# Patient Record
Sex: FEMALE | Race: WHITE | Employment: OTHER | URBAN - METROPOLITAN AREA
[De-identification: names, ages, dates, MRNs, and addresses within clinical notes are randomized per-mention and may not be internally consistent; named-entity substitution may affect disease eponyms.]

---

## 2017-02-23 ENCOUNTER — OFFICE VISIT (OUTPATIENT)
Dept: FAMILY MEDICINE CLINIC | Age: 67
End: 2017-02-23

## 2017-02-23 VITALS
DIASTOLIC BLOOD PRESSURE: 90 MMHG | HEIGHT: 64 IN | WEIGHT: 140 LBS | HEART RATE: 57 BPM | SYSTOLIC BLOOD PRESSURE: 160 MMHG | OXYGEN SATURATION: 97 % | BODY MASS INDEX: 23.9 KG/M2

## 2017-02-23 DIAGNOSIS — I25.10 CORONARY ARTERY DISEASE INVOLVING NATIVE CORONARY ARTERY OF NATIVE HEART WITHOUT ANGINA PECTORIS: ICD-10-CM

## 2017-02-23 DIAGNOSIS — R35.0 URINARY FREQUENCY: ICD-10-CM

## 2017-02-23 DIAGNOSIS — J45.20 MILD INTERMITTENT ASTHMA WITHOUT COMPLICATION: ICD-10-CM

## 2017-02-23 DIAGNOSIS — F32.A DEPRESSION, UNSPECIFIED DEPRESSION TYPE: ICD-10-CM

## 2017-02-23 DIAGNOSIS — I10 ESSENTIAL HYPERTENSION: Primary | ICD-10-CM

## 2017-02-23 DIAGNOSIS — I24.9 ACS (ACUTE CORONARY SYNDROME) (HCC): ICD-10-CM

## 2017-02-23 DIAGNOSIS — F41.9 ANXIETY: ICD-10-CM

## 2017-02-23 DIAGNOSIS — K21.9 GASTROESOPHAGEAL REFLUX DISEASE WITHOUT ESOPHAGITIS: ICD-10-CM

## 2017-02-23 DIAGNOSIS — F41.0 PANIC DISORDER: ICD-10-CM

## 2017-02-23 DIAGNOSIS — K52.9 COLITIS: ICD-10-CM

## 2017-02-23 DIAGNOSIS — Z79.899 LONG TERM USE OF DRUG: ICD-10-CM

## 2017-02-23 LAB
AMPHETAMINE SCREEN, URINE: NORMAL
BARBITURATE SCREEN, URINE: NORMAL
BASOPHILS ABSOLUTE: 0.2 K/UL (ref 0–0.2)
BASOPHILS RELATIVE PERCENT: 2.4 %
BENZODIAZEPINE SCREEN, URINE: NORMAL
BILIRUBIN, POC: ABNORMAL
BLOOD URINE, POC: ABNORMAL
CLARITY, POC: ABNORMAL
COCAINE METABOLITE SCREEN URINE: NORMAL
COLOR, POC: ABNORMAL
EOSINOPHILS ABSOLUTE: 0.1 K/UL (ref 0–0.6)
EOSINOPHILS RELATIVE PERCENT: 1.6 %
GLUCOSE URINE, POC: ABNORMAL
HCT VFR BLD CALC: 42.6 % (ref 36–48)
HEMOGLOBIN: 13.4 G/DL (ref 12–16)
KETONES, POC: ABNORMAL
LEUKOCYTE EST, POC: ABNORMAL
LYMPHOCYTES ABSOLUTE: 2.1 K/UL (ref 1–5.1)
LYMPHOCYTES RELATIVE PERCENT: 29.6 %
MCH RBC QN AUTO: 27.8 PG (ref 26–34)
MCHC RBC AUTO-ENTMCNC: 31.5 G/DL (ref 31–36)
MCV RBC AUTO: 88.3 FL (ref 80–100)
MDMA URINE: NORMAL
METHADONE SCREEN, URINE: NORMAL
METHAMPHETAMINE, URINE: NORMAL
MONOCYTES ABSOLUTE: 0.6 K/UL (ref 0–1.3)
MONOCYTES RELATIVE PERCENT: 7.9 %
NEUTROPHILS ABSOLUTE: 4.2 K/UL (ref 1.7–7.7)
NEUTROPHILS RELATIVE PERCENT: 58.5 %
NITRITE, POC: ABNORMAL
OPIATE SCREEN URINE: NORMAL
OXYCODONE SCREEN URINE: NORMAL
PDW BLD-RTO: 16.2 % (ref 12.4–15.4)
PH, POC: 6
PHENCYCLIDINE SCREEN URINE: NORMAL
PLATELET # BLD: 395 K/UL (ref 135–450)
PMV BLD AUTO: 9.5 FL (ref 5–10.5)
PROPOXYPHENE SCREEN, URINE: NORMAL
PROTEIN, POC: ABNORMAL
RBC # BLD: 4.82 M/UL (ref 4–5.2)
SPECIFIC GRAVITY, POC: 1
THC: NORMAL
TRICYCLIC ANTIDEPRESSANTS, UR: NORMAL
UROBILINOGEN, POC: NORMAL
WBC # BLD: 7.2 K/UL (ref 4–11)

## 2017-02-23 PROCEDURE — 80305 DRUG TEST PRSMV DIR OPT OBS: CPT | Performed by: NURSE PRACTITIONER

## 2017-02-23 PROCEDURE — G8484 FLU IMMUNIZE NO ADMIN: HCPCS | Performed by: NURSE PRACTITIONER

## 2017-02-23 PROCEDURE — 36415 COLL VENOUS BLD VENIPUNCTURE: CPT | Performed by: NURSE PRACTITIONER

## 2017-02-23 PROCEDURE — 81002 URINALYSIS NONAUTO W/O SCOPE: CPT | Performed by: NURSE PRACTITIONER

## 2017-02-23 PROCEDURE — 3014F SCREEN MAMMO DOC REV: CPT | Performed by: NURSE PRACTITIONER

## 2017-02-23 PROCEDURE — 3017F COLORECTAL CA SCREEN DOC REV: CPT | Performed by: NURSE PRACTITIONER

## 2017-02-23 PROCEDURE — G8427 DOCREV CUR MEDS BY ELIG CLIN: HCPCS | Performed by: NURSE PRACTITIONER

## 2017-02-23 PROCEDURE — 99215 OFFICE O/P EST HI 40 MIN: CPT | Performed by: NURSE PRACTITIONER

## 2017-02-23 PROCEDURE — G8598 ASA/ANTIPLAT THER USED: HCPCS | Performed by: NURSE PRACTITIONER

## 2017-02-23 PROCEDURE — G8420 CALC BMI NORM PARAMETERS: HCPCS | Performed by: NURSE PRACTITIONER

## 2017-02-23 PROCEDURE — 1036F TOBACCO NON-USER: CPT | Performed by: NURSE PRACTITIONER

## 2017-02-23 RX ORDER — BUDESONIDE AND FORMOTEROL FUMARATE DIHYDRATE 80; 4.5 UG/1; UG/1
2 AEROSOL RESPIRATORY (INHALATION) 2 TIMES DAILY
Qty: 3 INHALER | Refills: 1 | Status: SHIPPED | OUTPATIENT
Start: 2017-02-23 | End: 2017-07-13 | Stop reason: SDUPTHER

## 2017-02-23 RX ORDER — SIMVASTATIN 10 MG
10 TABLET ORAL EVERY EVENING
Qty: 90 TABLET | Refills: 1 | Status: SHIPPED | OUTPATIENT
Start: 2017-02-23 | End: 2017-07-13 | Stop reason: SDUPTHER

## 2017-02-23 RX ORDER — CIPROFLOXACIN 500 MG/1
500 TABLET, FILM COATED ORAL 2 TIMES DAILY
Qty: 10 TABLET | Refills: 0 | Status: SHIPPED | OUTPATIENT
Start: 2017-02-23 | End: 2017-02-28

## 2017-02-23 RX ORDER — ATENOLOL 25 MG/1
TABLET ORAL
Qty: 90 TABLET | Refills: 1 | Status: SHIPPED | OUTPATIENT
Start: 2017-02-23 | End: 2017-07-13 | Stop reason: SDUPTHER

## 2017-02-23 RX ORDER — PANTOPRAZOLE SODIUM 40 MG/1
TABLET, DELAYED RELEASE ORAL
Qty: 90 TABLET | Refills: 1 | Status: SHIPPED | OUTPATIENT
Start: 2017-02-23 | End: 2017-07-13 | Stop reason: SDUPTHER

## 2017-02-23 RX ORDER — ALBUTEROL SULFATE 90 UG/1
2 AEROSOL, METERED RESPIRATORY (INHALATION) EVERY 6 HOURS PRN
Qty: 3 INHALER | Refills: 1 | Status: SHIPPED | OUTPATIENT
Start: 2017-02-23 | End: 2017-07-13 | Stop reason: SDUPTHER

## 2017-02-23 RX ORDER — CLOPIDOGREL BISULFATE 75 MG/1
75 TABLET ORAL DAILY
Qty: 90 TABLET | Refills: 1 | Status: SHIPPED | OUTPATIENT
Start: 2017-02-23 | End: 2017-07-13 | Stop reason: SDUPTHER

## 2017-02-23 RX ORDER — NITROGLYCERIN 0.4 MG/1
0.4 TABLET SUBLINGUAL EVERY 5 MIN PRN
Qty: 25 TABLET | Refills: 2 | Status: SHIPPED | OUTPATIENT
Start: 2017-02-23 | End: 2018-04-10 | Stop reason: SDUPTHER

## 2017-02-23 RX ORDER — ALPRAZOLAM 1 MG/1
1 TABLET ORAL 2 TIMES DAILY PRN
Qty: 180 TABLET | Refills: 0 | Status: SHIPPED | OUTPATIENT
Start: 2017-02-23 | End: 2017-07-13 | Stop reason: SDUPTHER

## 2017-02-23 RX ORDER — HYDROCHLOROTHIAZIDE 12.5 MG/1
12.5 CAPSULE, GELATIN COATED ORAL DAILY
Qty: 90 CAPSULE | Refills: 1 | Status: SHIPPED | OUTPATIENT
Start: 2017-02-23 | End: 2017-08-11 | Stop reason: SDUPTHER

## 2017-02-23 ASSESSMENT — ENCOUNTER SYMPTOMS
DIARRHEA: 0
VOMITING: 0
SHORTNESS OF BREATH: 0
CHEST TIGHTNESS: 0
CONSTIPATION: 0
STRIDOR: 0
ABDOMINAL DISTENTION: 0
NAUSEA: 0
WHEEZING: 0
CHOKING: 0
FACIAL SWELLING: 0
BACK PAIN: 0
COUGH: 0
APNEA: 0
PHOTOPHOBIA: 0

## 2017-02-24 ENCOUNTER — TELEPHONE (OUTPATIENT)
Dept: FAMILY MEDICINE CLINIC | Age: 67
End: 2017-02-24

## 2017-02-24 LAB
A/G RATIO: 1.3 (ref 1.1–2.2)
ALBUMIN SERPL-MCNC: 4.3 G/DL (ref 3.4–5)
ALP BLD-CCNC: 90 U/L (ref 40–129)
ALT SERPL-CCNC: 11 U/L (ref 10–40)
ANION GAP SERPL CALCULATED.3IONS-SCNC: 19 MMOL/L (ref 3–16)
AST SERPL-CCNC: 22 U/L (ref 15–37)
BILIRUB SERPL-MCNC: 0.3 MG/DL (ref 0–1)
BUN BLDV-MCNC: 28 MG/DL (ref 7–20)
CALCIUM SERPL-MCNC: 9.9 MG/DL (ref 8.3–10.6)
CHLORIDE BLD-SCNC: 100 MMOL/L (ref 99–110)
CHOLESTEROL, TOTAL: 179 MG/DL (ref 0–199)
CO2: 22 MMOL/L (ref 21–32)
CREAT SERPL-MCNC: 1.2 MG/DL (ref 0.6–1.2)
GFR AFRICAN AMERICAN: 55
GFR NON-AFRICAN AMERICAN: 45
GLOBULIN: 3.3 G/DL
GLUCOSE BLD-MCNC: 84 MG/DL (ref 70–99)
HDLC SERPL-MCNC: 78 MG/DL (ref 40–60)
LDL CHOLESTEROL CALCULATED: 85 MG/DL
POTASSIUM SERPL-SCNC: 4.7 MMOL/L (ref 3.5–5.1)
SODIUM BLD-SCNC: 141 MMOL/L (ref 136–145)
TOTAL PROTEIN: 7.6 G/DL (ref 6.4–8.2)
TRIGL SERPL-MCNC: 81 MG/DL (ref 0–150)
VLDLC SERPL CALC-MCNC: 16 MG/DL

## 2017-02-27 ENCOUNTER — TELEPHONE (OUTPATIENT)
Dept: FAMILY MEDICINE CLINIC | Age: 67
End: 2017-02-27

## 2017-07-06 ENCOUNTER — HOSPITAL ENCOUNTER (OUTPATIENT)
Dept: ENDOSCOPY | Age: 67
Discharge: HOME | End: 2017-07-06
Attending: INTERNAL MEDICINE | Admitting: INTERNAL MEDICINE

## 2017-07-06 VITALS
WEIGHT: 132.5 LBS | OXYGEN SATURATION: 97 % | DIASTOLIC BLOOD PRESSURE: 68 MMHG | HEART RATE: 59 BPM | TEMPERATURE: 97.1 F | HEIGHT: 65 IN | BODY MASS INDEX: 22.08 KG/M2 | SYSTOLIC BLOOD PRESSURE: 109 MMHG | RESPIRATION RATE: 18 BRPM

## 2017-07-06 RX ORDER — SODIUM CHLORIDE 9 MG/ML
INJECTION, SOLUTION INTRAVENOUS CONTINUOUS
Status: DISCONTINUED | OUTPATIENT
Start: 2017-07-06 | End: 2017-07-07 | Stop reason: HOSPADM

## 2017-07-06 RX ADMIN — SODIUM CHLORIDE: 9 INJECTION, SOLUTION INTRAVENOUS at 08:34

## 2017-07-06 ASSESSMENT — PAIN - FUNCTIONAL ASSESSMENT
PAIN_FUNCTIONAL_ASSESSMENT: 0-10
PAIN_FUNCTIONAL_ASSESSMENT: 0-10

## 2017-07-06 ASSESSMENT — PAIN SCALES - GENERAL
PAINLEVEL_OUTOF10: 0

## 2017-07-13 ENCOUNTER — OFFICE VISIT (OUTPATIENT)
Dept: FAMILY MEDICINE CLINIC | Age: 67
End: 2017-07-13

## 2017-07-13 VITALS
HEART RATE: 66 BPM | DIASTOLIC BLOOD PRESSURE: 80 MMHG | BODY MASS INDEX: 22.96 KG/M2 | WEIGHT: 138 LBS | SYSTOLIC BLOOD PRESSURE: 130 MMHG

## 2017-07-13 DIAGNOSIS — F32.A DEPRESSION, UNSPECIFIED DEPRESSION TYPE: ICD-10-CM

## 2017-07-13 DIAGNOSIS — I73.9 PERIPHERAL VASCULAR DISEASE, UNSPECIFIED (HCC): ICD-10-CM

## 2017-07-13 DIAGNOSIS — F41.9 ANXIETY: ICD-10-CM

## 2017-07-13 DIAGNOSIS — I24.9 ACS (ACUTE CORONARY SYNDROME) (HCC): ICD-10-CM

## 2017-07-13 DIAGNOSIS — I25.10 CORONARY ARTERY DISEASE INVOLVING NATIVE CORONARY ARTERY OF NATIVE HEART WITHOUT ANGINA PECTORIS: ICD-10-CM

## 2017-07-13 DIAGNOSIS — I10 ESSENTIAL HYPERTENSION: ICD-10-CM

## 2017-07-13 DIAGNOSIS — F41.0 PANIC DISORDER: ICD-10-CM

## 2017-07-13 DIAGNOSIS — M54.42 ACUTE LEFT-SIDED LOW BACK PAIN WITH LEFT-SIDED SCIATICA: Primary | ICD-10-CM

## 2017-07-13 DIAGNOSIS — J45.20 MILD INTERMITTENT ASTHMA WITHOUT COMPLICATION: ICD-10-CM

## 2017-07-13 PROCEDURE — G8598 ASA/ANTIPLAT THER USED: HCPCS | Performed by: FAMILY MEDICINE

## 2017-07-13 PROCEDURE — 3014F SCREEN MAMMO DOC REV: CPT | Performed by: FAMILY MEDICINE

## 2017-07-13 PROCEDURE — 1036F TOBACCO NON-USER: CPT | Performed by: FAMILY MEDICINE

## 2017-07-13 PROCEDURE — G8420 CALC BMI NORM PARAMETERS: HCPCS | Performed by: FAMILY MEDICINE

## 2017-07-13 PROCEDURE — 3017F COLORECTAL CA SCREEN DOC REV: CPT | Performed by: FAMILY MEDICINE

## 2017-07-13 PROCEDURE — 99214 OFFICE O/P EST MOD 30 MIN: CPT | Performed by: FAMILY MEDICINE

## 2017-07-13 PROCEDURE — G8428 CUR MEDS NOT DOCUMENT: HCPCS | Performed by: FAMILY MEDICINE

## 2017-07-13 RX ORDER — ALPRAZOLAM 1 MG/1
1 TABLET ORAL 2 TIMES DAILY PRN
Qty: 180 TABLET | Refills: 0 | Status: CANCELLED | OUTPATIENT
Start: 2017-07-13

## 2017-07-13 RX ORDER — ALBUTEROL SULFATE 90 UG/1
2 AEROSOL, METERED RESPIRATORY (INHALATION) EVERY 6 HOURS PRN
Qty: 1 INHALER | Refills: 2 | Status: SHIPPED | OUTPATIENT
Start: 2017-07-13 | End: 2018-05-23 | Stop reason: SDUPTHER

## 2017-07-13 RX ORDER — BUDESONIDE AND FORMOTEROL FUMARATE DIHYDRATE 80; 4.5 UG/1; UG/1
2 AEROSOL RESPIRATORY (INHALATION) 2 TIMES DAILY
Qty: 3 INHALER | Refills: 1 | Status: CANCELLED | OUTPATIENT
Start: 2017-07-13

## 2017-07-13 RX ORDER — ATENOLOL 25 MG/1
TABLET ORAL
Qty: 90 TABLET | Refills: 1 | Status: CANCELLED | OUTPATIENT
Start: 2017-07-13

## 2017-07-13 RX ORDER — ALPRAZOLAM 1 MG/1
1 TABLET ORAL 2 TIMES DAILY PRN
Qty: 180 TABLET | Refills: 0 | Status: SHIPPED | OUTPATIENT
Start: 2017-07-13 | End: 2017-11-17 | Stop reason: SDUPTHER

## 2017-07-13 RX ORDER — ATENOLOL 25 MG/1
TABLET ORAL
Qty: 90 TABLET | Refills: 1 | Status: SHIPPED | OUTPATIENT
Start: 2017-07-13 | End: 2018-04-10 | Stop reason: SDUPTHER

## 2017-07-13 RX ORDER — BUDESONIDE AND FORMOTEROL FUMARATE DIHYDRATE 80; 4.5 UG/1; UG/1
2 AEROSOL RESPIRATORY (INHALATION) 2 TIMES DAILY
Qty: 1 INHALER | Refills: 0 | Status: CANCELLED | OUTPATIENT
Start: 2017-07-13

## 2017-07-13 RX ORDER — BUDESONIDE AND FORMOTEROL FUMARATE DIHYDRATE 80; 4.5 UG/1; UG/1
2 AEROSOL RESPIRATORY (INHALATION) 2 TIMES DAILY
Qty: 1 INHALER | Refills: 1 | Status: SHIPPED | OUTPATIENT
Start: 2017-07-13 | End: 2017-11-17 | Stop reason: SDUPTHER

## 2017-07-13 RX ORDER — PANTOPRAZOLE SODIUM 40 MG/1
TABLET, DELAYED RELEASE ORAL
Qty: 90 TABLET | Refills: 1 | Status: CANCELLED | OUTPATIENT
Start: 2017-07-13

## 2017-07-13 RX ORDER — ALBUTEROL SULFATE 90 UG/1
2 AEROSOL, METERED RESPIRATORY (INHALATION) EVERY 6 HOURS PRN
Qty: 1 INHALER | Refills: 0 | Status: CANCELLED | OUTPATIENT
Start: 2017-07-13

## 2017-07-13 RX ORDER — CLOPIDOGREL BISULFATE 75 MG/1
75 TABLET ORAL DAILY
Qty: 90 TABLET | Refills: 1 | Status: SHIPPED | OUTPATIENT
Start: 2017-07-13 | End: 2017-12-29 | Stop reason: SDUPTHER

## 2017-07-13 RX ORDER — NITROGLYCERIN 0.4 MG/1
0.4 TABLET SUBLINGUAL EVERY 5 MIN PRN
Qty: 25 TABLET | Refills: 2 | Status: CANCELLED | OUTPATIENT
Start: 2017-07-13

## 2017-07-13 RX ORDER — HYDROCHLOROTHIAZIDE 12.5 MG/1
12.5 CAPSULE, GELATIN COATED ORAL DAILY
Qty: 90 CAPSULE | Refills: 1 | Status: CANCELLED | OUTPATIENT
Start: 2017-07-13

## 2017-07-13 RX ORDER — PANTOPRAZOLE SODIUM 40 MG/1
TABLET, DELAYED RELEASE ORAL
Qty: 90 TABLET | Refills: 1 | Status: SHIPPED | OUTPATIENT
Start: 2017-07-13 | End: 2018-04-10 | Stop reason: SDUPTHER

## 2017-07-13 RX ORDER — SIMVASTATIN 10 MG
10 TABLET ORAL EVERY EVENING
Qty: 90 TABLET | Refills: 1 | Status: SHIPPED | OUTPATIENT
Start: 2017-07-13 | End: 2018-04-10 | Stop reason: SDUPTHER

## 2017-07-13 RX ORDER — SIMVASTATIN 10 MG
10 TABLET ORAL EVERY EVENING
Qty: 90 TABLET | Refills: 1 | Status: CANCELLED | OUTPATIENT
Start: 2017-07-13

## 2017-07-13 RX ORDER — ALBUTEROL SULFATE 90 UG/1
2 AEROSOL, METERED RESPIRATORY (INHALATION) EVERY 6 HOURS PRN
Qty: 3 INHALER | Refills: 1 | Status: CANCELLED | OUTPATIENT
Start: 2017-07-13

## 2017-07-13 RX ORDER — CLOPIDOGREL BISULFATE 75 MG/1
75 TABLET ORAL DAILY
Qty: 90 TABLET | Refills: 1 | Status: CANCELLED | OUTPATIENT
Start: 2017-07-13

## 2017-07-13 ASSESSMENT — ENCOUNTER SYMPTOMS
CONSTIPATION: 0
BACK PAIN: 1
ABDOMINAL PAIN: 1
BLOOD IN STOOL: 0
DIARRHEA: 1

## 2017-07-14 ENCOUNTER — HOSPITAL ENCOUNTER (OUTPATIENT)
Dept: OTHER | Age: 67
Discharge: HOME | End: 2017-07-14
Attending: FAMILY MEDICINE | Admitting: FAMILY MEDICINE

## 2017-07-14 DIAGNOSIS — M54.42 ACUTE LEFT-SIDED LOW BACK PAIN WITH LEFT-SIDED SCIATICA: ICD-10-CM

## 2017-07-17 ENCOUNTER — TELEPHONE (OUTPATIENT)
Dept: FAMILY MEDICINE CLINIC | Age: 67
End: 2017-07-17

## 2017-07-17 RX ORDER — OXYCODONE AND ACETAMINOPHEN 7.5; 325 MG/1; MG/1
1 TABLET ORAL EVERY 8 HOURS PRN
Qty: 60 TABLET | Refills: 0 | Status: SHIPPED | OUTPATIENT
Start: 2017-07-17 | End: 2017-11-17 | Stop reason: SDUPTHER

## 2017-07-17 RX ORDER — DEXAMETHASONE 4 MG/1
4 TABLET ORAL
Qty: 7 TABLET | Refills: 0 | Status: SHIPPED | OUTPATIENT
Start: 2017-07-17 | End: 2017-07-17

## 2017-07-26 ENCOUNTER — TELEPHONE (OUTPATIENT)
Dept: FAMILY MEDICINE CLINIC | Age: 67
End: 2017-07-26

## 2017-08-03 ENCOUNTER — TELEPHONE (OUTPATIENT)
Dept: FAMILY MEDICINE CLINIC | Age: 67
End: 2017-08-03

## 2017-08-11 RX ORDER — HYDROCHLOROTHIAZIDE 12.5 MG/1
12.5 CAPSULE, GELATIN COATED ORAL DAILY
Qty: 90 CAPSULE | Refills: 1 | Status: SHIPPED | OUTPATIENT
Start: 2017-08-11 | End: 2017-11-17

## 2017-11-17 ENCOUNTER — OFFICE VISIT (OUTPATIENT)
Dept: FAMILY MEDICINE CLINIC | Age: 67
End: 2017-11-17

## 2017-11-17 VITALS
TEMPERATURE: 97.9 F | OXYGEN SATURATION: 96 % | SYSTOLIC BLOOD PRESSURE: 144 MMHG | BODY MASS INDEX: 24.41 KG/M2 | WEIGHT: 143 LBS | HEIGHT: 64 IN | DIASTOLIC BLOOD PRESSURE: 80 MMHG | HEART RATE: 60 BPM

## 2017-11-17 DIAGNOSIS — J45.20 MILD INTERMITTENT ASTHMA WITHOUT COMPLICATION: ICD-10-CM

## 2017-11-17 DIAGNOSIS — F32.A DEPRESSION, UNSPECIFIED DEPRESSION TYPE: ICD-10-CM

## 2017-11-17 DIAGNOSIS — M54.42 ACUTE LEFT-SIDED LOW BACK PAIN WITH LEFT-SIDED SCIATICA: Primary | ICD-10-CM

## 2017-11-17 DIAGNOSIS — F41.0 PANIC DISORDER: ICD-10-CM

## 2017-11-17 DIAGNOSIS — F41.9 ANXIETY: ICD-10-CM

## 2017-11-17 PROCEDURE — G8427 DOCREV CUR MEDS BY ELIG CLIN: HCPCS | Performed by: NURSE PRACTITIONER

## 2017-11-17 PROCEDURE — 1123F ACP DISCUSS/DSCN MKR DOCD: CPT | Performed by: NURSE PRACTITIONER

## 2017-11-17 PROCEDURE — 99214 OFFICE O/P EST MOD 30 MIN: CPT | Performed by: NURSE PRACTITIONER

## 2017-11-17 PROCEDURE — 3017F COLORECTAL CA SCREEN DOC REV: CPT | Performed by: NURSE PRACTITIONER

## 2017-11-17 PROCEDURE — 3014F SCREEN MAMMO DOC REV: CPT | Performed by: NURSE PRACTITIONER

## 2017-11-17 PROCEDURE — G8598 ASA/ANTIPLAT THER USED: HCPCS | Performed by: NURSE PRACTITIONER

## 2017-11-17 PROCEDURE — 4040F PNEUMOC VAC/ADMIN/RCVD: CPT | Performed by: NURSE PRACTITIONER

## 2017-11-17 PROCEDURE — 1036F TOBACCO NON-USER: CPT | Performed by: NURSE PRACTITIONER

## 2017-11-17 PROCEDURE — G8420 CALC BMI NORM PARAMETERS: HCPCS | Performed by: NURSE PRACTITIONER

## 2017-11-17 PROCEDURE — G8482 FLU IMMUNIZE ORDER/ADMIN: HCPCS | Performed by: NURSE PRACTITIONER

## 2017-11-17 PROCEDURE — G8399 PT W/DXA RESULTS DOCUMENT: HCPCS | Performed by: NURSE PRACTITIONER

## 2017-11-17 PROCEDURE — 1090F PRES/ABSN URINE INCON ASSESS: CPT | Performed by: NURSE PRACTITIONER

## 2017-11-17 RX ORDER — ALPRAZOLAM 1 MG/1
1 TABLET ORAL 2 TIMES DAILY PRN
Qty: 180 TABLET | Refills: 0 | Status: SHIPPED | OUTPATIENT
Start: 2017-11-17 | End: 2018-02-15

## 2017-11-17 RX ORDER — OXYCODONE AND ACETAMINOPHEN 7.5; 325 MG/1; MG/1
1 TABLET ORAL EVERY 12 HOURS PRN
Qty: 60 TABLET | Refills: 0 | Status: SHIPPED | OUTPATIENT
Start: 2017-11-17 | End: 2018-04-16 | Stop reason: ALTCHOICE

## 2017-11-17 RX ORDER — BUDESONIDE AND FORMOTEROL FUMARATE DIHYDRATE 80; 4.5 UG/1; UG/1
2 AEROSOL RESPIRATORY (INHALATION) 2 TIMES DAILY
Qty: 2 INHALER | Refills: 0 | COMMUNITY
Start: 2017-11-17 | End: 2018-10-03 | Stop reason: ALTCHOICE

## 2017-11-17 RX ORDER — GABAPENTIN 300 MG/1
300 CAPSULE ORAL 2 TIMES DAILY
Qty: 60 CAPSULE | Refills: 3 | Status: SHIPPED | OUTPATIENT
Start: 2017-11-17 | End: 2018-04-16 | Stop reason: ALTCHOICE

## 2017-11-17 NOTE — PROGRESS NOTES
Subjective:      Patient ID: Ricardo Kelsey is a 74 y.o. female. HPI  Patient with history of chronic left sided back pain with left sciatica, HTN, IBS/colitis, depression/anxiety, panic disorder, and CAD is here with complaint of left leg numbness and burning for about one month. While attempting to discuss whether she had tried neurontin, she began complaining about how the GI doctors that she has been referred to in the past have done nothing to help her colitis. She was given a steroid after her last colonoscopy, but admits that she discontinued this because she was also prescribed a steroid by Dr. Francisco Grace at the same time for her back. When asked if Dr. Francisco Grace was aware that she was taking a steroid from the GI doctor, she said that she could not imagine that she didn't. She also discontinued the steroid prescribed by Dr. Juan Corado they were both just \"tearing me up\". She continues to have abdominal cramping and intermittent diarrhea, stating \"I can't go anywhere\". Past Medical History:   Diagnosis Date    Anxiety     Asthma     CAD (coronary artery disease)     Depression 5/17/2013    Disorders of porphyrin metabolism     Hypertension 5/3/2016    IBS (irritable bowel syndrome)     Menopause     Other and unspecified noninfectious gastroenteritis and colitis(558.9)     Palpitations     Panic disorder         Review of Systems   Constitutional: Negative. Negative for appetite change, chills, fatigue, fever and unexpected weight change. HENT: Negative for congestion, ear pain and facial swelling. Eyes: Negative for photophobia and visual disturbance. Respiratory: Negative for apnea, cough, choking, chest tightness, shortness of breath, wheezing and stridor. Cardiovascular: Negative. Negative for chest pain, palpitations and leg swelling. HTN; HLD; CAD   Gastrointestinal: Positive for abdominal pain and diarrhea.  Negative for abdominal distention, constipation, nausea and vomiting. History of ischemic colitis (biopsy confirmed) Dr. Miguel Gillette   Genitourinary: Negative for decreased urine volume, difficulty urinating, dysuria, flank pain, frequency, hematuria, pelvic pain and urgency. Musculoskeletal: Positive for back pain. Negative for gait problem, joint swelling and neck pain. Skin: Negative for color change and rash. Neurological: Positive for numbness. Psychiatric/Behavioral: Negative for agitation, behavioral problems, confusion, self-injury, sleep disturbance and suicidal ideas. The patient is not nervous/anxious. Depression and anxiety       Objective:   Physical Exam   Constitutional: She is oriented to person, place, and time. She appears well-developed and well-nourished. No distress. HENT:   Head: Normocephalic and atraumatic. Eyes: Conjunctivae are normal. Pupils are equal, round, and reactive to light. Right eye exhibits no discharge. Left eye exhibits no discharge. No scleral icterus. Neck: Normal range of motion. No tracheal deviation present. Cardiovascular: Normal rate, regular rhythm and normal heart sounds. Pulmonary/Chest: Effort normal and breath sounds normal. No respiratory distress. She has no wheezes. She has no rales. Abdominal: Soft. Bowel sounds are normal. She exhibits no distension and no mass. There is tenderness (diffuse tenderness). There is no rebound and no guarding. Musculoskeletal: She exhibits tenderness (tenderness in the lumbosacral region, but no left leg tenderness) and deformity (spasm in lumbar region). She exhibits no edema. Lymphadenopathy:     She has no cervical adenopathy. Neurological: She is alert and oriented to person, place, and time. She has normal reflexes. She exhibits normal muscle tone. Coordination normal.   Sensation is intact to light touch. Strength is 4/5 in bilateral LE's   Skin: Skin is warm and dry. No rash noted. She is not diaphoretic. No erythema.    Psychiatric: She has a

## 2017-11-17 NOTE — PROGRESS NOTES
Subjective:      Patient ID: Cloyde Peabody is a 74 y.o. female.     HPI    Review of Systems    Objective:   Physical Exam    Assessment:            Plan:

## 2017-11-20 ASSESSMENT — ENCOUNTER SYMPTOMS
WHEEZING: 0
FACIAL SWELLING: 0
CONSTIPATION: 0
DIARRHEA: 1
BACK PAIN: 1
CHEST TIGHTNESS: 0
VOMITING: 0
PHOTOPHOBIA: 0
STRIDOR: 0
SHORTNESS OF BREATH: 0
NAUSEA: 0
ABDOMINAL PAIN: 1
COLOR CHANGE: 0
COUGH: 0
ABDOMINAL DISTENTION: 0
CHOKING: 0
APNEA: 0

## 2017-12-29 DIAGNOSIS — I25.10 CORONARY ARTERY DISEASE INVOLVING NATIVE CORONARY ARTERY OF NATIVE HEART WITHOUT ANGINA PECTORIS: ICD-10-CM

## 2017-12-29 RX ORDER — CLOPIDOGREL BISULFATE 75 MG/1
75 TABLET ORAL DAILY
Qty: 90 TABLET | Refills: 0 | Status: SHIPPED | OUTPATIENT
Start: 2017-12-29 | End: 2018-04-10 | Stop reason: SDUPTHER

## 2018-02-08 RX ORDER — HYDROCHLOROTHIAZIDE 12.5 MG/1
CAPSULE, GELATIN COATED ORAL
Qty: 90 CAPSULE | Refills: 0 | Status: SHIPPED | OUTPATIENT
Start: 2018-02-08 | End: 2018-04-16 | Stop reason: ALTCHOICE

## 2018-04-10 ENCOUNTER — TELEPHONE (OUTPATIENT)
Dept: PRIMARY CARE CLINIC | Age: 68
End: 2018-04-10

## 2018-04-10 ENCOUNTER — OFFICE VISIT (OUTPATIENT)
Dept: PRIMARY CARE CLINIC | Age: 68
End: 2018-04-10

## 2018-04-10 VITALS
WEIGHT: 146 LBS | SYSTOLIC BLOOD PRESSURE: 130 MMHG | BODY MASS INDEX: 24.92 KG/M2 | HEIGHT: 64 IN | TEMPERATURE: 98.1 F | DIASTOLIC BLOOD PRESSURE: 80 MMHG | OXYGEN SATURATION: 98 % | HEART RATE: 60 BPM

## 2018-04-10 DIAGNOSIS — F41.0 PANIC DISORDER: ICD-10-CM

## 2018-04-10 DIAGNOSIS — R51.9 NONINTRACTABLE HEADACHE, UNSPECIFIED CHRONICITY PATTERN, UNSPECIFIED HEADACHE TYPE: Primary | ICD-10-CM

## 2018-04-10 DIAGNOSIS — I10 ESSENTIAL HYPERTENSION: ICD-10-CM

## 2018-04-10 DIAGNOSIS — I25.10 CORONARY ARTERY DISEASE INVOLVING NATIVE CORONARY ARTERY OF NATIVE HEART WITHOUT ANGINA PECTORIS: ICD-10-CM

## 2018-04-10 DIAGNOSIS — I24.9 ACS (ACUTE CORONARY SYNDROME) (HCC): ICD-10-CM

## 2018-04-10 DIAGNOSIS — R04.0 FREQUENT NOSEBLEEDS: ICD-10-CM

## 2018-04-10 PROCEDURE — G8399 PT W/DXA RESULTS DOCUMENT: HCPCS | Performed by: NURSE PRACTITIONER

## 2018-04-10 PROCEDURE — 4040F PNEUMOC VAC/ADMIN/RCVD: CPT | Performed by: NURSE PRACTITIONER

## 2018-04-10 PROCEDURE — 1036F TOBACCO NON-USER: CPT | Performed by: NURSE PRACTITIONER

## 2018-04-10 PROCEDURE — 1090F PRES/ABSN URINE INCON ASSESS: CPT | Performed by: NURSE PRACTITIONER

## 2018-04-10 PROCEDURE — 1123F ACP DISCUSS/DSCN MKR DOCD: CPT | Performed by: NURSE PRACTITIONER

## 2018-04-10 PROCEDURE — 3017F COLORECTAL CA SCREEN DOC REV: CPT | Performed by: NURSE PRACTITIONER

## 2018-04-10 PROCEDURE — 3014F SCREEN MAMMO DOC REV: CPT | Performed by: NURSE PRACTITIONER

## 2018-04-10 PROCEDURE — G8427 DOCREV CUR MEDS BY ELIG CLIN: HCPCS | Performed by: NURSE PRACTITIONER

## 2018-04-10 PROCEDURE — G8598 ASA/ANTIPLAT THER USED: HCPCS | Performed by: NURSE PRACTITIONER

## 2018-04-10 PROCEDURE — G8419 CALC BMI OUT NRM PARAM NOF/U: HCPCS | Performed by: NURSE PRACTITIONER

## 2018-04-10 PROCEDURE — 99214 OFFICE O/P EST MOD 30 MIN: CPT | Performed by: NURSE PRACTITIONER

## 2018-04-10 RX ORDER — PANTOPRAZOLE SODIUM 40 MG/1
TABLET, DELAYED RELEASE ORAL
Qty: 90 TABLET | Refills: 1 | Status: SHIPPED | OUTPATIENT
Start: 2018-04-10 | End: 2018-10-03 | Stop reason: SDUPTHER

## 2018-04-10 RX ORDER — CLOPIDOGREL BISULFATE 75 MG/1
75 TABLET ORAL DAILY
Qty: 90 TABLET | Refills: 0 | Status: SHIPPED | OUTPATIENT
Start: 2018-04-10 | End: 2018-04-10 | Stop reason: SDUPTHER

## 2018-04-10 RX ORDER — SIMVASTATIN 10 MG
10 TABLET ORAL EVERY EVENING
Qty: 90 TABLET | Refills: 1 | Status: SHIPPED | OUTPATIENT
Start: 2018-04-10 | End: 2018-10-03 | Stop reason: SDUPTHER

## 2018-04-10 RX ORDER — ATENOLOL 25 MG/1
TABLET ORAL
Qty: 90 TABLET | Refills: 1 | Status: SHIPPED | OUTPATIENT
Start: 2018-04-10 | End: 2018-04-16 | Stop reason: SDUPTHER

## 2018-04-10 RX ORDER — ALPRAZOLAM 1 MG/1
1 TABLET ORAL NIGHTLY PRN
COMMUNITY
End: 2018-04-10 | Stop reason: SDUPTHER

## 2018-04-10 RX ORDER — PANTOPRAZOLE SODIUM 40 MG/1
TABLET, DELAYED RELEASE ORAL
Qty: 90 TABLET | Refills: 1 | Status: SHIPPED | OUTPATIENT
Start: 2018-04-10 | End: 2018-04-10 | Stop reason: SDUPTHER

## 2018-04-10 RX ORDER — NITROGLYCERIN 0.4 MG/1
0.4 TABLET SUBLINGUAL EVERY 5 MIN PRN
Qty: 25 TABLET | Refills: 2 | Status: SHIPPED | OUTPATIENT
Start: 2018-04-10 | End: 2019-01-11 | Stop reason: SDUPTHER

## 2018-04-10 RX ORDER — ALPRAZOLAM 1 MG/1
1 TABLET ORAL NIGHTLY PRN
Qty: 30 TABLET | Refills: 0 | Status: SHIPPED | OUTPATIENT
Start: 2018-04-10 | End: 2018-05-23 | Stop reason: SDUPTHER

## 2018-04-10 RX ORDER — SIMVASTATIN 10 MG
10 TABLET ORAL EVERY EVENING
Qty: 90 TABLET | Refills: 1 | Status: SHIPPED | OUTPATIENT
Start: 2018-04-10 | End: 2018-04-10 | Stop reason: SDUPTHER

## 2018-04-10 RX ORDER — ATENOLOL 25 MG/1
TABLET ORAL
Qty: 90 TABLET | Refills: 1 | Status: SHIPPED | OUTPATIENT
Start: 2018-04-10 | End: 2018-04-10 | Stop reason: SDUPTHER

## 2018-04-10 RX ORDER — CLOPIDOGREL BISULFATE 75 MG/1
75 TABLET ORAL DAILY
Qty: 90 TABLET | Refills: 0 | Status: SHIPPED | OUTPATIENT
Start: 2018-04-10 | End: 2018-05-23 | Stop reason: SDUPTHER

## 2018-04-10 ASSESSMENT — ENCOUNTER SYMPTOMS
SORE THROAT: 0
SCALP TENDERNESS: 0
EYE WATERING: 0
COUGH: 0
SINUS PRESSURE: 0
VOMITING: 0
BACK PAIN: 0
PHOTOPHOBIA: 0
BLURRED VISION: 0
RHINORRHEA: 0
SWOLLEN GLANDS: 0
FACIAL SWEATING: 0
ABDOMINAL PAIN: 0
VISUAL CHANGE: 0
SINUS PAIN: 0
NAUSEA: 0
EYE REDNESS: 0
EYE PAIN: 0

## 2018-04-16 ENCOUNTER — TELEPHONE (OUTPATIENT)
Dept: PRIMARY CARE CLINIC | Age: 68
End: 2018-04-16

## 2018-04-16 DIAGNOSIS — I10 ESSENTIAL HYPERTENSION: ICD-10-CM

## 2018-04-16 RX ORDER — ATENOLOL 25 MG/1
TABLET ORAL
Qty: 7 TABLET | Refills: 0 | Status: SHIPPED | OUTPATIENT
Start: 2018-04-16 | End: 2018-05-23 | Stop reason: SDUPTHER

## 2018-04-19 ENCOUNTER — HOSPITAL ENCOUNTER (OUTPATIENT)
Dept: OTHER | Age: 68
Discharge: HOME | End: 2018-04-19
Attending: NURSE PRACTITIONER | Admitting: NURSE PRACTITIONER

## 2018-04-19 DIAGNOSIS — R51.9 NONINTRACTABLE HEADACHE, UNSPECIFIED CHRONICITY PATTERN, UNSPECIFIED HEADACHE TYPE: ICD-10-CM

## 2018-05-23 ENCOUNTER — OFFICE VISIT (OUTPATIENT)
Dept: PRIMARY CARE CLINIC | Age: 68
End: 2018-05-23

## 2018-05-23 VITALS
HEIGHT: 65 IN | WEIGHT: 146 LBS | BODY MASS INDEX: 24.32 KG/M2 | TEMPERATURE: 97.7 F | HEART RATE: 60 BPM | DIASTOLIC BLOOD PRESSURE: 80 MMHG | OXYGEN SATURATION: 97 % | SYSTOLIC BLOOD PRESSURE: 130 MMHG

## 2018-05-23 DIAGNOSIS — M54.2 NECK PAIN: ICD-10-CM

## 2018-05-23 DIAGNOSIS — I10 ESSENTIAL HYPERTENSION: ICD-10-CM

## 2018-05-23 DIAGNOSIS — J45.20 MILD INTERMITTENT ASTHMA WITHOUT COMPLICATION: ICD-10-CM

## 2018-05-23 DIAGNOSIS — R35.0 URINARY FREQUENCY: ICD-10-CM

## 2018-05-23 DIAGNOSIS — F41.9 ANXIETY: Primary | ICD-10-CM

## 2018-05-23 DIAGNOSIS — I25.10 CORONARY ARTERY DISEASE INVOLVING NATIVE CORONARY ARTERY OF NATIVE HEART WITHOUT ANGINA PECTORIS: ICD-10-CM

## 2018-05-23 DIAGNOSIS — F34.1 DYSTHYMIA: ICD-10-CM

## 2018-05-23 DIAGNOSIS — Z79.899 LONG TERM USE OF DRUG: ICD-10-CM

## 2018-05-23 LAB
A/G RATIO: 1.4 (ref 1.1–2.2)
ALBUMIN SERPL-MCNC: 4.5 G/DL (ref 3.4–5)
ALP BLD-CCNC: 91 U/L (ref 40–129)
ALT SERPL-CCNC: 11 U/L (ref 10–40)
AMPHETAMINE SCREEN, URINE: NORMAL
ANION GAP SERPL CALCULATED.3IONS-SCNC: 14 MMOL/L (ref 3–16)
AST SERPL-CCNC: 22 U/L (ref 15–37)
BARBITURATE SCREEN, URINE: NORMAL
BASOPHILS ABSOLUTE: 0.1 K/UL (ref 0–0.2)
BASOPHILS RELATIVE PERCENT: 1 %
BENZODIAZEPINE SCREEN, URINE: NORMAL
BILIRUB SERPL-MCNC: 0.3 MG/DL (ref 0–1)
BILIRUBIN, POC: ABNORMAL
BLOOD URINE, POC: ABNORMAL
BUN BLDV-MCNC: 25 MG/DL (ref 7–20)
CALCIUM SERPL-MCNC: 10.2 MG/DL (ref 8.3–10.6)
CHLORIDE BLD-SCNC: 104 MMOL/L (ref 99–110)
CHOLESTEROL, TOTAL: 197 MG/DL (ref 0–199)
CLARITY, POC: ABNORMAL
CO2: 25 MMOL/L (ref 21–32)
COCAINE METABOLITE SCREEN URINE: NORMAL
COLOR, POC: ABNORMAL
CREAT SERPL-MCNC: 1.5 MG/DL (ref 0.6–1.2)
EOSINOPHILS ABSOLUTE: 0.2 K/UL (ref 0–0.6)
EOSINOPHILS RELATIVE PERCENT: 2 %
GFR AFRICAN AMERICAN: 42
GFR NON-AFRICAN AMERICAN: 35
GLOBULIN: 3.2 G/DL
GLUCOSE BLD-MCNC: 86 MG/DL (ref 70–99)
GLUCOSE URINE, POC: ABNORMAL
HCT VFR BLD CALC: 37.6 % (ref 36–48)
HDLC SERPL-MCNC: 73 MG/DL (ref 40–60)
HEMOGLOBIN: 12.5 G/DL (ref 12–16)
KETONES, POC: ABNORMAL
LDL CHOLESTEROL CALCULATED: 99 MG/DL
LEUKOCYTE EST, POC: ABNORMAL
LYMPHOCYTES ABSOLUTE: 2.9 K/UL (ref 1–5.1)
LYMPHOCYTES RELATIVE PERCENT: 35.6 %
MCH RBC QN AUTO: 27.1 PG (ref 26–34)
MCHC RBC AUTO-ENTMCNC: 33.3 G/DL (ref 31–36)
MCV RBC AUTO: 81.3 FL (ref 80–100)
MDMA URINE: NORMAL
METHADONE SCREEN, URINE: NORMAL
METHAMPHETAMINE, URINE: NORMAL
MONOCYTES ABSOLUTE: 0.7 K/UL (ref 0–1.3)
MONOCYTES RELATIVE PERCENT: 8.8 %
NEUTROPHILS ABSOLUTE: 4.3 K/UL (ref 1.7–7.7)
NEUTROPHILS RELATIVE PERCENT: 52.6 %
NITRITE, POC: ABNORMAL
OPIATE SCREEN URINE: NORMAL
OXYCODONE SCREEN URINE: NORMAL
PDW BLD-RTO: 18 % (ref 12.4–15.4)
PH, POC: 5.5
PHENCYCLIDINE SCREEN URINE: NORMAL
PLATELET # BLD: 445 K/UL (ref 135–450)
PMV BLD AUTO: 9.6 FL (ref 5–10.5)
POTASSIUM SERPL-SCNC: 5.9 MMOL/L (ref 3.5–5.1)
PROPOXYPHENE SCREEN, URINE: NORMAL
PROTEIN, POC: ABNORMAL
RBC # BLD: 4.63 M/UL (ref 4–5.2)
SODIUM BLD-SCNC: 143 MMOL/L (ref 136–145)
SPECIFIC GRAVITY, POC: 1
THC: NORMAL
TOTAL PROTEIN: 7.7 G/DL (ref 6.4–8.2)
TRICYCLIC ANTIDEPRESSANTS, UR: NORMAL
TRIGL SERPL-MCNC: 123 MG/DL (ref 0–150)
TSH REFLEX FT4: 0.89 UIU/ML (ref 0.27–4.2)
UROBILINOGEN, POC: 0.2
VLDLC SERPL CALC-MCNC: 25 MG/DL
WBC # BLD: 8.3 K/UL (ref 4–11)

## 2018-05-23 PROCEDURE — 1036F TOBACCO NON-USER: CPT | Performed by: NURSE PRACTITIONER

## 2018-05-23 PROCEDURE — 3017F COLORECTAL CA SCREEN DOC REV: CPT | Performed by: NURSE PRACTITIONER

## 2018-05-23 PROCEDURE — 99214 OFFICE O/P EST MOD 30 MIN: CPT | Performed by: NURSE PRACTITIONER

## 2018-05-23 PROCEDURE — 80305 DRUG TEST PRSMV DIR OPT OBS: CPT | Performed by: NURSE PRACTITIONER

## 2018-05-23 PROCEDURE — 81002 URINALYSIS NONAUTO W/O SCOPE: CPT | Performed by: NURSE PRACTITIONER

## 2018-05-23 PROCEDURE — G8427 DOCREV CUR MEDS BY ELIG CLIN: HCPCS | Performed by: NURSE PRACTITIONER

## 2018-05-23 PROCEDURE — 1090F PRES/ABSN URINE INCON ASSESS: CPT | Performed by: NURSE PRACTITIONER

## 2018-05-23 PROCEDURE — G8420 CALC BMI NORM PARAMETERS: HCPCS | Performed by: NURSE PRACTITIONER

## 2018-05-23 PROCEDURE — G8399 PT W/DXA RESULTS DOCUMENT: HCPCS | Performed by: NURSE PRACTITIONER

## 2018-05-23 PROCEDURE — 1123F ACP DISCUSS/DSCN MKR DOCD: CPT | Performed by: NURSE PRACTITIONER

## 2018-05-23 PROCEDURE — G8598 ASA/ANTIPLAT THER USED: HCPCS | Performed by: NURSE PRACTITIONER

## 2018-05-23 PROCEDURE — 4040F PNEUMOC VAC/ADMIN/RCVD: CPT | Performed by: NURSE PRACTITIONER

## 2018-05-23 PROCEDURE — 36415 COLL VENOUS BLD VENIPUNCTURE: CPT | Performed by: NURSE PRACTITIONER

## 2018-05-23 RX ORDER — ALPRAZOLAM 1 MG/1
1 TABLET ORAL NIGHTLY PRN
Qty: 30 TABLET | Refills: 2 | Status: SHIPPED | OUTPATIENT
Start: 2018-05-23 | End: 2018-10-03 | Stop reason: SDUPTHER

## 2018-05-23 RX ORDER — ATENOLOL 25 MG/1
TABLET ORAL
Qty: 90 TABLET | Refills: 0 | Status: SHIPPED | OUTPATIENT
Start: 2018-05-23 | End: 2018-10-03 | Stop reason: SDUPTHER

## 2018-05-23 RX ORDER — ALBUTEROL SULFATE 90 UG/1
2 AEROSOL, METERED RESPIRATORY (INHALATION) EVERY 6 HOURS PRN
Qty: 1 INHALER | Refills: 2 | Status: SHIPPED | OUTPATIENT
Start: 2018-05-23 | End: 2018-10-03 | Stop reason: SDUPTHER

## 2018-05-23 RX ORDER — CIPROFLOXACIN 500 MG/1
500 TABLET, FILM COATED ORAL 2 TIMES DAILY
Qty: 10 TABLET | Refills: 0 | Status: SHIPPED | OUTPATIENT
Start: 2018-05-23 | End: 2018-05-28

## 2018-05-23 RX ORDER — CLOPIDOGREL BISULFATE 75 MG/1
75 TABLET ORAL DAILY
Qty: 90 TABLET | Refills: 0 | Status: SHIPPED | OUTPATIENT
Start: 2018-05-23 | End: 2018-10-03 | Stop reason: SDUPTHER

## 2018-05-23 RX ORDER — CYCLOBENZAPRINE HCL 10 MG
10 TABLET ORAL 3 TIMES DAILY PRN
Qty: 90 TABLET | Refills: 1 | Status: SHIPPED | OUTPATIENT
Start: 2018-05-23 | End: 2018-10-03 | Stop reason: SDUPTHER

## 2018-05-23 ASSESSMENT — ENCOUNTER SYMPTOMS
ABDOMINAL DISTENTION: 0
COUGH: 0
PHOTOPHOBIA: 0
VOMITING: 0
CHEST TIGHTNESS: 0
COLOR CHANGE: 0
APNEA: 0
NAUSEA: 0
STRIDOR: 0
ABDOMINAL PAIN: 0
BLOOD IN STOOL: 0
TROUBLE SWALLOWING: 0
WHEEZING: 0
DIARRHEA: 0
VOICE CHANGE: 0
CHOKING: 0
SHORTNESS OF BREATH: 0
CONSTIPATION: 0
FACIAL SWELLING: 0
BACK PAIN: 0

## 2018-05-24 ENCOUNTER — TELEPHONE (OUTPATIENT)
Dept: PRIMARY CARE CLINIC | Age: 68
End: 2018-05-24

## 2018-05-24 DIAGNOSIS — N18.30 CKD (CHRONIC KIDNEY DISEASE) STAGE 3, GFR 30-59 ML/MIN (HCC): Primary | ICD-10-CM

## 2018-06-04 ASSESSMENT — PATIENT HEALTH QUESTIONNAIRE - PHQ9
SUM OF ALL RESPONSES TO PHQ QUESTIONS 1-9: 0
2. FEELING DOWN, DEPRESSED OR HOPELESS: 0
SUM OF ALL RESPONSES TO PHQ9 QUESTIONS 1 & 2: 0
1. LITTLE INTEREST OR PLEASURE IN DOING THINGS: 0

## 2018-06-11 ENCOUNTER — TELEPHONE (OUTPATIENT)
Dept: PRIMARY CARE CLINIC | Age: 68
End: 2018-06-11

## 2018-06-11 DIAGNOSIS — R51.9 NONINTRACTABLE EPISODIC HEADACHE, UNSPECIFIED HEADACHE TYPE: Primary | ICD-10-CM

## 2018-06-13 ENCOUNTER — HOSPITAL ENCOUNTER (OUTPATIENT)
Dept: CT IMAGING | Age: 68
Discharge: HOME | End: 2018-06-13
Attending: NURSE PRACTITIONER | Admitting: NURSE PRACTITIONER

## 2018-06-13 DIAGNOSIS — R51.9 NONINTRACTABLE EPISODIC HEADACHE, UNSPECIFIED HEADACHE TYPE: ICD-10-CM

## 2018-06-13 DIAGNOSIS — R51.9 HEADACHE: ICD-10-CM

## 2018-06-13 LAB — SEDIMENTATION RATE, ERYTHROCYTE: 38 MM/HR (ref 0–30)

## 2018-06-13 RX ORDER — PREDNISONE 20 MG/1
40 TABLET ORAL DAILY
Qty: 20 TABLET | Refills: 0 | Status: SHIPPED | OUTPATIENT
Start: 2018-06-13 | End: 2018-06-23

## 2018-06-19 ENCOUNTER — OFFICE VISIT (OUTPATIENT)
Dept: PRIMARY CARE CLINIC | Age: 68
End: 2018-06-19

## 2018-06-19 VITALS
HEART RATE: 71 BPM | OXYGEN SATURATION: 91 % | DIASTOLIC BLOOD PRESSURE: 80 MMHG | TEMPERATURE: 98.5 F | WEIGHT: 147.2 LBS | HEIGHT: 65 IN | SYSTOLIC BLOOD PRESSURE: 130 MMHG | BODY MASS INDEX: 24.53 KG/M2

## 2018-06-19 DIAGNOSIS — Z23 NEED FOR ZOSTER VACCINATION: ICD-10-CM

## 2018-06-19 DIAGNOSIS — G44.209 TENSION-TYPE HEADACHE, NOT INTRACTABLE, UNSPECIFIED CHRONICITY PATTERN: Primary | ICD-10-CM

## 2018-06-19 DIAGNOSIS — Z12.39 BREAST CANCER SCREENING: ICD-10-CM

## 2018-06-19 DIAGNOSIS — Z23 NEED FOR VACCINATION FOR STREP PNEUMONIAE: ICD-10-CM

## 2018-06-19 DIAGNOSIS — M54.2 NECK PAIN: ICD-10-CM

## 2018-06-19 PROCEDURE — G0009 ADMIN PNEUMOCOCCAL VACCINE: HCPCS | Performed by: NURSE PRACTITIONER

## 2018-06-19 PROCEDURE — G8427 DOCREV CUR MEDS BY ELIG CLIN: HCPCS | Performed by: NURSE PRACTITIONER

## 2018-06-19 PROCEDURE — G8399 PT W/DXA RESULTS DOCUMENT: HCPCS | Performed by: NURSE PRACTITIONER

## 2018-06-19 PROCEDURE — G8420 CALC BMI NORM PARAMETERS: HCPCS | Performed by: NURSE PRACTITIONER

## 2018-06-19 PROCEDURE — G8598 ASA/ANTIPLAT THER USED: HCPCS | Performed by: NURSE PRACTITIONER

## 2018-06-19 PROCEDURE — 3017F COLORECTAL CA SCREEN DOC REV: CPT | Performed by: NURSE PRACTITIONER

## 2018-06-19 PROCEDURE — 1123F ACP DISCUSS/DSCN MKR DOCD: CPT | Performed by: NURSE PRACTITIONER

## 2018-06-19 PROCEDURE — 4040F PNEUMOC VAC/ADMIN/RCVD: CPT | Performed by: NURSE PRACTITIONER

## 2018-06-19 PROCEDURE — 90670 PCV13 VACCINE IM: CPT | Performed by: NURSE PRACTITIONER

## 2018-06-19 PROCEDURE — 99213 OFFICE O/P EST LOW 20 MIN: CPT | Performed by: NURSE PRACTITIONER

## 2018-06-19 PROCEDURE — 1090F PRES/ABSN URINE INCON ASSESS: CPT | Performed by: NURSE PRACTITIONER

## 2018-06-19 PROCEDURE — 4004F PT TOBACCO SCREEN RCVD TLK: CPT | Performed by: NURSE PRACTITIONER

## 2018-06-19 ASSESSMENT — ENCOUNTER SYMPTOMS
NAUSEA: 0
EYE PAIN: 0
BLOOD IN STOOL: 0
VOMITING: 0
EYE DISCHARGE: 0
TROUBLE SWALLOWING: 0
VOICE CHANGE: 0
PHOTOPHOBIA: 0
CHOKING: 0
SHORTNESS OF BREATH: 0
CHEST TIGHTNESS: 0
SINUS PAIN: 0
APNEA: 0
DIARRHEA: 0
CONSTIPATION: 0
SINUS PRESSURE: 0
WHEEZING: 0
ABDOMINAL PAIN: 0
STRIDOR: 0
EYE ITCHING: 0
COUGH: 0
ABDOMINAL DISTENTION: 0
BACK PAIN: 0
COLOR CHANGE: 0
EYE REDNESS: 0

## 2018-07-06 ENCOUNTER — HOSPITAL ENCOUNTER (OUTPATIENT)
Dept: ULTRASOUND IMAGING | Age: 68
Discharge: HOME | End: 2018-07-06
Attending: INTERNAL MEDICINE | Admitting: INTERNAL MEDICINE

## 2018-07-06 DIAGNOSIS — N18.30 CHRONIC KIDNEY DISEASE, STAGE III (MODERATE) (HCC): ICD-10-CM

## 2018-08-03 ENCOUNTER — HOSPITAL ENCOUNTER (OUTPATIENT)
Dept: INFUSION THERAPY | Age: 68
Discharge: HOME | End: 2018-08-31
Admitting: INTERNAL MEDICINE

## 2018-08-03 VITALS
OXYGEN SATURATION: 100 % | HEART RATE: 72 BPM | DIASTOLIC BLOOD PRESSURE: 80 MMHG | SYSTOLIC BLOOD PRESSURE: 140 MMHG | TEMPERATURE: 98.1 F | RESPIRATION RATE: 16 BRPM

## 2018-08-03 RX ORDER — SODIUM CHLORIDE 9 MG/ML
INJECTION, SOLUTION INTRAVENOUS CONTINUOUS
Status: DISPENSED | OUTPATIENT
Start: 2018-08-03 | End: 2018-08-03

## 2018-08-03 RX ADMIN — SODIUM CHLORIDE: 9 INJECTION, SOLUTION INTRAVENOUS at 14:20

## 2018-08-03 RX ADMIN — SODIUM CHLORIDE: 9 INJECTION, SOLUTION INTRAVENOUS at 12:17

## 2018-08-04 ENCOUNTER — HOSPITAL ENCOUNTER (OUTPATIENT)
Dept: OTHER | Age: 68
Discharge: HOME | End: 2018-08-04
Attending: INTERNAL MEDICINE | Admitting: INTERNAL MEDICINE

## 2018-08-04 LAB
ALBUMIN SERPL-MCNC: 3.6 G/DL (ref 3.4–5)
ANION GAP SERPL CALCULATED.3IONS-SCNC: 12 MMOL/L (ref 3–16)
BUN BLDV-MCNC: 23 MG/DL (ref 7–20)
CALCIUM SERPL-MCNC: 8.8 MG/DL (ref 8.3–10.6)
CHLORIDE BLD-SCNC: 107 MMOL/L (ref 99–110)
CO2: 21 MMOL/L (ref 21–32)
CREAT SERPL-MCNC: 1.6 MG/DL (ref 0.6–1.2)
CREATININE URINE: 131.3 MG/DL (ref 28–259)
GFR AFRICAN AMERICAN: 39
GFR NON-AFRICAN AMERICAN: 32
GLUCOSE BLD-MCNC: 92 MG/DL (ref 70–99)
HCT VFR BLD CALC: 37.3 % (ref 36–48)
HEMOGLOBIN: 12 G/DL (ref 12–16)
MCH RBC QN AUTO: 26.5 PG (ref 26–34)
MCHC RBC AUTO-ENTMCNC: 32.3 G/DL (ref 31–36)
MCV RBC AUTO: 82.2 FL (ref 80–100)
MICROALBUMIN UR-MCNC: 10.2 MG/DL
MICROALBUMIN/CREAT UR-RTO: 77.7 MG/G (ref 0–30)
PARATHYROID HORMONE INTACT: 69.6 PG/ML (ref 14–72)
PDW BLD-RTO: 18.7 % (ref 12.4–15.4)
PHOSPHORUS: 3.1 MG/DL (ref 2.5–4.9)
PLATELET # BLD: 424 K/UL (ref 135–450)
PMV BLD AUTO: 8.3 FL (ref 5–10.5)
POTASSIUM SERPL-SCNC: 3.6 MMOL/L (ref 3.5–5.1)
PROTEIN PROTEIN: 37 MG/DL
RBC # BLD: 4.53 M/UL (ref 4–5.2)
SODIUM BLD-SCNC: 140 MMOL/L (ref 136–145)
VITAMIN D 25-HYDROXY: 31.1 NG/ML
WBC # BLD: 9.9 K/UL (ref 4–11)

## 2018-09-01 ENCOUNTER — HOSPITAL ENCOUNTER (OUTPATIENT)
Dept: INFUSION THERAPY | Age: 68
Discharge: HOME | End: 2018-09-01
Attending: INTERNAL MEDICINE | Admitting: INTERNAL MEDICINE

## 2018-09-23 DIAGNOSIS — I25.10 CORONARY ARTERY DISEASE INVOLVING NATIVE CORONARY ARTERY OF NATIVE HEART WITHOUT ANGINA PECTORIS: ICD-10-CM

## 2018-09-23 DIAGNOSIS — I10 ESSENTIAL HYPERTENSION: ICD-10-CM

## 2018-09-24 DIAGNOSIS — I25.10 CORONARY ARTERY DISEASE INVOLVING NATIVE CORONARY ARTERY OF NATIVE HEART WITHOUT ANGINA PECTORIS: ICD-10-CM

## 2018-09-25 RX ORDER — CLOPIDOGREL BISULFATE 75 MG/1
75 TABLET ORAL DAILY
Qty: 90 TABLET | Refills: 0 | OUTPATIENT
Start: 2018-09-25

## 2018-09-25 RX ORDER — SIMVASTATIN 10 MG
TABLET ORAL
Qty: 90 TABLET | Refills: 1 | OUTPATIENT
Start: 2018-09-25

## 2018-09-25 RX ORDER — ATENOLOL 25 MG/1
TABLET ORAL
Qty: 90 TABLET | Refills: 1 | OUTPATIENT
Start: 2018-09-25

## 2018-10-01 ENCOUNTER — TELEPHONE (OUTPATIENT)
Dept: PRIMARY CARE CLINIC | Age: 68
End: 2018-10-01

## 2018-10-02 DIAGNOSIS — I25.10 CORONARY ARTERY DISEASE INVOLVING NATIVE CORONARY ARTERY OF NATIVE HEART WITHOUT ANGINA PECTORIS: ICD-10-CM

## 2018-10-03 ENCOUNTER — OFFICE VISIT (OUTPATIENT)
Dept: PRIMARY CARE CLINIC | Age: 68
End: 2018-10-03
Payer: MEDICARE

## 2018-10-03 VITALS
BODY MASS INDEX: 25.19 KG/M2 | SYSTOLIC BLOOD PRESSURE: 138 MMHG | TEMPERATURE: 98 F | WEIGHT: 151.2 LBS | DIASTOLIC BLOOD PRESSURE: 84 MMHG | HEIGHT: 65 IN | HEART RATE: 72 BPM

## 2018-10-03 DIAGNOSIS — F41.0 PANIC DISORDER: ICD-10-CM

## 2018-10-03 DIAGNOSIS — F34.1 DYSTHYMIA: ICD-10-CM

## 2018-10-03 DIAGNOSIS — J45.20 MILD INTERMITTENT ASTHMA WITHOUT COMPLICATION: ICD-10-CM

## 2018-10-03 DIAGNOSIS — I25.10 CORONARY ARTERY DISEASE INVOLVING NATIVE CORONARY ARTERY OF NATIVE HEART WITHOUT ANGINA PECTORIS: ICD-10-CM

## 2018-10-03 DIAGNOSIS — F41.9 ANXIETY: ICD-10-CM

## 2018-10-03 DIAGNOSIS — M54.2 NECK PAIN: ICD-10-CM

## 2018-10-03 DIAGNOSIS — I10 ESSENTIAL HYPERTENSION: Primary | ICD-10-CM

## 2018-10-03 DIAGNOSIS — M50.30 DDD (DEGENERATIVE DISC DISEASE), CERVICAL: ICD-10-CM

## 2018-10-03 DIAGNOSIS — Z12.39 SCREENING FOR BREAST CANCER: ICD-10-CM

## 2018-10-03 PROBLEM — N28.9 KIDNEY DISORDER: Status: ACTIVE | Noted: 2018-10-03

## 2018-10-03 PROBLEM — K21.9 GASTROESOPHAGEAL REFLUX DISEASE: Status: ACTIVE | Noted: 2018-10-03

## 2018-10-03 PROBLEM — M19.90 ARTHRITIS: Status: ACTIVE | Noted: 2018-10-03

## 2018-10-03 PROBLEM — R51.9 HEADACHE: Status: ACTIVE | Noted: 2018-10-03

## 2018-10-03 PROBLEM — R32 URINARY INCONTINENCE: Status: ACTIVE | Noted: 2018-10-03

## 2018-10-03 PROBLEM — J45.909 ASTHMA: Status: ACTIVE | Noted: 2018-10-03

## 2018-10-03 PROBLEM — E80.21 ACUTE INTERMITTENT PORPHYRIA (HCC): Status: ACTIVE | Noted: 2018-10-03

## 2018-10-03 PROBLEM — J30.2 SEASONAL ALLERGIC RHINITIS: Status: ACTIVE | Noted: 2018-10-03

## 2018-10-03 PROBLEM — I48.91 ATRIAL FIBRILLATION (HCC): Status: ACTIVE | Noted: 2018-10-03

## 2018-10-03 PROCEDURE — 99214 OFFICE O/P EST MOD 30 MIN: CPT | Performed by: NURSE PRACTITIONER

## 2018-10-03 RX ORDER — CLOPIDOGREL BISULFATE 75 MG/1
75 TABLET ORAL DAILY
Qty: 90 TABLET | Refills: 1 | Status: SHIPPED | OUTPATIENT
Start: 2018-10-03 | End: 2019-04-12 | Stop reason: SDUPTHER

## 2018-10-03 RX ORDER — PANTOPRAZOLE SODIUM 40 MG/1
TABLET, DELAYED RELEASE ORAL
Qty: 90 TABLET | Refills: 1 | Status: SHIPPED | OUTPATIENT
Start: 2018-10-03 | End: 2019-04-01 | Stop reason: SDUPTHER

## 2018-10-03 RX ORDER — BUPROPION HYDROCHLORIDE 150 MG/1
150 TABLET ORAL EVERY MORNING
Qty: 30 TABLET | Refills: 3 | Status: SHIPPED | OUTPATIENT
Start: 2018-10-03 | End: 2018-11-14 | Stop reason: SDUPTHER

## 2018-10-03 RX ORDER — ATENOLOL 25 MG/1
TABLET ORAL
Qty: 90 TABLET | Refills: 1 | Status: SHIPPED | OUTPATIENT
Start: 2018-10-03 | End: 2018-12-24 | Stop reason: SDUPTHER

## 2018-10-03 RX ORDER — CYCLOBENZAPRINE HCL 10 MG
10 TABLET ORAL 3 TIMES DAILY PRN
Qty: 90 TABLET | Refills: 1 | Status: SHIPPED | OUTPATIENT
Start: 2018-10-03 | End: 2019-06-25 | Stop reason: SDUPTHER

## 2018-10-03 RX ORDER — CLOPIDOGREL BISULFATE 75 MG/1
75 TABLET ORAL DAILY
Qty: 90 TABLET | Refills: 0 | OUTPATIENT
Start: 2018-10-03

## 2018-10-03 RX ORDER — ALPRAZOLAM 1 MG/1
1 TABLET ORAL NIGHTLY PRN
Qty: 30 TABLET | Refills: 2 | Status: SHIPPED | OUTPATIENT
Start: 2018-10-03 | End: 2019-01-11 | Stop reason: SDUPTHER

## 2018-10-03 RX ORDER — ALBUTEROL SULFATE 90 UG/1
2 AEROSOL, METERED RESPIRATORY (INHALATION) EVERY 6 HOURS PRN
Qty: 1 INHALER | Refills: 2 | Status: SHIPPED | OUTPATIENT
Start: 2018-10-03 | End: 2019-04-12 | Stop reason: SDUPTHER

## 2018-10-03 RX ORDER — SIMVASTATIN 10 MG
10 TABLET ORAL EVERY EVENING
Qty: 90 TABLET | Refills: 1 | Status: SHIPPED | OUTPATIENT
Start: 2018-10-03 | End: 2019-04-12 | Stop reason: SDUPTHER

## 2018-10-03 ASSESSMENT — ENCOUNTER SYMPTOMS
NAUSEA: 0
FACIAL SWELLING: 0
ABDOMINAL PAIN: 0
BACK PAIN: 0
ABDOMINAL DISTENTION: 0
CHEST TIGHTNESS: 0
STRIDOR: 0
DIARRHEA: 0
COLOR CHANGE: 0
CONSTIPATION: 0
VOMITING: 0
PHOTOPHOBIA: 0
TROUBLE SWALLOWING: 0
WHEEZING: 0
BLOOD IN STOOL: 0
VOICE CHANGE: 0
SHORTNESS OF BREATH: 0
APNEA: 0
COUGH: 0
CHOKING: 0

## 2018-10-03 NOTE — PROGRESS NOTES
Subjective:      Patient ID: Marlon Yancey is a 74 y.o. female. HPI  Patient is here for follow up of HLD, CAD, HTN, GERD, depression with anxiety, and headaches. She was recently diagnosed with acute intermittent porphyria and is being followed by Dr. Dori Moya for this. She is requesting to try something other than zoloft for her depression. She reports that this medication caused severe dry mouth. she is complaining about not having as much xanax as she has had in the past.  WE have discussed that I am not willing to increase her dose. She declines a referral to psychiatry. She is having headaches which originate in the posterior neck. These are chronic. She does have a history of DDD both lumbar and cervical and has been seen at [address] [place] [address] in the remote past.  We have discussed this and she is wanting to see Dr. Mendy Diaz. Past Medical History:   Diagnosis Date    Allergic rhinitis     Anxiety     Asthma     Atrial fibrillation (HCC)     CAD (coronary artery disease)     Chronic kidney disease     Depression 5/17/2013    Disorders of porphyrin metabolism     GERD (gastroesophageal reflux disease)     Headache     Hypertension 5/3/2016    IBS (irritable bowel syndrome)     Menopause     Other and unspecified noninfectious gastroenteritis and colitis(558.9)     Palpitations     Panic disorder     Urinary incontinence        Review of Systems   Constitutional: Negative for activity change, appetite change, chills, diaphoresis, fatigue, fever and unexpected weight change. HENT: Negative for congestion, ear pain, facial swelling, trouble swallowing and voice change. Eyes: Negative for photophobia and visual disturbance. Respiratory: Negative for apnea, cough, choking, chest tightness, shortness of breath, wheezing and stridor. Cardiovascular: Negative for chest pain, palpitations and leg swelling.         HTN; HLD; CAD   Gastrointestinal: Negative for abdominal distention, abdominal pain, blood in stool, constipation, diarrhea, nausea and vomiting. History of ischemic colitis (biopsy confirmed) followed by Dr. Fani Bright   Genitourinary: Negative for decreased urine volume, difficulty urinating, dysuria, flank pain, frequency, hematuria and urgency. Musculoskeletal: Negative for back pain, gait problem, joint swelling and neck pain. Skin: Negative for color change, rash and wound. Neurological: Positive for headaches (chronic posterior headaches). Negative for dizziness, syncope, speech difficulty, weakness and light-headedness. Hematological:        History of acute intermittent porphyria? ? Psychiatric/Behavioral: Negative for agitation, behavioral problems, confusion, decreased concentration, self-injury, sleep disturbance and suicidal ideas. The patient is nervous/anxious. Objective:   Physical Exam   Constitutional: She is oriented to person, place, and time. She appears well-developed and well-nourished. No distress. HENT:   Head: Normocephalic and atraumatic. No mastoid or tragus tenderness   Eyes: Pupils are equal, round, and reactive to light. Conjunctivae are normal. Right eye exhibits no discharge. Left eye exhibits no discharge. No scleral icterus. Neck: Normal range of motion. Neck supple. No tracheal deviation present. No thyromegaly present. No bruits   Cardiovascular: Normal rate, regular rhythm and normal heart sounds. Exam reveals no gallop and no friction rub. No murmur heard. Pulmonary/Chest: Effort normal and breath sounds normal. No respiratory distress. She has no wheezes. She has no rales. Abdominal: Soft. She exhibits no distension and no mass. There is no tenderness. There is no rebound. Neurological: She is alert and oriented to person, place, and time. No cranial nerve deficit. Skin: Skin is warm and dry. No rash noted. She is not diaphoretic. No erythema. Psychiatric: She has a normal mood and affect.  Her

## 2018-10-08 ENCOUNTER — TELEPHONE (OUTPATIENT)
Dept: PRIMARY CARE CLINIC | Age: 68
End: 2018-10-08

## 2018-10-08 NOTE — TELEPHONE ENCOUNTER
I don't see a referral in pts chart for [facility]. Will verify with Genaro Hawkins what the referral is for and what records need sent.

## 2018-11-14 ENCOUNTER — OFFICE VISIT (OUTPATIENT)
Dept: PRIMARY CARE CLINIC | Age: 68
End: 2018-11-14
Payer: MEDICARE

## 2018-11-14 VITALS
DIASTOLIC BLOOD PRESSURE: 78 MMHG | SYSTOLIC BLOOD PRESSURE: 138 MMHG | OXYGEN SATURATION: 97 % | WEIGHT: 160 LBS | HEIGHT: 64 IN | HEART RATE: 50 BPM | BODY MASS INDEX: 27.31 KG/M2 | TEMPERATURE: 98.3 F

## 2018-11-14 DIAGNOSIS — M04.8 OTHER AUTOINFLAMMATORY SYNDROMES (HCC): ICD-10-CM

## 2018-11-14 DIAGNOSIS — R43.2 ALTERED TASTE: ICD-10-CM

## 2018-11-14 DIAGNOSIS — R43.2 DYSGEUSIA: Primary | ICD-10-CM

## 2018-11-14 DIAGNOSIS — F41.8 DEPRESSION WITH ANXIETY: ICD-10-CM

## 2018-11-14 LAB
FOLATE: 7.85 NG/ML (ref 4.78–24.2)
TSH REFLEX: 1.49 UIU/ML (ref 0.27–4.2)
VITAMIN B-12: 465 PG/ML (ref 211–911)

## 2018-11-14 PROCEDURE — 1090F PRES/ABSN URINE INCON ASSESS: CPT | Performed by: NURSE PRACTITIONER

## 2018-11-14 PROCEDURE — G8598 ASA/ANTIPLAT THER USED: HCPCS | Performed by: NURSE PRACTITIONER

## 2018-11-14 PROCEDURE — G8419 CALC BMI OUT NRM PARAM NOF/U: HCPCS | Performed by: NURSE PRACTITIONER

## 2018-11-14 PROCEDURE — 36415 COLL VENOUS BLD VENIPUNCTURE: CPT | Performed by: NURSE PRACTITIONER

## 2018-11-14 PROCEDURE — 1123F ACP DISCUSS/DSCN MKR DOCD: CPT | Performed by: NURSE PRACTITIONER

## 2018-11-14 PROCEDURE — 4004F PT TOBACCO SCREEN RCVD TLK: CPT | Performed by: NURSE PRACTITIONER

## 2018-11-14 PROCEDURE — 99213 OFFICE O/P EST LOW 20 MIN: CPT | Performed by: NURSE PRACTITIONER

## 2018-11-14 PROCEDURE — 4040F PNEUMOC VAC/ADMIN/RCVD: CPT | Performed by: NURSE PRACTITIONER

## 2018-11-14 PROCEDURE — 1101F PT FALLS ASSESS-DOCD LE1/YR: CPT | Performed by: NURSE PRACTITIONER

## 2018-11-14 PROCEDURE — 3017F COLORECTAL CA SCREEN DOC REV: CPT | Performed by: NURSE PRACTITIONER

## 2018-11-14 PROCEDURE — G8399 PT W/DXA RESULTS DOCUMENT: HCPCS | Performed by: NURSE PRACTITIONER

## 2018-11-14 PROCEDURE — G8427 DOCREV CUR MEDS BY ELIG CLIN: HCPCS | Performed by: NURSE PRACTITIONER

## 2018-11-14 PROCEDURE — G8484 FLU IMMUNIZE NO ADMIN: HCPCS | Performed by: NURSE PRACTITIONER

## 2018-11-14 RX ORDER — BUPROPION HYDROCHLORIDE 300 MG/1
300 TABLET ORAL EVERY MORNING
Qty: 30 TABLET | Refills: 2 | Status: SHIPPED | OUTPATIENT
Start: 2018-11-14 | End: 2018-12-18 | Stop reason: SINTOL

## 2018-11-14 RX ORDER — PREDNISONE 1 MG/1
TABLET ORAL
Qty: 36 TABLET | Refills: 0 | Status: SHIPPED | OUTPATIENT
Start: 2018-11-14 | End: 2018-12-18 | Stop reason: ALTCHOICE

## 2018-11-14 ASSESSMENT — ENCOUNTER SYMPTOMS
ABDOMINAL DISTENTION: 0
CHOKING: 0
VOMITING: 0
ABDOMINAL PAIN: 0
BACK PAIN: 0
TROUBLE SWALLOWING: 0
PHOTOPHOBIA: 0
APNEA: 0
STRIDOR: 0
BLOOD IN STOOL: 0
SINUS PRESSURE: 0
FACIAL SWELLING: 0
WHEEZING: 0
SORE THROAT: 0
CHEST TIGHTNESS: 0
VOICE CHANGE: 0
DIARRHEA: 0
NAUSEA: 0
CONSTIPATION: 0
COUGH: 0
COLOR CHANGE: 0
SHORTNESS OF BREATH: 0
SINUS PAIN: 0

## 2018-11-21 ENCOUNTER — NURSE ONLY (OUTPATIENT)
Dept: PRIMARY CARE CLINIC | Age: 68
End: 2018-11-21
Payer: MEDICARE

## 2018-11-21 DIAGNOSIS — Z23 NEED FOR INFLUENZA VACCINATION: Primary | ICD-10-CM

## 2018-11-21 DIAGNOSIS — E53.8 VITAMIN B 12 DEFICIENCY: ICD-10-CM

## 2018-11-21 PROCEDURE — 96372 THER/PROPH/DIAG INJ SC/IM: CPT | Performed by: NURSE PRACTITIONER

## 2018-11-21 PROCEDURE — G0008 ADMIN INFLUENZA VIRUS VAC: HCPCS | Performed by: NURSE PRACTITIONER

## 2018-11-21 PROCEDURE — 90662 IIV NO PRSV INCREASED AG IM: CPT | Performed by: NURSE PRACTITIONER

## 2018-11-21 RX ORDER — CYANOCOBALAMIN 1000 UG/ML
1000 INJECTION INTRAMUSCULAR; SUBCUTANEOUS ONCE
Status: COMPLETED | OUTPATIENT
Start: 2018-11-21 | End: 2018-11-21

## 2018-11-21 RX ADMIN — CYANOCOBALAMIN 1000 MCG: 1000 INJECTION INTRAMUSCULAR; SUBCUTANEOUS at 11:30

## 2018-12-18 ENCOUNTER — OFFICE VISIT (OUTPATIENT)
Dept: PRIMARY CARE CLINIC | Age: 68
End: 2018-12-18
Payer: MEDICARE

## 2018-12-18 VITALS
HEART RATE: 73 BPM | WEIGHT: 159 LBS | HEIGHT: 64 IN | OXYGEN SATURATION: 95 % | BODY MASS INDEX: 27.14 KG/M2 | DIASTOLIC BLOOD PRESSURE: 80 MMHG | SYSTOLIC BLOOD PRESSURE: 138 MMHG | TEMPERATURE: 97.8 F

## 2018-12-18 DIAGNOSIS — F41.9 ANXIETY: Primary | ICD-10-CM

## 2018-12-18 DIAGNOSIS — N39.0 ACUTE UTI: ICD-10-CM

## 2018-12-18 LAB
BILIRUBIN, POC: ABNORMAL
BLOOD URINE, POC: ABNORMAL
CLARITY, POC: ABNORMAL
COLOR, POC: ABNORMAL
GLUCOSE URINE, POC: ABNORMAL
KETONES, POC: ABNORMAL
LEUKOCYTE EST, POC: ABNORMAL
NITRITE, POC: ABNORMAL
PH, POC: 6
PROTEIN, POC: ABNORMAL
SPECIFIC GRAVITY, POC: <1.005
UROBILINOGEN, POC: 0.2

## 2018-12-18 PROCEDURE — 81002 URINALYSIS NONAUTO W/O SCOPE: CPT | Performed by: NURSE PRACTITIONER

## 2018-12-18 PROCEDURE — G8419 CALC BMI OUT NRM PARAM NOF/U: HCPCS | Performed by: NURSE PRACTITIONER

## 2018-12-18 PROCEDURE — 1101F PT FALLS ASSESS-DOCD LE1/YR: CPT | Performed by: NURSE PRACTITIONER

## 2018-12-18 PROCEDURE — 4004F PT TOBACCO SCREEN RCVD TLK: CPT | Performed by: NURSE PRACTITIONER

## 2018-12-18 PROCEDURE — G8399 PT W/DXA RESULTS DOCUMENT: HCPCS | Performed by: NURSE PRACTITIONER

## 2018-12-18 PROCEDURE — 1090F PRES/ABSN URINE INCON ASSESS: CPT | Performed by: NURSE PRACTITIONER

## 2018-12-18 PROCEDURE — 4040F PNEUMOC VAC/ADMIN/RCVD: CPT | Performed by: NURSE PRACTITIONER

## 2018-12-18 PROCEDURE — G8427 DOCREV CUR MEDS BY ELIG CLIN: HCPCS | Performed by: NURSE PRACTITIONER

## 2018-12-18 PROCEDURE — 3017F COLORECTAL CA SCREEN DOC REV: CPT | Performed by: NURSE PRACTITIONER

## 2018-12-18 PROCEDURE — 99214 OFFICE O/P EST MOD 30 MIN: CPT | Performed by: NURSE PRACTITIONER

## 2018-12-18 PROCEDURE — G8598 ASA/ANTIPLAT THER USED: HCPCS | Performed by: NURSE PRACTITIONER

## 2018-12-18 PROCEDURE — 1123F ACP DISCUSS/DSCN MKR DOCD: CPT | Performed by: NURSE PRACTITIONER

## 2018-12-18 PROCEDURE — G8482 FLU IMMUNIZE ORDER/ADMIN: HCPCS | Performed by: NURSE PRACTITIONER

## 2018-12-18 RX ORDER — NITROFURANTOIN 25; 75 MG/1; MG/1
100 CAPSULE ORAL 2 TIMES DAILY
Qty: 20 CAPSULE | Refills: 0 | Status: SHIPPED | OUTPATIENT
Start: 2018-12-18 | End: 2018-12-28

## 2018-12-18 RX ORDER — BUPROPION HYDROCHLORIDE 150 MG/1
150 TABLET ORAL EVERY MORNING
Qty: 30 TABLET | Refills: 3 | Status: SHIPPED | OUTPATIENT
Start: 2018-12-18 | End: 2019-04-12 | Stop reason: SDUPTHER

## 2018-12-18 ASSESSMENT — ENCOUNTER SYMPTOMS
BACK PAIN: 0
CHEST TIGHTNESS: 0
EYE REDNESS: 0
SHORTNESS OF BREATH: 0
COUGH: 0
ABDOMINAL PAIN: 0
ABDOMINAL DISTENTION: 0

## 2018-12-18 NOTE — PATIENT INSTRUCTIONS
Patient Education        Anxiety Disorder: Care Instructions  Your Care Instructions    Anxiety is a normal reaction to stress. Difficult situations can cause you to have symptoms such as sweaty palms and a nervous feeling. In an anxiety disorder, the symptoms are far more severe. Constant worry, muscle tension, trouble sleeping, nausea and diarrhea, and other symptoms can make normal daily activities difficult or impossible. These symptoms may occur for no reason, and they can affect your work, school, or social life. Medicines, counseling, and self-care can all help. Follow-up care is a key part of your treatment and safety. Be sure to make and go to all appointments, and call your doctor if you are having problems. It's also a good idea to know your test results and keep a list of the medicines you take. How can you care for yourself at home? · Take medicines exactly as directed. Call your doctor if you think you are having a problem with your medicine. · Go to your counseling sessions and follow-up appointments. · Recognize and accept your anxiety. Then, when you are in a situation that makes you anxious, say to yourself, \"This is not an emergency. I feel uncomfortable, but I am not in danger. I can keep going even if I feel anxious. \"  · Be kind to your body:  ? Relieve tension with exercise or a massage. ? Get enough rest.  ? Avoid alcohol, caffeine, nicotine, and illegal drugs. They can increase your anxiety level and cause sleep problems. ? Learn and do relaxation techniques. See below for more about these techniques. · Engage your mind. Get out and do something you enjoy. Go to a funny movie, or take a walk or hike. Plan your day. Having too much or too little to do can make you anxious. · Keep a record of your symptoms. Discuss your fears with a good friend or family member, or join a support group for people with similar problems. Talking to others sometimes relieves stress.   · Get involved in

## 2018-12-18 NOTE — PROGRESS NOTES
[facility] Note    Date: 12/18/2018                                               Subjective/Objective:     Chief Complaint   Patient presents with    Check-Up     discuss dry mouth -started whe put on zoloft, med was stopped and changed. still has dry mouth after 1 month. also poss UTI       HPI   80-year-old female patient comes in office today for an acute visit regarding side effects from her medications. Depression- was on Zoloft but developed severe dry mouth was changed to Wellbutrin 150 then increased to 300mg. Now having increase RLS, agitation fells like she wants to come out of her skin, cant focus and dizziness  uti-1 week Denies any nausea or vomiting.      Objective   Patient Active Problem List    Diagnosis Date Noted    Acute intermittent porphyria ([address] 10/03/2018    Arthritis 10/03/2018    Asthma 10/03/2018    Atrial fibrillation ([facility] [address] 10/03/2018    Gastroesophageal reflux disease 10/03/2018    Headache 10/03/2018    Kidney disorder 10/03/2018    Seasonal allergic rhinitis 10/03/2018    Urinary incontinence 10/03/2018    Sepsis ([address] 05/03/2016    GI bleed 05/03/2016    Colitis 05/03/2016    Peripheral arterial disease ([address] 05/03/2016    CAD (coronary artery disease) 05/03/2016    Hypertension 05/03/2016    Acute coronary syndrome ([address] 10/21/2013    Depression 05/17/2013    Anxiety     Panic disorder     Palpitations     IBS (irritable bowel syndrome)        Past Medical History:   Diagnosis Date    Allergic rhinitis     Anxiety     Asthma     Atrial fibrillation (HCC)     CAD (coronary artery disease)     Chronic kidney disease     Depression 5/17/2013    Disorders of porphyrin metabolism     GERD (gastroesophageal reflux disease)     Headache     Hx of blood clots     Hypertension 5/3/2016    IBS (irritable bowel syndrome)     Menopause     Other and unspecified noninfectious gastroenteritis and colitis(558.9)     Palpitations     Panic

## 2018-12-19 LAB — URINE CULTURE, ROUTINE: NORMAL

## 2018-12-24 DIAGNOSIS — I10 ESSENTIAL HYPERTENSION: ICD-10-CM

## 2018-12-24 RX ORDER — ATENOLOL 25 MG/1
TABLET ORAL
Qty: 90 TABLET | Refills: 0 | Status: SHIPPED | OUTPATIENT
Start: 2018-12-24 | End: 2019-04-12 | Stop reason: SDUPTHER

## 2019-01-11 ENCOUNTER — OFFICE VISIT (OUTPATIENT)
Dept: PRIMARY CARE CLINIC | Age: 69
End: 2019-01-11
Payer: MEDICARE

## 2019-01-11 VITALS
SYSTOLIC BLOOD PRESSURE: 128 MMHG | HEIGHT: 64 IN | WEIGHT: 160 LBS | BODY MASS INDEX: 27.31 KG/M2 | OXYGEN SATURATION: 97 % | TEMPERATURE: 98 F | DIASTOLIC BLOOD PRESSURE: 76 MMHG | HEART RATE: 69 BPM

## 2019-01-11 DIAGNOSIS — J01.00 ACUTE NON-RECURRENT MAXILLARY SINUSITIS: ICD-10-CM

## 2019-01-11 DIAGNOSIS — I24.9 ACS (ACUTE CORONARY SYNDROME) (HCC): ICD-10-CM

## 2019-01-11 DIAGNOSIS — F41.9 ANXIETY: Primary | ICD-10-CM

## 2019-01-11 DIAGNOSIS — R31.9 URINARY TRACT INFECTION WITH HEMATURIA, SITE UNSPECIFIED: ICD-10-CM

## 2019-01-11 DIAGNOSIS — N39.0 URINARY TRACT INFECTION WITH HEMATURIA, SITE UNSPECIFIED: ICD-10-CM

## 2019-01-11 DIAGNOSIS — Z79.899 LONG TERM USE OF DRUG: ICD-10-CM

## 2019-01-11 LAB
AMPHETAMINE SCREEN, URINE: NORMAL
BARBITURATE SCREEN, URINE: NORMAL
BENZODIAZEPINE SCREEN, URINE: NORMAL
BILIRUBIN, POC: ABNORMAL
BLOOD URINE, POC: ABNORMAL
BUPRENORPHINE URINE: NORMAL
CLARITY, POC: ABNORMAL
COCAINE METABOLITE SCREEN URINE: NORMAL
COLOR, POC: ABNORMAL
GABAPENTIN SCREEN, URINE: NORMAL
GLUCOSE URINE, POC: ABNORMAL
KETONES, POC: ABNORMAL
LEUKOCYTE EST, POC: ABNORMAL
MDMA URINE: NORMAL
METHADONE SCREEN, URINE: NORMAL
METHAMPHETAMINE, URINE: NORMAL
NITRITE, POC: ABNORMAL
OPIATE SCREEN URINE: NORMAL
OXYCODONE SCREEN URINE: NORMAL
PH, POC: 5.5
PHENCYCLIDINE SCREEN URINE: NORMAL
PROPOXYPHENE SCREEN, URINE: NORMAL
PROTEIN, POC: 100
SPECIFIC GRAVITY, POC: 1.02
THC SCREEN, URINE: NORMAL
TRICYCLIC ANTIDEPRESSANTS, UR: NORMAL
UROBILINOGEN, POC: 0.2

## 2019-01-11 PROCEDURE — G8419 CALC BMI OUT NRM PARAM NOF/U: HCPCS | Performed by: NURSE PRACTITIONER

## 2019-01-11 PROCEDURE — G8399 PT W/DXA RESULTS DOCUMENT: HCPCS | Performed by: NURSE PRACTITIONER

## 2019-01-11 PROCEDURE — 4004F PT TOBACCO SCREEN RCVD TLK: CPT | Performed by: NURSE PRACTITIONER

## 2019-01-11 PROCEDURE — G8482 FLU IMMUNIZE ORDER/ADMIN: HCPCS | Performed by: NURSE PRACTITIONER

## 2019-01-11 PROCEDURE — 80305 DRUG TEST PRSMV DIR OPT OBS: CPT | Performed by: NURSE PRACTITIONER

## 2019-01-11 PROCEDURE — 81002 URINALYSIS NONAUTO W/O SCOPE: CPT | Performed by: NURSE PRACTITIONER

## 2019-01-11 PROCEDURE — G8598 ASA/ANTIPLAT THER USED: HCPCS | Performed by: NURSE PRACTITIONER

## 2019-01-11 PROCEDURE — 4040F PNEUMOC VAC/ADMIN/RCVD: CPT | Performed by: NURSE PRACTITIONER

## 2019-01-11 PROCEDURE — 99214 OFFICE O/P EST MOD 30 MIN: CPT | Performed by: NURSE PRACTITIONER

## 2019-01-11 PROCEDURE — 3017F COLORECTAL CA SCREEN DOC REV: CPT | Performed by: NURSE PRACTITIONER

## 2019-01-11 PROCEDURE — 1123F ACP DISCUSS/DSCN MKR DOCD: CPT | Performed by: NURSE PRACTITIONER

## 2019-01-11 PROCEDURE — 1101F PT FALLS ASSESS-DOCD LE1/YR: CPT | Performed by: NURSE PRACTITIONER

## 2019-01-11 PROCEDURE — G8427 DOCREV CUR MEDS BY ELIG CLIN: HCPCS | Performed by: NURSE PRACTITIONER

## 2019-01-11 PROCEDURE — 1090F PRES/ABSN URINE INCON ASSESS: CPT | Performed by: NURSE PRACTITIONER

## 2019-01-11 RX ORDER — ALPRAZOLAM 1 MG/1
1 TABLET ORAL NIGHTLY PRN
COMMUNITY
End: 2019-04-12 | Stop reason: SDUPTHER

## 2019-01-11 RX ORDER — ALPRAZOLAM 1 MG/1
1 TABLET ORAL NIGHTLY PRN
Qty: 90 TABLET | Refills: 0 | Status: SHIPPED | OUTPATIENT
Start: 2019-01-12 | End: 2019-04-12 | Stop reason: SDUPTHER

## 2019-01-11 RX ORDER — AMOXICILLIN AND CLAVULANATE POTASSIUM 875; 125 MG/1; MG/1
1 TABLET, FILM COATED ORAL 2 TIMES DAILY
Qty: 20 TABLET | Refills: 0 | Status: SHIPPED | OUTPATIENT
Start: 2019-01-11 | End: 2019-01-21

## 2019-01-11 RX ORDER — NITROGLYCERIN 0.4 MG/1
0.4 TABLET SUBLINGUAL EVERY 5 MIN PRN
Qty: 25 TABLET | Refills: 2 | Status: SHIPPED | OUTPATIENT
Start: 2019-01-11 | End: 2019-08-02 | Stop reason: SDUPTHER

## 2019-01-11 ASSESSMENT — ENCOUNTER SYMPTOMS
COLOR CHANGE: 0
SINUS PAIN: 0
SHORTNESS OF BREATH: 0
CHEST TIGHTNESS: 0
ABDOMINAL DISTENTION: 0
BACK PAIN: 0
CHOKING: 0
SINUS PRESSURE: 0
ABDOMINAL PAIN: 0
CONSTIPATION: 0
BLOOD IN STOOL: 0
STRIDOR: 0
PHOTOPHOBIA: 0
VOICE CHANGE: 0
APNEA: 0
SORE THROAT: 0
COUGH: 0
NAUSEA: 0
FACIAL SWELLING: 0
VOMITING: 0
DIARRHEA: 0
WHEEZING: 0
TROUBLE SWALLOWING: 0

## 2019-04-01 DIAGNOSIS — F41.0 PANIC DISORDER: ICD-10-CM

## 2019-04-01 RX ORDER — PANTOPRAZOLE SODIUM 40 MG/1
TABLET, DELAYED RELEASE ORAL
Qty: 90 TABLET | Refills: 1 | Status: SHIPPED | OUTPATIENT
Start: 2019-04-01 | End: 2019-08-02 | Stop reason: SDUPTHER

## 2019-04-12 ENCOUNTER — OFFICE VISIT (OUTPATIENT)
Dept: PRIMARY CARE CLINIC | Age: 69
End: 2019-04-12
Payer: MEDICARE

## 2019-04-12 VITALS
TEMPERATURE: 98.5 F | WEIGHT: 158 LBS | SYSTOLIC BLOOD PRESSURE: 138 MMHG | BODY MASS INDEX: 26.98 KG/M2 | DIASTOLIC BLOOD PRESSURE: 84 MMHG | HEIGHT: 64 IN | OXYGEN SATURATION: 98 % | HEART RATE: 68 BPM

## 2019-04-12 DIAGNOSIS — F41.9 ANXIETY: ICD-10-CM

## 2019-04-12 DIAGNOSIS — I25.10 CORONARY ARTERY DISEASE INVOLVING NATIVE CORONARY ARTERY OF NATIVE HEART WITHOUT ANGINA PECTORIS: Primary | ICD-10-CM

## 2019-04-12 DIAGNOSIS — I10 ESSENTIAL HYPERTENSION: ICD-10-CM

## 2019-04-12 DIAGNOSIS — Z79.899 LONG TERM USE OF DRUG: ICD-10-CM

## 2019-04-12 DIAGNOSIS — J45.20 MILD INTERMITTENT ASTHMA WITHOUT COMPLICATION: ICD-10-CM

## 2019-04-12 PROCEDURE — G8598 ASA/ANTIPLAT THER USED: HCPCS | Performed by: NURSE PRACTITIONER

## 2019-04-12 PROCEDURE — G8427 DOCREV CUR MEDS BY ELIG CLIN: HCPCS | Performed by: NURSE PRACTITIONER

## 2019-04-12 PROCEDURE — G8419 CALC BMI OUT NRM PARAM NOF/U: HCPCS | Performed by: NURSE PRACTITIONER

## 2019-04-12 PROCEDURE — 3017F COLORECTAL CA SCREEN DOC REV: CPT | Performed by: NURSE PRACTITIONER

## 2019-04-12 PROCEDURE — 4040F PNEUMOC VAC/ADMIN/RCVD: CPT | Performed by: NURSE PRACTITIONER

## 2019-04-12 PROCEDURE — G8399 PT W/DXA RESULTS DOCUMENT: HCPCS | Performed by: NURSE PRACTITIONER

## 2019-04-12 PROCEDURE — 1090F PRES/ABSN URINE INCON ASSESS: CPT | Performed by: NURSE PRACTITIONER

## 2019-04-12 PROCEDURE — 1123F ACP DISCUSS/DSCN MKR DOCD: CPT | Performed by: NURSE PRACTITIONER

## 2019-04-12 PROCEDURE — 99214 OFFICE O/P EST MOD 30 MIN: CPT | Performed by: NURSE PRACTITIONER

## 2019-04-12 PROCEDURE — 4004F PT TOBACCO SCREEN RCVD TLK: CPT | Performed by: NURSE PRACTITIONER

## 2019-04-12 RX ORDER — CLOPIDOGREL BISULFATE 75 MG/1
75 TABLET ORAL DAILY
Qty: 90 TABLET | Refills: 1 | Status: SHIPPED | OUTPATIENT
Start: 2019-04-12 | End: 2019-08-02 | Stop reason: SDUPTHER

## 2019-04-12 RX ORDER — BUPROPION HYDROCHLORIDE 150 MG/1
150 TABLET ORAL EVERY MORNING
Qty: 90 TABLET | Refills: 1 | Status: SHIPPED | OUTPATIENT
Start: 2019-04-12 | End: 2019-08-02 | Stop reason: SDUPTHER

## 2019-04-12 RX ORDER — SIMVASTATIN 10 MG
10 TABLET ORAL EVERY EVENING
Qty: 90 TABLET | Refills: 1 | Status: SHIPPED | OUTPATIENT
Start: 2019-04-12 | End: 2019-08-02 | Stop reason: SDUPTHER

## 2019-04-12 RX ORDER — ALPRAZOLAM 1 MG/1
1 TABLET ORAL NIGHTLY PRN
Qty: 90 TABLET | Refills: 0 | Status: SHIPPED | OUTPATIENT
Start: 2019-04-12 | End: 2019-07-09 | Stop reason: SDUPTHER

## 2019-04-12 RX ORDER — ALBUTEROL SULFATE 90 UG/1
2 AEROSOL, METERED RESPIRATORY (INHALATION) EVERY 6 HOURS PRN
Qty: 1 INHALER | Refills: 2 | Status: SHIPPED | OUTPATIENT
Start: 2019-04-12 | End: 2019-08-02 | Stop reason: SDUPTHER

## 2019-04-12 RX ORDER — ATENOLOL 25 MG/1
TABLET ORAL
Qty: 90 TABLET | Refills: 1 | Status: SHIPPED | OUTPATIENT
Start: 2019-04-12 | End: 2019-08-02 | Stop reason: SDUPTHER

## 2019-04-12 ASSESSMENT — ENCOUNTER SYMPTOMS
VOMITING: 0
WHEEZING: 0
SHORTNESS OF BREATH: 0
CHEST TIGHTNESS: 0
BACK PAIN: 0
VOICE CHANGE: 0
COUGH: 0
CONSTIPATION: 0
BLOOD IN STOOL: 0
TROUBLE SWALLOWING: 0
STRIDOR: 0
NAUSEA: 0
APNEA: 0
DIARRHEA: 0
COLOR CHANGE: 0
ABDOMINAL DISTENTION: 0
PHOTOPHOBIA: 0
ABDOMINAL PAIN: 0
CHOKING: 0

## 2019-04-12 NOTE — PROGRESS NOTES
Subjective:      Patient ID: Jeremy Morrison is a 79 y.o. female. HPI   Patient is here for follow up of HLD, CAD, HTN, GERD, depression with anxiety, and headaches.  She was recently diagnosed with acute intermittent porphyria and is being followed by Dr. Jennifer Cardenas for this. Derik Echols was taking zoloft but reports that this medication caused severe dry mouth. She was then given wellbutrin, but the 300 mg dose caused her to feel \"funny headed\". She is now taking [id number] mg and it is working well.  Sense of taste is still decreased. Oral hydration has not helped. Headaches and neck pain have improved significantly. She is followed by Dr. Lakshmi Matos for her stage 4 CKD.     Past Medical History:   Diagnosis Date    Allergic rhinitis     Anxiety     Asthma     Atrial fibrillation ([address]     CAD (coronary artery disease)     Chronic kidney disease     Depression 5/17/2013    Disorders of porphyrin metabolism     GERD (gastroesophageal reflux disease)     Headache     Hx of blood clots     Hypertension 5/3/2016    IBS (irritable bowel syndrome)     Menopause     Other and unspecified noninfectious gastroenteritis and colitis(558.9)     Palpitations     Panic disorder     Urinary incontinence        Review of Systems   Constitutional: Negative for activity change, appetite change, chills, diaphoresis, fatigue, fever and unexpected weight change. HENT: Negative for congestion, dental problem, trouble swallowing and voice change. Dry mouth   Eyes: Negative for photophobia and visual disturbance. Respiratory: Negative for apnea, cough, choking, chest tightness, shortness of breath, wheezing and stridor. Cardiovascular: Negative for chest pain, palpitations and leg swelling. HTN; HLD; CAD   Gastrointestinal: Negative for abdominal distention, abdominal pain, blood in stool, constipation, diarrhea, nausea and vomiting.         History of ischemic colitis (biopsy confirmed) followed by  Ayou[facility]   Endocrine: Negative for cold intolerance and heat intolerance. Genitourinary: Negative for decreased urine volume, difficulty urinating, dysuria, flank pain, frequency, hematuria and urgency. Musculoskeletal: Negative for back pain, gait problem, joint swelling and neck pain. Increased restless legs   Skin: Negative for color change, rash and wound. Allergic/Immunologic: Negative for food allergies. Neurological: Negative for dizziness, syncope, speech difficulty, weakness, light-headedness and headaches. Hematological: Negative for adenopathy. History of acute intermittent porphyria   Psychiatric/Behavioral: Negative for agitation, behavioral problems, confusion, decreased concentration, self-injury, sleep disturbance and suicidal ideas. The patient is nervous/anxious. Objective:   Physical Exam   Constitutional: She is oriented to person, place, and time. She appears well-developed and well-nourished. No distress. HENT:   Head: Normocephalic and atraumatic. Eyes: Pupils are equal, round, and reactive to light. Conjunctivae are normal. Right eye exhibits no discharge. Left eye exhibits no discharge. No scleral icterus. Neck: Normal range of motion. Neck supple. No tracheal deviation present. No thyromegaly present. No bruits   Cardiovascular: Normal rate, regular rhythm and normal heart sounds. Exam reveals no gallop and no friction rub. No murmur heard. Pulmonary/Chest: Effort normal and breath sounds normal. No respiratory distress. She has no wheezes. She has no rales. Abdominal: Soft. She exhibits no distension and no mass. There is no tenderness. There is no rebound. Neurological: She is alert and oriented to person, place, and time. She exhibits normal muscle tone. Coordination normal.   Skin: Skin is warm and dry. No rash noted. She is not diaphoretic. No erythema. Psychiatric: She has a normal mood and affect.  Her behavior is normal. Judgment and thought content normal.   Nursing note and vitals reviewed. Assessment:       Diagnosis Orders   1. Coronary artery disease involving native coronary artery of native heart without angina pectoris  clopidogrel (PLAVIX) 75 MG tablet    simvastatin (ZOCOR) 10 MG tablet   2. Anxiety  ALPRAZolam (XANAX) 1 MG tablet    buPROPion (WELLBUTRIN XL) 150 MG extended release tablet   3. Essential hypertension  atenolol (TENORMIN) 25 MG tablet   4. Mild intermittent asthma without complication  albuterol sulfate  (90 Base) MCG/ACT inhaler   5. Long term use of drug  Drug Panel-PM-HI Res-UR Interp-A     CAD stable. No angina. Anxiety>>stable. HTN>>stable. Asthma>>stable. Dry mouth>>she is offered a referral to rheumatology, but declines. She is advised of need for regular dental checks due to dry mouth. Plan:          Discussed use, benefit, and side effects of prescribed medications. Barriers to medication compliance addressed. All patient questions answered. Pt voiced understanding.            [facility], APRN - CNP

## 2019-04-18 LAB
6-ACETYLMORPHINE: NOT DETECTED
7-AMINOCLONAZEPAM: NOT DETECTED
ALPHA-OH-ALPRAZOLAM: PRESENT
ALPRAZOLAM: PRESENT
AMPHETAMINE: NOT DETECTED
BARBITURATES: NOT DETECTED
BENZOYLECGONINE: NOT DETECTED
BUPRENORPHINE: NOT DETECTED
CARISOPRODOL: NOT DETECTED
CLONAZEPAM: NOT DETECTED
CODEINE: NOT DETECTED
CREATININE URINE: 43.6 MG/DL (ref 20–400)
DIAZEPAM: NOT DETECTED
DRUGS EXPECTED: NORMAL
EER PAIN MGT DRUG PANEL, HIGH RES/EMIT U: NORMAL
ETHYL GLUCURONIDE: NOT DETECTED
FENTANYL: NOT DETECTED
HYDROCODONE: NOT DETECTED
HYDROMORPHONE: NOT DETECTED
LORAZEPAM: NOT DETECTED
MARIJUANA METABOLITE: NOT DETECTED
MDA: NOT DETECTED
MDEA: NOT DETECTED
MDMA URINE: NOT DETECTED
MEPERIDINE: NOT DETECTED
METHADONE: NOT DETECTED
METHAMPHETAMINE: NOT DETECTED
METHYLPHENIDATE: NOT DETECTED
MIDAZOLAM: NOT DETECTED
MORPHINE: NOT DETECTED
NORBUPRENORPHINE, FREE: NOT DETECTED
NORDIAZEPAM: NOT DETECTED
NORFENTANYL: NOT DETECTED
NORHYDROCODONE, URINE: NOT DETECTED
NOROXYCODONE: NOT DETECTED
NOROXYMORPHONE, URINE: NOT DETECTED
OXAZEPAM: NOT DETECTED
OXYCODONE: NOT DETECTED
OXYMORPHONE: NOT DETECTED
PAIN MANAGEMENT DRUG PANEL: NORMAL
PAIN MANAGEMENT DRUG PANEL: NORMAL
PCP: NOT DETECTED
PHENTERMINE: NOT DETECTED
PROPOXYPHENE: NOT DETECTED
TAPENTADOL, URINE: NOT DETECTED
TAPENTADOL-O-SULFATE, URINE: NOT DETECTED
TEMAZEPAM: NOT DETECTED
TRAMADOL: NOT DETECTED
ZOLPIDEM: NOT DETECTED

## 2019-06-25 ENCOUNTER — OFFICE VISIT (OUTPATIENT)
Dept: PRIMARY CARE CLINIC | Age: 69
End: 2019-06-25
Payer: MEDICARE

## 2019-06-25 VITALS
OXYGEN SATURATION: 95 % | HEART RATE: 72 BPM | DIASTOLIC BLOOD PRESSURE: 74 MMHG | SYSTOLIC BLOOD PRESSURE: 120 MMHG | HEIGHT: 65 IN | WEIGHT: 155 LBS | BODY MASS INDEX: 25.83 KG/M2 | TEMPERATURE: 98.4 F

## 2019-06-25 DIAGNOSIS — N18.4 STAGE 4 CHRONIC KIDNEY DISEASE (HCC): ICD-10-CM

## 2019-06-25 DIAGNOSIS — F41.9 ANXIETY: Primary | ICD-10-CM

## 2019-06-25 DIAGNOSIS — F34.1 DYSTHYMIA: ICD-10-CM

## 2019-06-25 DIAGNOSIS — E78.2 MIXED HYPERLIPIDEMIA: ICD-10-CM

## 2019-06-25 DIAGNOSIS — M54.2 NECK PAIN: ICD-10-CM

## 2019-06-25 DIAGNOSIS — R63.0 DECREASED APPETITE: ICD-10-CM

## 2019-06-25 LAB
BASOPHILS ABSOLUTE: 0.1 K/UL (ref 0–0.2)
BASOPHILS RELATIVE PERCENT: 0.6 %
EOSINOPHILS ABSOLUTE: 0.3 K/UL (ref 0–0.6)
EOSINOPHILS RELATIVE PERCENT: 2.6 %
HCT VFR BLD CALC: 37.5 % (ref 36–48)
HEMOGLOBIN: 11.8 G/DL (ref 12–16)
LYMPHOCYTES ABSOLUTE: 3.3 K/UL (ref 1–5.1)
LYMPHOCYTES RELATIVE PERCENT: 30.2 %
MCH RBC QN AUTO: 26.2 PG (ref 26–34)
MCHC RBC AUTO-ENTMCNC: 31.4 G/DL (ref 31–36)
MCV RBC AUTO: 83.5 FL (ref 80–100)
MONOCYTES ABSOLUTE: 0.8 K/UL (ref 0–1.3)
MONOCYTES RELATIVE PERCENT: 7.2 %
NEUTROPHILS ABSOLUTE: 6.5 K/UL (ref 1.7–7.7)
NEUTROPHILS RELATIVE PERCENT: 59.4 %
PDW BLD-RTO: 18 % (ref 12.4–15.4)
PLATELET # BLD: 492 K/UL (ref 135–450)
PMV BLD AUTO: 9.6 FL (ref 5–10.5)
RBC # BLD: 4.5 M/UL (ref 4–5.2)
WBC # BLD: 10.9 K/UL (ref 4–11)

## 2019-06-25 PROCEDURE — 4004F PT TOBACCO SCREEN RCVD TLK: CPT | Performed by: NURSE PRACTITIONER

## 2019-06-25 PROCEDURE — 1090F PRES/ABSN URINE INCON ASSESS: CPT | Performed by: NURSE PRACTITIONER

## 2019-06-25 PROCEDURE — G8419 CALC BMI OUT NRM PARAM NOF/U: HCPCS | Performed by: NURSE PRACTITIONER

## 2019-06-25 PROCEDURE — 90732 PPSV23 VACC 2 YRS+ SUBQ/IM: CPT | Performed by: NURSE PRACTITIONER

## 2019-06-25 PROCEDURE — G0009 ADMIN PNEUMOCOCCAL VACCINE: HCPCS | Performed by: NURSE PRACTITIONER

## 2019-06-25 PROCEDURE — 3017F COLORECTAL CA SCREEN DOC REV: CPT | Performed by: NURSE PRACTITIONER

## 2019-06-25 PROCEDURE — G8598 ASA/ANTIPLAT THER USED: HCPCS | Performed by: NURSE PRACTITIONER

## 2019-06-25 PROCEDURE — G8399 PT W/DXA RESULTS DOCUMENT: HCPCS | Performed by: NURSE PRACTITIONER

## 2019-06-25 PROCEDURE — G8427 DOCREV CUR MEDS BY ELIG CLIN: HCPCS | Performed by: NURSE PRACTITIONER

## 2019-06-25 PROCEDURE — 99214 OFFICE O/P EST MOD 30 MIN: CPT | Performed by: NURSE PRACTITIONER

## 2019-06-25 PROCEDURE — 36415 COLL VENOUS BLD VENIPUNCTURE: CPT | Performed by: NURSE PRACTITIONER

## 2019-06-25 PROCEDURE — 1123F ACP DISCUSS/DSCN MKR DOCD: CPT | Performed by: NURSE PRACTITIONER

## 2019-06-25 PROCEDURE — 4040F PNEUMOC VAC/ADMIN/RCVD: CPT | Performed by: NURSE PRACTITIONER

## 2019-06-25 RX ORDER — BUSPIRONE HYDROCHLORIDE 15 MG/1
15 TABLET ORAL 3 TIMES DAILY
Qty: 90 TABLET | Refills: 0 | Status: SHIPPED | OUTPATIENT
Start: 2019-06-25 | End: 2019-08-02 | Stop reason: SDUPTHER

## 2019-06-25 RX ORDER — CYCLOBENZAPRINE HCL 10 MG
10 TABLET ORAL 3 TIMES DAILY PRN
Qty: 90 TABLET | Refills: 1 | Status: SHIPPED | OUTPATIENT
Start: 2019-06-25 | End: 2019-07-25

## 2019-06-25 ASSESSMENT — ENCOUNTER SYMPTOMS
TROUBLE SWALLOWING: 0
VOICE CHANGE: 0
CONSTIPATION: 0
ABDOMINAL PAIN: 0
CHEST TIGHTNESS: 0
STRIDOR: 0
BACK PAIN: 0
BLOOD IN STOOL: 0
COLOR CHANGE: 0
SHORTNESS OF BREATH: 0
APNEA: 0
CHOKING: 0
ABDOMINAL DISTENTION: 0
WHEEZING: 0
PHOTOPHOBIA: 0
NAUSEA: 0
VOMITING: 0
DIARRHEA: 0
COUGH: 0

## 2019-06-25 NOTE — PROGRESS NOTES
Subjective:      Patient ID: Jeremy Morrison is a 79 y.o. female. HPI  Patient is here for follow up of HLD, CAD, HTN, GERD, depression with anxiety, and headaches. She was recently diagnosed with acute intermittent porphyria and is being followed by Dr. Jennifer Cardenas for this. Derik Echols is now taking wellbutrin 150 mg for her depression and it was working well., but she is now complaining of depression and anxiety. She reports that her anxiety is increased due to her son living with her. We have discussed counseling. She has had problems with SSRI's in the past causing dry mouth. Neck pain have improved significantly, but she is having headaches again. She has been seen by a specialist in the past who offered injections and she declined. She is followed by Dr. Lakshmi Matos for her stage 4 CKD. She is not pleased with the care she is receiving. She would like to see a different kidney doctor. She complains of restless legs and states that the flexeril helped and she would like to use it again. Past Medical History:   Diagnosis Date    Allergic rhinitis     Anxiety     Asthma     Atrial fibrillation (HCC)     CAD (coronary artery disease)     Chronic kidney disease     Depression 5/17/2013    Disorders of porphyrin metabolism     GERD (gastroesophageal reflux disease)     Headache     Hx of blood clots     Hypertension 5/3/2016    IBS (irritable bowel syndrome)     Menopause     Other and unspecified noninfectious gastroenteritis and colitis(558.9)     Palpitations     Panic disorder     Urinary incontinence          Review of Systems   Constitutional: Negative for activity change, appetite change, chills, diaphoresis, fatigue, fever and unexpected weight change. HENT: Negative for congestion, dental problem, trouble swallowing and voice change. Dry mouth   Eyes: Negative for photophobia and visual disturbance.    Respiratory: Negative for apnea, cough, choking, chest tightness, shortness of breath, wheezing and stridor. Cardiovascular: Negative for chest pain, palpitations and leg swelling. HTN; HLD; CAD   Gastrointestinal: Negative for abdominal distention, abdominal pain, blood in stool, constipation, diarrhea, nausea and vomiting. History of ischemic colitis (biopsy confirmed) followed by Dr. Dodge Shelter   Endocrine: Negative for cold intolerance and heat intolerance. Genitourinary: Negative for decreased urine volume, difficulty urinating, dysuria, flank pain, frequency, hematuria and urgency. Musculoskeletal: Negative for back pain, gait problem, joint swelling and neck pain. Increased restless legs   Skin: Negative for color change, rash and wound. Allergic/Immunologic: Negative for food allergies. Neurological: Negative for dizziness, syncope, speech difficulty, weakness, light-headedness and headaches. Hematological: Negative for adenopathy. History of acute intermittent porphyria   Psychiatric/Behavioral: Negative for agitation, behavioral problems, confusion, decreased concentration, self-injury, sleep disturbance and suicidal ideas. The patient is nervous/anxious. Objective:   Physical Exam   Constitutional: She is oriented to person, place, and time. She appears well-developed and well-nourished. No distress. HENT:   Head: Normocephalic and atraumatic. Eyes: Pupils are equal, round, and reactive to light. Conjunctivae are normal. Right eye exhibits no discharge. Left eye exhibits no discharge. No scleral icterus. Neck: Normal range of motion. Neck supple. No tracheal deviation present. No thyromegaly present. No bruits   Cardiovascular: Normal rate, regular rhythm and normal heart sounds. Exam reveals no gallop and no friction rub. No murmur heard. Pulmonary/Chest: Effort normal and breath sounds normal. No respiratory distress. She has no wheezes. She has no rales. Abdominal: Soft. She exhibits no distension and no mass.  There is no tenderness. There is no rebound and no guarding. Neurological: She is alert and oriented to person, place, and time. She exhibits normal muscle tone. Coordination normal.   Skin: Skin is warm and dry. No rash noted. She is not diaphoretic. No erythema. Psychiatric: She has a normal mood and affect. Her behavior is normal. Judgment and thought content normal.   Nursing note and vitals reviewed. Assessment:      Diagnosis Orders   1. Anxiety  TSH with Reflex    CBC Auto Differential   2. Dysthymia     3. Decreased appetite  Comprehensive Metabolic Panel    TSH with Reflex    CBC Auto Differential   4. Stage 4 chronic kidney disease (HCC)  Reyna Pelaez MD, Nephrology, [facility]    Comprehensive Metabolic Panel    Lipid Panel    TSH with Reflex    CBC Auto Differential   5. Neck pain  cyclobenzaprine (FLEXERIL) 10 MG tablet   6. Mixed hyperlipidemia  Comprehensive Metabolic Panel    Lipid Panel    TSH with Reflex    CBC Auto Differential     Anxiety>>continue with wellbutrin. I am adding buspar to use prn. She will seek counseling.,    Depression>>continue with wellbutrin. See in one month. Decreased appetite>>wieght is down 3 lbs. Will follow. CKD>>will give her referral to Dr. Mackenzie Shetty for second opinion. Neck pain>>restart flexeril and use prn. HLD>>will check fasting lab today. Plan:        Discussed use, benefit, and side effects of prescribed medications. Barriers to medication compliance addressed. All patient questions answered. Pt voiced understanding.            Young Luu, APRN - CNP

## 2019-06-26 LAB
A/G RATIO: 1.4 (ref 1.1–2.2)
ALBUMIN SERPL-MCNC: 4.6 G/DL (ref 3.4–5)
ALP BLD-CCNC: 95 U/L (ref 40–129)
ALT SERPL-CCNC: 13 U/L (ref 10–40)
ANION GAP SERPL CALCULATED.3IONS-SCNC: 19 MMOL/L (ref 3–16)
AST SERPL-CCNC: 23 U/L (ref 15–37)
BILIRUB SERPL-MCNC: <0.2 MG/DL (ref 0–1)
BUN BLDV-MCNC: 39 MG/DL (ref 7–20)
CALCIUM SERPL-MCNC: 10.6 MG/DL (ref 8.3–10.6)
CHLORIDE BLD-SCNC: 102 MMOL/L (ref 99–110)
CHOLESTEROL, TOTAL: 180 MG/DL (ref 0–199)
CO2: 21 MMOL/L (ref 21–32)
CREAT SERPL-MCNC: 1.8 MG/DL (ref 0.6–1.2)
GFR AFRICAN AMERICAN: 34
GFR NON-AFRICAN AMERICAN: 28
GLOBULIN: 3.4 G/DL
GLUCOSE BLD-MCNC: 89 MG/DL (ref 70–99)
HDLC SERPL-MCNC: 65 MG/DL (ref 40–60)
LDL CHOLESTEROL CALCULATED: 84 MG/DL
POTASSIUM SERPL-SCNC: 5.3 MMOL/L (ref 3.5–5.1)
SODIUM BLD-SCNC: 142 MMOL/L (ref 136–145)
TOTAL PROTEIN: 8 G/DL (ref 6.4–8.2)
TRIGL SERPL-MCNC: 156 MG/DL (ref 0–150)
TSH REFLEX: 1.26 UIU/ML (ref 0.27–4.2)
VLDLC SERPL CALC-MCNC: 31 MG/DL

## 2019-07-08 DIAGNOSIS — F41.9 ANXIETY: ICD-10-CM

## 2019-07-08 RX ORDER — ALPRAZOLAM 1 MG/1
1 TABLET ORAL NIGHTLY PRN
Qty: 90 TABLET | Refills: 0 | Status: CANCELLED | OUTPATIENT
Start: 2019-07-08 | End: 2019-10-06

## 2019-07-09 DIAGNOSIS — F41.9 ANXIETY: ICD-10-CM

## 2019-07-09 RX ORDER — ALPRAZOLAM 1 MG/1
1 TABLET ORAL NIGHTLY PRN
Qty: 30 TABLET | Refills: 0 | Status: SHIPPED | OUTPATIENT
Start: 2019-07-09 | End: 2019-08-02 | Stop reason: SDUPTHER

## 2019-07-09 NOTE — TELEPHONE ENCOUNTER
Pt stated that she will be out of town and will like for it to fill it early. Is this possible?  Please advise

## 2019-08-02 ENCOUNTER — OFFICE VISIT (OUTPATIENT)
Dept: PRIMARY CARE CLINIC | Age: 69
End: 2019-08-02
Payer: MEDICARE

## 2019-08-02 VITALS
HEART RATE: 58 BPM | BODY MASS INDEX: 25.66 KG/M2 | DIASTOLIC BLOOD PRESSURE: 80 MMHG | HEIGHT: 65 IN | OXYGEN SATURATION: 98 % | SYSTOLIC BLOOD PRESSURE: 118 MMHG | WEIGHT: 154 LBS | TEMPERATURE: 98.2 F

## 2019-08-02 DIAGNOSIS — Z90.710 HISTORY OF HYSTERECTOMY: ICD-10-CM

## 2019-08-02 DIAGNOSIS — N18.4 CKD (CHRONIC KIDNEY DISEASE) STAGE 4, GFR 15-29 ML/MIN (HCC): ICD-10-CM

## 2019-08-02 DIAGNOSIS — F41.0 PANIC DISORDER: ICD-10-CM

## 2019-08-02 DIAGNOSIS — I10 ESSENTIAL HYPERTENSION: Primary | ICD-10-CM

## 2019-08-02 DIAGNOSIS — I24.9 ACS (ACUTE CORONARY SYNDROME) (HCC): ICD-10-CM

## 2019-08-02 DIAGNOSIS — J45.20 MILD INTERMITTENT ASTHMA WITHOUT COMPLICATION: ICD-10-CM

## 2019-08-02 DIAGNOSIS — F41.9 ANXIETY: ICD-10-CM

## 2019-08-02 DIAGNOSIS — I25.10 CORONARY ARTERY DISEASE INVOLVING NATIVE CORONARY ARTERY OF NATIVE HEART WITHOUT ANGINA PECTORIS: ICD-10-CM

## 2019-08-02 DIAGNOSIS — Z79.899 LONG TERM USE OF DRUG: ICD-10-CM

## 2019-08-02 PROCEDURE — 99214 OFFICE O/P EST MOD 30 MIN: CPT | Performed by: NURSE PRACTITIONER

## 2019-08-02 PROCEDURE — G8427 DOCREV CUR MEDS BY ELIG CLIN: HCPCS | Performed by: NURSE PRACTITIONER

## 2019-08-02 PROCEDURE — 1123F ACP DISCUSS/DSCN MKR DOCD: CPT | Performed by: NURSE PRACTITIONER

## 2019-08-02 PROCEDURE — G8598 ASA/ANTIPLAT THER USED: HCPCS | Performed by: NURSE PRACTITIONER

## 2019-08-02 PROCEDURE — G8399 PT W/DXA RESULTS DOCUMENT: HCPCS | Performed by: NURSE PRACTITIONER

## 2019-08-02 PROCEDURE — 3017F COLORECTAL CA SCREEN DOC REV: CPT | Performed by: NURSE PRACTITIONER

## 2019-08-02 PROCEDURE — 1090F PRES/ABSN URINE INCON ASSESS: CPT | Performed by: NURSE PRACTITIONER

## 2019-08-02 PROCEDURE — G8419 CALC BMI OUT NRM PARAM NOF/U: HCPCS | Performed by: NURSE PRACTITIONER

## 2019-08-02 PROCEDURE — 4004F PT TOBACCO SCREEN RCVD TLK: CPT | Performed by: NURSE PRACTITIONER

## 2019-08-02 PROCEDURE — 4040F PNEUMOC VAC/ADMIN/RCVD: CPT | Performed by: NURSE PRACTITIONER

## 2019-08-02 RX ORDER — SIMVASTATIN 10 MG
10 TABLET ORAL EVERY EVENING
Qty: 90 TABLET | Refills: 1 | Status: SHIPPED | OUTPATIENT
Start: 2019-08-02 | End: 2019-11-05 | Stop reason: SDUPTHER

## 2019-08-02 RX ORDER — ALPRAZOLAM 1 MG/1
1 TABLET ORAL NIGHTLY PRN
Qty: 30 TABLET | Refills: 2 | Status: SHIPPED | OUTPATIENT
Start: 2019-08-09 | End: 2019-11-05 | Stop reason: SDUPTHER

## 2019-08-02 RX ORDER — BUPROPION HYDROCHLORIDE 150 MG/1
150 TABLET ORAL EVERY MORNING
Qty: 90 TABLET | Refills: 1 | Status: SHIPPED | OUTPATIENT
Start: 2019-08-02 | End: 2019-11-05 | Stop reason: SDUPTHER

## 2019-08-02 RX ORDER — NITROGLYCERIN 0.4 MG/1
0.4 TABLET SUBLINGUAL EVERY 5 MIN PRN
Qty: 25 TABLET | Refills: 5 | Status: SHIPPED | OUTPATIENT
Start: 2019-08-02 | End: 2020-01-06 | Stop reason: ALTCHOICE

## 2019-08-02 RX ORDER — ATENOLOL 25 MG/1
TABLET ORAL
Qty: 90 TABLET | Refills: 1 | Status: SHIPPED | OUTPATIENT
Start: 2019-08-02 | End: 2019-10-11 | Stop reason: SDUPTHER

## 2019-08-02 RX ORDER — CLOPIDOGREL BISULFATE 75 MG/1
75 TABLET ORAL DAILY
Qty: 90 TABLET | Refills: 1 | Status: SHIPPED | OUTPATIENT
Start: 2019-08-02 | End: 2019-11-05 | Stop reason: SDUPTHER

## 2019-08-02 RX ORDER — BUSPIRONE HYDROCHLORIDE 15 MG/1
15 TABLET ORAL 3 TIMES DAILY
Qty: 270 TABLET | Refills: 1 | Status: SHIPPED | OUTPATIENT
Start: 2019-08-02 | End: 2019-11-05 | Stop reason: SDUPTHER

## 2019-08-02 RX ORDER — ALBUTEROL SULFATE 90 UG/1
2 AEROSOL, METERED RESPIRATORY (INHALATION) EVERY 6 HOURS PRN
Qty: 1 INHALER | Refills: 5 | Status: SHIPPED | OUTPATIENT
Start: 2019-08-02 | End: 2019-11-05 | Stop reason: SDUPTHER

## 2019-08-02 RX ORDER — PANTOPRAZOLE SODIUM 40 MG/1
TABLET, DELAYED RELEASE ORAL
Qty: 90 TABLET | Refills: 1 | Status: SHIPPED | OUTPATIENT
Start: 2019-08-02 | End: 2019-11-05 | Stop reason: SDUPTHER

## 2019-08-02 ASSESSMENT — ENCOUNTER SYMPTOMS
TROUBLE SWALLOWING: 0
PHOTOPHOBIA: 0
APNEA: 0
BLOOD IN STOOL: 0
ABDOMINAL PAIN: 0
NAUSEA: 0
FACIAL SWELLING: 0
COLOR CHANGE: 0
VOMITING: 0
SORE THROAT: 0
SINUS PAIN: 0
WHEEZING: 0
COUGH: 0
SINUS PRESSURE: 0
DIARRHEA: 0
CHEST TIGHTNESS: 0
STRIDOR: 0
ABDOMINAL DISTENTION: 0
CHOKING: 0
CONSTIPATION: 0
BACK PAIN: 0
VOICE CHANGE: 0
SHORTNESS OF BREATH: 0

## 2019-08-02 NOTE — PROGRESS NOTES
Subjective:      Patient ID: Breana Goins is a 79 y.o. female. HPI  Patient is here for follow up of HLD, CAD, HTN, GERD, depression with anxiety, and headaches. She was recently diagnosed with acute intermittent porphyria and is being followed by Dr. Ordonez for this. Betty Evans is now taking wellbutrin 150 mg for her depression and it was working well. She reports that her anxiety is increased due to her son living with her, but the buspar is helping a lot. We have discussed counseling. Neck pain has improved significantly. She is followed by Dr. Urban Rahman for her stage 4 CKD. She is not pleased with the care she is receiving, but is planning to [place] him another chance\". She complains of restless legs and flexeril helped. Past Medical History:   Diagnosis Date    Allergic rhinitis     Anxiety     Asthma     Atrial fibrillation (HCC)     CAD (coronary artery disease)     Chronic kidney disease     Depression 5/17/2013    Disorders of porphyrin metabolism     GERD (gastroesophageal reflux disease)     Headache     Hx of blood clots     Hypertension 5/3/2016    IBS (irritable bowel syndrome)     Menopause     Other and unspecified noninfectious gastroenteritis and colitis(558.9)     Palpitations     Panic disorder     Urinary incontinence        Review of Systems   Constitutional: Negative for activity change, appetite change, chills, diaphoresis, fatigue, fever and unexpected weight change. HENT: Negative for congestion, dental problem, ear pain, facial swelling, postnasal drip, sinus pressure, sinus pain, sore throat, tinnitus, trouble swallowing and voice change. Decrease in sense of taste and sense of smell per HPI   Eyes: Negative for photophobia and visual disturbance. Respiratory: Negative for apnea, cough, choking, chest tightness, shortness of breath, wheezing and stridor. Cardiovascular: Negative for chest pain, palpitations and leg swelling.         HTN; HLD; CAD

## 2019-08-06 LAB
6-ACETYLMORPHINE: NOT DETECTED
7-AMINOCLONAZEPAM: NOT DETECTED
ALPHA-OH-ALPRAZOLAM: PRESENT
ALPRAZOLAM: PRESENT
AMPHETAMINE: NOT DETECTED
BARBITURATES: NOT DETECTED
BENZOYLECGONINE: NOT DETECTED
BUPRENORPHINE: NOT DETECTED
CARISOPRODOL: NOT DETECTED
CLONAZEPAM: NOT DETECTED
CODEINE: NOT DETECTED
CREATININE URINE: 96.5 MG/DL (ref 20–400)
DIAZEPAM: NOT DETECTED
DRUGS EXPECTED: NORMAL
EER PAIN MGT DRUG PANEL, HIGH RES/EMIT U: NORMAL
ETHYL GLUCURONIDE: NOT DETECTED
FENTANYL: NOT DETECTED
HYDROCODONE: NOT DETECTED
HYDROMORPHONE: NOT DETECTED
LORAZEPAM: NOT DETECTED
MARIJUANA METABOLITE: NOT DETECTED
MDA: NOT DETECTED
MDEA: NOT DETECTED
MDMA URINE: NOT DETECTED
MEPERIDINE: NOT DETECTED
METHADONE: NOT DETECTED
METHAMPHETAMINE: NOT DETECTED
METHYLPHENIDATE: NOT DETECTED
MIDAZOLAM: NOT DETECTED
MORPHINE: NOT DETECTED
NORBUPRENORPHINE, FREE: NOT DETECTED
NORDIAZEPAM: NOT DETECTED
NORFENTANYL: NOT DETECTED
NORHYDROCODONE, URINE: NOT DETECTED
NOROXYCODONE: NOT DETECTED
NOROXYMORPHONE, URINE: NOT DETECTED
OXAZEPAM: NOT DETECTED
OXYCODONE: NOT DETECTED
OXYMORPHONE: NOT DETECTED
PAIN MANAGEMENT DRUG PANEL: NORMAL
PAIN MANAGEMENT DRUG PANEL: NORMAL
PCP: NOT DETECTED
PHENTERMINE: NOT DETECTED
PROPOXYPHENE: NOT DETECTED
TAPENTADOL, URINE: NOT DETECTED
TAPENTADOL-O-SULFATE, URINE: NOT DETECTED
TEMAZEPAM: NOT DETECTED
TRAMADOL: NOT DETECTED
ZOLPIDEM: NOT DETECTED

## 2019-09-20 ENCOUNTER — TELEPHONE (OUTPATIENT)
Dept: PRIMARY CARE CLINIC | Age: 69
End: 2019-09-20

## 2019-09-27 ENCOUNTER — TELEPHONE (OUTPATIENT)
Dept: PRIMARY CARE CLINIC | Age: 69
End: 2019-09-27

## 2019-10-02 RX ORDER — ROPINIROLE 0.25 MG/1
0.25 TABLET, FILM COATED ORAL 3 TIMES DAILY
Qty: 90 TABLET | Refills: 2 | Status: SHIPPED | OUTPATIENT
Start: 2019-10-02 | End: 2019-11-05

## 2019-10-11 ENCOUNTER — TELEPHONE (OUTPATIENT)
Dept: PRIMARY CARE CLINIC | Age: 69
End: 2019-10-11

## 2019-10-11 DIAGNOSIS — I10 ESSENTIAL HYPERTENSION: ICD-10-CM

## 2019-10-11 RX ORDER — ATENOLOL 25 MG/1
TABLET ORAL
Qty: 90 TABLET | Refills: 1 | Status: SHIPPED | OUTPATIENT
Start: 2019-10-11 | End: 2020-02-11 | Stop reason: SDUPTHER

## 2019-11-05 ENCOUNTER — OFFICE VISIT (OUTPATIENT)
Dept: PRIMARY CARE CLINIC | Age: 69
End: 2019-11-05
Payer: MEDICARE

## 2019-11-05 VITALS
TEMPERATURE: 98.8 F | HEART RATE: 55 BPM | BODY MASS INDEX: 26.39 KG/M2 | HEIGHT: 65 IN | DIASTOLIC BLOOD PRESSURE: 76 MMHG | SYSTOLIC BLOOD PRESSURE: 118 MMHG | WEIGHT: 158.4 LBS | OXYGEN SATURATION: 95 %

## 2019-11-05 DIAGNOSIS — I10 ESSENTIAL HYPERTENSION: ICD-10-CM

## 2019-11-05 DIAGNOSIS — I25.10 CORONARY ARTERY DISEASE INVOLVING NATIVE CORONARY ARTERY OF NATIVE HEART WITHOUT ANGINA PECTORIS: ICD-10-CM

## 2019-11-05 DIAGNOSIS — Z12.39 BREAST CANCER SCREENING: ICD-10-CM

## 2019-11-05 DIAGNOSIS — F41.0 PANIC DISORDER: ICD-10-CM

## 2019-11-05 DIAGNOSIS — Z79.899 LONG TERM USE OF DRUG: ICD-10-CM

## 2019-11-05 DIAGNOSIS — Z12.31 ENCOUNTER FOR SCREENING MAMMOGRAM FOR MALIGNANT NEOPLASM OF BREAST: ICD-10-CM

## 2019-11-05 DIAGNOSIS — R73.09 ELEVATED GLUCOSE: ICD-10-CM

## 2019-11-05 DIAGNOSIS — J45.20 MILD INTERMITTENT ASTHMA WITHOUT COMPLICATION: ICD-10-CM

## 2019-11-05 DIAGNOSIS — F41.9 ANXIETY: ICD-10-CM

## 2019-11-05 DIAGNOSIS — M79.10 GENERALIZED MUSCLE ACHE: ICD-10-CM

## 2019-11-05 DIAGNOSIS — F41.8 DEPRESSION WITH ANXIETY: Primary | ICD-10-CM

## 2019-11-05 LAB
ALBUMIN SERPL-MCNC: 4.4 G/DL (ref 3.4–5)
ALP BLD-CCNC: 96 U/L (ref 40–129)
ALT SERPL-CCNC: 14 U/L (ref 10–40)
AST SERPL-CCNC: 28 U/L (ref 15–37)
BASOPHILS ABSOLUTE: 0.1 K/UL (ref 0–0.2)
BASOPHILS RELATIVE PERCENT: 0.8 %
BILIRUB SERPL-MCNC: 0.4 MG/DL (ref 0–1)
BILIRUBIN DIRECT: <0.2 MG/DL (ref 0–0.3)
BILIRUBIN, INDIRECT: NORMAL MG/DL (ref 0–1)
EOSINOPHILS ABSOLUTE: 0.2 K/UL (ref 0–0.6)
EOSINOPHILS RELATIVE PERCENT: 2.5 %
HBA1C MFR BLD: 5.7 %
HCT VFR BLD CALC: 35.8 % (ref 36–48)
HEMOGLOBIN: 11.4 G/DL (ref 12–16)
LYMPHOCYTES ABSOLUTE: 2.9 K/UL (ref 1–5.1)
LYMPHOCYTES RELATIVE PERCENT: 32.3 %
MCH RBC QN AUTO: 25.7 PG (ref 26–34)
MCHC RBC AUTO-ENTMCNC: 31.9 G/DL (ref 31–36)
MCV RBC AUTO: 80.6 FL (ref 80–100)
MONOCYTES ABSOLUTE: 0.8 K/UL (ref 0–1.3)
MONOCYTES RELATIVE PERCENT: 8.9 %
NEUTROPHILS ABSOLUTE: 4.9 K/UL (ref 1.7–7.7)
NEUTROPHILS RELATIVE PERCENT: 55.5 %
PDW BLD-RTO: 18.4 % (ref 12.4–15.4)
PLATELET # BLD: 509 K/UL (ref 135–450)
PMV BLD AUTO: 9.5 FL (ref 5–10.5)
RBC # BLD: 4.44 M/UL (ref 4–5.2)
TOTAL CK: 80 U/L (ref 26–192)
TOTAL PROTEIN: 7.7 G/DL (ref 6.4–8.2)
WBC # BLD: 8.8 K/UL (ref 4–11)

## 2019-11-05 PROCEDURE — 99214 OFFICE O/P EST MOD 30 MIN: CPT | Performed by: NURSE PRACTITIONER

## 2019-11-05 PROCEDURE — G8419 CALC BMI OUT NRM PARAM NOF/U: HCPCS | Performed by: NURSE PRACTITIONER

## 2019-11-05 PROCEDURE — 4040F PNEUMOC VAC/ADMIN/RCVD: CPT | Performed by: NURSE PRACTITIONER

## 2019-11-05 PROCEDURE — G8482 FLU IMMUNIZE ORDER/ADMIN: HCPCS | Performed by: NURSE PRACTITIONER

## 2019-11-05 PROCEDURE — 1090F PRES/ABSN URINE INCON ASSESS: CPT | Performed by: NURSE PRACTITIONER

## 2019-11-05 PROCEDURE — 4004F PT TOBACCO SCREEN RCVD TLK: CPT | Performed by: NURSE PRACTITIONER

## 2019-11-05 PROCEDURE — G8598 ASA/ANTIPLAT THER USED: HCPCS | Performed by: NURSE PRACTITIONER

## 2019-11-05 PROCEDURE — 83036 HEMOGLOBIN GLYCOSYLATED A1C: CPT | Performed by: NURSE PRACTITIONER

## 2019-11-05 PROCEDURE — G8399 PT W/DXA RESULTS DOCUMENT: HCPCS | Performed by: NURSE PRACTITIONER

## 2019-11-05 PROCEDURE — 1123F ACP DISCUSS/DSCN MKR DOCD: CPT | Performed by: NURSE PRACTITIONER

## 2019-11-05 PROCEDURE — 36415 COLL VENOUS BLD VENIPUNCTURE: CPT | Performed by: NURSE PRACTITIONER

## 2019-11-05 PROCEDURE — 3017F COLORECTAL CA SCREEN DOC REV: CPT | Performed by: NURSE PRACTITIONER

## 2019-11-05 PROCEDURE — G8427 DOCREV CUR MEDS BY ELIG CLIN: HCPCS | Performed by: NURSE PRACTITIONER

## 2019-11-05 RX ORDER — ROPINIROLE 1 MG/1
1 TABLET, FILM COATED ORAL NIGHTLY
Qty: 90 TABLET | Refills: 1 | Status: SHIPPED | OUTPATIENT
Start: 2019-11-05 | End: 2020-02-11 | Stop reason: SDUPTHER

## 2019-11-05 RX ORDER — CLOPIDOGREL BISULFATE 75 MG/1
75 TABLET ORAL DAILY
Qty: 90 TABLET | Refills: 1 | Status: SHIPPED | OUTPATIENT
Start: 2019-11-05 | End: 2020-02-11 | Stop reason: SDUPTHER

## 2019-11-05 RX ORDER — ALBUTEROL SULFATE 90 UG/1
2 AEROSOL, METERED RESPIRATORY (INHALATION) EVERY 6 HOURS PRN
Qty: 1 INHALER | Refills: 5 | Status: SHIPPED | OUTPATIENT
Start: 2019-11-05 | End: 2021-06-28 | Stop reason: SDUPTHER

## 2019-11-05 RX ORDER — SIMVASTATIN 10 MG
10 TABLET ORAL EVERY EVENING
Qty: 90 TABLET | Refills: 1 | Status: SHIPPED | OUTPATIENT
Start: 2019-11-05 | End: 2020-02-11 | Stop reason: SDUPTHER

## 2019-11-05 RX ORDER — PANTOPRAZOLE SODIUM 40 MG/1
TABLET, DELAYED RELEASE ORAL
Qty: 90 TABLET | Refills: 1 | Status: SHIPPED | OUTPATIENT
Start: 2019-11-05 | End: 2020-02-11 | Stop reason: SDUPTHER

## 2019-11-05 RX ORDER — BUPROPION HYDROCHLORIDE 150 MG/1
150 TABLET ORAL EVERY MORNING
Qty: 90 TABLET | Refills: 1 | Status: SHIPPED | OUTPATIENT
Start: 2019-11-05 | End: 2020-02-11 | Stop reason: SDUPTHER

## 2019-11-05 RX ORDER — ALPRAZOLAM 1 MG/1
1 TABLET ORAL NIGHTLY PRN
Qty: 30 TABLET | Refills: 2 | Status: SHIPPED | OUTPATIENT
Start: 2019-11-11 | End: 2019-12-11

## 2019-11-05 RX ORDER — BUSPIRONE HYDROCHLORIDE 15 MG/1
15 TABLET ORAL DAILY
Qty: 90 TABLET | Refills: 1 | Status: SHIPPED | OUTPATIENT
Start: 2019-11-05 | End: 2020-02-11 | Stop reason: SDUPTHER

## 2019-11-05 ASSESSMENT — ENCOUNTER SYMPTOMS
ABDOMINAL DISTENTION: 0
TROUBLE SWALLOWING: 0
DIARRHEA: 0
SINUS PRESSURE: 0
CHEST TIGHTNESS: 0
SHORTNESS OF BREATH: 0
APNEA: 0
CHOKING: 0
NAUSEA: 0
PHOTOPHOBIA: 0
BACK PAIN: 0
SORE THROAT: 0
CONSTIPATION: 0
SINUS PAIN: 0
VOICE CHANGE: 0
FACIAL SWELLING: 0
BLOOD IN STOOL: 0
COUGH: 0
COLOR CHANGE: 0
STRIDOR: 0
WHEEZING: 0
ABDOMINAL PAIN: 0
VOMITING: 0

## 2019-11-06 LAB
SEDIMENTATION RATE, ERYTHROCYTE: 32 MM/HR (ref 0–30)
TSH REFLEX: 1.38 UIU/ML (ref 0.27–4.2)

## 2019-11-09 LAB
6-ACETYLMORPHINE: NOT DETECTED
7-AMINOCLONAZEPAM: NOT DETECTED
ALPHA-OH-ALPRAZOLAM: PRESENT
ALPRAZOLAM: PRESENT
AMPHETAMINE: NOT DETECTED
BARBITURATES: NOT DETECTED
BENZOYLECGONINE: NOT DETECTED
BUPRENORPHINE: NOT DETECTED
CARISOPRODOL: NOT DETECTED
CLONAZEPAM: NOT DETECTED
CODEINE: NOT DETECTED
CREATININE URINE: 121.9 MG/DL (ref 20–400)
DIAZEPAM: NOT DETECTED
DRUGS EXPECTED: NORMAL
EER PAIN MGT DRUG PANEL, HIGH RES/EMIT U: NORMAL
ETHYL GLUCURONIDE: NOT DETECTED
FENTANYL: NOT DETECTED
HYDROCODONE: NOT DETECTED
HYDROMORPHONE: NOT DETECTED
LORAZEPAM: NOT DETECTED
MARIJUANA METABOLITE: NOT DETECTED
MDA: NOT DETECTED
MDEA: NOT DETECTED
MDMA URINE: NOT DETECTED
MEPERIDINE: NOT DETECTED
METHADONE: NOT DETECTED
METHAMPHETAMINE: NOT DETECTED
METHYLPHENIDATE: NOT DETECTED
MIDAZOLAM: NOT DETECTED
MORPHINE: NOT DETECTED
NORBUPRENORPHINE, FREE: NOT DETECTED
NORDIAZEPAM: NOT DETECTED
NORFENTANYL: NOT DETECTED
NORHYDROCODONE, URINE: NOT DETECTED
NOROXYCODONE: NOT DETECTED
NOROXYMORPHONE, URINE: NOT DETECTED
OXAZEPAM: NOT DETECTED
OXYCODONE: NOT DETECTED
OXYMORPHONE: NOT DETECTED
PAIN MANAGEMENT DRUG PANEL: NORMAL
PAIN MANAGEMENT DRUG PANEL: NORMAL
PCP: NOT DETECTED
PHENTERMINE: NOT DETECTED
PROPOXYPHENE: NOT DETECTED
TAPENTADOL, URINE: NOT DETECTED
TAPENTADOL-O-SULFATE, URINE: NOT DETECTED
TEMAZEPAM: NOT DETECTED
TRAMADOL: NOT DETECTED
ZOLPIDEM: NOT DETECTED

## 2020-01-06 ENCOUNTER — HOSPITAL ENCOUNTER (EMERGENCY)
Age: 70
Discharge: HOME | End: 2020-01-06
Attending: EMERGENCY MEDICINE
Payer: MEDICARE

## 2020-01-06 ENCOUNTER — APPOINTMENT (OUTPATIENT)
Dept: CT IMAGING | Age: 70
End: 2020-01-06
Payer: MEDICARE

## 2020-01-06 LAB
A/G RATIO: 1 (ref 1.1–2.2)
ALBUMIN SERPL-MCNC: 3.8 G/DL (ref 3.4–5)
ALP BLD-CCNC: 89 U/L (ref 40–129)
ALT SERPL-CCNC: 16 U/L (ref 10–40)
ANION GAP SERPL CALCULATED.3IONS-SCNC: 12 MMOL/L (ref 3–16)
AST SERPL-CCNC: 29 U/L (ref 15–37)
BASOPHILS ABSOLUTE: 0.1 K/UL (ref 0–0.2)
BASOPHILS RELATIVE PERCENT: 0.8 %
BILIRUB SERPL-MCNC: <0.2 MG/DL (ref 0–1)
BILIRUBIN URINE: NEGATIVE
BLOOD, URINE: ABNORMAL
BUN BLDV-MCNC: 34 MG/DL (ref 7–20)
CALCIUM SERPL-MCNC: 9.7 MG/DL (ref 8.3–10.6)
CHLORIDE BLD-SCNC: 103 MMOL/L (ref 99–110)
CLARITY: CLEAR
CO2: 26 MMOL/L (ref 21–32)
COLOR: ABNORMAL
CREAT SERPL-MCNC: 1.9 MG/DL (ref 0.6–1.2)
EOSINOPHILS ABSOLUTE: 0.2 K/UL (ref 0–0.6)
EOSINOPHILS RELATIVE PERCENT: 2.2 %
EPITHELIAL CELLS, UA: ABNORMAL /HPF
GFR AFRICAN AMERICAN: 32
GFR NON-AFRICAN AMERICAN: 26
GLOBULIN: 3.7 G/DL
GLUCOSE BLD-MCNC: 114 MG/DL (ref 70–99)
GLUCOSE URINE: NEGATIVE MG/DL
HCT VFR BLD CALC: 32.3 % (ref 36–48)
HEMOGLOBIN: 10.2 G/DL (ref 12–16)
KETONES, URINE: NEGATIVE MG/DL
LACTIC ACID: 1.2 MMOL/L (ref 0.4–2)
LEUKOCYTE ESTERASE, URINE: ABNORMAL
LIPASE: 56 U/L (ref 13–60)
LYMPHOCYTES ABSOLUTE: 1.4 K/UL (ref 1–5.1)
LYMPHOCYTES RELATIVE PERCENT: 15.1 %
MCH RBC QN AUTO: 25.1 PG (ref 26–34)
MCHC RBC AUTO-ENTMCNC: 31.6 G/DL (ref 31–36)
MCV RBC AUTO: 79.6 FL (ref 80–100)
MICROSCOPIC EXAMINATION: YES
MONOCYTES ABSOLUTE: 0.8 K/UL (ref 0–1.3)
MONOCYTES RELATIVE PERCENT: 8.3 %
NEUTROPHILS ABSOLUTE: 6.9 K/UL (ref 1.7–7.7)
NEUTROPHILS RELATIVE PERCENT: 73.6 %
NITRITE, URINE: NEGATIVE
PDW BLD-RTO: 17.4 % (ref 12.4–15.4)
PH UA: 6.5 (ref 5–8)
PLATELET # BLD: 432 K/UL (ref 135–450)
PMV BLD AUTO: 8 FL (ref 5–10.5)
POTASSIUM REFLEX MAGNESIUM: 4.7 MMOL/L (ref 3.5–5.1)
PROTEIN UA: ABNORMAL MG/DL
RBC # BLD: 4.06 M/UL (ref 4–5.2)
RBC UA: ABNORMAL /HPF (ref 0–2)
SODIUM BLD-SCNC: 141 MMOL/L (ref 136–145)
SPECIFIC GRAVITY UA: 1.01 (ref 1–1.03)
TOTAL PROTEIN: 7.5 G/DL (ref 6.4–8.2)
URINE REFLEX TO CULTURE: YES
URINE TYPE: ABNORMAL
UROBILINOGEN, URINE: 0.2 E.U./DL
WBC # BLD: 9.3 K/UL (ref 4–11)
WBC UA: ABNORMAL /HPF (ref 0–5)

## 2020-01-06 PROCEDURE — 36415 COLL VENOUS BLD VENIPUNCTURE: CPT

## 2020-01-06 PROCEDURE — 96365 THER/PROPH/DIAG IV INF INIT: CPT

## 2020-01-06 PROCEDURE — 96375 TX/PRO/DX INJ NEW DRUG ADDON: CPT

## 2020-01-06 PROCEDURE — 81001 URINALYSIS AUTO W/SCOPE: CPT

## 2020-01-06 PROCEDURE — 80053 COMPREHEN METABOLIC PANEL: CPT

## 2020-01-06 PROCEDURE — 83605 ASSAY OF LACTIC ACID: CPT

## 2020-01-06 PROCEDURE — 99284 EMERGENCY DEPT VISIT MOD MDM: CPT

## 2020-01-06 PROCEDURE — 2580000003 HC RX 258: Performed by: EMERGENCY MEDICINE

## 2020-01-06 PROCEDURE — 83690 ASSAY OF LIPASE: CPT

## 2020-01-06 PROCEDURE — 74176 CT ABD & PELVIS W/O CONTRAST: CPT

## 2020-01-06 PROCEDURE — 87086 URINE CULTURE/COLONY COUNT: CPT

## 2020-01-06 PROCEDURE — 85025 COMPLETE CBC W/AUTO DIFF WBC: CPT

## 2020-01-06 PROCEDURE — 96366 THER/PROPH/DIAG IV INF ADDON: CPT

## 2020-01-06 PROCEDURE — 6360000002 HC RX W HCPCS: Performed by: EMERGENCY MEDICINE

## 2020-01-06 RX ORDER — PROMETHAZINE HYDROCHLORIDE 25 MG/1
25 TABLET ORAL 4 TIMES DAILY PRN
Qty: 20 TABLET | Refills: 0 | Status: SHIPPED | OUTPATIENT
Start: 2020-01-06 | End: 2020-01-13

## 2020-01-06 RX ORDER — HYDROCODONE BITARTRATE AND ACETAMINOPHEN 5; 325 MG/1; MG/1
1 TABLET ORAL EVERY 6 HOURS PRN
Qty: 8 TABLET | Refills: 0 | Status: SHIPPED | OUTPATIENT
Start: 2020-01-06 | End: 2020-01-09

## 2020-01-06 RX ORDER — ALPRAZOLAM 1 MG/1
1 TABLET ORAL NIGHTLY
COMMUNITY
End: 2020-02-11 | Stop reason: SDUPTHER

## 2020-01-06 RX ORDER — DEXTROSE AND SODIUM CHLORIDE 5; .9 G/100ML; G/100ML
INJECTION, SOLUTION INTRAVENOUS CONTINUOUS
Status: DISCONTINUED | OUTPATIENT
Start: 2020-01-06 | End: 2020-01-06 | Stop reason: HOSPADM

## 2020-01-06 RX ORDER — SEVELAMER HYDROCHLORIDE 800 MG/1
800 TABLET, FILM COATED ORAL DAILY
COMMUNITY
End: 2021-08-24 | Stop reason: SDUPTHER

## 2020-01-06 RX ADMIN — PROMETHAZINE HYDROCHLORIDE: 25 INJECTION INTRAMUSCULAR; INTRAVENOUS at 15:47

## 2020-01-06 RX ADMIN — DEXTROSE AND SODIUM CHLORIDE: 5; 900 INJECTION, SOLUTION INTRAVENOUS at 14:29

## 2020-01-06 RX ADMIN — DEXTROSE AND SODIUM CHLORIDE: 5; 900 INJECTION, SOLUTION INTRAVENOUS at 14:32

## 2020-01-06 ASSESSMENT — PAIN DESCRIPTION - FREQUENCY: FREQUENCY: CONTINUOUS

## 2020-01-06 ASSESSMENT — PAIN DESCRIPTION - PAIN TYPE: TYPE: ACUTE PAIN

## 2020-01-06 ASSESSMENT — PAIN DESCRIPTION - DESCRIPTORS: DESCRIPTORS: SHARP

## 2020-01-06 ASSESSMENT — PAIN SCALES - GENERAL: PAINLEVEL_OUTOF10: 10

## 2020-01-06 ASSESSMENT — PAIN DESCRIPTION - LOCATION: LOCATION: ABDOMEN

## 2020-01-06 NOTE — ED PROVIDER NOTES
Father     Other Father         MI    Cancer Brother         testicular    No Known Problems Maternal Grandmother     No Known Problems Maternal Grandfather     No Known Problems Paternal Grandmother     No Known Problems Paternal Grandfather        SOCIAL HISTORY   reports that she has been smoking cigarettes. She started smoking about 52 years ago. She has a 4.50 pack-year smoking history. She has never used smokeless tobacco. She reports that she does not drink alcohol or use drugs. SURGICAL HISTORY  Past Surgical History:   Procedure Laterality Date    CHOLECYSTECTOMY      COLONOSCOPY  07/06/2017    DR. CARMONA    CORONARY ANGIOPLASTY WITH STENT PLACEMENT  10/2013 [place]    HYSTERECTOMY         CURRENT MEDICATIONS  Current Outpatient Rx   Medication Sig Dispense Refill    sevelamer hcl (RENAGEL) 800 MG tablet Take 800 mg by mouth daily      ALPRAZolam (XANAX) 1 MG tablet Take 1 mg by mouth nightly.  promethazine (PHENERGAN) 25 MG tablet Take 1 tablet by mouth 4 times daily as needed for Nausea 20 tablet 0    HYDROcodone-acetaminophen (NORCO) 5-325 MG per tablet Take 1 tablet by mouth every 6 hours as needed for Pain for up to 3 days. Intended supply: 3 days.  Take lowest dose possible to manage pain 8 tablet 0    busPIRone (BUSPAR) 15 MG tablet Take 15 mg by mouth daily 90 tablet 1    buPROPion (WELLBUTRIN XL) 150 MG extended release tablet Take 1 tablet by mouth every morning 90 tablet 1    clopidogrel (PLAVIX) 75 MG tablet Take 1 tablet by mouth daily 90 tablet 1    simvastatin (ZOCOR) 10 MG tablet Take 1 tablet by mouth every evening 90 tablet 1    albuterol sulfate  (90 Base) MCG/ACT inhaler Inhale 2 puffs into the lungs every 6 hours as needed for Wheezing 1 Inhaler 5    pantoprazole (PROTONIX) 40 MG tablet Take 1 tablet by mouth  daily 90 tablet 1    rOPINIRole (REQUIP) 1 MG tablet Take 1 tablet by mouth nightly (Patient taking differently: Take 1 mg by mouth 3 times daily Indications: pt only taking once a day ) 90 tablet 1    atenolol (TENORMIN) 25 MG tablet Take one tab daily (Patient taking differently: Indications: does not know her dose Take one tab daily) 90 tablet 1    aspirin 81 MG tablet Take 1 tablet by mouth daily 30 tablet 0       ALLERGIES  Allergies   Allergen Reactions    Adhesive Tape Rash    Iodine Itching and Nausea And Vomiting    Sertraline Other (See Comments)     Severe dry mouth    Sulfa Antibiotics Itching and Nausea Only         PHYSICAL EXAM  VITAL SIGNS: BP (!) 159/80   Pulse 69   Temp 97.6 °F (36.4 °C) (Oral)   Resp 20   Ht 5' 5\" (1.651 m)   Wt 162 lb 14.7 oz (73.9 kg)   SpO2 98%   BMI 27.11 kg/m²    Constitutional: Well-developed, well-nourished, appears mildly ill but otherwise normal, nontoxic, activity: Lying on the cart, emesis bag in front of her, TV is off  HEENT: Normocephalic, Atraumatic, Bilateral ears are normal, Oropharynx moist, No oral exudates, Nose normal.  Eyes: PERRLA, EOMI, Conjunctiva normal, No discharge. No scleral icterus. Neck: Normal range of motion, No tenderness, Supple,  Lymphatic: No lymphadenopathy noted. Cardiovascular: Normal heart rate, Normal rhythm, no murmurs, no gallops, no rubs. Thorax & Lungs: Normal breath sounds, No respiratory distress, No wheezing,  Abdomen: Soft, moderate periumbilical tender with mild guarding, no rebound, no rigidity; no distension, no masses, no pulsatile masses, no hepatosplenomegaly, normal bowel sounds. Skin: Warm, Dry, No erythema, No rash. Back: No tenderness, Full range of motion, No scoliosis. Extremities: No edema, No tenderness, No cyanosis, No clubbing. No amputations, capillary refill less than 2 seconds. Musculoskeletal: Good range of motion in all major joints, No tenderness to palpation or major deformities noted.   Neurologic: Alert & oriented x 3  Psychiatric: Affect normal, Judgment normal, Mood normal.    LABORATORY  Labs Reviewed   CBC WITH AUTO DIFFERENTIAL - Abnormal; Notable for the following components:       Result Value    Hemoglobin 10.2 (*)     Hematocrit 32.3 (*)     MCV 79.6 (*)     MCH 25.1 (*)     RDW 17.4 (*)     All other components within normal limits    Narrative:     Performed at:  [facility]  [address] [place], [place]   Phone [phone]   COMPREHENSIVE METABOLIC PANEL W/ REFLEX TO MG FOR LOW K - Abnormal; Notable for the following components:    Glucose 114 (*)     BUN 34 (*)     CREATININE 1.9 (*)     GFR Non- 26 (*)     GFR African American 32 (*)     Albumin/Globulin Ratio 1.0 (*)     All other components within normal limits    Narrative:     Performed at:  [facility]  [address] [place], [place]   Phone [phone]   URINE RT REFLEX TO CULTURE - Abnormal; Notable for the following components:    Blood, Urine MODERATE (*)     Protein, UA TRACE (*)     Leukocyte Esterase, Urine TRACE (*)     All other components within normal limits    Narrative:     Performed at:  [facility]  [address] [place], [place]   Phone [phone]   MICROSCOPIC URINALYSIS - Abnormal; Notable for the following components:    RBC, UA 3-5 (*)     All other components within normal limits    Narrative:     Performed at:  [facility]  [address] [place], [place]   Phone [phone]   URINE CULTURE   LIPASE    Narrative:     Performed at:  [address] [facility] [address] [facility]  [address] [place], [place]   Phone [phone]   LACTIC ACID, PLASMA    Narrative:     Performed at:  [address] [facility] [facility]  [address] [place], [place]   Phone [phone]       RADIOLOGY/PROCEDURES  I personally reviewed the images for this case.   CT changed. The patient was instructed to follow up closely with their personal physician to have their blood pressure rechecked. The patient was instructed to take a list of recent blood pressure readings to their next visit with their personal physician. See discharge instructions for specific medications, discharge information, and treatments. They were verbally instructed to return to emergency if any problems. (This chart has been completed using [address]. Although attempts have been made to ensure accuracy, words and/or phrases may not be transcribed as intended.)    Patient refused pain medicines at the time of his exam.    IMPRESSION(S):  1. Periumbilical abdominal pain    2. Chronic renal impairment, unspecified CKD stage    3. Nausea and vomiting, intractability of vomiting not specified, unspecified vomiting type        ?   Recheck Times: 1500, [place], [place]  01/06/20 4632

## 2020-01-06 NOTE — ED NOTES
epigastric pain at [address] [facility], [address] [place] [address]  01/06/20 5250

## 2020-01-07 VITALS
SYSTOLIC BLOOD PRESSURE: 163 MMHG | WEIGHT: 162.92 LBS | OXYGEN SATURATION: 96 % | DIASTOLIC BLOOD PRESSURE: 88 MMHG | BODY MASS INDEX: 27.14 KG/M2 | HEART RATE: 72 BPM | TEMPERATURE: 97.6 F | HEIGHT: 65 IN | RESPIRATION RATE: 17 BRPM

## 2020-01-07 PROBLEM — N28.9 KIDNEY DISORDER: Status: RESOLVED | Noted: 2018-10-03 | Resolved: 2020-01-07

## 2020-01-07 PROBLEM — R51.9 HEADACHE: Status: RESOLVED | Noted: 2018-10-03 | Resolved: 2020-01-07

## 2020-01-07 PROBLEM — N18.4 STAGE 4 CHRONIC KIDNEY DISEASE (HCC): Status: ACTIVE | Noted: 2020-01-07

## 2020-01-07 LAB — URINE CULTURE, ROUTINE: NORMAL

## 2020-01-27 NOTE — ED NOTES
Epigastric pain back to [address],  Rebeca Diaz, ASHLYN  01/27/20 6440

## 2020-01-27 NOTE — ED NOTES
Nauseated but not vomiting. Up to bathroom voids qs.   Explaining new orders  Epigastric pain at [address] [facility], [address] [place] [address]  01/27/20 7659

## 2020-01-29 ENCOUNTER — HOSPITAL ENCOUNTER (OUTPATIENT)
Dept: ULTRASOUND IMAGING | Age: 70
Discharge: HOME | End: 2020-01-29
Payer: MEDICARE

## 2020-01-29 ENCOUNTER — HOSPITAL ENCOUNTER (OUTPATIENT)
Dept: CT IMAGING | Age: 70
Discharge: HOME | End: 2020-01-29
Payer: MEDICARE

## 2020-01-29 PROBLEM — E04.1 LEFT THYROID NODULE: Status: ACTIVE | Noted: 2020-01-29

## 2020-01-29 PROCEDURE — 76536 US EXAM OF HEAD AND NECK: CPT

## 2020-01-29 PROCEDURE — 70450 CT HEAD/BRAIN W/O DYE: CPT

## 2020-06-16 ENCOUNTER — HOSPITAL ENCOUNTER (OUTPATIENT)
Age: 70
Discharge: HOME | End: 2020-06-16
Payer: MEDICARE

## 2020-06-16 ENCOUNTER — HOSPITAL ENCOUNTER (OUTPATIENT)
Dept: GENERAL RADIOLOGY | Age: 70
Discharge: HOME | End: 2020-06-16
Payer: MEDICARE

## 2020-06-16 PROCEDURE — 71046 X-RAY EXAM CHEST 2 VIEWS: CPT

## 2020-06-24 ENCOUNTER — OFFICE VISIT (OUTPATIENT)
Dept: CARDIOLOGY CLINIC | Age: 70
End: 2020-06-24
Payer: MEDICARE

## 2020-06-24 VITALS
HEART RATE: 76 BPM | DIASTOLIC BLOOD PRESSURE: 83 MMHG | HEIGHT: 64 IN | SYSTOLIC BLOOD PRESSURE: 160 MMHG | BODY MASS INDEX: 27.52 KG/M2 | OXYGEN SATURATION: 99 % | WEIGHT: 161.2 LBS | TEMPERATURE: 98.4 F

## 2020-06-24 PROCEDURE — 99214 OFFICE O/P EST MOD 30 MIN: CPT | Performed by: INTERNAL MEDICINE

## 2020-06-24 PROCEDURE — 93000 ELECTROCARDIOGRAM COMPLETE: CPT | Performed by: INTERNAL MEDICINE

## 2020-06-24 NOTE — PROGRESS NOTES
disease)     Headache     Heart attack ([facility] [address]     Hyperlipidemia     Hypertension 5/3/2016    IBS (irritable bowel syndrome)     Left thyroid nodule     Menopause     Other and unspecified noninfectious gastroenteritis and colitis(558.9)     Palpitations     Panic disorder     Restless leg syndrome     Urinary incontinence      Past Surgical History:   Procedure Laterality Date    CHOLECYSTECTOMY      COLONOSCOPY  07/06/2017    DR. CARMONA    [place]ONARY AN[place]OPLASTY [place] ST[place] PLACEMENT  10[place]2013 [place]    HYSTERECTOMY       Family History   Problem Relation Age of Onset    Hypertension Father     Other Father         MI    Cancer Brother         testicular    No Known Problems Maternal Grandmother     No Known Problems Maternal Grandfather     No Known Problems Paternal Grandmother     No Known Problems Paternal Grandfather      Social History     Tobacco Use    Smoking status: Current Some Day Smoker     Packs/day: 0.25     Years: 18.00     Pack years: 4.50     Types: Cigarettes     Start date: 1/1/1968    Smokeless tobacco: Never Used    Tobacco comment: 2 cigarettes daily-using patches when she does not smoke   Substance Use Topics    Alcohol use: No     Alcohol/week: 0.0 standard drinks    Drug use: No       Allergies   Allergen Reactions    Wellbutrin [Bupropion]      Dry mouth    Adhesive Tape Rash    Iodine Itching and Nausea And Vomiting    Sertraline Other (See Comments)     Severe dry mouth    Sulfa Antibiotics Itching and Nausea Only     Current Outpatient Medications   Medication Sig Dispense Refill    umeclidinium-vilanterol (ANORO ELLIPTA) 62.5-25 MCG/INH AEPB inhaler Inhale 1 puff into the lungs daily Lot# [id number] 25 puff 0    ALPRAZolam (XANAX) 1 MG tablet Take 1 tablet by mouth nightly as needed for Sleep for up to 90 days.  90 tablet 0    atenolol (TENORMIN) 25 MG tablet Take one tab daily (Patient taking differently: 12.5 mg Take half tablet daily) 90 tablet 1    normal. No respiratory distress. She has no wheezes, rhonchi or rales. Abdominal: Soft, non-tender. Bowel sounds and aorta are normal. She exhibits no organomegaly, mass or bruit. Extremities: No edema, cyanosis, or clubbing. Pulses are 2+ radial/carotid/dorsalis pedis and posterior tibial bilaterally. Neurological: She is alert and oriented to person, place, and time. She has normal reflexes. No cranial nerve deficit. Coordination normal.   Skin: Skin is warm and dry. There is no rash or diaphoresis. Psychiatric: She has a normal mood and affect. Her speech is normal and behavior is normal.       Procedures:     [facility] 10/21/13  1. Right dominant coronary arterial system with a 99% mid RCA lesion  successfully stented with a 3.5 mm bare metal Integrity stent. There is a  60% distal RCA lesion that did not meet significance by FFR with a ratio of  0.81.    2.  In the left system, there is no left main stenosis. There is a long 40%  mid LAD stenosis that is smooth. The diagonal branches are medium size with  no significant disease. The circumflex is a medium size vessel with no  significant disease either. 3.  Normal left ventricular end diastolic pressure. 4.  Normal left ventricular systolic function with an LV ejection fraction of  70%. 5.  No gradient across the aortic valve on pullback to suggest aortic  stenosis. Peripheral angiogram 5/30/14  Patent bilateral Renal arteries with no significant stenoses  Abdominal aorta with no sig atherosclerosis  Patent bilateral common, internal and external iliac arteries. Patent bilateral common femoral, femoris profunda and SFA arteries. There is 50% bilateral SFA stenoses in the mid portion. Two vessel runoff on the left as the posterior tibial artery is atretic. Three vessel runoff on the left to the foot.       Imaging:     Echo 10/21/13  Ejection fraction is visually estimated to be 60 %. trace mitral regurgitation is present.   Normal right

## 2020-06-25 ENCOUNTER — TELEPHONE (OUTPATIENT)
Dept: CARDIOLOGY CLINIC | Age: 70
End: 2020-06-25

## 2020-06-25 RX ORDER — CARVEDILOL 6.25 MG/1
6.25 TABLET ORAL DAILY
Qty: 30 TABLET | Refills: 5 | Status: SHIPPED | OUTPATIENT
Start: 2020-06-25 | End: 2020-08-05 | Stop reason: SDUPTHER

## 2020-06-30 ENCOUNTER — HOSPITAL ENCOUNTER (OUTPATIENT)
Dept: VASCULAR LAB | Age: 70
Discharge: HOME | End: 2020-06-30
Payer: MEDICARE

## 2020-06-30 ENCOUNTER — HOSPITAL ENCOUNTER (OUTPATIENT)
Dept: NON INVASIVE DIAGNOSTICS | Age: 70
Discharge: HOME | End: 2020-06-30
Payer: MEDICARE

## 2020-06-30 LAB
LV EF: 88 %
LVEF MODALITY: NORMAL

## 2020-06-30 PROCEDURE — 78452 HT MUSCLE IMAGE SPECT MULT: CPT

## 2020-06-30 PROCEDURE — 93017 CV STRESS TEST TRACING ONLY: CPT

## 2020-06-30 PROCEDURE — A9502 TC99M TETROFOSMIN: HCPCS | Performed by: INTERNAL MEDICINE

## 2020-06-30 PROCEDURE — 93923 UPR/LXTR ART STDY 3+ LVLS: CPT

## 2020-06-30 PROCEDURE — 3430000000 HC RX DIAGNOSTIC RADIOPHARMACEUTICAL: Performed by: INTERNAL MEDICINE

## 2020-06-30 RX ADMIN — TETROFOSMIN 10 MILLICURIE: 1.38 INJECTION, POWDER, LYOPHILIZED, FOR SOLUTION INTRAVENOUS at 08:27

## 2020-06-30 RX ADMIN — TETROFOSMIN 30 MILLICURIE: 1.38 INJECTION, POWDER, LYOPHILIZED, FOR SOLUTION INTRAVENOUS at 10:11

## 2020-08-05 ENCOUNTER — OFFICE VISIT (OUTPATIENT)
Dept: CARDIOLOGY CLINIC | Age: 70
End: 2020-08-05
Payer: MEDICARE

## 2020-08-05 VITALS
DIASTOLIC BLOOD PRESSURE: 98 MMHG | BODY MASS INDEX: 27.52 KG/M2 | HEART RATE: 78 BPM | SYSTOLIC BLOOD PRESSURE: 181 MMHG | OXYGEN SATURATION: 98 % | HEIGHT: 64 IN | TEMPERATURE: 97.9 F | WEIGHT: 161.2 LBS

## 2020-08-05 PROCEDURE — 93000 ELECTROCARDIOGRAM COMPLETE: CPT | Performed by: INTERNAL MEDICINE

## 2020-08-05 PROCEDURE — 99214 OFFICE O/P EST MOD 30 MIN: CPT | Performed by: INTERNAL MEDICINE

## 2020-08-05 RX ORDER — CARVEDILOL 6.25 MG/1
6.25 TABLET ORAL 2 TIMES DAILY
Qty: 180 TABLET | Refills: 3 | Status: SHIPPED | OUTPATIENT
Start: 2020-08-05 | End: 2020-11-20 | Stop reason: SDUPTHER

## 2020-08-05 NOTE — PROGRESS NOTES
Procedure Laterality Date    CHOLECYSTECTOMY      COLONOSCOPY  07/06/2017    DR. CARMONA    CORONARY ANGIOPLASTY WITH STENT PLACEMENT  10/2013 [place]    HYSTERECTOMY       Family History   Problem Relation Age of Onset    Hypertension Father     Other Father         MI    Cancer Brother         testicular    No Known Problems Maternal Grandmother     No Known Problems Maternal Grandfather     No Known Problems Paternal Grandmother     No Known Problems Paternal Grandfather      Social History     Tobacco Use    Smoking status: Current Some Day Smoker     Packs/day: 0.25     Years: 18.00     Pack years: 4.50     Types: Cigarettes     Start date: 1/1/1968    Smokeless tobacco: Never Used    Tobacco comment: 2 cigarettes daily-using patches when she does not smoke   Substance Use Topics    Alcohol use: No     Alcohol/week: 0.0 standard drinks    Drug use: No       Allergies   Allergen Reactions    Wellbutrin [Bupropion]      Dry mouth    Adhesive Tape Rash    Iodine Itching and Nausea And Vomiting    Sertraline Other (See Comments)     Severe dry mouth    Sulfa Antibiotics Itching and Nausea Only     Current Outpatient Medications   Medication Sig Dispense Refill    simvastatin (ZOCOR) 20 MG tablet Take 1 tablet by mouth every evening 30 tablet 3    umeclidinium-vilanterol (ANORO ELLIPTA) 62.5-25 MCG/INH AEPB inhaler Inhale 1 puff into the lungs daily Lot# [id number] 25 puff 0    carvedilol (COREG) 6.25 MG tablet Take 1 tablet by mouth daily 30 tablet 5    ALPRAZolam (XANAX) 1 MG tablet Take 1 tablet by mouth nightly as needed for Sleep for up to 90 days.  90 tablet 0    busPIRone (BUSPAR) 15 MG tablet Take 15 mg by mouth daily 90 tablet 1    pantoprazole (PROTONIX) 40 MG tablet Take 1 tablet by mouth  daily 90 tablet 1    rOPINIRole (REQUIP) 1 MG tablet Take 1 tablet by mouth nightly 90 tablet 1    amitriptyline (ELAVIL) 50 MG tablet Take 1 tablet by mouth nightly 90 tablet 1    clopidogrel 28 06/16/2020     Lab Results   Component Value Date     06/16/2020    K 5.5 06/16/2020    K 4.7 01/06/2020    BUN 28 06/16/2020    CREATININE 1.8 06/16/2020     Assessment:  1. CAD of native coronary arteries   2. Hyperlipidemia with LDL goal <70 mg/dL   3. PAD   4. Shortness of breath with exertion   5. Essential Hypertension    Plan:  Iram Baird has had fluctuating blood pressures lately. Currently, she is hypertensive. She is only taking her carvedilol once daily so we will Increase it to 6.25 mg BID. She ia going to see Dr. Jewels Osborn in 4 weeks and I asked for Iram Baird to have him assess this as well. Her carvedilol can be increased further as well. I will see her in the office for follow up in 6 months. This note was scribed in the presence of Cy Mireles MD by [employer], RN. Physician Attestation:  The scribes documentation has been prepared under my direction and personally reviewed by me in its entirety. I, Dr. Reinier Moreira personally performed the services described in this documentation as scribed by my RN,  [employer] in my presence, and I confirm that the note above accurately reflects all work, treatment, procedures, and medical decision making performed by me.

## 2020-08-05 NOTE — PATIENT INSTRUCTIONS

## 2020-08-11 ENCOUNTER — HOSPITAL ENCOUNTER (OUTPATIENT)
Dept: CT IMAGING | Age: 70
Discharge: HOME | End: 2020-08-11
Payer: MEDICARE

## 2020-08-11 PROCEDURE — G0297 LDCT FOR LUNG CA SCREEN: HCPCS

## 2020-08-14 ENCOUNTER — HOSPITAL ENCOUNTER (OUTPATIENT)
Dept: WOMENS IMAGING | Age: 70
Discharge: HOME | End: 2020-08-14
Payer: MEDICARE

## 2020-08-14 PROCEDURE — 77067 SCR MAMMO BI INCL CAD: CPT

## 2020-08-14 PROCEDURE — 77080 DXA BONE DENSITY AXIAL: CPT

## 2020-11-24 ENCOUNTER — HOSPITAL ENCOUNTER (EMERGENCY)
Age: 70
Discharge: HOME | End: 2020-11-25
Attending: EMERGENCY MEDICINE
Payer: MEDICARE

## 2020-11-24 ENCOUNTER — APPOINTMENT (OUTPATIENT)
Dept: CT IMAGING | Age: 70
End: 2020-11-24
Payer: MEDICARE

## 2020-11-24 ENCOUNTER — APPOINTMENT (OUTPATIENT)
Dept: GENERAL RADIOLOGY | Age: 70
End: 2020-11-24
Payer: MEDICARE

## 2020-11-24 LAB
ANION GAP SERPL CALCULATED.3IONS-SCNC: 11 MMOL/L (ref 3–16)
BASOPHILS ABSOLUTE: 0.1 K/UL (ref 0–0.2)
BASOPHILS RELATIVE PERCENT: 0.7 %
BUN BLDV-MCNC: 28 MG/DL (ref 7–20)
CALCIUM SERPL-MCNC: 9.5 MG/DL (ref 8.3–10.6)
CHLORIDE BLD-SCNC: 105 MMOL/L (ref 99–110)
CO2: 24 MMOL/L (ref 21–32)
CREAT SERPL-MCNC: 1.7 MG/DL (ref 0.6–1.2)
EOSINOPHILS ABSOLUTE: 0.3 K/UL (ref 0–0.6)
EOSINOPHILS RELATIVE PERCENT: 2.5 %
GFR AFRICAN AMERICAN: 36
GFR NON-AFRICAN AMERICAN: 30
GLUCOSE BLD-MCNC: 99 MG/DL (ref 70–99)
HCT VFR BLD CALC: 43 % (ref 36–48)
HEMOGLOBIN: 14.3 G/DL (ref 12–16)
LYMPHOCYTES ABSOLUTE: 3.1 K/UL (ref 1–5.1)
LYMPHOCYTES RELATIVE PERCENT: 30.3 %
MCH RBC QN AUTO: 29.6 PG (ref 26–34)
MCHC RBC AUTO-ENTMCNC: 33.3 G/DL (ref 31–36)
MCV RBC AUTO: 88.7 FL (ref 80–100)
MONOCYTES ABSOLUTE: 0.8 K/UL (ref 0–1.3)
MONOCYTES RELATIVE PERCENT: 7.5 %
NEUTROPHILS ABSOLUTE: 6 K/UL (ref 1.7–7.7)
NEUTROPHILS RELATIVE PERCENT: 59 %
PDW BLD-RTO: 14.3 % (ref 12.4–15.4)
PLATELET # BLD: 286 K/UL (ref 135–450)
PMV BLD AUTO: 8.2 FL (ref 5–10.5)
POTASSIUM REFLEX MAGNESIUM: 4.7 MMOL/L (ref 3.5–5.1)
PRO-BNP: 267 PG/ML (ref 0–124)
RBC # BLD: 4.84 M/UL (ref 4–5.2)
REASON FOR REJECTION: NORMAL
REJECTED TEST: NORMAL
SODIUM BLD-SCNC: 140 MMOL/L (ref 136–145)
TROPONIN: <0.01 NG/ML
WBC # BLD: 10.1 K/UL (ref 4–11)

## 2020-11-24 PROCEDURE — 93005 ELECTROCARDIOGRAM TRACING: CPT | Performed by: EMERGENCY MEDICINE

## 2020-11-24 PROCEDURE — 80048 BASIC METABOLIC PNL TOTAL CA: CPT

## 2020-11-24 PROCEDURE — 36415 COLL VENOUS BLD VENIPUNCTURE: CPT

## 2020-11-24 PROCEDURE — 70450 CT HEAD/BRAIN W/O DYE: CPT

## 2020-11-24 PROCEDURE — 85025 COMPLETE CBC W/AUTO DIFF WBC: CPT

## 2020-11-24 PROCEDURE — 71045 X-RAY EXAM CHEST 1 VIEW: CPT

## 2020-11-24 PROCEDURE — 99283 EMERGENCY DEPT VISIT LOW MDM: CPT

## 2020-11-24 PROCEDURE — 84484 ASSAY OF TROPONIN QUANT: CPT

## 2020-11-24 PROCEDURE — 72125 CT NECK SPINE W/O DYE: CPT

## 2020-11-24 PROCEDURE — 85610 PROTHROMBIN TIME: CPT

## 2020-11-24 PROCEDURE — 83880 ASSAY OF NATRIURETIC PEPTIDE: CPT

## 2020-11-25 VITALS
HEART RATE: 70 BPM | OXYGEN SATURATION: 93 % | WEIGHT: 156.53 LBS | HEIGHT: 65 IN | SYSTOLIC BLOOD PRESSURE: 101 MMHG | RESPIRATION RATE: 19 BRPM | TEMPERATURE: 97.4 F | DIASTOLIC BLOOD PRESSURE: 62 MMHG | BODY MASS INDEX: 26.08 KG/M2

## 2020-11-25 LAB
EKG ATRIAL RATE: 62 BPM
EKG DIAGNOSIS: NORMAL
EKG P AXIS: 62 DEGREES
EKG P-R INTERVAL: 136 MS
EKG Q-T INTERVAL: 456 MS
EKG QRS DURATION: 76 MS
EKG QTC CALCULATION (BAZETT): 462 MS
EKG R AXIS: 54 DEGREES
EKG T AXIS: 74 DEGREES
EKG VENTRICULAR RATE: 62 BPM
INR BLD: 1.11 (ref 0.86–1.14)
PROTHROMBIN TIME: 12.9 SEC (ref 10–13.2)

## 2020-11-25 PROCEDURE — 93010 ELECTROCARDIOGRAM REPORT: CPT | Performed by: INTERNAL MEDICINE

## 2020-11-25 ASSESSMENT — ENCOUNTER SYMPTOMS
EYE ITCHING: 0
VOMITING: 0
CONSTIPATION: 0
EYE DISCHARGE: 0
SHORTNESS OF BREATH: 0
COLOR CHANGE: 0
ABDOMINAL PAIN: 0
COUGH: 0

## 2020-11-25 NOTE — ED PROVIDER NOTES
[address] [facility]      Pt Name: Kumar Art  MRN: [id number]  [place] 1950  Date of evaluation: 11/24/2020  Provider: Ameena Rogers MD    CHIEF COMPLAINT       Chief Complaint   Patient presents with    Loss of Consciousness     pt was making a sandwich, got dizzy, passed out and fell backwards. hematoma to back of head. Pt on blood thinners. HISTORY OF PRESENT ILLNESS    Kumar Art is a 78 y.o. female who presents to the emergency department with LOC. Patient states she went to make a sandwhich and got up quickly and became dizzy and fell backwards and hit head. No LOC. On ASA and Plavix. Endorses mild headache other wise no other associated symptoms. States this has happened before when she gets up to quickly at times. Nursing Notes were reviewed. Including nursing noted for FM, Surgical History, Past Medical History, Social History, vitals, and allergies; agree with all. REVIEW OF SYSTEMS       Review of Systems   Constitutional: Negative for diaphoresis and unexpected weight change. HENT: Negative for congestion and dental problem. Eyes: Negative for discharge and itching. Respiratory: Negative for cough and shortness of breath. Cardiovascular: Negative for chest pain and leg swelling. Gastrointestinal: Negative for abdominal pain, constipation and vomiting. Endocrine: Negative for cold intolerance and heat intolerance. Genitourinary: Negative for vaginal bleeding, vaginal discharge and vaginal pain. Musculoskeletal: Negative for neck pain and neck stiffness. Skin: Negative for color change and pallor. Neurological: Positive for dizziness and headaches. Negative for tremors and weakness. Psychiatric/Behavioral: Negative for agitation and behavioral problems. Except as noted above the remainder of the review of systems was reviewed and negative.      PAST MEDICAL HISTORY     Past Medical History:   Diagnosis Date    Allergic rhinitis     Anxiety     Asthma     CAD (coronary artery disease)     Chronic kidney disease     Depression 5/17/2013    Disorders of porphyrin metabolism     GERD (gastroesophageal reflux disease)     Heart [facility] ([facility] [address]     Hyperlipidemia     Hypertension 5/3/2016    IBS (irritable bowel syndrome)     Left thyroid nodule     Menopause     Migraines     Other and unspecified noninfectious gastroenteritis and colitis(558.9)     Panic disorder     Prediabetes     Restless leg syndrome     Urinary incontinence        SURGICAL HISTORY       Past Surgical History:   Procedure Laterality Date    CHOLECYSTECTOMY      COLONOSCOPY  07/06/2017    DR. CARMONA    CORONARY ANGIOPLASTY WITH STENT PLACEMENT  10/2013 [place]    HYSTERECTOMY         CURRENT MEDICATIONS       Discharge Medication List as of 11/25/2020  1:02 AM      CONTINUE these medications which have NOT CHANGED    Details   simvastatin (ZOCOR) 20 MG tablet Take 1 tablet by mouth every evening, Disp-90 tablet,R-1Normal      carvedilol (COREG) 6.25 MG tablet Take 1 tablet by mouth 2 times daily, Disp-180 tablet,R-3Normal      amitriptyline (ELAVIL) 50 MG tablet Take 1 tablet by mouth nightly, Disp-90 tablet,R-1Normal      busPIRone (BUSPAR) 15 MG tablet Take 15 mg by mouth daily, Disp-90 tablet,R-1Normal      pantoprazole (PROTONIX) 40 MG tablet Take 1 tablet by mouth  daily, Disp-90 tablet,R-1Normal      clopidogrel (PLAVIX) 75 MG tablet Take 1 tablet by mouth daily, Disp-90 tablet,R-1Normal      methocarbamol (ROBAXIN) 500 MG tablet Take 1 tablet by mouth 4 times daily for 10 days, Disp-40 tablet,R-0Normal      rOPINIRole (REQUIP) 1 MG tablet Take 1 tablet by mouth nightly, Disp-90 tablet,R-1This replaces the rx for 0.25 mg tablets. Normal      ALPRAZolam (XANAX) 1 MG tablet Take 1 tablet by mouth nightly as needed for Sleep for up to 90 days. , Disp-90 tablet,R-0Normal      umeclidinium-vilanterol [facility] ELLIPTA) 62.5-25 MCG/INH AEPB inhaler Inhale 1 puff into the lungs daily Lot# [id number], Disp-25 puff,R-0Normal      sevelamer hcl (RENAGEL) 800 MG tablet Take 800 mg by mouth dailyHistorical Med      albuterol sulfate  (90 Base) MCG/ACT inhaler Inhale 2 puffs into the lungs every 6 hours as needed for Wheezing, Disp-1 Inhaler, R-5Normal      aspirin 81 MG tablet Take 1 tablet by mouth daily, Disp-30 tablet, R-0Normal             ALLERGIES     Wellbutrin [bupropion]; Adhesive tape; Iodine; Sertraline; and Sulfa antibiotics    FAMILY HISTORY        Family History   Problem Relation Age of Onset    Hypertension Father     Other Father         MI    Cancer Brother         testicular    No Known Problems Maternal Grandmother     No Known Problems Maternal Grandfather     No Known Problems Paternal Grandmother     No Known Problems Paternal Grandfather        SOCIAL HISTORY       Social History     Socioeconomic History    Marital status:       Spouse name: None    Number of children: None    Years of education: None    Highest education level: None   Occupational History    None   Social Needs    Financial resource strain: None    Food insecurity     Worry: None     Inability: None    Transportation needs     Medical: None     Non-medical: None   Tobacco Use    Smoking status: Current Some Day Smoker     Packs/day: 0.25     Years: 18.00     Pack years: 4.50     Types: Cigarettes     Start date: 1/1/1968    Smokeless tobacco: Never Used    Tobacco comment: 2 cigarettes daily-using patches when she does not smoke   Substance and Sexual Activity    Alcohol use: No     Alcohol/week: 0.0 standard drinks    Drug use: No    Sexual activity: Not Currently   Lifestyle    Physical activity     Days per week: None     Minutes per session: None    Stress: None   Relationships    Social connections     Talks on phone: None     Gets together: None     Attends Faith service: None     Active member of club or organization: None     Attends meetings of clubs or organizations: None     Relationship status: None    Intimate partner violence     Fear of current or ex partner: None     Emotionally abused: None     Physically abused: None     Forced sexual activity: None   Other Topics Concern    None   Social History Narrative    None       PHYSICAL EXAM       ED Triage Vitals   BP Temp Temp Source Pulse Resp SpO2 Height Weight   11/24/20 2210 11/24/20 2204 11/24/20 2204 11/24/20 2204 11/24/20 2204 11/24/20 2204 11/24/20 2204 11/24/20 2204   130/73 97.4 °F (36.3 °C) Oral 71 16 99 % 5' 5\" (1.651 m) 156 lb 8.4 oz (71 kg)       Physical Exam  Vitals signs and nursing note reviewed. Constitutional:       General: She is not in acute distress. Appearance: She is well-developed. She is not ill-appearing, toxic-appearing or diaphoretic. HENT:      Head: Normocephalic. Comments: Small hematoma on back of head     Right Ear: External ear normal.      Left Ear: External ear normal.   Eyes:      General:         Right eye: No discharge. Left eye: No discharge. Conjunctiva/sclera: Conjunctivae normal.      Pupils: Pupils are equal, round, and reactive to light. Neck:      Musculoskeletal: Normal range of motion and neck supple. Cardiovascular:      Rate and Rhythm: Normal rate and regular rhythm. Heart sounds: No murmur. Pulmonary:      Effort: Pulmonary effort is normal. No respiratory distress. Breath sounds: Normal breath sounds. No wheezing or rales. Abdominal:      General: Bowel sounds are normal. There is no distension. Palpations: Abdomen is soft. There is no mass. Tenderness: There is no abdominal tenderness. There is no guarding or rebound. Genitourinary:     Comments: Deferred  Musculoskeletal: Normal range of motion. General: No deformity. Skin:     General: Skin is warm. Findings: No erythema or rash.    Neurological:      Mental Status: She is alert and oriented to person, place, and time. She is not disoriented. Cranial Nerves: No cranial nerve deficit. Motor: No atrophy or abnormal muscle tone. Coordination: Coordination normal.   Psychiatric:         Behavior: Behavior normal.         Thought Content:  Thought content normal.         DIAGNOSTIC RESULTS     EKG: All EKG's are interpreted by the Emergency Department Physician who either signs or Co-signs this chart in the absence of acardiologist.    EKG NSR nonspecific ekg changes no ectopy     RADIOLOGY:   Non-plain film images such as CT, Ultrasoundand MRI are read by the radiologist. Plain radiographic images are visualized and preliminarily interpreted by the emergency physician with the below findings:    Reassuring imaging     ED BEDSIDE ULTRASOUND:   Performed by ED Physician - none    LABS:  Labs Reviewed   BASIC METABOLIC PANEL W/ REFLEX TO MG FOR LOW K - Abnormal; Notable for the following components:       Result Value    BUN 28 (*)     CREATININE 1.7 (*)     GFR Non- 30 (*)     GFR  36 (*)     All other components within normal limits    Narrative:     CALL  [facility] [phone],  Rejected Test Name/Called to: catherine can rn, 11/24/2020 23:15, by [facility]  Performed at:  [facility]  [address] [place], [place] [address]   Phone [phone]   BRAIN NATRIURETIC PEPTIDE - Abnormal; Notable for the following components:    Pro- (*)     All other components within normal limits    Narrative:     [place] tel. [phone],  Rejected Test Name/Called to: catherine can rn, 11/24/2020 23:15, by [facility]  Performed at:  [facility]  [address] [place], [place] [address]   Phone [phone]   CBC WITH AUTO DIFFERENTIAL    Narrative:     Performed at:  [address] [place] [address]  [address] [place], [place] [address]   Phone [phone]   TROPONIN    Narrative:     CALL  Mac Singh [phone],  Rejected Test Name/Called to: Yoseph Camp rn, 11/24/2020 23:15, by [facility]  Performed at:  [facility]  [address] [place] [address]   Phone [phone]   SPECIMEN REJECTION    Narrative:     Skylar Mclaughlin tel. [phone],  Rejected Test Name/Called to: Yoseph Camp rn, 11/24/2020 23:15, by [facility]  Performed at:  [facility]  [address], [facility] [place] [address]   Phone [phone]   PROTIME-INR    Narrative:     Performed at:  [address] [place] [address] Laboratory  [address] [place] [address] [facility] [place] [address]   Phone [phone]       All other labs were withinnormal range or not returned as of this dictation. EMERGENCY DEPARTMENT COURSE and DIFFERENTIAL DIAGNOSIS/MDM:     PMH, Surgical Hx, FH, Social Hx reviewed by myself (ETOH usage, Tobacco usage, Drug usage reviewed by myself, no pertinent Hx)- No Pertinent Hx     Old records were reviewed by me    Creatinine at baseline    Appears to be vasovagal in nature    Tolerating po. Feels better    I estimate there is LOW risk for Sepsis, MI, Stroke, Tamponade, PTX, Toxicity or other life threatening etiology thus I consider the discharge disposition reasonable. The patient is at low risk for mortality based on demographic, history and clinical factors. Given the best available information and clinical assessment, I estimate the risk of hospitalization to be greater than risk of treatment at home. I have explained to the patient that the risk could rapidly change, given precautions for return and instructions. Explained to patient that the risk for mortality is low based on demographic, history and clinical factors. I discussed with patient the results of evaluation in the ED, diagnosis, care, and prognosis. The plan is to discharge to home.   Patient is in agreement with plan and questions have been answered. I also discussed with patient the reasons which may require a return visit and the importance of follow-up care. The patient is well-appearing, nontoxic, and improved at the time of discharge. Patient agrees to call to arrange follow-up care as directed. Patient understands to return immediately for worsening/change in symptoms. CRITICAL CARE TIME   Total Critical Caretime was 21 minutes, excluding separately reportable procedures. There was a high probability of clinically significant/life threatening deterioration in the patient's condition which required my urgent intervention. PROCEDURES:  Unlessotherwise noted below, none    FINAL IMPRESSION      1.  Syncope and collapse          DISPOSITION/PLAN   DISPOSITION Decision To Discharge 11/25/2020 12:58:07 AM    PATIENT REFERRED TO:  Sade Bejarano, [address]  [phone]    Call today        DISCHARGE MEDICATIONS:  Discharge Medication List as of 11/25/2020  1:02 AM             (Please note that portions ofthis note were completed with a voice recognition program.  Efforts were made to edit the dictations but occasionally words are mis-transcribed.)    Judith Garcia MD(electronically signed)  Attending Emergency Physician        Judith Garcia MD  11/25/20 [id number]

## 2020-11-25 NOTE — ED NOTES
Spoke to patient's son Ingris Woods with her permission to give him an update on tests and poc. Pt resting comfortably in bed. No needs at this time. Will continue to monitor.      True Law RN  11/25/20 1773

## 2020-11-25 NOTE — ED NOTES
D/C: Order noted for d/c. Pt confirmed d/c paperwork does have correct name. Discharge and education instructions reviewed with patient. Teach-back successful. Pt verbalized understanding and signed d/c papers. Pt denied questions at this time. No acute distress noted. Patient instructed to follow-up as noted - return to emergency department if symptoms worsen. Patient verbalized understanding. Discharged per EDMD with discharge instructions. Pt discharged to private vehicle with son. Patient stable upon departure. Thanked patient for choosing [facility] for care.       Pranav Almodovar RN  11/25/20 2082

## 2021-02-22 ENCOUNTER — HOSPITAL ENCOUNTER (OUTPATIENT)
Dept: CT IMAGING | Age: 71
Discharge: HOME | End: 2021-02-22
Payer: MEDICARE

## 2021-02-22 DIAGNOSIS — R91.1 PULMONARY NODULE: ICD-10-CM

## 2021-02-22 PROCEDURE — 71250 CT THORAX DX C-: CPT

## 2021-02-25 ENCOUNTER — IMMUNIZATION (OUTPATIENT)
Dept: PRIMARY CARE CLINIC | Age: 71
End: 2021-02-25
Payer: MEDICARE

## 2021-02-25 PROCEDURE — 91300 COVID-19, PFIZER VACCINE 30MCG/0.3ML DOSE: CPT | Performed by: FAMILY MEDICINE

## 2021-02-25 PROCEDURE — 0001A COVID-19, PFIZER VACCINE 30MCG/0.3ML DOSE: CPT | Performed by: FAMILY MEDICINE

## 2021-03-09 NOTE — PROGRESS NOTES
[address]  1950    March 10, 2021      CC: \"I fell a few months ago\"     HPI:  The patient is 78 y.o. female with a past medical history significant for CAD, hyperlipidemia and hypertension. She underwent LHC on 10/21/13 resulting in PCI with BMS to mid RCA lesion. A peripheral angiogram was completed on 5/30/14 revealing 50% bilateral SFA stenoses. She presents today for follow up. Today, she reports feeling \"okay\". She reports an episode in November where she stood up and then passed out. She was seen in the ED and was found to be hypotensive. Her dose of carvedilol was decreased to 3.125 mg BID. She has experienced two consecutive evenings of chest pain that occurred with rest. This was improved with nitro. She was been walking 3 times daily and denies any exertional symptoms including chest pain or shortness of breath. She continues to follow with Dr. Hardik Novak for nephrology. Review of Systems:  Constitutional: No fatigue, weakness, night sweats or fever. HEENT: No new vision difficulties or ringing in the ears. Respiratory: No new SOB, PND, orthopnea or cough. Cardiovascular: See HPI   GI: No n/v, diarrhea, constipation, abdominal pain or changes in bowel habits. No melena, no hematochezia  : No urinary frequency, urgency, incontinence, hematuria or dysuria. Skin: No cyanosis or skin lesions. Musculoskeletal: No new muscle or joint pain. Neurological: No syncope or TIA-like symptoms.   Psychiatric: No anxiety, insomnia or depression     Past Medical History:   Diagnosis Date    Allergic rhinitis     Anxiety     Asthma     CAD (coronary artery disease)     Chronic kidney disease     Depression 5/17/2013    Disorders of porphyrin metabolism     GERD (gastroesophageal reflux disease)     [facility] attack ([facility] [address]     Hyperlipidemia     Hypertension 5/3/2016    IBS (irritable bowel syndrome)     Left thyroid nodule     Menopause     Migraines     Other and unspecified noninfectious gastroenteritis and colitis(558.9)     Panic disorder     Prediabetes     Restless leg syndrome     Urinary incontinence      Past Surgical History:   Procedure Laterality Date    CHOLECYSTECTOMY      COLONOSCOPY  07/06/2017    DR. CARMONA    CORONARY ANGIOPLASTY WITH STENT PLACEMENT  10/2013 [place]    HYSTERECTOMY       Family History   Problem Relation Age of Onset    Hypertension Father     Other Father         MI    Cancer Brother         testicular    No Known Problems Maternal Grandmother     No Known Problems Maternal Grandfather     No Known Problems Paternal Grandmother     No Known Problems Paternal Grandfather      Social History     Tobacco Use    Smoking status: Current Some Day Smoker     Packs/day: 0.50     Years: 18.00     Pack years: 9.00     Types: Cigarettes     Start date: 1/1/1968    Smokeless tobacco: Never Used    Tobacco comment: 2 cigarettes daily-using patches when she does not smoke   Substance Use Topics    Alcohol use: No     Alcohol/week: 0.0 standard drinks    Drug use: No       Allergies   Allergen Reactions    Wellbutrin [Bupropion]      Dry mouth    Adhesive Tape Rash    Iodine Itching and Nausea And Vomiting    Sertraline Other (See Comments)     Severe dry mouth    Sulfa Antibiotics Itching and Nausea Only     Current Outpatient Medications   Medication Sig Dispense Refill    carvedilol (COREG) 3.125 MG tablet Take 3.125 mg by mouth 2 times daily (with meals)      amitriptyline (ELAVIL) 50 MG tablet Take 1 tablet by mouth nightly 90 tablet 1    ALPRAZolam (XANAX) 1 MG tablet Take 1 tablet by mouth nightly as needed for Sleep or Anxiety for up to 90 days.  90 tablet 0    pantoprazole (PROTONIX) 40 MG tablet Take 1 tablet by mouth 2 times daily Take 1 tablet by mouth  daily 180 tablet 1    rOPINIRole (REQUIP) 1 MG tablet Take 1 tablet by mouth nightly 90 tablet 1    methocarbamol (ROBAXIN) 500 MG tablet TAKE ONE TABLET BY MOUTH FOUR TIMES A DAY FOR 10 DAYS 40 tablet 5    simvastatin (ZOCOR) 20 MG tablet Take 1 tablet by mouth every evening 90 tablet 1    busPIRone (BUSPAR) 15 MG tablet Take 15 mg by mouth daily 90 tablet 1    clopidogrel (PLAVIX) 75 MG tablet Take 1 tablet by mouth daily 90 tablet 1    umeclidinium-vilanterol (ANORO ELLIPTA) 62.5-25 MCG/INH AEPB inhaler Inhale 1 puff into the lungs daily Lot# [id number] 25 puff 0    sevelamer hcl (RENAGEL) 800 MG tablet Take 800 mg by mouth daily      albuterol sulfate  (90 Base) MCG/ACT inhaler Inhale 2 puffs into the lungs every 6 hours as needed for Wheezing 1 Inhaler 5    aspirin 81 MG tablet Take 1 tablet by mouth daily 30 tablet 0    nicotine (NICODERM CQ) 21 MG/24HR Place 1 patch onto the skin daily (Patient not taking: Reported on 3/10/2021) 42 patch 0     No current facility-administered medications for this visit. Physical Exam:   BP (!) 150/90   Pulse 77   Temp 96.4 °F (35.8 °C)   Ht 5' 5\" (1.651 m)   Wt 154 lb (69.9 kg)   SpO2 99%   BMI 25.63 kg/m²   No intake or output data in the 24 hours ending 03/10/21 1412  Wt Readings from Last 2 Encounters:   03/10/21 154 lb (69.9 kg)   02/23/21 155 lb (70.3 kg)     Constitutional: She is oriented to person, place, and time. She appears well-developed and well-nourished. In no acute distress. Head: Normocephalic and atraumatic. Neck: Neck supple. No JVD present. Carotid bruit is not present. No mass and no thyromegaly present. No lymphadenopathy present. Cardiovascular: Normal rate, regular rhythm, normal heart sounds and intact distal pulses. Exam reveals no gallop and no friction rub. No murmur heard. Pulmonary/Chest: Effort normal and breath sounds normal. No respiratory distress. She has no wheezes, rhonchi or rales. Abdominal: Soft, non-tender. Bowel sounds and aorta are normal. She exhibits no organomegaly, mass or bruit. Extremities: No edema, cyanosis, or clubbing. Pulses are 2+ radial/carotid/dorsalis pedis and posterior tibial bilaterally. Neurological: She is alert and oriented to person, place, and time. She has normal reflexes. No cranial nerve deficit. Coordination normal.   Skin: Skin is warm and dry. There is no rash or diaphoresis. Psychiatric: She has a normal mood and affect. Her speech is normal and behavior is normal.       Procedures:     [facility] 10/21/13  1. Right dominant coronary arterial system with a 99% mid RCA lesion  successfully stented with a 3.5 mm bare metal Integrity stent. There is a  60% distal RCA lesion that did not meet significance by FFR with a ratio of  0.81.    2.  In the left system, there is no left main stenosis. There is a long 40%  mid LAD stenosis that is smooth. The diagonal branches are medium size with  no significant disease. The circumflex is a medium size vessel with no  significant disease either. 3.  Normal left ventricular end diastolic pressure. 4.  Normal left ventricular systolic function with an LV ejection fraction of  70%. 5.  No gradient across the aortic valve on pullback to suggest aortic  stenosis. Peripheral angiogram 5/30/14  Patent bilateral Renal arteries with no significant stenoses  Abdominal aorta with no sig atherosclerosis  Patent bilateral common, internal and external iliac arteries. Patent bilateral common femoral, femoris profunda and SFA arteries. There is 50% bilateral SFA stenoses in the mid portion. Two vessel runoff on the left as the posterior tibial artery is atretic. Three vessel runoff on the left to the foot.       Imaging:     Echo 10/21/13  Ejection fraction is visually estimated to be 60 %. trace mitral regurgitation is present. Normal right ventricular size and function. There is trivial tricuspid regurgitation with RVSP estimated at 39 mmHg. Stress perfusion 6/30/20  Normal myocardial perfusion study.     Normal LV size and systolic function.      ECG portion of the stress test is normal.     Overall findings represent a low risk study. Lower extremity arterial study 6/30/20   No significant evidence of large vessel arterial occlusive disease of the     lower extremities.     Normal doppler waveforms, plethysmography, and segmental pressures     Normal SOULEYMANE and Toe/Brachial Index bilaterally           Lab Review:   Lab Results   Component Value Date    TRIG 141 06/16/2020    HDL 61 06/16/2020    LDLCALC 92 06/16/2020    LABVLDL 28 06/16/2020     Lab Results   Component Value Date     02/22/2021    K 5.4 02/22/2021    K 4.7 11/24/2020    BUN 22 02/22/2021    CREATININE 1.7 02/22/2021     Assessment:  1. CAD of native coronary arteries without angina   2. Hyperlipidemia with LDL goal <70 mg/dL   3. PAD   4. Essential Hypertension    Plan:  I think that Ms. Contreras  is entirely stable from a cardiovascular standpoint. I see no need to make any changes currently in her medical regimen or pursue further testing. The chest pain she describes is atypical and I have encouraged her to call with exertional chest pain or worsening episodes. Her blood pressure is elevated today in the office but I will defer further medication changes to Dr. Rox Miner.     I will see her in the office for follow up in 6 months. This note was scribed in the presence of Brooklyn Rae MD by [employer], RN. Sophie Bronson Physician Attestation:  The scribes documentation has been prepared under my direction and personally reviewed by me in its entirety. I, Dr. Greg Mendez personally performed the services described in this documentation as scribed by my RN in my presence, and I confirm that the note above accurately reflects all work, treatment, procedures, and medical decision making performed by me.

## 2021-03-10 ENCOUNTER — OFFICE VISIT (OUTPATIENT)
Dept: CARDIOLOGY CLINIC | Age: 71
End: 2021-03-10
Payer: MEDICARE

## 2021-03-10 VITALS
WEIGHT: 154 LBS | TEMPERATURE: 96.4 F | OXYGEN SATURATION: 99 % | HEART RATE: 77 BPM | SYSTOLIC BLOOD PRESSURE: 150 MMHG | BODY MASS INDEX: 25.66 KG/M2 | DIASTOLIC BLOOD PRESSURE: 90 MMHG | HEIGHT: 65 IN

## 2021-03-10 DIAGNOSIS — E78.5 HYPERLIPIDEMIA LDL GOAL <70: ICD-10-CM

## 2021-03-10 DIAGNOSIS — I10 ESSENTIAL HYPERTENSION: ICD-10-CM

## 2021-03-10 DIAGNOSIS — I25.10 CORONARY ARTERY DISEASE INVOLVING NATIVE CORONARY ARTERY OF NATIVE HEART WITHOUT ANGINA PECTORIS: Primary | ICD-10-CM

## 2021-03-10 DIAGNOSIS — I73.9 PAD (PERIPHERAL ARTERY DISEASE) (HCC): ICD-10-CM

## 2021-03-10 PROCEDURE — 99213 OFFICE O/P EST LOW 20 MIN: CPT | Performed by: INTERNAL MEDICINE

## 2021-03-10 RX ORDER — CARVEDILOL 3.12 MG/1
3.12 TABLET ORAL 2 TIMES DAILY WITH MEALS
COMMUNITY
End: 2021-08-24 | Stop reason: SDUPTHER

## 2021-03-18 ENCOUNTER — IMMUNIZATION (OUTPATIENT)
Dept: PRIMARY CARE CLINIC | Age: 71
End: 2021-03-18
Payer: MEDICARE

## 2021-03-18 PROCEDURE — 91300 COVID-19, PFIZER VACCINE 30MCG/0.3ML DOSE: CPT | Performed by: NURSE PRACTITIONER

## 2021-03-18 PROCEDURE — 0002A COVID-19, PFIZER VACCINE 30MCG/0.3ML DOSE: CPT | Performed by: NURSE PRACTITIONER

## 2021-05-19 ENCOUNTER — TELEPHONE (OUTPATIENT)
Dept: PHARMACY | Age: 71
End: 2021-05-19

## 2021-05-19 NOTE — TELEPHONE ENCOUNTER
Received referral from Dr. Thad Vinson for smoking cessation (advice and opinion only). Attempted to call patient to schedule initial appointment. LVM with patient asking her to call back to schedule.      Mathew Bernstein, PharmD, [facility]  [facility]  [place]: [phone]  [facility]: [phone]  5/19/2021 3:25 PM

## 2021-06-09 NOTE — TELEPHONE ENCOUNTER
Pt refused to schedule appt due to clinic location. Provided pt with phone number to clinic to call if she changes her mind.  Will close referral.      Santos Khan, PharmD, [facility]  [facility]   [facility] Ph: [phone]  Tamiko Combs [facility] Ph: [phone]  6/9/2021 6:05 PM

## 2021-06-21 DIAGNOSIS — E78.5 HYPERLIPIDEMIA, UNSPECIFIED HYPERLIPIDEMIA TYPE: ICD-10-CM

## 2021-06-21 LAB
CHOLESTEROL, TOTAL: 181 MG/DL (ref 0–199)
HDLC SERPL-MCNC: 58 MG/DL (ref 40–60)
LDL CHOLESTEROL CALCULATED: 85 MG/DL
TRIGL SERPL-MCNC: 192 MG/DL (ref 0–150)
VLDLC SERPL CALC-MCNC: 38 MG/DL

## 2021-08-24 PROBLEM — F33.9 EPISODE OF RECURRENT MAJOR DEPRESSIVE DISORDER, UNSPECIFIED DEPRESSION EPISODE SEVERITY (HCC): Status: ACTIVE | Noted: 2021-08-24

## 2021-08-24 PROBLEM — F13.20 SEDATIVE, HYPNOTIC OR ANXIOLYTIC DEPENDENCE, UNCOMPLICATED (HCC): Status: ACTIVE | Noted: 2021-08-24

## 2021-08-24 PROBLEM — F13.99 SEDATIVE, HYPNOTIC OR ANXIOLYTIC USE, UNSPECIFIED WITH UNSPECIFIED SEDATIVE, HYPNOTIC OR ANXIOLYTIC-INDUCED DISORDER (HCC): Status: ACTIVE | Noted: 2021-08-24

## 2021-08-24 PROBLEM — F13.29 SEDATIVE, HYPNOTIC OR ANXIOLYTIC DEPENDENCE WITH UNSPECIFIED SEDATIVE, HYPNOTIC OR ANXIOLYTIC-INDUCED DISORDER (HCC): Status: ACTIVE | Noted: 2021-08-24

## 2021-08-27 ENCOUNTER — HOSPITAL ENCOUNTER (OUTPATIENT)
Dept: WOMENS IMAGING | Age: 71
Discharge: HOME | End: 2021-08-27
Payer: MEDICARE

## 2021-08-27 DIAGNOSIS — Z12.31 BREAST CANCER SCREENING BY MAMMOGRAM: ICD-10-CM

## 2021-08-27 PROCEDURE — 77063 BREAST TOMOSYNTHESIS BI: CPT

## 2021-09-15 ENCOUNTER — HOSPITAL ENCOUNTER (OUTPATIENT)
Age: 71
Discharge: HOME | End: 2021-09-15
Payer: MEDICARE

## 2021-09-15 PROCEDURE — 97802 MEDICAL NUTRITION INDIV IN: CPT

## 2021-09-15 NOTE — PROGRESS NOTES
NUTRITION THERAPY ASSESSMENT     Referring Physician: Abeba Kuhn  PCP: Oleg Francois  Referring diagnosis: Colitis and renal disease     Katja Silver is a 78 y.o. female with a hx of intermittent acute porphyria, abdominal pain, GERD, IBS, colitis, CKD 4, and prediabetes. RD went over nutrition for CKD 4 including low sodium and low potassium foods. K+ 5.6. Phos WNL. Pt does take a phosphorus binder. Pt states eating potatoes sometimes but doesn't eat many bananas. RD also went over avoiding high fiber foods when experiencing periods of colitis flare-up. RD recommended more small frequent meals as pt states eating only one meal a day with decreased appetite. RD also recommended adding an Ensure or other high protein drink during the day. For porphyria, pt should consume regular amounts of carbs, just avoid a low carb diet such as keto / [employer]. Maintain a regular weight. OBJECTIVE DATA:  Past Medical History:   Diagnosis Date    Allergic rhinitis     Anxiety     Asthma     CAD (coronary artery disease)     Chronic kidney disease     Depression 5/17/2013    Disorders of porphyrin metabolism     GERD (gastroesophageal reflux disease)     Heart attack ([address]     Hyperlipidemia     Hypertension 5/3/2016    IBS (irritable bowel syndrome)     Left thyroid nodule     Menopause     Migraines     Other and unspecified noninfectious gastroenteritis and colitis(558.9)     Panic disorder     Prediabetes     Restless leg syndrome     Urinary incontinence        Labs:  No results for input(s): NA, K, CL, CO2, BUN, CREATININE, GLUCOSE in the last 72 hours.     Invalid input(s): CA  Lab Results   Component Value Date    PHOS 3.4 06/21/2021       Lab Results   Component Value Date    LABALBU 4.4 06/21/2021      Lab Results   Component Value Date    TRIG 192 06/21/2021    HDL 58 06/21/2021    LDLCALC 85 06/21/2021    LABVLDL 38 06/21/2021     Lab Results   Component Value Date    LABA1C 6.3 11/20/2020     No results for input(s): AST, ALT, ALB, BILIDIR, BILITOT, ALKPHOS in the last 72 hours.     Wt Readings from Last 50 Encounters:   08/24/21 148 lb 6.4 oz (67.3 kg)   05/18/21 151 lb (68.5 kg)   03/10/21 154 lb (69.9 kg)   02/23/21 155 lb (70.3 kg)   11/24/20 156 lb 8.4 oz (71 kg)   11/20/20 157 lb (71.2 kg)   08/05/20 161 lb 3.2 oz (73.1 kg)   07/20/20 160 lb (72.6 kg)   06/24/20 161 lb 3.2 oz (73.1 kg)   06/16/20 158 lb (71.7 kg)   02/11/20 163 lb (73.9 kg)   01/24/20 161 lb (73 kg)   01/07/20 163 lb (73.9 kg)   01/06/20 162 lb 14.7 oz (73.9 kg)   11/05/19 158 lb 6.4 oz (71.8 kg)   08/02/19 154 lb (69.9 kg)   06/25/19 155 lb (70.3 kg)   04/12/19 158 lb (71.7 kg)   01/11/19 160 lb (72.6 kg)   12/18/18 159 lb (72.1 kg)   11/14/18 160 lb (72.6 kg)   10/03/18 151 lb 3.2 oz (68.6 kg)   06/19/18 147 lb 3.2 oz (66.8 kg)   05/23/18 146 lb (66.2 kg)   04/10/18 146 lb (66.2 kg)   11/17/17 143 lb (64.9 kg)   07/13/17 138 lb (62.6 kg)   07/06/17 132 lb 8 oz (60.1 kg)   06/29/17 130 lb (59 kg)   02/23/17 140 lb (63.5 kg)   10/03/16 141 lb (64 kg)   05/27/16 138 lb (62.6 kg)   05/18/16 136 lb 2.2 oz (61.8 kg)   05/05/16 135 lb 12.9 oz (61.6 kg)   04/18/16 140 lb (63.5 kg)   11/24/15 131 lb (59.4 kg)   07/30/15 119 lb (54 kg)   05/27/15 120 lb 3.2 oz (54.5 kg)   04/21/15 125 lb (56.7 kg)   03/25/15 124 lb (56.2 kg)   02/25/15 124 lb (56.2 kg)   11/07/14 120 lb (54.4 kg)   07/24/14 123 lb 8 oz (56 kg)   05/05/14 117 lb 12.8 oz (53.4 kg)   03/31/14 116 lb (52.6 kg)   12/31/13 112 lb (50.8 kg)   12/14/13 113 lb (51.3 kg)   11/01/13 110 lb (49.9 kg)   09/23/13 111 lb (50.3 kg)   07/23/13 109 lb (49.4 kg)       Comparative Standards (not pertinent at this time)    24 Hour Diet Recall  One meal at 5pm: Hamburger osbaldo (no bun), green onion, pasta salad      NUTRITION DIAGNOSIS and GOAL  P: Inadequate oral intake  E related to Decreased appetite, Early satiety  S: as evidenced by GI issues    Nutrition Intervention:  - Nutrition Education: Colitis / low fiber edu, low sodium edu, low potassium edu  - Nutrition Counseling: verbal and written handouts    Individualized Treament Goals:  1. Eat multiple small meals daily  2. Avoid fruits with skins and seeds, raw vegetables, whole grains, and beans  3. Avoid high K+ foods  4. Hydrate    Patient encouraged to call RD with any questions. RD available for follow up as needed.      Ray Castle RD, LD RD, LD  Contact Number: [phone]

## 2022-02-07 ENCOUNTER — HOSPITAL ENCOUNTER (OUTPATIENT)
Dept: CT IMAGING | Age: 72
Discharge: HOME | End: 2022-02-07
Payer: MEDICARE

## 2022-02-07 DIAGNOSIS — R91.1 PULMONARY NODULE: ICD-10-CM

## 2022-02-07 PROCEDURE — 71250 CT THORAX DX C-: CPT

## 2022-03-10 NOTE — PROGRESS NOTES
[address] 86  1950      CC: \"my blood pressure is still high\"     HPI:  The patient is 73 y.o. female with a past medical history significant for CAD, hyperlipidemia and hypertension. She underwent LHC on 10/21/13 resulting in PCI with BMS to mid RCA lesion. A peripheral angiogram was completed on 14 revealing 50% bilateral SFA stenoses. She presents today for follow up. She has noticed her blood pressure being elevated on several occasions. She denies chest pain with rest or exertion. She does report shortness of breath. She continues to be a daily smoker and feels she is unable to quit currently. She reports medication compliance and is tolerating. Review of Systems:  Constitutional: No fatigue, weakness, night sweats or fever. HEENT: No new vision difficulties or ringing in the ears. Respiratory: No new SOB, PND, orthopnea or cough. Cardiovascular: See HPI   GI: No n/v, diarrhea, constipation, abdominal pain or changes in bowel habits. No melena, no hematochezia  : No urinary frequency, urgency, incontinence, hematuria or dysuria. Skin: No cyanosis or skin lesions. Musculoskeletal: No new muscle or joint pain. Neurological: No syncope or TIA-like symptoms.   Psychiatric: No anxiety, insomnia or depression     Past Medical History:   Diagnosis Date    acute intermittent porphyria     sees Abbe Stewart, mom had it too    agoraphobic     Allergic rhinitis     Anxiety     Asthma     CAD (coronary artery disease)     ckd 4     from HTN,  sees [facility]    collagenous colitis     [employer]    Depression 2013    eversince      GERD (gastroesophageal reflux disease)     Heart attack ([address]     Santhosh Tinajero    Hyperlipidemia     Hypertension 2016    Left thyroid nodule     Migraines     gets 4x per month    post menopausal     Prediabetes     Restless leg syndrome     Urinary incontinence      Past Surgical History:   Procedure Laterality Date    CHOLECYSTECTOMY      COLONOSCOPY  07/06/2017    DR. CARMONA    CORONARY ANGIOPLASTY WITH STENT PLACEMENT  10/2013 [place]    HYSTERECTOMY      JAMEL only for ? age 36     Family History   Problem Relation Age of Onset    Hypertension Father     Other Father         MI    Cancer Brother         testicular    No Known Problems Maternal Grandmother     No Known Problems Maternal Grandfather     No Known Problems Paternal Grandmother     No Known Problems Paternal Grandfather      Social History     Tobacco Use    Smoking status: Current Some Day Smoker     Packs/day: 1.00     Years: 50.00     Pack years: 50.00     Types: Cigarettes     Start date: 1/1/1968    Smokeless tobacco: Never Used    Tobacco comment: much of time smoked 1ppd   Vaping Use    Vaping Use: Never used   Substance Use Topics    Alcohol use: No     Alcohol/week: 0.0 standard drinks    Drug use: No       Allergies   Allergen Reactions    Wellbutrin [Bupropion]      Dry mouth    Adhesive Tape Rash    Iodine Itching and Nausea And Vomiting    Sertraline Other (See Comments)     Severe dry mouth    Sulfa Antibiotics Itching and Nausea Only     Current Outpatient Medications   Medication Sig Dispense Refill    patiromer sorbitex calcium (VELTASSA) 8.4 g PACK packet Take 8.4 g by mouth daily      pantoprazole (PROTONIX) 40 MG tablet Take 1 tablet by mouth 2 times daily Take 1 tablet by mouth  daily 180 tablet 1    clindamycin-tretinoin (ZIANA) 1.2-0.025 % gel Apply topically nightly Apply topically nightly.       amitriptyline (ELAVIL) 75 MG tablet Take 1 tablet by mouth nightly 90 tablet 0    diphenoxylate-atropine (DIPHENATOL) 2.5-0.025 MG per tablet Two pills qid prn diarrhea 240 tablet 2    colestipol (COLESTID) 1 g tablet One po tid for diarrhea 90 tablet 3    rosuvastatin (CRESTOR) 10 MG tablet Take 1 tablet by mouth nightly 30 tablet 3    carvedilol (COREG) 3.125 MG tablet Take 1 tablet by mouth 2 times daily (with meals) 60 tablet 1    albuterol sulfate  (90 Base) MCG/ACT inhaler Inhale 2 puffs into the lungs every 6 hours as needed for Wheezing 1 each 0    nitroGLYCERIN (NITROSTAT) 0.4 MG SL tablet Place 1 tablet under the tongue every 5 minutes as needed for Chest pain up to max of 3 total doses. If no relief after 1 dose, call 911. 25 tablet 3    nitroglycerin (NITRO-BID) 2 % ointment Apply to top of toes twice daily as needed 1 each 3    rOPINIRole (REQUIP) 1 MG tablet Take 1 tablet by mouth nightly 90 tablet 1    clopidogrel (PLAVIX) 75 MG tablet Take 1 tablet by mouth daily 90 tablet 1    busPIRone (BUSPAR) 15 MG tablet Take 15 mg by mouth daily 90 tablet 1    umeclidinium-vilanterol (ANORO ELLIPTA) 62.5-25 MCG/INH AEPB inhaler Inhale 1 puff into the lungs daily Lot# [id number] 25 puff 0    aspirin 81 MG tablet Take 1 tablet by mouth daily 30 tablet 0    simvastatin (ZOCOR) 20 MG tablet Take 1 tablet by mouth every evening (Patient not taking: Reported on 3/11/2022) 90 tablet 1     No current facility-administered medications for this visit. Physical Exam:   BP (!) 194/96   Pulse 74   Ht 5' 5\" (1.651 m)   Wt 151 lb 9.6 oz (68.8 kg)   SpO2 97%   BMI 25.23 kg/m²   No intake or output data in the 24 hours ending 03/11/22 1030  Wt Readings from Last 2 Encounters:   03/11/22 151 lb 9.6 oz (68.8 kg)   03/02/22 151 lb 6.4 oz (68.7 kg)     Constitutional: She is oriented to person, place, and time. She appears well-developed and well-nourished. In no acute distress. Head: Normocephalic and atraumatic. Neck: Neck supple. No JVD present. Carotid bruit is not present. No mass and no thyromegaly present. No lymphadenopathy present. Cardiovascular: Normal rate, regular rhythm, normal heart sounds and intact distal pulses. Exam reveals no gallop and no friction rub. No murmur heard.   Pulmonary/Chest: Effort normal and breath sounds normal. No respiratory distress. She has no wheezes, rhonchi or rales. Abdominal: Soft, non-tender. Bowel sounds and aorta are normal. She exhibits no organomegaly, mass or bruit. Extremities: No edema, cyanosis, or clubbing. Pulses are 2+ radial/carotid/dorsalis pedis and posterior tibial bilaterally. Neurological: She is alert and oriented to person, place, and time. She has normal reflexes. No cranial nerve deficit. Coordination normal.   Skin: Skin is warm and dry. There is no rash or diaphoresis. Psychiatric: She has a normal mood and affect. Her speech is normal and behavior is normal.       Procedures:     [facility] 10/21/13  1. Right dominant coronary arterial system with a 99% mid RCA lesion  successfully stented with a 3.5 mm bare metal Integrity stent. There is a  60% distal RCA lesion that did not meet significance by FFR with a ratio of  0.81.    2.  In the left system, there is no left main stenosis. There is a long 40%  mid LAD stenosis that is smooth. The diagonal branches are medium size with  no significant disease. The circumflex is a medium size vessel with no  significant disease either. 3.  Normal left ventricular end diastolic pressure. 4.  Normal left ventricular systolic function with an LV ejection fraction of  70%. 5.  No gradient across the aortic valve on pullback to suggest aortic  stenosis. Peripheral angiogram 5/30/14  Patent bilateral Renal arteries with no significant stenoses  Abdominal aorta with no sig atherosclerosis  Patent bilateral common, internal and external iliac arteries. Patent bilateral common femoral, femoris profunda and SFA arteries. There is 50% bilateral SFA stenoses in the mid portion. Two vessel runoff on the left as the posterior tibial artery is atretic. Three vessel runoff on the left to the foot.       Imaging:     Echo 10/21/13  Ejection fraction is visually estimated to be 60 %. trace mitral regurgitation is present.   Normal right ventricular size and function. There is trivial tricuspid regurgitation with RVSP estimated at 39 mmHg. Stress perfusion 6/30/20  Normal myocardial perfusion study.     Normal LV size and systolic function.  ECG portion of the stress test is normal.     Overall findings represent a low risk study. Lower extremity arterial study 6/30/20   No significant evidence of large vessel arterial occlusive disease of the     lower extremities.     Normal doppler waveforms, plethysmography, and segmental pressures     Normal SOULEYMANE and Toe/Brachial Index bilaterally           Lab Review:   Lab Results   Component Value Date    TRIG 130 03/02/2022    HDL 68 03/02/2022    LDLCALC 97 03/02/2022    LABVLDL 26 03/02/2022     Lab Results   Component Value Date     02/07/2022    K 5.2 02/07/2022    K 4.7 11/24/2020    BUN 37 02/07/2022    CREATININE 1.8 02/07/2022     Assessment:  1. CAD of native coronary arteries without angina   2. Hyperlipidemia with LDL goal <70 mg/dL   3. PAD   4. Essential Hypertension    Plan:  She is not endorsing any symptoms representing angina. Her blood pressure is uncontrolled today in the office. I will have her increase her dose of carvedilol to 12.5 mg BID and begin taking HCTZ 12. 5 mg daily. She will complete a BMP in one week to assess her kidney function. She will be seen in the office for an RN visit to assess her blood pressure in 2 weeks. Should this continue to be elevated her carvedilol or HCTZ dose could be increased. She will also complete a renal artery duplex to assess for further cause of her uncontrolled hypertension. I have personally reviewed all previous testing for this visit today including imaging, lab results and EKG as detailed above. I will see her in the office for follow up in 3 months. This note was scribed in the presence of Nat Marcelino MD by [employer], RN.   Physician Attestation:  The scribes documentation has been prepared under my direction and personally reviewed by me in its entirety. I, Dr. Hector Muse personally performed the services described in this documentation as scribed by my RN in my presence, and I confirm that the note above accurately reflects all work, treatment, procedures, and medical decision making performed by me.

## 2022-03-11 ENCOUNTER — OFFICE VISIT (OUTPATIENT)
Dept: CARDIOLOGY CLINIC | Age: 72
End: 2022-03-11
Payer: MEDICARE

## 2022-03-11 VITALS
HEART RATE: 74 BPM | SYSTOLIC BLOOD PRESSURE: 188 MMHG | BODY MASS INDEX: 25.26 KG/M2 | DIASTOLIC BLOOD PRESSURE: 108 MMHG | HEIGHT: 65 IN | OXYGEN SATURATION: 97 % | WEIGHT: 151.6 LBS

## 2022-03-11 DIAGNOSIS — E78.5 HYPERLIPIDEMIA LDL GOAL <70: ICD-10-CM

## 2022-03-11 DIAGNOSIS — I10 ESSENTIAL HYPERTENSION: ICD-10-CM

## 2022-03-11 DIAGNOSIS — I73.9 PAD (PERIPHERAL ARTERY DISEASE) (HCC): ICD-10-CM

## 2022-03-11 DIAGNOSIS — I25.10 CORONARY ARTERY DISEASE INVOLVING NATIVE CORONARY ARTERY OF NATIVE HEART WITHOUT ANGINA PECTORIS: Primary | ICD-10-CM

## 2022-03-11 PROCEDURE — 99214 OFFICE O/P EST MOD 30 MIN: CPT | Performed by: INTERNAL MEDICINE

## 2022-03-11 PROCEDURE — 93000 ELECTROCARDIOGRAM COMPLETE: CPT | Performed by: INTERNAL MEDICINE

## 2022-03-11 RX ORDER — PATIROMER 8.4 G/1
8.4 POWDER, FOR SUSPENSION ORAL DAILY
COMMUNITY
Start: 2021-11-29 | End: 2022-08-15 | Stop reason: CLARIF

## 2022-03-11 RX ORDER — HYDROCHLOROTHIAZIDE 25 MG/1
12.5 TABLET ORAL EVERY MORNING
Qty: 45 TABLET | Refills: 3 | Status: SHIPPED | OUTPATIENT
Start: 2022-03-11 | End: 2022-04-12

## 2022-03-11 RX ORDER — CARVEDILOL 12.5 MG/1
12.5 TABLET ORAL 2 TIMES DAILY
Qty: 60 TABLET | Refills: 3 | Status: SHIPPED | OUTPATIENT
Start: 2022-03-11 | End: 2022-04-12

## 2022-03-11 RX ORDER — ROSUVASTATIN CALCIUM 20 MG/1
20 TABLET, COATED ORAL NIGHTLY
Qty: 90 TABLET | Refills: 3 | Status: SHIPPED | OUTPATIENT
Start: 2022-03-11

## 2022-03-17 ENCOUNTER — HOSPITAL ENCOUNTER (OUTPATIENT)
Dept: VASCULAR LAB | Age: 72
Discharge: HOME | End: 2022-03-17
Payer: MEDICARE

## 2022-03-17 DIAGNOSIS — I10 ESSENTIAL HYPERTENSION: ICD-10-CM

## 2022-03-17 PROCEDURE — 93975 VASCULAR STUDY: CPT

## 2022-03-25 ENCOUNTER — NURSE ONLY (OUTPATIENT)
Dept: CARDIOLOGY CLINIC | Age: 72
End: 2022-03-25

## 2022-03-25 VITALS — SYSTOLIC BLOOD PRESSURE: 128 MMHG | DIASTOLIC BLOOD PRESSURE: 82 MMHG

## 2022-03-25 DIAGNOSIS — I10 ESSENTIAL HYPERTENSION: Primary | ICD-10-CM

## 2022-03-25 DIAGNOSIS — I10 ESSENTIAL HYPERTENSION: ICD-10-CM

## 2022-03-25 DIAGNOSIS — E78.00 HIGH CHOLESTEROL: ICD-10-CM

## 2022-03-25 LAB
ALBUMIN SERPL-MCNC: 4.1 G/DL (ref 3.4–5)
ALP BLD-CCNC: 97 U/L (ref 40–129)
ALT SERPL-CCNC: 10 U/L (ref 10–40)
ANION GAP SERPL CALCULATED.3IONS-SCNC: 16 MMOL/L (ref 3–16)
AST SERPL-CCNC: 19 U/L (ref 15–37)
BILIRUB SERPL-MCNC: <0.2 MG/DL (ref 0–1)
BILIRUBIN DIRECT: <0.2 MG/DL (ref 0–0.3)
BILIRUBIN, INDIRECT: NORMAL MG/DL (ref 0–1)
BUN BLDV-MCNC: 43 MG/DL (ref 7–20)
CALCIUM SERPL-MCNC: 9.4 MG/DL (ref 8.3–10.6)
CHLORIDE BLD-SCNC: 104 MMOL/L (ref 99–110)
CHOLESTEROL, TOTAL: 163 MG/DL (ref 0–199)
CO2: 21 MMOL/L (ref 21–32)
CREAT SERPL-MCNC: 2.3 MG/DL (ref 0.6–1.2)
GFR AFRICAN AMERICAN: 25
GFR NON-AFRICAN AMERICAN: 21
GLUCOSE BLD-MCNC: 95 MG/DL (ref 70–99)
HDLC SERPL-MCNC: 59 MG/DL (ref 40–60)
LDL CHOLESTEROL CALCULATED: 77 MG/DL
POTASSIUM SERPL-SCNC: 5.2 MMOL/L (ref 3.5–5.1)
SODIUM BLD-SCNC: 141 MMOL/L (ref 136–145)
TOTAL PROTEIN: 7.2 G/DL (ref 6.4–8.2)
TRIGL SERPL-MCNC: 135 MG/DL (ref 0–150)
VLDLC SERPL CALC-MCNC: 27 MG/DL

## 2022-04-04 DIAGNOSIS — I10 PRIMARY HYPERTENSION: Primary | ICD-10-CM

## 2022-04-04 RX ORDER — CARVEDILOL 25 MG/1
25 TABLET ORAL 2 TIMES DAILY
Qty: 60 TABLET | Refills: 1 | Status: SHIPPED | OUTPATIENT
Start: 2022-04-04

## 2022-04-19 DIAGNOSIS — E87.5 HYPERKALEMIA: Primary | ICD-10-CM

## 2022-06-16 NOTE — PROGRESS NOTES
[address] 86  1950      CC: \"I have felt horrible\"     HPI:  The patient is 73 y.o. female with a past medical history significant for CAD, hyperlipidemia and hypertension. She underwent LHC on 10/21/13 resulting in PCI with BMS to mid RCA lesion. A peripheral angiogram was completed on 14 revealing 50% bilateral SFA stenoses. She presents today for follow up. She experienced symptoms that resembled her prior heart attack for a couple days. These presented with rest. She reports occasional hypotension and will feel poorly at those times. She also reports occasional shortness of breath. She has a history of colitis and experienced recent symptoms. She has lost approximately 12 pounds since she was last seen. Her dose of carvedilol was recently reduced by Dr. Carol Cormier. She reports medication compliance and is tolerating. Review of Systems:  Constitutional: No fatigue, weakness, night sweats or fever. HEENT: No new vision difficulties or ringing in the ears. Respiratory: No new SOB, PND, orthopnea or cough. Cardiovascular: See HPI   GI: No n/v, diarrhea, constipation, abdominal pain or changes in bowel habits. No melena, no hematochezia  : No urinary frequency, urgency, incontinence, hematuria or dysuria. Skin: No cyanosis or skin lesions. Musculoskeletal: No new muscle or joint pain. Neurological: No syncope or TIA-like symptoms.   Psychiatric: No anxiety, insomnia or depression     Past Medical History:   Diagnosis Date    acute intermittent porphyria     sees Maddox Juliana, mom had it too    agoraphobic     Allergic rhinitis     Anxiety     Asthma     CAD (coronary artery disease)     ckd 4     from HTN,  sees [place]    collagenous colitis     Ayoub    Depression 2013    eversince      GERD (gastroesophageal reflux disease)     Heart attack ([facility] [address]    [address]    Hyperlipidemia     Hypertension 05/03/2016    Left thyroid nodule     Migraines     gets 4x per month    post menopausal     Prediabetes     Pulmonary nodules     yearly ct lung followed by Dr Juanito Barreto Restless leg syndrome     Urinary incontinence      Past Surgical History:   Procedure Laterality Date    CHOLECYSTECTOMY      COLONOSCOPY  07/06/2017    DR. CARMONA    CORONARY ANGIOPLASTY WITH STENT PLACEMENT  10/2013 [place]    HYSTERECTOMY (CERVIX STATUS UNKNOWN)      JAMEL only for ? age 36     Family History   Problem Relation Age of Onset    Hypertension Father     Other Father         MI    Cancer Brother         testicular    No Known Problems Maternal Grandmother     No Known Problems Maternal Grandfather     No Known Problems Paternal Grandmother     No Known Problems Paternal Grandfather      Social History     Tobacco Use    Smoking status: Current Some Day Smoker     Packs/day: 1.00     Years: 50.00     Pack years: 50.00     Types: Cigarettes     Start date: 1/1/1968    Smokeless tobacco: Never Used    Tobacco comment: much of time smoked 1ppd   Vaping Use    Vaping Use: Never used   Substance Use Topics    Alcohol use: No     Alcohol/week: 0.0 standard drinks    Drug use: No       Allergies   Allergen Reactions    Wellbutrin [Bupropion]      Dry mouth    Adhesive Tape Rash    Iodine Itching and Nausea And Vomiting    Sertraline Other (See Comments)     Severe dry mouth    Sulfa Antibiotics Itching and Nausea Only     Current Outpatient Medications   Medication Sig Dispense Refill    ALPRAZolam (XANAX) 1 MG tablet Take 1 tablet by mouth nightly as needed for Sleep or Anxiety for up to 90 days.  90 tablet 0    chlorthalidone (HYGROTON) 25 MG tablet Take 12.5 mg by mouth daily      rOPINIRole (REQUIP) 1 MG tablet Take 1 tablet by mouth nightly For RLS 90 tablet 1    albuterol sulfate  (90 Base) MCG/ACT inhaler Inhale 2 puffs into the lungs every 6 hours as needed for Wheezing 1 each 1  busPIRone (BUSPAR) 15 MG tablet Take 15 mg by mouth daily 90 tablet 1    carvedilol (COREG) 25 MG tablet Take 1 tablet by mouth 2 times daily 60 tablet 1    patiromer sorbitex calcium (VELTASSA) 8.4 g PACK packet Take 8.4 g by mouth daily      rosuvastatin (CRESTOR) 20 MG tablet Take 1 tablet by mouth nightly 90 tablet 3    pantoprazole (PROTONIX) 40 MG tablet Take 1 tablet by mouth 2 times daily Take 1 tablet by mouth  daily (Patient taking differently: Take 40 mg by mouth 2 times daily Take one tablet bid) 180 tablet 1    clindamycin-tretinoin (ZIANA) 1.2-0.025 % gel Apply topically nightly Apply topically nightly.  nitroGLYCERIN (NITROSTAT) 0.4 MG SL tablet Place 1 tablet under the tongue every 5 minutes as needed for Chest pain up to max of 3 total doses. If no relief after 1 dose, call 911. 25 tablet 3    nitroglycerin (NITRO-BID) 2 % ointment Apply to top of toes twice daily as needed 1 each 3    clopidogrel (PLAVIX) 75 MG tablet Take 1 tablet by mouth daily 90 tablet 1    umeclidinium-vilanterol (ANORO ELLIPTA) 62.5-25 MCG/INH AEPB inhaler Inhale 1 puff into the lungs daily Lot# [id number] 25 puff 0    aspirin 81 MG tablet Take 1 tablet by mouth daily 30 tablet 0    sucralfate (CARAFATE) 1 GM tablet One po qhs before bed for gerd (Patient not taking: Reported on 6/17/2022) 30 tablet 3     No current facility-administered medications for this visit. Physical Exam:   /78   Pulse 68   Ht 5' 4\" (1.626 m)   Wt 143 lb 12.8 oz (65.2 kg)   SpO2 95%   BMI 24.68 kg/m²   No intake or output data in the 24 hours ending 06/17/22 1101  Wt Readings from Last 2 Encounters:   06/17/22 143 lb 12.8 oz (65.2 kg)   05/11/22 152 lb 9.6 oz (69.2 kg)     Constitutional: She is oriented to person, place, and time. She appears well-developed and well-nourished. In no acute distress. Head: Normocephalic and atraumatic. Neck: Neck supple. No JVD present. Carotid bruit is not present.  No mass and no thyromegaly present. No lymphadenopathy present. Cardiovascular: Normal rate, regular rhythm, normal heart sounds and intact distal pulses. Exam reveals no gallop and no friction rub. No murmur heard. Pulmonary/Chest: Effort normal and breath sounds normal. No respiratory distress. She has no wheezes, rhonchi or rales. Abdominal: Soft, non-tender. Bowel sounds and aorta are normal. She exhibits no organomegaly, mass or bruit. Extremities: No edema, cyanosis, or clubbing. Pulses are 2+ radial/carotid/dorsalis pedis and posterior tibial bilaterally. Neurological: She is alert and oriented to person, place, and time. She has normal reflexes. No cranial nerve deficit. Coordination normal.   Skin: Skin is warm and dry. There is no rash or diaphoresis. Psychiatric: She has a normal mood and affect. Her speech is normal and behavior is normal.       Procedures:     [facility] 10/21/13  1. Right dominant coronary arterial system with a 99% mid RCA lesion  successfully stented with a 3.5 mm bare metal Integrity stent. There is a  60% distal RCA lesion that did not meet significance by FFR with a ratio of  0.81.    2.  In the left system, there is no left main stenosis. There is a long 40%  mid LAD stenosis that is smooth. The diagonal branches are medium size with  no significant disease. The circumflex is a medium size vessel with no  significant disease either. 3.  Normal left ventricular end diastolic pressure. 4.  Normal left ventricular systolic function with an LV ejection fraction of  70%. 5.  No gradient across the aortic valve on pullback to suggest aortic  stenosis. Peripheral angiogram 5/30/14  Patent bilateral Renal arteries with no significant stenoses  Abdominal aorta with no sig atherosclerosis  Patent bilateral common, internal and external iliac arteries. Patent bilateral common femoral, femoris profunda and SFA arteries. There is 50% bilateral SFA stenoses in the mid portion.   Two vessel runoff on the left as the posterior tibial artery is atretic. Three vessel runoff on the left to the foot.       Imaging:     Echo 10/21/13  Ejection fraction is visually estimated to be 60 %. trace mitral regurgitation is present. Normal right ventricular size and function. There is trivial tricuspid regurgitation with RVSP estimated at 39 mmHg. Stress perfusion 6/30/20  Normal myocardial perfusion study.     Normal LV size and systolic function.  ECG portion of the stress test is normal.     Overall findings represent a low risk study. Lower extremity arterial study 6/30/20   No significant evidence of large vessel arterial occlusive disease of the     lower extremities.     Normal doppler waveforms, plethysmography, and segmental pressures     Normal SOULEYMANE and Toe/Brachial Index bilaterally      Renal arterial duplex 3/17/22  Right Impression   There is no evidence to suggest renal artery stenosis. The right renal vein is patent. The parenchymal resistive index is within normal limits. Left Impression   There is no evidence to suggest renal artery stenosis. The left renal vein is patent. The parenchymal resistive index is within normal limits. Unilateral ImpressionNo evidence of abdominal aortic aneurysmal dilation. Mild   to moderate atherosclerotic plaque throughout. Lab Review:   Lab Results   Component Value Date    TRIG 135 03/25/2022    HDL 59 03/25/2022    LDLCALC 77 03/25/2022    LABVLDL 27 03/25/2022     Lab Results   Component Value Date     04/12/2022    K 5.5 04/12/2022    K 4.7 11/24/2020    BUN 26 04/12/2022    CREATININE 2.0 04/12/2022     Assessment:  1. CAD of native coronary arteries without angina   2. Hyperlipidemia with LDL goal <70 mg/dL   3. PAD   4. Essential Hypertension  5. Shortness of breath    Plan:  Given her episodes of fatigue and shortness of breath, I will have her complete a stress perfusion.  Her blood pressure is well controlled today with her current medical therapy. I have encouraged her to follow up with Dr. Cornel Garza regarding her symptoms of porphyria. Her lipid profile was favorable with her current statin therapy. With normal stress test results, I would encourage her to increase her aerobic activity and adhere to a heart healthy diet. I have personally reviewed all previous testing for this visit today including imaging, lab results and EKG as detailed above. I will see her in the office for follow up in 6 months or sooner pending test results. This note was scribed in the presence of Danny Hobbs MD by [employer], RN. Physician Attestation:  The scribes documentation has been prepared under my direction and personally reviewed by me in its entirety. I, Dr. Guillaume Pollock personally performed the services described in this documentation as scribed by my RN in my presence, and I confirm that the note above accurately reflects all work, treatment, procedures, and medical decision making performed by me.

## 2022-06-17 ENCOUNTER — OFFICE VISIT (OUTPATIENT)
Dept: CARDIOLOGY CLINIC | Age: 72
End: 2022-06-17
Payer: MEDICARE

## 2022-06-17 VITALS
HEIGHT: 64 IN | OXYGEN SATURATION: 95 % | BODY MASS INDEX: 24.55 KG/M2 | WEIGHT: 143.8 LBS | HEART RATE: 68 BPM | DIASTOLIC BLOOD PRESSURE: 78 MMHG | SYSTOLIC BLOOD PRESSURE: 122 MMHG

## 2022-06-17 DIAGNOSIS — I10 ESSENTIAL HYPERTENSION: ICD-10-CM

## 2022-06-17 DIAGNOSIS — R06.02 SOB (SHORTNESS OF BREATH): ICD-10-CM

## 2022-06-17 DIAGNOSIS — I25.10 CORONARY ARTERY DISEASE INVOLVING NATIVE CORONARY ARTERY OF NATIVE HEART WITHOUT ANGINA PECTORIS: Primary | ICD-10-CM

## 2022-06-17 DIAGNOSIS — I73.9 PAD (PERIPHERAL ARTERY DISEASE) (HCC): ICD-10-CM

## 2022-06-17 DIAGNOSIS — E78.5 HYPERLIPIDEMIA LDL GOAL <70: ICD-10-CM

## 2022-06-17 PROCEDURE — 1123F ACP DISCUSS/DSCN MKR DOCD: CPT | Performed by: INTERNAL MEDICINE

## 2022-06-17 PROCEDURE — 93000 ELECTROCARDIOGRAM COMPLETE: CPT | Performed by: INTERNAL MEDICINE

## 2022-06-17 PROCEDURE — 99214 OFFICE O/P EST MOD 30 MIN: CPT | Performed by: INTERNAL MEDICINE

## 2022-08-12 ENCOUNTER — HOSPITAL ENCOUNTER (EMERGENCY)
Age: 72
Discharge: HOME | End: 2022-08-12
Attending: EMERGENCY MEDICINE
Payer: MEDICARE

## 2022-08-12 ENCOUNTER — APPOINTMENT (OUTPATIENT)
Dept: CT IMAGING | Age: 72
End: 2022-08-12
Payer: MEDICARE

## 2022-08-12 VITALS
HEART RATE: 66 BPM | WEIGHT: 137.13 LBS | DIASTOLIC BLOOD PRESSURE: 97 MMHG | RESPIRATION RATE: 18 BRPM | OXYGEN SATURATION: 100 % | BODY MASS INDEX: 23.54 KG/M2 | TEMPERATURE: 97 F | SYSTOLIC BLOOD PRESSURE: 174 MMHG

## 2022-08-12 DIAGNOSIS — S39.012A STRAIN OF LUMBAR REGION, INITIAL ENCOUNTER: Primary | ICD-10-CM

## 2022-08-12 LAB
A/G RATIO: 1.2 (ref 1.1–2.2)
ALBUMIN SERPL-MCNC: 4 G/DL (ref 3.4–5)
ALP BLD-CCNC: 70 U/L (ref 40–129)
ALT SERPL-CCNC: 8 U/L (ref 10–40)
ANION GAP SERPL CALCULATED.3IONS-SCNC: 14 MMOL/L (ref 3–16)
AST SERPL-CCNC: 18 U/L (ref 15–37)
BACTERIA: ABNORMAL /HPF
BASOPHILS ABSOLUTE: 0 K/UL (ref 0–0.2)
BASOPHILS RELATIVE PERCENT: 0.5 %
BILIRUB SERPL-MCNC: 0.3 MG/DL (ref 0–1)
BILIRUBIN URINE: NEGATIVE
BLOOD, URINE: ABNORMAL
BUN BLDV-MCNC: 43 MG/DL (ref 7–20)
CALCIUM SERPL-MCNC: 10 MG/DL (ref 8.3–10.6)
CHLORIDE BLD-SCNC: 101 MMOL/L (ref 99–110)
CLARITY: CLEAR
CO2: 21 MMOL/L (ref 21–32)
COLOR: YELLOW
COMMENT UA: ABNORMAL
CREAT SERPL-MCNC: 2.4 MG/DL (ref 0.6–1.2)
EOSINOPHILS ABSOLUTE: 0.3 K/UL (ref 0–0.6)
EOSINOPHILS RELATIVE PERCENT: 3 %
EPITHELIAL CELLS, UA: 1 /HPF (ref 0–5)
GFR AFRICAN AMERICAN: 24
GFR NON-AFRICAN AMERICAN: 20
GLUCOSE BLD-MCNC: 111 MG/DL (ref 70–99)
GLUCOSE URINE: NEGATIVE MG/DL
HCT VFR BLD CALC: 39.9 % (ref 36–48)
HEMOGLOBIN: 13.2 G/DL (ref 12–16)
HYALINE CASTS: 2 /LPF (ref 0–8)
KETONES, URINE: NEGATIVE MG/DL
LEUKOCYTE ESTERASE, URINE: ABNORMAL
LIPASE: 76 U/L (ref 13–60)
LYMPHOCYTES ABSOLUTE: 1.2 K/UL (ref 1–5.1)
LYMPHOCYTES RELATIVE PERCENT: 12.4 %
MCH RBC QN AUTO: 29.3 PG (ref 26–34)
MCHC RBC AUTO-ENTMCNC: 33 G/DL (ref 31–36)
MCV RBC AUTO: 88.7 FL (ref 80–100)
MICROSCOPIC EXAMINATION: YES
MONOCYTES ABSOLUTE: 0.7 K/UL (ref 0–1.3)
MONOCYTES RELATIVE PERCENT: 6.8 %
MUCUS: ABNORMAL /LPF
NEUTROPHILS ABSOLUTE: 7.5 K/UL (ref 1.7–7.7)
NEUTROPHILS RELATIVE PERCENT: 77.3 %
NITRITE, URINE: NEGATIVE
PDW BLD-RTO: 15.4 % (ref 12.4–15.4)
PH UA: 5 (ref 5–8)
PLATELET # BLD: 307 K/UL (ref 135–450)
PMV BLD AUTO: 8.5 FL (ref 5–10.5)
POTASSIUM SERPL-SCNC: 4.2 MMOL/L (ref 3.5–5.1)
PROTEIN UA: ABNORMAL MG/DL
RBC # BLD: 4.49 M/UL (ref 4–5.2)
RBC UA: ABNORMAL /HPF (ref 0–4)
SODIUM BLD-SCNC: 136 MMOL/L (ref 136–145)
SPECIFIC GRAVITY UA: 1.01 (ref 1–1.03)
TOTAL PROTEIN: 7.4 G/DL (ref 6.4–8.2)
URINE REFLEX TO CULTURE: ABNORMAL
URINE TYPE: ABNORMAL
UROBILINOGEN, URINE: 0.2 E.U./DL
WBC # BLD: 9.7 K/UL (ref 4–11)
WBC UA: 6 /HPF (ref 0–5)

## 2022-08-12 PROCEDURE — 85025 COMPLETE CBC W/AUTO DIFF WBC: CPT

## 2022-08-12 PROCEDURE — 2580000003 HC RX 258: Performed by: NURSE PRACTITIONER

## 2022-08-12 PROCEDURE — 80053 COMPREHEN METABOLIC PANEL: CPT

## 2022-08-12 PROCEDURE — 83690 ASSAY OF LIPASE: CPT

## 2022-08-12 PROCEDURE — 99284 EMERGENCY DEPT VISIT MOD MDM: CPT

## 2022-08-12 PROCEDURE — 74176 CT ABD & PELVIS W/O CONTRAST: CPT

## 2022-08-12 PROCEDURE — 72131 CT LUMBAR SPINE W/O DYE: CPT

## 2022-08-12 PROCEDURE — 36415 COLL VENOUS BLD VENIPUNCTURE: CPT

## 2022-08-12 PROCEDURE — 81001 URINALYSIS AUTO W/SCOPE: CPT

## 2022-08-12 PROCEDURE — 6370000000 HC RX 637 (ALT 250 FOR IP): Performed by: NURSE PRACTITIONER

## 2022-08-12 RX ORDER — LIDOCAINE 50 MG/G
1 PATCH TOPICAL DAILY
Qty: 10 PATCH | Refills: 0 | Status: SHIPPED | OUTPATIENT
Start: 2022-08-12 | End: 2022-08-22

## 2022-08-12 RX ORDER — CEFUROXIME AXETIL 250 MG/1
250 TABLET ORAL ONCE
Status: COMPLETED | OUTPATIENT
Start: 2022-08-12 | End: 2022-08-12

## 2022-08-12 RX ORDER — METHOCARBAMOL 500 MG/1
750 TABLET, FILM COATED ORAL ONCE
Status: COMPLETED | OUTPATIENT
Start: 2022-08-12 | End: 2022-08-12

## 2022-08-12 RX ORDER — TRAMADOL HYDROCHLORIDE 50 MG/1
50 TABLET ORAL EVERY 6 HOURS PRN
Qty: 12 TABLET | Refills: 0 | Status: SHIPPED | OUTPATIENT
Start: 2022-08-12 | End: 2022-08-12

## 2022-08-12 RX ORDER — 0.9 % SODIUM CHLORIDE 0.9 %
500 INTRAVENOUS SOLUTION INTRAVENOUS ONCE
Status: COMPLETED | OUTPATIENT
Start: 2022-08-12 | End: 2022-08-12

## 2022-08-12 RX ORDER — LIDOCAINE 4 G/G
1 PATCH TOPICAL ONCE
Status: DISCONTINUED | OUTPATIENT
Start: 2022-08-12 | End: 2022-08-12 | Stop reason: HOSPADM

## 2022-08-12 RX ORDER — METHOCARBAMOL 500 MG/1
500 TABLET, FILM COATED ORAL 4 TIMES DAILY
Qty: 40 TABLET | Refills: 0 | Status: SHIPPED | OUTPATIENT
Start: 2022-08-12 | End: 2022-08-16

## 2022-08-12 RX ADMIN — CEFUROXIME AXETIL 250 MG: 250 TABLET ORAL at 11:12

## 2022-08-12 RX ADMIN — METHOCARBAMOL 750 MG: 500 TABLET ORAL at 09:34

## 2022-08-12 RX ADMIN — SODIUM CHLORIDE 500 ML: 9 INJECTION, SOLUTION INTRAVENOUS at 11:20

## 2022-08-12 ASSESSMENT — PAIN DESCRIPTION - ONSET: ONSET: SUDDEN

## 2022-08-12 ASSESSMENT — PAIN DESCRIPTION - PAIN TYPE: TYPE: ACUTE PAIN

## 2022-08-12 ASSESSMENT — PAIN - FUNCTIONAL ASSESSMENT: PAIN_FUNCTIONAL_ASSESSMENT: 0-10

## 2022-08-12 ASSESSMENT — PAIN DESCRIPTION - LOCATION: LOCATION: BACK

## 2022-08-12 ASSESSMENT — PAIN SCALES - GENERAL: PAINLEVEL_OUTOF10: 10

## 2022-08-12 ASSESSMENT — PAIN DESCRIPTION - FREQUENCY: FREQUENCY: CONTINUOUS

## 2022-08-12 ASSESSMENT — PAIN DESCRIPTION - ORIENTATION: ORIENTATION: LEFT;MID;LOWER

## 2022-08-12 ASSESSMENT — PAIN DESCRIPTION - DESCRIPTORS: DESCRIPTORS: ACHING;STABBING

## 2022-08-12 NOTE — ED PROVIDER NOTES
ED Attending Attestation Note    This patient was seen by the advanced practice provider. I personally saw the patient and performed a substantive portion of the visit including all aspects of the medical decision making. Briefly, 73 y.o. female presents with pain at the left buttocks. Denies fever. Denies dysuria. Denies focal weakness or numbness. Focused exam:   Gen: 73 y.o. female, NAD  HEENT: NCAT. PERRL. EOMI.   MSK: No C/T/L spine TTP, step off, or gross deformity. Palpable muscle spasm L gluteus muscle. MDM:   Patient presents with pain at the left buttocks. Has a palpable muscle spasm. There is no tenderness to palpation in the axial skeleton. Presentation not consistent with discitis, osteomyelitis, epidural abscess, or cauda equina syndrome. Patient has CKD. Will recommend acetaminophen and muscle relaxer. Usual strict return precautions for new or worsening symptoms communicated. For further details of the patient's emergency department visit, please see the advanced practice provider's documentation. David Donaldson MD     This report has been produced using speech recognition software and may contain errors related to that system including errors in grammar, punctuation, and spelling, as well as words and phrases that may be inappropriate. If there are any questions or concerns please feel free to contact the dictating provider for clarification.        David Donaldson MD  08/12/22 0295

## 2022-08-12 NOTE — ED PROVIDER NOTES
[facility] EMERGENCY DEPARTMENT  [address] [address]  Dept: [phone]  Loc: [facility] Lisa Mejia Agness COMPLAINT    Chief Complaint   Patient presents with    Back Pain     Patient states pain onset yesterday 08/11/2022; around 2100 went to lay down and had increasing pain. States it hurts to put pressure on the leg. States pain starts on the back and wraps into the groin. States only fall was x2 weeks ago when she was knocked down by a dog        HPI    Trae Kessler is a 73 y.o. female who presents emergency department with complaints of left lower back pain that started yesterday. She states the pain is radiating around down into her pelvis and left lower quadrant. She cannot tell the pain is starting in the back going to the abdomen or vice versa. She reports that she fell 2 weeks ago but was not evaluated. She did not notice any problems than yesterday when she was on a ladder she was reaching up for some time working when she developed discomfort. She is in stage IV CKD. She denies body aches, fever, chills, saddle anesthesia, urinary retention, loss of bowel or bladder function, extremity numbness, weakness or paresthesias. REVIEW OF SYSTEMS    General: No fevers, chills or night sweats, No weight loss  GI: No abdominal pain or vomiting  : No dysuria or hematuria  Musculoskeletal: see HPI, No unrelenting pain or night pain  Neurologic: No bowel or bladder incontinence, No saddle anesthesia, No leg weakness  All other systems reviewed and are negative.     PAST MEDICAL & SURGICAL HISTORY    Past Medical History:   Diagnosis Date    acute intermittent porphyria 1989    sees Servando Zurita, mom had it too    agoraphobic     Allergic rhinitis     Anxiety     Asthma     CAD (coronary artery disease)     ckd 4     from HTN,  sees Trini Ironsidejordyn    collagenous colitis     Ayoub    Depression 05/17/2013 [place]      GERD (gastroesophageal reflux disease)     Heart attack ([facility] [address]     Valjean Linker    Hyperlipidemia     Hypertension 2016    Left thyroid nodule     Migraines     gets 4x per month    post menopausal     Prediabetes     Pulmonary nodules     yearly ct lung followed by Dr Jenae Cohen    Restless leg syndrome     Urinary incontinence      Past Surgical History:   Procedure Laterality Date    CHOLECYSTECTOMY      COLONOSCOPY  2017    DR. CARMONA    CORONARY ANGIOPLASTY WITH STENT PLACEMENT  10/2013 [place]    HYSTERECTOMY (CERVIX STATUS UNKNOWN)      JAMEL only for ? age 36       CURRENT MEDICATIONS  (may include discharge medications prescribed in the ED)  Current Outpatient Rx   Medication Sig Dispense Refill    methocarbamol (ROBAXIN) 500 MG tablet Take 1 tablet by mouth in the morning and 1 tablet at noon and 1 tablet in the evening and 1 tablet before bedtime. Do all this for 10 days. 40 tablet 0    lidocaine (LIDODERM) 5 % Place 1 patch onto the skin in the morning for 10 days. 12 hours on, 12 hours off. . 10 patch 0    clopidogrel (PLAVIX) 75 MG tablet Take 1 tablet by mouth in the morning. 90 tablet 1    ALPRAZolam (XANAX) 1 MG tablet Take 1 tablet by mouth nightly as needed for Sleep or Anxiety for up to 90 days.  90 tablet 0    chlorthalidone (HYGROTON) 25 MG tablet Take 12.5 mg by mouth daily      rOPINIRole (REQUIP) 1 MG tablet Take 1 tablet by mouth nightly For RLS 90 tablet 1    albuterol sulfate  (90 Base) MCG/ACT inhaler Inhale 2 puffs into the lungs every 6 hours as needed for Wheezing 1 each 1    sucralfate (CARAFATE) 1 GM tablet One po qhs before bed for gerd (Patient not taking: Reported on 2022) 30 tablet 3    busPIRone (BUSPAR) 15 MG tablet Take 15 mg by mouth daily 90 tablet 1    carvedilol (COREG) 25 MG tablet Take 1 tablet by mouth 2 times daily 60 tablet 1    patiromer sorbitex calcium (VELTASSA) 8.4 g PACK packet Take 8.4 g by mouth daily rosuvastatin (CRESTOR) 20 MG tablet Take 1 tablet by mouth nightly 90 tablet 3    pantoprazole (PROTONIX) 40 MG tablet Take 1 tablet by mouth 2 times daily Take 1 tablet by mouth  daily (Patient taking differently: Take 40 mg by mouth 2 times daily Take one tablet bid) 180 tablet 1    clindamycin-tretinoin (ZIANA) 1.2-0.025 % gel Apply topically nightly Apply topically nightly. nitroGLYCERIN (NITROSTAT) 0.4 MG SL tablet Place 1 tablet under the tongue every 5 minutes as needed for Chest pain up to max of 3 total doses. If no relief after 1 dose, call 911. 25 tablet 3    nitroglycerin (NITRO-BID) 2 % ointment Apply to top of toes twice daily as needed 1 each 3    umeclidinium-vilanterol (ANORO ELLIPTA) 62.5-25 MCG/INH AEPB inhaler Inhale 1 puff into the lungs daily Lot# [id number] 25 puff 0    aspirin 81 MG tablet Take 1 tablet by mouth daily 30 tablet 0       ALLERGIES    Allergies   Allergen Reactions    Wellbutrin [Bupropion]      Dry mouth    Adhesive Tape Rash    Iodine Itching and Nausea And Vomiting    Sertraline Other (See Comments)     Severe dry mouth    Sulfa Antibiotics Itching and Nausea Only       SOCIAL HISTORY    Social History     Socioeconomic History    Marital status:       Spouse name: None    Number of children: None    Years of education: None    Highest education level: None   Tobacco Use    Smoking status: Some Days     Packs/day: 1.00     Years: 50.00     Pack years: 50.00     Types: Cigarettes     Start date: 1/1/1968    Smokeless tobacco: Never    Tobacco comments:     much of time smoked 1ppd   Vaping Use    Vaping Use: Never used   Substance and Sexual Activity    Alcohol use: No     Alcohol/week: 0.0 standard drinks    Drug use: No    Sexual activity: Not Currently     Social Determinants of Health     Financial Resource Strain: Low Risk     Difficulty of Paying Living Expenses: Not hard at all   Food Insecurity: No Food Insecurity    Worried About [employer] [employer] in the Last Year: Never true    Ran Out of Food in the Last Year: Never true       PHYSICAL EXAM    VITAL SIGNS: BP (!) 174/97   Pulse 66   Temp 97 °F (36.1 °C)   Resp 18   Wt 137 lb 2 oz (62.2 kg)   SpO2 100%   BMI 23.54 kg/m²    Constitutional:  Well developed, well nourished, no acute distress, non-toxic appearing  HENT:  Atraumatic, moist mucus membranes  Neck: No JVD, supple   Respiratory:  No respiratory distress, normal breath sounds  Cardiovascular: Regular rhythm and rate, S1-S2. No murmur   GI:  Soft, nondistended abdomen. She has tenderness with palpation to the left lower quadrant. No rebound or guarding. No pain with palpation to the pelvis or hip/thigh. Musculoskeletal:  No edema, no acute deformities no obvious deformities. 5/5 bilateral upper and bilateral lower extremity strength. She is ambulating without difficulty but she has her hand placed to her back like she is splinting the right lower side. She has an obvious palpable spasm to the left lumbar paraspinal  there is no midline C-spine, T-spine or L-spine pain with palpation. No bony abnormalities step-offs or crepitance. Negative straight leg raise. Integument:  Well hydrated, no rash  Vascular: Radial, pedal posttibial pulses 2+ equal bilaterally. Brisk capillary refill to fingers and toes. Extremities are warm natural color.   Neurologic:  Motor testing reveals: intact thigh adduction, knee flexion and extension, ankle dorsiflexion, toe plantarflexion, and great toe dorsiflexion bilaterally, patellar reflexes are 2+ and equal bilaterally, sensation to light touch is intact in the groin and lower extremities bilaterally    RADIOLOGY  CT ABDOMEN PELVIS WO CONTRAST   Final Result   Abdomen and pelvis:      No acute intra-abdominal abnormality      Lumbar spine:      No acute findings noted      Degenerative change, greatest at L4-L5         CT LUMBAR SPINE WO CONTRAST   Final Result   Abdomen and pelvis:      No acute intra-abdominal abnormality      Lumbar spine:      No acute findings noted      Degenerative change, greatest at L4-L5           Labs Reviewed   COMPREHENSIVE METABOLIC PANEL - Abnormal; Notable for the following components:       Result Value    Glucose 111 (*)     BUN 43 (*)     Creatinine 2.4 (*)     GFR Non- 20 (*)     GFR  24 (*)     ALT 8 (*)     All other components within normal limits   LIPASE - Abnormal; Notable for the following components:    Lipase 76.0 (*)     All other components within normal limits   URINALYSIS WITH REFLEX TO CULTURE - Abnormal; Notable for the following components:    Blood, Urine MODERATE (*)     Protein, UA TRACE (*)     Leukocyte Esterase, Urine SMALL (*)     All other components within normal limits   MICROSCOPIC URINALYSIS - Abnormal; Notable for the following components:    Mucus, UA Rare (*)     WBC, UA 6 (*)     All other components within normal limits   CBC WITH AUTO DIFFERENTIAL       ED COURSE & MEDICAL DECISION MAKING    Please see EMR for medications administered in the ED. Medications   methocarbamol (ROBAXIN) tablet 750 mg (750 mg Oral Given 8/12/22 0934)   0.9 % sodium chloride bolus (0 mLs IntraVENous Stopped 8/12/22 1249)   cefUROXime (CEFTIN) tablet 250 mg (250 mg Oral Given 8/12/22 1112)     This patient was seen evaluated by myself my attending physician Dr. Elver Moran    Differential Diagnosis: Spinal Epidural Abscess, Vertebral Osteomyelitis, Cauda Equina Syndrome, Spinal Cord Compression, Conus Medullaris Syndrome, ruptured/dissecting Abdominal Aortic Aneurysm, Metastases to the back, Herpes Zoster, Kidney Stone, Pyelonephritis, Fracture or dislocation, other    Patient is afebrile and nontoxic in appearance.  No evidence of neurological deficit on exam.    CBC is unremarkable  CMP with BUN of 43 and a creatinine of 2.4 she has known stage IV kidney disease    Urinalysis is unremarkable for infection    CT of the abdomen pelvis and lumbar showed  Impression   Abdomen and pelvis:       No acute intra-abdominal abnormality       Lumbar spine:       No acute findings noted       Degenerative change, greatest at L4-L5     She is ambulating without difficulty. She will be discharged with Robaxin and lidocaine. She can follow-up with her PCP. She was instructed on red flag symptoms when to return to the emergency department. Patient verbalized understanding of the discharge instructions. FINAL IMPRESSION    1.  Strain of lumbar region, initial encounter        PLAN  Discharge with outpatient follow-up (see EMR)      (Please note that this note was completed with a voice recognition program.  Every attempt was made to edit the dictations, but inevitably there remain words that are mis-transcribed.)            BEKAH Dale - DOREEN  08/14/22 0211

## 2022-08-12 NOTE — ED TRIAGE NOTES
Patient is A&O x4; vitals are stable. Patient pain Is in the low back off to the left side and goes into the groin.

## 2022-08-12 NOTE — ED NOTES
Attempted to call pt from [address] [place] [address] no answer     Rita Hughes RN  08/12/22 [id number]

## 2022-08-15 ENCOUNTER — CARE COORDINATION (OUTPATIENT)
Dept: CARE COORDINATION | Age: 72
End: 2022-08-15

## 2022-08-15 NOTE — CARE COORDINATION
ACM contacted Rachel Katz to follow up on her [facility] ED visit from 8/12/2022 with back pain     Prescribed :  lidocaine (LIDODERM)  methocarbamol (Robaxin)  Follow up   Schedule an appointment with Brenton Herrera MD (Internal Medicine) in 2 days (8/14/2022)  Go to [facility] Emergency Department (Emergency Medicine); If symptoms worsen    Ambulatory Care Coordination  ED Follow up Call    Reason for ED visit:  lumbar strain   Status:     not changed    Did you call your PCP prior to going to the ED? No      Did you receive a discharge instructions from the Emergency Room? No: she has not started the prescribed meds. Review of Instructions:     Understands what to report/when to return?:  Yes   Understands discharge instructions?:  No, did not start prescribed meds but ha scheduled follow up visit    Following discharge instructions? :No If not why? She does not believe the prescribed meds will help and she will need stronger med    Are there any new complaints of pain? No  New Pain Meds? No    Constipation prophylaxis needed? No    If you have a wound is the dressing clean, dry, and intact? N/A  Understands wound care regimen? N/A    Are there any other complaints/concerns that you wish to tell your provider? ACM spoke with Rachel Katz today. She reports that her back pain has not improved. She has not started Lidoderm or Robaxin because she does not believe meds will provided relief. She was not happy with her care in the ED. Provided grievance number to contact to report. She has scheduled an ED follow up visit for today. FU appts/Provider:    Future Appointments   Date Time Provider Michael Beltran   8/15/2022 11:20 AM Mecca Dale MD [facility]   11/9/2022 10:20 AM Lio Woods MD [facility]   12/9/2022 10:15 AM Etienne James MD [facility]           New Medications?:   Yes      Medication Reconciliation by phone - No  Understands Medications?   Did not access  Taking Medications? Not taking prescribed meds from ED visit   Can you swallow your pills? Yes    Any further needs in the home i.e. Equipment?   No    Link to services in community?:  No   Which services:  N/A

## 2022-08-31 ENCOUNTER — HOSPITAL ENCOUNTER (OUTPATIENT)
Dept: PHYSICAL THERAPY | Age: 72
Setting detail: THERAPIES SERIES
Discharge: HOME | End: 2022-08-31

## 2022-08-31 NOTE — FLOWSHEET NOTE
[facility]       Physical Therapy   Phone: [phone]   Fax: [phone]      Physical Therapy  Cancellation/No-show Note  Patient Name:  Kathy Singh  :  1950   Date:  2022  Cancelled visits to date: 1  No-shows to date: 0    For today's appointment patient:  [x]  Cancelled  []  Rescheduled appointment  []  No-show     Reason given by patient:  [x]  Patient ill  []  Conflicting appointment  []  No transportation    []  Conflict with work  []  No reason given  []  Other:     Comments:  sick    Electronically signed by:  Tatyana Bernard PT, DPT

## 2022-11-09 PROBLEM — F17.200 SMOKER: Status: ACTIVE | Noted: 2022-11-09

## 2022-11-09 NOTE — PROGRESS NOTES
[facility] time______0915______        Surgery time_____1045_______    Take the following medications with a sip of water: Follow your MD/Surgeons pre-procedure instructions regarding your medications     Do not eat or drink anything after 12:00 midnight except for your prep prior to your surgery. This includes water chewing gum, mints and ice chips. You may brush your teeth and gargle the morning of your surgery, but do not swallow the water     Please see your family doctor/pediatrician for a history and physical and/or concerning medications. Bring any test results/reports from your physicians office. If you are under the care of a heart doctor or specialist doctor, please be aware that you may be asked to them for clearance    You may be asked to stop blood thinners such as Coumadin, Plavix, Fragmin, Lovenox, etc., or any anti-inflammatories such as:  Aspirin, Ibuprofen, Advil, Naproxen prior to your surgery. We also ask that you stop any OTC medications such as fish oil, vitamin E, glucosamine, garlic, Multivitamins, COQ 10, etc.    We ask that you do not smoke 24 hours prior to surgery  We ask that you do not  drink any alcoholic beverages 24 hours prior to surgery     You must make arrangements for a responsible adult to take you home after your surgery. For your safety you will not be allowed to leave alone or drive yourself home. Your surgery will be cancelled if you do not have a ride home. Also for your safety, it is strongly suggested that someone stay with you the first 24 hours after your surgery. A parent or legal guardian must accompany a child scheduled for surgery and plan to stay at the hospital until the child is discharged. Please do not bring other children with you. For your comfort, please wear simple loose fitting clothing to the hospital.  Please do not bring valuables.     Do not wear any make-up or nail polish on your fingers or toes      For your safety, please do not wear any jewelry or body piercing's on the day of surgery. All jewelry must be removed. If you have dentures, they will be removed before going to operating room. For your convenience, we will provide you with a container. If you wear contact lenses or glasses, they will be removed, please bring a case for them. If you have a living will and a durable power of  for healthcare, please bring in a copy. As part of our patient safety program to minimize surgical site infections, we ask you to do the following:    Please notify your surgeon if you develop any illness between         now and the  day of your surgery. This includes a cough, cold, fever, sore throat, nausea,         or vomiting, and diarrhea, etc.   Please notify your surgeon if you experience dizziness, shortness         of breath or blurred vision between now and the time of your surgery. Do not shave your operative site 96 hours prior to surgery. For face and neck surgery, men may use an electric razor 48 hours   prior to surgery. You may shower the night before surgery or the morning of   your surgery with an antibacterial soap. You will need to bring a photo ID and insurance card    [facility] has an onsite pharmacy, would you like to utilize our pharmacy     If you will be staying overnight and use a C-pap machine, please bring   your C-pap to hospital     Our goal is to provide you with excellent care, therefore, visitors will be limited to two(2) in the room at a time so that we may focus on providing this care for you. Please contact pre-admission testing if you have any further questions.                  [facility] phone number:  [phone] PAT fax number:  [phone]  Please note these are generalized instructions for all surgical cases, you may be provided with more specific instructions according to your surgery. C-Difficile admission screening and protocol:       * Admitted with diarrhea? [] YES    [x]  NO     *Prior history of C-Diff. In last 3 months? [] YES    [x]  NO     *Antibiotic use in the past 6-8 weeks? [x]  NO    []  YES                 If yes, which ANTIBIOTIC AND REASON______     *Prior hospitalization or nursing home in the last month? []  YES    [x]  NO        SAFETY FIRST. .call before you fall

## 2022-11-14 ENCOUNTER — HOSPITAL ENCOUNTER (OUTPATIENT)
Dept: WOMENS IMAGING | Age: 72
Discharge: HOME | End: 2022-11-14
Payer: MEDICARE

## 2022-11-14 DIAGNOSIS — Z12.31 VISIT FOR SCREENING MAMMOGRAM: ICD-10-CM

## 2022-11-14 PROCEDURE — 77067 SCR MAMMO BI INCL CAD: CPT

## 2022-11-15 ENCOUNTER — ANESTHESIA EVENT (OUTPATIENT)
Dept: ENDOSCOPY | Age: 72
End: 2022-11-15
Payer: MEDICARE

## 2022-11-16 ENCOUNTER — HOSPITAL ENCOUNTER (OUTPATIENT)
Age: 72
Setting detail: OUTPATIENT SURGERY
Discharge: HOME | End: 2022-11-16
Attending: INTERNAL MEDICINE | Admitting: INTERNAL MEDICINE
Payer: MEDICARE

## 2022-11-16 ENCOUNTER — ANESTHESIA (OUTPATIENT)
Dept: ENDOSCOPY | Age: 72
End: 2022-11-16
Payer: MEDICARE

## 2022-11-16 VITALS
WEIGHT: 131 LBS | DIASTOLIC BLOOD PRESSURE: 71 MMHG | HEIGHT: 64 IN | HEART RATE: 68 BPM | OXYGEN SATURATION: 98 % | SYSTOLIC BLOOD PRESSURE: 131 MMHG | RESPIRATION RATE: 18 BRPM | BODY MASS INDEX: 22.36 KG/M2 | TEMPERATURE: 97 F

## 2022-11-16 DIAGNOSIS — K52.9 COLITIS: ICD-10-CM

## 2022-11-16 DIAGNOSIS — K21.9 GASTROESOPHAGEAL REFLUX DISEASE, UNSPECIFIED WHETHER ESOPHAGITIS PRESENT: ICD-10-CM

## 2022-11-16 PROCEDURE — 3700000001 HC ADD 15 MINUTES (ANESTHESIA): Performed by: INTERNAL MEDICINE

## 2022-11-16 PROCEDURE — 6360000002 HC RX W HCPCS: Performed by: NURSE ANESTHETIST, CERTIFIED REGISTERED

## 2022-11-16 PROCEDURE — 3700000000 HC ANESTHESIA ATTENDED CARE: Performed by: INTERNAL MEDICINE

## 2022-11-16 PROCEDURE — 7100000010 HC PHASE II RECOVERY - FIRST 15 MIN: Performed by: INTERNAL MEDICINE

## 2022-11-16 PROCEDURE — 2580000003 HC RX 258: Performed by: ANESTHESIOLOGY

## 2022-11-16 PROCEDURE — 2500000003 HC RX 250 WO HCPCS: Performed by: NURSE ANESTHETIST, CERTIFIED REGISTERED

## 2022-11-16 PROCEDURE — 7100000000 HC PACU RECOVERY - FIRST 15 MIN: Performed by: INTERNAL MEDICINE

## 2022-11-16 PROCEDURE — 7100000001 HC PACU RECOVERY - ADDTL 15 MIN: Performed by: INTERNAL MEDICINE

## 2022-11-16 PROCEDURE — 3609012400 HC EGD TRANSORAL BIOPSY SINGLE/MULTIPLE: Performed by: INTERNAL MEDICINE

## 2022-11-16 PROCEDURE — 7100000011 HC PHASE II RECOVERY - ADDTL 15 MIN: Performed by: INTERNAL MEDICINE

## 2022-11-16 PROCEDURE — 2709999900 HC NON-CHARGEABLE SUPPLY: Performed by: INTERNAL MEDICINE

## 2022-11-16 PROCEDURE — 3609010300 HC COLONOSCOPY W/BIOPSY SINGLE/MULTIPLE: Performed by: INTERNAL MEDICINE

## 2022-11-16 PROCEDURE — 88305 TISSUE EXAM BY PATHOLOGIST: CPT

## 2022-11-16 RX ORDER — SODIUM CHLORIDE 0.9 % (FLUSH) 0.9 %
5-40 SYRINGE (ML) INJECTION PRN
Status: CANCELLED | OUTPATIENT
Start: 2022-11-16

## 2022-11-16 RX ORDER — SODIUM CHLORIDE 0.9 % (FLUSH) 0.9 %
5-40 SYRINGE (ML) INJECTION EVERY 12 HOURS SCHEDULED
Status: DISCONTINUED | OUTPATIENT
Start: 2022-11-16 | End: 2022-11-16 | Stop reason: HOSPADM

## 2022-11-16 RX ORDER — SODIUM CHLORIDE 9 MG/ML
INJECTION, SOLUTION INTRAVENOUS CONTINUOUS
Status: DISCONTINUED | OUTPATIENT
Start: 2022-11-16 | End: 2022-11-16 | Stop reason: HOSPADM

## 2022-11-16 RX ORDER — SODIUM CHLORIDE 0.9 % (FLUSH) 0.9 %
5-40 SYRINGE (ML) INJECTION PRN
Status: DISCONTINUED | OUTPATIENT
Start: 2022-11-16 | End: 2022-11-16 | Stop reason: HOSPADM

## 2022-11-16 RX ORDER — SODIUM CHLORIDE 9 MG/ML
INJECTION, SOLUTION INTRAVENOUS PRN
Status: DISCONTINUED | OUTPATIENT
Start: 2022-11-16 | End: 2022-11-16 | Stop reason: HOSPADM

## 2022-11-16 RX ORDER — SODIUM CHLORIDE 0.9 % (FLUSH) 0.9 %
5-40 SYRINGE (ML) INJECTION EVERY 12 HOURS SCHEDULED
Status: CANCELLED | OUTPATIENT
Start: 2022-11-16

## 2022-11-16 RX ORDER — PROPOFOL 10 MG/ML
INJECTION, EMULSION INTRAVENOUS PRN
Status: DISCONTINUED | OUTPATIENT
Start: 2022-11-16 | End: 2022-11-16 | Stop reason: SDUPTHER

## 2022-11-16 RX ORDER — LIDOCAINE HYDROCHLORIDE 20 MG/ML
INJECTION, SOLUTION EPIDURAL; INFILTRATION; INTRACAUDAL; PERINEURAL PRN
Status: DISCONTINUED | OUTPATIENT
Start: 2022-11-16 | End: 2022-11-16 | Stop reason: SDUPTHER

## 2022-11-16 RX ORDER — PHENYLEPHRINE HCL IN 0.9% NACL 1 MG/10 ML
SYRINGE (ML) INTRAVENOUS PRN
Status: DISCONTINUED | OUTPATIENT
Start: 2022-11-16 | End: 2022-11-16 | Stop reason: SDUPTHER

## 2022-11-16 RX ORDER — SODIUM CHLORIDE 9 MG/ML
INJECTION, SOLUTION INTRAVENOUS PRN
Status: CANCELLED | OUTPATIENT
Start: 2022-11-16

## 2022-11-16 RX ORDER — GLYCOPYRROLATE 0.2 MG/ML
INJECTION INTRAMUSCULAR; INTRAVENOUS PRN
Status: DISCONTINUED | OUTPATIENT
Start: 2022-11-16 | End: 2022-11-16 | Stop reason: SDUPTHER

## 2022-11-16 RX ADMIN — Medication 100 MCG: at 10:28

## 2022-11-16 RX ADMIN — Medication 100 MCG: at 10:16

## 2022-11-16 RX ADMIN — PROPOFOL 160 MCG/KG/MIN: 10 INJECTION, EMULSION INTRAVENOUS at 10:10

## 2022-11-16 RX ADMIN — LIDOCAINE HYDROCHLORIDE 50 MG: 20 INJECTION, SOLUTION EPIDURAL; INFILTRATION; INTRACAUDAL; PERINEURAL at 10:09

## 2022-11-16 RX ADMIN — SODIUM CHLORIDE: 9 INJECTION, SOLUTION INTRAVENOUS at 09:42

## 2022-11-16 RX ADMIN — PROPOFOL 70 MG: 10 INJECTION, EMULSION INTRAVENOUS at 10:09

## 2022-11-16 RX ADMIN — GLYCOPYRROLATE 0.2 MG: 0.2 INJECTION, SOLUTION INTRAMUSCULAR; INTRAVENOUS at 10:13

## 2022-11-16 ASSESSMENT — PAIN SCALES - GENERAL
PAINLEVEL_OUTOF10: 0
PAINLEVEL_OUTOF10: 0

## 2022-11-16 NOTE — ANESTHESIA PRE PROCEDURE
[facility] Department of Anesthesiology  Pre-Anesthesia Evaluation/Consultation       Name:  Alycia Magallanes  : 1950  Age:  81 y.o.                                            MRN:  [id number]  Date: 2022           Surgeon: Surgeon(s):  Lelo Guillen MD    Procedure: Procedure(s):  COLONOSCOPY  ESOPHAGOGASTRODUODENOSCOPY     Allergies   Allergen Reactions    Wellbutrin [Bupropion]      Dry mouth    Adhesive Tape Rash    Iodine Itching and Nausea And Vomiting    Sertraline Other (See Comments)     Severe dry mouth    Sulfa Antibiotics Itching and Nausea Only     Patient Active Problem List   Diagnosis    Anxiety    Panic disorder    Palpitations    IBS (irritable bowel syndrome)    Depression    Colitis    Peripheral arterial disease ([facility] [address]    CAD (coronary artery disease)    Hypertension    Acute intermittent porphyria ([address]    Arthritis    Asthma    Atrial fibrillation (HCC)    Gastroesophageal reflux disease    Seasonal allergic rhinitis    Urinary incontinence    Stage 4 chronic kidney disease ([address]    Left thyroid nodule    Sedative, hypnotic or anxiolytic use, unspecified with unspecified sedative, hypnotic or anxiolytic-induced disorder    Sedative, hypnotic or anxiolytic dependence with unspecified sedative, hypnotic or anxiolytic-induced disorder    Sedative, hypnotic or anxiolytic dependence, uncomplicated    Episode of recurrent major depressive disorder, unspecified depression episode severity ([address]    Smoker     Past Medical History:   Diagnosis Date    acute intermittent porphyria     sees [place], mom had it too    agoraphobic     ongoing, prefers to be at home    Allergic rhinitis     Anxiety     Asthma     CAD (coronary artery disease)     historyMI     ckd 4     from HTN,  sees [place]    collagenous colitis     [facility]    Depression 2013    eversince      GERD (gastroesophageal reflux disease)     Hyperlipidemia     Hypertension 05/03/2016    Left thyroid nodule 2020    9mm, gets rechecked yearly on ct lung    Migraines     gets 4x per month    post menopausal     Prediabetes     Pulmonary nodules     yearly ct lung followed by Dr Clint Ledesma Restless leg syndrome     Urinary incontinence      Past Surgical History:   Procedure Laterality Date    CHOLECYSTECTOMY      COLONOSCOPY  07/06/2017    DR. CARMONA    CORONARY ANGIOPLASTY WITH STENT PLACEMENT  10/2013 [facility]    HYSTERECTOMY (CERVIX STATUS UNKNOWN)      JAMEL only for ? age 36     Social History     Tobacco Use    Smoking status: Some Days     Packs/day: 1.00     Years: 50.00     Pack years: 50.00     Types: Cigarettes     Start date: 1/1/1968    Smokeless tobacco: Never    Tobacco comments:     Now smokes half ppd   Vaping Use    Vaping Use: Never used   Substance Use Topics    Alcohol use: No     Alcohol/week: 0.0 standard drinks    Drug use: No     Medications  No current facility-administered medications on file prior to encounter. Current Outpatient Medications on File Prior to Encounter   Medication Sig Dispense Refill    ALPRAZolam (XANAX) 1 MG tablet Take 1 tablet by mouth nightly as needed for Sleep or Anxiety for up to 90 days. 90 tablet 0    pantoprazole (PROTONIX) 40 MG tablet TAKE ONE TABLET BY MOUTH TWICE A  tablet 1    clopidogrel (PLAVIX) 75 MG tablet Take 1 tablet by mouth in the morning. 90 tablet 1    chlorthalidone (HYGROTON) 25 MG tablet Take 12.5 mg by mouth as needed swelling      sucralfate (CARAFATE) 1 GM tablet One po qhs before bed for gerd (Patient taking differently: as needed One po qhs before bed for gerd) 30 tablet 3    nitroGLYCERIN (NITROSTAT) 0.4 MG SL tablet Place 1 tablet under the tongue every 5 minutes as needed for Chest pain up to max of 3 total doses. If no relief after 1 dose, call 911. 25 tablet 3     No current facility-administered medications for this encounter.      Vital Signs (Current) Vitals:    11/09/22 1533   Weight: 131 lb (59.4 kg)   Height: 5' 4\" (1.626 m)                                            Vital Signs Statistics (for past 48 hrs)     No data recorded  BP Readings from Last 3 Encounters:   11/09/22 (!) 144/82   08/15/22 138/76   08/12/22 (!) 174/97       BMI  Body mass index is 22.49 kg/m². Estimated body mass index is 22.49 kg/m² as calculated from the following:    Height as of this encounter: 5' 4\" (1.626 m). Weight as of this encounter: 131 lb (59.4 kg). CBC   Lab Results   Component Value Date/Time    WBC 9.7 08/12/2022 09:16 AM    RBC 4.49 08/12/2022 09:16 AM    HGB 13.2 08/12/2022 09:16 AM    HCT 39.9 08/12/2022 09:16 AM    MCV 88.7 08/12/2022 09:16 AM    RDW 15.4 08/12/2022 09:16 AM     08/12/2022 09:16 AM     CMP    Lab Results   Component Value Date/Time     08/12/2022 09:16 AM    K 4.2 08/12/2022 09:16 AM    K 4.7 11/24/2020 10:24 PM     08/12/2022 09:16 AM    CO2 21 08/12/2022 09:16 AM    BUN 43 08/12/2022 09:16 AM    CREATININE 2.4 08/12/2022 09:16 AM    GFRAA 24 08/12/2022 09:16 AM    GFRAA 54 01/16/2013 02:40 PM    AGRATIO 1.2 08/12/2022 09:16 AM    LABGLOM 20 08/12/2022 09:16 AM    GLUCOSE 111 08/12/2022 09:16 AM    PROT 7.4 08/12/2022 09:16 AM    PROT 7.9 06/24/2012 05:30 PM    CALCIUM 10.0 08/12/2022 09:16 AM    BILITOT 0.3 08/12/2022 09:16 AM    ALKPHOS 70 08/12/2022 09:16 AM    AST 18 08/12/2022 09:16 AM    ALT 8 08/12/2022 09:16 AM     BMP    Lab Results   Component Value Date/Time     08/12/2022 09:16 AM    K 4.2 08/12/2022 09:16 AM    K 4.7 11/24/2020 10:24 PM     08/12/2022 09:16 AM    CO2 21 08/12/2022 09:16 AM    BUN 43 08/12/2022 09:16 AM    CREATININE 2.4 08/12/2022 09:16 AM    CALCIUM 10.0 08/12/2022 09:16 AM    GFRAA 24 08/12/2022 09:16 AM    GFRAA 54 01/16/2013 02:40 PM    LABGLOM 20 08/12/2022 09:16 AM    GLUCOSE 111 08/12/2022 09:16 AM     POCGlucose  No results for input(s): GLUCOSE in the last 72 hours. [facility]    Lab Results   Component Value Date/Time    PROTIME 12.9 11/24/2020 11:37 PM    INR 1.11 11/24/2020 11:37 PM    APTT 31.5 02/09/2015 10:48 AM     HCG (If Applicable) No results found for: PREGTESTUR, PREGSERUM, HCG, HCGQUANT   ABGs No results found for: PHART, PO2ART, WJP6XGR, BIB0UPT, BEART, V2MEQQNR   Type & Screen (If Applicable)  No results found for: LABABO, LABRH                         BMI: Wt Readings from Last 3 Encounters:       NPO Status:  >8h                          Anesthesia Evaluation  Patient summary reviewed no history of anesthetic complications:   Airway: Mallampati: II  TM distance: >3 FB   Neck ROM: full  Mouth opening: > = 3 FB   Dental: normal exam         Pulmonary: breath sounds clear to auscultation  (+) asthma:                            Cardiovascular:    (+) hypertension:, CAD:, dysrhythmias: atrial fibrillation,           Rate: normal                    Neuro/Psych:   (+) headaches: migraine headaches, psychiatric history:depression/anxiety             GI/Hepatic/Renal:   (+) GERD:, bowel prep,           Endo/Other:    (+) blood dyscrasia (AIP): arthritis:., .                 Abdominal:             Vascular:   + PVD, aortic or cerebral, . Other Findings:           Anesthesia Plan      MAC     ASA 3       Induction: intravenous. Anesthetic plan and risks discussed with patient. Plan discussed with CRNA. This pre-anesthesia assessment may be used as a history and physical.    DOS STAFF ADDENDUM:    Pt seen and examined, chart reviewed (including anesthesia, drug and allergy history). No interval changes to history and physical examination. Anesthetic plan, risks, benefits, alternatives, and personnel involved discussed with patient. Patient verbalized an understanding and agrees to proceed.       Richard Chase MD  November 16, 2022  9:19 AM

## 2022-11-16 NOTE — ANESTHESIA POSTPROCEDURE EVALUATION
Department of Anesthesiology  Postprocedure Note    Patient: Stevenson Caicedo  MRN: [id number]  YOB: 1950  Date of evaluation: 11/16/2022      Procedure Summary     Date: 11/16/22 Room / Location: [address] [place] [address]    Anesthesia Start: 1004 Anesthesia Stop: 2304    Procedures:       COLONOSCOPY WITH BIOPSY      EGD BIOPSY Diagnosis:       Colitis      Gastroesophageal reflux disease, unspecified whether esophagitis present      (COLITIS, REFLUX)    Surgeons: Sherley Thompson MD Responsible Provider: Ever Doherty MD    Anesthesia Type: MAC ASA Status: 3          Anesthesia Type: No value filed.     Missy Phase I: Missy Score: 9    Missy Phase II:        Anesthesia Post Evaluation    Patient location during evaluation: PACU  Patient participation: complete - patient participated  Level of consciousness: awake and alert  Pain score: 0  Airway patency: patent  Nausea & Vomiting: no nausea and no vomiting  Complications: no  Cardiovascular status: blood pressure returned to baseline  Respiratory status: acceptable  Hydration status: euvolemic

## 2022-11-16 NOTE — PROGRESS NOTES
Tolerating oral intake and sitting up on side of bed. Discharge instructions given to patient and friend. Verbalize understanding. No complaints.

## 2022-11-16 NOTE — DISCHARGE INSTRUCTIONS
[facility] Discharge Instructions  Colonoscopy    NAME:  Allie Salcedo  YOB: 1950  MEDICAL RECORD NUMBER:  [id number]  DATE:  11/16/2022      After receiving Propofol (Diprivan) for Moderate Sedation:    Do not drive or operate any machinery until tomorrow  Do not sign any legal documents or make any critical decisions  Do not drink alcoholic beverages for 24 hours  Plan to spend a few hours resting before resuming your normal routine  Possible side effects are light headedness and sedation    You may resume your usual diet at home    Resume all your daily medications    Call your physician if any of the following occur:    Severe abdominal distention and/or pain. (Mild distention or cramping is normal after this procedure; this should improve within an hour or two with passage of air)  Fever, chills, nausea or vomiting  You may notice a small amount of blood in your next few bowel movements    If excessive bleeding occurs:  Call your physician immediately or proceed to the nearest Emergency Room    Biopsy Obtained: YES. If you have not heard from your physician in one week please call your physician's office for biopsy results, [phone]. Recommendations: pending pathology results         For questions or concerns please contact your GI physician's 24 hour call center at [phone].

## 2022-11-16 NOTE — PROCEDURES
[facility]  Endoscopy Note    Patient: Winsome Son  : 1950  Acct#: [de-identified]    Procedure: Colonoscopy with biopsy    Date:  2022    Surgeon:  Kim Winter MD, MD    Referring Physician:  Niko Martins    Previous Colonoscopy: Yes  Date:  17  Greater than 3 years? Yes    Preoperative Diagnosis:  colitis    Postoperative Diagnosis:  Normal colon biopsied for microscopic colitis. Anesthesia:  See anesthesia note    Indications: This is a 81y.o. year old female who presents today with colitis. Procedure: An informed consent was obtained from the patient after explanation of indications, benefits, possible risks and complications of the procedure. The patient was then taken to the endoscopy suite, placed in the left lateral decubitus position, and the above IV anesthesia was administered. A digital rectal examination was performed and revealed negative without mass, lesions or tenderness. The Olympus CFQ-180-AL video colonoscope was placed in the patient's rectum under digital direction and advanced to the cecum. The cecum was identified by characteristic anatomy and ballottment. The ileocecal valve was identified. The preparation was good. The scope was then withdrawn back through the cecum, ascending, transverse, descending and sigmoid colons. Carefull circumferential examination of the mucosa in these areas demonstrated normal colonic mucosa throughout. Biopsies were taken of the colon to assess for microscopic colitis. The scope was then withdrawn into the rectum and retroflexed. The retroflexed view of the anal verge and rectum demonstrates no abnormalities. The scope was straightened, the colon was decompressed and the scope was withdrawn from the patient. The patient tolerated the procedure well and was taken to the PACU in good condition. Estimated Blood Loss:  none    Impression:  Normal colon biopsied for microscopic colitis.     Recommendations:  Await pathology.     Ryann Be MD, MD   [facility]  11/16/2022

## 2022-11-16 NOTE — PROCEDURES
[facility]  Endoscopy Note    Patient: Kortney Buenrostro  : 1950  Acct#: [de-identified]    Procedure: Esophagogastroduodenoscopy with biopsy    Date:  2022     Surgeon:  Judi De La Torre MD, MD    Referring Physician:  Levi Nava    Preoperative Diagnosis:  GERD    Postoperative Diagnosis:  Possible eosinophilic esophagitis and gastritis. Anesthesia: see anesthesia note. Indications: This is a 81y.o. year old female who presents today with GERD. Description of Procedure:  Informed consent was obtained from the patient after explanation of indications, benefits and possible risks and complications of the procedure. The patient was then taken to the endoscopy suite, placed in the left lateral decubitus position and the above IV sedation was administrered. The Olympus videoendoscope was placed in the patient's mouth and under direct visualization passed into the esophagus. Visualization of the esophagus demonstrated concentric rings in the middle of the esophagus that was biopsied. It is suspicious for eosinophilic esophagitis. The disral esophagus was biopsied as well. .     The scope was then advanced into the stomach. Visualization of the gastric body and antrum demonstrated gastritis. The antrum was biopsied. .  A retroflexed exam of the gastric cardia and fundus demonstrated normal..  The pylorus was patent and the scope was advanced into the duodenum. Visualization of the duodenal bulb demonstrated normal..  The second portion of the duodenum demonstrated normal..    The scope was then withdrawn back into the stomach, it was decompressed, and the scope was completely withdrawn. The patient tolerated the procedure well and was taken to the post anesthesia care unit in good condition. Estimated Blood loss:  minimal.    Impression: Possible eosinophilic esophagitis and gastritis. Recommendations:Await pathology. Consider fluticasone or budesonide.     Judi De La Torre MD, MD [facility]

## 2022-11-16 NOTE — H&P
[facility]   Pre-operative History and Physical    Patient: Bea Krueger  : 1950  Acct#: [de-identified]    History Obtained From: electronic medical record    HISTORY OF PRESENT ILLNESS  Procedure:EGD and colonoscopy  Indications:GERD and colitis  Past Medical History:        Diagnosis Date    acute intermittent porphyria     sees Vern Mcdaniels, mom had it too    agoraphobic     ongoing, prefers to be at home    Allergic rhinitis     Anxiety     Asthma     CAD (coronary artery disease)     historyMI     ckd 4     from HTN,  sees Trini Pagan    collagenous colitis     Mega    Depression 2013    eversince      GERD (gastroesophageal reflux disease)     Hyperlipidemia     Hypertension 2016    Left thyroid nodule 2020    9mm, gets rechecked yearly on ct lung    Migraines     gets 4x per month    post menopausal     Prediabetes     Pulmonary nodules     yearly ct lung followed by Dr Vern Mcdaniels    Restless leg syndrome     Urinary incontinence      Past Surgical History:        Procedure Laterality Date    CHOLECYSTECTOMY      COLONOSCOPY  2017    DR. CARMONA    CORONARY ANGIOPLASTY WITH STENT PLACEMENT  10/2013 [place]    HYSTERECTOMY (CERVIX STATUS UNKNOWN)      JAMEL only for ? age 36     Medications prior to admission:   Prior to Admission medications    Medication Sig Start Date End Date Taking? Authorizing Provider   rOPINIRole (REQUIP) 1 MG tablet Take 1 tablet by mouth nightly For RLS 22   Soo Chiang MD   carvedilol (COREG) 25 MG tablet Take 1 tablet by mouth 2 times daily 22   Soo Chiang MD   rosuvastatin (CRESTOR) 20 MG tablet Take 1 tablet by mouth nightly 22   Soo Chiang MD   umeclidinium-vilanterol [facility] ELLIPTA) 62.5-25 MCG/INH AEPB inhaler Inhale 1 puff into the lungs daily Lot# [id number] 22   Soo Chiang MD   fluocinolone (DERMA-SMOOTHE) 0.01 % external oil Apply topically. daily to scalp  Patient not taking: Reported on 2022 Lashawn Tenorio MD   busPIRone (BUSPAR) 15 MG tablet Take 15 mg by mouth daily 11/4/22   Lashawn Tenorio MD   albuterol sulfate HFA (PROVENTIL;VENTOLIN;PROAIR) 108 (90 Base) MCG/ACT inhaler Inhale 2 puffs into the lungs every 6 hours as needed for Wheezing 10/14/22   Syed Wong MD   ALPRAZolam [place]) 1 MG tablet Take 1 tablet by mouth nightly as needed for Sleep or Anxiety for up to 90 days. 9/12/22 12/11/22  Lashawn Tenorio MD   pantoprazole (PROTONIX) 40 MG tablet TAKE ONE TABLET BY MOUTH TWICE A DAY 9/12/22   Lashawn Tenorio MD   clopidogrel (PLAVIX) 75 MG tablet Take 1 tablet by mouth in the morning. 7/29/22   Lashawn Tenorio MD   chlorthalidone (HYGROTON) 25 MG tablet Take 12.5 mg by mouth as needed swelling  Patient not taking: Reported on 11/16/2022    Historical Provider, MD   sucralfate (CARAFATE) 1 GM tablet One po qhs before bed for gerd  Patient taking differently: as needed One po qhs before bed for gerd 5/11/22   Lashawn Tenorio MD   nitroGLYCERIN (NITROSTAT) 0.4 MG SL tablet Place 1 tablet under the tongue every 5 minutes as needed for Chest pain up to max of 3 total doses. If no relief after 1 dose, call 911. 12/5/21 8/15/22  Rhona King,      Allergies: Wellbutrin [bupropion], Adhesive tape, Iodine, Sertraline, and Sulfa antibiotics    Social History     Socioeconomic History    Marital status:       Spouse name: Not on file    Number of children: Not on file    Years of education: Not on file    Highest education level: Not on file   Occupational History    Not on file   Tobacco Use    Smoking status: Some Days     Packs/day: 1.00     Years: 50.00     Pack years: 50.00     Types: Cigarettes     Start date: 1/1/1968    Smokeless tobacco: Never    Tobacco comments:     Now smokes half ppd   Vaping Use    Vaping Use: Never used   Substance and Sexual Activity    Alcohol use: No     Alcohol/week: 0.0 standard drinks    Drug use: No    Sexual activity: Not Currently   Other Topics Concern    Not on file Social History Narrative    Not on file     Social Determinants of Health     Financial Resource Strain: Low Risk     Difficulty of Paying Living Expenses: Not hard at all   Food Insecurity: No Food Insecurity    Worried About Running Out of Food in the Last Year: Never true    Ran Out of Food in the Last Year: Never true   Transportation Needs: Not on file   Physical Activity: Not on file   Stress: Not on file   Social Connections: Not on file   Intimate Partner Violence: Not on file   Housing Stability: Not on file     Family History   Problem Relation Age of Onset    Hypertension Father     Other Father         MI    Cancer Brother         testicular    No Known Problems Maternal Grandmother     No Known Problems Maternal Grandfather     No Known Problems Paternal Grandmother     No Known Problems Paternal Grandfather     Cancer Other          PHYSICAL EXAM:      /75   Pulse 72   Temp 97.8 °F (36.6 °C) (Temporal)   Resp 18   Ht 5' 4\" (1.626 m)   Wt 131 lb (59.4 kg)   SpO2 99%   BMI 22.49 kg/m²  I        Heart:normal    Lungs: normal    Abdomen: normal      ASA Grade:  See anesthesia note      ASSESSMENT AND PLAN:    1. Procedure options, risks and benefits reviewed with patient and expresses understanding.

## 2022-11-22 ENCOUNTER — HOSPITAL ENCOUNTER (OUTPATIENT)
Dept: ULTRASOUND IMAGING | Age: 72
Discharge: HOME | End: 2022-11-22
Payer: MEDICARE

## 2022-11-22 ENCOUNTER — HOSPITAL ENCOUNTER (OUTPATIENT)
Dept: WOMENS IMAGING | Age: 72
Discharge: HOME | End: 2022-11-22
Payer: MEDICARE

## 2022-11-22 DIAGNOSIS — R92.8 ABNORMAL MAMMOGRAM: ICD-10-CM

## 2022-11-22 PROCEDURE — 77065 DX MAMMO INCL CAD UNI: CPT

## 2022-11-22 PROCEDURE — 76642 ULTRASOUND BREAST LIMITED: CPT

## 2022-11-22 PROCEDURE — G0279 TOMOSYNTHESIS, MAMMO: HCPCS

## 2022-12-04 ENCOUNTER — APPOINTMENT (OUTPATIENT)
Dept: GENERAL RADIOLOGY | Age: 72
DRG: 249 | End: 2022-12-04
Payer: MEDICARE

## 2022-12-04 ENCOUNTER — HOSPITAL ENCOUNTER (INPATIENT)
Age: 72
LOS: 2 days | Discharge: HOME | DRG: 249 | End: 2022-12-06
Attending: EMERGENCY MEDICINE | Admitting: INTERNAL MEDICINE
Payer: MEDICARE

## 2022-12-04 DIAGNOSIS — I21.19 INFERIOR MI (HCC): Primary | ICD-10-CM

## 2022-12-04 PROBLEM — I21.11 ST ELEVATION MYOCARDIAL INFARCTION INVOLVING RIGHT CORONARY ARTERY (HCC): Status: ACTIVE | Noted: 2022-12-04

## 2022-12-04 LAB
ANION GAP SERPL CALCULATED.3IONS-SCNC: 14 MMOL/L (ref 3–16)
APTT: 27.2 SEC (ref 23–34.3)
BASE EXCESS VENOUS: -5 (ref -3–3)
BASOPHILS ABSOLUTE: 0.1 K/UL (ref 0–0.2)
BASOPHILS RELATIVE PERCENT: 1.4 %
BUN BLDV-MCNC: 39 MG/DL (ref 7–20)
CALCIUM SERPL-MCNC: 9.2 MG/DL (ref 8.3–10.6)
CHLORIDE BLD-SCNC: 105 MMOL/L (ref 99–110)
CO2: 20 MMOL/L (ref 21–32)
CREAT SERPL-MCNC: 1.6 MG/DL (ref 0.6–1.2)
EOSINOPHILS ABSOLUTE: 0.2 K/UL (ref 0–0.6)
EOSINOPHILS RELATIVE PERCENT: 2.7 %
GFR SERPL CREATININE-BSD FRML MDRD: 34 ML/MIN/{1.73_M2}
GLUCOSE BLD-MCNC: 135 MG/DL (ref 70–99)
HCO3 VENOUS: 20.6 MMOL/L (ref 23–29)
HCT VFR BLD CALC: 38.8 % (ref 36–48)
HEMOGLOBIN: 12.5 G/DL (ref 12–16)
LYMPHOCYTES ABSOLUTE: 3.4 K/UL (ref 1–5.1)
LYMPHOCYTES RELATIVE PERCENT: 37.1 %
MCH RBC QN AUTO: 29.2 PG (ref 26–34)
MCHC RBC AUTO-ENTMCNC: 32.3 G/DL (ref 31–36)
MCV RBC AUTO: 90.7 FL (ref 80–100)
MONOCYTES ABSOLUTE: 0.7 K/UL (ref 0–1.3)
MONOCYTES RELATIVE PERCENT: 7.8 %
NEUTROPHILS ABSOLUTE: 4.7 K/UL (ref 1.7–7.7)
NEUTROPHILS RELATIVE PERCENT: 51 %
O2 SAT, VEN: 90 %
PCO2, VEN: 38.7 MM HG (ref 40–50)
PDW BLD-RTO: 13.6 % (ref 12.4–15.4)
PERFORMED ON: ABNORMAL
PH VENOUS: 7.33 (ref 7.35–7.45)
PLATELET # BLD: 309 K/UL (ref 135–450)
PMV BLD AUTO: 8.3 FL (ref 5–10.5)
PO2, VEN: 62 MM HG
POC ACT LR: 176 SEC
POC SAMPLE TYPE: ABNORMAL
POTASSIUM REFLEX MAGNESIUM: 4.5 MMOL/L (ref 3.5–5.1)
PRO-BNP: 634 PG/ML (ref 0–124)
RBC # BLD: 4.28 M/UL (ref 4–5.2)
SODIUM BLD-SCNC: 139 MMOL/L (ref 136–145)
TCO2 CALC VENOUS: 22 MMOL/L
TROPONIN: <0.01 NG/ML
WBC # BLD: 9.2 K/UL (ref 4–11)

## 2022-12-04 PROCEDURE — 93005 ELECTROCARDIOGRAM TRACING: CPT | Performed by: EMERGENCY MEDICINE

## 2022-12-04 PROCEDURE — 93460 R&L HRT ART/VENTRICLE ANGIO: CPT | Performed by: INTERNAL MEDICINE

## 2022-12-04 PROCEDURE — 85730 THROMBOPLASTIN TIME PARTIAL: CPT

## 2022-12-04 PROCEDURE — 02703DZ DILATION OF CORONARY ARTERY, ONE ARTERY WITH INTRALUMINAL DEVICE, PERCUTANEOUS APPROACH: ICD-10-PCS | Performed by: INTERNAL MEDICINE

## 2022-12-04 PROCEDURE — 2100000000 HC CCU R&B

## 2022-12-04 PROCEDURE — 6360000004 HC RX CONTRAST MEDICATION: Performed by: INTERNAL MEDICINE

## 2022-12-04 PROCEDURE — 96374 THER/PROPH/DIAG INJ IV PUSH: CPT

## 2022-12-04 PROCEDURE — 92928 PRQ TCAT PLMT NTRAC ST 1 LES: CPT | Performed by: INTERNAL MEDICINE

## 2022-12-04 PROCEDURE — 71045 X-RAY EXAM CHEST 1 VIEW: CPT

## 2022-12-04 PROCEDURE — C1874 STENT, COATED/COV W/DEL SYS: HCPCS

## 2022-12-04 PROCEDURE — C1894 INTRO/SHEATH, NON-LASER: HCPCS

## 2022-12-04 PROCEDURE — 80048 BASIC METABOLIC PNL TOTAL CA: CPT

## 2022-12-04 PROCEDURE — 99223 1ST HOSP IP/OBS HIGH 75: CPT | Performed by: INTERNAL MEDICINE

## 2022-12-04 PROCEDURE — 2580000003 HC RX 258: Performed by: EMERGENCY MEDICINE

## 2022-12-04 PROCEDURE — 85347 COAGULATION TIME ACTIVATED: CPT

## 2022-12-04 PROCEDURE — 6370000000 HC RX 637 (ALT 250 FOR IP): Performed by: INTERNAL MEDICINE

## 2022-12-04 PROCEDURE — C1887 CATHETER, GUIDING: HCPCS

## 2022-12-04 PROCEDURE — 6370000000 HC RX 637 (ALT 250 FOR IP)

## 2022-12-04 PROCEDURE — 84484 ASSAY OF TROPONIN QUANT: CPT

## 2022-12-04 PROCEDURE — C1725 CATH, TRANSLUMIN NON-LASER: HCPCS

## 2022-12-04 PROCEDURE — 96361 HYDRATE IV INFUSION ADD-ON: CPT

## 2022-12-04 PROCEDURE — 93460 R&L HRT ART/VENTRICLE ANGIO: CPT

## 2022-12-04 PROCEDURE — 85025 COMPLETE CBC W/AUTO DIFF WBC: CPT

## 2022-12-04 PROCEDURE — 2709999900 HC NON-CHARGEABLE SUPPLY

## 2022-12-04 PROCEDURE — 02HQ32Z INSERTION OF MONITORING DEVICE INTO RIGHT PULMONARY ARTERY, PERCUTANEOUS APPROACH: ICD-10-PCS | Performed by: INTERNAL MEDICINE

## 2022-12-04 PROCEDURE — 83880 ASSAY OF NATRIURETIC PEPTIDE: CPT

## 2022-12-04 PROCEDURE — 6360000002 HC RX W HCPCS

## 2022-12-04 PROCEDURE — 82803 BLOOD GASES ANY COMBINATION: CPT

## 2022-12-04 PROCEDURE — C9606 PERC D-E COR REVASC W AMI S: HCPCS

## 2022-12-04 PROCEDURE — 4A023N8 MEASUREMENT OF CARDIAC SAMPLING AND PRESSURE, BILATERAL, PERCUTANEOUS APPROACH: ICD-10-PCS | Performed by: INTERNAL MEDICINE

## 2022-12-04 PROCEDURE — 36415 COLL VENOUS BLD VENIPUNCTURE: CPT

## 2022-12-04 PROCEDURE — C1751 CATH, INF, PER/CENT/MIDLINE: HCPCS

## 2022-12-04 PROCEDURE — 027034Z DILATION OF CORONARY ARTERY, ONE ARTERY WITH DRUG-ELUTING INTRALUMINAL DEVICE, PERCUTANEOUS APPROACH: ICD-10-PCS | Performed by: INTERNAL MEDICINE

## 2022-12-04 PROCEDURE — 2580000003 HC RX 258: Performed by: INTERNAL MEDICINE

## 2022-12-04 PROCEDURE — B2111ZZ FLUOROSCOPY OF MULTIPLE CORONARY ARTERIES USING LOW OSMOLAR CONTRAST: ICD-10-PCS | Performed by: INTERNAL MEDICINE

## 2022-12-04 PROCEDURE — 6360000002 HC RX W HCPCS: Performed by: EMERGENCY MEDICINE

## 2022-12-04 PROCEDURE — 4A023N7 MEASUREMENT OF CARDIAC SAMPLING AND PRESSURE, LEFT HEART, PERCUTANEOUS APPROACH: ICD-10-PCS | Performed by: INTERNAL MEDICINE

## 2022-12-04 PROCEDURE — C1769 GUIDE WIRE: HCPCS

## 2022-12-04 PROCEDURE — 99285 EMERGENCY DEPT VISIT HI MDM: CPT

## 2022-12-04 PROCEDURE — 93005 ELECTROCARDIOGRAM TRACING: CPT | Performed by: INTERNAL MEDICINE

## 2022-12-04 PROCEDURE — 2500000003 HC RX 250 WO HCPCS

## 2022-12-04 RX ORDER — HEPARIN SODIUM 1000 [USP'U]/ML
3700 INJECTION, SOLUTION INTRAVENOUS; SUBCUTANEOUS ONCE
Status: COMPLETED | OUTPATIENT
Start: 2022-12-04 | End: 2022-12-04

## 2022-12-04 RX ORDER — ONDANSETRON 2 MG/ML
4 INJECTION INTRAMUSCULAR; INTRAVENOUS EVERY 6 HOURS PRN
Status: DISCONTINUED | OUTPATIENT
Start: 2022-12-04 | End: 2022-12-06 | Stop reason: HOSPADM

## 2022-12-04 RX ORDER — 0.9 % SODIUM CHLORIDE 0.9 %
1000 INTRAVENOUS SOLUTION INTRAVENOUS ONCE
Status: COMPLETED | OUTPATIENT
Start: 2022-12-04 | End: 2022-12-04

## 2022-12-04 RX ORDER — HEPARIN SODIUM 1000 [USP'U]/ML
60 INJECTION, SOLUTION INTRAVENOUS; SUBCUTANEOUS PRN
Status: DISCONTINUED | OUTPATIENT
Start: 2022-12-04 | End: 2022-12-04

## 2022-12-04 RX ORDER — EPTIFIBATIDE 0.75 MG/ML
1 INJECTION, SOLUTION INTRAVENOUS CONTINUOUS
Status: ACTIVE | OUTPATIENT
Start: 2022-12-04 | End: 2022-12-04

## 2022-12-04 RX ORDER — ASPIRIN 81 MG/1
81 TABLET, CHEWABLE ORAL DAILY
Status: DISCONTINUED | OUTPATIENT
Start: 2022-12-05 | End: 2022-12-06 | Stop reason: HOSPADM

## 2022-12-04 RX ORDER — ALBUTEROL SULFATE 90 UG/1
2 AEROSOL, METERED RESPIRATORY (INHALATION) EVERY 6 HOURS PRN
Status: DISCONTINUED | OUTPATIENT
Start: 2022-12-04 | End: 2022-12-06 | Stop reason: HOSPADM

## 2022-12-04 RX ORDER — HEPARIN SODIUM 1000 [USP'U]/ML
30 INJECTION, SOLUTION INTRAVENOUS; SUBCUTANEOUS PRN
Status: DISCONTINUED | OUTPATIENT
Start: 2022-12-04 | End: 2022-12-04

## 2022-12-04 RX ORDER — SODIUM CHLORIDE 0.9 % (FLUSH) 0.9 %
5-40 SYRINGE (ML) INJECTION EVERY 12 HOURS SCHEDULED
Status: DISCONTINUED | OUTPATIENT
Start: 2022-12-04 | End: 2022-12-06 | Stop reason: HOSPADM

## 2022-12-04 RX ORDER — HEPARIN SODIUM 10000 [USP'U]/100ML
0-4000 INJECTION, SOLUTION INTRAVENOUS CONTINUOUS
Status: DISCONTINUED | OUTPATIENT
Start: 2022-12-04 | End: 2022-12-05

## 2022-12-04 RX ORDER — SODIUM CHLORIDE 9 MG/ML
INJECTION, SOLUTION INTRAVENOUS PRN
Status: DISCONTINUED | OUTPATIENT
Start: 2022-12-04 | End: 2022-12-06 | Stop reason: HOSPADM

## 2022-12-04 RX ORDER — SODIUM CHLORIDE 0.9 % (FLUSH) 0.9 %
5-40 SYRINGE (ML) INJECTION PRN
Status: DISCONTINUED | OUTPATIENT
Start: 2022-12-04 | End: 2022-12-06 | Stop reason: HOSPADM

## 2022-12-04 RX ORDER — ATROPINE SULFATE 0.1 MG/ML
INJECTION INTRAVENOUS
Status: DISCONTINUED
Start: 2022-12-04 | End: 2022-12-05 | Stop reason: WASHOUT

## 2022-12-04 RX ORDER — PANTOPRAZOLE SODIUM 40 MG/1
40 TABLET, DELAYED RELEASE ORAL
Status: DISCONTINUED | OUTPATIENT
Start: 2022-12-05 | End: 2022-12-06 | Stop reason: HOSPADM

## 2022-12-04 RX ORDER — ALPRAZOLAM 0.5 MG/1
1 TABLET ORAL NIGHTLY PRN
Status: DISCONTINUED | OUTPATIENT
Start: 2022-12-04 | End: 2022-12-06 | Stop reason: HOSPADM

## 2022-12-04 RX ORDER — ACETAMINOPHEN 325 MG/1
650 TABLET ORAL EVERY 4 HOURS PRN
Status: DISCONTINUED | OUTPATIENT
Start: 2022-12-04 | End: 2022-12-06 | Stop reason: HOSPADM

## 2022-12-04 RX ORDER — ROSUVASTATIN CALCIUM 40 MG/1
40 TABLET, COATED ORAL NIGHTLY
Status: DISCONTINUED | OUTPATIENT
Start: 2022-12-04 | End: 2022-12-06 | Stop reason: HOSPADM

## 2022-12-04 RX ORDER — EPTIFIBATIDE 0.75 MG/ML
2 INJECTION, SOLUTION INTRAVENOUS CONTINUOUS
Status: DISCONTINUED | OUTPATIENT
Start: 2022-12-04 | End: 2022-12-04

## 2022-12-04 RX ORDER — CLOPIDOGREL BISULFATE 75 MG/1
75 TABLET ORAL DAILY
Status: DISCONTINUED | OUTPATIENT
Start: 2022-12-05 | End: 2022-12-06 | Stop reason: HOSPADM

## 2022-12-04 RX ORDER — ATORVASTATIN CALCIUM 40 MG/1
80 TABLET, FILM COATED ORAL NIGHTLY
Status: DISCONTINUED | OUTPATIENT
Start: 2022-12-04 | End: 2022-12-04 | Stop reason: ALTCHOICE

## 2022-12-04 RX ORDER — ROPINIROLE 1 MG/1
1 TABLET, FILM COATED ORAL NIGHTLY
Status: DISCONTINUED | OUTPATIENT
Start: 2022-12-04 | End: 2022-12-06 | Stop reason: HOSPADM

## 2022-12-04 RX ADMIN — Medication 10 ML: at 22:22

## 2022-12-04 RX ADMIN — IOPAMIDOL 65 ML: 755 INJECTION, SOLUTION INTRAVENOUS at 21:38

## 2022-12-04 RX ADMIN — ROSUVASTATIN CALCIUM 40 MG: 40 TABLET, FILM COATED ORAL at 22:22

## 2022-12-04 RX ADMIN — ROPINIROLE HYDROCHLORIDE 1 MG: 1 TABLET, FILM COATED ORAL at 22:22

## 2022-12-04 RX ADMIN — HEPARIN SODIUM 3700 UNITS: 1000 INJECTION INTRAVENOUS; SUBCUTANEOUS at 20:50

## 2022-12-04 RX ADMIN — SODIUM CHLORIDE 1000 ML: 9 INJECTION, SOLUTION INTRAVENOUS at 20:29

## 2022-12-04 ASSESSMENT — ENCOUNTER SYMPTOMS
ABDOMINAL PAIN: 0
SHORTNESS OF BREATH: 1

## 2022-12-04 ASSESSMENT — LIFESTYLE VARIABLES: HOW OFTEN DO YOU HAVE A DRINK CONTAINING ALCOHOL: NEVER

## 2022-12-05 LAB
ANION GAP SERPL CALCULATED.3IONS-SCNC: 14 MMOL/L (ref 3–16)
APTT: 28.6 SEC (ref 23–34.3)
BUN BLDV-MCNC: 30 MG/DL (ref 7–20)
CALCIUM SERPL-MCNC: 9.2 MG/DL (ref 8.3–10.6)
CHLORIDE BLD-SCNC: 105 MMOL/L (ref 99–110)
CO2: 21 MMOL/L (ref 21–32)
CREAT SERPL-MCNC: 1.6 MG/DL (ref 0.6–1.2)
EKG ATRIAL RATE: 52 BPM
EKG ATRIAL RATE: 81 BPM
EKG DIAGNOSIS: NORMAL
EKG DIAGNOSIS: NORMAL
EKG P AXIS: 34 DEGREES
EKG P AXIS: 55 DEGREES
EKG P-R INTERVAL: 132 MS
EKG P-R INTERVAL: 144 MS
EKG Q-T INTERVAL: 402 MS
EKG Q-T INTERVAL: 482 MS
EKG QRS DURATION: 72 MS
EKG QRS DURATION: 82 MS
EKG QTC CALCULATION (BAZETT): 448 MS
EKG QTC CALCULATION (BAZETT): 466 MS
EKG R AXIS: 24 DEGREES
EKG R AXIS: 52 DEGREES
EKG T AXIS: 85 DEGREES
EKG T AXIS: 91 DEGREES
EKG VENTRICULAR RATE: 52 BPM
EKG VENTRICULAR RATE: 81 BPM
GFR SERPL CREATININE-BSD FRML MDRD: 34 ML/MIN/{1.73_M2}
GLUCOSE BLD-MCNC: 136 MG/DL (ref 70–99)
HCT VFR BLD CALC: 39.8 % (ref 36–48)
HEMOGLOBIN: 12.6 G/DL (ref 12–16)
MCH RBC QN AUTO: 28.8 PG (ref 26–34)
MCHC RBC AUTO-ENTMCNC: 31.7 G/DL (ref 31–36)
MCV RBC AUTO: 90.7 FL (ref 80–100)
PDW BLD-RTO: 14 % (ref 12.4–15.4)
PLATELET # BLD: 302 K/UL (ref 135–450)
PMV BLD AUTO: 8.4 FL (ref 5–10.5)
POC ACT LR: 155 SEC
POC ACT LR: 173 SEC
POC ACT LR: 177 SEC
POC ACT LR: 207 SEC
POC ACT LR: 240 SEC
POC ACT LR: 284 SEC
POTASSIUM SERPL-SCNC: 4.4 MMOL/L (ref 3.5–5.1)
RBC # BLD: 4.39 M/UL (ref 4–5.2)
SODIUM BLD-SCNC: 140 MMOL/L (ref 136–145)
TROPONIN: 6.54 NG/ML
WBC # BLD: 7.5 K/UL (ref 4–11)

## 2022-12-05 PROCEDURE — 6370000000 HC RX 637 (ALT 250 FOR IP): Performed by: INTERNAL MEDICINE

## 2022-12-05 PROCEDURE — 80048 BASIC METABOLIC PNL TOTAL CA: CPT

## 2022-12-05 PROCEDURE — 2100000000 HC CCU R&B

## 2022-12-05 PROCEDURE — 94640 AIRWAY INHALATION TREATMENT: CPT

## 2022-12-05 PROCEDURE — 6360000002 HC RX W HCPCS

## 2022-12-05 PROCEDURE — 36415 COLL VENOUS BLD VENIPUNCTURE: CPT

## 2022-12-05 PROCEDURE — 6360000002 HC RX W HCPCS: Performed by: INTERNAL MEDICINE

## 2022-12-05 PROCEDURE — 84484 ASSAY OF TROPONIN QUANT: CPT

## 2022-12-05 PROCEDURE — 85027 COMPLETE CBC AUTOMATED: CPT

## 2022-12-05 PROCEDURE — 2580000003 HC RX 258: Performed by: INTERNAL MEDICINE

## 2022-12-05 PROCEDURE — 93010 ELECTROCARDIOGRAM REPORT: CPT | Performed by: INTERNAL MEDICINE

## 2022-12-05 PROCEDURE — 99233 SBSQ HOSP IP/OBS HIGH 50: CPT | Performed by: INTERNAL MEDICINE

## 2022-12-05 PROCEDURE — C8929 TTE W OR WO FOL WCON,DOPPLER: HCPCS

## 2022-12-05 PROCEDURE — 93005 ELECTROCARDIOGRAM TRACING: CPT | Performed by: INTERNAL MEDICINE

## 2022-12-05 PROCEDURE — 94760 N-INVAS EAR/PLS OXIMETRY 1: CPT

## 2022-12-05 PROCEDURE — 6360000004 HC RX CONTRAST MEDICATION

## 2022-12-05 PROCEDURE — 85730 THROMBOPLASTIN TIME PARTIAL: CPT

## 2022-12-05 PROCEDURE — 85347 COAGULATION TIME ACTIVATED: CPT

## 2022-12-05 RX ORDER — CARVEDILOL 6.25 MG/1
6.25 TABLET ORAL 2 TIMES DAILY WITH MEALS
Status: DISCONTINUED | OUTPATIENT
Start: 2022-12-05 | End: 2022-12-06 | Stop reason: HOSPADM

## 2022-12-05 RX ORDER — MORPHINE SULFATE 2 MG/ML
INJECTION, SOLUTION INTRAMUSCULAR; INTRAVENOUS
Status: COMPLETED
Start: 2022-12-05 | End: 2022-12-05

## 2022-12-05 RX ORDER — MORPHINE SULFATE 2 MG/ML
2 INJECTION, SOLUTION INTRAMUSCULAR; INTRAVENOUS EVERY 4 HOURS PRN
Status: DISCONTINUED | OUTPATIENT
Start: 2022-12-05 | End: 2022-12-06 | Stop reason: HOSPADM

## 2022-12-05 RX ORDER — NICOTINE 21 MG/24HR
1 PATCH, TRANSDERMAL 24 HOURS TRANSDERMAL DAILY
Status: DISCONTINUED | OUTPATIENT
Start: 2022-12-05 | End: 2022-12-06 | Stop reason: HOSPADM

## 2022-12-05 RX ORDER — NITROGLYCERIN 0.4 MG/1
0.4 TABLET SUBLINGUAL EVERY 5 MIN PRN
Status: DISCONTINUED | OUTPATIENT
Start: 2022-12-05 | End: 2022-12-06 | Stop reason: HOSPADM

## 2022-12-05 RX ORDER — ISOSORBIDE MONONITRATE 30 MG/1
30 TABLET, EXTENDED RELEASE ORAL DAILY
Status: DISCONTINUED | OUTPATIENT
Start: 2022-12-05 | End: 2022-12-06 | Stop reason: HOSPADM

## 2022-12-05 RX ADMIN — ROPINIROLE HYDROCHLORIDE 1 MG: 1 TABLET, FILM COATED ORAL at 20:12

## 2022-12-05 RX ADMIN — PERFLUTREN: 6.52 INJECTION, SUSPENSION INTRAVENOUS at 12:15

## 2022-12-05 RX ADMIN — ACETAMINOPHEN 650 MG: 325 TABLET ORAL at 21:07

## 2022-12-05 RX ADMIN — PANTOPRAZOLE SODIUM 40 MG: 40 TABLET, DELAYED RELEASE ORAL at 09:29

## 2022-12-05 RX ADMIN — CLOPIDOGREL BISULFATE 75 MG: 75 TABLET ORAL at 09:30

## 2022-12-05 RX ADMIN — ONDANSETRON 4 MG: 2 INJECTION INTRAMUSCULAR; INTRAVENOUS at 21:14

## 2022-12-05 RX ADMIN — BUSPIRONE HYDROCHLORIDE 15 MG: 5 TABLET ORAL at 09:30

## 2022-12-05 RX ADMIN — ACETAMINOPHEN 650 MG: 325 TABLET ORAL at 04:45

## 2022-12-05 RX ADMIN — NITROGLYCERIN 0.4 MG: 0.4 TABLET, ORALLY DISINTEGRATING SUBLINGUAL at 10:02

## 2022-12-05 RX ADMIN — Medication 10 ML: at 09:32

## 2022-12-05 RX ADMIN — Medication 10 ML: at 20:12

## 2022-12-05 RX ADMIN — CARVEDILOL 6.25 MG: 6.25 TABLET, FILM COATED ORAL at 12:13

## 2022-12-05 RX ADMIN — MORPHINE SULFATE 2 MG: 2 INJECTION, SOLUTION INTRAMUSCULAR; INTRAVENOUS at 15:10

## 2022-12-05 RX ADMIN — LIDOCAINE HYDROCHLORIDE: 20 SOLUTION ORAL; TOPICAL at 09:31

## 2022-12-05 RX ADMIN — ALPRAZOLAM 1 MG: 0.5 TABLET ORAL at 23:28

## 2022-12-05 RX ADMIN — ASPIRIN 81 MG 81 MG: 81 TABLET ORAL at 09:29

## 2022-12-05 RX ADMIN — TIOTROPIUM BROMIDE AND OLODATEROL 2 PUFF: 3.124; 2.736 SPRAY, METERED RESPIRATORY (INHALATION) at 07:45

## 2022-12-05 RX ADMIN — MORPHINE SULFATE 2 MG: 2 INJECTION, SOLUTION INTRAMUSCULAR; INTRAVENOUS at 10:40

## 2022-12-05 RX ADMIN — ROSUVASTATIN CALCIUM 40 MG: 40 TABLET, FILM COATED ORAL at 20:12

## 2022-12-05 RX ADMIN — CARVEDILOL 6.25 MG: 6.25 TABLET, FILM COATED ORAL at 18:50

## 2022-12-05 RX ADMIN — ALPRAZOLAM 1 MG: 0.5 TABLET ORAL at 00:37

## 2022-12-05 RX ADMIN — PANTOPRAZOLE SODIUM 40 MG: 40 TABLET, DELAYED RELEASE ORAL at 18:50

## 2022-12-05 RX ADMIN — ISOSORBIDE MONONITRATE 30 MG: 30 TABLET, EXTENDED RELEASE ORAL at 12:13

## 2022-12-05 RX ADMIN — NITROGLYCERIN 0.4 MG: 0.4 TABLET, ORALLY DISINTEGRATING SUBLINGUAL at 09:55

## 2022-12-05 ASSESSMENT — PAIN SCALES - GENERAL
PAINLEVEL_OUTOF10: 8
PAINLEVEL_OUTOF10: 8
PAINLEVEL_OUTOF10: 2
PAINLEVEL_OUTOF10: 8
PAINLEVEL_OUTOF10: 5
PAINLEVEL_OUTOF10: 8
PAINLEVEL_OUTOF10: 2
PAINLEVEL_OUTOF10: 3

## 2022-12-05 ASSESSMENT — PAIN DESCRIPTION - ORIENTATION
ORIENTATION: MID
ORIENTATION: MID
ORIENTATION: OTHER (COMMENT)

## 2022-12-05 ASSESSMENT — PAIN DESCRIPTION - DESCRIPTORS
DESCRIPTORS: ACHING;SORE
DESCRIPTORS: ACHING;SORE
DESCRIPTORS: SHARP;ACHING
DESCRIPTORS: ACHING
DESCRIPTORS: ACHING;SHARP

## 2022-12-05 ASSESSMENT — PAIN - FUNCTIONAL ASSESSMENT
PAIN_FUNCTIONAL_ASSESSMENT: ACTIVITIES ARE NOT PREVENTED
PAIN_FUNCTIONAL_ASSESSMENT: PREVENTS OR INTERFERES SOME ACTIVE ACTIVITIES AND ADLS
PAIN_FUNCTIONAL_ASSESSMENT: ACTIVITIES ARE NOT PREVENTED
PAIN_FUNCTIONAL_ASSESSMENT: PREVENTS OR INTERFERES SOME ACTIVE ACTIVITIES AND ADLS

## 2022-12-05 ASSESSMENT — PAIN DESCRIPTION - LOCATION
LOCATION: CHEST
LOCATION: CHEST
LOCATION: BACK
LOCATION: CHEST
LOCATION: HEAD
LOCATION: CHEST

## 2022-12-05 NOTE — PROGRESS NOTES
Late entry R/t patient care    Upon morning assessment Pt was continuing to have some CP that would come and go. She seems to have had this during the night as well but did not express this to the night RN. She does have a hx of reflux and did need to lay flat r/t swan in groin. Discussed with Yobany Brownlee. GI cocktail, the nitro SL if pain remains. GI Cocktail given without relieft,  Nitro x 3 given with no improvement. ECG obtained and reviewed by Dr Yobany Brownlee. He felt the ECG was greatly improved.   Coreg and Imdur started as well as morphine PRN for pain

## 2022-12-05 NOTE — PROGRESS NOTES
Pt admitted to  from cath lab. Alert and oriented. VSS as charted. Pt with 1 JAYME to RCA. Venous and arterial sheaths left in place, swan floated through venous sheath. Order per Dr. Helene Dale to remove arterial sheath when ACT<170. Site CD&I. Slight hardness around site which has been present in cath lab per cath lab RN report. Palpable pulses noted in all extremities. Son Jillian Borden brought to bedside. All questions answered.

## 2022-12-05 NOTE — PROCEDURES
[address] [place] [address]                            CARDIAC CATHETERIZATION    PATIENT NAME: Doris Hernandez                     :        1950  MED REC NO:   [id number]                          ROOM:       3417  ACCOUNT NO:   [de-identified]                           ADMIT DATE: 2022  PROVIDER:     Thu Fernandez MD    DATE OF PROCEDURE:  2022    PROCEDURES PERFORMED:  1. Left heart catheterization. 2.  Percutaneous coronary intervention of right coronary with insertion  of one stent 3.0 x 26 mm drug-coated in the distal RCA, getting a  diameter of 3.3 mm.  3.  Insertion of balloon-tipped Purdum-Matilda catheter in the pulmonary  artery. INDICATIONS:  Acute inferior wall ST-elevation myocardial infarction  involving the right coronary artery. Informed consent obtained. ASA is III. Mallampati score is II. No complications. Estimated blood loss less than 30 mL. PROCEDURE IN DETAIL:  The right groin was prepped and draped in sterile  fashion. We accessed right common femoral artery and common femoral  vein. Inserted 6 and 7-Amharic sheaths respectively. JR4 guide catheter  engaged in the right coronary artery. We performed angioplasty. We  crossed the lesion with a guidewire. Balloon angioplasty performed with  2.5-mm balloon catheter. Stent placed 3.0 x 26 mm post-dilating up to  3.3 mm. JL4 engaged in left main. We performed angiography of the left system. End-diastolic pressure recorded after crossing the aortic valve with a  JR4 catheter. Balloon-tipped Purdum-Matilda catheter placed in the right pulmonary artery  from the right common femoral vein access. ANGIOGRAPHY FINDINGS:  1. Right coronary had distal 99.99% stenosis with GARO 1 flow. 2.  Left main normal.  3.  Left anterior descending artery is patent. no significant stenosis  present.   4.  Left circumflex is patent without significant stenosis of the _____  dominant system. 5.  LVEDP is 21%, LV pressure is 110/70. 6.  Pulmonary capillary wedge pressure 21 mmHg. 7.  Pulmonary artery pressure 42/19. SUMMARY:  Successful revascularization of the distal right coronary  artery with placement of one drug-coated stent 3.0 x 26 mm post-dilating  up to 3.3 mm. RECOMMENDATIONS:  1. Continue postop post cath care. 2.  Site care. 3.  Risk factor modification. 4.  Dual antiplatelet therapy. 5.  Echocardiogram tomorrow. 6.  The patient will follow up with me in office.         Jazmine Barahona MD    D: 12/04/2022 21:39:48       T: 12/05/2022 0:04:14     [id number]  Job#: [id number]     Doc#: [id number]    CC:

## 2022-12-05 NOTE — PLAN OF CARE
Problem: Safety - Adult  Goal: Free from fall injury  12/5/2022 1131 by Julianne Blanchard RN  Outcome: Progressing  12/5/2022 0503 by Esther Ravi RN  Outcome: Progressing  Flowsheets (Taken 12/5/2022 0503)  Free From Fall Injury: Instruct family/caregiver on patient safety     Problem: ABCDS Injury Assessment  Goal: Absence of physical injury  12/5/2022 1131 by Julianne Blanchard RN  Outcome: Progressing  12/5/2022 0503 by Esther Ravi RN  Outcome: Progressing  Flowsheets (Taken 12/5/2022 0503)  Absence of Physical Injury: Implement safety measures based on patient assessment     Problem: Skin/Tissue Integrity  Goal: Absence of new skin breakdown  Description: 1. Monitor for areas of redness and/or skin breakdown  2. Assess vascular access sites hourly  3. Every 4-6 hours minimum:  Change oxygen saturation probe site  4. Every 4-6 hours:  If on nasal continuous positive airway pressure, respiratory therapy assess nares and determine need for appliance change or resting period.   12/5/2022 1131 by Julianne Blanchard RN  Outcome: Progressing  12/5/2022 0503 by Esther Ravi RN  Outcome: Progressing

## 2022-12-05 NOTE — ED TRIAGE NOTES
Patient presents to ED via [facility] for c/o chest pain that started approximately 30 minutes PTA. Patient took 2 nitro at home prior to EMS arrival and was given 324 ASA per EMS. . Patient noted with cardiac hx. Patient states 10/10 chest pain. A&O upon arrival. Code STEMI activated.

## 2022-12-05 NOTE — PROGRESS NOTES
4 Eyes Skin Assessment     NAME:  Karen Jenkins  YOB: 1950  MEDICAL RECORD NUMBER:  [id number]    The patient is being assessed for  Shift Handoff    I agree that One RN have performed a thorough Head to Toe Skin Assessment on the patient. ALL assessment sites listed below have been assessed. Areas assessed by both nurses:    Head, Face, Ears, Shoulders, Back, Chest, Arms, Elbows, Hands, Sacrum. Buttock, Coccyx, Ischium, and Legs. Feet and Heels        Does the Patient have a Wound?  No noted wound(s)       Hakeem Prevention initiated by RN: Yes   Wound Care Orders initiated by RN: NA    Pressure Injury (Stage 3,4, Unstageable, DTI, NWPT, and Complex wounds) if present place referral order by RN under : NA    New and Established Ostomies, if present place, referral order under : NA      Nurse 1 eSignature: Electronically signed by Mara Amaya RN on 12/5/22 at 5:17 AM EST    **SHARE this note so that the co-signing nurse is able to place an eSignature**    Nurse 2 eSignature: Electronically signed by Diego Preciado RN on 12/5/22 at 7:30 A M EST

## 2022-12-05 NOTE — PROGRESS NOTES
4 Eyes Skin Assessment     NAME:  Rachel Slade  YOB: 1950  MEDICAL RECORD NUMBER:  [id number]    The patient is being assessed for  Admission    I agree that One RN have performed a thorough Head to Toe Skin Assessment on the patient. ALL assessment sites listed below have been assessed. Areas assessed by both nurses:    Head, Face, Ears, Shoulders, Back, Chest, Arms, Elbows, Hands, Sacrum. Buttock, Coccyx, Ischium, and Legs. Feet and Heels        Does the Patient have a Wound?  No noted wound(s)       Hakeem Prevention initiated by RN: NA   Wound Care Orders initiated by RN: NA    Pressure Injury (Stage 3,4, Unstageable, DTI, NWPT, and Complex wounds) if present place referral order by RN under : NA    New and Established Ostomies, if present place, referral order under : NA      Nurse 1 eSignature: Electronically signed by Maria Esther Culver RN on 12/4/22 at 10:40 PM EST    **SHARE this note so that the co-signing nurse is able to place an eSignature**    Nurse 2 eSignature: Electronically signed by Danni Bustamante RN on 12/4/22 at 10:44 PM EST

## 2022-12-05 NOTE — CONSULTS
[address]  CARDIOPULMONARY PHASE I CONSULT        NAME:  Rosina [facility] RECORD NUMBER:  [id number]  AGE: 81 y.o.    GENDER: female  : 1950  TODAY'S DATE:  2022    Subjective:     VISIT TYPE: evaluation     ADMITTING PHYSICIAN:  Ilana Daniel MD     PAST MEDICAL HISTORY        Diagnosis Date    acute intermittent porphyria     sees Vinnie Bauer, mom had it too    agoraphobic     ongoing, prefers to be at home    Allergic rhinitis     Anxiety     Asthma     CAD (coronary artery disease)     historyMI     ckd 4     from HTN,  sees Trini Pagan    collagenous colitis     Ayoub    Depression 2013    eversince      GERD (gastroesophageal reflux disease)     Hyperlipidemia     Hypertension 2016    Left thyroid nodule     9mm, gets rechecked yearly on ct lung    Migraines     gets 4x per month    post menopausal     Prediabetes     Pulmonary nodules     yearly ct lung followed by Dr Vinnie Bauer    Restless leg syndrome     Urinary incontinence        SOCIAL HISTORY    Social History     Tobacco Use    Smoking status: Some Days     Packs/day: 1.00     Years: 50.00     Pack years: 50.00     Types: Cigarettes     Start date: 1968    Smokeless tobacco: Never    Tobacco comments:     Now smokes half ppd   Vaping Use    Vaping Use: Never used   Substance Use Topics    Alcohol use: No     Alcohol/week: 0.0 standard drinks    Drug use: No       ALLERGIES    Allergies   Allergen Reactions    Wellbutrin [Bupropion]      Dry mouth    Adhesive Tape Rash    Iodine Itching and Nausea And Vomiting    Sertraline Other (See Comments)     Severe dry mouth    Sulfa Antibiotics Itching and Nausea Only       MEDICATIONS  Scheduled Meds:   nicotine  1 patch TransDERmal Daily    clopidogrel  75 mg Oral Daily    pantoprazole  40 mg Oral BID AC    busPIRone  15 mg Oral Daily    rOPINIRole  1 mg Oral Nightly    rosuvastatin  40 mg Oral Nightly    sodium chloride flush  5-40 mL IntraVENous 2 times per day    aspirin  81 mg Oral Daily    tiotropium-olodaterol  2 puff Inhalation Daily       ADMIT DATE: 12/4/2022      Objective:     ADMISSION DIAGNOSIS:   Inferior MI ([facility] [address] [I21.19]  STEMI involving right coronary artery (HCC) [I21.11]     BP (!) 146/89   Pulse 71   Temp 97.4 °F (36.3 °C) (Oral)   Resp 15   Ht 5' 4\" (1.626 m)   Wt 129 lb 3 oz (58.6 kg)   SpO2 99%   BMI 22.18 kg/m²     ADMIT:  Weight: 135 lb 5.8 oz (61.4 kg)    TODAY: Weight: 129 lb 3 oz (58.6 kg)    Wt Readings from Last 3 Encounters:   12/05/22 129 lb 3 oz (58.6 kg)   11/09/22 131 lb (59.4 kg)   11/09/22 131 lb 3.2 oz (59.5 kg)        ECHOCARDIOGRAM: Pending     HgBA1c:  No components found for: HGBA1C  LIPID PANEL:    Lab Results   Component Value Date/Time    CHOL 163 03/25/2022 09:33 AM    HDL 59 03/25/2022 09:33 AM    TRIG 135 03/25/2022 09:33 AM        Assessment:     CONSULTS:   IP CONSULT TO CARDIAC REHAB  IP CONSULT TO CARDIAC REHAB    Patient has a CARDIOLOGY CONSULT: Yes        EDUCATION STATUS: Patient   [x]  Provided both written and verbal education on Cardiopulmonary Rehabilitation. [x]  Provided instructions for smoking cessation programs. [x]  Provided education of CAD risk factors. [x]  Provided recommendations on activity and exercise. []  Provided education on medications. []  Provided education on coronary anatomy and coronary interventions. [x]  Other:Patient continues to smoke \"off and on\" but is interested in smoking cessation. Brief counseling provided and informed of counseling options within the rehab program     CURRENT DIET: ADULT DIET; Regular; Low Fat/Low Chol/High Fiber/2 gm Na    EDUCATIONAL PACKETS PROVIDED- PRINTED FROM [employer].     Titles and material given:  Yes   [x]  Managing Heart Disease and Preventing Stroke  []  Heart Owner's Manual  []  Living Well with Heart Failure  []  Other:     PATIENT/CAREGIVER TEACHING: patient    Level of patient/caregiver understanding able to: [x] Verbalize understanding   [] Demonstrate understanding       [] Teach back        [] Needs reinforcement     []  Other:       TEACHING TIME:  10 minutes       Plan:       DISCHARGE PLAN:  Placement for patient upon discharge: home with support   Hospice Care:  no  Code Status: Full Code  Discharge appointment scheduled: Yes     RECOMMENDATIONS:   [x]  Patient/Family instructed to call Cardiopulmonary Rehab ([phone]) upon discharge schedule the initial evaluation  [x]  Encourage to call Cardiopulmonary Rehabilitation with any questions. []  Referral to alternate Cardiopulmonary Rehabilitation facility.    [x]  Other:  Patient expressed interest in the program.   [] Appointment scheduled for   [x] Chooses to not schedule at this time          Electronically signed by Salvador Del Cid RN,MS on 12/5/2022 at 10:29 AM

## 2022-12-05 NOTE — PROGRESS NOTES
[address]   Daily Progress Note      Admit Date:  12/4/2022      Subjective:   Ms. Fortino Berry is a 71yo female with PAD and CAD who presented to [facility] with chest pain and an acute inferior STEMI. She underwent PCI by Dr. Justin Laurent to a 99% distal RCA lesion restoring GARO 3 flow. Her prior LHC on 10/21/13 resulted in PCI with BMS to mid RCA lesion. A peripheral angiogram was completed on 5/30/14 revealing 50% bilateral SFA stenoses. I saw her in the office on 06/17/22. She had anginal symptoms then and I ordered a stress perfusion study which she never completed. Interval History:  Daya Lockhart is doing okay but does have some persistent left chest pain. She has tenderness to palpation there as well. Sinus rhythm on telemetry.          Objective:     BP (!) 146/89   Pulse 71   Temp 97.4 °F (36.3 °C) (Oral)   Resp 15   Ht 5' 4\" (1.626 m)   Wt 129 lb 3 oz (58.6 kg)   SpO2 99%   BMI 22.18 kg/m²      Intake/Output Summary (Last 24 hours) at 12/5/2022 0849  Last data filed at 12/5/2022 1501  Gross per 24 hour   Intake 240 ml   Output 475 ml   Net -235 ml       Physical Exam:  General:  Awake, alert, NAD  Skin:  Warm and dry  Neck:  JVD<8, no carotid bruits  Chest:  Clear to auscultation, no wheezes/rhonchi/rales  Cardiovascular:  RRR, normal S1/S2, no M/R/G  Abdomen:  Soft, nontender, +bowel sounds  Extremities:  No edema  Pulses: 2+ bilat carotid    2+ bilat radial    2+ bilat femoral        Medications:    clopidogrel  75 mg Oral Daily    pantoprazole  40 mg Oral BID AC    busPIRone  15 mg Oral Daily    rOPINIRole  1 mg Oral Nightly    rosuvastatin  40 mg Oral Nightly    sodium chloride flush  5-40 mL IntraVENous 2 times per day    aspirin  81 mg Oral Daily    tiotropium-olodaterol  2 puff Inhalation Daily      heparin (PORCINE) Infusion      sodium chloride         Lab Data:  CBC:   Recent Labs     12/04/22 2027 12/05/22  0356   WBC 9.2 7.5   HGB 12.5 12.6    302     BMP:    Recent Labs 12/04/22 2027 12/05/22  0355    140   K 4.5 4.4   CO2 20* 21   BUN 39* 30*   CREATININE 1.6* 1.6*     LIVR: No results for input(s): AST, ALT in the last 72 hours. PT/INR: No results for input(s): PROT, INR in the last 72 hours. APTT:   Recent Labs     12/04/22 2045 12/05/22  0356   APTT 27.2 28.6     BNP:  No results for input(s): BNP in the last 72 hours. CARDIAC ENZYMES:  Recent Labs     12/04/22 2027   TROPONINI <0.01     FASTING LIPID PANEL:  Lab Results   Component Value Date/Time    CHOL 163 03/25/2022 09:33 AM    HDL 59 03/25/2022 09:33 AM    TRIG 135 03/25/2022 09:33 AM           Left and Right Heart Cath PCI 12/4/22:  1. Right coronary had distal 99.99% stenosis with GARO 1 flow. 2.  Left main normal.  3.  Left anterior descending artery is patent. no significant stenosis  present. 4.  Left circumflex is patent without significant stenosis of the  dominant system. 5.  LVEDP is 21%, LV pressure is 110/70. 6.  Pulmonary capillary wedge pressure 21 mmHg. 7.  Pulmonary artery pressure 42/19. SUMMARY:  Successful revascularization of the distal right coronary  artery with placement of one drug-coated stent 3.0 x 26 mm post-dilating  up to 3.3 mm. Echo 10/21/13  Ejection fraction is visually estimated to be 60 %. trace mitral regurgitation is present. Normal right ventricular size and function. There is trivial tricuspid regurgitation with RVSP estimated at 39 mmHg. Stress perfusion 6/30/20  Normal myocardial perfusion study. Normal LV size and systolic function. ECG portion of the stress test is normal.     Overall findings represent a low risk study. Lower extremity arterial study 6/30/20   No significant evidence of large vessel arterial occlusive disease of the     lower extremities.      Normal doppler waveforms, plethysmography, and segmental pressures     Normal SOULEYMANE and Toe/Brachial Index bilaterally       Renal arterial duplex 3/17/22  Right Impression   There is no evidence to suggest renal artery stenosis. The right renal vein is patent. The parenchymal resistive index is within normal limits. Left Impression   There is no evidence to suggest renal artery stenosis. The left renal vein is patent. The parenchymal resistive index is within normal limits. Unilateral ImpressionNo evidence of abdominal aortic aneurysmal dilation. Mild   to moderate atherosclerotic plaque throughout. Echo 12/5/22:  Normal left ventricular size, wall thickness and systolic function. Ejection fraction is visually estimated to be 60-65 %. Indeterminate diastolic function. The right ventricle is normal in size and function. The left atrium is mildly dilated. There are no significant valvular abnormalities appreciated in this study. Assessment / Plan: 1. Acute Inferior myocardial Infarction  Reina Boone is doing better but still has persistent chest pain. I think this is musculoskeletal in nature but we will monitor it. Her ECG actually look s much better and Dr. Mita Gallegos achieved a good results with his PCI. Restart beta blocker therapy with carvedilol. Continue high dose statin therapy. 2.PAD  Reina Boone has some significant right common femoral stenosis. We will obtain a Duplex U/S of the right leg to assess this. 3.Chest Pain, atypical  This seems musculoskeletal in nature. She has reproducible chest wall pain to palpation.   Her ECG is improved and her LV function and wall motion on the echocardiogram are normal.          Signed:  Alyssa Iyer MD

## 2022-12-05 NOTE — PLAN OF CARE
Problem: Safety - Adult  Goal: Free from fall injury  Outcome: Progressing  Flowsheets (Taken 12/5/2022 0503)  Free From Fall Injury: Instruct family/caregiver on patient safety     Problem: ABCDS Injury Assessment  Goal: Absence of physical injury  Outcome: Progressing  Flowsheets (Taken 12/5/2022 0503)  Absence of Physical Injury: Implement safety measures based on patient assessment     Problem: Skin/Tissue Integrity  Goal: Absence of new skin breakdown  Description: 1. Monitor for areas of redness and/or skin breakdown  2. Assess vascular access sites hourly  3. Every 4-6 hours minimum:  Change oxygen saturation probe site  4. Every 4-6 hours:  If on nasal continuous positive airway pressure, respiratory therapy assess nares and determine need for appliance change or resting period.   Outcome: Progressing

## 2022-12-05 NOTE — PROGRESS NOTES
ACT drawn and resulted 155. Patient instructed on removal procedure. Arterial sheath site without hematoma or oozing. Arterial sheath removed per policy without difficulty. Integrity of sheath inspected upon removal and no abnormalities noted. Manual pressure held X 30 minutes. Dry, sterile tegaderm applied. Pressure dressing applied. Patient tolerated well. Vital signs, groin checks, and pedal pulses will be completed per protocol (every 15 minutes X 4, every 30 minutes X 2, and every hour X 2 per protocol).

## 2022-12-05 NOTE — ED PROVIDER NOTES
[address] [facility]      Pt Name: Radha Walker  MRN: [id number]  [place] 1950  Date of evaluation: 12/4/2022  Provider: Nehemias Olmedo MD    CHIEF COMPLAINT       Chief Complaint   Patient presents with    Chest Pain     STEMI         HISTORY OF PRESENT ILLNESS   (Location/Symptom, Timing/Onset, Context/Setting, Quality, Duration, Modifying Factors, Severity)  Note limiting factors. Radha Walker is a 81 y.o. female who presents to the emergency department chest pain and EKG concerning for inferior MI    HPI    This is a 53-year-old  female with history of hypertension smoking and possibly high cholesterol who developed an episode of chest pain about 30 minutes prior to arrival.  I was notified by this via EMS and they faxed an EKG which was highly concerning for inferior STEMI. The Cath Lab was paged prior to the patient's arrival.  On arrival here she complains of chest pain rates the pain a 6 or 7 out of 10 makes her somewhat short of breath some nausea but no diaphoresis. She is awake but mildly lethargic and looks ill she is also mildly hypotensive. Nursing Notes were reviewed. REVIEW OF SYSTEMS    (2-9 systems for level 4, 10 or more for level 5)     Review of Systems   Constitutional:  Negative for chills and fever. Respiratory:  Positive for shortness of breath. Cardiovascular:  Positive for chest pain. Negative for palpitations. Gastrointestinal:  Negative for abdominal pain. All other systems reviewed and are negative. Except as noted above the remainder of the review of systems was reviewed and negative.        PAST MEDICAL HISTORY     Past Medical History:   Diagnosis Date    acute intermittent porphyria 1989    sees Micah Apodaca, mom had it too    agoraphobic     ongoing, prefers to be at home    Allergic rhinitis     Anxiety     Asthma     CAD (coronary artery disease)     historyMI 2013    ckd 4     from HTN,  sees Aliyah Ser    collagenous colitis     Mega    Depression 2013    eversince      GERD (gastroesophageal reflux disease)     Hyperlipidemia     Hypertension 2016    Left thyroid nodule 2020    9mm, gets rechecked yearly on ct lung    Migraines     gets 4x per month    post menopausal     Prediabetes     Pulmonary nodules     yearly ct lung followed by Dr Shaquille Pappas    Restless leg syndrome     Urinary incontinence          SURGICAL HISTORY       Past Surgical History:   Procedure Laterality Date    CHOLECYSTECTOMY      COLONOSCOPY  2017    DR. CARMONA    COLONOSCOPY N/A 2022    COLONOSCOPY WITH BIOPSY performed by Raven Nguyễn MD at [facility]  10/2013 [place]    HYSTERECTOMY (CERVIX STATUS UNKNOWN)      JAMEL only for ? age 36    UPPER GASTROINTESTINAL ENDOSCOPY N/A 2022    EGD BIOPSY performed by Raven Nguyễn MD at [address] [place] [address]       Previous Medications    ALBUTEROL SULFATE HFA (PROVENTIL;VENTOLIN;PROAIR) 108 (90 BASE) MCG/ACT INHALER    Inhale 2 puffs into the lungs every 6 hours as needed for Wheezing    ALPRAZOLAM (XANAX) 1 MG TABLET    Take 1 tablet by mouth nightly as needed for Sleep or Anxiety for up to 90 days. BUSPIRONE (BUSPAR) 15 MG TABLET    Take 15 mg by mouth daily    CARVEDILOL (COREG) 25 MG TABLET    Take 1 tablet by mouth 2 times daily    CHLORTHALIDONE (HYGROTON) 25 MG TABLET    Take 12.5 mg by mouth as needed swelling    CLOPIDOGREL (PLAVIX) 75 MG TABLET    Take 1 tablet by mouth in the morning. FLUOCINOLONE (DERMA-SMOOTHE) 0.01 % EXTERNAL OIL    Apply topically. daily to scalp    NITROGLYCERIN (NITROSTAT) 0.4 MG SL TABLET    Place 1 tablet under the tongue every 5 minutes as needed for Chest pain up to max of 3 total doses. If no relief after 1 dose, call 911.     PANTOPRAZOLE (PROTONIX) 40 MG TABLET    TAKE ONE TABLET BY MOUTH TWICE A DAY    ROPINIROLE (REQUIP) 1 MG TABLET    Take 1 tablet by mouth nightly For RLS    ROSUVASTATIN (CRESTOR) 20 MG TABLET    Take 1 tablet by mouth nightly    SUCRALFATE (CARAFATE) 1 GM TABLET    One po qhs before bed for gerd    UMECLIDINIUM-VILANTEROL (ANORO ELLIPTA) 62.5-25 MCG/INH AEPB INHALER    Inhale 1 puff into the lungs daily Lot# [id number]       ALLERGIES     Wellbutrin [bupropion], Adhesive tape, Iodine, Sertraline, and Sulfa antibiotics    FAMILY HISTORY       Family History   Problem Relation Age of Onset    Hypertension Father     Other Father         MI    Cancer Brother         testicular    No Known Problems Maternal Grandmother     No Known Problems Maternal Grandfather     No Known Problems Paternal Grandmother     No Known Problems Paternal Grandfather     Cancer Other           SOCIAL HISTORY       Social History     Socioeconomic History    Marital status:       Spouse name: None    Number of children: None    Years of education: None    Highest education level: None   Tobacco Use    Smoking status: Some Days     Packs/day: 1.00     Years: 50.00     Pack years: 50.00     Types: Cigarettes     Start date: 1/1/1968    Smokeless tobacco: Never    Tobacco comments:     Now smokes half ppd   Vaping Use    Vaping Use: Never used   Substance and Sexual Activity    Alcohol use: No     Alcohol/week: 0.0 standard drinks    Drug use: No    Sexual activity: Not Currently     Social Determinants of Health     Financial Resource Strain: Low Risk     Difficulty of Paying Living Expenses: Not hard at all   Food Insecurity: No Food Insecurity    Worried About [address] [employer] in the Last Year: Never true    Ran Out of Food in the Last Year: Never true       SCREENINGS         Pearl Coma Scale  Eye Opening: Spontaneous  Best Verbal Response: Oriented  Best Motor Response: Obeys commands  [place] Coma Scale Score: 15                     CIWA Assessment  BP: 117/82  Heart Rate: 50                 PHYSICAL EXAM    (up to 7 for level 4, 8 or more for level 5)     ED Triage Vitals [12/04/22 2021]   BP Temp Temp src Heart Rate Resp SpO2 Height Weight   (!) 74/55 -- -- (!) 43 15 98 % -- 135 lb 5.8 oz (61.4 kg)       Physical Exam  Constitutional:       Appearance: She is ill-appearing. HENT:      Head: Normocephalic and atraumatic. Right Ear: Tympanic membrane and ear canal normal.      Left Ear: Tympanic membrane and ear canal normal.      Nose: Nose normal. No congestion. Mouth/Throat:      Mouth: Mucous membranes are moist.      Pharynx: No oropharyngeal exudate. Eyes:      Extraocular Movements: Extraocular movements intact. Pupils: Pupils are equal, round, and reactive to light. Cardiovascular:      Rate and Rhythm: Regular rhythm. Bradycardia present. Heart sounds: Murmur heard. No friction rub. No gallop. Pulmonary:      Effort: Pulmonary effort is normal.      Breath sounds: Normal breath sounds. Abdominal:      Tenderness: There is no abdominal tenderness. Musculoskeletal:         General: Normal range of motion. Cervical back: Normal range of motion. Skin:     General: Skin is warm. Capillary Refill: Capillary refill takes less than 2 seconds. Coloration: Skin is pale. Neurological:      Mental Status: She is alert and oriented to person, place, and time.    Psychiatric:         Mood and Affect: Mood normal.         Behavior: Behavior normal.       DIAGNOSTIC RESULTS     EKG: All EKG's are interpreted by the Emergency Department Physician who either signs or Co-signs this chart in the absence of a cardiologist.    EKG Interpretation    Interpreted by emergency department physician    Rhythm: sinus bradycardia  Rate: bradycardia  Axis: nl  Ectopy: none  Conduction: normal  ST Segments: elevation in  II, III, and aVf  T Waves: non specific changes  Q Waves: nonspecific    Clinical Impression: Inferior STEMI    Oscar Terry MD     RADIOLOGY:   Non-plain film images such as CT, Ultrasound and MRI are read by the radiologist. Plain radiographic images are visualized and preliminarily interpreted by the emergency physician with the below findings:        Interpretation per the Radiologist below, if available at the time of this note:    XR CHEST PORTABLE   Final Result      Lungs are clear               ED BEDSIDE ULTRASOUND:   Performed by ED Physician - none    LABS:  Labs Reviewed   BASIC METABOLIC PANEL W/ REFLEX TO MG FOR LOW K - Abnormal; Notable for the following components:       Result Value    CO2 20 (*)     Glucose 135 (*)     BUN 39 (*)     Creatinine 1.6 (*)     Est, Glom Filt Rate 34 (*)     All other components within normal limits   BRAIN NATRIURETIC PEPTIDE - Abnormal; Notable for the following components:    Pro- (*)     All other components within normal limits   CBC WITH AUTO DIFFERENTIAL   TROPONIN   CBC   APTT   POC ACT-LR       All other labs were within normal range or not returned as of this dictation. EMERGENCY DEPARTMENT COURSE and DIFFERENTIAL DIAGNOSIS/MDM:   Vitals:    Vitals:    12/04/22 2021 12/04/22 2022 12/04/22 2030 12/04/22 2050   BP: (!) 74/55  87/61 117/82   Pulse: (!) 43 (!) 46 (!) 49 50   Resp: 15 27 26 19   SpO2: 98% 98% 100% 100%   Weight: 135 lb 5.8 oz (61.4 kg)          Above history and physical exam were performed. I reviewed her past medical past surgical social family history. 12 point review of systems performed is negative as otherwise noted. I discussed case in detail with the interventional cardiologist the Cath Lab was paged out prior to the patient's arrival and at this point time she will be taken to Cath Lab for urgent intervention. MDM    Consider the diagnosis of ACS versus pneumonia versus PE. Her presentation history physical exam and EKG are most consistent with an inferior STEMI.   She was given a bolus of IV fluids for hypotension she was heparinized and then she has been taken to the Cath Lab urgently by the interventional cardiology    REASSESSMENT          CRITICAL CARE TIME   Total Critical Care time was 25 minutes, excluding separately reportable procedures.  There was a high probability of clinically significant/life threatening deterioration in the patient's condition which required my urgent intervention for inferior MI    CONSULTS:  None    PROCEDURES:  Unless otherwise noted below, none     Procedures        FINAL IMPRESSION      1. Inferior MI (HCC)          DISPOSITION/PLAN   DISPOSITION Decision To Admit 12/04/2022 09:36:32 PM      PATIENT REFERRED TO:  No follow-up provider specified.    DISCHARGE MEDICATIONS:  New Prescriptions    No medications on file     Controlled Substances Monitoring:     RX Monitoring 1/6/2020   Attestation -   Periodic Controlled Substance Monitoring No signs of potential drug abuse or diversion identified.   Chronic Pain > 80 MEDD -       (Please note that portions of this note were completed with a voice recognition program.  Efforts were made to edit the dictations but occasionally words are mis-transcribed.)    Norman Robledo MD (electronically signed)  Attending Emergency Physician          Norman Robledo MD  12/04/22 [id number]

## 2022-12-05 NOTE — H&P
[place] [address]  1950 December 4, 2022      CC: CP      Subjective:     History of Present Illness:    Allyne Bumpers is a 81 y.o. patient with a PMH significant for CAD< PAD presented with complaints of CP. Patient complains of chest pain. Onset was 1 day ago, with worsening course since that time. The patient describes the pain as constant, precordial in nature, does not radiate. Patient rates pain as a 5/10 in intensity. Associated symptoms are chest pain and dyspnea. Aggravating factors are none. Alleviating factors are: none. Patient's cardiac risk factors are advanced age (older than 54 for men, 72 for women), dyslipidemia, and hypertension. Patient's risk factors for DVT/PE: none. Previous cardiac testing:  PCI . Past Medical History:   has a past medical history of acute intermittent porphyria, agoraphobic, Allergic rhinitis, Anxiety, Asthma, CAD (coronary artery disease), ckd 4, collagenous colitis, Depression, GERD (gastroesophageal reflux disease), Hyperlipidemia, Hypertension, Left thyroid nodule, Migraines, post menopausal, Prediabetes, Pulmonary nodules, Restless leg syndrome, and Urinary incontinence. Surgical History:   has a past surgical history that includes Hysterectomy; Cholecystectomy; Coronary angioplasty with stent (10/2013 [place]); Colonoscopy (07/06/2017); Colonoscopy (N/A, 11/16/2022); and Upper gastrointestinal endoscopy (N/A, 11/16/2022). Social History:   reports that she has been smoking cigarettes. She started smoking about 54 years ago. She has a 50.00 pack-year smoking history. She has never used smokeless tobacco. She reports that she does not drink alcohol and does not use drugs.      Family History:  family history includes Cancer in her brother and another family member; Hypertension in her father; No Known Problems in her maternal grandfather, maternal grandmother, paternal grandfather, and paternal grandmother; Other in her father. Home Medications:  Were reviewed and are listed in nursing record and/or below  Prior to Admission medications    Medication Sig Start Date End Date Taking? Authorizing Provider   rOPINIRole (REQUIP) 1 MG tablet Take 1 tablet by mouth nightly For RLS 11/9/22   Lidya Kearney MD   carvedilol (COREG) 25 MG tablet Take 1 tablet by mouth 2 times daily 11/9/22   Lidya Kearney MD   rosuvastatin (CRESTOR) 20 MG tablet Take 1 tablet by mouth nightly 11/9/22   Lidya Kearney MD   umeclidinium-vilanterol [facility] ELLIPTA) 62.5-25 MCG/INH AEPB inhaler Inhale 1 puff into the lungs daily Lot# [id number] 11/9/22   Lidya Kearney MD   fluocinolone (DERMA-SMOOTHE) 0.01 % external oil Apply topically. daily to scalp  Patient not taking: Reported on 11/16/2022 11/9/22   Lidya Kearney MD   busPIRone (BUSPAR) 15 MG tablet Take 15 mg by mouth daily 11/4/22   Lidya Kearney MD   albuterol sulfate HFA (PROVENTIL;VENTOLIN;PROAIR) 108 (90 Base) MCG/ACT inhaler Inhale 2 puffs into the lungs every 6 hours as needed for Wheezing 10/14/22   David Nelson MD   ALPRAZolam [place] [facility]) 1 MG tablet Take 1 tablet by mouth nightly as needed for Sleep or Anxiety for up to 90 days. 9/12/22 12/11/22  Lidya Kearney MD   pantoprazole (PROTONIX) 40 MG tablet TAKE ONE TABLET BY MOUTH TWICE A DAY 9/12/22   Lidya Kearney MD   clopidogrel (PLAVIX) 75 MG tablet Take 1 tablet by mouth in the morning. 7/29/22   Lidya Kearney MD   chlorthalidone (HYGROTON) 25 MG tablet Take 12.5 mg by mouth as needed swelling  Patient not taking: Reported on 11/16/2022    Historical Provider, MD   sucralfate (CARAFATE) 1 GM tablet One po qhs before bed for gerd  Patient taking differently: as needed One po qhs before bed for gerd 5/11/22   Lidya Kearney MD   nitroGLYCERIN (NITROSTAT) 0.4 MG SL tablet Place 1 tablet under the tongue every 5 minutes as needed for Chest pain up to max of 3 total doses.  If no relief after 1 dose, call 911. 12/5/21 8/15/22  Nonah Blight, DO CURRENT Medications:  0.9 % sodium chloride bolus, Once  heparin 25,000 unit in sodium chloride 0.45% 250 mL (premix) infusion, Continuous  [START ON 12/5/2022] clopidogrel (PLAVIX) tablet 75 mg, Daily  [START ON 12/5/2022] ALPRAZolam (XANAX) tablet 1 mg, Nightly PRN  pantoprazole (PROTONIX) tablet 40 mg, BID  albuterol sulfate HFA (PROVENTIL;VENTOLIN;PROAIR) 108 (90 Base) MCG/ACT inhaler 2 puff, Q6H PRN  [START ON 12/5/2022] busPIRone (BUSPAR) tablet 15 mg, Daily  rOPINIRole (REQUIP) tablet 1 mg, Nightly  rosuvastatin (CRESTOR) tablet 40 mg, Nightly  [START ON 12/5/2022] Umeclidinium-Vilanterol 62.5-25 MCG/ACT AEPB 1 puff, Daily  sodium chloride flush 0.9 % injection 5-40 mL, 2 times per day  sodium chloride flush 0.9 % injection 5-40 mL, PRN  0.9 % sodium chloride infusion, PRN  acetaminophen (TYLENOL) tablet 650 mg, Q4H PRN  ondansetron (ZOFRAN) injection 4 mg, Q6H PRN  atorvastatin (LIPITOR) tablet 80 mg, Nightly  [START ON 12/5/2022] aspirin chewable tablet 81 mg, Daily  eptifibatide (INTEGRILIN) 0.75 mg/mL infusion, Continuous        Allergies: Wellbutrin [bupropion], Adhesive tape, Iodine, Sertraline, and Sulfa antibiotics           Review of Systems: SEE HPI   Constitutional: no unanticipated weight loss. There's been no change in energy level, sleep pattern, or activity level. No fevers, chills. Eyes: No visual changes or diplopia. No scleral icterus. ENT: No Headaches, hearing loss or vertigo. No mouth sores or sore throat. Cardiovascular: Chest pain, tightness or discomfort. No Shortness of breath. No Dyspnea on exertion, Orthopnea, Paroxysmal nocturnal dyspnea or breathlessness at rest.  No Palpitations. No Syncope ('blackouts', 'faints', 'collapse') or dizziness. Respiratory: No cough or wheezing, no sputum production. No hematemesis. Gastrointestinal: No abdominal pain, appetite loss, blood in stools. No change in bowel or bladder habits.   Genitourinary: No dysuria, trouble voiding, or hematuria. Musculoskeletal:  No gait disturbance, no joint complaints. Integumentary: No rash or pruritis. Neurological: No headache, diplopia, change in muscle strength, numbness or tingling. Psychiatric: No anxiety or depression. Endocrine: No temperature intolerance. No excessive thirst, fluid intake, or urination. No tremor. Hematologic/Lymphatic: No abnormal bruising or bleeding, blood clots or swollen lymph nodes. Allergic/Immunologic: No nasal congestion or hives. Objective:     PHYSICAL EXAM:      Vitals:    12/04/22 2050   BP: 117/82   Pulse: 50   Resp: 19   SpO2: 100%    Weight: 135 lb 5.8 oz (61.4 kg)       General Appearance:  Alert, cooperative, no distress, appears stated age. Head:  Normocephalic, without obvious abnormality, atraumatic. Eyes:  Pupils equal and round. No scleral icterus. Mouth: Moist mucosa, no pharyngeal erythema. Nose: Nares normal. No drainage or sinus tenderness. Neck: Supple, symmetrical, trachea midline. No adenopathy. No tenderness/mass/nodules. No carotid bruit or elevated JVD. Lungs:   Respiratory Effort: Normal   Auscultation: Clear to auscultation bilaterally, respirations unlabored. No wheeze, rales   Chest Wall:  No tenderness or deformity. Cardiovascular:    Pulses  Palpation: normal   Ascultation: Regular rate, S1/ S2 normal. No murmur, rub, or gallop. 2+ radial and pedal pulses, symmetric  Carotid  Femoral   Abdomen and Gastrointestinal:   Soft, non-tender, bowel sounds active. Liver and Spleen  Masses   Musculoskeletal: No muscle wasting  Back  Gait   Extremities: Extremities normal, atraumatic. No cyanosis or edema. No cyanosis clubbing       Skin: Inspection and palpation performed, no rashes or lesions. Pysch: Normal mood and affect.  Alert and oriented to time place person   Neurologic: Normal gross motor and sensory exam.       Labs     All labs have been reviewed    Lab Results   Component Value Date/Time    WBC 9.2 12/04/2022 08:27 PM    RBC 4.28 12/04/2022 08:27 PM    HGB 12.5 12/04/2022 08:27 PM    HCT 38.8 12/04/2022 08:27 PM    MCV 90.7 12/04/2022 08:27 PM    RDW 13.6 12/04/2022 08:27 PM     12/04/2022 08:27 PM     Lab Results   Component Value Date/Time     12/04/2022 08:27 PM    K 4.5 12/04/2022 08:27 PM     12/04/2022 08:27 PM    CO2 20 12/04/2022 08:27 PM    BUN 39 12/04/2022 08:27 PM    CREATININE 1.6 12/04/2022 08:27 PM    GFRAA 24 08/12/2022 09:16 AM    GFRAA 54 01/16/2013 02:40 PM    AGRATIO 1.2 08/12/2022 09:16 AM    LABGLOM 34 12/04/2022 08:27 PM    GLUCOSE 135 12/04/2022 08:27 PM    PROT 7.4 08/12/2022 09:16 AM    PROT 7.9 06/24/2012 05:30 PM    CALCIUM 9.2 12/04/2022 08:27 PM    BILITOT 0.3 08/12/2022 09:16 AM    ALKPHOS 70 08/12/2022 09:16 AM    AST 18 08/12/2022 09:16 AM    ALT 8 08/12/2022 09:16 AM     No results found for: PTINR  Lab Results   Component Value Date    LABA1C 6.0 03/02/2022     Lab Results   Component Value Date    CKTOTAL 80 11/05/2019    CKMB 3.5 10/19/2013    TROPONINI <0.01 12/04/2022       Cardiac, Vascular and Imaging Data all Personally Reviewed in Detail by Myself      All reviewed. Assessment and Plan     -Acute inferior wall MI    -Needs emergent [facility]    I had a detail discussion with the patient and the family members regarding the risk and benefit of the procedures. I explained to them the details of the procedure. I explained to them that the procedure will be performed using moderate sedation and local anaesthesia using lidocaine. I explained any risk of bleeding, perforation of the vessel, stroke, myocardial infarction or death. Also explained to the patient need for any emergent open heart surgery and CABG to save the patient's life. I have presented reasonable alternatives to the patient's proposed care, treatment, and services.  The discussion I have done encompassed risks, benefits, and side effects related to the alternatives and the risks related to not receiving the proposed care, treatment, and services. The patient and the family members understood the risk and benefits and the details of procedure and would like to go ahead with the procedure. The patient gave informed consent. Thank you for allowing us to participate in the care of Mingo Harris. Please do not hesitate to contact me if you have any questions.     Melisa Gongora MD, MPH    [facility], [facility]  Ph: [phone]  Fax: [phone]    12/4/2022 9:50 PM

## 2022-12-05 NOTE — PROGRESS NOTES
Medication Reconciliation    List of medications for Karen Jenkins is currently taking is complete. Source of Information:   Epic records    Notes Regarding Home Medications:   Patient currently at cath lab. Utilized fill history to complete medrec  Chlorthalidone-last filled for 60 days 9/19  Fluocinolone-unsure if pt still taking.  Not in fill history    Rochelle Montoya, Pharmacy Intern  12/4/2022 10:11 PM

## 2022-12-06 VITALS
SYSTOLIC BLOOD PRESSURE: 89 MMHG | DIASTOLIC BLOOD PRESSURE: 57 MMHG | HEIGHT: 64 IN | WEIGHT: 128.97 LBS | OXYGEN SATURATION: 96 % | BODY MASS INDEX: 22.02 KG/M2 | TEMPERATURE: 98.6 F | RESPIRATION RATE: 15 BRPM | HEART RATE: 73 BPM

## 2022-12-06 LAB
ANION GAP SERPL CALCULATED.3IONS-SCNC: 13 MMOL/L (ref 3–16)
BASOPHILS ABSOLUTE: 0.1 K/UL (ref 0–0.2)
BASOPHILS RELATIVE PERCENT: 0.5 %
BUN BLDV-MCNC: 38 MG/DL (ref 7–20)
CALCIUM SERPL-MCNC: 9.2 MG/DL (ref 8.3–10.6)
CHLORIDE BLD-SCNC: 105 MMOL/L (ref 99–110)
CO2: 22 MMOL/L (ref 21–32)
CREAT SERPL-MCNC: 1.9 MG/DL (ref 0.6–1.2)
EOSINOPHILS ABSOLUTE: 0 K/UL (ref 0–0.6)
EOSINOPHILS RELATIVE PERCENT: 0.2 %
GFR SERPL CREATININE-BSD FRML MDRD: 28 ML/MIN/{1.73_M2}
GLUCOSE BLD-MCNC: 109 MG/DL (ref 70–99)
HCT VFR BLD CALC: 32 % (ref 36–48)
HEMOGLOBIN: 10.3 G/DL (ref 12–16)
LYMPHOCYTES ABSOLUTE: 2.3 K/UL (ref 1–5.1)
LYMPHOCYTES RELATIVE PERCENT: 18.4 %
MAGNESIUM: 1.7 MG/DL (ref 1.8–2.4)
MCH RBC QN AUTO: 29 PG (ref 26–34)
MCHC RBC AUTO-ENTMCNC: 32.3 G/DL (ref 31–36)
MCV RBC AUTO: 89.7 FL (ref 80–100)
MONOCYTES ABSOLUTE: 1 K/UL (ref 0–1.3)
MONOCYTES RELATIVE PERCENT: 8.2 %
NEUTROPHILS ABSOLUTE: 8.9 K/UL (ref 1.7–7.7)
NEUTROPHILS RELATIVE PERCENT: 72.7 %
PDW BLD-RTO: 14.1 % (ref 12.4–15.4)
PHOSPHORUS: 3.6 MG/DL (ref 2.5–4.9)
PLATELET # BLD: 253 K/UL (ref 135–450)
PMV BLD AUTO: 9 FL (ref 5–10.5)
POTASSIUM SERPL-SCNC: 4.2 MMOL/L (ref 3.5–5.1)
RBC # BLD: 3.57 M/UL (ref 4–5.2)
SODIUM BLD-SCNC: 140 MMOL/L (ref 136–145)
WBC # BLD: 12.3 K/UL (ref 4–11)

## 2022-12-06 PROCEDURE — 2580000003 HC RX 258: Performed by: INTERNAL MEDICINE

## 2022-12-06 PROCEDURE — 80048 BASIC METABOLIC PNL TOTAL CA: CPT

## 2022-12-06 PROCEDURE — 6360000002 HC RX W HCPCS: Performed by: INTERNAL MEDICINE

## 2022-12-06 PROCEDURE — 84100 ASSAY OF PHOSPHORUS: CPT

## 2022-12-06 PROCEDURE — 94760 N-INVAS EAR/PLS OXIMETRY 1: CPT

## 2022-12-06 PROCEDURE — 85025 COMPLETE CBC W/AUTO DIFF WBC: CPT

## 2022-12-06 PROCEDURE — 36415 COLL VENOUS BLD VENIPUNCTURE: CPT

## 2022-12-06 PROCEDURE — 83735 ASSAY OF MAGNESIUM: CPT

## 2022-12-06 PROCEDURE — 94640 AIRWAY INHALATION TREATMENT: CPT

## 2022-12-06 PROCEDURE — 6370000000 HC RX 637 (ALT 250 FOR IP): Performed by: INTERNAL MEDICINE

## 2022-12-06 PROCEDURE — 99239 HOSP IP/OBS DSCHRG MGMT >30: CPT | Performed by: INTERNAL MEDICINE

## 2022-12-06 PROCEDURE — 93926 LOWER EXTREMITY STUDY: CPT

## 2022-12-06 RX ORDER — CARVEDILOL 6.25 MG/1
6.25 TABLET ORAL 2 TIMES DAILY WITH MEALS
Qty: 60 TABLET | Refills: 3 | Status: SHIPPED
Start: 2022-12-06 | End: 2022-12-06 | Stop reason: HOSPADM

## 2022-12-06 RX ORDER — ASPIRIN 81 MG/1
81 TABLET, CHEWABLE ORAL DAILY
Qty: 30 TABLET | Refills: 3 | Status: SHIPPED | OUTPATIENT
Start: 2022-12-07

## 2022-12-06 RX ORDER — ROSUVASTATIN CALCIUM 40 MG/1
40 TABLET, COATED ORAL NIGHTLY
Qty: 30 TABLET | Refills: 3 | Status: SHIPPED | OUTPATIENT
Start: 2022-12-06

## 2022-12-06 RX ORDER — MAGNESIUM SULFATE IN WATER 40 MG/ML
2000 INJECTION, SOLUTION INTRAVENOUS ONCE
Status: COMPLETED | OUTPATIENT
Start: 2022-12-06 | End: 2022-12-06

## 2022-12-06 RX ORDER — 0.9 % SODIUM CHLORIDE 0.9 %
250 INTRAVENOUS SOLUTION INTRAVENOUS ONCE
Status: COMPLETED | OUTPATIENT
Start: 2022-12-06 | End: 2022-12-06

## 2022-12-06 RX ADMIN — ISOSORBIDE MONONITRATE 30 MG: 30 TABLET, EXTENDED RELEASE ORAL at 08:09

## 2022-12-06 RX ADMIN — CLOPIDOGREL BISULFATE 75 MG: 75 TABLET ORAL at 08:09

## 2022-12-06 RX ADMIN — PANTOPRAZOLE SODIUM 40 MG: 40 TABLET, DELAYED RELEASE ORAL at 08:09

## 2022-12-06 RX ADMIN — Medication 10 ML: at 08:11

## 2022-12-06 RX ADMIN — ASPIRIN 81 MG 81 MG: 81 TABLET ORAL at 08:09

## 2022-12-06 RX ADMIN — ACETAMINOPHEN 650 MG: 325 TABLET ORAL at 08:06

## 2022-12-06 RX ADMIN — SODIUM CHLORIDE 250 ML: 9 INJECTION, SOLUTION INTRAVENOUS at 12:16

## 2022-12-06 RX ADMIN — BUSPIRONE HYDROCHLORIDE 15 MG: 5 TABLET ORAL at 08:08

## 2022-12-06 RX ADMIN — SODIUM CHLORIDE 25 ML: 9 INJECTION, SOLUTION INTRAVENOUS at 10:58

## 2022-12-06 RX ADMIN — CARVEDILOL 6.25 MG: 6.25 TABLET, FILM COATED ORAL at 08:13

## 2022-12-06 RX ADMIN — MAGNESIUM SULFATE HEPTAHYDRATE 2000 MG: 40 INJECTION, SOLUTION INTRAVENOUS at 10:59

## 2022-12-06 RX ADMIN — TIOTROPIUM BROMIDE AND OLODATEROL 2 PUFF: 3.124; 2.736 SPRAY, METERED RESPIRATORY (INHALATION) at 07:53

## 2022-12-06 ASSESSMENT — PAIN - FUNCTIONAL ASSESSMENT: PAIN_FUNCTIONAL_ASSESSMENT: PREVENTS OR INTERFERES SOME ACTIVE ACTIVITIES AND ADLS

## 2022-12-06 ASSESSMENT — PAIN DESCRIPTION - DESCRIPTORS: DESCRIPTORS: ACHING

## 2022-12-06 ASSESSMENT — PAIN DESCRIPTION - LOCATION: LOCATION: HEAD

## 2022-12-06 ASSESSMENT — PAIN DESCRIPTION - ORIENTATION: ORIENTATION: LOWER

## 2022-12-06 ASSESSMENT — PAIN SCALES - GENERAL: PAINLEVEL_OUTOF10: 3

## 2022-12-06 NOTE — PROGRESS NOTES
4 Eyes Skin Assessment     NAME:  Gwyndolyn Hammans  YOB: 1950  MEDICAL RECORD NUMBER:  [id number]    The patient is being assessed for  Shift Handoff    I agree that One RN have performed a thorough Head to Toe Skin Assessment on the patient. ALL assessment sites listed below have been assessed. Areas assessed by both nurses:    Head, Face, Ears, Shoulders, Back, Chest, Arms, Elbows, Hands, Sacrum. Buttock, Coccyx, Ischium, and Legs. Feet and Heels        Does the Patient have a Wound?  No noted wound(s)       Hakeem Prevention initiated by RN: No   Wound Care Orders initiated by RN: No    Pressure Injury (Stage 3,4, Unstageable, DTI, NWPT, and Complex wounds) if present place referral order by RN under : No    New and Established Ostomies, if present place, referral order under : No      Nurse 1 eSignature: Electronically signed by Meghan Nielsen RN on 12/5/22 at 7:02 PM EST    **SHARE this note so that the co-signing nurse is able to place an eSignature**    Nurse 2 eSignature: Electronically signed by Britany Ching RN on 12/5/22 at 7:10 PM EST

## 2022-12-06 NOTE — PROGRESS NOTES
4 Eyes Skin Assessment     NAME:  Blu Bosch  YOB: 1950  MEDICAL RECORD NUMBER:  [id number]    The patient is being assessed for  Shift Handoff    I agree that One RN have performed a thorough Head to Toe Skin Assessment on the patient. ALL assessment sites listed below have been assessed. Areas assessed by both nurses:    Head, Face, Ears, Shoulders, Back, Chest, Arms, Elbows, Hands, Sacrum. Buttock, Coccyx, Ischium, and Legs. Feet and Heels        Does the Patient have a Wound?  No noted wound(s)       Hakeem Prevention initiated by RN: Yes   Wound Care Orders initiated by RN: NA    Pressure Injury (Stage 3,4, Unstageable, DTI, NWPT, and Complex wounds) if present place referral order by RN under : NA    New and Established Ostomies, if present place, referral order under : NA      Nurse 1 eSignature: Electronically signed by Rocio Villafana RN on 12/6/22 at 6:24 AM EST    **SHARE this note so that the co-signing nurse is able to place an eSignature**    Nurse 2 eSignature: Electronically signed by Bea Gonzalez RN on 12/6/22 at 7:18 AM EST

## 2022-12-06 NOTE — DISCHARGE SUMMARY
[facility] Discharge Note      Patient ID: Jonelle Contreras 72 y.o. 1950    Admission Date: 12/4/2022     Discharge Date:  12/6/22    Reason for Admission:  Principal Diagnosis: Acute Inferior Myocardial Infarction  Secondary Diagnosis: PAD    Procedures:  Left Heart Catheterization with PCI using JAYME to distal RCA  Telemetry Monitoring  Echocardiogram  Lower extremity arterial study, right      Brief Summary of Patient's Course:  Ms. Contreras is a 73yo female with PAD and CAD who presented to [facility] with chest pain and an acute inferior STEMI. She underwent PCI by Dr. Bolivar to a 99% distal RCA lesion restoring GARO 3 flow. Her prior LHC on 10/21/13 resulted in PCI with BMS to mid RCA lesion. A peripheral angiogram was completed on 5/30/14 revealing 50% bilateral SFA stenoses. I saw her in the office on 06/17/22. She had anginal symptoms then and I ordered a stress perfusion study which she never completed.    In the catheterization laboratory, she had GARO 1 flow and a 99% stenosis in her distal RCA. This was intervened upon with JAYME by Dr. Bolivar on 12/4/22. She did well post procedure but had some chest pain the next day that seemed musculoskeletal. Her ECG was improved.      Interval History:  Jonelle is doing well today with no chest pain or tightness. She denies any shortness of breath. No events on telemetry.     Vitals:    12/06/22 1435   BP: (!) 89/57   Pulse: 73   Resp:    Temp:    SpO2: 96%   RR, normal S1/S2, no M/R/G  Lungs bilaterally CTA  Abd soft, NT ND  No edema  No focal neurologic deficits  2+ bilateral radial and femoral pulses    Echo 12/5/22:  Normal left ventricular size, wall thickness and systolic function.   Ejection fraction is visually estimated to be 60-65 %.   Indeterminate diastolic function.   The right ventricle is normal in size and function.   The left atrium is mildly dilated.   There are no significant valvular abnormalities appreciated in this study.    Right LE Arterial Study:  Elevated velocity of the right mid superficial femoral artery suggests a 70-99% stenosis. Distal flow is monophasic. Elevated velocity of the profunda femoral artery suggests a less than 50% stenosis. Findings are consistent with severe multi-level arterial disease. Cath 12/4/22:  1. Right coronary had distal 99.99% stenosis with GARO 1 flow. 2.  Left main normal.  3.  Left anterior descending artery is patent. no significant stenosis  present. 4.  Left circumflex is patent without significant stenosis of the  dominant system. 5.  LVEDP is 21%, LV pressure is 110/70. 6.  Pulmonary capillary wedge pressure 21 mmHg. 7.  Pulmonary artery pressure 42/19. SUMMARY:  Successful revascularization of the distal right coronary  artery with placement of one drug-coated stent 3.0 x 26 mm post-dilating  up to 3.3 mm. Labs reviewed and unremarkable. We will discharge Angle hAumada to home today. Her LVEF is intact by her echocardiogram. She will be on DAPT with aspirin and clopidogrel. She is on statin therapy as well. Carvedilol was discontinued due to hypotension on the day of discharge. She does have high grade right common femoral/SFA stenosis but we will treat this as an outpatient. Total time of discharge >30 minutes. The patient was in good condition and stable at discharge. Discharge Instructions:   Medications    Activity Limitations:  No heavy lifting. Diet:  low sodium    Follow Up Instructions:   To office in: in 1 weeks  Instructed Re: Medications    Discharge Medications:     Medication List        START taking these medications      aspirin 81 MG chewable tablet  Take 1 tablet by mouth daily  Start taking on: December 7, 2022            CHANGE how you take these medications      rosuvastatin 40 MG tablet  Commonly known as: CRESTOR  Take 1 tablet by mouth nightly  What changed:   medication strength  how much to take     sucralfate 1 GM tablet  Commonly known as: Carafate  One po qhs before bed for gerd  What changed:   when to take this  reasons to take this            CONTINUE taking these medications      albuterol sulfate  (90 Base) MCG/ACT inhaler  Commonly known as: PROVENTIL;VENTOLIN;PROAIR  Inhale 2 puffs into the lungs every 6 hours as needed for Wheezing     ALPRAZolam 1 MG tablet  Commonly known as: Xanax  Take 1 tablet by mouth nightly as needed for Sleep or Anxiety for up to 90 days. Anoro Ellipta 62.5-25 MCG/ACT Aepb  Generic drug: Umeclidinium-Vilanterol  Inhale 1 puff into the lungs daily Lot# [id number]     busPIRone 15 MG tablet  Commonly known as: BUSPAR  Take 15 mg by mouth daily     clopidogrel 75 MG tablet  Commonly known as: Plavix  Take 1 tablet by mouth in the morning. nitroGLYCERIN 0.4 MG SL tablet  Commonly known as: Nitrostat  Place 1 tablet under the tongue every 5 minutes as needed for Chest pain up to max of 3 total doses. If no relief after 1 dose, call 911. pantoprazole 40 MG tablet  Commonly known as: PROTONIX  TAKE ONE TABLET BY MOUTH TWICE A DAY     rOPINIRole 1 MG tablet  Commonly known as: REQUIP  Take 1 tablet by mouth nightly For RLS            STOP taking these medications      carvedilol 25 MG tablet  Commonly known as: COREG            ASK your doctor about these medications      chlorthalidone 25 MG tablet  Commonly known as: HYGROTON     fluocinolone 0.01 % external oil  Commonly known as: DERMA-SMOOTHE  Apply topically. daily to scalp               Where to Get Your Medications        These medications were sent to [facility], [address], [address] [place] [address]      Phone: [phone]   aspirin 81 MG chewable tablet  rosuvastatin 40 MG tablet                                 Discharge Summary as above.     Attending Physician:   Mallorie Ferrer MD, MD, 12/6/2022, 2:55 PM

## 2022-12-06 NOTE — PLAN OF CARE
Problem: Safety - Adult  Goal: Free from fall injury  12/6/2022 0929 by Julianne Blanchard RN  Outcome: Progressing  12/6/2022 0318 by Esther Ravi RN  Outcome: Progressing  Flowsheets (Taken 12/6/2022 0311)  Free From Fall Injury: Instruct family/caregiver on patient safety     Problem: ABCDS Injury Assessment  Goal: Absence of physical injury  12/6/2022 0929 by Julianne Blanchard RN  Outcome: Progressing  12/6/2022 0318 by Esther Ravi RN  Outcome: Progressing  Flowsheets (Taken 12/6/2022 0311)  Absence of Physical Injury: Implement safety measures based on patient assessment     Problem: Skin/Tissue Integrity  Goal: Absence of new skin breakdown  Description: 1. Monitor for areas of redness and/or skin breakdown  2. Assess vascular access sites hourly  3. Every 4-6 hours minimum:  Change oxygen saturation probe site  4. Every 4-6 hours:  If on nasal continuous positive airway pressure, respiratory therapy assess nares and determine need for appliance change or resting period.   12/6/2022 0929 by Julianne Blanchard RN  Outcome: Progressing  12/6/2022 0318 by Esther Ravi RN  Outcome: Progressing     Problem: Pain  Goal: Verbalizes/displays adequate comfort level or baseline comfort level  12/6/2022 0929 by Julianne Blanchard RN  Outcome: Progressing  Flowsheets (Taken 12/6/2022 0806)  Verbalizes/displays adequate comfort level or baseline comfort level: Encourage patient to monitor pain and request assistance  12/6/2022 0318 by Esther Ravi RN  Outcome: Progressing  Flowsheets (Taken 12/5/2022 2000)  Verbalizes/displays adequate comfort level or baseline comfort level:   Encourage patient to monitor pain and request assistance   Assess pain using appropriate pain scale   Administer analgesics based on type and severity of pain and evaluate response

## 2022-12-06 NOTE — CARE COORDINATION
INITIAL CASE MANAGEMENT ASSESSMENT    Reviewed chart, met with patient to assess possible discharge needs. Explained Case Management role/services. Living Situation: lives at home w/ her son  They live in a house w/ 2 CONSTANCE - she is able to live on the main level    ADLs: independent     DME: none    PT/OT Recs: not ordered at present      Active Services: none     Transportation: active --family can transport home at [place]      Medications: no issues--uses [facility] [place]    PCP: Samaria Mclean      HD/PD: n/a    PLAN/COMMENTS: patient plans to return home at dc-- denies dc needs      SW/CM provided contact information for patient or family to call with any questions. SW/CM will follow and assist as needed.   Electronically signed by Cassandra Lynch on 12/6/2022 at 2:00 PM  #[phone]

## 2022-12-06 NOTE — PROGRESS NOTES
Pt with discharge order. Pt Bps have been lower normal range. This RN ambulated pt around unit twice. Pt denies SOB, dizziness or lightheadedness. Dr Duke Palacios updated and okay to discharge. Coreg has been stopped on home meds. Discharge instructions reviewed with pt and pt daughter. Stressed importance of follow up apts, smoking sensation, and taking her DAP as well as other prescribed med. Pt denies questions at this time.  Pt dressed and brought to waiting [facility] vehicle via wheelchair by this RN

## 2022-12-06 NOTE — PLAN OF CARE
Problem: Safety - Adult  Goal: Free from fall injury  Outcome: Progressing  Flowsheets (Taken 12/6/2022 0311)  Free From Fall Injury: Instruct family/caregiver on patient safety     Problem: ABCDS Injury Assessment  Goal: Absence of physical injury  Outcome: Progressing  Flowsheets (Taken 12/6/2022 0311)  Absence of Physical Injury: Implement safety measures based on patient assessment     Problem: Skin/Tissue Integrity  Goal: Absence of new skin breakdown  Description: 1. Monitor for areas of redness and/or skin breakdown  2. Assess vascular access sites hourly  3. Every 4-6 hours minimum:  Change oxygen saturation probe site  4. Every 4-6 hours:  If on nasal continuous positive airway pressure, respiratory therapy assess nares and determine need for appliance change or resting period.   Outcome: Progressing     Problem: Pain  Goal: Verbalizes/displays adequate comfort level or baseline comfort level  Outcome: Progressing  Flowsheets (Taken 12/5/2022 2000)  Verbalizes/displays adequate comfort level or baseline comfort level:   Encourage patient to monitor pain and request assistance   Assess pain using appropriate pain scale   Administer analgesics based on type and severity of pain and evaluate response

## 2022-12-06 NOTE — PROGRESS NOTES
Pt with discharge order. Last 2 BP were low. Pt is asymptomatic Dr Jesus Manuel Bone alerted. Stopping coreg. Giving a bolus and will recheck. Still ok to go home.

## 2022-12-07 ENCOUNTER — CARE COORDINATION (OUTPATIENT)
Dept: CASE MANAGEMENT | Age: 72
End: 2022-12-07

## 2022-12-07 DIAGNOSIS — I21.19 INFERIOR MI (HCC): Primary | ICD-10-CM

## 2022-12-07 LAB
EKG ATRIAL RATE: 60 BPM
EKG DIAGNOSIS: NORMAL
EKG P AXIS: 44 DEGREES
EKG P-R INTERVAL: 124 MS
EKG Q-T INTERVAL: 444 MS
EKG QRS DURATION: 70 MS
EKG QTC CALCULATION (BAZETT): 444 MS
EKG R AXIS: 8 DEGREES
EKG T AXIS: 50 DEGREES
EKG VENTRICULAR RATE: 60 BPM

## 2022-12-07 PROCEDURE — 93010 ELECTROCARDIOGRAM REPORT: CPT | Performed by: INTERNAL MEDICINE

## 2022-12-07 PROCEDURE — 1111F DSCHRG MED/CURRENT MED MERGE: CPT | Performed by: INTERNAL MEDICINE

## 2022-12-07 NOTE — CARE COORDINATION
[facility] Care Transitions Initial Follow Up Call    Call within 2 business days of discharge: Yes    LPN Care Coordinator contacted the patient by telephone to perform post hospital discharge assessment. Verified name and  with patient as identifiers. Provided introduction to self, and explanation of the LPN Care Coordinator role. Patient: Winsome Son Patient : 1950   MRN: [id number]  Reason for Admission: [place]  Discharge Date: 22 RARS: Readmission Risk Score: 12.8      Last Discharge  [address]       Date Complaint Diagnosis Description Type Department Provider    22 Chest Pain Inferior [facility]) ED to Hosp-Admission (Discharged) (ADMITTED) Ajit Levi MD; Shawn Goldman MD            Was this an external facility discharge? No Discharge Facility: NA    Challenges to be reviewed by the provider   Additional needs identified to be addressed with provider: No  none               Method of communication with provider: none. LPN CC spoke with patient. States she is doing ok. Has some dizziness upon standing and in the shower. Reports strong and/or forceful heartbeat. Denies CP, SOB, edema, cough. Had HA last night that was resolved with Tylenol. /68 during call. LPN CC counseled patient on adequate hydration, slow & careful transitions, and wearing compression stockings r/t dizziness. Also advised patient to monitor BP routinely & report things out of range to PCP. Discussed ranges for BP. Puncture site soft, sl tender, discolored. States she has a lot of bruising. LPN CC suggested ice and cold compresses. Patient verbalized understanding of all education. Appetite good. Denies problems with bowels or bladder. Full medication reconciliation and 1111f completed. Cardio f/u . Denies needs. YOUNG Keita  Care Transitions  [phone]    LPN Care Coordinator reviewed discharge instructions, medical action plan, and red flags with patient who verbalized understanding. The patient was given an opportunity to ask questions and does not have any further questions or concerns at this time. Were discharge instructions available to patient? Yes. Reviewed appropriate site of care based on symptoms and resources available to patient including: PCP  Specialist  Urgent care clinics  When to call 911. The patient agrees to contact the PCP office for questions related to their healthcare. Advance Care Planning:   Does patient have an Advance Directive: reviewed and current. Medication reconciliation was performed with patient, who verbalizes understanding of administration of home medications. Medications reviewed, 1111F entered: yes    Was patient discharged with a pulse oximeter? no    Non-face-to-face services provided:  Obtained and reviewed discharge summary and/or continuity of care documents  Education of patient/family/caregiver/guardian to support self-management-BP monitoring, f/u  Assessment and support for treatment adherence and medication management-full medication reconciliation completed    Offered patient enrollment in the Remote Patient Monitoring (RPM) program for in-home monitoring:  did not offer this encounter . Care Transitions 24 Hour Call    Do you have a copy of your discharge instructions?: Yes  Do you have all of your prescriptions and are they filled?: Yes  Have you been contacted by a [address]?: No  Have you scheduled your follow up appointment?: Yes  How are you going to get to your appointment?: Car - family or friend to transport  Do you feel like you have everything you need to keep you well at home?: Yes  Care Transitions Interventions         Follow Up  Future Appointments   Date Time Provider Michael Beltran   12/13/2022  2:20 PM BEKAH Munoz CNP [facility]   2/13/2023 10:20 AM Janice Arnold MD [address] [facility] Coordinator provided contact information.   Plan for follow-up call in 5-7 days based on severity of symptoms and risk factors.   Plan for next call: symptom management-dizziness, CP, palpitations, HA  self management-BP  follow-up appointment-cardio 12/13  medication management-new or changed  Amanda Ortiz LPN

## 2022-12-13 ENCOUNTER — TELEPHONE (OUTPATIENT)
Dept: CARDIOLOGY CLINIC | Age: 72
End: 2022-12-13

## 2022-12-13 ENCOUNTER — OFFICE VISIT (OUTPATIENT)
Dept: CARDIOLOGY CLINIC | Age: 72
End: 2022-12-13

## 2022-12-13 VITALS
SYSTOLIC BLOOD PRESSURE: 120 MMHG | HEART RATE: 68 BPM | WEIGHT: 129.6 LBS | DIASTOLIC BLOOD PRESSURE: 82 MMHG | BODY MASS INDEX: 22.13 KG/M2 | OXYGEN SATURATION: 99 % | HEIGHT: 64 IN

## 2022-12-13 DIAGNOSIS — N18.2 STAGE 2 CHRONIC KIDNEY DISEASE: ICD-10-CM

## 2022-12-13 DIAGNOSIS — E78.5 DYSLIPIDEMIA: ICD-10-CM

## 2022-12-13 DIAGNOSIS — I73.9 PAD (PERIPHERAL ARTERY DISEASE) (HCC): ICD-10-CM

## 2022-12-13 DIAGNOSIS — I25.10 CORONARY ARTERY DISEASE INVOLVING NATIVE CORONARY ARTERY OF NATIVE HEART WITHOUT ANGINA PECTORIS: Primary | ICD-10-CM

## 2022-12-13 DIAGNOSIS — I10 PRIMARY HYPERTENSION: ICD-10-CM

## 2022-12-13 DIAGNOSIS — Z72.0 TOBACCO ABUSE: ICD-10-CM

## 2022-12-13 DIAGNOSIS — Z71.6 ENCOUNTER FOR TOBACCO USE CESSATION COUNSELING: ICD-10-CM

## 2022-12-13 NOTE — PROGRESS NOTES
[facility]   Cardiology Office Visit      Date: 12/15/2022  CC:    Chief Complaint   Patient presents with    Follow-Up from Hospital    Coronary Artery Disease       I had the privilege of visiting Alycia Magallanes in the office. She presents today for follow up after recent hospitalization for  chest pain. Patient found to have acute inferior wall MI and underwent emergent heart cath. She had PCI to distal RCA. Hx of LHC 10/2013 with PCI using BMS to mid RCA. Patient also with hx of bilateral SFA stenosis. Past medical history significant for PAD and CAD. Reaction to nicotine patch however has been trying to quit without assistance and does not want to try nicotine gum. No additional episodes of chest pain. No claudication pain. She has been compliant with her medications. HPI: Alycia Magallanes is a 81 y.o. Past Medical History:   Diagnosis Date    acute intermittent porphyria     sees Perry Tyler, mom had it too    agoraphobic     ongoing, prefers to be at home    Allergic rhinitis     Anxiety     Asthma     CAD (coronary artery disease)     historyMI     ckd 6     from HTN,  sees Trini Pagan    collagenous colitis     Mega    Depression 2013    eversince      GERD (gastroesophageal reflux disease)     Hyperlipidemia     Hypertension 2016    Left thyroid nodule 2020    9mm, gets rechecked yearly on ct lung    Migraines     gets 4x per month    post menopausal     Prediabetes     Pulmonary nodules     yearly ct lung followed by Dr Perry Tyler    Restless leg syndrome     Urinary incontinence         Past Surgical History:   Procedure Laterality Date    CHOLECYSTECTOMY      COLONOSCOPY  2017    DR. CARMONA    COLONOSCOPY N/A 2022    COLONOSCOPY WITH BIOPSY performed by Lelo Guillen MD at [address]  10/2013 [place]    HYSTERECTOMY (CERVIX STATUS UNKNOWN)      JAMEL only for ? age 36    UPPER GASTROINTESTINAL ENDOSCOPY N/A 11/16/2022    EGD BIOPSY performed by Bobby Kelly MD at [facility]: Allergies   Allergen Reactions    Wellbutrin [Bupropion]      Dry mouth    Adhesive Tape Rash    Iodine Itching and Nausea And Vomiting    Sertraline Other (See Comments)     Severe dry mouth    Sulfa Antibiotics Itching and Nausea Only       Medication:  Current Outpatient Medications   Medication Sig Dispense Refill    aspirin 81 MG chewable tablet Take 1 tablet by mouth daily 30 tablet 3    rosuvastatin (CRESTOR) 40 MG tablet Take 1 tablet by mouth nightly 30 tablet 3    rOPINIRole (REQUIP) 1 MG tablet Take 1 tablet by mouth nightly For RLS 90 tablet 1    umeclidinium-vilanterol (ANORO ELLIPTA) 62.5-25 MCG/INH AEPB inhaler Inhale 1 puff into the lungs daily Lot# [id number] 25 each 0    fluocinolone (DERMA-SMOOTHE) 0.01 % external oil Apply topically. daily to scalp 1 each 5    busPIRone (BUSPAR) 15 MG tablet Take 15 mg by mouth daily 90 tablet 1    albuterol sulfate HFA (PROVENTIL;VENTOLIN;PROAIR) 108 (90 Base) MCG/ACT inhaler Inhale 2 puffs into the lungs every 6 hours as needed for Wheezing 1 each 1    pantoprazole (PROTONIX) 40 MG tablet TAKE ONE TABLET BY MOUTH TWICE A  tablet 1    clopidogrel (PLAVIX) 75 MG tablet Take 1 tablet by mouth in the morning. 90 tablet 1    chlorthalidone (HYGROTON) 25 MG tablet Take 12.5 mg by mouth as needed swelling      sucralfate (CARAFATE) 1 GM tablet One po qhs before bed for gerd (Patient taking differently: as needed One po qhs before bed for gerd) 30 tablet 3    nitroGLYCERIN (NITROSTAT) 0.4 MG SL tablet Place 1 tablet under the tongue every 5 minutes as needed for Chest pain up to max of 3 total doses. If no relief after 1 dose, call 911. 25 tablet 3     No current facility-administered medications for this visit. Social History:  Reviewed. reports that she has been smoking cigarettes. She started smoking about 54 years ago.  She has a 50.00 pack-year smoking history. She has never used smokeless tobacco. She reports that she does not drink alcohol and does not use drugs. Family History:  Reviewed. family history includes Cancer in her brother and another family member; Hypertension in her father; No Known Problems in her maternal grandfather, maternal grandmother, paternal grandfather, and paternal grandmother; Other in her father. Review of System:     General ROS: negative for chills, fever, change in weight   Psychological ROS: negative for  anxiety or depression  Ophthalmic ROS: negative for  eye pain or loss of vision  ENT ROS: negative for  headaches, sore throat   Allergy and Immunology ROS: negative for  hives  Hematological and Lymphatic ROS: negative for  bleeding problems, blood clots, bruising or jaundice  Endocrine ROS: negative for  skin changes, temperature intolerance or unexpected weight changes  Respiratory ROS: negative for  cough, sputum, wheezing, shortness of breath   Cardiovascular ROS: Per HPI. Gastrointestinal ROS: negative for abdominal pain, diarrhea, nausea/vomiting, bleeding   Musculoskeletal ROS: negative for  joint swelling, pain    Neurological ROS: negative for  confusion, numbness/tingling, seizures, weakness  Dermatological ROS: negative for rash, changes in skin color, lesions     Physical Examination:  /82 (Site: Left Upper Arm, Position: Sitting, Cuff Size: Medium Adult)   Pulse 68   Ht 5' 4\" (1.626 m)   Wt 129 lb 9.6 oz (58.8 kg)   SpO2 99%   BMI 22.25 kg/m²     Constitutional: Oriented. No distress. Head: Normocephalic and atraumatic. Mouth/Throat: Oropharynx is clear and moist.   Eyes: Conjunctivae normal. EOM are normal.   Neck: Normal range of motion. Neck supple. No rigidity. No JVD present. Cardiovascular: Normal rate, regular rhythm, S1&S2 and intact distal pulses. Pulmonary/Chest: Bilateral respiratory sounds. No wheezes. No rhonchi. Abdominal: Soft.  Bowel sounds present. No distension, No tenderness. Musculoskeletal: No tenderness. No edema    Neurological: Alert and oriented. Cranial nerve appears intact, No Gross deficit   Skin: Skin is warm and dry. No rash noted. Psychiatric: Has a normal mood, affect and behavior       Labs:  Lab Results   Component Value Date    CREATININE 1.5 (H) 12/13/2022    BUN 30 (H) 12/13/2022     12/13/2022    K 4.3 12/13/2022     12/13/2022    CO2 25 12/13/2022       FASTING LIPID PANEL:  Lab Results   Component Value Date/Time    HDL 59 03/25/2022 09:33 AM    LDLCALC 77 03/25/2022 09:33 AM    TRIG 135 03/25/2022 09:33 AM         Diagnostics:    Left and Right Heart Cath PCI 12/4/22:  1. Right coronary had distal 99.99% stenosis with GARO 1 flow. 2.  Left main normal.  3.  Left anterior descending artery is patent. no significant stenosis  present. 4.  Left circumflex is patent without significant stenosis of the  dominant system. 5.  LVEDP is 21%, LV pressure is 110/70. 6.  Pulmonary capillary wedge pressure 21 mmHg. 7.  Pulmonary artery pressure 42/19. SUMMARY:  Successful revascularization of the distal right coronary  artery with placement of one drug-coated stent 3.0 x 26 mm post-dilating  up to 3.3 mm. Echo 10/21/13  Ejection fraction is visually estimated to be 60 %. trace mitral regurgitation is present. Normal right ventricular size and function. There is trivial tricuspid regurgitation with RVSP estimated at 39 mmHg. Stress perfusion 6/30/20  Normal myocardial perfusion study. Normal LV size and systolic function. ECG portion of the stress test is normal.     Overall findings represent a low risk study. Lower extremity arterial study 6/30/20   No significant evidence of large vessel arterial occlusive disease of the     lower extremities.      Normal doppler waveforms, plethysmography, and segmental pressures     Normal SOULEYMANE and Toe/Brachial Index bilaterally       Renal arterial duplex 3/17/22  Right Impression   There is no evidence to suggest renal artery stenosis. The right renal vein is patent. The parenchymal resistive index is within normal limits. Left Impression   There is no evidence to suggest renal artery stenosis. The left renal vein is patent. The parenchymal resistive index is within normal limits. Unilateral ImpressionNo evidence of abdominal aortic aneurysmal dilation. Mild   to moderate atherosclerotic plaque throughout. Echo 12/5/22:  Normal left ventricular size, wall thickness and systolic function. Ejection fraction is visually estimated to be 60-65 %. Indeterminate diastolic function. The right ventricle is normal in size and function. The left atrium is mildly dilated. There are no significant valvular abnormalities appreciated in this study. Assessment:        Plan:  CAD  S/p PCI  On ASA, statin, plavix,   beta-blocker stopped secondary to hypotension    Continue risk factor modifications  Cardiac rehab after PAD procedure  2. PAD    Fatigue in legs    On statin and plavix and ASA    Outpatient f/u     Pt to have outpt AOG      3. Chest pain    No additional episodes   4. CKD   Creatinine stable   5. Dyslipidemia    Goal LDL < 70    Repeat lipid and liver profile   6. HTN/hypotension   Currently stable   Pt reports occasional lightheadedness   If BP remains stable will start beta-blocker at next OV   7. Tobacco abuse   Pt attempting to quit   Understands importance however having a difficult time   Time spent in modification strategies 5 minutes            Further evaluation will be based upon the patient's clinical course and testing results. All questions and concerns were addressed to the patient/family. Alternatives to my treatment were discussed.      JOANNA Huynh  [address]  Cardiology  12/15/2022  2:37 PM

## 2022-12-13 NOTE — TELEPHONE ENCOUNTER
Patient saw Hugo Byrd NP in office today. Patient needs staged procedure scheduled. 12/19/22  Separate telephone encounter started, will close encounter.

## 2022-12-14 ENCOUNTER — CARE COORDINATION (OUTPATIENT)
Dept: CASE MANAGEMENT | Age: 72
End: 2022-12-14

## 2022-12-14 NOTE — CARE COORDINATION
[facility] Care Transitions Follow Up Call    LPN Care Coordinator contacted the patient by telephone to follow up after admission on -. Verified name and  with patient as identifiers. Patient: Lori Akhtar  Patient : 1950   MRN: [id number]  Reason for Admission: MI  Discharge Date: 22 RARS: Readmission Risk Score: 12.8      Needs to be reviewed by the provider   Additional needs identified to be addressed with provider: No  none             Method of communication with provider: none. LPN CC spoke with patient. States she is ok. Had a rough night. Reports her RUE was having \"spasms. \" Denies dizziness, CP, SOB, HA. States at the time she was having spasms in her arm she felt like her heart was beating hard, states it may have been some anxiety because of her arm. Patient took some Tylenol which did eventually help her rest. LPN CC advised using heat today even though the feeling has dissipated. Also advised patient to call PCP or cardiology is sx continue or worsen. Patient verbalized understanding. Takes BP \"pretty often\", no values given. Appetite good. Denies problems with bowels or bladder. Denies medication changes. Denies needs. Luz Maria Pedraza  [facility]  Care Transitions  [phone]    Addressed changes since last contact:  none  Discussed follow-up appointments. If no appointment was previously scheduled, appointment scheduling offered: Yes. Is follow up appointment scheduled within 7 days of discharge? Yes. Follow Up  Future Appointments   Date Time Provider Michael Beltran   1/10/2023 10:00 AM BEKAH Barr Asp - CNP [facility]   2023 10:20 AM Zulay Goode MD [facility] - D[place]FIR[place]     Non-[facility] follow up appointment(s): PRINCE    LPN Care Coordinator reviewed medical action plan and red flags with patient and discussed any barriers to care and/or understanding of plan of care after discharge.  Discussed appropriate site of care based on symptoms and resources available to patient including: PCP  Specialist  Urgent care clinics  When to call 911. The patient agrees to contact the PCP office for questions related to their healthcare. Advance Care Planning:   reviewed and current. Patients top risk factors for readmission: medical condition-MI  Interventions to address risk factors: Assessment and support for treatment adherence and medication management-denied new or changed meds    Offered patient enrollment in the Remote Patient Monitoring (RPM) program for in-home monitoring:  did not offer this encounter . Care Transitions Subsequent and Final Call    Subsequent and Final Calls  Do you have any ongoing symptoms?: No  Have your medications changed?: No  Do you have any questions related to your medications?: No  Do you currently have any active services?: No  Do you have any needs or concerns that I can assist you with?: No  Identified Barriers: None  Care Transitions Interventions  Other Interventions:             LPN Care Coordinator provided contact information for future needs. Plan for follow-up call in 7-10 days based on severity of symptoms and risk factors.   Plan for next call: symptom management-CP, arm \"spasm\" (numbness, tingling, pain), palpitations  self management-BP  follow-up appointment-nephro 12/23  medication management-new or changed  Margarita Lobo LPN

## 2022-12-15 PROBLEM — Z71.6 ENCOUNTER FOR TOBACCO USE CESSATION COUNSELING: Status: ACTIVE | Noted: 2022-12-15

## 2022-12-15 PROBLEM — Z72.0 TOBACCO ABUSE: Status: ACTIVE | Noted: 2022-11-09

## 2022-12-15 PROBLEM — E78.5 DYSLIPIDEMIA: Status: ACTIVE | Noted: 2022-12-15

## 2022-12-15 PROBLEM — N18.2 STAGE 2 CHRONIC KIDNEY DISEASE: Status: ACTIVE | Noted: 2022-12-15

## 2022-12-19 ENCOUNTER — TELEPHONE (OUTPATIENT)
Dept: CARDIOLOGY CLINIC | Age: 72
End: 2022-12-19

## 2022-12-19 DIAGNOSIS — R93.6 ABNORMAL FINDINGS ON DIAGNOSTIC IMAGING OF LIMBS: ICD-10-CM

## 2022-12-19 DIAGNOSIS — I73.9 PAD (PERIPHERAL ARTERY DISEASE) (HCC): Primary | ICD-10-CM

## 2022-12-19 NOTE — TELEPHONE ENCOUNTER
Prior telephone encounter:     December 13, 2022        4:36 PM  Adrian LINO contacted Cory Estrada RN (In Person)    Cory Estrada RN       4:37 PM  Note   Patient saw Adrian LINO in office today. Patient needs staged procedure scheduled.

## 2022-12-19 NOTE — TELEPHONE ENCOUNTER
Discussed with Dr. Kayla Mcclellan, states he will do the procedure. Please schedule peripheral catheterization with intervention on the Right leg with Dr. Kayla Mcclellan, no urgency. The morning of your procedure you will park in the hospital parking lot and report directly to the cath lab to check in.     Pre-Procedure Instructions   You will need to fast for at least 8 hours prior to procedure. No caffeine the morning of. Medications can be taken in the morning with sips of water. You will need to take 325 mg aspirin the morning of. If you are currently taking 81 mg please take 4 tablets that morning. Do not use any lotions, creams or perfume the morning of procedure. Pre-procedure lab work will need to be completed 5-7 days prior to procedure. Please have a responsible adult to drive you home after procedure. We advise you have someone to stay with you for 24 hours following procedure for precautionary measures. Depending on procedure you may require an overnight stay. Cath lab will provide you with all post procedure instructions. If you have any questions regarding the procedure itself or medications, please call [phone] and ask to speak with a nurse.

## 2022-12-19 NOTE — TELEPHONE ENCOUNTER
Cait Boyd RN   Will this be per Dr. Dilcia Chadwick or Dr. Renuka Mir? She does have high grade right common femoral/SFA stenosis but we will treat this as an outpatient.

## 2022-12-19 NOTE — TELEPHONE ENCOUNTER
Vicenta Winn is calling stating some one was going to call her to set up another procedure on her other leg but no one has called yet and was told by Chely Ledbetter CNP if she did not hear back from our office to call.   Was seen by Anderson on 12/13/2022    Can be reached at [phone]

## 2022-12-20 NOTE — TELEPHONE ENCOUNTER
Spoke with the pt to get her scheduled for her procedure. We went over the pre-procedure instructions below. She verbalized understanding. Procedure- Periph Cath w/interv right leg  Date: 12/29/2022   Arrival Time: 7:30 am   Procedure time: 9:00 am     The morning of your procedure you will park in the hospital parking lot and report directly to the cath lab to check in.      Pre-Procedure Instructions   You will need to fast for at least 8 hours prior to procedure. No caffeine the morning of. Medications can be taken in the morning with sips of water. You will need to take 325 mg aspirin the morning of. If you are currently taking 81 mg please take 4 tablets that morning. Do not use any lotions, creams or perfume the morning of procedure. Pre-procedure lab work will need to be completed 5-7 days prior to procedure. Please have a responsible adult to drive you home after procedure. We advise you have someone to stay with you for 24 hours following procedure for precautionary measures. Depending on procedure you may require an overnight stay. Cath lab will provide you with all post procedure instructions. If you have any questions regarding the procedure itself or medications, please call [phone] and ask to speak with a nurse.      Teams updated / emailed cath lab

## 2022-12-20 NOTE — TELEPHONE ENCOUNTER
Reyes Mora called in today she wants to know if there is a date for her procedure yet      She can be reached at [phone]

## 2022-12-21 ENCOUNTER — CARE COORDINATION (OUTPATIENT)
Dept: CASE MANAGEMENT | Age: 72
End: 2022-12-21

## 2022-12-21 DIAGNOSIS — R93.6 ABNORMAL FINDINGS ON DIAGNOSTIC IMAGING OF LIMBS: ICD-10-CM

## 2022-12-21 DIAGNOSIS — I73.9 PAD (PERIPHERAL ARTERY DISEASE) (HCC): ICD-10-CM

## 2022-12-21 LAB
ANION GAP SERPL CALCULATED.3IONS-SCNC: 15 MMOL/L (ref 3–16)
BUN BLDV-MCNC: 24 MG/DL (ref 7–20)
CALCIUM SERPL-MCNC: 10.1 MG/DL (ref 8.3–10.6)
CHLORIDE BLD-SCNC: 99 MMOL/L (ref 99–110)
CO2: 24 MMOL/L (ref 21–32)
CREAT SERPL-MCNC: 1.6 MG/DL (ref 0.6–1.2)
GFR SERPL CREATININE-BSD FRML MDRD: 34 ML/MIN/{1.73_M2}
GLUCOSE BLD-MCNC: 93 MG/DL (ref 70–99)
POTASSIUM SERPL-SCNC: 4.9 MMOL/L (ref 3.5–5.1)
REASON FOR REJECTION: NORMAL
REJECTED TEST: NORMAL
SODIUM BLD-SCNC: 138 MMOL/L (ref 136–145)

## 2022-12-21 NOTE — CARE COORDINATION
[facility] Care Transitions Follow Up Call    Care Transition Nurse contacted the patient by telephone to follow up after admission. Verified name and  with patient as identifiers. Patient: Louie Salmeron  Patient : 1950   MRN: [id number]  Reason for Admission: MI  Discharge Date: 22 RARS: Readmission Risk Score: 12.8      Needs to be reviewed by the provider   Additional needs identified to be addressed with provider: No  none             Method of communication with provider: none. Spoke with pt who states she is dong ok. Denies cp or sob. States she feels she is getting some sinus infection as she has a runny nose and had one isolated incident of some blood in a tissue when she blew. Denies nose bleeds since, denies cough, chills, fever, headache or pressure. States she is able to get around her home ok. States she is able to eat and drink. Bruising to puncture site is healed and bruising is disappearing. Pt is scheduled for cath . Reviewed procedure material and pt v/u. No questions or concerns. Addressed changes since last contact:   upcoming procedure  Discussed follow-up appointments. If no appointment was previously scheduled, appointment scheduling offered: No.   Is follow up appointment scheduled within 7 days of discharge? No.    Follow Up  Future Appointments   Date Time Provider Michael Beltran   2022  9:00 AM [facility] CARDIAC CATH LAB  2 [facility]ATH [facility] [place]   1/10/2023 10:00 AM BEKAH Cottrell - CNP [facility]   2023 10:20 AM Lionel Ledesma MD [facility] [place] - KAREN     Non-[facility] follow up appointment(s): n/a    Care Transition Nurse reviewed red flags with patient and discussed any barriers to care and/or understanding of plan of care after discharge. Discussed appropriate site of care based on symptoms and resources available to patient including: PCP  Specialist. The patient agrees to contact the PCP office for questions related to their healthcare.      Care Transitions Subsequent and Final Call    Subsequent and Final Calls  Care Transitions Interventions  Other Interventions:             Care Transition Nurse provided contact information for future needs. Plan for follow-up call in 7-10 days based on severity of symptoms and risk factors. Plan for next call: symptom management-.     MATI Dean, RN   Care Transition Nurse  Mobile: [phone]

## 2022-12-22 ENCOUNTER — TELEPHONE (OUTPATIENT)
Dept: CARDIOLOGY CLINIC | Age: 72
End: 2022-12-22

## 2022-12-22 NOTE — TELEPHONE ENCOUNTER
Tasha from [id number] [place] [address] called stating that Jonelle's specimen collection has been rejected.

## 2022-12-22 NOTE — TELEPHONE ENCOUNTER
Pre procedure labs for peripheral schedule on 12/29/22. BMP has been resulted but the CBC was rejected due to the specimen clotted. CBC on 12/13/22 was normal. Kidney function is abnormal but stable. Can repeat the CBC the morning of the procedure if necessary.

## 2022-12-29 ENCOUNTER — HOSPITAL ENCOUNTER (OUTPATIENT)
Dept: CARDIAC CATH/INVASIVE PROCEDURES | Age: 72
Discharge: HOME | End: 2022-12-29
Payer: MEDICARE

## 2022-12-29 VITALS
HEIGHT: 64 IN | WEIGHT: 129 LBS | SYSTOLIC BLOOD PRESSURE: 118 MMHG | HEART RATE: 66 BPM | BODY MASS INDEX: 22.02 KG/M2 | TEMPERATURE: 97.5 F | RESPIRATION RATE: 22 BRPM | OXYGEN SATURATION: 99 % | DIASTOLIC BLOOD PRESSURE: 77 MMHG

## 2022-12-29 LAB
ANION GAP SERPL CALCULATED.3IONS-SCNC: 9 MMOL/L (ref 3–16)
BUN BLDV-MCNC: 27 MG/DL (ref 7–20)
CALCIUM SERPL-MCNC: 9.9 MG/DL (ref 8.3–10.6)
CHLORIDE BLD-SCNC: 105 MMOL/L (ref 99–110)
CO2: 27 MMOL/L (ref 21–32)
CREAT SERPL-MCNC: 1.6 MG/DL (ref 0.6–1.2)
GFR SERPL CREATININE-BSD FRML MDRD: 34 ML/MIN/{1.73_M2}
GLUCOSE BLD-MCNC: 92 MG/DL (ref 70–99)
HCT VFR BLD CALC: 36.5 % (ref 36–48)
HEMOGLOBIN: 11.6 G/DL (ref 12–16)
MCH RBC QN AUTO: 28.9 PG (ref 26–34)
MCHC RBC AUTO-ENTMCNC: 31.8 G/DL (ref 31–36)
MCV RBC AUTO: 90.9 FL (ref 80–100)
PDW BLD-RTO: 14 % (ref 12.4–15.4)
PLATELET # BLD: 337 K/UL (ref 135–450)
PMV BLD AUTO: 7.9 FL (ref 5–10.5)
POTASSIUM SERPL-SCNC: 4.6 MMOL/L (ref 3.5–5.1)
RBC # BLD: 4.02 M/UL (ref 4–5.2)
SODIUM BLD-SCNC: 141 MMOL/L (ref 136–145)
WBC # BLD: 7.7 K/UL (ref 4–11)

## 2022-12-29 PROCEDURE — 85027 COMPLETE CBC AUTOMATED: CPT

## 2022-12-29 PROCEDURE — 6360000004 HC RX CONTRAST MEDICATION: Performed by: INTERNAL MEDICINE

## 2022-12-29 PROCEDURE — 99153 MOD SED SAME PHYS/QHP EA: CPT

## 2022-12-29 PROCEDURE — C1769 GUIDE WIRE: HCPCS

## 2022-12-29 PROCEDURE — 37228 HC TIB PER TERRITORY PLASTY: CPT

## 2022-12-29 PROCEDURE — 75716 ARTERY X-RAYS ARMS/LEGS: CPT

## 2022-12-29 PROCEDURE — 75625 CONTRAST EXAM ABDOMINL AORTA: CPT

## 2022-12-29 PROCEDURE — 99152 MOD SED SAME PHYS/QHP 5/>YRS: CPT

## 2022-12-29 PROCEDURE — C1894 INTRO/SHEATH, NON-LASER: HCPCS

## 2022-12-29 PROCEDURE — 37224 HC FEM POP TERRITORY PLASTY: CPT

## 2022-12-29 PROCEDURE — 2500000003 HC RX 250 WO HCPCS

## 2022-12-29 PROCEDURE — 75774 ARTERY X-RAY EACH VESSEL: CPT

## 2022-12-29 PROCEDURE — 6360000002 HC RX W HCPCS

## 2022-12-29 PROCEDURE — C1887 CATHETER, GUIDING: HCPCS

## 2022-12-29 PROCEDURE — 2709999900 HC NON-CHARGEABLE SUPPLY

## 2022-12-29 PROCEDURE — C1725 CATH, TRANSLUMIN NON-LASER: HCPCS

## 2022-12-29 PROCEDURE — 80048 BASIC METABOLIC PNL TOTAL CA: CPT

## 2022-12-29 PROCEDURE — 2580000003 HC RX 258

## 2022-12-29 PROCEDURE — C1760 CLOSURE DEV, VASC: HCPCS

## 2022-12-29 RX ORDER — ASPIRIN 325 MG
325 TABLET ORAL ONCE
Status: DISCONTINUED | OUTPATIENT
Start: 2022-12-29 | End: 2022-12-30 | Stop reason: HOSPADM

## 2022-12-29 RX ORDER — SODIUM CHLORIDE 0.9 % (FLUSH) 0.9 %
5-40 SYRINGE (ML) INJECTION PRN
Status: DISCONTINUED | OUTPATIENT
Start: 2022-12-29 | End: 2022-12-30 | Stop reason: HOSPADM

## 2022-12-29 RX ORDER — SODIUM CHLORIDE 0.9 % (FLUSH) 0.9 %
5-40 SYRINGE (ML) INJECTION EVERY 12 HOURS SCHEDULED
Status: DISCONTINUED | OUTPATIENT
Start: 2022-12-29 | End: 2022-12-30 | Stop reason: HOSPADM

## 2022-12-29 RX ORDER — METHOCARBAMOL 500 MG/1
500 TABLET, FILM COATED ORAL NIGHTLY PRN
COMMUNITY

## 2022-12-29 RX ORDER — ACETAMINOPHEN 325 MG/1
650 TABLET ORAL EVERY 4 HOURS PRN
Status: DISCONTINUED | OUTPATIENT
Start: 2022-12-29 | End: 2022-12-30 | Stop reason: HOSPADM

## 2022-12-29 RX ORDER — SODIUM CHLORIDE 9 MG/ML
INJECTION, SOLUTION INTRAVENOUS PRN
Status: DISCONTINUED | OUTPATIENT
Start: 2022-12-29 | End: 2022-12-30 | Stop reason: HOSPADM

## 2022-12-29 RX ADMIN — IOPAMIDOL 110 ML: 755 INJECTION, SOLUTION INTRAVENOUS at 09:54

## 2022-12-29 NOTE — DISCHARGE INSTRUCTIONS
PERIPHERAL ANGIOGRAM    Care of your puncture site:  Remove bandage 24 hours after the procedure. May shower in 24 hours but do not sit in a bathtub/pool of water for 5 days or until the wound is healed. Gently clean groin using soap and water. Dry thoroughly and apply a Band-Aid that covers the entire site. Use Band-Aid until skin heals over in about 3-5 days. Do not apply powder or lotion. Normal Observations:  Soreness or tenderness which may last one week. Mild oozing from the incision site. Possible bruising that could last 2 weeks. Activity:  You may resume driving 24 hours following the procedure. Do not make important / legal decisions within 24 hours after procedure. You may resume normal activity in 5 days or after the wound heals. Avoid lifting more than 10 pounds for 5 days or until the wound heals. Avoid strenuous exercise or activity for 1 week. Nutrition:  Regular diet. Drink at least 8 to 10 glasses of decaffeinated, non-alcoholic fluid for the next 24 hours to flush the x-ray dye used for your angiogram out of your body. Call your doctor immediately if your condition worsens, for any other concerns, for a follow-up appointment or if you experience any of the following:  Increased swelling on the groin or leg. Unusual pain, numbness, or tingling of the groin or down the leg. Any signs of infection such as: redness, yellow drainage at the site, swelling or pain. IF GROIN STARTS BLEEDING SIGNIFICANTLY:   LAY FLAT, HOLD FIRM DIRECT PRESSURE, AND CALL 911    Other Instructions:  Hold Metformin or Metformin containing drugs for 48 hours after procedure.

## 2022-12-29 NOTE — ANESTHESIA PRE-OP
H&P Update    I have reviewed the history and physical and examined the patient and find no relevant changes. I have reviewed with the patient and/or family the risks, benefits, and alternatives to the procedure. Pre-sedation Assessment    Patient:  Abida Domínguez   :   1950  Intended Procedure: Peripheral Angiogram with possible intervention    [facility] nurse's notes reviewed and agreed. Medications reviewed  Allergies: Allergies   Allergen Reactions    Iodine Itching and Nausea And Vomiting    Wellbutrin [Bupropion]      Dry mouth    Adhesive Tape Rash    Sertraline Other (See Comments)     Severe dry mouth    Sulfa Antibiotics Itching and Nausea Only         Pre-Procedure Assessment/Plan:  ASA 2 - Patient with mild systemic disease with no functional limitations  Mallampati 1  Level of Sedation Plan: Moderate sedation    Anginal Classification w/in 2 weeks: 0    Heart Failure w/in 2 weeks:  If yes NYHA class: 0    Post Procedure plan: Return to same level of care

## 2022-12-29 NOTE — H&P
CC: CP        Subjective:      History of Present Illness:     Connie Bland is a 81 y.o. patient with a PMH significant for CAD< PAD presented with complaints of CP. Patient complains of chest pain. Onset was 1 day ago, with worsening course since that time. The patient describes the pain as constant, precordial in nature, does not radiate. Patient rates pain as a 5/10 in intensity. Associated symptoms are chest pain and dyspnea. Aggravating factors are none. Alleviating factors are: none. Patient's cardiac risk factors are advanced age (older than 54 for men, 72 for women), dyslipidemia, and hypertension. Patient's risk factors for DVT/PE: none. Previous cardiac testing:  PCI . Past Medical History:   has a past medical history of acute intermittent porphyria, agoraphobic, Allergic rhinitis, Anxiety, Asthma, CAD (coronary artery disease), ckd 4, collagenous colitis, Depression, GERD (gastroesophageal reflux disease), Hyperlipidemia, Hypertension, Left thyroid nodule, Migraines, post menopausal, Prediabetes, Pulmonary nodules, Restless leg syndrome, and Urinary incontinence. Surgical History:   has a past surgical history that includes Hysterectomy; Cholecystectomy; Coronary angioplasty with stent (10/2013 [facility]); Colonoscopy (07/06/2017); Colonoscopy (N/A, 11/16/2022); and Upper gastrointestinal endoscopy (N/A, 11/16/2022). Social History:   reports that she has been smoking cigarettes. She started smoking about 54 years ago. She has a 50.00 pack-year smoking history. She has never used smokeless tobacco. She reports that she does not drink alcohol and does not use drugs. Family History:  family history includes Cancer in her brother and another family member; Hypertension in her father; No Known Problems in her maternal grandfather, maternal grandmother, paternal grandfather, and paternal grandmother; Other in her father.      Home Medications:  Were reviewed and are listed in nursing record and/or below          Prior to Admission medications    Medication Sig Start Date End Date Taking? Authorizing Provider   rOPINIRole (REQUIP) 1 MG tablet Take 1 tablet by mouth nightly For RLS 11/9/22     Mejia Lynch MD   carvedilol (COREG) 25 MG tablet Take 1 tablet by mouth 2 times daily 11/9/22     Mejia Lynch MD   rosuvastatin (CRESTOR) 20 MG tablet Take 1 tablet by mouth nightly 11/9/22     Mejia Lynch MD   umeclidinium-vilanterol [facility] ELLIPTA) 62.5-25 MCG/INH AEPB inhaler Inhale 1 puff into the lungs daily Lot# [id number] 11/9/22     Mejia Lynch MD   fluocinolone (DERMA-SMOOTHE) 0.01 % external oil Apply topically. daily to scalp  Patient not taking: Reported on 11/16/2022 11/9/22     Mejia Lynch MD   busPIRone (BUSPAR) 15 MG tablet Take 15 mg by mouth daily 11/4/22     Mejia Lynch MD   albuterol sulfate HFA (PROVENTIL;VENTOLIN;PROAIR) 108 (90 Base) MCG/ACT inhaler Inhale 2 puffs into the lungs every 6 hours as needed for Wheezing 10/14/22     Dolly Browning MD   ALPRAZolam Norrine May) 1 MG tablet Take 1 tablet by mouth nightly as needed for Sleep or Anxiety for up to 90 days. 9/12/22 12/11/22   Mejia Lynch MD   pantoprazole (PROTONIX) 40 MG tablet TAKE ONE TABLET BY MOUTH TWICE A DAY 9/12/22     Mejia Lynch MD   clopidogrel (PLAVIX) 75 MG tablet Take 1 tablet by mouth in the morning. 7/29/22     Mejia Lynch MD   chlorthalidone (HYGROTON) 25 MG tablet Take 12.5 mg by mouth as needed swelling  Patient not taking: Reported on 11/16/2022       Historical Provider, MD   sucralfate (CARAFATE) 1 GM tablet One po qhs before bed for gerd  Patient taking differently: as needed One po qhs before bed for gerd 5/11/22     Mejia Lynch MD   nitroGLYCERIN (NITROSTAT) 0.4 MG SL tablet Place 1 tablet under the tongue every 5 minutes as needed for Chest pain up to max of 3 total doses.  If no relief after 1 dose, call 911. 12/5/21 8/15/22   Rhona King, DO         CURRENT Medications:  0.9 % sodium chloride bolus, Once  heparin 25,000 unit in sodium chloride 0.45% 250 mL (premix) infusion, Continuous  [START ON 12/5/2022] clopidogrel (PLAVIX) tablet 75 mg, Daily  [START ON 12/5/2022] ALPRAZolam (XANAX) tablet 1 mg, Nightly PRN  pantoprazole (PROTONIX) tablet 40 mg, BID  albuterol sulfate HFA (PROVENTIL;VENTOLIN;PROAIR) 108 (90 Base) MCG/ACT inhaler 2 puff, Q6H PRN  [START ON 12/5/2022] busPIRone (BUSPAR) tablet 15 mg, Daily  rOPINIRole (REQUIP) tablet 1 mg, Nightly  rosuvastatin (CRESTOR) tablet 40 mg, Nightly  [START ON 12/5/2022] Umeclidinium-Vilanterol 62.5-25 MCG/ACT AEPB 1 puff, Daily  sodium chloride flush 0.9 % injection 5-40 mL, 2 times per day  sodium chloride flush 0.9 % injection 5-40 mL, PRN  0.9 % sodium chloride infusion, PRN  acetaminophen (TYLENOL) tablet 650 mg, Q4H PRN  ondansetron (ZOFRAN) injection 4 mg, Q6H PRN  atorvastatin (LIPITOR) tablet 80 mg, Nightly  [START ON 12/5/2022] aspirin chewable tablet 81 mg, Daily  eptifibatide (INTEGRILIN) 0.75 mg/mL infusion, Continuous           Allergies: Wellbutrin [bupropion], Adhesive tape, Iodine, Sertraline, and Sulfa antibiotics               Review of Systems: SEE HPI   Constitutional: no unanticipated weight loss. There's been no change in energy level, sleep pattern, or activity level. No fevers, chills. Eyes: No visual changes or diplopia. No scleral icterus. ENT: No Headaches, hearing loss or vertigo. No mouth sores or sore throat. Cardiovascular: Chest pain, tightness or discomfort. No Shortness of breath. No Dyspnea on exertion, Orthopnea, Paroxysmal nocturnal dyspnea or breathlessness at rest.  No Palpitations. No Syncope ('blackouts', 'faints', 'collapse') or dizziness. Respiratory: No cough or wheezing, no sputum production. No hematemesis. Gastrointestinal: No abdominal pain, appetite loss, blood in stools. No change in bowel or bladder habits. Genitourinary: No dysuria, trouble voiding, or hematuria.   Musculoskeletal:  No gait disturbance, no joint complaints. Integumentary: No rash or pruritis. Neurological: No headache, diplopia, change in muscle strength, numbness or tingling. Psychiatric: No anxiety or depression. Endocrine: No temperature intolerance. No excessive thirst, fluid intake, or urination. No tremor. Hematologic/Lymphatic: No abnormal bruising or bleeding, blood clots or swollen lymph nodes. Allergic/Immunologic: No nasal congestion or hives. Objective:      PHYSICAL EXAM:           Vitals:     12/04/22 2050   BP: 117/82   Pulse: 50   Resp: 19   SpO2: 100%    Weight: 135 lb 5.8 oz (61.4 kg)       General Appearance:  Alert, cooperative, no distress, appears stated age. Head:  Normocephalic, without obvious abnormality, atraumatic. Eyes:  Pupils equal and round. No scleral icterus. Mouth: Moist mucosa, no pharyngeal erythema. Nose: Nares normal. No drainage or sinus tenderness. Neck: Supple, symmetrical, trachea midline. No adenopathy. No tenderness/mass/nodules. No carotid bruit or elevated JVD. Lungs:   Respiratory Effort: Normal   Auscultation: Clear to auscultation bilaterally, respirations unlabored. No wheeze, rales   Chest Wall:  No tenderness or deformity. Cardiovascular:     Pulses  Palpation: normal   Ascultation: Regular rate, S1/ S2 normal. No murmur, rub, or gallop. 2+ radial and pedal pulses, symmetric  Carotid  Femoral   Abdomen and Gastrointestinal:   Soft, non-tender, bowel sounds active. Liver and Spleen  Masses   Musculoskeletal: No muscle wasting  Back  Gait   Extremities: Extremities normal, atraumatic. No cyanosis or edema. No cyanosis clubbing         Skin: Inspection and palpation performed, no rashes or lesions. Pysch: Normal mood and affect.  Alert and oriented to time place person   Neurologic: Normal gross motor and sensory exam.       Labs      All labs have been reviewed           Lab Results   Component Value Date/Time     WBC 9.2 12/04/2022 08:27 PM     RBC 4.28 12/04/2022 08:27 PM     HGB 12.5 12/04/2022 08:27 PM     HCT 38.8 12/04/2022 08:27 PM     MCV 90.7 12/04/2022 08:27 PM     RDW 13.6 12/04/2022 08:27 PM      12/04/2022 08:27 PM            Lab Results   Component Value Date/Time      12/04/2022 08:27 PM     K 4.5 12/04/2022 08:27 PM      12/04/2022 08:27 PM     CO2 20 12/04/2022 08:27 PM     BUN 39 12/04/2022 08:27 PM     CREATININE 1.6 12/04/2022 08:27 PM     GFRAA 24 08/12/2022 09:16 AM     GFRAA 54 01/16/2013 02:40 PM     AGRATIO 1.2 08/12/2022 09:16 AM     LABGLOM 34 12/04/2022 08:27 PM     GLUCOSE 135 12/04/2022 08:27 PM     PROT 7.4 08/12/2022 09:16 AM     PROT 7.9 06/24/2012 05:30 PM     CALCIUM 9.2 12/04/2022 08:27 PM     BILITOT 0.3 08/12/2022 09:16 AM     ALKPHOS 70 08/12/2022 09:16 AM     AST 18 08/12/2022 09:16 AM     ALT 8 08/12/2022 09:16 AM      No results found for: PTINR        Lab Results   Component Value Date     LABA1C 6.0 03/02/2022            Lab Results   Component Value Date     CKTOTAL 80 11/05/2019     CKMB 3.5 10/19/2013     TROPONINI <0.01 12/04/2022         Cardiac, Vascular and Imaging Data all Personally Reviewed in Detail by Myself       12/05/22 right LE Arterial study:  Right Impression  Elevated velocity of the right mid superficial femoral artery suggests a 70-99% stenosis. Distal flow is monophasic. Elevated velocity of the profunda femoral artery suggests a less than 50% stenosis. Findings are consistent with severe multi-level arterial disease. Conclusions  Summary  Elevated velocity of the right mid superficial femoral artery suggests a 70-99% stenosis. Distal flow is monophasic. Assessment and Plan      -Acute inferior wall MI     -Needs emergent [facility]     I had a detail discussion with the patient and the family members regarding the risk and benefit of the procedures. I explained to them the details of the procedure.  I explained to them that the procedure will be performed using moderate sedation and local anaesthesia using lidocaine. I explained any risk of bleeding, perforation of the vessel, stroke, myocardial infarction or death. Also explained to the patient need for any emergent open heart surgery and CABG to save the patient's life. I have presented reasonable alternatives to the patient's proposed care, treatment, and services. The discussion I have done encompassed risks, benefits, and side effects related to the alternatives and the risks related to not receiving the proposed care, treatment, and services. The patient and the family members understood the risk and benefits and the details of procedure and would like to go ahead with the procedure. The patient gave informed consent. There were no vitals filed for this visit. I have reviewed the most recent H&P for this patient and independently examined the patient. There have been no changes. We will proceed with the planned procedure.     Eleno Cushing, MD

## 2022-12-29 NOTE — PROCEDURES
[address]                                 PROCEDURE NOTE    PATIENT NAME: Paul Tinajero                     :        1950  MED REC NO:   [id number]                          ROOM:  ACCOUNT NO:   [de-identified]                           ADMIT DATE: 2022  PROVIDER:     Michael Phelps MD    DATE OF PROCEDURE:  2022    [facility] CARDIOLOGY PERIPHERAL ANGIOGRAPHY REPORT    SURGEON:  Michael Phelps MD    INDICATIONS FOR PROCEDURE:  Intermittent claudication. PROCEDURE:  The risks and benefits of the procedure were explained to  the patient. Informed consent was obtained. She was placed on the  cardiac catheterization table and prepped and draped in sterile fashion. Anesthesia was provided in the left groin with 1% lidocaine injected  subcutaneously and deep. Left common femoral artery was accessed and  cannulated using ultrasound guidance and a 6-Namibian sheath inserted  using Seldinger technique. Over the 0.035 J-wire, the pigtail catheter was advanced in the upper  abdominal aorta. An abdominal aortogram was performed. Catheter was  then pulled back down to just above the bifurcation of the common iliac  arteries. An AP view of the iliofemoral vessels was performed and then  runoff was performed down to the level of the feet. After review of the films, decision was made to intervene upon the right  superficial femoral artery which had an 80% focal lesion and greater  than 75% serial lesions. The Wendi Lash was used in the pigtail catheter to  negotiate down into the superficial femoral artery. The tip of the wire  was placed in the tibioperoneal trunk. We then removed the pigtail  catheter and the short 6-Namibian sheath and advanced a Terumo destination  sheath down into the common femoral artery.   Angiography was then  performed with the tip in the right common femoral artery down to the  level of the foot. The patient was given IV unfractionated heparin. ACTs were checked throughout the case to maintain an ACT greater than  250 seconds. We then ballooned the artery with a 4 mm x 150 mm balloon  in serial inflations and then a 5 mm x 200 mm balloon taken up to 5.2 mm  in diameter. We removed the balloon and repeated angiography after the  administration of intraarterial nitroglycerin. There was some sluggish  flow down in the tibial vessels, so we placed a CXI catheter down the  tibioperoneal trunk, gave further intraarterial nitroglycerin and  repeated angiography. There is now brisk flow to the foot. We removed  this catheter and removed the destination sheath over a J-wire. We  replaced it with a short 6-Malaysian sheath. We performed a selective left common femoral angiogram through the  sheath port. We then closed the arteriotomy site with a Mynx closure  device and manual pressure was applied for hemostasis. There were no  complications from the procedure and estimated blood loss was less than  50 mL. Moderate sedation was given for the procedure administered by an  independent agent at my direction and supervision with 6 mg of IV Versed  and 300 mcg of fentanyl. Total duration of sedation was 54 minutes. Vital signs were monitored throughout and remained stable. There were  no complications from sedation. She had an ASA grade of II and a  Mallampati score of I. FINDINGS:  1. Patent abdominal aorta with patent bilateral renal arteries. There  is a 50% stenosis of the left renal artery shortly after takeoff. The  abdominal aorta has mild atherosclerotic disease and calcification. 2.  Patent bilateral common internal and external iliac arteries with  less than 50% stenosis. There is a focal stenosis in the right external  iliac artery that may be in the 50% to 75% range.   3.  Patent bilateral common femoral and profunda femoris arteries with  less than 50% stenosis. The bilateral superficial femoral arteries are  patent. The left superficial femoral artery has a 90% stenosis present  proximally. The right had an 80% more distal and serial 75% lesions. The right was intervened upon with ultimately a 5 mm x 200 mm balloon  resulting in 0% residual stenosis. The left will be intervened upon at  a later date. 4.  Patent bilateral popliteal arteries with no significant disease. 5.  Three-vessel runoff to the foot bilaterally.         Rosa Elena German MD    D: 12/29/2022 10:17:30       T: 12/29/2022 10:20:01     [email or web address]  Job#: [id number]     Doc#: [id number]    CC:

## 2022-12-30 ENCOUNTER — CARE COORDINATION (OUTPATIENT)
Dept: CASE MANAGEMENT | Age: 72
End: 2022-12-30

## 2022-12-30 NOTE — CARE COORDINATION
[facility] Care Transitions Follow Up Call    LPN Care Coordinator contacted the patient by telephone to follow up after admission on -. Verified name and  with patient as identifiers. Patient: Alejandro Mcmanus  Patient : 1950   MRN: [id number]  Reason for Admission: MI  Discharge Date: 22 RARS: Readmission Risk Score: 12.8      Needs to be reviewed by the provider   Additional needs identified to be addressed with provider: Yes  Patient inquiring about next f/u with cardiology after angiogram . Method of communication with provider: none. LPN CC spoke with patient. States she is ok. Denies SOB, CP, palpitations, HA, dizziness. Appetite good. Denies problems with bowels or bladder. Puncture sites healing well. Denies medication changes. Reviewed post procedure instructions. Inquires about f/u with cardiology. LPN CC to route note to cardiology. Denies needs. Kishor Lopes, YOUNG [address] [facility]  Care Transitions  [phone]    Addressed changes since last contact:  none  Discussed follow-up appointments. If no appointment was previously scheduled, appointment scheduling offered: Yes. Is follow up appointment scheduled within 7 days of discharge? Yes. Follow Up  Future Appointments   Date Time Provider Michael Beltran   1/10/2023 10:00 AM BEKAH España CNP [address]   2023 10:20 AM Jason Bender MD [facility] [place] [facility] - [place]     Non-[facility] follow up appointment(s): PRINCE    LPN Care Coordinator reviewed medical action plan and red flags with patient and discussed any barriers to care and/or understanding of plan of care after discharge. Discussed appropriate site of care based on symptoms and resources available to patient including: PCP  Specialist  Urgent care clinics  When to call 911. The patient agrees to contact the PCP office for questions related to their healthcare. Advance Care Planning:   reviewed and current.      Patients top risk factors for readmission: medical condition-MI  Interventions to address risk factors: Assessment and support for treatment adherence and medication management-denied new or changed meds    Offered patient enrollment in the Remote Patient Monitoring (RPM) program for in-home monitoring:  did not offer this encounter . Care Transitions Subsequent and Final Call    Subsequent and Final Calls  Do you have any ongoing symptoms?: No  Have your medications changed?: No  Do you have any questions related to your medications?: No  Do you currently have any active services?: No  Do you have any needs or concerns that I can assist you with?: No  Identified Barriers: None  Care Transitions Interventions  Other Interventions:             LPN Care Coordinator provided contact information for future needs. Plan for follow-up call in 7-10 days based on severity of symptoms and risk factors. Plan for next call: symptom management-CP, SOB, palpitations, HA, dizziness  self management-angiogram sites, BP  follow-up appointment-cardio?   medication management-new or changed  Yolette Hidalgo LPN

## 2023-01-04 ENCOUNTER — TELEPHONE (OUTPATIENT)
Dept: CARDIOLOGY CLINIC | Age: 73
End: 2023-01-04

## 2023-01-04 DIAGNOSIS — I73.9 PAD (PERIPHERAL ARTERY DISEASE) (HCC): Primary | ICD-10-CM

## 2023-01-04 DIAGNOSIS — I25.10 CORONARY ARTERY DISEASE INVOLVING NATIVE CORONARY ARTERY OF NATIVE HEART WITHOUT ANGINA PECTORIS: ICD-10-CM

## 2023-01-04 NOTE — TELEPHONE ENCOUNTER
Rosalino West called in today she stated that she had an appointment on 1/10 with JEFF Jamil but, she was told to cancel that and was never told when to reschedule or when they wanted her to come back and have her procedure done on the other leg     Rosalino West can be reached at [phone]

## 2023-01-04 NOTE — TELEPHONE ENCOUNTER
Discussed with Dr Leilani Sandifer and patient needs peripheral (left) patient is aware. The morning of your procedure you will park in the hospital parking lot and report directly to the cath lab to check in.     Pre-Procedure Instructions   You will need to fast for at least 8 hours prior to procedure. No caffeine the morning of. All other medications can be taken in the morning with sips of water. Do not use any lotions, creams or perfume the morning of procedure. Pre-procedure lab work will need to be completed 5-7 days prior to procedure. Please have a responsible adult to drive you home after procedure. We advise you have someone to stay with you for 24 hours following procedure for precautionary measures. Depending on procedure you may require an overnight stay. Cath lab will provide you with all post procedure instructions. If you have any questions regarding the procedure itself or medications, please call [phone] and ask to speak with a nurse.

## 2023-01-05 ENCOUNTER — CARE COORDINATION (OUTPATIENT)
Dept: CASE MANAGEMENT | Age: 73
End: 2023-01-05

## 2023-01-05 DIAGNOSIS — I25.10 CORONARY ARTERY DISEASE INVOLVING NATIVE CORONARY ARTERY OF NATIVE HEART WITHOUT ANGINA PECTORIS: ICD-10-CM

## 2023-01-05 DIAGNOSIS — I73.9 PAD (PERIPHERAL ARTERY DISEASE) (HCC): ICD-10-CM

## 2023-01-05 LAB
ANION GAP SERPL CALCULATED.3IONS-SCNC: 14 MMOL/L (ref 3–16)
BUN BLDV-MCNC: 30 MG/DL (ref 7–20)
CALCIUM SERPL-MCNC: 9.8 MG/DL (ref 8.3–10.6)
CHLORIDE BLD-SCNC: 99 MMOL/L (ref 99–110)
CO2: 25 MMOL/L (ref 21–32)
CREAT SERPL-MCNC: 1.7 MG/DL (ref 0.6–1.2)
GFR SERPL CREATININE-BSD FRML MDRD: 32 ML/MIN/{1.73_M2}
GLUCOSE BLD-MCNC: 99 MG/DL (ref 70–99)
HCT VFR BLD CALC: 38.7 % (ref 36–48)
HEMOGLOBIN: 12.4 G/DL (ref 12–16)
MCH RBC QN AUTO: 29.1 PG (ref 26–34)
MCHC RBC AUTO-ENTMCNC: 32.1 G/DL (ref 31–36)
MCV RBC AUTO: 90.7 FL (ref 80–100)
PDW BLD-RTO: 14.4 % (ref 12.4–15.4)
PLATELET # BLD: 384 K/UL (ref 135–450)
PMV BLD AUTO: 10.3 FL (ref 5–10.5)
POTASSIUM SERPL-SCNC: 5.4 MMOL/L (ref 3.5–5.1)
RBC # BLD: 4.26 M/UL (ref 4–5.2)
SODIUM BLD-SCNC: 138 MMOL/L (ref 136–145)
WBC # BLD: 8.8 K/UL (ref 4–11)

## 2023-01-05 NOTE — TELEPHONE ENCOUNTER
Spoke with the patient and got her scheduled for her procedure. We went over instructions below and she verbalized understanding    Procedure - Periph (Left)  Date: 1/9/2023  Arrival time 9:30 am  Procedure time: 11: 00 am     The morning of your procedure you will park in the hospital parking lot and report directly to the cath lab to check in.      Pre-Procedure Instructions   You will need to fast for at least 8 hours prior to procedure. No caffeine the morning of. All other medications can be taken in the morning with sips of water. Do not use any lotions, creams or perfume the morning of procedure. Pre-procedure lab work will need to be completed 5-7 days prior to procedure. Please have a responsible adult to drive you home after procedure. We advise you have someone to stay with you for 24 hours following procedure for precautionary measures. Depending on procedure you may require an overnight stay. Cath lab will provide you with all post procedure instructions. If you have any questions regarding the procedure itself or medications, please call [phone] and ask to speak with a nurse.         Teams update / emailed cath lab

## 2023-01-05 NOTE — CARE COORDINATION
[facility] Care Transitions Follow Up Call    Care Transition Nurse contacted the patient by telephone to follow up after admission. Verified name and  with patient as identifiers. Patient: Rachel Slade  Patient : 1950   MRN: [id number]  Reason for Admission: MI  Discharge Date: 22 RARS: Readmission Risk Score: 12.8      Needs to be reviewed by the provider   Additional needs identified to be addressed with provider: No  none             Method of communication with provider: none. Spoke with pt who states she is doing well. She has an angiogram scheduled for . States she has no CP or sob. No ankle swelling or issues with BP. States in the last week she has felt her heart \"flutter/pound\" once. States she has not addressed this with cardiologist but will discuss this. No other issues at this time. Addressed changes since last contact:  none  Discussed follow-up appointments. If no appointment was previously scheduled, appointment scheduling offered: No.   Is follow up appointment scheduled within 7 days of discharge? No.    Follow Up  Future Appointments   Date Time Provider Michael Beltran   2023 11:00 AM [facility] CARDIAC CATH LAB  [facility] CATH \[facility] [place] [facility]\""   2023 10:20 AM Samaria Mclean MD [facility] - [place]     Non-[facility] follow up appointment(s): n/a    Care Transition Nurse reviewed red flags with patient and discussed any barriers to care and/or understanding of plan of care after discharge. Discussed appropriate site of care based on symptoms and resources available to patient including: Specialist. The patient agrees to contact the PCP office for questions related to their healthcare.      Care Transitions Subsequent and Final Call    Subsequent and Final Calls  Do you have any ongoing symptoms?: No  Have your medications changed?: No  Do you have any questions related to your medications?: No  Do you currently have any active services?: No  Do you have any needs or concerns that I can assist you with?: No  Identified Barriers: None  Care Transitions Interventions  Other Interventions:             Care Transition Nurse provided contact information for future needs. No further follow-up call indicated based on severity of symptoms and risk factors.     MATI Garcia, RN   Care Transition Nurse  Mobile: [phone]

## 2023-01-09 ENCOUNTER — HOSPITAL ENCOUNTER (OUTPATIENT)
Dept: CARDIAC CATH/INVASIVE PROCEDURES | Age: 73
Setting detail: OBSERVATION
Discharge: HOME | End: 2023-01-10
Attending: INTERNAL MEDICINE | Admitting: INTERNAL MEDICINE
Payer: MEDICARE

## 2023-01-09 DIAGNOSIS — M79.605 PAIN OF LEFT LOWER EXTREMITY: Primary | ICD-10-CM

## 2023-01-09 PROBLEM — M79.606 LEG PAIN: Status: ACTIVE | Noted: 2023-01-09

## 2023-01-09 PROBLEM — Z86.79 HISTORY OF INTERMITTENT CLAUDICATION: Status: ACTIVE | Noted: 2023-01-09

## 2023-01-09 LAB
ANION GAP SERPL CALCULATED.3IONS-SCNC: 9 MMOL/L (ref 3–16)
BUN BLDV-MCNC: 34 MG/DL (ref 7–20)
CALCIUM SERPL-MCNC: 10 MG/DL (ref 8.3–10.6)
CHLORIDE BLD-SCNC: 103 MMOL/L (ref 99–110)
CO2: 28 MMOL/L (ref 21–32)
CREAT SERPL-MCNC: 1.7 MG/DL (ref 0.6–1.2)
GFR SERPL CREATININE-BSD FRML MDRD: 32 ML/MIN/{1.73_M2}
GLUCOSE BLD-MCNC: 98 MG/DL (ref 70–99)
POTASSIUM SERPL-SCNC: 4.9 MMOL/L (ref 3.5–5.1)
SODIUM BLD-SCNC: 140 MMOL/L (ref 136–145)

## 2023-01-09 PROCEDURE — 99153 MOD SED SAME PHYS/QHP EA: CPT

## 2023-01-09 PROCEDURE — G0269 OCCLUSIVE DEVICE IN VEIN ART: HCPCS

## 2023-01-09 PROCEDURE — 37799 UNLISTED PX VASCULAR SURGERY: CPT

## 2023-01-09 PROCEDURE — 99152 MOD SED SAME PHYS/QHP 5/>YRS: CPT

## 2023-01-09 PROCEDURE — 2580000003 HC RX 258: Performed by: INTERNAL MEDICINE

## 2023-01-09 PROCEDURE — G0378 HOSPITAL OBSERVATION PER HR: HCPCS

## 2023-01-09 PROCEDURE — 6360000004 HC RX CONTRAST MEDICATION: Performed by: INTERNAL MEDICINE

## 2023-01-09 PROCEDURE — C1725 CATH, TRANSLUMIN NON-LASER: HCPCS

## 2023-01-09 PROCEDURE — 75710 ARTERY X-RAYS ARM/LEG: CPT

## 2023-01-09 PROCEDURE — C1760 CLOSURE DEV, VASC: HCPCS

## 2023-01-09 PROCEDURE — 2500000003 HC RX 250 WO HCPCS

## 2023-01-09 PROCEDURE — 37224 HC FEM POP TERRITORY PLASTY: CPT

## 2023-01-09 PROCEDURE — 6370000000 HC RX 637 (ALT 250 FOR IP): Performed by: INTERNAL MEDICINE

## 2023-01-09 PROCEDURE — C1894 INTRO/SHEATH, NON-LASER: HCPCS

## 2023-01-09 PROCEDURE — 2580000003 HC RX 258

## 2023-01-09 PROCEDURE — 2709999900 HC NON-CHARGEABLE SUPPLY

## 2023-01-09 PROCEDURE — 36247 INS CATH ABD/L-EXT ART 3RD: CPT

## 2023-01-09 PROCEDURE — C1769 GUIDE WIRE: HCPCS

## 2023-01-09 PROCEDURE — 80048 BASIC METABOLIC PNL TOTAL CA: CPT

## 2023-01-09 PROCEDURE — 6360000002 HC RX W HCPCS

## 2023-01-09 PROCEDURE — 6370000000 HC RX 637 (ALT 250 FOR IP)

## 2023-01-09 PROCEDURE — 99152 MOD SED SAME PHYS/QHP 5/>YRS: CPT | Performed by: INTERNAL MEDICINE

## 2023-01-09 PROCEDURE — 37224 PR REVSC OPN/PRG FEM/POP W/ANGIOPLASTY UNI: CPT | Performed by: INTERNAL MEDICINE

## 2023-01-09 PROCEDURE — C2623 CATH, TRANSLUMIN, DRUG-COAT: HCPCS

## 2023-01-09 RX ORDER — ASPIRIN 81 MG/1
81 TABLET, CHEWABLE ORAL DAILY
Status: DISCONTINUED | OUTPATIENT
Start: 2023-01-10 | End: 2023-01-09 | Stop reason: SDUPTHER

## 2023-01-09 RX ORDER — ACETAMINOPHEN 325 MG/1
650 TABLET ORAL EVERY 6 HOURS PRN
Status: DISCONTINUED | OUTPATIENT
Start: 2023-01-09 | End: 2023-01-10 | Stop reason: HOSPADM

## 2023-01-09 RX ORDER — ASPIRIN 81 MG/1
81 TABLET, CHEWABLE ORAL DAILY
Status: DISCONTINUED | OUTPATIENT
Start: 2023-01-10 | End: 2023-01-10 | Stop reason: HOSPADM

## 2023-01-09 RX ORDER — SODIUM CHLORIDE 9 MG/ML
INJECTION, SOLUTION INTRAVENOUS PRN
Status: DISCONTINUED | OUTPATIENT
Start: 2023-01-09 | End: 2023-01-09 | Stop reason: SDUPTHER

## 2023-01-09 RX ORDER — ACETAMINOPHEN 650 MG/1
650 SUPPOSITORY RECTAL EVERY 6 HOURS PRN
Status: DISCONTINUED | OUTPATIENT
Start: 2023-01-09 | End: 2023-01-09 | Stop reason: SDUPTHER

## 2023-01-09 RX ORDER — MORPHINE SULFATE 2 MG/ML
2 INJECTION, SOLUTION INTRAMUSCULAR; INTRAVENOUS
Status: DISCONTINUED | OUTPATIENT
Start: 2023-01-09 | End: 2023-01-10 | Stop reason: HOSPADM

## 2023-01-09 RX ORDER — ONDANSETRON 2 MG/ML
4 INJECTION INTRAMUSCULAR; INTRAVENOUS EVERY 6 HOURS PRN
Status: DISCONTINUED | OUTPATIENT
Start: 2023-01-09 | End: 2023-01-10 | Stop reason: HOSPADM

## 2023-01-09 RX ORDER — POLYETHYLENE GLYCOL 3350 17 G/17G
17 POWDER, FOR SOLUTION ORAL DAILY PRN
Status: DISCONTINUED | OUTPATIENT
Start: 2023-01-09 | End: 2023-01-10 | Stop reason: HOSPADM

## 2023-01-09 RX ORDER — ALPRAZOLAM 0.5 MG/1
1 TABLET ORAL NIGHTLY PRN
Status: DISCONTINUED | OUTPATIENT
Start: 2023-01-09 | End: 2023-01-10 | Stop reason: HOSPADM

## 2023-01-09 RX ORDER — CLOPIDOGREL BISULFATE 75 MG/1
75 TABLET ORAL DAILY
Status: DISCONTINUED | OUTPATIENT
Start: 2023-01-10 | End: 2023-01-10 | Stop reason: HOSPADM

## 2023-01-09 RX ORDER — SODIUM CHLORIDE 0.9 % (FLUSH) 0.9 %
5-40 SYRINGE (ML) INJECTION EVERY 12 HOURS SCHEDULED
Status: DISCONTINUED | OUTPATIENT
Start: 2023-01-09 | End: 2023-01-09 | Stop reason: SDUPTHER

## 2023-01-09 RX ORDER — SODIUM CHLORIDE 0.9 % (FLUSH) 0.9 %
5-40 SYRINGE (ML) INJECTION EVERY 12 HOURS SCHEDULED
Status: DISCONTINUED | OUTPATIENT
Start: 2023-01-09 | End: 2023-01-10 | Stop reason: HOSPADM

## 2023-01-09 RX ORDER — ENOXAPARIN SODIUM 100 MG/ML
30 INJECTION SUBCUTANEOUS DAILY
Status: DISCONTINUED | OUTPATIENT
Start: 2023-01-10 | End: 2023-01-10 | Stop reason: HOSPADM

## 2023-01-09 RX ORDER — ONDANSETRON 4 MG/1
4 TABLET, ORALLY DISINTEGRATING ORAL EVERY 8 HOURS PRN
Status: DISCONTINUED | OUTPATIENT
Start: 2023-01-09 | End: 2023-01-10 | Stop reason: HOSPADM

## 2023-01-09 RX ORDER — SODIUM CHLORIDE 9 MG/ML
INJECTION, SOLUTION INTRAVENOUS PRN
Status: DISCONTINUED | OUTPATIENT
Start: 2023-01-09 | End: 2023-01-10 | Stop reason: HOSPADM

## 2023-01-09 RX ORDER — ROSUVASTATIN CALCIUM 40 MG/1
40 TABLET, COATED ORAL NIGHTLY
Status: DISCONTINUED | OUTPATIENT
Start: 2023-01-09 | End: 2023-01-10 | Stop reason: HOSPADM

## 2023-01-09 RX ORDER — ROPINIROLE 1 MG/1
1 TABLET, FILM COATED ORAL NIGHTLY
Status: DISCONTINUED | OUTPATIENT
Start: 2023-01-09 | End: 2023-01-10 | Stop reason: HOSPADM

## 2023-01-09 RX ORDER — ASPIRIN 325 MG
325 TABLET ORAL ONCE
Status: DISCONTINUED | OUTPATIENT
Start: 2023-01-09 | End: 2023-01-10 | Stop reason: HOSPADM

## 2023-01-09 RX ORDER — BUSPIRONE HYDROCHLORIDE 15 MG/1
15 TABLET ORAL DAILY
Status: DISCONTINUED | OUTPATIENT
Start: 2023-01-10 | End: 2023-01-10 | Stop reason: HOSPADM

## 2023-01-09 RX ORDER — SODIUM CHLORIDE 0.9 % (FLUSH) 0.9 %
5-40 SYRINGE (ML) INJECTION PRN
Status: DISCONTINUED | OUTPATIENT
Start: 2023-01-09 | End: 2023-01-09 | Stop reason: SDUPTHER

## 2023-01-09 RX ORDER — LORAZEPAM 2 MG/ML
0.5 INJECTION INTRAMUSCULAR ONCE
Status: DISCONTINUED | OUTPATIENT
Start: 2023-01-09 | End: 2023-01-10 | Stop reason: HOSPADM

## 2023-01-09 RX ORDER — ACETAMINOPHEN 325 MG/1
650 TABLET ORAL EVERY 4 HOURS PRN
Status: DISCONTINUED | OUTPATIENT
Start: 2023-01-09 | End: 2023-01-10 | Stop reason: HOSPADM

## 2023-01-09 RX ORDER — ACETAMINOPHEN 325 MG/1
650 TABLET ORAL EVERY 6 HOURS PRN
Status: DISCONTINUED | OUTPATIENT
Start: 2023-01-09 | End: 2023-01-09 | Stop reason: SDUPTHER

## 2023-01-09 RX ORDER — OXYCODONE HYDROCHLORIDE AND ACETAMINOPHEN 5; 325 MG/1; MG/1
1 TABLET ORAL EVERY 4 HOURS PRN
Status: DISCONTINUED | OUTPATIENT
Start: 2023-01-09 | End: 2023-01-10 | Stop reason: HOSPADM

## 2023-01-09 RX ORDER — POLYETHYLENE GLYCOL 3350 17 G/17G
17 POWDER, FOR SOLUTION ORAL DAILY PRN
Status: DISCONTINUED | OUTPATIENT
Start: 2023-01-09 | End: 2023-01-09 | Stop reason: SDUPTHER

## 2023-01-09 RX ORDER — ALBUTEROL SULFATE 90 UG/1
2 AEROSOL, METERED RESPIRATORY (INHALATION) EVERY 6 HOURS PRN
Status: DISCONTINUED | OUTPATIENT
Start: 2023-01-09 | End: 2023-01-10 | Stop reason: HOSPADM

## 2023-01-09 RX ORDER — METHOCARBAMOL 500 MG/1
500 TABLET, FILM COATED ORAL NIGHTLY PRN
Status: DISCONTINUED | OUTPATIENT
Start: 2023-01-09 | End: 2023-01-10 | Stop reason: HOSPADM

## 2023-01-09 RX ORDER — SODIUM CHLORIDE 0.9 % (FLUSH) 0.9 %
5-40 SYRINGE (ML) INJECTION PRN
Status: DISCONTINUED | OUTPATIENT
Start: 2023-01-09 | End: 2023-01-10 | Stop reason: HOSPADM

## 2023-01-09 RX ORDER — MORPHINE SULFATE 4 MG/ML
4 INJECTION, SOLUTION INTRAMUSCULAR; INTRAVENOUS
Status: DISCONTINUED | OUTPATIENT
Start: 2023-01-09 | End: 2023-01-10 | Stop reason: HOSPADM

## 2023-01-09 RX ORDER — VITAMIN B COMPLEX
2000 TABLET ORAL DAILY
Status: DISCONTINUED | OUTPATIENT
Start: 2023-01-10 | End: 2023-01-10 | Stop reason: HOSPADM

## 2023-01-09 RX ORDER — PANTOPRAZOLE SODIUM 40 MG/1
40 TABLET, DELAYED RELEASE ORAL 2 TIMES DAILY
Status: DISCONTINUED | OUTPATIENT
Start: 2023-01-09 | End: 2023-01-10 | Stop reason: HOSPADM

## 2023-01-09 RX ORDER — ONDANSETRON 4 MG/1
4 TABLET, ORALLY DISINTEGRATING ORAL EVERY 8 HOURS PRN
Status: DISCONTINUED | OUTPATIENT
Start: 2023-01-09 | End: 2023-01-09 | Stop reason: SDUPTHER

## 2023-01-09 RX ORDER — ONDANSETRON 2 MG/ML
4 INJECTION INTRAMUSCULAR; INTRAVENOUS EVERY 6 HOURS PRN
Status: DISCONTINUED | OUTPATIENT
Start: 2023-01-09 | End: 2023-01-09 | Stop reason: SDUPTHER

## 2023-01-09 RX ADMIN — PANTOPRAZOLE SODIUM 40 MG: 40 TABLET, DELAYED RELEASE ORAL at 20:58

## 2023-01-09 RX ADMIN — IOPAMIDOL 80 ML: 755 INJECTION, SOLUTION INTRAVENOUS at 12:55

## 2023-01-09 RX ADMIN — ROSUVASTATIN CALCIUM 40 MG: 40 TABLET, FILM COATED ORAL at 20:58

## 2023-01-09 RX ADMIN — OXYCODONE AND ACETAMINOPHEN 1 TABLET: 5; 325 TABLET ORAL at 20:58

## 2023-01-09 RX ADMIN — Medication 10 ML: at 20:59

## 2023-01-09 RX ADMIN — ROPINIROLE HYDROCHLORIDE 1 MG: 1 TABLET, FILM COATED ORAL at 20:58

## 2023-01-09 RX ADMIN — ALPRAZOLAM 1 MG: 0.5 TABLET ORAL at 21:02

## 2023-01-09 ASSESSMENT — PAIN - FUNCTIONAL ASSESSMENT: PAIN_FUNCTIONAL_ASSESSMENT: PREVENTS OR INTERFERES SOME ACTIVE ACTIVITIES AND ADLS

## 2023-01-09 ASSESSMENT — PAIN DESCRIPTION - DESCRIPTORS: DESCRIPTORS: ACHING;DISCOMFORT

## 2023-01-09 ASSESSMENT — PAIN SCALES - GENERAL: PAINLEVEL_OUTOF10: 4

## 2023-01-09 ASSESSMENT — PAIN DESCRIPTION - ORIENTATION: ORIENTATION: LEFT

## 2023-01-09 ASSESSMENT — PAIN DESCRIPTION - LOCATION: LOCATION: LEG

## 2023-01-09 NOTE — ANESTHESIA PRE-OP
H&P Update    I have reviewed the history and physical and examined the patient and find no relevant changes. I have reviewed with the patient and/or family the risks, benefits, and alternatives to the procedure. Pre-sedation Assessment    Patient:  Alycia Magallanes   :   1950  Intended Procedure: Peripheral Angiogram with intervention    [facility] nurse's notes reviewed and agreed. Medications reviewed  Allergies: Allergies   Allergen Reactions    Iodine Itching and Nausea And Vomiting    Wellbutrin [Bupropion]      Dry mouth    Adhesive Tape Rash    Sertraline Other (See Comments)     Severe dry mouth    Sulfa Antibiotics Itching and Nausea Only         Pre-Procedure Assessment/Plan:  ASA 3 - Patient with moderate systemic disease with functional limitations  Mallampati 2    Level of Sedation Plan: Moderate sedation    Anginal Classification w/in 2 weeks: 0    Heart Failure w/in 2 weeks:  If yes NYHA class: 0    Post Procedure plan: Return to same level of care

## 2023-01-09 NOTE — ANESTHESIA PRE-OP
H&P Update    I have reviewed the history and physical and examined the patient and find no relevant changes. I have reviewed with the patient and/or family the risks, benefits, and alternatives to the procedure. Pre-sedation Assessment    Patient:  Bea Krueger   :   1950  Intended Procedure: Peripheral Angiogram    [facility] nurse's notes reviewed and agreed. Medications reviewed  Allergies: Allergies   Allergen Reactions    Iodine Itching and Nausea And Vomiting    Wellbutrin [Bupropion]      Dry mouth    Adhesive Tape Rash    Sertraline Other (See Comments)     Severe dry mouth    Sulfa Antibiotics Itching and Nausea Only         Pre-Procedure Assessment/Plan:  ASA 2 - Patient with mild systemic disease with no functional limitations  Mallampati 2  Level of Sedation Plan: Moderate sedation    Anginal Classification w/in 2 weeks: 0    Heart Failure w/in 2 weeks:  If yes NYHA class: 0    Post Procedure plan: Return to same level of care

## 2023-01-09 NOTE — H&P
CC: CP        Subjective:      History of Present Illness:     Radha Craft is a 81 y.o. patient with a PMH significant for CAD< PAD presented with complaints of CP. Patient complains of chest pain. Onset was 1 day ago, with worsening course since that time. The patient describes the pain as constant, precordial in nature, does not radiate. Patient rates pain as a 5/10 in intensity. Associated symptoms are chest pain and dyspnea. Aggravating factors are none. Alleviating factors are: none. Patient's cardiac risk factors are advanced age (older than 54 for men, 72 for women), dyslipidemia, and hypertension. Patient's risk factors for DVT/PE: none. Previous cardiac testing:  PCI . Past Medical History:   has a past medical history of acute intermittent porphyria, agoraphobic, Allergic rhinitis, Anxiety, Asthma, CAD (coronary artery disease), ckd 4, collagenous colitis, Depression, GERD (gastroesophageal reflux disease), Hyperlipidemia, Hypertension, Left thyroid nodule, Migraines, post menopausal, Prediabetes, Pulmonary nodules, Restless leg syndrome, and Urinary incontinence. Surgical History:   has a past surgical history that includes Hysterectomy; Cholecystectomy; Coronary angioplasty with stent (10/2013 [facility]); Colonoscopy (07/06/2017); Colonoscopy (N/A, 11/16/2022); and Upper gastrointestinal endoscopy (N/A, 11/16/2022). Social History:   reports that she has been smoking cigarettes. She started smoking about 54 years ago. She has a 50.00 pack-year smoking history. She has never used smokeless tobacco. She reports that she does not drink alcohol and does not use drugs. Family History:  family history includes Cancer in her brother and another family member; Hypertension in her father; No Known Problems in her maternal grandfather, maternal grandmother, paternal grandfather, and paternal grandmother; Other in her father.      Home Medications:  Were reviewed and are listed in nursing record and/or below                Prior to Admission medications    Medication Sig Start Date End Date Taking? Authorizing Provider   rOPINIRole (REQUIP) 1 MG tablet Take 1 tablet by mouth nightly For RLS 11/9/22     Sarah Johnson MD   carvedilol (COREG) 25 MG tablet Take 1 tablet by mouth 2 times daily 11/9/22     Sarah Johnson MD   rosuvastatin (CRESTOR) 20 MG tablet Take 1 tablet by mouth nightly 11/9/22     Sarah Johnson MD   umeclidinium-vilanterol (ANORO ELLIPTA) 62.5-25 MCG/INH AEPB inhaler Inhale 1 puff into the lungs daily Lot# [id number] 11/9/22     Sarah Johnson MD   fluocinolone (DERMA-SMOOTHE) 0.01 % external oil Apply topically.daily to scalp  Patient not taking: Reported on 11/16/2022 11/9/22     Sarah Johnson MD   busPIRone (BUSPAR) 15 MG tablet Take 15 mg by mouth daily 11/4/22     Sarah Johnson MD   albuterol sulfate HFA (PROVENTIL;VENTOLIN;PROAIR) 108 (90 Base) MCG/ACT inhaler Inhale 2 puffs into the lungs every 6 hours as needed for Wheezing 10/14/22     Russell Sr MD   ALPRAZolam (XANAX) 1 MG tablet Take 1 tablet by mouth nightly as needed for Sleep or Anxiety for up to 90 days. 9/12/22 12/11/22   Sarah Johnson MD   pantoprazole (PROTONIX) 40 MG tablet TAKE ONE TABLET BY MOUTH TWICE A DAY 9/12/22     Sarah Johnson MD   clopidogrel (PLAVIX) 75 MG tablet Take 1 tablet by mouth in the morning. 7/29/22     Sarah Johnson MD   chlorthalidone (HYGROTON) 25 MG tablet Take 12.5 mg by mouth as needed swelling  Patient not taking: Reported on 11/16/2022       Historical Provider, MD   sucralfate (CARAFATE) 1 GM tablet One po qhs before bed for gerd  Patient taking differently: as needed One po qhs before bed for gerd 5/11/22     Sarah Johnson MD   nitroGLYCERIN (NITROSTAT) 0.4 MG SL tablet Place 1 tablet under the tongue every 5 minutes as needed for Chest pain up to max of 3 total doses. If no relief after 1 dose, call 911. 12/5/21 8/15/22   Rhona King, DO         CURRENT Medications:  0.9 % sodium chloride  bolus, Once  heparin 25,000 unit in sodium chloride 0.45% 250 mL (premix) infusion, Continuous  [START ON 12/5/2022] clopidogrel (PLAVIX) tablet 75 mg, Daily  [START ON 12/5/2022] ALPRAZolam (XANAX) tablet 1 mg, Nightly PRN  pantoprazole (PROTONIX) tablet 40 mg, BID  albuterol sulfate HFA (PROVENTIL;VENTOLIN;PROAIR) 108 (90 Base) MCG/ACT inhaler 2 puff, Q6H PRN  [START ON 12/5/2022] busPIRone (BUSPAR) tablet 15 mg, Daily  rOPINIRole (REQUIP) tablet 1 mg, Nightly  rosuvastatin (CRESTOR) tablet 40 mg, Nightly  [START ON 12/5/2022] Umeclidinium-Vilanterol 62.5-25 MCG/ACT AEPB 1 puff, Daily  sodium chloride flush 0.9 % injection 5-40 mL, 2 times per day  sodium chloride flush 0.9 % injection 5-40 mL, PRN  0.9 % sodium chloride infusion, PRN  acetaminophen (TYLENOL) tablet 650 mg, Q4H PRN  ondansetron (ZOFRAN) injection 4 mg, Q6H PRN  atorvastatin (LIPITOR) tablet 80 mg, Nightly  [START ON 12/5/2022] aspirin chewable tablet 81 mg, Daily  eptifibatide (INTEGRILIN) 0.75 mg/mL infusion, Continuous           Allergies: Wellbutrin [bupropion], Adhesive tape, Iodine, Sertraline, and Sulfa antibiotics               Review of Systems: SEE HPI   Constitutional: no unanticipated weight loss. There's been no change in energy level, sleep pattern, or activity level. No fevers, chills. Eyes: No visual changes or diplopia. No scleral icterus. ENT: No Headaches, hearing loss or vertigo. No mouth sores or sore throat. Cardiovascular: Chest pain, tightness or discomfort. No Shortness of breath. No Dyspnea on exertion, Orthopnea, Paroxysmal nocturnal dyspnea or breathlessness at rest.  No Palpitations. No Syncope ('blackouts', 'faints', 'collapse') or dizziness. Respiratory: No cough or wheezing, no sputum production. No hematemesis. Gastrointestinal: No abdominal pain, appetite loss, blood in stools. No change in bowel or bladder habits. Genitourinary: No dysuria, trouble voiding, or hematuria.   Musculoskeletal:  No gait disturbance, no joint complaints. Integumentary: No rash or pruritis. Neurological: No headache, diplopia, change in muscle strength, numbness or tingling. Psychiatric: No anxiety or depression. Endocrine: No temperature intolerance. No excessive thirst, fluid intake, or urination. No tremor. Hematologic/Lymphatic: No abnormal bruising or bleeding, blood clots or swollen lymph nodes. Allergic/Immunologic: No nasal congestion or hives. Objective:      PHYSICAL EXAM:             Vitals:     12/04/22 2050   BP: 117/82   Pulse: 50   Resp: 19   SpO2: 100%    Weight: 135 lb 5.8 oz (61.4 kg)       General Appearance:  Alert, cooperative, no distress, appears stated age. Head:  Normocephalic, without obvious abnormality, atraumatic. Eyes:  Pupils equal and round. No scleral icterus. Mouth: Moist mucosa, no pharyngeal erythema. Nose: Nares normal. No drainage or sinus tenderness. Neck: Supple, symmetrical, trachea midline. No adenopathy. No tenderness/mass/nodules. No carotid bruit or elevated JVD. Lungs:   Respiratory Effort: Normal   Auscultation: Clear to auscultation bilaterally, respirations unlabored. No wheeze, rales   Chest Wall:  No tenderness or deformity. Cardiovascular:     Pulses  Palpation: normal   Ascultation: Regular rate, S1/ S2 normal. No murmur, rub, or gallop. 2+ radial and pedal pulses, symmetric  Carotid  Femoral   Abdomen and Gastrointestinal:   Soft, non-tender, bowel sounds active. Liver and Spleen  Masses   Musculoskeletal: No muscle wasting  Back  Gait   Extremities: Extremities normal, atraumatic. No cyanosis or edema. No cyanosis clubbing         Skin: Inspection and palpation performed, no rashes or lesions. Pysch: Normal mood and affect.  Alert and oriented to time place person   Neurologic: Normal gross motor and sensory exam.       Labs      All labs have been reviewed               Lab Results   Component Value Date/Time     WBC 9.2 12/04/2022 08:27 PM     RBC 4.28 12/04/2022 08:27 PM     HGB 12.5 12/04/2022 08:27 PM     HCT 38.8 12/04/2022 08:27 PM     MCV 90.7 12/04/2022 08:27 PM     RDW 13.6 12/04/2022 08:27 PM      12/04/2022 08:27 PM                Lab Results   Component Value Date/Time      12/04/2022 08:27 PM     K 4.5 12/04/2022 08:27 PM      12/04/2022 08:27 PM     CO2 20 12/04/2022 08:27 PM     BUN 39 12/04/2022 08:27 PM     CREATININE 1.6 12/04/2022 08:27 PM     GFRAA 24 08/12/2022 09:16 AM     GFRAA 54 01/16/2013 02:40 PM     AGRATIO 1.2 08/12/2022 09:16 AM     LABGLOM 34 12/04/2022 08:27 PM     GLUCOSE 135 12/04/2022 08:27 PM     PROT 7.4 08/12/2022 09:16 AM     PROT 7.9 06/24/2012 05:30 PM     CALCIUM 9.2 12/04/2022 08:27 PM     BILITOT 0.3 08/12/2022 09:16 AM     ALKPHOS 70 08/12/2022 09:16 AM     AST 18 08/12/2022 09:16 AM     ALT 8 08/12/2022 09:16 AM      No results found for: PTINR            Lab Results   Component Value Date     LABA1C 6.0 03/02/2022                Lab Results   Component Value Date     CKTOTAL 80 11/05/2019     CKMB 3.5 10/19/2013     TROPONINI <0.01 12/04/2022         Peripheral Angiogram 12/29/22:  FINDINGS:  1. Patent abdominal aorta with patent bilateral renal arteries. There  is a 50% stenosis of the left renal artery shortly after takeoff. The  abdominal aorta has mild atherosclerotic disease and calcification. 2.  Patent bilateral common internal and external iliac arteries with  less than 50% stenosis. There is a focal stenosis in the right external  iliac artery that may be in the 50% to 75% range. 3.  Patent bilateral common femoral and profunda femoris arteries with  less than 50% stenosis. The bilateral superficial femoral arteries are  patent. The left superficial femoral artery has a 90% stenosis present  proximally. The right had an 80% more distal and serial 75% lesions. The right was intervened upon with ultimately a 5 mm x 200 mm balloon  resulting in 0% residual stenosis.   The left will be intervened upon at  a later date. 4.  Patent bilateral popliteal arteries with no significant disease. 5.  Three-vessel runoff to the foot bilaterally. Cardiac, Vascular and Imaging Data all Personally Reviewed in Detail by Myself       12/05/22 right LE Arterial study:  Right Impression  Elevated velocity of the right mid superficial femoral artery suggests a 70-99% stenosis. Distal flow is monophasic. Elevated velocity of the profunda femoral artery suggests a less than 50% stenosis. Findings are consistent with severe multi-level arterial disease. Conclusions  Summary  Elevated velocity of the right mid superficial femoral artery suggests a 70-99% stenosis. Distal flow is monophasic. Assessment and Plan      -Acute inferior wall MI     -Needs emergent C     I had a detail discussion with the patient and the family members regarding the risk and benefit of the procedures. I explained to them the details of the procedure. I explained to them that the procedure will be performed using moderate sedation and local anaesthesia using lidocaine. I explained any risk of bleeding, perforation of the vessel, stroke, myocardial infarction or death. Also explained to the patient need for any emergent open heart surgery and CABG to save the patient's life. I have presented reasonable alternatives to the patient's proposed care, treatment, and services. The discussion I have done encompassed risks, benefits, and side effects related to the alternatives and the risks related to not receiving the proposed care, treatment, and services. The patient and the family members understood the risk and benefits and the details of procedure and would like to go ahead with the procedure. The patient gave informed consent. There were no vitals filed for this visit. I have reviewed the most recent H&P for this patient and independently examined the patient. There have been no changes.  We will proceed with the planned procedure. Daniel Gilbert MD  Vitals:    01/09/23 1009   BP: 127/80   Pulse: 65   Resp: 22   Temp: 97.9 °F (36.6 °C)   SpO2: 99%     I have reviewed the most recent H&P for this patient and independently examined the patient. There have been no changes. We will proceed with the planned procedure.     Daniel Gilbert MD

## 2023-01-09 NOTE — PROCEDURES
[address]                                 PROCEDURE NOTE    PATIENT NAME: Paul Tinajero                     :        1950  MED REC NO:   [id number]                          ROOM:  ACCOUNT NO:   [de-identified]                           ADMIT DATE: 2023  PROVIDER:     Michael Phelps MD    DATE OF PROCEDURE:  2023    [facility] CARDIOLOGY CATHETERIZATION REPORT:    SURGEON:  Michael Phelps MD    INDICATION FOR PROCEDURE:  Intermittent claudication. PROCEDURE:  The risks and benefits of the procedure were explained to  the patient. Informed consent was obtained. She was placed on the  cardiac catheterization table and prepped and draped in a sterile  fashion. Anesthesia was provided in the right groin with 1% lidocaine  injected subcutaneously and deep. Right common femoral artery was  accessed and cannulated and a 6-Cameroonian sheath inserted using Seldinger  technique. Over the 0.035 Terumo angled Glidewire, the IRIS catheter was advanced to  the ostium of the left common iliac artery. Angiography was performed  to this vessel down into the superficial femoral artery. We then wired  the Terumo Advantage wire down into the superficial femoral artery into  the SFA on the left and removed the IRIS catheter and the short 6-Cameroonian  sheath. We advanced a Terumo Destination sheath down into the common  femoral artery. Angiography was performed down to the level of the  foot. There was a focal 90% proximal superficial femoral artery lesion. Gave the patient IV unfractionated heparin. ACTs were checked  throughout the case to maintain an ACT greater than 250 seconds. We  ballooned this initially with a 5 mm x 60 mm balloon and then a 5 mm x  100 mm Lutonix drug-coated balloon. There was 0% residual stenosis and  excellent flow in the vessel at the conclusion of the procedure. Removed the Terumo Destination sheath over the wire and advanced a short  6-Danish sheath. We then closed the arteriotomy site with a Mynx  closure device and manual pressure was applied for hemostasis. There  were no complications from the procedure. Estimated blood loss was less  than 20 mL. Moderate sedation was administered for the procedure by an independent  agent at  direction and supervision with 4 mg of IV Versed and 200 mcg  of fentanyl. Total duration of sedation was 27 minutes. Vital signs  were monitored throughout and remained stable. The patient had an ASA  grade of III and a Mallampati score of I. There were no complications  from sedation. FINDINGS:  1. Patent left common, external and internal iliac arteries. 2.  Patent common femoral and profunda femoris with less than 50%  plaque. 3.  Patent left superficial femoral artery with a 90% focal proximal  stenosis that underwent balloon angioplasty ultimately with a 5 mm x 100  mm Lutonix drug-coated balloon resulting in 0% residual stenosis and  excellent flow. 4.  Patent popliteal artery with no significant stenosis. 5.  Patent tibioperoneal trunk with three-vessel runoff to the foot.         Rosa Elena German MD    D: 01/09/2023 13:12:58       T: 01/09/2023 13:15:44     [id number]  Job#: [id number]     Doc#: [id number]    CC:  Valerie Martinez MD

## 2023-01-10 VITALS
SYSTOLIC BLOOD PRESSURE: 115 MMHG | OXYGEN SATURATION: 96 % | BODY MASS INDEX: 21.34 KG/M2 | WEIGHT: 125 LBS | HEART RATE: 79 BPM | RESPIRATION RATE: 21 BRPM | HEIGHT: 64 IN | TEMPERATURE: 98.1 F | DIASTOLIC BLOOD PRESSURE: 65 MMHG

## 2023-01-10 LAB
ANION GAP SERPL CALCULATED.3IONS-SCNC: 12 MMOL/L (ref 3–16)
BUN BLDV-MCNC: 38 MG/DL (ref 7–20)
CALCIUM SERPL-MCNC: 9.2 MG/DL (ref 8.3–10.6)
CHLORIDE BLD-SCNC: 102 MMOL/L (ref 99–110)
CO2: 23 MMOL/L (ref 21–32)
CREAT SERPL-MCNC: 1.8 MG/DL (ref 0.6–1.2)
GFR SERPL CREATININE-BSD FRML MDRD: 30 ML/MIN/{1.73_M2}
GLUCOSE BLD-MCNC: 195 MG/DL (ref 70–99)
HCT VFR BLD CALC: 34 % (ref 36–48)
HEMOGLOBIN: 11 G/DL (ref 12–16)
MCH RBC QN AUTO: 29.2 PG (ref 26–34)
MCHC RBC AUTO-ENTMCNC: 32.2 G/DL (ref 31–36)
MCV RBC AUTO: 90.7 FL (ref 80–100)
PDW BLD-RTO: 14 % (ref 12.4–15.4)
PLATELET # BLD: 365 K/UL (ref 135–450)
PMV BLD AUTO: 7.7 FL (ref 5–10.5)
POTASSIUM REFLEX MAGNESIUM: 4.8 MMOL/L (ref 3.5–5.1)
RBC # BLD: 3.75 M/UL (ref 4–5.2)
SODIUM BLD-SCNC: 137 MMOL/L (ref 136–145)
WBC # BLD: 16.7 K/UL (ref 4–11)

## 2023-01-10 PROCEDURE — G0378 HOSPITAL OBSERVATION PER HR: HCPCS

## 2023-01-10 PROCEDURE — 94640 AIRWAY INHALATION TREATMENT: CPT

## 2023-01-10 PROCEDURE — 85027 COMPLETE CBC AUTOMATED: CPT

## 2023-01-10 PROCEDURE — 96372 THER/PROPH/DIAG INJ SC/IM: CPT

## 2023-01-10 PROCEDURE — 80048 BASIC METABOLIC PNL TOTAL CA: CPT

## 2023-01-10 PROCEDURE — 36415 COLL VENOUS BLD VENIPUNCTURE: CPT

## 2023-01-10 PROCEDURE — 99239 HOSP IP/OBS DSCHRG MGMT >30: CPT | Performed by: NURSE PRACTITIONER

## 2023-01-10 PROCEDURE — 94760 N-INVAS EAR/PLS OXIMETRY 1: CPT

## 2023-01-10 PROCEDURE — 6370000000 HC RX 637 (ALT 250 FOR IP): Performed by: INTERNAL MEDICINE

## 2023-01-10 PROCEDURE — 6360000002 HC RX W HCPCS: Performed by: INTERNAL MEDICINE

## 2023-01-10 PROCEDURE — 2580000003 HC RX 258: Performed by: INTERNAL MEDICINE

## 2023-01-10 RX ORDER — OXYCODONE HYDROCHLORIDE AND ACETAMINOPHEN 5; 325 MG/1; MG/1
1 TABLET ORAL EVERY 6 HOURS PRN
Qty: 8 TABLET | Refills: 0 | Status: SHIPPED | OUTPATIENT
Start: 2023-01-10 | End: 2023-01-12

## 2023-01-10 RX ADMIN — BUSPIRONE HYDROCHLORIDE 15 MG: 15 TABLET ORAL at 08:12

## 2023-01-10 RX ADMIN — Medication 2000 UNITS: at 08:12

## 2023-01-10 RX ADMIN — ENOXAPARIN SODIUM 30 MG: 100 INJECTION SUBCUTANEOUS at 08:12

## 2023-01-10 RX ADMIN — TIOTROPIUM BROMIDE AND OLODATEROL 1 PUFF: 3.124; 2.736 SPRAY, METERED RESPIRATORY (INHALATION) at 08:03

## 2023-01-10 RX ADMIN — CLOPIDOGREL BISULFATE 75 MG: 75 TABLET ORAL at 08:12

## 2023-01-10 RX ADMIN — OXYCODONE AND ACETAMINOPHEN 1 TABLET: 5; 325 TABLET ORAL at 12:31

## 2023-01-10 RX ADMIN — ASPIRIN 81 MG CHEWABLE TABLET 81 MG: 81 TABLET CHEWABLE at 08:12

## 2023-01-10 RX ADMIN — OXYCODONE AND ACETAMINOPHEN 1 TABLET: 5; 325 TABLET ORAL at 05:04

## 2023-01-10 RX ADMIN — PANTOPRAZOLE SODIUM 40 MG: 40 TABLET, DELAYED RELEASE ORAL at 08:12

## 2023-01-10 RX ADMIN — Medication 10 ML: at 08:12

## 2023-01-10 ASSESSMENT — PAIN DESCRIPTION - ORIENTATION
ORIENTATION: LEFT
ORIENTATION: LEFT

## 2023-01-10 ASSESSMENT — PAIN DESCRIPTION - LOCATION
LOCATION: LEG
LOCATION: LEG

## 2023-01-10 ASSESSMENT — PAIN DESCRIPTION - DESCRIPTORS
DESCRIPTORS: ACHING;SPASM;SQUEEZING
DESCRIPTORS: ACHING;DISCOMFORT

## 2023-01-10 ASSESSMENT — PAIN - FUNCTIONAL ASSESSMENT
PAIN_FUNCTIONAL_ASSESSMENT: PREVENTS OR INTERFERES SOME ACTIVE ACTIVITIES AND ADLS
PAIN_FUNCTIONAL_ASSESSMENT: PREVENTS OR INTERFERES SOME ACTIVE ACTIVITIES AND ADLS

## 2023-01-10 ASSESSMENT — PAIN SCALES - GENERAL
PAINLEVEL_OUTOF10: 5
PAINLEVEL_OUTOF10: 6
PAINLEVEL_OUTOF10: 0

## 2023-01-10 ASSESSMENT — PAIN DESCRIPTION - FREQUENCY: FREQUENCY: INTERMITTENT

## 2023-01-10 ASSESSMENT — PAIN DESCRIPTION - PAIN TYPE: TYPE: ACUTE PAIN

## 2023-01-10 ASSESSMENT — PAIN DESCRIPTION - ONSET: ONSET: ON-GOING

## 2023-01-10 NOTE — PROGRESS NOTES
[address]   Daily Progress Note      Admit Date:  1/9/2023    CC: claudication    HPI:   Ilya Spann is a 81 y.o. female with PMH CAD s/p PCI/BMS to mid RCA 2013, PAD/ bilateral SFA stenosis, CKD4, HLP, HTN.  + smoker. 12/4 adm to [facility] w acute inferior wall MI>emergent [facility] w PCI to [facility] to distal RCA    12/29 peripheral cath for claudication w/ successful angioplasty balloon to R SFA. Adm 1/9 for staged peripheral cath with successful balloon angioplasty to L SFA. Complicated by LLE calf swelling post procedure. C/O Left calf pain. Associated with swelling. Improves with percocet. Movement in Left leg has improved. She is able to ambulate in [place]. Denies CP, SOB, LH, syncope, palpitations. Review of Systems:   General: Denies fever, chills  Skin: Denies skin changes, rash, itching, lesions.   HEENT: Denies headache, dizziness, vision changes, nosebleeds, sore throat, nasal drainage  RESP: Denies cough, sputum, dyspnea, wheeze, snoring  CARD: Denies palpitations,  murmur  GI:Denies nausea, vomiting, heartburn, loss of appetite, change in bowels  : Denies frequency, pain, incontinence, polyuria  VASC: Denies claudication, leg cramps, clots  MUSC/SKEL: Denies pain, stiffness, arthritis  PSYCH: Denies anxiety, depression, stress  NEURO: Denies numbness, tingling, weakness,change in mood or memory  HEME: Denies abn bruising, bleeding, anemia  ENDO: Denies intolerance to heat, cold, excessive thirst or hunger, hx thyroid disease    Objective:   /77   Pulse 89   Temp 98.5 °F (36.9 °C) (Oral)   Resp 17   Ht 5' 4\" (1.626 m)   Wt 125 lb (56.7 kg)   SpO2 94%   BMI 21.46 kg/m²         Intake/Output Summary (Last 24 hours) at 1/10/2023 1020  Last data filed at 1/10/2023 0845  Gross per 24 hour   Intake 360 ml   Output --   Net 360 ml       WEIGHT:Admit Weight: 127 lb (57.6 kg)         Today  Weight: 125 lb (56.7 kg)   DRY WEIGHT:  Wt Readings from Last 3 Encounters: 01/10/23 125 lb (56.7 kg)   12/29/22 129 lb (58.5 kg)   12/13/22 129 lb 9.6 oz (58.8 kg)       Physical Exam:  GEN: Appears frail,  no acute distress  SKIN: Pink, warm, dry. HEENT: PERRLA. No adenopathy. LUNG: AP diameter normal. Diminished BS bilateral bases. No wheeze, rales, or ronchi. Respiratory effort normal.  HEART: S1S2 A/R. No JVD. No carotid bruit. No murmur, rub or gallop. ABD: Soft, nontender. +BS X 4 quads. No hepatomegaly. EXT: Radial and pedal pulses 2+ and symmetric. Without varicosities. LLE calf swelling. R groin site CDI. MUSCSKEL: Good ROM X4 extremities. No deformity. NEURO: A/O X3. Calm and cooperative. Telemetry: NSR    Medications:    aspirin  325 mg Oral Once    sodium chloride flush  5-40 mL IntraVENous 2 times per day    LORazepam  0.5 mg IntraVENous Once    enoxaparin  30 mg SubCUTAneous Daily    aspirin  81 mg Oral Daily    clopidogrel  75 mg Oral Daily    pantoprazole  40 mg Oral BID    busPIRone  15 mg Oral Daily    rOPINIRole  1 mg Oral Nightly    tiotropium-olodaterol  1 puff Inhalation Daily    rosuvastatin  40 mg Oral Nightly    Vitamin D  2,000 Units Oral Daily      sodium chloride      sodium chloride       sodium chloride flush, sodium chloride, acetaminophen, [DISCONTINUED] ondansetron **OR** ondansetron, albuterol sulfate HFA, ALPRAZolam, methocarbamol, sodium chloride, ondansetron **OR** [DISCONTINUED] ondansetron, acetaminophen **OR** [DISCONTINUED] acetaminophen, polyethylene glycol, oxyCODONE-acetaminophen, morphine **OR** morphine    Lab Data: I have reviewed all labs below today.    CBC:   Recent Labs     01/10/23  0456   WBC 16.7*   HGB 11.0*   HCT 34.0*   MCV 90.7        BMP:   Recent Labs     01/09/23  0932 01/10/23  0456    137   K 4.9 4.8    102   CO2 28 23   BUN 34* 38*   CREATININE 1.7* 1.8*     GLUCOSE:   Recent Labs     01/09/23 0932 01/10/23  0456   GLUCOSE 98 195*     LIVER PROFILE:   Lab Results   Component Value Date/Time AST 18 08/12/2022 09:16 AM    ALT 8 08/12/2022 09:16 AM    LIPASE 76.0 08/12/2022 09:16 AM    LABALBU 3.8 12/13/2022 02:10 PM    BILIDIR <0.2 03/25/2022 09:33 AM    BILITOT 0.3 08/12/2022 09:16 AM    ALKPHOS 70 08/12/2022 09:16 AM     PT/INR:   Lab Results   Component Value Date/Time    PROTIME 12.9 11/24/2020 11:37 PM    INR 1.11 11/24/2020 11:37 PM    INR 0.98 02/09/2015 10:48 AM    INR 0.97 10/19/2013 11:40 AM     APTT:   Lab Results   Component Value Date/Time    APTT 28.6 12/05/2022 03:56 AM     Pro-BNP:    Lab Results   Component Value Date/Time    PROBNP 634 12/04/2022 08:27 PM    PROBNP 267 11/24/2020 10:24 PM    PROBNP 226 02/09/2015 10:48 AM     Parameters:   > 450 pg/mL under age 48  > 900 pg/mL ages 54-65  > 1800 pg/mL over age 76    ENZYMES:  Lab Results   Component Value Date/Time    CKTOTAL 80 11/05/2019 03:33 PM    CKTOTAL 88 10/19/2013 11:40 AM    CKTOTAL 87 10/18/2013 02:54 PM    CKMB 3.5 10/19/2013 11:40 AM    CKMB 2.4 10/18/2013 02:54 PM    TROPONINI 6.54 12/05/2022 02:31 PM    TROPONINI <0.01 12/04/2022 08:27 PM    TROPONINI <0.01 11/24/2020 10:24 PM     FASTING LIPID PANEL:  Lab Results   Component Value Date/Time    CHOL 163 03/25/2022 09:33 AM    HDL 59 03/25/2022 09:33 AM    LDLCALC 77 03/25/2022 09:33 AM    TRIG 135 03/25/2022 09:33 AM       Diagnostics:    EKG:     Echo 10/21/13  Ejection fraction is visually estimated to be 60 %. trace mitral regurgitation is present. Normal right ventricular size and function. There is trivial tricuspid regurgitation with RVSP estimated at 39 mmHg. Stress perfusion 6/30/20  Normal myocardial perfusion study. Normal LV size and systolic function. ECG portion of the stress test is normal.     Overall findings represent a low risk study. Lower extremity arterial study 6/30/20   No significant evidence of large vessel arterial occlusive disease of the     lower extremities.      Normal doppler waveforms, plethysmography, and segmental pressures     Normal SOULEYMANE and Toe/Brachial Index bilaterally      Renal arterial duplex 3/17/22  Right Impression   There is no evidence to suggest renal artery stenosis.   The right renal vein is patent.   The parenchymal resistive index is within normal limits.   Left Impression   There is no evidence to suggest renal artery stenosis.   The left renal vein is patent.   The parenchymal resistive index is within normal limits.   Unilateral ImpressionNo evidence of abdominal aortic aneurysmal dilation. Mild   to moderate atherosclerotic plaque throughout.     Left and Right Heart Cath PCI 12/4/22:  1.  Right coronary had distal 99.99% stenosis with GARO 1 flow.  2.  Left main normal.  3.  Left anterior descending artery is patent. no significant stenosis  present.  4.  Left circumflex is patent without significant stenosis of the  dominant system.  5.  LVEDP is 21%, LV pressure is 110/70.  6.  Pulmonary capillary wedge pressure 21 mmHg.  7.  Pulmonary artery pressure 42/19.     SUMMARY:  Successful revascularization of the distal right coronary  artery with placement of one drug-coated stent 3.0 x 26 mm post-dilating  up to 3.3 mm.     Echo 12/5/22:  Normal left ventricular size, wall thickness and systolic function.   Ejection fraction is visually estimated to be 60-65 %.   Indeterminate diastolic function.   The right ventricle is normal in size and function.   The left atrium is mildly dilated.   There are no significant valvular abnormalities appreciated in this study.    12/05/22 right LE Arterial study:  Right Impression  Elevated velocity of the right mid superficial femoral artery suggests a 70-99% stenosis. Distal flow is monophasic.  Elevated velocity of the profunda femoral artery suggests a less than 50% stenosis.  Findings are consistent with severe multi-level arterial disease.  Conclusions  Summary  Elevated velocity of the right mid superficial femoral artery suggests a 70-99% stenosis. Distal flow is  monophasic. Peripheral Cath 12/29/2023:   FINDINGS:  1. Patent abdominal aorta with patent bilateral renal arteries. There  is a 50% stenosis of the left renal artery shortly after takeoff. The  abdominal aorta has mild atherosclerotic disease and calcification. 2.  Patent bilateral common internal and external iliac arteries with  less than 50% stenosis. There is a focal stenosis in the right external  iliac artery that may be in the 50% to 75% range. 3.  Patent bilateral common femoral and profunda femoris arteries with  less than 50% stenosis. The bilateral superficial femoral arteries are  patent. The left superficial femoral artery has a 90% stenosis present  proximally. The right had an 80% more distal and serial 75% lesions. The right was intervened upon with ultimately a 5 mm x 200 mm balloon  resulting in 0% residual stenosis. The left will be intervened upon at  a later date. 4.  Patent bilateral popliteal arteries with no significant disease. 5.  Three-vessel runoff to the foot bilaterally. Peripheral Cath: 1/9/2023:   FINDINGS:  1. Patent left common, external and internal iliac arteries. 2.  Patent common femoral and profunda femoris with less than 50%  plaque. 3.  Patent left superficial femoral artery with a 90% focal proximal  stenosis that underwent balloon angioplasty ultimately with a 5 mm x 100  mm Lutonix drug-coated balloon resulting in 0% residual stenosis and  excellent flow. 4.  Patent popliteal artery with no significant stenosis. 5.  Patent tibioperoneal trunk with three-vessel runoff to the foot. Assessment/Plan:    1.) PAD: s/p balloon angioplasty to RLE SFA 12/19/22, s/p balloon angioplasty LLE SFA 1/9/22.   Supervised Exercise Therapy - 3x/week for 12 weeks  Cont asa/plavix/statin  Risk factor management: Smoking cessation, cardiac/low cholesterol diet, BS control (consider SGLT2i), weight reduction  Elevate LLE, percocet for pain, continue to ambulate as tolerated    2.) CAD: BMS to RCA 2013, s/p JAYME to RCA 12/4/22. Stable without angina, cont GDMT  DAPT: asa,plavix  Lipid management/high intensity statin: crestor  Risk factor management: high blood pressure, high cholesterol, Diabetes, smoking, obesity, family hx  Lifestyle modification: Heart healthy diet, regular exercise, weight loss, smoking cessation, stress reduction  Cardiac Rehab: Phase 2    3.) Hypertension:   Goal BP <130/80.  Met  Non pharmacologic interventions include:   -weight loss  -heart healthy low sodium and low fat diet that consist of mostly fruits, vegetables and grains (Dash diet)  -limited amount of alcohol (no more than 1 drink/day for women, 2 drinks/day for men)  -regular physical activity  -no smoking  -stress reduction    Electronically signed by BEKAH Morales CNP on 1/10/2023 at 10:20 AM

## 2023-01-10 NOTE — PLAN OF CARE
Problem: Discharge Planning  Goal: Discharge to home or other facility with appropriate resources  Outcome: Progressing  Flowsheets (Taken 1/9/2023 2244)  Discharge to home or other facility with appropriate resources:   Identify barriers to discharge with patient and caregiver   Arrange for needed discharge resources and transportation as appropriate     Problem: Pain  Goal: Verbalizes/displays adequate comfort level or baseline comfort level  Outcome: Progressing  Flowsheets (Taken 1/10/2023 0422)  Verbalizes/displays adequate comfort level or baseline comfort level:   Encourage patient to monitor pain and request assistance   Assess pain using appropriate pain scale   Administer analgesics based on type and severity of pain and evaluate response     Problem: ABCDS Injury Assessment  Goal: Absence of physical injury  Outcome: Progressing  Flowsheets (Taken 1/10/2023 0422)  Absence of Physical Injury: Implement safety measures based on patient assessment     Problem: Safety - Adult  Goal: Free from fall injury  Outcome: Progressing  Flowsheets (Taken 1/10/2023 0422)  Free From Fall Injury: Instruct family/caregiver on patient safety

## 2023-01-10 NOTE — PROGRESS NOTES
Pt able to ambulate in [place]. Continues to have pain but gait definitely more normal than this morning, able to apply more pressure to left foot than earlier. Pt is requesting pain medication since was walking. Pt is up to chair with call light within reach.

## 2023-01-10 NOTE — PROGRESS NOTES
4 Eyes Skin Assessment     NAME:  Abida Domínguez  YOB: 1950  MEDICAL RECORD NUMBER:  [id number]    The patient is being assessed for  Admission    I agree that One RN have performed a thorough Head to Toe Skin Assessment on the patient. ALL assessment sites listed below have been assessed. Areas assessed by both nurses:    Head, Face, Ears, Shoulders, Back, Chest, Arms, Elbows, Hands, Sacrum. Buttock, Coccyx, Ischium, and Legs. Feet and Heels        Does the Patient have a Wound?  No noted wound(s)       Hakeem Prevention initiated by RN: NA   Wound Care Orders initiated by RN: NA    Pressure Injury (Stage 3,4, Unstageable, DTI, NWPT, and Complex wounds) if present place referral order by RN under : NA    New and Established Ostomies, if present place, referral order under : NA      Nurse 1 eSignature: Electronically signed by Janes Sagastume RN on 1/10/23 at 4:22 AM EST    **SHARE this note so that the co-signing nurse is able to place an eSignature**    Nurse 2 eSignature: Electronically signed by Lori Herrera RN on 1/10/23 at 5:57 AM EST

## 2023-01-10 NOTE — DISCHARGE SUMMARY
[address]    DISCHARGE SUMMARY      Patient ID:  Teofilo Iqbal  [id number] 81 y.o. 1950    Admit date: 1/9/2023    Discharge date:  1/10/2023    Admitting Physician: Kurt Arnett MD     Discharge Physician: ELISE Galloway, [facility] - CNP     Admission Diagnoses: History of intermittent claudication [Z86.79]  Leg pain [M79.606]    Discharge Diagnoses:   Patient Active Problem List   Diagnosis    Anxiety    Panic disorder    Palpitations    IBS (irritable bowel syndrome)    Depression    Colitis    PAD (peripheral artery disease) (HCC)    CAD (coronary artery disease)    Primary hypertension    Acute intermittent porphyria (HCC)    Arthritis    Asthma    Atrial fibrillation ([facility]    Gastroesophageal reflux disease    Seasonal allergic rhinitis    Urinary incontinence    Stage 4 chronic kidney disease ([facility])    Left thyroid nodule    Sedative, hypnotic or anxiolytic use, unspecified with unspecified sedative, hypnotic or anxiolytic-induced disorder    Sedative, hypnotic or anxiolytic dependence with unspecified sedative, hypnotic or anxiolytic-induced disorder    Sedative, hypnotic or anxiolytic dependence, uncomplicated    Episode of recurrent major depressive disorder, unspecified depression episode severity ([address]    Tobacco abuse    ST elevation myocardial infarction involving right coronary artery ([address]    Inferior MI ([facility]    Stage 2 chronic kidney disease    Dyslipidemia    Encounter for tobacco use cessation counseling    History of intermittent claudication    Leg pain        Discharged Condition: good    Hospital Course: Teofilo Iqbal was admitted with PMH CAD s/p PCI/BMS to mid RCA 2013, PAD/ bilateral SFA stenosis, CKD4, HLP, HTN.  + smoker. [facility]/4 [facility] w acute inferior wall MI>emergent [facility] w PCI to [facility] to distal RCA     12/29 peripheral cath for claudication w/ successful angioplasty balloon to R SFA.       Adm 1/9 for staged peripheral cath with successful balloon angioplasty to L SFA. Complicated by LLE calf swelling post procedure. C/O Left calf pain. Associated with swelling. Improves with percocet. Movement in Left leg has improved. She is able to ambulate in [place]. Denies CP, SOB, LH, syncope, palpitations. 1.) PAD: s/p balloon angioplasty to RLE SFA 12/19/22, s/p balloon angioplasty LLE SFA 1/9/22. Supervised Exercise Therapy - 3x/week for 12 weeks  Cont asa/plavix/statin  Risk factor management: Smoking cessation, cardiac/low cholesterol diet, BS control (consider SGLT2i), weight reduction  Elevate LLE, percocet for pain, continue to ambulate as tolerated     2.) CAD: BMS to RCA 2013, s/p JAYME to RCA 12/4/22. Stable without angina, cont GDMT  DAPT: asa,plavix  Lipid management/high intensity statin: crestor  Risk factor management: high blood pressure, high cholesterol, Diabetes, smoking, obesity, family hx  Lifestyle modification: Heart healthy diet, regular exercise, weight loss, smoking cessation, stress reduction  Cardiac Rehab: Phase 2     3.) Hypertension:   Goal BP <130/80. Met  Non pharmacologic interventions include:   -weight loss  -heart healthy low sodium and low fat diet that consist of mostly fruits, vegetables and grains (Dash diet)  -limited amount of alcohol (no more than 1 drink/day for women, 2 drinks/day for men)  -regular physical activity  -no smoking  -stress reduction    Consults:  None    Significant Diagnostic Studies:   Peripheral Cath: 1/9/2023:   FINDINGS:  1. Patent left common, external and internal iliac arteries. 2.  Patent common femoral and profunda femoris with less than 50%  plaque. 3.  Patent left superficial femoral artery with a 90% focal proximal  stenosis that underwent balloon angioplasty ultimately with a 5 mm x 100  mm Lutonix drug-coated balloon resulting in 0% residual stenosis and  excellent flow. 4.  Patent popliteal artery with no significant stenosis.   5.  Patent tibioperoneal trunk with three-vessel runoff to the foot    Labs:   Lab Results   Component Value Date    CREATININE 1.8 (H) 01/10/2023    BUN 38 (H) 01/10/2023     01/10/2023    K 4.8 01/10/2023     01/10/2023    CO2 23 01/10/2023      Lab Results   Component Value Date    WBC 16.7 (H) 01/10/2023    HGB 11.0 (L) 01/10/2023    HCT 34.0 (L) 01/10/2023    MCV 90.7 01/10/2023     01/10/2023      Lab Results   Component Value Date    INR 1.11 11/24/2020    PROTIME 12.9 11/24/2020      Lab Results   Component Value Date    BNP 26 10/18/2013       Disposition: home    Patient Instructions:      Medication List        START taking these medications      oxyCODONE-acetaminophen 5-325 MG per tablet  Commonly known as: Percocet  Take 1 tablet by mouth every 6 hours as needed for Pain for up to 2 days. Intended supply: 3 days. Take lowest dose possible to manage pain Max Daily Amount: 4 tablets            CONTINUE taking these medications      albuterol sulfate  (90 Base) MCG/ACT inhaler  Commonly known as: PROVENTIL;VENTOLIN;PROAIR  Inhale 2 puffs into the lungs every 6 hours as needed for Wheezing     ALPRAZolam 1 MG tablet  Commonly known as: Xanax  Take 1 tablet by mouth nightly as needed for Sleep or Anxiety for up to 30 days. Anoro Ellipta 62.5-25 MCG/ACT Aepb  Generic drug: Umeclidinium-Vilanterol  Inhale 1 puff into the lungs daily Lot# [id number]     aspirin 81 MG chewable tablet  Take 1 tablet by mouth daily     busPIRone 15 MG tablet  Commonly known as: BUSPAR  Take 15 mg by mouth daily     Cholecalciferol 50 MCG (2000 UT) Caps     clopidogrel 75 MG tablet  Commonly known as: Plavix  Take 1 tablet by mouth in the morning. fluocinolone 0.01 % external oil  Commonly known as: DERMA-SMOOTHE  Apply topically. daily to scalp     methocarbamol 500 MG tablet  Commonly known as: ROBAXIN     nitroGLYCERIN 0.4 MG SL tablet  Commonly known as: Nitrostat  Place 1 tablet under the tongue every 5 minutes as needed for Chest pain up to max of 3 total doses. If no relief after 1 dose, call 911.      pantoprazole 40 MG tablet  Commonly known as: PROTONIX  TAKE ONE TABLET BY MOUTH TWICE A DAY     rOPINIRole 1 MG tablet  Commonly known as: REQUIP  Take 1 tablet by mouth nightly For RLS     rosuvastatin 40 MG tablet  Commonly known as: CRESTOR  Take 1 tablet by mouth nightly               Where to Get Your Medications        You can get these medications from any pharmacy    Bring a paper prescription for each of these medications  oxyCODONE-acetaminophen 5-325 MG per tablet           Signed:  BEKAH Pringle CNP on 1/10/2023 at 12:43 PM    [facility] [address], [id number] [place] [address]  Phone: [phone]  Fax: [phone]

## 2023-01-10 NOTE — PROGRESS NOTES
The [address] [place] [address], [address] [place] [address] [address] [place] [address]  [phone]    PrimaryCare Doctor:  Ruby Pettit MD  Primary Cardiologist: Dr. Radha Rajput    Chief Complaint   Patient presents with    Follow-Up from Hospital     Follow up from procedure on left leg with Radha Rajput on 1/9/23. Left calf pain and tingling in left foot. Right leg and foot are fine. History of Present Illness:  Jennifer Calixto is a 81 y.o. female with PMH CAD s/p PCI/BMS to mid RCA 2013, PAD/ bilateral SFA stenosis, CKD4, HLP, HTN.  + smoker. 12/4 adm to [facility] w acute inferior wall MI>emergent [facility] w PCI to [facility] to distal RCA     12/29 peripheral cath for claudication w/ successful angioplasty balloon to R SFA. Adm 1/9 for staged peripheral cath with successful balloon angioplasty to L SFA. Complicated by LLE calf swelling post procedure. Presents to [facility] today for PAD follow  up. LLE swelling and pain improving. Light purple bruise. Good mobility. Taking tylenol for pain. Able to ambulate. She has not started cardiac rehab following PCI, she was waiting to complete peripheral procedures. C/O Left calf pain. Associated with swelling. Improves with percocet. + palpable and doppler pulse L DP    Denies CP, SOB, LH, syncope, palpitations. Review of Systems:   General: Denies fever, chills, fatigue, weakness  Skin: Denies skin changes, rash, itching, lesions.   HEENT: Denies headache, dizziness, vision changes, nosebleeds, sore throat, nasal drainage  RESP: Denies cough, sputum, dyspnea, wheeze, snoring  CARD: Denies palpitations,  murmur  GI:Denies nausea, vomiting, heartburn, loss of appetite, change in bowels  : Denies frequency, pain, incontinence, polyuria  VASC: Denies claudication, leg cramps, clots  MUSC/SKEL: Denies pain, stiffness, arthritis  PSYCH: Denies anxiety, depression, stress  NEURO: Denies numbness, tingling, weakness,change in mood or memory  HEME: Denies abn bruising, bleeding, anemia  ENDO: Denies intolerance to heat, cold, excessive thirst or hunger, hx thyroid disease    /66   Pulse 76   Ht 5' 4\" (1.626 m)   Wt 127 lb (57.6 kg)   SpO2 98%   BMI 21.80 kg/m²   Wt Readings from Last 3 Encounters:   01/16/23 127 lb (57.6 kg)   01/10/23 125 lb (56.7 kg)   12/29/22 129 lb (58.5 kg)       Physical Exam:  GEN: Appears well, no acute distress  SKIN: Pink, warm, dry. Nails without clubbing. HEENT: PERRLA. Normocephalic, atraumatic. Neck supple. No adenopathy. LUNG: AP diameter normal. Clear bilateral. No wheeze, rales, or ronchi. Respiratory effort normal.  HEART: S1S2 A/R. No JVD. No carotid bruit. No murmur, rub or gallop. ABD: Soft, nontender. +BS X 4 quads. No hepatomegaly. EXT: Doppler and palpable pulse LLE DP No edema. Light purple bruise L calf/shin. MUSCSKEL: Good ROM X4 extremities. No deformity. NEURO: A/O X3. Calm and cooperative. Past Medical History:   has a past medical history of acute intermittent porphyria, agoraphobic, Allergic rhinitis, Anxiety, Asthma, CAD (coronary artery disease), ckd 4, collagenous colitis, Depression, GERD (gastroesophageal reflux disease), Hyperlipidemia, Hypertension, Left thyroid nodule, Migraines, post menopausal, Prediabetes, Pulmonary nodules, Restless leg syndrome, STEMI involving oth coronary artery of inferior wall ([facility] [address] and Urinary incontinence. Surgical History:   has a past surgical history that includes Hysterectomy; Cholecystectomy; Coronary angioplasty with stent (10/2013 [place]); Colonoscopy (07/06/2017); Colonoscopy (N/A, 11/16/2022); and Upper gastrointestinal endoscopy (N/A, 11/16/2022). Social History:   reports that she has been smoking cigarettes. She started smoking about 55 years ago. She has a 50.00 pack-year smoking history. She has never used smokeless tobacco. She reports that she does not drink alcohol and does not use drugs.    Family History:   Family History   Problem Relation Age of Onset    Hypertension Father     Other Father         MI    Cancer Brother         testicular    No Known Problems Maternal Grandmother     No Known Problems Maternal Grandfather     No Known Problems Paternal Grandmother     No Known Problems Paternal Grandfather     Cancer Other        HomeMedications:  Prior to Admission medications    Medication Sig Start Date End Date Taking? Authorizing Provider   methocarbamol (ROBAXIN) 500 MG tablet Take 500 mg by mouth nightly as needed   Yes Historical Provider, MD   Cholecalciferol 50 MCG (2000 UT) CAPS Take 1 capsule by mouth daily   Yes Historical Provider, MD   ALPRAZolam Danney Niels) 1 MG tablet Take 1 tablet by mouth nightly as needed for Sleep or Anxiety for up to 30 days. 12/19/22 1/18/23 Yes Nohemi Hunt MD   aspirin 81 MG chewable tablet Take 1 tablet by mouth daily 12/7/22  Yes Jada Rosas MD   rosuvastatin (CRESTOR) 40 MG tablet Take 1 tablet by mouth nightly 12/6/22  Yes Jada Rosas MD   rOPINIRole (REQUIP) 1 MG tablet Take 1 tablet by mouth nightly For RLS 11/9/22  Yes Andi Manjarrez MD   umeclidinium-vilanterol [facility] ELLIPTA) 62.5-25 MCG/INH AEPB inhaler Inhale 1 puff into the lungs daily Lot# [id number] 11/9/22  Yes Andi Manjrarez MD   busPIRone (BUSPAR) 15 MG tablet Take 15 mg by mouth daily 11/4/22  Yes Andi Manjarrez MD   albuterol sulfate HFA (PROVENTIL;VENTOLIN;PROAIR) 108 (90 Base) MCG/ACT inhaler Inhale 2 puffs into the lungs every 6 hours as needed for Wheezing 10/14/22  Yes Arnoldo Michael MD   pantoprazole (PROTONIX) 40 MG tablet TAKE ONE TABLET BY MOUTH TWICE A DAY 9/12/22  Yes Andi Manjarrez MD   clopidogrel (PLAVIX) 75 MG tablet Take 1 tablet by mouth in the morning. 7/29/22  Yes Andi Manjarrez MD   nitroGLYCERIN (NITROSTAT) 0.4 MG SL tablet Place 1 tablet under the tongue every 5 minutes as needed for Chest pain up to max of 3 total doses.  If no relief after 1 dose, call 911. 12/5/21 1/16/23 Yes Rhona King,         Allergies:  Iodine, Wellbutrin [bupropion], Adhesive tape, Sertraline, and Sulfa antibiotics       LABS: Results reviewed with patient today.     CBC:   Lab Results   Component Value Date/Time    WBC 16.7 01/10/2023 04:56 AM    WBC 8.8 01/05/2023 10:41 AM    WBC 7.7 12/29/2022 07:55 AM    RBC 3.75 01/10/2023 04:56 AM    RBC 4.26 01/05/2023 10:41 AM    RBC 4.02 12/29/2022 07:55 AM    HGB 11.0 01/10/2023 04:56 AM    HGB 12.4 01/05/2023 10:41 AM    HGB 11.6 12/29/2022 07:55 AM    HCT 34.0 01/10/2023 04:56 AM    HCT 38.7 01/05/2023 10:41 AM    HCT 36.5 12/29/2022 07:55 AM    MCV 90.7 01/10/2023 04:56 AM    MCV 90.7 01/05/2023 10:41 AM    MCV 90.9 12/29/2022 07:55 AM    RDW 14.0 01/10/2023 04:56 AM    RDW 14.4 01/05/2023 10:41 AM    RDW 14.0 12/29/2022 07:55 AM     01/10/2023 04:56 AM     01/05/2023 10:41 AM     12/29/2022 07:55 AM     BMP:  Lab Results   Component Value Date/Time     01/10/2023 04:56 AM     01/09/2023 09:32 AM     01/05/2023 10:41 AM    K 4.8 01/10/2023 04:56 AM    K 4.9 01/09/2023 09:32 AM    K 5.4 01/05/2023 10:41 AM    K 4.6 12/29/2022 07:55 AM    K 4.5 12/04/2022 08:27 PM    K 4.7 11/24/2020 10:24 PM     01/10/2023 04:56 AM     01/09/2023 09:32 AM    CL 99 01/05/2023 10:41 AM    CO2 23 01/10/2023 04:56 AM    CO2 28 01/09/2023 09:32 AM    CO2 25 01/05/2023 10:41 AM    PHOS 4.1 12/13/2022 02:10 PM    PHOS 3.6 12/06/2022 03:32 AM    PHOS 4.1 02/07/2022 10:18 AM    BUN 38 01/10/2023 04:56 AM    BUN 34 01/09/2023 09:32 AM    BUN 30 01/05/2023 10:41 AM    CREATININE 1.8 01/10/2023 04:56 AM    CREATININE 1.7 01/09/2023 09:32 AM    CREATININE 1.7 01/05/2023 10:41 AM     BNP:   Lab Results   Component Value Date/Time    PROBNP 634 12/04/2022 08:27 PM    PROBNP 267 11/24/2020 10:24 PM    PROBNP 226 02/09/2015 10:48 AM       Parameters:   > 450 pg/mL under age 48  > 900 pg/mL ages 54-65  > 1800 pg/mL over age 76    Iron Studies:    Lab Results   Component Value Date/Time    TIBC 436 06/16/2020 03:45 PM    TIBC 313 01/16/2013 02:40 PM     GLUCOSE:   No results for input(s): GLUCOSE in the last 72 hours. LIVER PROFILE:   Lab Results   Component Value Date/Time    AST 18 08/12/2022 09:16 AM    ALT 8 08/12/2022 09:16 AM    LIPASE 76.0 08/12/2022 09:16 AM    LABALBU 3.8 12/13/2022 02:10 PM    BILIDIR <0.2 03/25/2022 09:33 AM    BILITOT 0.3 08/12/2022 09:16 AM    ALKPHOS 70 08/12/2022 09:16 AM     PT/INR:   Lab Results   Component Value Date/Time    PROTIME 12.9 11/24/2020 11:37 PM    INR 1.11 11/24/2020 11:37 PM     Cardiac Enzymes:  Lab Results   Component Value Date/Time    CKTOTAL 80 11/05/2019 03:33 PM    CKMB 3.5 10/19/2013 11:40 AM    TROPONINI 6.54 12/05/2022 02:31 PM     FASTING LIPID PANEL:  Lab Results   Component Value Date/Time    CHOL 163 03/25/2022 09:33 AM    HDL 59 03/25/2022 09:33 AM    LDLCALC 77 03/25/2022 09:33 AM    TRIG 135 03/25/2022 09:33 AM     TSH:   Lab Results   Component Value Date/Time    TSH 0.77 07/30/2015 01:15 PM       Cardiac Imaging: Reports interpreted by me and reviewed with patient today. EKG:      Echo 10/21/13  Ejection fraction is visually estimated to be 60 %. trace mitral regurgitation is present. Normal right ventricular size and function. There is trivial tricuspid regurgitation with RVSP estimated at 39 mmHg. Stress perfusion 6/30/20  Normal myocardial perfusion study. Normal LV size and systolic function. ECG portion of the stress test is normal.     Overall findings represent a low risk study. Lower extremity arterial study 6/30/20   No significant evidence of large vessel arterial occlusive disease of the     lower extremities. Normal doppler waveforms, plethysmography, and segmental pressures     Normal SOULEYMANE and Toe/Brachial Index bilaterally       Renal arterial duplex 3/17/22  Right Impression   There is no evidence to suggest renal artery stenosis. The right renal vein is patent.    The parenchymal resistive index is within normal limits. Left Impression   There is no evidence to suggest renal artery stenosis. The left renal vein is patent. The parenchymal resistive index is within normal limits. Unilateral ImpressionNo evidence of abdominal aortic aneurysmal dilation. Mild   to moderate atherosclerotic plaque throughout. Left and Right Heart Cath PCI 12/4/22:  1. Right coronary had distal 99.99% stenosis with GARO 1 flow. 2.  Left main normal.  3.  Left anterior descending artery is patent. no significant stenosis  present. 4.  Left circumflex is patent without significant stenosis of the  dominant system. 5.  LVEDP is 21%, LV pressure is 110/70. 6.  Pulmonary capillary wedge pressure 21 mmHg. 7.  Pulmonary artery pressure 42/19. SUMMARY:  Successful revascularization of the distal right coronary  artery with placement of one drug-coated stent 3.0 x 26 mm post-dilating  up to 3.3 mm. Echo 12/5/22:  Normal left ventricular size, wall thickness and systolic function. Ejection fraction is visually estimated to be 60-65 %. Indeterminate diastolic function. The right ventricle is normal in size and function. The left atrium is mildly dilated. There are no significant valvular abnormalities appreciated in this study. 12/05/22 right LE Arterial study:  Right Impression  Elevated velocity of the right mid superficial femoral artery suggests a 70-99% stenosis. Distal flow is monophasic. Elevated velocity of the profunda femoral artery suggests a less than 50% stenosis. Findings are consistent with severe multi-level arterial disease. Conclusions  Summary  Elevated velocity of the right mid superficial femoral artery suggests a 70-99% stenosis. Distal flow is monophasic. Peripheral Cath 12/29/2023:   FINDINGS:  1. Patent abdominal aorta with patent bilateral renal arteries. There  is a 50% stenosis of the left renal artery shortly after takeoff.   The  abdominal aorta has mild atherosclerotic disease and calcification. 2.  Patent bilateral common internal and external iliac arteries with  less than 50% stenosis. There is a focal stenosis in the right external  iliac artery that may be in the 50% to 75% range. 3.  Patent bilateral common femoral and profunda femoris arteries with  less than 50% stenosis. The bilateral superficial femoral arteries are  patent. The left superficial femoral artery has a 90% stenosis present  proximally. The right had an 80% more distal and serial 75% lesions. The right was intervened upon with ultimately a 5 mm x 200 mm balloon  resulting in 0% residual stenosis. The left will be intervened upon at  a later date. 4.  Patent bilateral popliteal arteries with no significant disease. 5.  Three-vessel runoff to the foot bilaterally. Peripheral Cath: 1/9/2023:   FINDINGS:  1. Patent left common, external and internal iliac arteries. 2.  Patent common femoral and profunda femoris with less than 50%  plaque. 3.  Patent left superficial femoral artery with a 90% focal proximal  stenosis that underwent balloon angioplasty ultimately with a 5 mm x 100  mm Lutonix drug-coated balloon resulting in 0% residual stenosis and  excellent flow. 4.  Patent popliteal artery with no significant stenosis. 5.  Patent tibioperoneal trunk with three-vessel runoff to the foot. Assessment/Plan:     1.) PAD: s/p balloon angioplasty to RLE SFA 12/19/22, s/p balloon angioplasty LLE SFA 1/9/22. + BLE pedal pulses. LLE pain/swelling improving - able to increase activity. Supervised Exercise Therapy - 3x/week for 12 weeks- faxed order  Cont asa/plavix/statin  Risk factor management: Smoking cessation, cardiac/low cholesterol diet, BS control (consider SGLT2i), weight reduction       2.) CAD: BMS to RCA 2013, s/p JAYME to RCA 12/4/22.    Stable without angina, cont GDMT  DAPT: asa,plavix  Lipid management/high intensity statin: crestor  Risk factor management: high blood pressure, high cholesterol, Diabetes, smoking, obesity, family hx  Lifestyle modification: Heart healthy diet, regular exercise, weight loss, smoking cessation, stress reduction  Cardiac Rehab: Phase 2- needs to schedule, waited until after peripheral cath completed     3.) Hypertension:   Goal BP <130/80. Met  Non pharmacologic interventions include:   -weight loss  -heart healthy low sodium and low fat diet that consist of mostly fruits, vegetables and grains (Dash diet)  -limited amount of alcohol (no more than 1 drink/day for women, 2 drinks/day for men)  -regular physical activity  -no smoking  -stress reduction    4. Smoking addiction: cont to smoke, using nicotine patches  Patient continues to smoke cigarettes. Smoking cessation advised. Smoking cessation counseling provided > 3 min. Benefits of smoking cessation include decrease risk for lung disease, heart disease, stroke, and peripheral vascular disease. Recommendations include medications such as Chantix, Zyban or nicotine replacement products, counseling and resources such as 1800-QUIT-NOW. Will continue to monitor progress and follow up next office visit. Post-Discharge Transitional Care Follow Up      [address] [place] [address]   YOB: 1950    Date of Office Visit:  1/16/2023  Date of Hospital Admission: 1/9/23  Date of Hospital Discharge: 1/10/23  Readmission Risk Score (high >=14%.  Medium >=10%):Readmission Risk Score: 12.8      Care management risk score Rising risk (score 2-5) and Complex Care (Scores >=6): No Risk Score On File     Non face to face  following discharge, date last encounter closed (first attempt may have been earlier): 01/11/2023     Call initiated 2 business days of discharge: Yes     Tobacco abuse  Encounter for tobacco use cessation counseling  Hospital discharge follow-up  -     ME DISCHARGE MEDS RECONCILED W/ CURRENT OUTPATIENT MED LIST      Medical Decision Making: moderate complexity    Return if symptoms worsen or fail to improve. Subjective:     See above for HPI, inpatient course and interval history. Discharge summary reviewed - see chart. Patient Active Problem List   Diagnosis    Anxiety    Panic disorder    Palpitations    IBS (irritable bowel syndrome)    Depression    Colitis    PAD (peripheral artery disease) (HCC)    CAD (coronary artery disease)    Primary hypertension    Acute intermittent porphyria (HCC)    Arthritis    Asthma    Atrial fibrillation (HCC)    Gastroesophageal reflux disease    Seasonal allergic rhinitis    Urinary incontinence    Stage 4 chronic kidney disease (HCC)    Left thyroid nodule    Sedative, hypnotic or anxiolytic use, unspecified with unspecified sedative, hypnotic or anxiolytic-induced disorder    Sedative, hypnotic or anxiolytic dependence with unspecified sedative, hypnotic or anxiolytic-induced disorder    Sedative, hypnotic or anxiolytic dependence, uncomplicated    Episode of recurrent major depressive disorder, unspecified depression episode severity ([facility] [address]    Tobacco abuse    ST elevation myocardial infarction involving right coronary artery ([facility] [address]    Inferior MI ([facility] [address]    Stage 2 chronic kidney disease    Dyslipidemia    Encounter for tobacco use cessation counseling    History of intermittent claudication    Leg pain       Medications listed as ordered at the time of discharge from hospital     Medication List            Accurate as of January 16, 2023 11:38 AM. If you have any questions, ask your nurse or doctor. CONTINUE taking these medications      albuterol sulfate  (90 Base) MCG/ACT inhaler  Commonly known as: PROVENTIL;VENTOLIN;PROAIR  Inhale 2 puffs into the lungs every 6 hours as needed for Wheezing     ALPRAZolam 1 MG tablet  Commonly known as: Xanax  Take 1 tablet by mouth nightly as needed for Sleep or Anxiety for up to 30 days.      Anoro Ellipta 62.5-25 MCG/ACT Aepb  Generic drug: Umeclidinium-Vilanterol  Inhale 1 puff into the lungs daily Lot# [id number]     aspirin 81 MG chewable tablet  Take 1 tablet by mouth daily     busPIRone 15 MG tablet  Commonly known as: BUSPAR  Take 15 mg by mouth daily     Cholecalciferol 50 MCG (2000 UT) Caps     clopidogrel 75 MG tablet  Commonly known as: Plavix  Take 1 tablet by mouth in the morning. methocarbamol 500 MG tablet  Commonly known as: ROBAXIN     nitroGLYCERIN 0.4 MG SL tablet  Commonly known as: Nitrostat  Place 1 tablet under the tongue every 5 minutes as needed for Chest pain up to max of 3 total doses. If no relief after 1 dose, call 911. pantoprazole 40 MG tablet  Commonly known as: PROTONIX  TAKE ONE TABLET BY MOUTH TWICE A DAY     rOPINIRole 1 MG tablet  Commonly known as: REQUIP  Take 1 tablet by mouth nightly For RLS     rosuvastatin 40 MG tablet  Commonly known as: CRESTOR  Take 1 tablet by mouth nightly               Medications marked \"taking\" at this time  Outpatient Medications Marked as Taking for the 1/16/23 encounter (Office Visit) with BEKAH Ewing CNP   Medication Sig Dispense Refill    methocarbamol (ROBAXIN) 500 MG tablet Take 500 mg by mouth nightly as needed      Cholecalciferol 50 MCG (2000 UT) CAPS Take 1 capsule by mouth daily      ALPRAZolam (XANAX) 1 MG tablet Take 1 tablet by mouth nightly as needed for Sleep or Anxiety for up to 30 days.  30 tablet 0    aspirin 81 MG chewable tablet Take 1 tablet by mouth daily 30 tablet 3    rosuvastatin (CRESTOR) 40 MG tablet Take 1 tablet by mouth nightly 30 tablet 3    rOPINIRole (REQUIP) 1 MG tablet Take 1 tablet by mouth nightly For RLS 90 tablet 1    umeclidinium-vilanterol (ANORO ELLIPTA) 62.5-25 MCG/INH AEPB inhaler Inhale 1 puff into the lungs daily Lot# [id number] 25 each 0    busPIRone (BUSPAR) 15 MG tablet Take 15 mg by mouth daily 90 tablet 1    albuterol sulfate HFA (PROVENTIL;VENTOLIN;PROAIR) 108 (90 Base) MCG/ACT inhaler Inhale 2 puffs into the lungs every 6 hours as needed for Wheezing 1 each 1 pantoprazole (PROTONIX) 40 MG tablet TAKE ONE TABLET BY MOUTH TWICE A  tablet 1    clopidogrel (PLAVIX) 75 MG tablet Take 1 tablet by mouth in the morning. 90 tablet 1    nitroGLYCERIN (NITROSTAT) 0.4 MG SL tablet Place 1 tablet under the tongue every 5 minutes as needed for Chest pain up to max of 3 total doses. If no relief after 1 dose, call 911. 25 tablet 3        Medications patient taking as of now reconciled against medications ordered at time of hospital discharge: Yes    See above for review of systems. Objective:      See above for vitals and physical exam.    Instructions:     Heart Healthy Diet  Blood pressure control if  you have high blood pressure  Diabetic control if you have diabetes  Smoking cessation if you smoke  Weight loss if BMI >30  Regular exercise when OK per cardiac rehab  Stress Reduction    Call your Doctor if you have:   Increased shortness of breath, weakness, or increased fatigue  A fast or a slow heartbeat  Problems or questions with your medications  Feeling lightheaded or dizzy  Unusual swelling of lower legs/feet, chest discomfort, unusual weakness or fatigue    I appreciate the opportunity of cooperating in the care of this individual.    ELISE Ornelas, BEKAH - CNP, CNP, 1/16/2023,11:08 AM

## 2023-01-10 NOTE — PLAN OF CARE
Pt had an outpatient procedure and ended up being admitted due to increase in pain and in ability to ambulate. Pt continues to have swelling and pain to left leg. Pt able to ambulate in [place] and as she ambulated her gait became more normalized and more steady. Safety maintained. Patient has received pain medication as ordered prn.

## 2023-01-10 NOTE — PROGRESS NOTES
Patient continues to have pain and swelling to left lower extremity. She said she is able to flex her foot more than she could this morning but states it still hurts. Pt describes the pain that she has a pulsating and grabbing type pain. Pt denies the need for pain medication. Pt assisted to chair. Call light within reach.

## 2023-01-10 NOTE — PROGRESS NOTES
Discharge instructions reviewed with patient. Reviewed follow up appointments and the importance of keeping appointments. Reviewed medications including purpose and time. Pt verbalized understanding.

## 2023-01-11 ENCOUNTER — CARE COORDINATION (OUTPATIENT)
Dept: CASE MANAGEMENT | Age: 73
End: 2023-01-11

## 2023-01-11 DIAGNOSIS — M79.605 PAIN OF LEFT LOWER EXTREMITY: Primary | ICD-10-CM

## 2023-01-11 PROCEDURE — 1111F DSCHRG MED/CURRENT MED MERGE: CPT

## 2023-01-11 NOTE — CARE COORDINATION
[facility] Care Transitions Initial Follow Up Call    Call within 2 business days of discharge: Yes    Care Transition Nurse contacted the patient by telephone to perform post hospital discharge assessment. Verified name and  with patient as identifiers. Provided introduction to self, and explanation of the Care Transition Nurse role. Patient: Lori Akhtar Patient : 1950   MRN: [id number]  Reason for Admission: Pain of left lower extremity  Discharge Date: 1/10/23 RARS: Readmission Risk Score: 12.8      Last Discharge [address]       Date Complaint Diagnosis Description Type Department Provider    23  Pain of left lower extremity Admission (Discharged) from [address] [place] [address] Bambi Altman MD            Was this an external facility discharge? No Discharge Facility: [facility] to be reviewed by the provider   Additional needs identified to be addressed with provider: No                 Method of communication with provider: none. Max Bland states \"I have been better\". She states she is still in pain - about the same as when she left the hospital. She rates the pain in her left leg at 3/10 if she is sitting and 9/10 if she is up walking. Max Bland states she is unable to bear her full weight on her foot & leg. She states her left leg remains edematous. She states she is taking extra strength tylenol only at this time because she does not feel well enough to go  her prescription. Max Bland confirms her appointment with Claudia GODFREY on 2023. She states she will provide her own transportation. Max Bland states the puncture site is \"fine\". She states she is up and about at home on her own. Max Bland states she is tolerating fluids but has a poor appetite. She denies any fever at this time. Max Bland states her bowels are moving and she is urinating without difficulty.  She states she monitors her B/P daily for her kidney physician but has not done so yet today.She denies any needs at this time. Care Transition Nurse reviewed discharge instructions with patient who verbalized understanding. The patient was given an opportunity to ask questions and does not have any further questions or concerns at this time. Were discharge instructions available to patient? Yes. Reviewed appropriate site of care based on symptoms and resources available to patient including: PCP  Specialist  When to call 911. The patient agrees to contact the PCP office for questions related to their healthcare. Medication reconciliation was performed with patient, who verbalizes understanding of administration of home medications. Medications reviewed, 1111F entered: yes    Was patient discharged with a pulse oximeter? no    Non-face-to-face services provided:  Obtained and reviewed discharge summary and/or continuity of care documents  Assessment and support for treatment adherence and medication management-medication list reviewed. Offered patient enrollment in the Remote Patient Monitoring (RPM) program for in-home monitoring: Patient declined. Care Transitions 24 Hour Call    Do you have a copy of your discharge instructions?: Yes  Do you have all of your prescriptions and are they filled?: No  Have you been contacted by a [address]?: No  Have you scheduled your follow up appointment?: Yes  How are you going to get to your appointment?: Car - drive self  Do you have support at home?: Child  Do you feel like you have everything you need to keep you well at home?: Yes  Care Transitions Interventions         Follow Up  Future Appointments   Date Time Provider Michael Beltran   1/16/2023 11:00 AM BEKAH Moore - CNP [facility]   2/13/2023 10:20 AM Ruby García MD [facility] [place] [facility]   2/14/2023  1:45 PM Hudson Petersen MD [facility]       Care Transition Nurse provided contact information.     Plan for next call:  pain - B/P    [facility] Transition Nurse  [phone]

## 2023-01-16 ENCOUNTER — TELEPHONE (OUTPATIENT)
Dept: CARDIOLOGY CLINIC | Age: 73
End: 2023-01-16

## 2023-01-16 ENCOUNTER — OFFICE VISIT (OUTPATIENT)
Dept: CARDIOLOGY CLINIC | Age: 73
End: 2023-01-16

## 2023-01-16 VITALS
HEART RATE: 76 BPM | HEIGHT: 64 IN | SYSTOLIC BLOOD PRESSURE: 118 MMHG | BODY MASS INDEX: 21.68 KG/M2 | WEIGHT: 127 LBS | OXYGEN SATURATION: 98 % | DIASTOLIC BLOOD PRESSURE: 66 MMHG

## 2023-01-16 DIAGNOSIS — Z09 HOSPITAL DISCHARGE FOLLOW-UP: ICD-10-CM

## 2023-01-16 DIAGNOSIS — Z71.6 ENCOUNTER FOR TOBACCO USE CESSATION COUNSELING: ICD-10-CM

## 2023-01-16 DIAGNOSIS — Z72.0 TOBACCO ABUSE: Primary | ICD-10-CM

## 2023-01-17 ENCOUNTER — CARE COORDINATION (OUTPATIENT)
Dept: CASE MANAGEMENT | Age: 73
End: 2023-01-17

## 2023-01-17 NOTE — CARE COORDINATION
[facility] Care Transitions Follow Up Call         Patient: Iam Holden  Patient : 1950   MRN: [id number]  Reason for Admission: Pain of Left LE  Discharge Date: 1/10/23 RARS: Readmission Risk Score: 12.8      Needs to be reviewed by the provider   Additional needs identified to be addressed with provider: No               Method of communication with provider: none. Unable to reach patient. Left message. Contact info provided. Requested return call to CTN.       Follow Up  Future Appointments   Date Time Provider Michael Beltran   2023 10:20 AM José Rivas MD [facility] [place] [facility]   2023  1:45 PM Pema Reid MD [facility]        Care Transitions Subsequent and Final Call    Subsequent and Final Calls  Care Transitions Interventions  Other Interventions:              Gee Spears RN BSN  Care Transition Nurse  [phone]

## 2023-01-19 ENCOUNTER — CARE COORDINATION (OUTPATIENT)
Dept: CASE MANAGEMENT | Age: 73
End: 2023-01-19

## 2023-01-19 NOTE — CARE COORDINATION
[address] Care Transitions Follow Up Call        Patient: Andreia Lake  Patient : 1950   MRN: [id number]  Reason for Admission: Pain of Left LE  Discharge Date: 1/10/23 RARS: Readmission Risk Score: 12.8      Needs to be reviewed by the provider   Additional needs identified to be addressed with provider: No               Method of communication with provider: none. 2nd and final attempt at CT follow up phone call. Unable to reach patient left message. Informed Justin Apodaca this would be the final CTN outreach.       Follow Up  Future Appointments   Date Time Provider Michael Beltran   2023 10:20 AM Ronit Barber MD [facility] [place] [facility]   2023  1:45 PM Bambi Camarena MD [facility]        Care Transitions Subsequent and Final Call    Subsequent and Final Calls  Care Transitions Interventions  Other Interventions:              Cristhian Jerez RN BSN  Care Transition Nurse  [phone]

## 2023-02-14 ENCOUNTER — TELEPHONE (OUTPATIENT)
Dept: CARDIOLOGY CLINIC | Age: 73
End: 2023-02-14

## 2023-02-14 ENCOUNTER — OFFICE VISIT (OUTPATIENT)
Dept: CARDIOLOGY CLINIC | Age: 73
End: 2023-02-14
Payer: MEDICARE

## 2023-02-14 VITALS
HEIGHT: 64 IN | BODY MASS INDEX: 21.51 KG/M2 | SYSTOLIC BLOOD PRESSURE: 152 MMHG | OXYGEN SATURATION: 98 % | WEIGHT: 126 LBS | DIASTOLIC BLOOD PRESSURE: 72 MMHG | HEART RATE: 101 BPM

## 2023-02-14 DIAGNOSIS — E78.5 HYPERLIPIDEMIA LDL GOAL <70: ICD-10-CM

## 2023-02-14 DIAGNOSIS — N18.4 CKD (CHRONIC KIDNEY DISEASE), STAGE IV (HCC): ICD-10-CM

## 2023-02-14 DIAGNOSIS — I10 PRIMARY HYPERTENSION: ICD-10-CM

## 2023-02-14 DIAGNOSIS — I73.9 PAD (PERIPHERAL ARTERY DISEASE) (HCC): ICD-10-CM

## 2023-02-14 DIAGNOSIS — I25.10 CORONARY ARTERY DISEASE INVOLVING NATIVE CORONARY ARTERY OF NATIVE HEART WITHOUT ANGINA PECTORIS: Primary | ICD-10-CM

## 2023-02-14 DIAGNOSIS — R60.0 EDEMA OF LEFT LOWER EXTREMITY: ICD-10-CM

## 2023-02-14 PROCEDURE — 99214 OFFICE O/P EST MOD 30 MIN: CPT | Performed by: INTERNAL MEDICINE

## 2023-02-14 PROCEDURE — 1123F ACP DISCUSS/DSCN MKR DOCD: CPT | Performed by: INTERNAL MEDICINE

## 2023-02-14 PROCEDURE — 3074F SYST BP LT 130 MM HG: CPT | Performed by: INTERNAL MEDICINE

## 2023-02-14 PROCEDURE — 93000 ELECTROCARDIOGRAM COMPLETE: CPT | Performed by: INTERNAL MEDICINE

## 2023-02-14 PROCEDURE — 3078F DIAST BP <80 MM HG: CPT | Performed by: INTERNAL MEDICINE

## 2023-02-14 RX ORDER — TORSEMIDE 10 MG/1
10 TABLET ORAL DAILY
Qty: 10 TABLET | Refills: 0 | Status: SHIPPED | OUTPATIENT
Start: 2023-02-14

## 2023-02-14 NOTE — PROGRESS NOTES
[address] 86  1950 February 14, 2023      CC: \"My leg is still bothering me\"     HPI:  The patient is 81 y.o. female with a past medical history significant for CAD, PAD, hyperlipidemia and hypertension. She underwent LHC on 10/21/13 resulting in PCI with BMS to mid RCA lesion. A peripheral angiogram was completed on 5/30/14 revealing 50% bilateral SFA stenoses. She underwent a peripheral 12/19/22 that resulted in a balloon angioplasty to the right SFA. Her dose of carvedilol was previously reduced by Dr. Thomasine Nyhan. She completed a normal echocardiogram 12/5/22. She underwent a peripheral 1/10/22 that resulted in a balloon angioplasty to the left SFA. Today she presents for follow up and states that overall she is feeling okay. She states that her left leg is still bothering her and reports some pain at the surface. She reports continued left lower extremity edema. She denies any new sounding cardiac complaints. She denies any chest pains or worsening shortness of breath. She states that her shortness of breath has resolved and she did not complete the stress test ordered last office visit. She states she continues to smoke but she is waiting on the approval for Chantix. She continues to follow with Dr. Thomasine Nyhan for her kidney disease. She reports medication compliance and is tolerating. She denies any abnormal bleeding or bruising. She denies exertional chest pain/pressure, dyspnea at rest, worsening KOVACS, PND, orthopnea, palpitations, lightheadedness, weight changes, changes in LE edema, and syncope. Review of Systems:  Constitutional: No fatigue, weakness, night sweats or fever. HEENT: No new vision difficulties or ringing in the ears. Respiratory: No new SOB, PND, orthopnea or cough. Cardiovascular: See HPI   GI: No n/v, diarrhea, constipation, abdominal pain or changes in bowel habits.   No melena, no hematochezia  : No urinary frequency, urgency, incontinence, hematuria or dysuria. Skin: No cyanosis or skin lesions. Musculoskeletal: No new muscle or joint pain. Neurological: No syncope or TIA-like symptoms. Psychiatric: No anxiety, insomnia or depression     Past Medical History:   Diagnosis Date    acute intermittent porphyria     sees Brandon Lozano, mom had it too    agoraphobic     ongoing, prefers to be at home    Allergic rhinitis     Anxiety     Asthma     CAD (coronary artery disease)     historyMI     ckd 6     from HTN,  sees Trini Caroway    collagenous colitis     Mega    Depression 2013    eversince      GERD (gastroesophageal reflux disease)     Hyperlipidemia     Hypertension 2016    Left thyroid nodule     9mm, gets rechecked yearly on ct lung    Migraines     gets 4x per month    PAD (peripheral artery disease) ([address] 2022    bilat ptca fem art    post menopausal     Prediabetes     Pulmonary nodules     yearly ct lung followed by Dr Brandon Lozano    Restless leg syndrome     STEMI involving oth coronary artery of inferior wall ([address] 2022    JAYME RCA    Urinary incontinence      Past Surgical History:   Procedure Laterality Date    CHOLECYSTECTOMY      COLONOSCOPY  2017    DR. CARMONA    COLONOSCOPY N/A 2022    COLONOSCOPY WITH BIOPSY performed by Purvi Johnson MD at [address]  10/2013 [place]    HYSTERECTOMY (CERVIX STATUS UNKNOWN)      JAMEL only for ? age 36    UPPER GASTROINTESTINAL ENDOSCOPY N/A 2022    EGD BIOPSY performed by Purvi Johnson MD at [address] History   Problem Relation Age of Onset    Hypertension Father     Other Father         MI    Cancer Brother         testicular    No Known Problems Maternal Grandmother     No Known Problems Maternal Grandfather     No Known Problems Paternal Grandmother     No Known Problems Paternal Grandfather     Cancer Other      Social History     Tobacco Use    Smoking status: Some Days     Packs/day: 1.00     Years: 50.00     Pack years: 50.00     Types: Cigarettes     Start date: 1/1/1968    Smokeless tobacco: Never   Vaping Use    Vaping Use: Never used   Substance Use Topics    Alcohol use: No     Alcohol/week: 0.0 standard drinks    Drug use: No       Allergies   Allergen Reactions    Iodine Itching and Nausea And Vomiting    Wellbutrin [Bupropion]      Dry mouth    Adhesive Tape Rash    Sertraline Other (See Comments)     Severe dry mouth    Sulfa Antibiotics Itching and Nausea Only     Current Outpatient Medications   Medication Sig Dispense Refill    rOPINIRole (REQUIP) 1 MG tablet Take 1 tablet by mouth nightly For RLS 90 tablet 1    rosuvastatin (CRESTOR) 40 MG tablet Take 1 tablet by mouth nightly 30 tablet 3    pantoprazole (PROTONIX) 40 MG tablet TAKE ONE TABLET BY MOUTH TWICE A  tablet 1    clopidogrel (PLAVIX) 75 MG tablet Take 1 tablet by mouth daily 90 tablet 1    Cholecalciferol 50 MCG (2000 UT) CAPS Take 1 capsule by mouth daily      aspirin 81 MG chewable tablet Take 1 tablet by mouth daily 30 tablet 3    umeclidinium-vilanterol (ANORO ELLIPTA) 62.5-25 MCG/INH AEPB inhaler Inhale 1 puff into the lungs daily Lot# [id number] 25 each 0    busPIRone (BUSPAR) 15 MG tablet Take 15 mg by mouth daily 90 tablet 1    albuterol sulfate HFA (PROVENTIL;VENTOLIN;PROAIR) 108 (90 Base) MCG/ACT inhaler Inhale 2 puffs into the lungs every 6 hours as needed for Wheezing 1 each 1    nitroGLYCERIN (NITROSTAT) 0.4 MG SL tablet Place 1 tablet under the tongue every 5 minutes as needed for Chest pain up to max of 3 total doses.  If no relief after 1 dose, call 911. 25 tablet 3    methocarbamol (ROBAXIN) 500 MG tablet Take 1 tablet by mouth nightly as needed (muscles spasm) (Patient not taking: Reported on 2/14/2023) 90 tablet 2    varenicline (CHANTIX) 0.5 MG tablet Take 1-2 tablets by mouth See Admin Instructions 0.5mg DAILY for 3 days followed by 0.5mg TWICE DAILY for 4 days followed by 1mg TWICE DAILY (Patient not taking: Reported on 2/14/2023) 57 tablet 0    varenicline (CHANTIX CONTINUING MONTH PAK) 1 MG tablet Take 1 tablet by mouth 2 times daily (Patient not taking: Reported on 2/14/2023) 60 tablet 11     No current facility-administered medications for this visit. Physical Exam:   BP (!) 152/72   Pulse (!) 101   Ht 5' 4\" (1.626 m)   Wt 126 lb (57.2 kg)   SpO2 98%   BMI 21.63 kg/m²   No intake or output data in the 24 hours ending 02/14/23 1407  Wt Readings from Last 2 Encounters:   02/14/23 126 lb (57.2 kg)   02/13/23 126 lb 9.6 oz (57.4 kg)     Constitutional: She is oriented to person, place, and time. She appears well-developed and well-nourished. In no acute distress. Head: Normocephalic and atraumatic. Neck: Neck supple. No JVD present. Carotid bruit is not present. No mass and no thyromegaly present. No lymphadenopathy present. Cardiovascular: Normal rate, regular rhythm, normal heart sounds and intact distal pulses. Exam reveals no gallop and no friction rub. No murmur heard. Pulmonary/Chest: Effort normal and breath sounds normal. No respiratory distress. She has no wheezes, rhonchi or rales. Abdominal: Soft, non-tender. Bowel sounds and aorta are normal. She exhibits no organomegaly, mass or bruit. Extremities: No edema, cyanosis, or clubbing. Pulses are 2+ radial/carotid/dorsalis pedis and posterior tibial bilaterally. Neurological: She is alert and oriented to person, place, and time. She has normal reflexes. No cranial nerve deficit. Coordination normal.   Skin: Skin is warm and dry. There is no rash or diaphoresis. Psychiatric: She has a normal mood and affect. Her speech is normal and behavior is normal.     EKG Interpretation 2/14/23: Sinus rhythm     Procedures:     [facility] 10/21[facility]  1. Right dominant coronary arterial system with a 99% mid RCA lesion  successfully stented with a 3.5 mm bare metal Integrity stent.   There is a  60% distal RCA lesion that did not meet significance by FFR with a ratio of  0.81.    2.  In the left system, there is no left main stenosis. There is a long 40%  mid LAD stenosis that is smooth. The diagonal branches are medium size with  no significant disease. The circumflex is a medium size vessel with no  significant disease either. 3.  Normal left ventricular end diastolic pressure. 4.  Normal left ventricular systolic function with an LV ejection fraction of  70%. 5.  No gradient across the aortic valve on pullback to suggest aortic  stenosis. Peripheral angiogram 5/30/14  Patent bilateral Renal arteries with no significant stenoses  Abdominal aorta with no sig atherosclerosis  Patent bilateral common, internal and external iliac arteries. Patent bilateral common femoral, femoris profunda and SFA arteries. There is 50% bilateral SFA stenoses in the mid portion. Two vessel runoff on the left as the posterior tibial artery is atretic. Three vessel runoff on the left to the foot. Imaging:     Echo 10/21/13  Ejection fraction is visually estimated to be 60 %. trace mitral regurgitation is present. Normal right ventricular size and function. There is trivial tricuspid regurgitation with RVSP estimated at 39 mmHg. Stress perfusion 6/30/20  Normal myocardial perfusion study. Normal LV size and systolic function. ECG portion of the stress test is normal.     Overall findings represent a low risk study. Lower extremity arterial study 6/30/20   No significant evidence of large vessel arterial occlusive disease of the     lower extremities. Normal doppler waveforms, plethysmography, and segmental pressures     Normal SOULEYMANE and Toe/Brachial Index bilaterally      Renal arterial duplex 3/17/22  Right Impression   There is no evidence to suggest renal artery stenosis. The right renal vein is patent. The parenchymal resistive index is within normal limits.    Left Impression   There is no evidence to suggest renal artery stenosis. The left renal vein is patent. The parenchymal resistive index is within normal limits. Unilateral ImpressionNo evidence of abdominal aortic aneurysmal dilation. Mild   to moderate atherosclerotic plaque throughout. Echo 12/5/22  Normal left ventricular size, wall thickness and systolic function. Ejection fraction is visually estimated to be 60-65 %. Indeterminate diastolic function. The right ventricle is normal in size and function. The left atrium is mildly dilated. There are no significant valvular abnormalities appreciated in this study. Peripheral 1/10/23  FINDINGS:  1. Patent left common, external and internal iliac arteries. 2.  Patent common femoral and profunda femoris with less than 50%  plaque. 3.  Patent left superficial femoral artery with a 90% focal proximal  stenosis that underwent balloon angioplasty ultimately with a 5 mm x 100  mm Lutonix drug-coated balloon resulting in 0% residual stenosis and  excellent flow. 4.  Patent popliteal artery with no significant stenosis. 5.  Patent tibioperoneal trunk with three-vessel runoff to the foot. Lab Review:   Lab Results   Component Value Date/Time    TRIG 135 03/25/2022 09:33 AM    HDL 58 02/13/2023 11:21 AM    LDLCALC 58 02/13/2023 11:21 AM    LABVLDL 22 02/13/2023 11:21 AM     Lab Results   Component Value Date/Time     02/13/2023 11:21 AM    K 5.7 02/13/2023 11:21 AM    K 4.8 01/10/2023 04:56 AM    BUN 28 02/13/2023 11:21 AM    CREATININE 1.6 02/13/2023 11:21 AM     Assessment:  1. CAD of native coronary arteries without angina   2. Hyperlipidemia with LDL goal <70 mg/dL   3. PAD   4. Essential Hypertension  5. CKD stage IV  6. Left lower extremity edema     Plan:  I believe her left lower extremity pain may be orthopedic related more than vascular. She has noted strong pulses with the doppler on exam today.  Given her left lower extremity edema, I will have her take Torsemide 10 mg daily for 3 days and repeat BMP in one week. We discussed placing a referral to an orthopedic physician and she may call the office if she prefers. Her blood pressure is elevated in office today. I have encouraged her to monitor her blood pressure at home and call the office with continued elevated symptoms. Her most recent lipid profile was favorable on his current statin therapy. I have encouraged her to increase her aerobic activity as tolerated and adhere to a heart healthy diet. I have personally reviewed all previous testing for this visit today including imaging, lab results and EKG as detailed above. I will see her in the office for follow up in 3 months. This note was scribed in the presence of Dr Tae Hamilton MD by Rox Gonzalez RN. Physician Attestation:  The scribes documentation has been prepared under my direction and personally reviewed by me in its entirety. I, Dr. Tae Hamilton personally performed the services described in this documentation as scribed by my RN in my presence, and I confirm that the note above accurately reflects all work, treatment, procedures, and medical decision making performed by me.

## 2023-02-14 NOTE — TELEPHONE ENCOUNTER
[address] Stuart called to verify the script for TORSEMIDE. Needs clarification, says take 1 tablet a day  but only for 3 days however the dispense cmount says 10.       Haris Ledesma # [phone]

## 2023-02-21 DIAGNOSIS — R60.0 EDEMA OF LEFT LOWER EXTREMITY: ICD-10-CM

## 2023-02-21 LAB
ANION GAP SERPL CALCULATED.3IONS-SCNC: 13 MMOL/L (ref 3–16)
BUN BLDV-MCNC: 34 MG/DL (ref 7–20)
CALCIUM SERPL-MCNC: 10.1 MG/DL (ref 8.3–10.6)
CHLORIDE BLD-SCNC: 106 MMOL/L (ref 99–110)
CO2: 22 MMOL/L (ref 21–32)
CREAT SERPL-MCNC: 1.7 MG/DL (ref 0.6–1.2)
GFR SERPL CREATININE-BSD FRML MDRD: 32 ML/MIN/{1.73_M2}
GLUCOSE BLD-MCNC: 106 MG/DL (ref 70–99)
POTASSIUM SERPL-SCNC: 5.1 MMOL/L (ref 3.5–5.1)
SODIUM BLD-SCNC: 141 MMOL/L (ref 136–145)

## 2023-03-24 ENCOUNTER — HOSPITAL ENCOUNTER (OUTPATIENT)
Dept: CT IMAGING | Age: 73
Discharge: HOME | End: 2023-03-24
Payer: MEDICARE

## 2023-03-24 DIAGNOSIS — R91.1 PULMONARY NODULE: ICD-10-CM

## 2023-03-24 PROCEDURE — 71250 CT THORAX DX C-: CPT

## 2023-05-16 ENCOUNTER — OFFICE VISIT (OUTPATIENT)
Dept: CARDIOLOGY CLINIC | Age: 73
End: 2023-05-16
Payer: MEDICARE

## 2023-05-16 VITALS
SYSTOLIC BLOOD PRESSURE: 134 MMHG | DIASTOLIC BLOOD PRESSURE: 82 MMHG | HEIGHT: 64 IN | OXYGEN SATURATION: 100 % | HEART RATE: 62 BPM | WEIGHT: 122 LBS | BODY MASS INDEX: 20.83 KG/M2

## 2023-05-16 DIAGNOSIS — N18.4 CKD (CHRONIC KIDNEY DISEASE), STAGE IV (HCC): ICD-10-CM

## 2023-05-16 DIAGNOSIS — R60.0 EDEMA OF LEFT LOWER EXTREMITY: ICD-10-CM

## 2023-05-16 DIAGNOSIS — I25.10 CORONARY ARTERY DISEASE INVOLVING NATIVE CORONARY ARTERY OF NATIVE HEART WITHOUT ANGINA PECTORIS: Primary | ICD-10-CM

## 2023-05-16 DIAGNOSIS — I10 ESSENTIAL HYPERTENSION: ICD-10-CM

## 2023-05-16 DIAGNOSIS — I73.9 PAD (PERIPHERAL ARTERY DISEASE) (HCC): ICD-10-CM

## 2023-05-16 DIAGNOSIS — E78.5 HYPERLIPIDEMIA LDL GOAL <70: ICD-10-CM

## 2023-05-16 PROCEDURE — 3079F DIAST BP 80-89 MM HG: CPT | Performed by: INTERNAL MEDICINE

## 2023-05-16 PROCEDURE — 99214 OFFICE O/P EST MOD 30 MIN: CPT | Performed by: INTERNAL MEDICINE

## 2023-05-16 PROCEDURE — 3075F SYST BP GE 130 - 139MM HG: CPT | Performed by: INTERNAL MEDICINE

## 2023-05-16 PROCEDURE — 1123F ACP DISCUSS/DSCN MKR DOCD: CPT | Performed by: INTERNAL MEDICINE

## 2023-05-16 RX ORDER — CLOPIDOGREL BISULFATE 75 MG/1
75 TABLET ORAL DAILY
Qty: 90 TABLET | Refills: 3 | Status: SHIPPED | OUTPATIENT
Start: 2023-05-16

## 2023-05-16 RX ORDER — ROSUVASTATIN CALCIUM 40 MG/1
40 TABLET, COATED ORAL NIGHTLY
Qty: 90 TABLET | Refills: 3 | Status: SHIPPED | OUTPATIENT
Start: 2023-05-16

## 2023-05-16 NOTE — PROGRESS NOTES
extremity edema and pain have both improved. We discussed again placing a referral to an orthopedic physician and she may call the office if she prefers. She will remain on DAPT therapy with Aspirin and Plavix for at least one year. She denies any symptoms representing angina and her blood pressure is well controlled. Her most recent lipid profile was favorable on her current statin therapy. I have encouraged her to increase her aerobic activity as tolerated and adhere to a heart healthy diet. I have personally reviewed all previous testing for this visit today including imaging, lab results and EKG as detailed above    I will see her in the office for follow up in 7 months. This note was scribed in the presence of Dr Naif Conteh MD by Alexey Martines RN. Physician Attestation:  The scribes documentation has been prepared under my direction and personally reviewed by me in its entirety. I, Dr. Naif Conteh personally performed the services described in this documentation as scribed by my RN in my presence, and I confirm that the note above accurately reflects all work, treatment, procedures, and medical decision making performed by me.

## 2023-05-17 PROBLEM — G25.81 RESTLESS LEG SYNDROME: Status: ACTIVE | Noted: 2023-05-17

## 2023-05-17 PROBLEM — Z78.0 MENOPAUSE: Status: ACTIVE | Noted: 2020-08-01

## 2023-05-17 PROBLEM — E78.5 HYPERLIPIDEMIA: Status: ACTIVE | Noted: 2023-05-17

## 2023-06-05 ENCOUNTER — HOSPITAL ENCOUNTER (OUTPATIENT)
Dept: PHYSICAL THERAPY | Age: 73
Setting detail: THERAPIES SERIES
Discharge: HOME | End: 2023-06-05
Payer: MEDICARE

## 2023-06-08 ENCOUNTER — HOSPITAL ENCOUNTER (OUTPATIENT)
Dept: PHYSICAL THERAPY | Age: 73
Setting detail: THERAPIES SERIES
Discharge: HOME | End: 2023-06-08
Payer: MEDICARE

## 2023-06-08 PROCEDURE — 97110 THERAPEUTIC EXERCISES: CPT

## 2023-06-08 PROCEDURE — 97530 THERAPEUTIC ACTIVITIES: CPT

## 2023-06-08 PROCEDURE — 97161 PT EVAL LOW COMPLEX 20 MIN: CPT

## 2023-06-08 NOTE — FLOWSHEET NOTE
hours without increased symptoms or restriction. [] Progressing: [] Met: [] Not Met: [] Adjusted  5. Pt will be able to lay horizontal for 30 min without pillow propping and pain limitations  [] Progressing: [] Met: [] Not Met: [] Adjusted     ASSESSMENT:  See eval    Treatment/Activity Tolerance:  [] Patient tolerated treatment well [] Patient limited by fatique  [x] Patient limited by pain  [] Patient limited by other medical complications  [] Other:     Overall Progression Towards Functional goals/ Treatment Progress Update:  [] Patient is progressing as expected towards functional goals listed. [] Progression is slowed due to complexities/Impairments listed. [] Progression has been slowed due to co-morbidities. [x] Plan just implemented, too soon to assess goals progression <30days   [] Goals require adjustment due to lack of progress  [] Patient is not progressing as expected and requires additional follow up with physician  [] Other    Prognosis for POC: [x] Good [] Fair  [] Poor    Patient requires continued skilled intervention: [x] Yes  [] No        PLAN: See eval  [] Continue per plan of care [] Alter current plan (see comments)  [x] Plan of care initiated [] Hold pending MD visit [] Discharge    Electronically signed by: Mirela Harmon, PT, DPT    Note: If patient does not return for scheduled/recommended follow up visits, this note will serve as a discharge from care along with the most recent update on progress.

## 2023-06-08 NOTE — PLAN OF CARE
stabilizers to allow for proper functional mobility as indicated by patients Functional Deficits. [] Progressing: [] Met: [] Not Met: [] Adjusted  4. Patient will return to sleeping for 6+ hours without increased symptoms or restriction. [] Progressing: [] Met: [] Not Met: [] Adjusted  5. Pt will be able to lay horizontal for 30 min without pillow propping and pain limitations  [] Progressing: [] Met: [] Not Met: [] Adjusted       Electronically signed by:  Dawna Quintana PT      Note: If patient does not return for scheduled/recommended follow up visits, this note will serve as a discharge from care along with the most recent update on progress.

## 2023-06-21 ENCOUNTER — HOSPITAL ENCOUNTER (OUTPATIENT)
Dept: PHYSICAL THERAPY | Age: 73
Setting detail: THERAPIES SERIES
Discharge: HOME | End: 2023-06-21
Payer: MEDICARE

## 2023-06-21 PROCEDURE — 97112 NEUROMUSCULAR REEDUCATION: CPT

## 2023-06-21 PROCEDURE — 97140 MANUAL THERAPY 1/> REGIONS: CPT

## 2023-06-21 NOTE — FLOWSHEET NOTE
driving/computer work  [] (33328) Reviewed/Progressed HEP activities related to improving balance, coordination, kinesthetic sense, posture, motor skill, proprioception of cervical, scapular, scapulothoracic and UE control with self care, reaching, carrying, lifting, house/yardwork, driving/computer work      Manual Treatments:  PROM / STM / Oscillations-Mobs:  G-I, II, III, IV (PA's, Inf., Post.)  [] ([id number]) Provided manual therapy to mobilize soft tissue/joints of cervical/CT, scapular GHJ and UE for the purpose of decreasing headache, modulating pain, promoting relaxation,  increasing ROM, reducing/eliminating soft tissue swelling/inflammation/restriction, improving soft tissue extensibility and allowing for proper ROM for normal function with self care, reaching, carrying, lifting, house/yardwork, driving/computer work      Approval Dates:  CPT Code Units Approved Units Used  Date Updated:                     Charges:  Timed Code Treatment Minutes: 41   Total Treatment Minutes: 41     [] EVAL (LOW) 48993 (typically 20 minutes face-to-face)  [] EVAL (MOD) 72959 (typically 30 minutes face-to-face)  [] EVAL (HIGH) 99441 (typically 45 minutes face-to-face)  [] RE-EVAL     [] XB(88524) x   1  [] Dry needle 1 or 2 Muscles (48240)  [x] NMR (76722) x 1    [] Dry needle 3+ Muscles (62793)  [x] Manual (30931) x  2   [] Ultrasound (53245) x  [] TA (41353) x  1   [] Mech Traction (45653)  [] ES(attended) (63723)     [] ES (un) (08322):   [] Vasopump (27646) [] Ionto (10760)   [] Other:    GOALS:  Patient stated goal: Be able to lay down/sleep  [] Progressing: [] Met: [] Not Met: [] Adjusted    Therapist goals for Patient:   Short Term Goals: To be achieved in: 2 weeks  1. Independent in HEP and progression per patient tolerance, in order to prevent re-injury. [] Progressing: [] Met: [] Not Met: [] Adjusted  2.  Patient will have a decrease in pain 3/10 to to facilitate improvement in movement, function, and ADLs as

## 2023-06-23 ENCOUNTER — APPOINTMENT (OUTPATIENT)
Dept: PHYSICAL THERAPY | Age: 73
End: 2023-06-23
Payer: MEDICARE

## 2023-06-23 ENCOUNTER — HOSPITAL ENCOUNTER (OUTPATIENT)
Dept: WOMENS IMAGING | Age: 73
Discharge: HOME | End: 2023-06-23
Attending: INTERNAL MEDICINE
Payer: MEDICARE

## 2023-06-23 DIAGNOSIS — Z13.820 ENCOUNTER FOR OSTEOPOROSIS SCREENING IN ASYMPTOMATIC POSTMENOPAUSAL PATIENT: ICD-10-CM

## 2023-06-23 DIAGNOSIS — Z78.0 ENCOUNTER FOR OSTEOPOROSIS SCREENING IN ASYMPTOMATIC POSTMENOPAUSAL PATIENT: ICD-10-CM

## 2023-06-23 PROCEDURE — 77080 DXA BONE DENSITY AXIAL: CPT

## 2023-06-26 ENCOUNTER — HOSPITAL ENCOUNTER (OUTPATIENT)
Dept: PHYSICAL THERAPY | Age: 73
Setting detail: THERAPIES SERIES
Discharge: HOME | End: 2023-06-26
Payer: MEDICARE

## 2023-06-26 PROCEDURE — 97140 MANUAL THERAPY 1/> REGIONS: CPT

## 2023-06-26 PROCEDURE — 97112 NEUROMUSCULAR REEDUCATION: CPT

## 2023-06-26 PROCEDURE — 97110 THERAPEUTIC EXERCISES: CPT

## 2023-06-29 ENCOUNTER — HOSPITAL ENCOUNTER (OUTPATIENT)
Dept: PHYSICAL THERAPY | Age: 73
Setting detail: THERAPIES SERIES
Discharge: HOME | End: 2023-06-29
Payer: MEDICARE

## 2023-06-29 PROCEDURE — 97140 MANUAL THERAPY 1/> REGIONS: CPT

## 2023-06-29 PROCEDURE — 97110 THERAPEUTIC EXERCISES: CPT

## 2023-07-17 ENCOUNTER — HOSPITAL ENCOUNTER (OUTPATIENT)
Dept: PHYSICAL THERAPY | Age: 73
Setting detail: THERAPIES SERIES
Discharge: HOME | End: 2023-07-17
Payer: MEDICARE

## 2023-07-17 PROCEDURE — 97140 MANUAL THERAPY 1/> REGIONS: CPT

## 2023-07-17 PROCEDURE — 97110 THERAPEUTIC EXERCISES: CPT

## 2023-07-17 PROCEDURE — 97530 THERAPEUTIC ACTIVITIES: CPT

## 2023-07-17 NOTE — PROGRESS NOTES
[address] [address]  Phone: [phone]   Fax:     [phone]    Physical Therapy Treatment Note/ Progress Report:     Date:  2023    Patient Name:  Dameon Dao    :  1950  MRN: [id number]    Pertinent Medical History:   Asthma, CAD, Hyperlipidemia, Hypertension, Anxiety,ckd 4  from HTN, Depression, Migraines, PAD, Restless leg syndrome    Medical/Treatment Diagnosis Information:  Medical Diagnosis: Cervicalgia [M54.2]  Pain in right hip [M25.551]  Pain in left hip [M25.552]       Insurance/Certification information:   [place] MEDICARE, no cpt exclusions, no co-pay, no auth required, [place]  Physician Information:  Krystal Lo MD  Plan of care signed (Y/N): Y    Date of Patient follow up with Physician: 23     Progress Report: [x]  Yes  []  No     Date Range for reporting period:  Beginnin2023  PN: 23  Endin/12/23 SUICIDE SCREEN- NEGATIVE- No need for behavioral health referral    Progress report due (10 Rx/or 30 days whichever is less):      Recertification due (POC duration/ or 90 days whichever is less): 8/3/23     Visit # Insurance/POC Allowable Auth Needed   7  Auth not needed [x]Yes    []No     Functional Outcomes Measure:      Test: NDI  Date Assessed Score   23 10 (20% disability)   23  13 (26% disability) following set back with house work       Pain level:  310     HISTORY OF INJURY:Pt notes her head was itiching which started 1 month ago. She started using medical shampoo to calm the itching. She has itching at the base of her head and behind her ears mostly. Now she can't lay on the L side of her head when sleeping at night. When she lays on her R side, she has shooting pain to the L side of her head. She tried wearing a neck brace at night and felt relief.  She takes 2 extra strength tylenol every night so she could

## 2023-07-24 ENCOUNTER — HOSPITAL ENCOUNTER (OUTPATIENT)
Dept: PHYSICAL THERAPY | Age: 73
Setting detail: THERAPIES SERIES
Discharge: HOME | End: 2023-07-24
Payer: MEDICARE

## 2023-07-24 PROCEDURE — 97140 MANUAL THERAPY 1/> REGIONS: CPT

## 2023-07-24 PROCEDURE — 97110 THERAPEUTIC EXERCISES: CPT

## 2023-07-24 NOTE — FLOWSHEET NOTE
[address] [address]  Phone: [phone]   Fax:     [phone]    Physical Therapy Treatment Note/ Progress Report:     Date:  2023    Patient Name:  August Munguia    :  1950  MRN: [id number]    Pertinent Medical History:   Asthma, CAD, Hyperlipidemia, Hypertension, Anxiety,ckd 4  from HTN, Depression, Migraines, PAD, Restless leg syndrome    Medical/Treatment Diagnosis Information:  Medical Diagnosis: Cervicalgia [M54.2]  Pain in right hip [M25.551]  Pain in left hip [M25.552]       Insurance/Certification information:   [place] MEDICARE, no cpt exclusions, no co-pay, no auth required, [place]  Physician Information:  Marti Martinez MD  Plan of care signed (Y/N): Y    Date of Patient follow up with Physician: 23     Progress Report: []  Yes  [x]  No     Date Range for reporting period:  Beginnin2023  PN: 23  Endin/12/23 SUICIDE SCREEN- NEGATIVE- No need for behavioral health referral    Progress report due (10 Rx/or 30 days whichever is less): 3/84/06     Recertification due (POC duration/ or 90 days whichever is less): 8/3/23     Visit # Insurance/POC Allowable Auth Needed   8 Auth not needed [x]Yes    []No     Functional Outcomes Measure:      Test: NDI  Date Assessed Score   23 10 (20% disability)   23  13 (26% disability) following set back with house work       Pain level:  3/10     HISTORY OF INJURY:Pt notes her head was itiching which started 1 month ago. She started using medical shampoo to calm the itching. She has itching at the base of her head and behind her ears mostly. Now she can't lay on the L side of her head when sleeping at night. When she lays on her R side, she has shooting pain to the L side of her head. She tried wearing a neck brace at night and felt relief.  She takes 2 extra strength tylenol every night so she could

## 2023-07-31 ENCOUNTER — HOSPITAL ENCOUNTER (OUTPATIENT)
Dept: PHYSICAL THERAPY | Age: 73
Setting detail: THERAPIES SERIES
Discharge: HOME | End: 2023-07-31
Payer: MEDICARE

## 2023-07-31 NOTE — FLOWSHEET NOTE
[address] [address]  Phone: [phone]   Fax:     [phone]    Physical Therapy  Cancellation/No-show Note  Patient Name:  Sergio Truong  :  1950   Date:  2023  Cancelled visits to date: 1  No-shows to date: 0    Patient status for today's appointment patient:  [x]  Cancelled  []  Rescheduled appointment  []  No-show     Reason given by patient:  []  Patient ill  []  Conflicting appointment  []  No transportation    []  Conflict with work  [x]  No reason given  []  Other:     Comments:      Phone call information:   []  Phone call made today to patient at _ time at number provided:      []  Patient answered, conversation as follows:    []  Patient did not answer, message left as follows:  [x]  Phone call not made today- patient called to cancel    Electronically signed by:  Dom Petty, PT, DPT

## 2023-08-04 ENCOUNTER — HOSPITAL ENCOUNTER (OUTPATIENT)
Dept: PHYSICAL THERAPY | Age: 73
Setting detail: THERAPIES SERIES
Discharge: HOME | End: 2023-08-04
Payer: MEDICARE

## 2023-08-04 PROCEDURE — 97140 MANUAL THERAPY 1/> REGIONS: CPT

## 2023-08-04 PROCEDURE — 97530 THERAPEUTIC ACTIVITIES: CPT

## 2023-08-04 PROCEDURE — 97110 THERAPEUTIC EXERCISES: CPT

## 2023-08-04 NOTE — FLOWSHEET NOTE
[address] [address]  Phone: [phone]   Fax:     [phone]    Physical Therapy Treatment Note/ Progress Report:     Date:  2023    Patient Name:  Aldo Altamirano    :  1950  MRN: [id number]    Pertinent Medical History:   Asthma, CAD, Hyperlipidemia, Hypertension, Anxiety,ckd 4  from HTN, Depression, Migraines, PAD, Restless leg syndrome    Medical/Treatment Diagnosis Information:  Medical Diagnosis: Cervicalgia [M54.2]  Pain in right hip [M25.551]  Pain in left hip [M25.552]       Insurance/Certification information:   [place] MEDICARE, no cpt exclusions, no co-pay, [place] auth required[place]  Physician Information:  Arnulfo Pereira MD  Plan of care signed (Y/N): Y    Date of Patient follow up with Physician: 23     Progress Report: []  Yes  [x]  No     Date Range for reporting period:  Beginnin2023  PN: 23  Endin/12/23 SUICIDE SCREEN- NEGATIVE- No need for behavioral health referral    Progress report due (10 Rx/or 30 days whichever is less):      Recertification due (POC duration/ or 90 days whichever is less): 8/3/23     Visit # Insurance/POC Allowable Auth Needed   9 Auth not needed [x]Yes    []No     Functional Outcomes Measure:      Test: NDI  Date Assessed Score   23 10 (20% disability)   23  13 (26% disability) following set back with house work       Pain level:  5/10     HISTORY OF INJURY:Pt notes her head was itiching which started 1 month ago. She started using medical shampoo to calm the itching. She has itching at the base of her head and behind her ears mostly. Now she can't lay on the L side of her head when sleeping at night. When she lays on her R side, she has shooting pain to the L side of her head. She tried wearing a neck brace at night and felt relief.  She takes 2 extra strength tylenol every night so she could

## 2023-08-14 ENCOUNTER — HOSPITAL ENCOUNTER (OUTPATIENT)
Dept: PHYSICAL THERAPY | Age: 73
Setting detail: THERAPIES SERIES
Discharge: HOME | End: 2023-08-14
Payer: MEDICARE

## 2023-08-14 NOTE — FLOWSHEET NOTE
[address] [address]  Phone: [phone]   Fax:     [phone]    Physical Therapy  Cancellation/No-show Note  Patient Name:  Luis Miguel Tejada  :  1950   Date:  2023  Cancelled visits to date: 2  No-shows to date: 0    Patient status for today's appointment patient:  [x]  Cancelled  []  Rescheduled appointment  []  No-show     Reason given by patient:  [x]  Patient ill  []  Conflicting appointment  []  No transportation    []  Conflict with work  []  No reason given  []  Other:     Comments:      Phone call information:   []  Phone call made today to patient at _ time at number provided:      []  Patient answered, conversation as follows:    []  Patient did not answer, message left as follows:  [x]  Phone call not made today- patient called to cancel    Electronically signed by:  Neftali Pierre, PT, DPT

## 2023-08-18 ENCOUNTER — HOSPITAL ENCOUNTER (OUTPATIENT)
Dept: PHYSICAL THERAPY | Age: 73
Setting detail: THERAPIES SERIES
Discharge: HOME | End: 2023-08-18
Payer: MEDICARE

## 2023-08-18 PROCEDURE — 97112 NEUROMUSCULAR REEDUCATION: CPT

## 2023-08-18 PROCEDURE — 97140 MANUAL THERAPY 1/> REGIONS: CPT

## 2023-08-18 PROCEDURE — 97530 THERAPEUTIC ACTIVITIES: CPT

## 2023-08-18 NOTE — FLOWSHEET NOTE
[address] [address]  Phone: [phone]   Fax:     [phone]    Physical Therapy Treatment Note/ Progress Report:     Date:  2023    Patient Name:  Elizabeth Marie    :  1950  MRN: [id number]    Pertinent Medical History:   Asthma, CAD, Hyperlipidemia, Hypertension, Anxiety,ckd 4  from HTN, Depression, Migraines, PAD, Restless leg syndrome    Medical/Treatment Diagnosis Information:  Medical Diagnosis: Cervicalgia [M54.2]  Pain in right hip [M25.551]  Pain in left hip [M25.552]       Insurance/Certification information:   [place] MEDICARE, no cpt exclusions, no co-pay, [place] auth [place], [place]  Physician Information:  Yobani Hargrove MD  Plan of care signed (Y/N): Y    Date of Patient follow up with Physician: 23     Progress Report: []  Yes  [x]  No     Date Range for reporting period:  Beginnin2023  PN: 23  Endin/12/23 SUICIDE SCREEN- NEGATIVE- No need for behavioral health referral    Progress report due (10 Rx/or 30 days whichever is less):  PN NPV    Recertification due (POC duration/ or 90 days whichever is less): 8/3/23     Visit # Insurance/POC Allowable Auth Needed   10 [place]  [x]Yes    []No     Functional Outcomes Measure:      Test: NDI  Date Assessed Score   23 10 (20% disability)   23  13 (26% disability) following set back with house work       Pain level:  5/10     HISTORY OF INJURY:Pt notes her head was itiching which started 1 month ago. She started using medical shampoo to calm the itching. She has itching at the base of her head and behind her ears mostly. Now she can't lay on the L side of her head when sleeping at night. When she lays on her R side, she has shooting pain to the L side of her head. She tried wearing a neck brace at night and felt relief. She takes 2 extra strength tylenol every night so she could sleep.  No

## 2023-08-21 ENCOUNTER — APPOINTMENT (OUTPATIENT)
Dept: PHYSICAL THERAPY | Age: 73
End: 2023-08-21
Payer: MEDICARE

## 2023-08-24 ENCOUNTER — APPOINTMENT (OUTPATIENT)
Dept: PHYSICAL THERAPY | Age: 73
End: 2023-08-24
Payer: MEDICARE

## 2023-09-13 ENCOUNTER — HOSPITAL ENCOUNTER (OUTPATIENT)
Dept: MRI IMAGING | Age: 73
Discharge: HOME | End: 2023-09-13
Attending: INTERNAL MEDICINE
Payer: MEDICARE

## 2023-09-13 DIAGNOSIS — M47.22 OSTEOARTHRITIS OF SPINE WITH RADICULOPATHY, CERVICAL REGION: ICD-10-CM

## 2023-09-13 PROCEDURE — 72141 MRI NECK SPINE W/O DYE: CPT

## 2023-10-23 SDOH — HEALTH STABILITY: PHYSICAL HEALTH: ON AVERAGE, HOW MANY DAYS PER WEEK DO YOU ENGAGE IN MODERATE TO STRENUOUS EXERCISE (LIKE A BRISK WALK)?: 7 DAYS

## 2023-10-23 SDOH — HEALTH STABILITY: PHYSICAL HEALTH: ON AVERAGE, HOW MANY MINUTES DO YOU ENGAGE IN EXERCISE AT THIS LEVEL?: 10 MIN

## 2023-10-23 ASSESSMENT — SOCIAL DETERMINANTS OF HEALTH (SDOH)
WITHIN THE LAST YEAR, HAVE YOU BEEN HUMILIATED OR EMOTIONALLY ABUSED IN OTHER WAYS BY YOUR PARTNER OR EX-PARTNER?: PATIENT DECLINED
WITHIN THE LAST YEAR, HAVE YOU BEEN AFRAID OF YOUR PARTNER OR EX-PARTNER?: PATIENT DECLINED
WITHIN THE LAST YEAR, HAVE YOU BEEN KICKED, HIT, SLAPPED, OR OTHERWISE PHYSICALLY HURT BY YOUR PARTNER OR EX-PARTNER?: NO
WITHIN THE LAST YEAR, HAVE TO BEEN RAPED OR FORCED TO HAVE ANY KIND OF SEXUAL ACTIVITY BY YOUR PARTNER OR EX-PARTNER?: NO

## 2023-10-24 ENCOUNTER — TRANSCRIBE ORDERS (OUTPATIENT)
Dept: GENERAL RADIOLOGY | Age: 73
End: 2023-10-24

## 2023-10-24 ENCOUNTER — HOSPITAL ENCOUNTER (OUTPATIENT)
Age: 73
Discharge: HOME | End: 2023-10-24
Payer: MEDICARE

## 2023-10-24 ENCOUNTER — HOSPITAL ENCOUNTER (OUTPATIENT)
Dept: GENERAL RADIOLOGY | Age: 73
Discharge: HOME | End: 2023-10-24
Attending: NEUROLOGICAL SURGERY
Payer: MEDICARE

## 2023-10-24 PROCEDURE — 72110 X-RAY EXAM L-2 SPINE 4/>VWS: CPT

## 2023-10-26 ENCOUNTER — TELEPHONE (OUTPATIENT)
Dept: ADMINISTRATIVE | Age: 73
End: 2023-10-26

## 2023-10-26 ENCOUNTER — OFFICE VISIT (OUTPATIENT)
Dept: INTERNAL MEDICINE CLINIC | Age: 73
End: 2023-10-26
Payer: MEDICARE

## 2023-10-26 VITALS
BODY MASS INDEX: 21.17 KG/M2 | DIASTOLIC BLOOD PRESSURE: 95 MMHG | HEIGHT: 64 IN | WEIGHT: 124 LBS | SYSTOLIC BLOOD PRESSURE: 134 MMHG

## 2023-10-26 DIAGNOSIS — N18.4 STAGE 4 CHRONIC KIDNEY DISEASE (HCC): ICD-10-CM

## 2023-10-26 DIAGNOSIS — Z72.0 TOBACCO ABUSE: ICD-10-CM

## 2023-10-26 DIAGNOSIS — I25.10 CORONARY ARTERY DISEASE INVOLVING NATIVE CORONARY ARTERY OF NATIVE HEART WITHOUT ANGINA PECTORIS: Primary | ICD-10-CM

## 2023-10-26 DIAGNOSIS — I10 PRIMARY HYPERTENSION: ICD-10-CM

## 2023-10-26 DIAGNOSIS — F41.9 ANXIETY: ICD-10-CM

## 2023-10-26 PROCEDURE — 3080F DIAST BP >= 90 MM HG: CPT | Performed by: STUDENT IN AN ORGANIZED HEALTH CARE EDUCATION/TRAINING PROGRAM

## 2023-10-26 PROCEDURE — 99214 OFFICE O/P EST MOD 30 MIN: CPT | Performed by: STUDENT IN AN ORGANIZED HEALTH CARE EDUCATION/TRAINING PROGRAM

## 2023-10-26 PROCEDURE — 1123F ACP DISCUSS/DSCN MKR DOCD: CPT | Performed by: STUDENT IN AN ORGANIZED HEALTH CARE EDUCATION/TRAINING PROGRAM

## 2023-10-26 PROCEDURE — 3075F SYST BP GE 130 - 139MM HG: CPT | Performed by: STUDENT IN AN ORGANIZED HEALTH CARE EDUCATION/TRAINING PROGRAM

## 2023-10-26 RX ORDER — ALPRAZOLAM 1 MG/1
1 TABLET ORAL NIGHTLY PRN
Qty: 90 TABLET | Refills: 0 | Status: SHIPPED | OUTPATIENT
Start: 2023-11-20 | End: 2024-02-18

## 2023-10-26 RX ORDER — VARENICLINE TARTRATE 0.5 MG/1
.5-1 TABLET, FILM COATED ORAL SEE ADMIN INSTRUCTIONS
Qty: 57 TABLET | Refills: 0 | Status: SHIPPED | OUTPATIENT
Start: 2023-10-26

## 2023-10-26 RX ORDER — RIMEGEPANT SULFATE 75 MG/75MG
75 TABLET, ORALLY DISINTEGRATING ORAL DAILY PRN
Qty: 6 TABLET | Refills: 0 | COMMUNITY
Start: 2023-10-26

## 2023-10-26 RX ORDER — CHLORTHALIDONE 25 MG/1
25 TABLET ORAL EVERY OTHER DAY
COMMUNITY

## 2023-10-26 RX ORDER — ROSUVASTATIN CALCIUM 40 MG/1
40 TABLET, COATED ORAL NIGHTLY
Qty: 90 TABLET | Refills: 3 | Status: SHIPPED | OUTPATIENT
Start: 2023-10-26

## 2023-10-26 ASSESSMENT — ENCOUNTER SYMPTOMS
CONSTIPATION: 0
SORE THROAT: 0
NAUSEA: 0
BACK PAIN: 0
ABDOMINAL PAIN: 0
VOMITING: 0
COUGH: 0
DIARRHEA: 0
CHEST TIGHTNESS: 0

## 2023-10-26 NOTE — TELEPHONE ENCOUNTER
Submitted PA for VARENICLINE [facility]  (Key: [id number]) STATUS: PENDING. Follow up done daily; if no response in three days we will refax for status check. If another three days goes by with no response we will call the insurance for status.

## 2023-10-26 NOTE — PROGRESS NOTES
[facility] ([facility]) Internal Medicine    Kriss Berman is a 81 y.o. female with the past medical history as listed below presenting to the clinic for follow up on chronic condition and discussion of preventative health care. Generalized anxiety disorder and insomnia: Patient reports trouble sleeping and anxiety after her   12 years ago. She has been on alprazolam nightly that has helped. Coronary artery disease: Patient denies chest pain today. She is compliant with clopidogrel. CKD: Patient follows with nephrology for CKD and hypertension. Review of Systems   Constitutional:  Negative for chills, fatigue and fever. HENT:  Negative for congestion, ear pain and sore throat. Respiratory:  Negative for cough and chest tightness. Cardiovascular:  Negative for chest pain, palpitations and leg swelling. Gastrointestinal:  Negative for abdominal pain, constipation, diarrhea, nausea and vomiting. Genitourinary:  Negative for dysuria, flank pain and urgency. Musculoskeletal:  Negative for arthralgias, back pain and joint swelling. Skin:  Negative for rash. Neurological:  Negative for dizziness, weakness and numbness. Psychiatric/Behavioral:  Negative for sleep disturbance. The patient is not nervous/anxious.         Past Medical History:   Diagnosis Date    acute intermittent porphyria     sees aTty Franklin, mom had it too    agoraphobic     ongoing, prefers to be at home    Allergic rhinitis     Anxiety     Asthma     CAD (coronary artery disease) 2022    acute inf [place] RCA stent successfully placed Maddison Mcdonough other arteries clear    ckd 4     from HTN,  sees Trini Caroway    collagenous colitis     Ayoub    Depression 2013    eversince      GERD (gastroesophageal reflux disease)     Hyperlipidemia     Hypertension 2016    Left thyroid nodule 2020    9mm, gets rechecked yearly on ct lung    Migraines     gets 4x per month    PAD (peripheral artery disease) ([address]) 2022

## 2023-10-26 NOTE — ASSESSMENT & PLAN NOTE
Blood pressure is still slightly elevated. - Continue current regimen for now  - Follow with Dr. Maggie Higgins in nephrology.

## 2023-10-27 NOTE — TELEPHONE ENCOUNTER
The medication is APPROVED FROM 01/10/23 THRU 04/23/24    If this requires a response please respond to the pool ( [id number] [phone]). Thank you please advise patient.

## 2023-11-09 ENCOUNTER — APPOINTMENT (OUTPATIENT)
Dept: CT IMAGING | Age: 73
DRG: 305 | End: 2023-11-09
Payer: MEDICARE

## 2023-11-09 ENCOUNTER — APPOINTMENT (OUTPATIENT)
Dept: GENERAL RADIOLOGY | Age: 73
DRG: 305 | End: 2023-11-09
Payer: MEDICARE

## 2023-11-09 ENCOUNTER — HOSPITAL ENCOUNTER (INPATIENT)
Age: 73
LOS: 4 days | Discharge: HOME | DRG: 305 | End: 2023-11-13
Attending: STUDENT IN AN ORGANIZED HEALTH CARE EDUCATION/TRAINING PROGRAM | Admitting: INTERNAL MEDICINE
Payer: MEDICARE

## 2023-11-09 DIAGNOSIS — I16.1 HYPERTENSIVE EMERGENCY: Primary | ICD-10-CM

## 2023-11-09 DIAGNOSIS — R51.9 ACUTE INTRACTABLE HEADACHE, UNSPECIFIED HEADACHE TYPE: ICD-10-CM

## 2023-11-09 DIAGNOSIS — N17.9 ACUTE KIDNEY INJURY SUPERIMPOSED ON CKD (HCC): ICD-10-CM

## 2023-11-09 DIAGNOSIS — N18.9 ACUTE KIDNEY INJURY SUPERIMPOSED ON CKD (HCC): ICD-10-CM

## 2023-11-09 PROBLEM — H53.8 BLURRED VISION, BILATERAL: Status: ACTIVE | Noted: 2023-11-09

## 2023-11-09 PROBLEM — I16.0 HYPERTENSIVE URGENCY: Status: ACTIVE | Noted: 2023-11-09

## 2023-11-09 PROBLEM — R07.9 CHEST PAIN: Status: ACTIVE | Noted: 2023-11-09

## 2023-11-09 LAB
ALBUMIN SERPL-MCNC: 4.1 G/DL (ref 3.4–5)
ALBUMIN/GLOB SERPL: 1.1 {RATIO} (ref 1.1–2.2)
ALP SERPL-CCNC: 74 U/L (ref 40–129)
ALT SERPL-CCNC: 8 U/L (ref 10–40)
ANION GAP SERPL CALCULATED.3IONS-SCNC: 11 MMOL/L (ref 3–16)
AST SERPL-CCNC: 18 U/L (ref 15–37)
BASOPHILS # BLD: 0.1 K/UL (ref 0–0.2)
BASOPHILS NFR BLD: 1.1 %
BILIRUB SERPL-MCNC: 0.3 MG/DL (ref 0–1)
BUN SERPL-MCNC: 60 MG/DL (ref 7–20)
CALCIUM SERPL-MCNC: 9.9 MG/DL (ref 8.3–10.6)
CHLORIDE SERPL-SCNC: 103 MMOL/L (ref 99–110)
CO2 SERPL-SCNC: 26 MMOL/L (ref 21–32)
CREAT SERPL-MCNC: 2.5 MG/DL (ref 0.6–1.2)
CRP SERPL-MCNC: <3 MG/L (ref 0–5.1)
DEPRECATED RDW RBC AUTO: 15.5 % (ref 12.4–15.4)
EOSINOPHIL # BLD: 0.3 K/UL (ref 0–0.6)
EOSINOPHIL NFR BLD: 3.1 %
GFR SERPLBLD CREATININE-BSD FMLA CKD-EPI: 20 ML/MIN/{1.73_M2}
GLUCOSE SERPL-MCNC: 84 MG/DL (ref 70–99)
HCT VFR BLD AUTO: 39.1 % (ref 36–48)
HGB BLD-MCNC: 12.9 G/DL (ref 12–16)
LYMPHOCYTES # BLD: 2.4 K/UL (ref 1–5.1)
LYMPHOCYTES NFR BLD: 27.9 %
MCH RBC QN AUTO: 29.1 PG (ref 26–34)
MCHC RBC AUTO-ENTMCNC: 33.1 G/DL (ref 31–36)
MCV RBC AUTO: 87.8 FL (ref 80–100)
MONOCYTES # BLD: 0.6 K/UL (ref 0–1.3)
MONOCYTES NFR BLD: 7.6 %
NEUTROPHILS # BLD: 5.1 K/UL (ref 1.7–7.7)
NEUTROPHILS NFR BLD: 60.3 %
PLATELET # BLD AUTO: 395 K/UL (ref 135–450)
PMV BLD AUTO: 7.6 FL (ref 5–10.5)
POTASSIUM SERPL-SCNC: 4.5 MMOL/L (ref 3.5–5.1)
PROT SERPL-MCNC: 7.8 G/DL (ref 6.4–8.2)
RBC # BLD AUTO: 4.45 M/UL (ref 4–5.2)
SODIUM SERPL-SCNC: 140 MMOL/L (ref 136–145)
TROPONIN, HIGH SENSITIVITY: 28 NG/L (ref 0–14)
TROPONIN, HIGH SENSITIVITY: 28 NG/L (ref 0–14)
TROPONIN, HIGH SENSITIVITY: 33 NG/L (ref 0–14)
WBC # BLD AUTO: 8.4 K/UL (ref 4–11)

## 2023-11-09 PROCEDURE — 85652 RBC SED RATE AUTOMATED: CPT

## 2023-11-09 PROCEDURE — 96372 THER/PROPH/DIAG INJ SC/IM: CPT

## 2023-11-09 PROCEDURE — 96376 TX/PRO/DX INJ SAME DRUG ADON: CPT

## 2023-11-09 PROCEDURE — 80053 COMPREHEN METABOLIC PANEL: CPT

## 2023-11-09 PROCEDURE — 36415 COLL VENOUS BLD VENIPUNCTURE: CPT

## 2023-11-09 PROCEDURE — 6360000002 HC RX W HCPCS: Performed by: STUDENT IN AN ORGANIZED HEALTH CARE EDUCATION/TRAINING PROGRAM

## 2023-11-09 PROCEDURE — 96375 TX/PRO/DX INJ NEW DRUG ADDON: CPT

## 2023-11-09 PROCEDURE — 2000000000 HC ICU R&B

## 2023-11-09 PROCEDURE — 85025 COMPLETE CBC W/AUTO DIFF WBC: CPT

## 2023-11-09 PROCEDURE — 99285 EMERGENCY DEPT VISIT HI MDM: CPT

## 2023-11-09 PROCEDURE — 2580000003 HC RX 258: Performed by: STUDENT IN AN ORGANIZED HEALTH CARE EDUCATION/TRAINING PROGRAM

## 2023-11-09 PROCEDURE — 2500000003 HC RX 250 WO HCPCS: Performed by: STUDENT IN AN ORGANIZED HEALTH CARE EDUCATION/TRAINING PROGRAM

## 2023-11-09 PROCEDURE — 93005 ELECTROCARDIOGRAM TRACING: CPT | Performed by: STUDENT IN AN ORGANIZED HEALTH CARE EDUCATION/TRAINING PROGRAM

## 2023-11-09 PROCEDURE — 96367 TX/PROPH/DG ADDL SEQ IV INF: CPT

## 2023-11-09 PROCEDURE — 86140 C-REACTIVE PROTEIN: CPT

## 2023-11-09 PROCEDURE — 6370000000 HC RX 637 (ALT 250 FOR IP): Performed by: STUDENT IN AN ORGANIZED HEALTH CARE EDUCATION/TRAINING PROGRAM

## 2023-11-09 PROCEDURE — 71046 X-RAY EXAM CHEST 2 VIEWS: CPT

## 2023-11-09 PROCEDURE — 84484 ASSAY OF TROPONIN QUANT: CPT

## 2023-11-09 PROCEDURE — 70450 CT HEAD/BRAIN W/O DYE: CPT

## 2023-11-09 PROCEDURE — 6370000000 HC RX 637 (ALT 250 FOR IP): Performed by: INTERNAL MEDICINE

## 2023-11-09 PROCEDURE — 96365 THER/PROPH/DIAG IV INF INIT: CPT

## 2023-11-09 RX ORDER — ENOXAPARIN SODIUM 100 MG/ML
30 INJECTION SUBCUTANEOUS DAILY
Status: DISCONTINUED | OUTPATIENT
Start: 2023-11-10 | End: 2023-11-13

## 2023-11-09 RX ORDER — ACETAMINOPHEN 325 MG/1
650 TABLET ORAL EVERY 6 HOURS PRN
Status: DISCONTINUED | OUTPATIENT
Start: 2023-11-09 | End: 2023-11-13 | Stop reason: HOSPADM

## 2023-11-09 RX ORDER — POLYETHYLENE GLYCOL 3350 17 G/17G
17 POWDER, FOR SOLUTION ORAL DAILY PRN
Status: DISCONTINUED | OUTPATIENT
Start: 2023-11-09 | End: 2023-11-13 | Stop reason: HOSPADM

## 2023-11-09 RX ORDER — CARVEDILOL 3.12 MG/1
3.12 TABLET ORAL 2 TIMES DAILY WITH MEALS
Status: DISCONTINUED | OUTPATIENT
Start: 2023-11-10 | End: 2023-11-10

## 2023-11-09 RX ORDER — ACETAMINOPHEN 325 MG/1
650 TABLET ORAL ONCE
Status: COMPLETED | OUTPATIENT
Start: 2023-11-09 | End: 2023-11-09

## 2023-11-09 RX ORDER — MAGNESIUM SULFATE IN WATER 40 MG/ML
2000 INJECTION, SOLUTION INTRAVENOUS ONCE
Status: COMPLETED | OUTPATIENT
Start: 2023-11-09 | End: 2023-11-09

## 2023-11-09 RX ORDER — SODIUM CHLORIDE 0.9 % (FLUSH) 0.9 %
5-40 SYRINGE (ML) INJECTION EVERY 12 HOURS SCHEDULED
Status: DISCONTINUED | OUTPATIENT
Start: 2023-11-10 | End: 2023-11-13 | Stop reason: HOSPADM

## 2023-11-09 RX ORDER — 0.9 % SODIUM CHLORIDE 0.9 %
1000 INTRAVENOUS SOLUTION INTRAVENOUS ONCE
Status: COMPLETED | OUTPATIENT
Start: 2023-11-09 | End: 2023-11-09

## 2023-11-09 RX ORDER — NITROGLYCERIN 0.4 MG/1
0.4 TABLET SUBLINGUAL EVERY 5 MIN PRN
Status: DISCONTINUED | OUTPATIENT
Start: 2023-11-09 | End: 2023-11-10 | Stop reason: SDUPTHER

## 2023-11-09 RX ORDER — CHLORTHALIDONE 25 MG/1
25 TABLET ORAL EVERY OTHER DAY
Status: DISCONTINUED | OUTPATIENT
Start: 2023-11-10 | End: 2023-11-10

## 2023-11-09 RX ORDER — DEXAMETHASONE SODIUM PHOSPHATE 4 MG/ML
10 INJECTION, SOLUTION INTRA-ARTICULAR; INTRALESIONAL; INTRAMUSCULAR; INTRAVENOUS; SOFT TISSUE ONCE
Status: COMPLETED | OUTPATIENT
Start: 2023-11-09 | End: 2023-11-09

## 2023-11-09 RX ORDER — CLOPIDOGREL BISULFATE 75 MG/1
75 TABLET ORAL DAILY
Status: DISCONTINUED | OUTPATIENT
Start: 2023-11-10 | End: 2023-11-13 | Stop reason: HOSPADM

## 2023-11-09 RX ORDER — PANTOPRAZOLE SODIUM 40 MG/1
40 TABLET, DELAYED RELEASE ORAL
Status: DISCONTINUED | OUTPATIENT
Start: 2023-11-10 | End: 2023-11-10

## 2023-11-09 RX ORDER — PROCHLORPERAZINE EDISYLATE 5 MG/ML
10 INJECTION INTRAMUSCULAR; INTRAVENOUS EVERY 6 HOURS PRN
Status: DISCONTINUED | OUTPATIENT
Start: 2023-11-09 | End: 2023-11-10 | Stop reason: SDUPTHER

## 2023-11-09 RX ORDER — SODIUM CHLORIDE 0.9 % (FLUSH) 0.9 %
5-40 SYRINGE (ML) INJECTION PRN
Status: DISCONTINUED | OUTPATIENT
Start: 2023-11-09 | End: 2023-11-13 | Stop reason: HOSPADM

## 2023-11-09 RX ORDER — DIPHENHYDRAMINE HYDROCHLORIDE 50 MG/ML
50 INJECTION INTRAMUSCULAR; INTRAVENOUS ONCE
Status: COMPLETED | OUTPATIENT
Start: 2023-11-09 | End: 2023-11-09

## 2023-11-09 RX ORDER — ROPINIROLE 1 MG/1
1 TABLET, FILM COATED ORAL NIGHTLY
Status: DISCONTINUED | OUTPATIENT
Start: 2023-11-09 | End: 2023-11-13 | Stop reason: HOSPADM

## 2023-11-09 RX ORDER — DROPERIDOL 2.5 MG/ML
1.25 INJECTION, SOLUTION INTRAMUSCULAR; INTRAVENOUS ONCE
Status: COMPLETED | OUTPATIENT
Start: 2023-11-09 | End: 2023-11-09

## 2023-11-09 RX ORDER — ALPRAZOLAM 0.5 MG/1
1 TABLET ORAL NIGHTLY PRN
Status: DISCONTINUED | OUTPATIENT
Start: 2023-11-09 | End: 2023-11-13 | Stop reason: HOSPADM

## 2023-11-09 RX ORDER — ACETAMINOPHEN 650 MG/1
650 SUPPOSITORY RECTAL EVERY 6 HOURS PRN
Status: DISCONTINUED | OUTPATIENT
Start: 2023-11-09 | End: 2023-11-13 | Stop reason: HOSPADM

## 2023-11-09 RX ORDER — ROSUVASTATIN CALCIUM 40 MG/1
40 TABLET, COATED ORAL NIGHTLY
Status: DISCONTINUED | OUTPATIENT
Start: 2023-11-10 | End: 2023-11-10 | Stop reason: DRUGHIGH

## 2023-11-09 RX ORDER — LABETALOL HYDROCHLORIDE 5 MG/ML
10 INJECTION, SOLUTION INTRAVENOUS ONCE
Status: COMPLETED | OUTPATIENT
Start: 2023-11-09 | End: 2023-11-09

## 2023-11-09 RX ORDER — NITROGLYCERIN 0.4 MG/1
0.4 TABLET SUBLINGUAL EVERY 5 MIN PRN
Status: DISCONTINUED | OUTPATIENT
Start: 2023-11-09 | End: 2023-11-13 | Stop reason: HOSPADM

## 2023-11-09 RX ORDER — ASPIRIN 81 MG/1
81 TABLET, CHEWABLE ORAL DAILY
Status: DISCONTINUED | OUTPATIENT
Start: 2023-11-10 | End: 2023-11-13 | Stop reason: HOSPADM

## 2023-11-09 RX ORDER — ONDANSETRON 4 MG/1
4 TABLET, ORALLY DISINTEGRATING ORAL EVERY 8 HOURS PRN
Status: DISCONTINUED | OUTPATIENT
Start: 2023-11-09 | End: 2023-11-13 | Stop reason: HOSPADM

## 2023-11-09 RX ORDER — HYDRALAZINE HYDROCHLORIDE 20 MG/ML
10 INJECTION INTRAMUSCULAR; INTRAVENOUS
Status: COMPLETED | OUTPATIENT
Start: 2023-11-09 | End: 2023-11-09

## 2023-11-09 RX ORDER — ONDANSETRON 2 MG/ML
4 INJECTION INTRAMUSCULAR; INTRAVENOUS EVERY 6 HOURS PRN
Status: DISCONTINUED | OUTPATIENT
Start: 2023-11-09 | End: 2023-11-13 | Stop reason: HOSPADM

## 2023-11-09 RX ORDER — NITROGLYCERIN 20 MG/100ML
5-200 INJECTION INTRAVENOUS CONTINUOUS
Status: DISCONTINUED | OUTPATIENT
Start: 2023-11-09 | End: 2023-11-10

## 2023-11-09 RX ORDER — SODIUM CHLORIDE 9 MG/ML
INJECTION, SOLUTION INTRAVENOUS PRN
Status: DISCONTINUED | OUTPATIENT
Start: 2023-11-09 | End: 2023-11-13 | Stop reason: HOSPADM

## 2023-11-09 RX ORDER — HYDRALAZINE HYDROCHLORIDE 20 MG/ML
10 INJECTION INTRAMUSCULAR; INTRAVENOUS ONCE
Status: COMPLETED | OUTPATIENT
Start: 2023-11-09 | End: 2023-11-09

## 2023-11-09 RX ADMIN — DEXAMETHASONE SODIUM PHOSPHATE 10 MG: 4 INJECTION, SOLUTION INTRAMUSCULAR; INTRAVENOUS at 19:53

## 2023-11-09 RX ADMIN — ACETAMINOPHEN 650 MG: 325 TABLET ORAL at 17:08

## 2023-11-09 RX ADMIN — DIPHENHYDRAMINE HYDROCHLORIDE 50 MG: 50 INJECTION INTRAMUSCULAR; INTRAVENOUS at 17:07

## 2023-11-09 RX ADMIN — HYDRALAZINE HYDROCHLORIDE 10 MG: 20 INJECTION INTRAMUSCULAR; INTRAVENOUS at 20:50

## 2023-11-09 RX ADMIN — ROPINIROLE HYDROCHLORIDE 1 MG: 1 TABLET, FILM COATED ORAL at 22:38

## 2023-11-09 RX ADMIN — SODIUM CHLORIDE 1000 ML: 9 INJECTION, SOLUTION INTRAVENOUS at 17:06

## 2023-11-09 RX ADMIN — ALPRAZOLAM 1 MG: 0.5 TABLET ORAL at 22:30

## 2023-11-09 RX ADMIN — LABETALOL HYDROCHLORIDE 10 MG: 5 INJECTION, SOLUTION INTRAVENOUS at 17:46

## 2023-11-09 RX ADMIN — MAGNESIUM SULFATE HEPTAHYDRATE 2000 MG: 40 INJECTION, SOLUTION INTRAVENOUS at 17:12

## 2023-11-09 RX ADMIN — NICARDIPINE HYDROCHLORIDE 5 MG/HR: 0.1 INJECTION, SOLUTION INTRAVENTRICULAR at 20:59

## 2023-11-09 RX ADMIN — HYDRALAZINE HYDROCHLORIDE 10 MG: 20 INJECTION INTRAMUSCULAR; INTRAVENOUS at 18:22

## 2023-11-09 RX ADMIN — DROPERIDOL 1.25 MG: 2.5 INJECTION, SOLUTION INTRAMUSCULAR; INTRAVENOUS at 19:51

## 2023-11-09 RX ADMIN — NITROGLYCERIN 5 MCG/MIN: 20 INJECTION INTRAVENOUS at 19:58

## 2023-11-09 ASSESSMENT — PAIN DESCRIPTION - DESCRIPTORS
DESCRIPTORS: ACHING
DESCRIPTORS: POUNDING;STABBING

## 2023-11-09 ASSESSMENT — PAIN SCALES - GENERAL
PAINLEVEL_OUTOF10: 9
PAINLEVEL_OUTOF10: 9
PAINLEVEL_OUTOF10: 8

## 2023-11-09 ASSESSMENT — PAIN - FUNCTIONAL ASSESSMENT: PAIN_FUNCTIONAL_ASSESSMENT: 0-10

## 2023-11-09 ASSESSMENT — PAIN DESCRIPTION - LOCATION
LOCATION: HEAD
LOCATION: HEAD

## 2023-11-09 ASSESSMENT — PAIN DESCRIPTION - ORIENTATION: ORIENTATION: RIGHT;LEFT

## 2023-11-10 ENCOUNTER — TELEPHONE (OUTPATIENT)
Dept: INTERNAL MEDICINE CLINIC | Age: 73
End: 2023-11-10

## 2023-11-10 PROBLEM — I16.1 HYPERTENSIVE EMERGENCY: Status: ACTIVE | Noted: 2023-11-10

## 2023-11-10 LAB
ANION GAP SERPL CALCULATED.3IONS-SCNC: 11 MMOL/L (ref 3–16)
BASOPHILS # BLD: 0 K/UL (ref 0–0.2)
BASOPHILS NFR BLD: 0.3 %
BUN SERPL-MCNC: 58 MG/DL (ref 7–20)
CALCIUM SERPL-MCNC: 9.7 MG/DL (ref 8.3–10.6)
CHLORIDE SERPL-SCNC: 105 MMOL/L (ref 99–110)
CO2 SERPL-SCNC: 22 MMOL/L (ref 21–32)
CREAT SERPL-MCNC: 2.3 MG/DL (ref 0.6–1.2)
DEPRECATED RDW RBC AUTO: 15.1 % (ref 12.4–15.4)
EKG ATRIAL RATE: 67 BPM
EKG DIAGNOSIS: NORMAL
EKG P AXIS: 50 DEGREES
EKG P-R INTERVAL: 128 MS
EKG Q-T INTERVAL: 416 MS
EKG QRS DURATION: 74 MS
EKG QTC CALCULATION (BAZETT): 439 MS
EKG R AXIS: 5 DEGREES
EKG T AXIS: 31 DEGREES
EKG VENTRICULAR RATE: 67 BPM
EOSINOPHIL # BLD: 0 K/UL (ref 0–0.6)
EOSINOPHIL NFR BLD: 0 %
ERYTHROCYTE [SEDIMENTATION RATE] IN BLOOD BY WESTERGREN METHOD: 29 MM/HR (ref 0–30)
GFR SERPLBLD CREATININE-BSD FMLA CKD-EPI: 22 ML/MIN/{1.73_M2}
GLUCOSE SERPL-MCNC: 129 MG/DL (ref 70–99)
HCT VFR BLD AUTO: 38.9 % (ref 36–48)
HGB BLD-MCNC: 12.9 G/DL (ref 12–16)
LYMPHOCYTES # BLD: 0.6 K/UL (ref 1–5.1)
LYMPHOCYTES NFR BLD: 8.2 %
MCH RBC QN AUTO: 29.1 PG (ref 26–34)
MCHC RBC AUTO-ENTMCNC: 33.1 G/DL (ref 31–36)
MCV RBC AUTO: 88.1 FL (ref 80–100)
MONOCYTES # BLD: 0 K/UL (ref 0–1.3)
MONOCYTES NFR BLD: 0.5 %
NEUTROPHILS # BLD: 7.1 K/UL (ref 1.7–7.7)
NEUTROPHILS NFR BLD: 91 %
PLATELET # BLD AUTO: 420 K/UL (ref 135–450)
PMV BLD AUTO: 8.4 FL (ref 5–10.5)
POTASSIUM SERPL-SCNC: 4.5 MMOL/L (ref 3.5–5.1)
RBC # BLD AUTO: 4.41 M/UL (ref 4–5.2)
SODIUM SERPL-SCNC: 138 MMOL/L (ref 136–145)
TROPONIN, HIGH SENSITIVITY: 32 NG/L (ref 0–14)
TROPONIN, HIGH SENSITIVITY: 33 NG/L (ref 0–14)
WBC # BLD AUTO: 7.8 K/UL (ref 4–11)

## 2023-11-10 PROCEDURE — 6370000000 HC RX 637 (ALT 250 FOR IP): Performed by: STUDENT IN AN ORGANIZED HEALTH CARE EDUCATION/TRAINING PROGRAM

## 2023-11-10 PROCEDURE — 80048 BASIC METABOLIC PNL TOTAL CA: CPT

## 2023-11-10 PROCEDURE — 36415 COLL VENOUS BLD VENIPUNCTURE: CPT

## 2023-11-10 PROCEDURE — 2500000003 HC RX 250 WO HCPCS: Performed by: STUDENT IN AN ORGANIZED HEALTH CARE EDUCATION/TRAINING PROGRAM

## 2023-11-10 PROCEDURE — 99223 1ST HOSP IP/OBS HIGH 75: CPT | Performed by: INTERNAL MEDICINE

## 2023-11-10 PROCEDURE — 94640 AIRWAY INHALATION TREATMENT: CPT

## 2023-11-10 PROCEDURE — 6370000000 HC RX 637 (ALT 250 FOR IP): Performed by: INTERNAL MEDICINE

## 2023-11-10 PROCEDURE — 93010 ELECTROCARDIOGRAM REPORT: CPT | Performed by: INTERNAL MEDICINE

## 2023-11-10 PROCEDURE — 6360000002 HC RX W HCPCS: Performed by: STUDENT IN AN ORGANIZED HEALTH CARE EDUCATION/TRAINING PROGRAM

## 2023-11-10 PROCEDURE — 94760 N-INVAS EAR/PLS OXIMETRY 1: CPT

## 2023-11-10 PROCEDURE — 84484 ASSAY OF TROPONIN QUANT: CPT

## 2023-11-10 PROCEDURE — 6360000002 HC RX W HCPCS: Performed by: INTERNAL MEDICINE

## 2023-11-10 PROCEDURE — 2000000000 HC ICU R&B

## 2023-11-10 PROCEDURE — 85025 COMPLETE CBC W/AUTO DIFF WBC: CPT

## 2023-11-10 PROCEDURE — 99232 SBSQ HOSP IP/OBS MODERATE 35: CPT | Performed by: STUDENT IN AN ORGANIZED HEALTH CARE EDUCATION/TRAINING PROGRAM

## 2023-11-10 PROCEDURE — 2580000003 HC RX 258: Performed by: INTERNAL MEDICINE

## 2023-11-10 PROCEDURE — 2580000003 HC RX 258: Performed by: STUDENT IN AN ORGANIZED HEALTH CARE EDUCATION/TRAINING PROGRAM

## 2023-11-10 PROCEDURE — 6370000000 HC RX 637 (ALT 250 FOR IP): Performed by: NURSE PRACTITIONER

## 2023-11-10 RX ORDER — CARVEDILOL 12.5 MG/1
12.5 TABLET ORAL 2 TIMES DAILY WITH MEALS
Status: DISCONTINUED | OUTPATIENT
Start: 2023-11-10 | End: 2023-11-13 | Stop reason: HOSPADM

## 2023-11-10 RX ORDER — CHLORTHALIDONE 25 MG/1
25 TABLET ORAL DAILY
Status: DISCONTINUED | OUTPATIENT
Start: 2023-11-11 | End: 2023-11-13 | Stop reason: HOSPADM

## 2023-11-10 RX ORDER — ROSUVASTATIN CALCIUM 10 MG/1
10 TABLET, COATED ORAL NIGHTLY
Status: DISCONTINUED | OUTPATIENT
Start: 2023-11-10 | End: 2023-11-11

## 2023-11-10 RX ORDER — OXYCODONE HYDROCHLORIDE 5 MG/1
5 TABLET ORAL EVERY 4 HOURS PRN
Status: DISCONTINUED | OUTPATIENT
Start: 2023-11-10 | End: 2023-11-13 | Stop reason: HOSPADM

## 2023-11-10 RX ORDER — NIFEDIPINE 30 MG/1
60 TABLET, EXTENDED RELEASE ORAL DAILY
Status: DISCONTINUED | OUTPATIENT
Start: 2023-11-10 | End: 2023-11-13

## 2023-11-10 RX ORDER — PROCHLORPERAZINE EDISYLATE 5 MG/ML
10 INJECTION INTRAMUSCULAR; INTRAVENOUS EVERY 6 HOURS PRN
Status: DISCONTINUED | OUTPATIENT
Start: 2023-11-10 | End: 2023-11-10

## 2023-11-10 RX ORDER — PANTOPRAZOLE SODIUM 40 MG/1
40 TABLET, DELAYED RELEASE ORAL
Status: DISCONTINUED | OUTPATIENT
Start: 2023-11-10 | End: 2023-11-13 | Stop reason: HOSPADM

## 2023-11-10 RX ORDER — PROCHLORPERAZINE EDISYLATE 5 MG/ML
10 INJECTION INTRAMUSCULAR; INTRAVENOUS EVERY 6 HOURS PRN
Status: DISCONTINUED | OUTPATIENT
Start: 2023-11-10 | End: 2023-11-13 | Stop reason: HOSPADM

## 2023-11-10 RX ORDER — NICOTINE 21 MG/24HR
1 PATCH, TRANSDERMAL 24 HOURS TRANSDERMAL DAILY
Status: DISCONTINUED | OUTPATIENT
Start: 2023-11-10 | End: 2023-11-13 | Stop reason: HOSPADM

## 2023-11-10 RX ORDER — CARVEDILOL 12.5 MG/1
12.5 TABLET ORAL ONCE
Status: COMPLETED | OUTPATIENT
Start: 2023-11-10 | End: 2023-11-10

## 2023-11-10 RX ORDER — LABETALOL HYDROCHLORIDE 5 MG/ML
10 INJECTION, SOLUTION INTRAVENOUS ONCE
Status: COMPLETED | OUTPATIENT
Start: 2023-11-10 | End: 2023-11-10

## 2023-11-10 RX ORDER — CARVEDILOL 6.25 MG/1
6.25 TABLET ORAL 2 TIMES DAILY WITH MEALS
Status: DISCONTINUED | OUTPATIENT
Start: 2023-11-10 | End: 2023-11-10

## 2023-11-10 RX ADMIN — CARVEDILOL 12.5 MG: 12.5 TABLET, FILM COATED ORAL at 14:14

## 2023-11-10 RX ADMIN — OXYCODONE HYDROCHLORIDE 5 MG: 5 TABLET ORAL at 21:14

## 2023-11-10 RX ADMIN — ALPRAZOLAM 1 MG: 0.5 TABLET ORAL at 21:29

## 2023-11-10 RX ADMIN — NIFEDIPINE 60 MG: 30 TABLET, EXTENDED RELEASE ORAL at 13:53

## 2023-11-10 RX ADMIN — CLOPIDOGREL BISULFATE 75 MG: 75 TABLET ORAL at 08:31

## 2023-11-10 RX ADMIN — SODIUM CHLORIDE 5 MG/HR: 9 INJECTION, SOLUTION INTRAVENOUS at 12:58

## 2023-11-10 RX ADMIN — SODIUM CHLORIDE 5 MG/HR: 9 INJECTION, SOLUTION INTRAVENOUS at 15:00

## 2023-11-10 RX ADMIN — ACETAMINOPHEN 650 MG: 325 TABLET ORAL at 00:47

## 2023-11-10 RX ADMIN — CARVEDILOL 3.12 MG: 3.12 TABLET, FILM COATED ORAL at 08:32

## 2023-11-10 RX ADMIN — PANTOPRAZOLE SODIUM 40 MG: 40 TABLET, DELAYED RELEASE ORAL at 08:32

## 2023-11-10 RX ADMIN — Medication 10 ML: at 08:35

## 2023-11-10 RX ADMIN — OXYCODONE HYDROCHLORIDE 5 MG: 5 TABLET ORAL at 11:21

## 2023-11-10 RX ADMIN — OXYCODONE HYDROCHLORIDE 5 MG: 5 TABLET ORAL at 17:32

## 2023-11-10 RX ADMIN — Medication 10 ML: at 21:38

## 2023-11-10 RX ADMIN — ASPIRIN 81 MG CHEWABLE TABLET 81 MG: 81 TABLET CHEWABLE at 08:32

## 2023-11-10 RX ADMIN — CHLORTHALIDONE 25 MG: 25 TABLET ORAL at 08:32

## 2023-11-10 RX ADMIN — POLYETHYLENE GLYCOL, PROPYLENE GLYCOL 1 DROP: .4; .3 LIQUID OPHTHALMIC at 13:00

## 2023-11-10 RX ADMIN — ROSUVASTATIN CALCIUM 10 MG: 10 TABLET, FILM COATED ORAL at 21:15

## 2023-11-10 RX ADMIN — SODIUM CHLORIDE 5 MG/HR: 9 INJECTION, SOLUTION INTRAVENOUS at 17:05

## 2023-11-10 RX ADMIN — ENOXAPARIN SODIUM 30 MG: 100 INJECTION SUBCUTANEOUS at 08:38

## 2023-11-10 RX ADMIN — LABETALOL HYDROCHLORIDE 10 MG: 5 INJECTION, SOLUTION INTRAVENOUS at 10:50

## 2023-11-10 RX ADMIN — TIOTROPIUM BROMIDE AND OLODATEROL 2 PUFF: 3.124; 2.736 SPRAY, METERED RESPIRATORY (INHALATION) at 08:46

## 2023-11-10 RX ADMIN — PANTOPRAZOLE SODIUM 40 MG: 40 TABLET, DELAYED RELEASE ORAL at 22:00

## 2023-11-10 RX ADMIN — ROPINIROLE HYDROCHLORIDE 1 MG: 1 TABLET, FILM COATED ORAL at 21:15

## 2023-11-10 RX ADMIN — ROSUVASTATIN CALCIUM 10 MG: 10 TABLET, FILM COATED ORAL at 01:33

## 2023-11-10 ASSESSMENT — PAIN SCALES - GENERAL
PAINLEVEL_OUTOF10: 5
PAINLEVEL_OUTOF10: 5
PAINLEVEL_OUTOF10: 0
PAINLEVEL_OUTOF10: 5
PAINLEVEL_OUTOF10: 4
PAINLEVEL_OUTOF10: 3
PAINLEVEL_OUTOF10: 0
PAINLEVEL_OUTOF10: 8
PAINLEVEL_OUTOF10: 8
PAINLEVEL_OUTOF10: 4

## 2023-11-10 ASSESSMENT — PAIN DESCRIPTION - DESCRIPTORS
DESCRIPTORS: ACHING
DESCRIPTORS: ACHING
DESCRIPTORS: ACHING;SHOOTING

## 2023-11-10 ASSESSMENT — PAIN DESCRIPTION - ORIENTATION
ORIENTATION: OTHER (COMMENT)
ORIENTATION: MID;POSTERIOR;UPPER
ORIENTATION: MID

## 2023-11-10 ASSESSMENT — PAIN DESCRIPTION - LOCATION
LOCATION: HEAD

## 2023-11-10 ASSESSMENT — PAIN DESCRIPTION - PAIN TYPE: TYPE: ACUTE PAIN

## 2023-11-10 ASSESSMENT — PAIN - FUNCTIONAL ASSESSMENT: PAIN_FUNCTIONAL_ASSESSMENT: PREVENTS OR INTERFERES SOME ACTIVE ACTIVITIES AND ADLS

## 2023-11-10 NOTE — ED NOTES
Report received from  [place], [place] at this time. Introduced self to pt, all needs met, call light within reach.        Taz Mcdaniel RN  11/09/23 7661

## 2023-11-10 NOTE — TELEPHONE ENCOUNTER
Hospital calling stating they mistakenly admitted pt. Wanting to know if you want to take over for pt.  Please call attending physician back at [phone]

## 2023-11-10 NOTE — ED NOTES
ED SBAR report provider to PAUL, RN. Patient to be transported to Room 2110 via stretcher by RN. Patient transported with bedside cardiac monitor and with IV medications infusing. IV site clean, dry, and intact. Updated patient on plan of care.        Catrachita Quevedo RN  11/09/23 2049

## 2023-11-10 NOTE — ASSESSMENT & PLAN NOTE
Blood pressure elevated with headache.  She was initially on nicardipine drip  - Wean nicardipine drip  - Continue chlorthalidone 25 mg daily  - Increase carvedilol to 12.5 mg PO BID  - Start nifedipine  - Patient's nephrologist and cardiologist consulted

## 2023-11-10 NOTE — ED PROVIDER NOTES
Capillary Refill: Capillary refill takes less than 2 seconds. Coloration: Skin is not jaundiced. Neurological:      General: No focal deficit present. Mental Status: She is alert and oriented to person, place, and time. GCS: GCS eye subscore is 4. GCS verbal subscore is 5. GCS motor subscore is 6. Cranial Nerves: Cranial nerves 2-12 are intact. No cranial nerve deficit, dysarthria or facial asymmetry. Sensory: Sensation is intact. No sensory deficit. Motor: Motor function is intact. No weakness, tremor, atrophy, abnormal muscle tone, seizure activity or pronator drift. Coordination: Coordination is intact. Coordination normal. Heel to Shin Test normal.   Psychiatric:         Mood and Affect: Mood normal.         Behavior: Behavior normal.           FORMAL DIAGNOSTIC RESULTS     EKG: All EKG's are interpreted by the Emergency Department Physician who either signs or Co-signs this chart in the absence of a cardiologist.      RADIOLOGY:   Non-plain film images such as CT, Ultrasound and MRI are read by the radiologist. Chang Marino images are visualized and preliminarily interpreted by the  ED Provider with the belowfindings:      Interpretation per the Radiologist below, if available at the time of this note:    CT HEAD WO CONTRAST   Final Result   Mild atrophy and mild chronic microischemic changes scattered in the deep   white matter which is unchanged with no acute abnormality seen. XR CHEST (2 VW)   Final Result   No acute cardiopulmonary findings           CT HEAD WO CONTRAST    Result Date: 11/9/2023  EXAMINATION: CT OF THE HEAD WITHOUT CONTRAST  11/9/2023 5:15 pm TECHNIQUE: CT of the head was performed without the administration of intravenous contrast. Automated exposure control, iterative reconstruction, and/or weight based adjustment of the mA/kV was utilized to reduce the radiation dose to as low as reasonably achievable.  COMPARISON: 11/24/2020 HISTORY:

## 2023-11-10 NOTE — H&P
HOSPITALIST   HISTORY AND PHYSICAL    11/9/2023 10:02 PM    Patient Information:  Carmen Ruiz is a 72 y.o. female    PCP:  Neha Kearns MD      History of Present Illness:  Carmen Ruiz is a 72 y.o. female who presented to ER with worsening headache. Patient does have a history of migraine but this is apparently different for her. She has had headaches intermittently for the last several months in the back of her head and neck. MRI scan was performed. Subsequently she was referred to [facility] neurology group to consider possible epidural injections. Over time her headaches have become more intense and are now constant. 2 days ago she noticed blurred vision in both eyes which continues to this day. Today she decided that she should measure her blood pressure. She found her systolic blood pressure to be over 220 mmHg. Patient initiated on a Cardene infusion in the emergency room. Hospitalist service called to admit to ICU due to titratable drip. Her PCP is Dr. Bria Washburn who is normal with admit their own patient but they did not see patients in the ICU. Patient states that here in the emergency room she has had left-sided chest pain radiating to her back. She describes it as being heavy and similar to the discomfort with her first heart attack. Is also been associated with nausea and shortness of breath. She received nitroglycerin sublingual and states that now she has no further chest pain. There have not been any significant changes to her medication. She did recently stop smoking and is utilizing a nicotine patch. She denies any noncompliance with her usual medications. Patient request full CODE STATUS.     History obtained from   Patient Yes  Family and/or friend(s) no        History obtained from outside source no    Prior medical record(s) reviewed yes ER note 11/9    Case discussed with  ER provider no  Consultant no  Other no    REVIEW OF SYSTEMS:    Significant positives

## 2023-11-10 NOTE — PLAN OF CARE
Problem: Discharge Planning  Goal: Discharge to home or other facility with appropriate resources  Outcome: Progressing  Flowsheets (Taken 11/10/2023 7030)  Discharge to home or other facility with appropriate resources:   Identify barriers to discharge with patient and caregiver   Arrange for needed discharge resources and transportation as appropriate     Problem: Pain  Goal: Verbalizes/displays adequate comfort level or baseline comfort level  Outcome: Progressing

## 2023-11-11 PROBLEM — R79.89 ELEVATED TROPONIN: Status: ACTIVE | Noted: 2023-11-11

## 2023-11-11 LAB
ANION GAP SERPL CALCULATED.3IONS-SCNC: 10 MMOL/L (ref 3–16)
BASOPHILS # BLD: 0 K/UL (ref 0–0.2)
BASOPHILS NFR BLD: 0.4 %
BUN SERPL-MCNC: 78 MG/DL (ref 7–20)
CALCIUM SERPL-MCNC: 9.7 MG/DL (ref 8.3–10.6)
CHLORIDE SERPL-SCNC: 105 MMOL/L (ref 99–110)
CO2 SERPL-SCNC: 24 MMOL/L (ref 21–32)
CREAT SERPL-MCNC: 2.7 MG/DL (ref 0.6–1.2)
DEPRECATED RDW RBC AUTO: 15.3 % (ref 12.4–15.4)
EOSINOPHIL # BLD: 0 K/UL (ref 0–0.6)
EOSINOPHIL NFR BLD: 0.1 %
GFR SERPLBLD CREATININE-BSD FMLA CKD-EPI: 18 ML/MIN/{1.73_M2}
GLUCOSE SERPL-MCNC: 119 MG/DL (ref 70–99)
HCT VFR BLD AUTO: 37 % (ref 36–48)
HGB BLD-MCNC: 12.4 G/DL (ref 12–16)
LYMPHOCYTES # BLD: 1.7 K/UL (ref 1–5.1)
LYMPHOCYTES NFR BLD: 13.7 %
MCH RBC QN AUTO: 29.4 PG (ref 26–34)
MCHC RBC AUTO-ENTMCNC: 33.6 G/DL (ref 31–36)
MCV RBC AUTO: 87.5 FL (ref 80–100)
MONOCYTES # BLD: 1 K/UL (ref 0–1.3)
MONOCYTES NFR BLD: 8 %
NEUTROPHILS # BLD: 9.8 K/UL (ref 1.7–7.7)
NEUTROPHILS NFR BLD: 77.8 %
PLATELET # BLD AUTO: 371 K/UL (ref 135–450)
PMV BLD AUTO: 8.5 FL (ref 5–10.5)
POTASSIUM SERPL-SCNC: 4.4 MMOL/L (ref 3.5–5.1)
RBC # BLD AUTO: 4.23 M/UL (ref 4–5.2)
SODIUM SERPL-SCNC: 139 MMOL/L (ref 136–145)
WBC # BLD AUTO: 12.6 K/UL (ref 4–11)

## 2023-11-11 PROCEDURE — 6370000000 HC RX 637 (ALT 250 FOR IP): Performed by: INTERNAL MEDICINE

## 2023-11-11 PROCEDURE — 6370000000 HC RX 637 (ALT 250 FOR IP): Performed by: NURSE PRACTITIONER

## 2023-11-11 PROCEDURE — 99232 SBSQ HOSP IP/OBS MODERATE 35: CPT | Performed by: INTERNAL MEDICINE

## 2023-11-11 PROCEDURE — 2580000003 HC RX 258: Performed by: INTERNAL MEDICINE

## 2023-11-11 PROCEDURE — 36415 COLL VENOUS BLD VENIPUNCTURE: CPT

## 2023-11-11 PROCEDURE — 85025 COMPLETE CBC W/AUTO DIFF WBC: CPT

## 2023-11-11 PROCEDURE — 6370000000 HC RX 637 (ALT 250 FOR IP): Performed by: STUDENT IN AN ORGANIZED HEALTH CARE EDUCATION/TRAINING PROGRAM

## 2023-11-11 PROCEDURE — 94760 N-INVAS EAR/PLS OXIMETRY 1: CPT

## 2023-11-11 PROCEDURE — 94640 AIRWAY INHALATION TREATMENT: CPT

## 2023-11-11 PROCEDURE — 6360000002 HC RX W HCPCS: Performed by: INTERNAL MEDICINE

## 2023-11-11 PROCEDURE — 80048 BASIC METABOLIC PNL TOTAL CA: CPT

## 2023-11-11 PROCEDURE — 2000000000 HC ICU R&B

## 2023-11-11 RX ORDER — ROSUVASTATIN CALCIUM 40 MG/1
40 TABLET, COATED ORAL NIGHTLY
Status: DISCONTINUED | OUTPATIENT
Start: 2023-11-11 | End: 2023-11-13 | Stop reason: HOSPADM

## 2023-11-11 RX ADMIN — ROPINIROLE HYDROCHLORIDE 1 MG: 1 TABLET, FILM COATED ORAL at 21:06

## 2023-11-11 RX ADMIN — CLOPIDOGREL BISULFATE 75 MG: 75 TABLET ORAL at 08:04

## 2023-11-11 RX ADMIN — ASPIRIN 81 MG CHEWABLE TABLET 81 MG: 81 TABLET CHEWABLE at 08:04

## 2023-11-11 RX ADMIN — PANTOPRAZOLE SODIUM 40 MG: 40 TABLET, DELAYED RELEASE ORAL at 08:16

## 2023-11-11 RX ADMIN — CARVEDILOL 12.5 MG: 12.5 TABLET, FILM COATED ORAL at 18:27

## 2023-11-11 RX ADMIN — ENOXAPARIN SODIUM 30 MG: 100 INJECTION SUBCUTANEOUS at 08:24

## 2023-11-11 RX ADMIN — OXYCODONE HYDROCHLORIDE 5 MG: 5 TABLET ORAL at 23:05

## 2023-11-11 RX ADMIN — PANTOPRAZOLE SODIUM 40 MG: 40 TABLET, DELAYED RELEASE ORAL at 16:35

## 2023-11-11 RX ADMIN — ROSUVASTATIN 40 MG: 40 TABLET, FILM COATED ORAL at 21:06

## 2023-11-11 RX ADMIN — TIOTROPIUM BROMIDE AND OLODATEROL 2 PUFF: 3.124; 2.736 SPRAY, METERED RESPIRATORY (INHALATION) at 08:28

## 2023-11-11 RX ADMIN — CHLORTHALIDONE 25 MG: 25 TABLET ORAL at 08:04

## 2023-11-11 RX ADMIN — CARVEDILOL 12.5 MG: 12.5 TABLET, FILM COATED ORAL at 08:04

## 2023-11-11 RX ADMIN — NIFEDIPINE 60 MG: 30 TABLET, EXTENDED RELEASE ORAL at 08:04

## 2023-11-11 RX ADMIN — POLYETHYLENE GLYCOL, PROPYLENE GLYCOL 1 DROP: .4; .3 LIQUID OPHTHALMIC at 15:46

## 2023-11-11 RX ADMIN — Medication 10 ML: at 08:05

## 2023-11-11 RX ADMIN — ALPRAZOLAM 1 MG: 0.5 TABLET ORAL at 21:14

## 2023-11-11 RX ADMIN — Medication 10 ML: at 21:07

## 2023-11-11 ASSESSMENT — PAIN DESCRIPTION - LOCATION: LOCATION: HEAD

## 2023-11-11 ASSESSMENT — PAIN DESCRIPTION - PAIN TYPE: TYPE: ACUTE PAIN

## 2023-11-11 ASSESSMENT — PAIN DESCRIPTION - DESCRIPTORS: DESCRIPTORS: ACHING

## 2023-11-11 ASSESSMENT — PAIN SCALES - GENERAL: PAINLEVEL_OUTOF10: 4

## 2023-11-11 ASSESSMENT — PAIN DESCRIPTION - ORIENTATION: ORIENTATION: RIGHT;LEFT;POSTERIOR

## 2023-11-11 ASSESSMENT — PAIN - FUNCTIONAL ASSESSMENT: PAIN_FUNCTIONAL_ASSESSMENT: PREVENTS OR INTERFERES SOME ACTIVE ACTIVITIES AND ADLS

## 2023-11-11 NOTE — PLAN OF CARE
Problem: Discharge Planning  Goal: Discharge to home or other facility with appropriate resources  Outcome: Progressing  Flowsheets (Taken 11/10/2023 2030 by Jeremías Ngo RN)  Discharge to home or other facility with appropriate resources: Identify barriers to discharge with patient and caregiver     Problem: Pain  Goal: Verbalizes/displays adequate comfort level or baseline comfort level  Outcome: Progressing  Flowsheets  Taken 11/11/2023 0926 by Amrit Santos RN  Verbalizes/displays adequate comfort level or baseline comfort level:   Encourage patient to monitor pain and request assistance   Assess pain using appropriate pain scale   Administer analgesics based on type and severity of pain and evaluate response   Implement non-pharmacological measures as appropriate and evaluate response   Consider cultural and social influences on pain and pain management  Taken 11/10/2023 2030 by Jeremías Ngo RN  Verbalizes/displays adequate comfort level or baseline comfort level: Encourage patient to monitor pain and request assistance     Problem: Safety - Adult  Goal: Free from fall injury  Outcome: Progressing  Flowsheets (Taken 11/11/2023 0926)  Free From Fall Injury:   Instruct family/caregiver on patient safety   Based on caregiver fall risk screen, instruct family/caregiver to ask for assistance with transferring infant if caregiver noted to have fall risk factors  Note: No fall during this shift.      Problem: ABCDS Injury Assessment  Goal: Absence of physical injury  Outcome: Progressing     Problem: Neurosensory - Adult  Goal: Achieves stable or improved neurological status  Outcome: Progressing     Problem: Neurosensory - Adult  Goal: Absence of seizures  Outcome: Progressing     Problem: Neurosensory - Adult  Goal: Remains free of injury related to seizures activity  Outcome: Progressing     Problem: Neurosensory - Adult  Goal: Achieves maximal functionality and self care  Outcome: Progressing

## 2023-11-12 LAB
ANION GAP SERPL CALCULATED.3IONS-SCNC: 10 MMOL/L (ref 3–16)
BACTERIA URNS QL MICRO: NORMAL /HPF
BASOPHILS # BLD: 0.1 K/UL (ref 0–0.2)
BASOPHILS NFR BLD: 1.1 %
BILIRUB UR QL STRIP.AUTO: NEGATIVE
BUN SERPL-MCNC: 78 MG/DL (ref 7–20)
CALCIUM SERPL-MCNC: 9.2 MG/DL (ref 8.3–10.6)
CHLORIDE SERPL-SCNC: 104 MMOL/L (ref 99–110)
CLARITY UR: CLEAR
CO2 SERPL-SCNC: 24 MMOL/L (ref 21–32)
COLOR UR: YELLOW
CREAT SERPL-MCNC: 3.2 MG/DL (ref 0.6–1.2)
DEPRECATED RDW RBC AUTO: 15.5 % (ref 12.4–15.4)
EOSINOPHIL # BLD: 0.4 K/UL (ref 0–0.6)
EOSINOPHIL NFR BLD: 3.7 %
EPI CELLS #/AREA URNS AUTO: 0 /HPF (ref 0–5)
GFR SERPLBLD CREATININE-BSD FMLA CKD-EPI: 15 ML/MIN/{1.73_M2}
GLUCOSE SERPL-MCNC: 106 MG/DL (ref 70–99)
GLUCOSE UR STRIP.AUTO-MCNC: NEGATIVE MG/DL
HCT VFR BLD AUTO: 35 % (ref 36–48)
HGB BLD-MCNC: 11.7 G/DL (ref 12–16)
HGB UR QL STRIP.AUTO: ABNORMAL
HYALINE CASTS #/AREA URNS AUTO: 1 /LPF (ref 0–8)
KETONES UR STRIP.AUTO-MCNC: NEGATIVE MG/DL
LEUKOCYTE ESTERASE UR QL STRIP.AUTO: NEGATIVE
LYMPHOCYTES # BLD: 3.1 K/UL (ref 1–5.1)
LYMPHOCYTES NFR BLD: 29.6 %
MCH RBC QN AUTO: 29.2 PG (ref 26–34)
MCHC RBC AUTO-ENTMCNC: 33.4 G/DL (ref 31–36)
MCV RBC AUTO: 87.6 FL (ref 80–100)
MONOCYTES # BLD: 0.9 K/UL (ref 0–1.3)
MONOCYTES NFR BLD: 8.6 %
NEUTROPHILS # BLD: 5.9 K/UL (ref 1.7–7.7)
NEUTROPHILS NFR BLD: 57 %
NITRITE UR QL STRIP.AUTO: NEGATIVE
PH UR STRIP.AUTO: 5.5 [PH] (ref 5–8)
PLATELET # BLD AUTO: 372 K/UL (ref 135–450)
PMV BLD AUTO: 8.4 FL (ref 5–10.5)
POTASSIUM SERPL-SCNC: 4.4 MMOL/L (ref 3.5–5.1)
PROT UR STRIP.AUTO-MCNC: 100 MG/DL
RBC # BLD AUTO: 3.99 M/UL (ref 4–5.2)
RBC #/AREA URNS HPF: NORMAL /HPF (ref 0–4)
SODIUM SERPL-SCNC: 138 MMOL/L (ref 136–145)
SP GR UR STRIP.AUTO: 1.01 (ref 1–1.03)
UA COMPLETE W REFLEX CULTURE PNL UR: ABNORMAL
UA DIPSTICK W REFLEX MICRO PNL UR: YES
URN SPEC COLLECT METH UR: ABNORMAL
UROBILINOGEN UR STRIP-ACNC: 0.2 E.U./DL
WBC # BLD AUTO: 10.4 K/UL (ref 4–11)
WBC #/AREA URNS AUTO: 1 /HPF (ref 0–5)

## 2023-11-12 PROCEDURE — 6370000000 HC RX 637 (ALT 250 FOR IP): Performed by: STUDENT IN AN ORGANIZED HEALTH CARE EDUCATION/TRAINING PROGRAM

## 2023-11-12 PROCEDURE — 6370000000 HC RX 637 (ALT 250 FOR IP): Performed by: INTERNAL MEDICINE

## 2023-11-12 PROCEDURE — 85025 COMPLETE CBC W/AUTO DIFF WBC: CPT

## 2023-11-12 PROCEDURE — 94760 N-INVAS EAR/PLS OXIMETRY 1: CPT

## 2023-11-12 PROCEDURE — 94640 AIRWAY INHALATION TREATMENT: CPT

## 2023-11-12 PROCEDURE — 2000000000 HC ICU R&B

## 2023-11-12 PROCEDURE — 80048 BASIC METABOLIC PNL TOTAL CA: CPT

## 2023-11-12 PROCEDURE — 6370000000 HC RX 637 (ALT 250 FOR IP): Performed by: NURSE PRACTITIONER

## 2023-11-12 PROCEDURE — 81001 URINALYSIS AUTO W/SCOPE: CPT

## 2023-11-12 PROCEDURE — 36415 COLL VENOUS BLD VENIPUNCTURE: CPT

## 2023-11-12 PROCEDURE — 2580000003 HC RX 258: Performed by: INTERNAL MEDICINE

## 2023-11-12 PROCEDURE — 6360000002 HC RX W HCPCS: Performed by: INTERNAL MEDICINE

## 2023-11-12 PROCEDURE — 99232 SBSQ HOSP IP/OBS MODERATE 35: CPT | Performed by: INTERNAL MEDICINE

## 2023-11-12 RX ADMIN — OXYCODONE HYDROCHLORIDE 5 MG: 5 TABLET ORAL at 21:11

## 2023-11-12 RX ADMIN — ROSUVASTATIN 40 MG: 40 TABLET, FILM COATED ORAL at 21:07

## 2023-11-12 RX ADMIN — ASPIRIN 81 MG CHEWABLE TABLET 81 MG: 81 TABLET CHEWABLE at 07:33

## 2023-11-12 RX ADMIN — CARVEDILOL 12.5 MG: 12.5 TABLET, FILM COATED ORAL at 07:34

## 2023-11-12 RX ADMIN — CLOPIDOGREL BISULFATE 75 MG: 75 TABLET ORAL at 07:34

## 2023-11-12 RX ADMIN — NIFEDIPINE 60 MG: 30 TABLET, EXTENDED RELEASE ORAL at 07:33

## 2023-11-12 RX ADMIN — PANTOPRAZOLE SODIUM 40 MG: 40 TABLET, DELAYED RELEASE ORAL at 16:00

## 2023-11-12 RX ADMIN — CARVEDILOL 12.5 MG: 12.5 TABLET, FILM COATED ORAL at 18:07

## 2023-11-12 RX ADMIN — Medication 10 ML: at 21:00

## 2023-11-12 RX ADMIN — ALPRAZOLAM 1 MG: 0.5 TABLET ORAL at 21:07

## 2023-11-12 RX ADMIN — CHLORTHALIDONE 25 MG: 25 TABLET ORAL at 07:33

## 2023-11-12 RX ADMIN — Medication 10 ML: at 07:34

## 2023-11-12 RX ADMIN — ROPINIROLE HYDROCHLORIDE 1 MG: 1 TABLET, FILM COATED ORAL at 21:07

## 2023-11-12 RX ADMIN — PANTOPRAZOLE SODIUM 40 MG: 40 TABLET, DELAYED RELEASE ORAL at 07:34

## 2023-11-12 RX ADMIN — ENOXAPARIN SODIUM 30 MG: 100 INJECTION SUBCUTANEOUS at 08:11

## 2023-11-12 RX ADMIN — TIOTROPIUM BROMIDE AND OLODATEROL 2 PUFF: 3.124; 2.736 SPRAY, METERED RESPIRATORY (INHALATION) at 08:33

## 2023-11-12 ASSESSMENT — PAIN DESCRIPTION - LOCATION: LOCATION: HEAD

## 2023-11-12 ASSESSMENT — PAIN SCALES - GENERAL: PAINLEVEL_OUTOF10: 4

## 2023-11-12 ASSESSMENT — PAIN DESCRIPTION - ORIENTATION: ORIENTATION: RIGHT;LEFT;POSTERIOR

## 2023-11-12 ASSESSMENT — PAIN - FUNCTIONAL ASSESSMENT: PAIN_FUNCTIONAL_ASSESSMENT: PREVENTS OR INTERFERES SOME ACTIVE ACTIVITIES AND ADLS

## 2023-11-12 ASSESSMENT — PAIN DESCRIPTION - PAIN TYPE: TYPE: ACUTE PAIN

## 2023-11-12 ASSESSMENT — PAIN DESCRIPTION - DESCRIPTORS: DESCRIPTORS: ACHING

## 2023-11-12 NOTE — PLAN OF CARE
Problem: Discharge Planning  Goal: Discharge to home or other facility with appropriate resources  Outcome: Progressing     Problem: Pain  Goal: Verbalizes/displays adequate comfort level or baseline comfort level  Outcome: Progressing     Problem: Safety - Adult  Goal: Free from fall injury  Outcome: Progressing     Problem: ABCDS Injury Assessment  Goal: Absence of physical injury  Outcome: Progressing     Problem: Neurosensory - Adult  Goal: Achieves stable or improved neurological status  Outcome: Progressing     Problem: Neurosensory - Adult  Goal: Absence of seizures  Outcome: Progressing     Problem: Neurosensory - Adult  Goal: Remains free of injury related to seizures activity  Outcome: Progressing     Problem: Neurosensory - Adult  Goal: Achieves maximal functionality and self care  Outcome: Progressing

## 2023-11-12 NOTE — CONSULTS
[address] [facility] [address]  1950    November 10, 2023    Reason for Consult: Hypertensive Urgency    CC: Headache    HPI:  The patient is 72 y.o. female with a past medical history significant for CAD, PAD, hyperlipidemia and hypertension. She underwent LHC on 10/21/13 resulting in PCI with BMS to mid RCA lesion. A peripheral angiogram was completed on 5/30/14 revealing 50% bilateral SFA stenoses. She was admitted 12/4-12/6/22 for a MI and underwent a left heart catheterization resulting in x1DES to the RCA. She completed a normal echocardiogram 12/5/22. She underwent a peripheral 12/19/22 that resulted in a balloon angioplasty to the right SFA. Her dose of carvedilol was previously reduced by Dr. Helaine Brittle. She underwent a peripheral 1/10/22 that resulted in a balloon angioplasty to the left SFA. She was admitted to [facility] after presenting with a headache and hypertensive urgency. Her BP was 218/108. I was asked to see her for this. Ellen Munguia states that she has been having headaches intermittently for about 6 months. Dr. Shira Ryan has been working this up with [employer]. Two days ago she developed the worst headache she has had. This persisted into yesterday whe she decided to take her BP. It was 174EGYS systolic. She decided to come to the ED after speakig with Dr. Tejas Patel office. There has been no chest pain or tightness. She denies and shortness of breath. Review of Systems:  Constitutional: No fatigue, weakness, night sweats or fever. HEENT: No new vision difficulties or ringing in the ears. Respiratory: No new SOB, PND, orthopnea or cough. Cardiovascular: See HPI   GI: No n/v, diarrhea, constipation, abdominal pain or changes in bowel habits. No melena, no hematochezia  : No urinary frequency, urgency, incontinence, hematuria or dysuria. Skin: No cyanosis or skin lesions. Musculoskeletal: No new muscle or joint pain.   Neurological: Positive for
[address] [facility]     Nephrology Note         Patient ID: Keyon Torres  Referring/ Physician: Odalys Lewis MD      Summary:   Keyon Torres is being seen by nephrology for HTN urgency. Reason for admission:   HTN urgency. Interval Hx:     The patient was seen and examined in her room in ICU  She was seen in her bed  She was very awake, alert and conversant  Still that she feels a lot better, no more headaches  Her vision is fine, no chest pain  She has been off nicardipine drip  Her most recent set of vital signs showed blood pressure 136/87    Lab work from this morning showed sodium 139, potassium 4.4, bicarb 24, BUN 78, creatinine was 2.7, glucose was 119    WBC 12.6, hemoglobin 12.4 and platelet was 614. Assessment/Plan:   The patient off Cardene drip and her blood pressure is great, 136/87  As of now, she is getting carvedilol 12.5 p.o. twice daily, chlorthalidone 25 mg p.o. once daily and nifedipine 60 mg p.o. once daily. Prior to the presentation, the patient was getting chlorthalidone 25 mg p.o. once daily, carvedilol 3.125 p.o. twice daily    The patient's serum creatinine this morning was 2.7, which is higher than her baseline level. This time she presented with a creatinine of four 2.5 and her baseline creatinine appears to be between 1.5 to 2 mg/dL  The patient UA showed protein 30    No need for other intervention from renal aspect for today          Hypertensive urgency  Patient has a history of hypertension in the setting of CKD and peripheral vascular disease but had acute rise in blood pressure prior to presentation associated with headaches. Her blood pressure was over 899 systolic. Her outpatient blood pressure with me back in August was 148/72. At that time we started chlorthalidone every other day 25 mg but she has been out of this for the past week prior to presentation.   Manage blood pressure acutely with nicardipine infusion, systolic blood pressure goal less
urine or discolored urine. She has a history of acute intermittent porphyria and usually when she gets a flare of this she has severe abdominal pain which she has not had any of. Review of Systems:   As above. PMH/SH/FH:    Medical Hx: reviewed and updated as appropriate  Past Medical History:   Diagnosis Date    acute intermittent porphyria     sees Jalen Moralez, mom had it too    agoraphobic     ongoing, prefers to be at home    Allergic rhinitis     Anxiety     Asthma     CAD (coronary artery disease) 2022    acute inf [place] RCA stent successfully placed *Gianni Giang other arteries clear    ckd 4     from HTN,  sees Trini Pagan    collagenous colitis     Ayoub    Depression 2013    eversince      GERD (gastroesophageal reflux disease)     Hyperlipidemia     Hypertension 2016    Left thyroid nodule     9mm, gets rechecked yearly on ct lung    Migraines     gets 4x per month    PAD (peripheral artery disease) ([address]) 2022    bilat ptca fem art Portia Hanna    post menopausal 2020    osteopenia frax score under on calcium and D    Prediabetes     Pulmonary nodules     yearly ct lung followed by Dr Jalen Moralez    Restless leg syndrome     STEMI involving oth coronary artery of inferior wall ([address]) 2022    JAYME RCA         Surgical Hx: reviewed and updated as appropriate   has a past surgical history that includes Hysterectomy; Cholecystectomy; Coronary angioplasty with stent (10/2013 [place]); Colonoscopy (2017); Colonoscopy (N/A, 2022); and Upper gastrointestinal endoscopy (N/A, 2022). Social Hx: reviewed and updated as appropriate  Social History     Tobacco Use    Smoking status: Former     Packs/day: 1.00     Years: 50.00     Additional pack years: 0.00     Total pack years: 50.00     Types: Cigarettes     Start date: 1968    Smokeless tobacco: Never    Tobacco comments:     Resumed cigarettes    Substance Use Topics    Alcohol use:  No

## 2023-11-13 VITALS
DIASTOLIC BLOOD PRESSURE: 78 MMHG | OXYGEN SATURATION: 93 % | HEIGHT: 64 IN | BODY MASS INDEX: 18.71 KG/M2 | SYSTOLIC BLOOD PRESSURE: 132 MMHG | WEIGHT: 109.57 LBS | TEMPERATURE: 98.3 F | HEART RATE: 66 BPM | RESPIRATION RATE: 18 BRPM

## 2023-11-13 LAB
ANION GAP SERPL CALCULATED.3IONS-SCNC: 11 MMOL/L (ref 3–16)
BUN SERPL-MCNC: 76 MG/DL (ref 7–20)
CALCIUM SERPL-MCNC: 9.8 MG/DL (ref 8.3–10.6)
CHLORIDE SERPL-SCNC: 101 MMOL/L (ref 99–110)
CO2 SERPL-SCNC: 26 MMOL/L (ref 21–32)
CREAT SERPL-MCNC: 3.3 MG/DL (ref 0.6–1.2)
GFR SERPLBLD CREATININE-BSD FMLA CKD-EPI: 14 ML/MIN/{1.73_M2}
GLUCOSE SERPL-MCNC: 94 MG/DL (ref 70–99)
MAGNESIUM SERPL-MCNC: 2.2 MG/DL (ref 1.8–2.4)
PHOSPHATE SERPL-MCNC: 3.9 MG/DL (ref 2.5–4.9)
POTASSIUM SERPL-SCNC: 4.3 MMOL/L (ref 3.5–5.1)
SODIUM SERPL-SCNC: 138 MMOL/L (ref 136–145)

## 2023-11-13 PROCEDURE — 6370000000 HC RX 637 (ALT 250 FOR IP): Performed by: INTERNAL MEDICINE

## 2023-11-13 PROCEDURE — 6370000000 HC RX 637 (ALT 250 FOR IP): Performed by: STUDENT IN AN ORGANIZED HEALTH CARE EDUCATION/TRAINING PROGRAM

## 2023-11-13 PROCEDURE — 36415 COLL VENOUS BLD VENIPUNCTURE: CPT

## 2023-11-13 PROCEDURE — 80048 BASIC METABOLIC PNL TOTAL CA: CPT

## 2023-11-13 PROCEDURE — 83735 ASSAY OF MAGNESIUM: CPT

## 2023-11-13 PROCEDURE — 2580000003 HC RX 258: Performed by: INTERNAL MEDICINE

## 2023-11-13 PROCEDURE — 84100 ASSAY OF PHOSPHORUS: CPT

## 2023-11-13 PROCEDURE — 6370000000 HC RX 637 (ALT 250 FOR IP): Performed by: NURSE PRACTITIONER

## 2023-11-13 PROCEDURE — 6360000002 HC RX W HCPCS: Performed by: STUDENT IN AN ORGANIZED HEALTH CARE EDUCATION/TRAINING PROGRAM

## 2023-11-13 RX ORDER — CHLORTHALIDONE 25 MG/1
25 TABLET ORAL EVERY OTHER DAY
Qty: 30 TABLET | Refills: 3 | Status: SHIPPED | OUTPATIENT
Start: 2023-11-13

## 2023-11-13 RX ORDER — MENTHOL 1.4 %
ADHESIVE PATCH, MEDICATED TOPICAL 3 TIMES DAILY PRN
Status: DISCONTINUED | OUTPATIENT
Start: 2023-11-13 | End: 2023-11-13 | Stop reason: ALTCHOICE

## 2023-11-13 RX ORDER — BENZONATATE 100 MG/1
100 CAPSULE ORAL 3 TIMES DAILY PRN
Status: DISCONTINUED | OUTPATIENT
Start: 2023-11-13 | End: 2023-11-13 | Stop reason: HOSPADM

## 2023-11-13 RX ORDER — TIZANIDINE 4 MG/1
4 TABLET ORAL EVERY 6 HOURS PRN
Status: DISCONTINUED | OUTPATIENT
Start: 2023-11-13 | End: 2023-11-13 | Stop reason: HOSPADM

## 2023-11-13 RX ORDER — NIFEDIPINE 30 MG/1
30 TABLET, EXTENDED RELEASE ORAL DAILY
Status: DISCONTINUED | OUTPATIENT
Start: 2023-11-14 | End: 2023-11-13

## 2023-11-13 RX ORDER — TIZANIDINE 4 MG/1
4 TABLET ORAL EVERY 6 HOURS PRN
Qty: 90 TABLET | Refills: 2 | Status: SHIPPED | OUTPATIENT
Start: 2023-11-13

## 2023-11-13 RX ORDER — CARVEDILOL 12.5 MG/1
12.5 TABLET ORAL 2 TIMES DAILY WITH MEALS
Qty: 60 TABLET | Refills: 3 | Status: SHIPPED | OUTPATIENT
Start: 2023-11-13

## 2023-11-13 RX ORDER — TROLAMINE SALICYLATE 10 G/100G
CREAM TOPICAL 3 TIMES DAILY PRN
Status: DISCONTINUED | OUTPATIENT
Start: 2023-11-13 | End: 2023-11-13 | Stop reason: HOSPADM

## 2023-11-13 RX ORDER — HEPARIN SODIUM 5000 [USP'U]/ML
5000 INJECTION, SOLUTION INTRAVENOUS; SUBCUTANEOUS 2 TIMES DAILY
Status: DISCONTINUED | OUTPATIENT
Start: 2023-11-13 | End: 2023-11-13 | Stop reason: HOSPADM

## 2023-11-13 RX ADMIN — OXYCODONE HYDROCHLORIDE 5 MG: 5 TABLET ORAL at 04:15

## 2023-11-13 RX ADMIN — CLOPIDOGREL BISULFATE 75 MG: 75 TABLET ORAL at 08:37

## 2023-11-13 RX ADMIN — HEPARIN SODIUM 5000 UNITS: 5000 INJECTION INTRAVENOUS; SUBCUTANEOUS at 08:36

## 2023-11-13 RX ADMIN — TROLAMINE SALICYLATE: 100 CREAM TOPICAL at 11:43

## 2023-11-13 RX ADMIN — NIFEDIPINE 60 MG: 30 TABLET, EXTENDED RELEASE ORAL at 08:36

## 2023-11-13 RX ADMIN — Medication 10 ML: at 08:42

## 2023-11-13 RX ADMIN — BENZOCAINE AND MENTHOL 1 LOZENGE: 15; 3.6 LOZENGE ORAL at 11:41

## 2023-11-13 RX ADMIN — PANTOPRAZOLE SODIUM 40 MG: 40 TABLET, DELAYED RELEASE ORAL at 08:37

## 2023-11-13 RX ADMIN — ASPIRIN 81 MG CHEWABLE TABLET 81 MG: 81 TABLET CHEWABLE at 08:36

## 2023-11-13 RX ADMIN — BENZONATATE 100 MG: 100 CAPSULE ORAL at 11:42

## 2023-11-13 RX ADMIN — TIOTROPIUM BROMIDE AND OLODATEROL 2 PUFF: 3.124; 2.736 SPRAY, METERED RESPIRATORY (INHALATION) at 11:21

## 2023-11-13 RX ADMIN — CARVEDILOL 12.5 MG: 12.5 TABLET, FILM COATED ORAL at 08:37

## 2023-11-13 ASSESSMENT — PAIN DESCRIPTION - LOCATION
LOCATION: HEAD

## 2023-11-13 ASSESSMENT — PAIN SCALES - GENERAL
PAINLEVEL_OUTOF10: 2
PAINLEVEL_OUTOF10: 2
PAINLEVEL_OUTOF10: 4

## 2023-11-13 ASSESSMENT — PAIN - FUNCTIONAL ASSESSMENT
PAIN_FUNCTIONAL_ASSESSMENT: ACTIVITIES ARE NOT PREVENTED
PAIN_FUNCTIONAL_ASSESSMENT: ACTIVITIES ARE NOT PREVENTED
PAIN_FUNCTIONAL_ASSESSMENT: PREVENTS OR INTERFERES SOME ACTIVE ACTIVITIES AND ADLS

## 2023-11-13 ASSESSMENT — PAIN DESCRIPTION - DESCRIPTORS
DESCRIPTORS: STABBING
DESCRIPTORS: ACHING
DESCRIPTORS: ACHING;OTHER (COMMENT)

## 2023-11-13 ASSESSMENT — PAIN DESCRIPTION - ORIENTATION
ORIENTATION: POSTERIOR;MID
ORIENTATION: RIGHT;LEFT;POSTERIOR
ORIENTATION: POSTERIOR;MID

## 2023-11-13 ASSESSMENT — PAIN DESCRIPTION - PAIN TYPE
TYPE: ACUTE PAIN
TYPE: CHRONIC PAIN
TYPE: CHRONIC PAIN

## 2023-11-13 ASSESSMENT — PAIN DESCRIPTION - FREQUENCY
FREQUENCY: CONTINUOUS
FREQUENCY: CONTINUOUS

## 2023-11-13 NOTE — PLAN OF CARE
Problem: Discharge Planning  Goal: Discharge to home or other facility with appropriate resources  Outcome: Progressing  Flowsheets (Taken 11/13/2023 0800)  Discharge to home or other facility with appropriate resources:   Identify barriers to discharge with patient and caregiver   Arrange for needed discharge resources and transportation as appropriate   Identify discharge learning needs (meds, wound care, etc)   Refer to discharge planning if patient needs post-hospital services based on physician order or complex needs related to functional status, cognitive ability or social support system     Problem: Pain  Goal: Verbalizes/displays adequate comfort level or baseline comfort level  Outcome: Progressing  Flowsheets (Taken 11/13/2023 0800)  Verbalizes/displays adequate comfort level or baseline comfort level:   Encourage patient to monitor pain and request assistance   Assess pain using appropriate pain scale   Administer analgesics based on type and severity of pain and evaluate response     Problem: Safety - Adult  Goal: Free from fall injury  Outcome: Progressing     Problem: ABCDS Injury Assessment  Goal: Absence of physical injury  Outcome: Progressing     Problem: Neurosensory - Adult  Goal: Achieves stable or improved neurological status  Outcome: Progressing  Goal: Absence of seizures  Outcome: Progressing  Goal: Remains free of injury related to seizures activity  Outcome: Progressing  Goal: Achieves maximal functionality and self care  Outcome: Progressing

## 2023-11-14 ENCOUNTER — CARE COORDINATION (OUTPATIENT)
Dept: CASE MANAGEMENT | Age: 73
End: 2023-11-14

## 2023-11-14 DIAGNOSIS — I16.1 HYPERTENSIVE EMERGENCY: Primary | ICD-10-CM

## 2023-11-14 PROCEDURE — 1111F DSCHRG MED/CURRENT MED MERGE: CPT

## 2023-11-14 NOTE — CARE COORDINATION
Transition Nurse provided contact information.     Plan for next call:  H/A    Loreto Leyva RN BSN  Care Transition Nurse  [phone]

## 2023-11-21 ENCOUNTER — TELEPHONE (OUTPATIENT)
Dept: INTERNAL MEDICINE CLINIC | Age: 73
End: 2023-11-21

## 2023-11-21 DIAGNOSIS — F41.9 ANXIETY: ICD-10-CM

## 2023-11-21 RX ORDER — ALPRAZOLAM 2 MG/1
2 TABLET ORAL NIGHTLY PRN
Qty: 30 TABLET | Refills: 2 | Status: SHIPPED | OUTPATIENT
Start: 2023-11-21 | End: 2024-02-19

## 2023-11-21 NOTE — TELEPHONE ENCOUNTER
Pharmacy called, they have questions about her Rx for ALPRAZolam Marty Beverage) 2 MG tablet     Thank you

## 2023-11-21 NOTE — TELEPHONE ENCOUNTER
Spoke with the pharmacist, they stated that their record show that pt took Xanax 1mg once a day and now 's rx shows 2mg TID which six times more. They want to confirm with MD that this is the right dosage. Please advise.

## 2023-11-22 ENCOUNTER — CARE COORDINATION (OUTPATIENT)
Dept: CASE MANAGEMENT | Age: 73
End: 2023-11-22

## 2023-11-22 NOTE — CARE COORDINATION
Care Transitions Follow Up Call      Patient: Gila Mckeon  Patient : 1950   MRN: [id number]  Reason for Admission: H/A  Discharge Date: 23 RARS: Readmission Risk Score: 15.1      Needs to be reviewed by the provider   Additional needs identified to be addressed with provider: No               Method of communication with provider: none. Unable to reach patient for CT follow up phone call. Left message. Contact info provided. Requested return call to CTN.         Follow Up  Future Appointments   Date Time Provider [address] [place] [address]   2023 11:00 AM Layne Clifton MD [facility]   2024  9:30 AM Clayton Blackman  [address] Transitions Subsequent and Final Call    Subsequent and Final Calls  Care Transitions Interventions                          Other Interventions:           Alessia Lopes RN BSN  Care Transition Nurse  [phone]

## 2023-11-28 ENCOUNTER — CARE COORDINATION (OUTPATIENT)
Dept: CASE MANAGEMENT | Age: 73
End: 2023-11-28

## 2023-11-28 NOTE — CARE COORDINATION
ckd  Interventions to address risk factors: Education of patient/family/caregiver/guardian to support self-management-     Offered patient enrollment in the Remote Patient Monitoring (RPM) program for in-home monitoring:  future call . Care Transitions Subsequent and Final Call    Subsequent and Final Calls  Do you have any ongoing symptoms?: No  Have your medications changed?: No  Do you have any questions related to your medications?: No  Do you currently have any active services?: No  Do you have any needs or concerns that I can assist you with?: No  Identified Barriers: None  Care Transitions Interventions                          Other Interventions:             LPN Care Coordinator provided contact information for future needs. Plan for follow-up call in 5-7 days based on severity of symptoms and risk factors.   Plan for next call: symptom management-sciatic pain, tiredness  self management-       [address] [facility] [address] Coordinator  [phone]

## 2023-12-05 ENCOUNTER — OFFICE VISIT (OUTPATIENT)
Dept: CARDIOLOGY CLINIC | Age: 73
End: 2023-12-05
Payer: MEDICARE

## 2023-12-05 VITALS
BODY MASS INDEX: 21.44 KG/M2 | SYSTOLIC BLOOD PRESSURE: 120 MMHG | OXYGEN SATURATION: 94 % | HEIGHT: 64 IN | DIASTOLIC BLOOD PRESSURE: 84 MMHG | HEART RATE: 63 BPM | WEIGHT: 125.6 LBS

## 2023-12-05 DIAGNOSIS — I10 ESSENTIAL HYPERTENSION: ICD-10-CM

## 2023-12-05 DIAGNOSIS — I25.10 CORONARY ARTERY DISEASE INVOLVING NATIVE CORONARY ARTERY OF NATIVE HEART WITHOUT ANGINA PECTORIS: Primary | ICD-10-CM

## 2023-12-05 DIAGNOSIS — N18.4 CKD (CHRONIC KIDNEY DISEASE), STAGE IV (HCC): ICD-10-CM

## 2023-12-05 DIAGNOSIS — E78.5 HYPERLIPIDEMIA LDL GOAL <70: ICD-10-CM

## 2023-12-05 DIAGNOSIS — I73.9 PAD (PERIPHERAL ARTERY DISEASE) (HCC): ICD-10-CM

## 2023-12-05 PROCEDURE — 3079F DIAST BP 80-89 MM HG: CPT | Performed by: INTERNAL MEDICINE

## 2023-12-05 PROCEDURE — 3074F SYST BP LT 130 MM HG: CPT | Performed by: INTERNAL MEDICINE

## 2023-12-05 PROCEDURE — 99214 OFFICE O/P EST MOD 30 MIN: CPT | Performed by: INTERNAL MEDICINE

## 2023-12-05 PROCEDURE — 1123F ACP DISCUSS/DSCN MKR DOCD: CPT | Performed by: INTERNAL MEDICINE

## 2023-12-05 NOTE — PROGRESS NOTES
[address] [facility] [address]  1950 December 5, 2023      CC: \"My blood pressure is fluctuating\"     HPI:  The patient is 72 y.o. female with a past medical history significant for CAD, PAD, hyperlipidemia and hypertension. She underwent LHC on 10/21/13 resulting in PCI with BMS to mid RCA lesion. A peripheral angiogram was completed on 5/30/14 revealing 50% bilateral SFA stenoses. She was admitted 12/4-12/6/22 for a MI and underwent a left heart catheterization resulting in x1DES to the RCA. She completed a normal echocardiogram 12/5/22. She underwent a peripheral 12/19/22 that resulted in a balloon angioplasty to the right SFA. Her dose of carvedilol was previously reduced by Dr. Sandro Gunderson. She underwent a peripheral 1/10/22 that resulted in a balloon angioplasty to the left SFA. She was admitted 11/9-11/13/23 for hypertensive urgency. Today she presents for follow up and states that overall she is feeling okay. She states her blood pressure has been fluctuating since her admission. She states she feels lethargic when her blood pressure is \"low\" like it is today. She denies any chest pains or worsening shortness of breath. She reports medication compliance and is tolerating. She denies any abnormal bleeding or bruising. She denies exertional chest pain/pressure, dyspnea at rest, worsening KOVACS, PND, orthopnea, palpitations, lightheadedness, weight changes, changes in LE edema, and syncope. Review of Systems:  Constitutional: No fatigue, weakness, night sweats or fever. HEENT: No new vision difficulties or ringing in the ears. Respiratory: No new SOB, PND, orthopnea or cough. Cardiovascular: See HPI   GI: No n/v, diarrhea, constipation, abdominal pain or changes in bowel habits. No melena, no hematochezia  : No urinary frequency, urgency, incontinence, hematuria or dysuria. Skin: No cyanosis or skin lesions. Musculoskeletal: No new muscle or joint pain.   Neurological:

## 2023-12-06 ENCOUNTER — CARE COORDINATION (OUTPATIENT)
Dept: CASE MANAGEMENT | Age: 73
End: 2023-12-06

## 2023-12-06 NOTE — CARE COORDINATION
Care Transitions Follow Up Call      Patient: Janes Caceres  Patient : 1950   MRN: [id number]  Reason for Admission: H/A  Discharge Date: 23 RARS: Readmission Risk Score: 15.1      Needs to be reviewed by the provider   Additional needs identified to be addressed with provider: No               Method of communication with provider: none. Unable to reach patient for CT follow up phone call. Left message. Contact info provided. Requested return call to CTN.     Follow Up  Future Appointments   Date Time Provider [address] [place] [address] Ct   2024 11:00 AM Addi Muhammad MD [facility]   2024  9:30 AM Becky Walker  [address] Transitions Subsequent and Final Call    Subsequent and Final Calls  Care Transitions Interventions                          Other Interventions:           Vonnie Adams RN BSN  Care Transition Nurse  [phone]

## 2023-12-11 ENCOUNTER — CARE COORDINATION (OUTPATIENT)
Dept: CASE MANAGEMENT | Age: 73
End: 2023-12-11

## 2023-12-11 PROBLEM — R79.89 ELEVATED TROPONIN: Status: RESOLVED | Noted: 2023-11-11 | Resolved: 2023-12-11

## 2023-12-11 NOTE — CARE COORDINATION
Care Transitions Follow Up Call    Patient Current Location:  Home: [address] [place] [id number]    [facility] Care Coordinator contacted the patient by telephone to follow up after admission on 2023. Verified name and  with patient as identifiers. Patient: Elizabeth Marie  Patient : 1950   MRN: [id number]  Reason for Admission: HA  Discharge Date: 23 RARS: Readmission Risk Score: 15.1      Needs to be reviewed by the provider   Additional needs identified to be addressed with provider: No  none             Method of communication with provider: none. Patient reports she is doing okay. Appetite and fluid intake is good. No bladder or bowel issues. Last /83. Checks BP several times during the day. Taking medication as prescribed. Denies cp, palpitations, sob, nvd, bleeding, swelling, sore throat, ha or vision disturbance. Informed her this is the final call. Any medical concerns contact her PCP. No questions or concerns at this time. No further outreach scheduled. Addressed changes since last contact:  none  Discussed follow-up appointments. If no appointment was previously scheduled, appointment scheduling offered: No.   Is follow up appointment scheduled within 7 days of discharge? Yes. Follow Up  Future Appointments   Date Time Provider [address] [place] [address]   2024 11:00 AM Karma Cabot, MD [facility]   2024  9:30 AM Addy Damon MD [facility]     External follow up appointment(s):       Advance Care Planning:   not on file.      Patients top risk factors for readmission: medical condition-HA, htn, Afib, ckd  Interventions to address risk factors: Education of patient/family/caregiver/guardian to support self-management-          Care Transitions Subsequent and Final Call    Subsequent and Final Calls  Do you have any ongoing symptoms?: No  Have your medications changed?: No  Do you have any questions related to your medications?:

## 2024-01-03 ENCOUNTER — TELEPHONE (OUTPATIENT)
Dept: CARDIOLOGY CLINIC | Age: 74
End: 2024-01-03

## 2024-01-03 RX ORDER — CARVEDILOL 25 MG/1
25 TABLET ORAL 2 TIMES DAILY
Qty: 180 TABLET | Refills: 3 | Status: SHIPPED | OUTPATIENT
Start: 2024-01-03

## 2024-01-03 NOTE — TELEPHONE ENCOUNTER
Spoke with pharmacist - he states since it will be a new prescription, there should not be an issue filling it. However, he states if so - he does not believe it is very expensive and there is a discount they can apply for the patient.     Spoke with patient - advised to increase Coreg to 25 mg BID. Informed her of above message - advised to call back if there are any issues. Also, advised to monitor BP for next week and call the office with an update. She v/u.

## 2024-01-03 NOTE — TELEPHONE ENCOUNTER
Dr. Kasper, please see below and advise. Thanks.     Spoke with patient - she states she has been taking Coreg 12.5 mg in the morning and 25 mg in the evening since her OV 12/5/23, although she was only supposed to do this for 3-4 days then call with an update on BP. We went over her home BP log ( see below). Appears uncontrolled. She states she took Coreg 12.5 mg this morning and has none left. Unable to get refill from pharmacy. Advised I will consult with Dr. Kasper on medication adjustments, then I will call pharmacy - and her with an update. She v/u - she also states that her nephrologist was originally managing Coreg and she has an appointment on 1/9/23 (just as an FYI).     12/18 - 150/84  12/22 -173/95  12/23 -130/76  1/1 - 161/112  1/2 - 173/93  1/3 - 149/86

## 2024-01-03 NOTE — TELEPHONE ENCOUNTER
Jonelle called in this afternoon, she states when she was in to see Dr. Kasper last month he increased her carvedilol from 2 times daily to 1 time in the AM and 2 times in the PM. She states she only has 1 pill left. I informed her the notes stated for her to only take the extra pill for 3-4 days, she states she misunderstood the instructions. She states she called the pharmacy because she only has 1 pill left and was informed that she can't get it filled at this time because its too early. She would like a return call concerning this.      She can be reached at [phone].

## 2024-02-12 DIAGNOSIS — Z01.00 EYE EXAM, ROUTINE: Primary | ICD-10-CM

## 2024-02-26 ENCOUNTER — TELEPHONE (OUTPATIENT)
Dept: INTERNAL MEDICINE CLINIC | Age: 74
End: 2024-02-26

## 2024-02-26 NOTE — TELEPHONE ENCOUNTER
----- Message from Janet Kennedy sent at 2/26/2024  9:12 AM EST -----  Subject: Referral Request    Reason for referral request? Eye Doctor - blurred visioin  Provider patient wants to be referred to(if known):     Provider Phone Number(if known):    Additional Information for Provider? Asking for a new referral to an eye   doctor closer to her part of town. The initial appointment could not be   scheduled until May. Please call to advise.   ---------------------------------------------------------------------------  --------------  CALL BACK INFO    [phone]; OK to leave message on voicemail  ---------------------------------------------------------------------------  --------------

## 2024-02-28 ASSESSMENT — PATIENT HEALTH QUESTIONNAIRE - PHQ9
8. MOVING OR SPEAKING SO SLOWLY THAT OTHER PEOPLE COULD HAVE NOTICED. OR THE OPPOSITE, BEING SO FIGETY OR RESTLESS THAT YOU HAVE BEEN MOVING AROUND A LOT MORE THAN USUAL: NOT AT ALL
5. POOR APPETITE OR OVEREATING: SEVERAL DAYS
9. THOUGHTS THAT YOU WOULD BE BETTER OFF DEAD, OR OF HURTING YOURSELF: NOT AT ALL
SUM OF ALL RESPONSES TO PHQ QUESTIONS 1-9: 10
4. FEELING TIRED OR HAVING LITTLE ENERGY: SEVERAL DAYS
1. LITTLE INTEREST OR PLEASURE IN DOING THINGS: MORE THAN HALF THE DAYS
7. TROUBLE CONCENTRATING ON THINGS, SUCH AS READING THE NEWSPAPER OR WATCHING TELEVISION: SEVERAL DAYS
SUM OF ALL RESPONSES TO PHQ QUESTIONS 1-9: 10
3. TROUBLE FALLING OR STAYING ASLEEP: MORE THAN HALF THE DAYS
SUM OF ALL RESPONSES TO PHQ QUESTIONS 1-9: 10
4. FEELING TIRED OR HAVING LITTLE ENERGY: SEVERAL DAYS
6. FEELING BAD ABOUT YOURSELF - OR THAT YOU ARE A FAILURE OR HAVE LET YOURSELF OR YOUR FAMILY DOWN: SEVERAL DAYS
2. FEELING DOWN, DEPRESSED OR HOPELESS: MORE THAN HALF THE DAYS
6. FEELING BAD ABOUT YOURSELF - OR THAT YOU ARE A FAILURE OR HAVE LET YOURSELF OR YOUR FAMILY DOWN: SEVERAL DAYS
SUM OF ALL RESPONSES TO PHQ QUESTIONS 1-9: 10
7. TROUBLE CONCENTRATING ON THINGS, SUCH AS READING THE NEWSPAPER OR WATCHING TELEVISION: SEVERAL DAYS
2. FEELING DOWN, DEPRESSED OR HOPELESS: MORE THAN HALF THE DAYS
10. IF YOU CHECKED OFF ANY PROBLEMS, HOW DIFFICULT HAVE THESE PROBLEMS MADE IT FOR YOU TO DO YOUR WORK, TAKE CARE OF THINGS AT HOME, OR GET ALONG WITH OTHER PEOPLE: VERY DIFFICULT
5. POOR APPETITE OR OVEREATING: SEVERAL DAYS
9. THOUGHTS THAT YOU WOULD BE BETTER OFF DEAD, OR OF HURTING YOURSELF: NOT AT ALL
8. MOVING OR SPEAKING SO SLOWLY THAT OTHER PEOPLE COULD HAVE NOTICED. OR THE OPPOSITE - BEING SO FIDGETY OR RESTLESS THAT YOU HAVE BEEN MOVING AROUND A LOT MORE THAN USUAL: NOT AT ALL
SUM OF ALL RESPONSES TO PHQ QUESTIONS 1-9: 10
SUM OF ALL RESPONSES TO PHQ9 QUESTIONS 1 & 2: 4
3. TROUBLE FALLING OR STAYING ASLEEP: MORE THAN HALF THE DAYS
10. IF YOU CHECKED OFF ANY PROBLEMS, HOW DIFFICULT HAVE THESE PROBLEMS MADE IT FOR YOU TO DO YOUR WORK, TAKE CARE OF THINGS AT HOME, OR GET ALONG WITH OTHER PEOPLE: VERY DIFFICULT
1. LITTLE INTEREST OR PLEASURE IN DOING THINGS: MORE THAN HALF THE DAYS

## 2024-03-01 SDOH — ECONOMIC STABILITY: HOUSING INSECURITY
IN THE LAST 12 MONTHS, WAS THERE A TIME WHEN YOU DID NOT HAVE A STEADY PLACE TO SLEEP OR SLEPT IN A SHELTER (INCLUDING NOW)?: NO

## 2024-03-01 SDOH — ECONOMIC STABILITY: FOOD INSECURITY: WITHIN THE PAST 12 MONTHS, YOU WORRIED THAT YOUR FOOD WOULD RUN OUT BEFORE YOU GOT MONEY TO BUY MORE.: NEVER TRUE

## 2024-03-01 SDOH — ECONOMIC STABILITY: INCOME INSECURITY: HOW HARD IS IT FOR YOU TO PAY FOR THE VERY BASICS LIKE FOOD, HOUSING, MEDICAL CARE, AND HEATING?: SOMEWHAT HARD

## 2024-03-01 SDOH — ECONOMIC STABILITY: FOOD INSECURITY: WITHIN THE PAST 12 MONTHS, THE FOOD YOU BOUGHT JUST DIDN'T LAST AND YOU DIDN'T HAVE MONEY TO GET MORE.: NEVER TRUE

## 2024-03-01 SDOH — ECONOMIC STABILITY: TRANSPORTATION INSECURITY
IN THE PAST 12 MONTHS, HAS LACK OF TRANSPORTATION KEPT YOU FROM MEETINGS, WORK, OR FROM GETTING THINGS NEEDED FOR DAILY LIVING?: NO

## 2024-03-04 ENCOUNTER — OFFICE VISIT (OUTPATIENT)
Dept: INTERNAL MEDICINE CLINIC | Age: 74
End: 2024-03-04
Payer: MEDICARE

## 2024-03-04 VITALS
HEIGHT: 64 IN | SYSTOLIC BLOOD PRESSURE: 131 MMHG | OXYGEN SATURATION: 97 % | RESPIRATION RATE: 18 BRPM | WEIGHT: 129.8 LBS | HEART RATE: 55 BPM | DIASTOLIC BLOOD PRESSURE: 70 MMHG | BODY MASS INDEX: 22.16 KG/M2 | TEMPERATURE: 97.4 F

## 2024-03-04 DIAGNOSIS — I25.10 CORONARY ARTERY DISEASE INVOLVING NATIVE CORONARY ARTERY OF NATIVE HEART WITHOUT ANGINA PECTORIS: ICD-10-CM

## 2024-03-04 DIAGNOSIS — G25.81 RESTLESS LEG SYNDROME: ICD-10-CM

## 2024-03-04 DIAGNOSIS — H53.8 BLURRED VISION: Primary | ICD-10-CM

## 2024-03-04 DIAGNOSIS — K21.9 GASTROESOPHAGEAL REFLUX DISEASE, UNSPECIFIED WHETHER ESOPHAGITIS PRESENT: ICD-10-CM

## 2024-03-04 DIAGNOSIS — F41.9 ANXIETY: ICD-10-CM

## 2024-03-04 PROCEDURE — 99213 OFFICE O/P EST LOW 20 MIN: CPT | Performed by: STUDENT IN AN ORGANIZED HEALTH CARE EDUCATION/TRAINING PROGRAM

## 2024-03-04 PROCEDURE — 1123F ACP DISCUSS/DSCN MKR DOCD: CPT | Performed by: STUDENT IN AN ORGANIZED HEALTH CARE EDUCATION/TRAINING PROGRAM

## 2024-03-04 PROCEDURE — 3078F DIAST BP <80 MM HG: CPT | Performed by: STUDENT IN AN ORGANIZED HEALTH CARE EDUCATION/TRAINING PROGRAM

## 2024-03-04 PROCEDURE — 3074F SYST BP LT 130 MM HG: CPT | Performed by: STUDENT IN AN ORGANIZED HEALTH CARE EDUCATION/TRAINING PROGRAM

## 2024-03-04 RX ORDER — ROSUVASTATIN CALCIUM 40 MG/1
40 TABLET, COATED ORAL NIGHTLY
Qty: 90 TABLET | Refills: 3 | Status: SHIPPED | OUTPATIENT
Start: 2024-03-04

## 2024-03-04 RX ORDER — ALPRAZOLAM 1 MG/1
1 TABLET ORAL 3 TIMES DAILY
Qty: 270 TABLET | Refills: 0 | Status: SHIPPED | OUTPATIENT
Start: 2024-03-04 | End: 2024-06-02

## 2024-03-04 RX ORDER — CARVEDILOL 12.5 MG/1
12.5 TABLET ORAL 2 TIMES DAILY
Qty: 180 TABLET | Refills: 3 | Status: SHIPPED | OUTPATIENT
Start: 2024-03-04

## 2024-03-04 RX ORDER — TRAMADOL HYDROCHLORIDE 50 MG/1
50 TABLET ORAL EVERY 12 HOURS PRN
Qty: 60 TABLET | Refills: 0 | Status: SHIPPED | OUTPATIENT
Start: 2024-03-04 | End: 2024-04-03

## 2024-03-04 RX ORDER — ALPRAZOLAM 2 MG/1
2 TABLET ORAL NIGHTLY PRN
Qty: 30 TABLET | Refills: 2 | Status: SHIPPED | OUTPATIENT
Start: 2024-03-04 | End: 2024-06-02

## 2024-03-04 RX ORDER — TIZANIDINE 4 MG/1
4 TABLET ORAL EVERY 6 HOURS PRN
Qty: 90 TABLET | Refills: 2 | Status: SHIPPED | OUTPATIENT
Start: 2024-03-04

## 2024-03-04 RX ORDER — VARENICLINE TARTRATE 0.5 MG/1
.5-1 TABLET, FILM COATED ORAL SEE ADMIN INSTRUCTIONS
Qty: 57 TABLET | Refills: 0 | Status: SHIPPED | OUTPATIENT
Start: 2024-03-04

## 2024-03-04 RX ORDER — PANTOPRAZOLE SODIUM 40 MG/1
TABLET, DELAYED RELEASE ORAL
Qty: 180 TABLET | Refills: 1 | Status: SHIPPED | OUTPATIENT
Start: 2024-03-04

## 2024-03-04 RX ORDER — CHLORTHALIDONE 25 MG/1
12.5 TABLET ORAL DAILY
Qty: 30 TABLET | Refills: 11 | Status: SHIPPED | OUTPATIENT
Start: 2024-03-04

## 2024-03-04 RX ORDER — ALPRAZOLAM 2 MG/1
2 TABLET ORAL NIGHTLY PRN
COMMUNITY
End: 2024-03-04 | Stop reason: CLARIF

## 2024-03-04 RX ORDER — CLONIDINE HYDROCHLORIDE 0.1 MG/1
0.1 TABLET ORAL 2 TIMES DAILY
Qty: 60 TABLET | Refills: 3 | Status: SHIPPED | OUTPATIENT
Start: 2024-03-04

## 2024-03-04 RX ORDER — ROPINIROLE 1 MG/1
TABLET, FILM COATED ORAL
Qty: 90 TABLET | Refills: 3 | Status: SHIPPED | OUTPATIENT
Start: 2024-03-04

## 2024-03-04 RX ORDER — CLOPIDOGREL BISULFATE 75 MG/1
75 TABLET ORAL DAILY
Qty: 90 TABLET | Refills: 3 | Status: SHIPPED | OUTPATIENT
Start: 2024-03-04

## 2024-03-04 NOTE — PROGRESS NOTES
Rfl: 0    ALPRAZolam (XANAX) 2 MG tablet, Take 1 tablet by mouth nightly as needed for Sleep for up to 90 days. Max Daily Amount: 2 mg, Disp: 30 tablet, Rfl: 2    Handicap Placard MISC, by Does not apply route It is my medical opinion that Jonelle Contreras requires a disability parking placard for the following reasons: She cannot walk without assistance from another person or the use of an assistance device (cane, crutch, prosthetic device, wheelchair, etc.). Duration of need: 5 years, Disp: 1 each, Rfl: 0    traMADol (ULTRAM) 50 MG tablet, Take 1 tablet by mouth every 12 hours as needed for Pain for up to 30 days. Max Daily Amount: 100 mg, Disp: 60 tablet, Rfl: 0    umeclidinium-vilanterol (ANORO ELLIPTA) 62.5-25 MCG/ACT inhaler, INHALE ONE DOSE BY MOUTH DAILY, Disp: 60 each, Rfl: 5    albuterol sulfate HFA (PROVENTIL;VENTOLIN;PROAIR) 108 (90 Base) MCG/ACT inhaler, INHALE TWO PUFFS BY MOUTH EVERY 6 HOURS AS NEEDED FOR WHEEZING, Disp: 8.5 g, Rfl: 5    aspirin 81 MG chewable tablet, Take 1 tablet by mouth daily, Disp: 30 tablet, Rfl: 3     Examination  Vitals:    03/04/24 1416 03/04/24 1441   BP: 119/74 131/70   Pulse: 55    Resp: 18    Temp: 97.4 °F (36.3 °C)    TempSrc: Tympanic    SpO2: 97%    Weight: 58.9 kg (129 lb 12.8 oz)    Height: 1.626 m (5' 4\")       Physical Exam  Constitutional:       General: She is not in acute distress.  HENT:      Mouth/Throat:      Mouth: Mucous membranes are moist.   Eyes:      General: No scleral icterus.     Pupils: Pupils are equal, round, and reactive to light.   Cardiovascular:      Rate and Rhythm: Normal rate and regular rhythm.      Pulses: Normal pulses.      Heart sounds: No murmur heard.     No gallop.   Pulmonary:      Effort: Pulmonary effort is normal. No respiratory distress.      Breath sounds: Normal breath sounds. No wheezing, rhonchi or rales.   Abdominal:      General: Abdomen is flat. Bowel sounds are normal. There is no distension.      Palpations: Abdomen is

## 2024-03-15 ENCOUNTER — TELEPHONE (OUTPATIENT)
Dept: INTERNAL MEDICINE CLINIC | Age: 74
End: 2024-03-15

## 2024-03-15 NOTE — TELEPHONE ENCOUNTER
Jonelle started taking her new medication,   NIFEdipine (PROCARDIA XL) 30 MG extended release tablet. She said that after taking it her BP kept getting pretty low. She has stopped taking it since she is worried about it getting so low. Could you please contact her regarding this?    Thank you

## 2024-03-22 ENCOUNTER — TELEPHONE (OUTPATIENT)
Dept: INTERNAL MEDICINE CLINIC | Age: 74
End: 2024-03-22

## 2024-04-01 ENCOUNTER — HOSPITAL ENCOUNTER (OUTPATIENT)
Dept: CT IMAGING | Age: 74
Discharge: HOME | End: 2024-04-01
Payer: COMMERCIAL

## 2024-04-01 DIAGNOSIS — F17.208 NICOTINE DEPENDENCE, UNSP, W OTH NICOTINE-INDUCED DISORDERS: ICD-10-CM

## 2024-04-01 DIAGNOSIS — R91.1 PULMONARY NODULE: ICD-10-CM

## 2024-04-01 PROCEDURE — 71250 CT THORAX DX C-: CPT

## 2024-04-16 ENCOUNTER — OFFICE VISIT (OUTPATIENT)
Dept: CARDIOLOGY CLINIC | Age: 74
End: 2024-04-16
Payer: COMMERCIAL

## 2024-04-16 VITALS
DIASTOLIC BLOOD PRESSURE: 108 MMHG | OXYGEN SATURATION: 93 % | BODY MASS INDEX: 21.85 KG/M2 | HEART RATE: 59 BPM | WEIGHT: 128 LBS | HEIGHT: 64 IN | SYSTOLIC BLOOD PRESSURE: 194 MMHG

## 2024-04-16 DIAGNOSIS — E78.5 HYPERLIPIDEMIA LDL GOAL <70: ICD-10-CM

## 2024-04-16 DIAGNOSIS — N18.4 CKD (CHRONIC KIDNEY DISEASE), STAGE IV (HCC): ICD-10-CM

## 2024-04-16 DIAGNOSIS — I25.10 CORONARY ARTERY DISEASE INVOLVING NATIVE CORONARY ARTERY OF NATIVE HEART WITHOUT ANGINA PECTORIS: ICD-10-CM

## 2024-04-16 DIAGNOSIS — I10 ESSENTIAL HYPERTENSION: ICD-10-CM

## 2024-04-16 DIAGNOSIS — I73.9 PAD (PERIPHERAL ARTERY DISEASE) (HCC): ICD-10-CM

## 2024-04-16 DIAGNOSIS — I25.10 CORONARY ARTERY DISEASE INVOLVING NATIVE CORONARY ARTERY OF NATIVE HEART WITHOUT ANGINA PECTORIS: Primary | ICD-10-CM

## 2024-04-16 LAB
ANION GAP SERPL CALCULATED.3IONS-SCNC: 11 MMOL/L (ref 3–16)
BUN SERPL-MCNC: 45 MG/DL (ref 7–20)
CALCIUM SERPL-MCNC: 9.6 MG/DL (ref 8.3–10.6)
CHLORIDE SERPL-SCNC: 105 MMOL/L (ref 99–110)
CO2 SERPL-SCNC: 26 MMOL/L (ref 21–32)
CREAT SERPL-MCNC: 3 MG/DL (ref 0.6–1.2)
GFR SERPLBLD CREATININE-BSD FMLA CKD-EPI: 16 ML/MIN/{1.73_M2}
GLUCOSE SERPL-MCNC: 91 MG/DL (ref 70–99)
NT-PROBNP SERPL-MCNC: 2404 PG/ML (ref 0–124)
POTASSIUM SERPL-SCNC: 5.4 MMOL/L (ref 3.5–5.1)
SODIUM SERPL-SCNC: 142 MMOL/L (ref 136–145)

## 2024-04-16 PROCEDURE — 1123F ACP DISCUSS/DSCN MKR DOCD: CPT | Performed by: INTERNAL MEDICINE

## 2024-04-16 PROCEDURE — 93000 ELECTROCARDIOGRAM COMPLETE: CPT | Performed by: INTERNAL MEDICINE

## 2024-04-16 PROCEDURE — 99214 OFFICE O/P EST MOD 30 MIN: CPT | Performed by: INTERNAL MEDICINE

## 2024-04-16 PROCEDURE — 3080F DIAST BP >= 90 MM HG: CPT | Performed by: INTERNAL MEDICINE

## 2024-04-16 PROCEDURE — 3077F SYST BP >= 140 MM HG: CPT | Performed by: INTERNAL MEDICINE

## 2024-04-16 RX ORDER — HYDRALAZINE HYDROCHLORIDE 25 MG/1
25 TABLET, FILM COATED ORAL 3 TIMES DAILY
Qty: 90 TABLET | Refills: 5 | Status: SHIPPED | OUTPATIENT
Start: 2024-04-16

## 2024-04-16 RX ORDER — CLONIDINE HYDROCHLORIDE 0.2 MG/1
0.2 TABLET ORAL 2 TIMES DAILY
Qty: 180 TABLET | Refills: 3 | Status: SHIPPED | OUTPATIENT
Start: 2024-04-16

## 2024-04-16 NOTE — PROGRESS NOTES
[facility]    Jonelle Contreras  1950 April 16, 2024      CC:  \" My blood pressure has been high\".    HPI:  The patient is 73 y.o. female with a past medical history significant for CAD, PAD, hyperlipidemia and hypertension. She underwent LHC on 10/21/13 resulting in PCI with BMS to mid RCA lesion. A peripheral angiogram was completed on 5/30/14 revealing 50% bilateral SFA stenoses. She was admitted 12/4-12/6/22 for a MI and underwent a left heart catheterization resulting in one JAYME to the RCA. She completed a normal echocardiogram on 12/5/22. She underwent a peripheral 12/19/22 that resulted in a balloon angioplasty to the right SFA.  Her dose of carvedilol was previously reduced by Dr. Pagan.She underwent a peripheral 1/10/22 that resulted in a balloon angioplasty to the left SFA. She was admitted 11/9-11/13/23 for hypertensive urgency.       Today she presents for follow up and states that overall she is feeling okay. She endorses elevated home blood pressure reading with nose bleeds and blurred vision. She increased her Carvedilol but has not noted an improvement in her readings.   She reports that she was previously on higher dose of Carvedilol but PCP decreased back down with the addition of amlodipine. She bring to office with her today a log of her BP readings. She will be starting PD soon and will be seeing surgeon on 04/26/2024 to see about getting PD catheter placed.  She reported that she stopped smoking using Chantix but stopped the medication due to side effects, but has continued cessation.      Review of Systems:  Constitutional: No fatigue, weakness, night sweats or fever.   HEENT: No new vision difficulties or ringing in the ears.  Respiratory: No new SOB, PND, orthopnea or cough.   Cardiovascular: See HPI   GI: No n/v, diarrhea, constipation, abdominal pain or changes in bowel habits.  No melena, no hematochezia  : No urinary frequency, urgency, incontinence, 
mitral regurgitation is present.  Normal right ventricular size and function.  There is trivial tricuspid regurgitation with RVSP estimated at 39 mmHg.    Stress perfusion 6/30/20  Normal myocardial perfusion study.     Normal LV size and systolic function.     ECG portion of the stress test is normal.     Overall findings represent a low risk study.     Lower extremity arterial study 6/30/20   No significant evidence of large vessel arterial occlusive disease of the     lower extremities.     Normal doppler waveforms, plethysmography, and segmental pressures     Normal SOULEYMANE and Toe/Brachial Index bilaterally      Renal arterial duplex 3/17/22  Right Impression   There is no evidence to suggest renal artery stenosis.   The right renal vein is patent.   The parenchymal resistive index is within normal limits.   Left Impression   There is no evidence to suggest renal artery stenosis.   The left renal vein is patent.   The parenchymal resistive index is within normal limits.   Unilateral ImpressionNo evidence of abdominal aortic aneurysmal dilation. Mild   to moderate atherosclerotic plaque throughout.     Echo 12/5/22  Normal left ventricular size, wall thickness and systolic function.  Ejection fraction is visually estimated to be 60-65 %.  Indeterminate diastolic function.  The right ventricle is normal in size and function.  The left atrium is mildly dilated.  There are no significant valvular abnormalities appreciated in this study.    Peripheral 1/10/23  FINDINGS:  1.  Patent left common, external and internal iliac arteries.  2.  Patent common femoral and profunda femoris with less than 50%  plaque.  3.  Patent left superficial femoral artery with a 90% focal proximal  stenosis that underwent balloon angioplasty ultimately with a 5 mm x 100  mm Lutonix drug-coated balloon resulting in 0% residual stenosis and  excellent flow.  4.  Patent popliteal artery with no significant stenosis.  5.  Patent tibioperoneal

## 2024-04-19 LAB
ANNOTATION COMMENT IMP: ABNORMAL
METANEPHS SERPL-SCNC: 0.28 NMOL/L (ref 0–0.49)
NORMETANEPHRINE SERPL-SCNC: 1.06 NMOL/L (ref 0–0.89)

## 2024-05-02 ENCOUNTER — OFFICE VISIT (OUTPATIENT)
Dept: INTERNAL MEDICINE CLINIC | Age: 74
End: 2024-05-02
Payer: MEDICARE

## 2024-05-02 VITALS
SYSTOLIC BLOOD PRESSURE: 152 MMHG | BODY MASS INDEX: 22.3 KG/M2 | DIASTOLIC BLOOD PRESSURE: 78 MMHG | OXYGEN SATURATION: 97 % | HEART RATE: 62 BPM | TEMPERATURE: 98.2 F | WEIGHT: 130 LBS

## 2024-05-02 DIAGNOSIS — I48.0 PAROXYSMAL ATRIAL FIBRILLATION (HCC): ICD-10-CM

## 2024-05-02 DIAGNOSIS — F41.9 ANXIETY: ICD-10-CM

## 2024-05-02 PROBLEM — F13.99 SEDATIVE, HYPNOTIC OR ANXIOLYTIC USE, UNSPECIFIED WITH UNSPECIFIED SEDATIVE, HYPNOTIC OR ANXIOLYTIC-INDUCED DISORDER (HCC): Status: RESOLVED | Noted: 2021-08-24 | Resolved: 2024-05-02

## 2024-05-02 PROBLEM — F13.20 SEDATIVE, HYPNOTIC OR ANXIOLYTIC DEPENDENCE, UNCOMPLICATED (HCC): Status: RESOLVED | Noted: 2021-08-24 | Resolved: 2024-05-02

## 2024-05-02 PROBLEM — F13.29 SEDATIVE, HYPNOTIC OR ANXIOLYTIC DEPENDENCE WITH UNSPECIFIED SEDATIVE, HYPNOTIC OR ANXIOLYTIC-INDUCED DISORDER (HCC): Status: RESOLVED | Noted: 2021-08-24 | Resolved: 2024-05-02

## 2024-05-02 PROBLEM — E80.21 ACUTE INTERMITTENT PORPHYRIA (HCC): Status: RESOLVED | Noted: 2018-10-03 | Resolved: 2024-05-02

## 2024-05-02 PROBLEM — F33.9 EPISODE OF RECURRENT MAJOR DEPRESSIVE DISORDER, UNSPECIFIED DEPRESSION EPISODE SEVERITY (HCC): Status: RESOLVED | Noted: 2021-08-24 | Resolved: 2024-05-02

## 2024-05-02 PROCEDURE — 3078F DIAST BP <80 MM HG: CPT | Performed by: STUDENT IN AN ORGANIZED HEALTH CARE EDUCATION/TRAINING PROGRAM

## 2024-05-02 PROCEDURE — 1123F ACP DISCUSS/DSCN MKR DOCD: CPT | Performed by: STUDENT IN AN ORGANIZED HEALTH CARE EDUCATION/TRAINING PROGRAM

## 2024-05-02 PROCEDURE — 3077F SYST BP >= 140 MM HG: CPT | Performed by: STUDENT IN AN ORGANIZED HEALTH CARE EDUCATION/TRAINING PROGRAM

## 2024-05-02 PROCEDURE — 99213 OFFICE O/P EST LOW 20 MIN: CPT | Performed by: STUDENT IN AN ORGANIZED HEALTH CARE EDUCATION/TRAINING PROGRAM

## 2024-05-02 RX ORDER — TRAMADOL HYDROCHLORIDE 50 MG/1
TABLET ORAL
COMMUNITY

## 2024-05-02 RX ORDER — ALPRAZOLAM 2 MG/1
2 TABLET ORAL NIGHTLY PRN
Qty: 90 TABLET | Refills: 0 | Status: SHIPPED | OUTPATIENT
Start: 2024-05-02 | End: 2024-07-31

## 2024-05-02 NOTE — PROGRESS NOTES
[facility]  Clinic Progress note:    Jonelle Contreras is a 73 y.o. female with the past medical history as listed below presenting to the clinic for follow up on chronic condition and discussion of preventative health care.    Chief Complaint   Patient presents with    6 Month Follow-Up       Patient is feeling ok today. She did get her dialysis access but has not started dialysis.    Past Medical History:   Diagnosis Date    acute intermittent porphyria     sees Alfredo, mom had it too    agoraphobic     ongoing, prefers to be at home    Allergic rhinitis     Anxiety     Asthma     CAD (coronary artery disease) 2022    acute inf MI RCA stent successfully placed *[place] other arteries clear    ckd 4     from HTN,  sees Trini Pagan    collagenous colitis     Mega    Depression 2013    eversince      GERD (gastroesophageal reflux disease)     Hyperlipidemia     Hypertension 2016    Left thyroid nodule     9mm, gets rechecked yearly on ct lung    Migraines     gets 4x per month    PAD (peripheral artery disease) ([facility]) 2022    bilat ptca fem art Ranjith    post menopausal 2020    osteopenia frax score under on calcium and D    Prediabetes     Pulmonary nodules     yearly ct lung followed by Dr Fuentes    Restless leg syndrome     STEMI involving oth coronary artery of inferior wall (HCC) 2022    JAYME RCA       Past Surgical History:   Procedure Laterality Date    CHOLECYSTECTOMY      COLONOSCOPY  2017    DR. AYOUB    COLONOSCOPY N/A 2022    COLONOSCOPY WITH BIOPSY performed by Aston Ayoub MD at [facility] ENDOSCOPY    CORONARY ANGIOPLASTY WITH STENT PLACEMENT  10/2013 [place]    HYSTERECTOMY (CERVIX STATUS UNKNOWN)      JAMEL only for ? age 31    UPPER GASTROINTESTINAL ENDOSCOPY N/A 2022    EGD BIOPSY performed by Aston Ayoub MD at [facility] ENDOSCOPY       Social History     Socioeconomic History    Marital status:      Spouse name:

## 2024-05-07 DIAGNOSIS — G25.81 RESTLESS LEG SYNDROME: ICD-10-CM

## 2024-05-07 RX ORDER — ROPINIROLE 1 MG/1
TABLET, FILM COATED ORAL
Qty: 90 TABLET | Refills: 3 | Status: SHIPPED | OUTPATIENT
Start: 2024-05-07

## 2024-05-07 NOTE — TELEPHONE ENCOUNTER
Medication:   Requested Prescriptions     Pending Prescriptions Disp Refills    rOPINIRole (REQUIP) 1 MG tablet 90 tablet 3     Sig: TAKE ONE TABLET BY MOUTH ONCE NIGHTLY FOR RLS       Last Filled:  3.4.24    Patient Phone Number: [phone] (home)     Last appt: 5/2/2024   Next appt: 5/17/2024  Future Appointments   Date Time Provider Department Center   5/17/2024 12:30 PM Candido Bentley MD [place]  [place] - D[facility]D   8/2/2024  9:45 AM Candido Bentley MD [place]  [place] [facility]   10/16/2024  2:00 PM Sami Kasper MD [facility]

## 2024-05-17 ENCOUNTER — OFFICE VISIT (OUTPATIENT)
Dept: INTERNAL MEDICINE CLINIC | Age: 74
End: 2024-05-17
Payer: MEDICARE

## 2024-05-17 DIAGNOSIS — Z00.00 MEDICARE ANNUAL WELLNESS VISIT, SUBSEQUENT: Primary | ICD-10-CM

## 2024-05-17 DIAGNOSIS — R10.84 GENERALIZED ABDOMINAL PAIN: ICD-10-CM

## 2024-05-17 PROCEDURE — G0439 PPPS, SUBSEQ VISIT: HCPCS | Performed by: STUDENT IN AN ORGANIZED HEALTH CARE EDUCATION/TRAINING PROGRAM

## 2024-05-17 PROCEDURE — 1123F ACP DISCUSS/DSCN MKR DOCD: CPT | Performed by: STUDENT IN AN ORGANIZED HEALTH CARE EDUCATION/TRAINING PROGRAM

## 2024-05-17 ASSESSMENT — PATIENT HEALTH QUESTIONNAIRE - PHQ9
8. MOVING OR SPEAKING SO SLOWLY THAT OTHER PEOPLE COULD HAVE NOTICED. OR THE OPPOSITE, BEING SO FIGETY OR RESTLESS THAT YOU HAVE BEEN MOVING AROUND A LOT MORE THAN USUAL: MORE THAN HALF THE DAYS
7. TROUBLE CONCENTRATING ON THINGS, SUCH AS READING THE NEWSPAPER OR WATCHING TELEVISION: MORE THAN HALF THE DAYS
2. FEELING DOWN, DEPRESSED OR HOPELESS: NEARLY EVERY DAY
6. FEELING BAD ABOUT YOURSELF - OR THAT YOU ARE A FAILURE OR HAVE LET YOURSELF OR YOUR FAMILY DOWN: NEARLY EVERY DAY
SUM OF ALL RESPONSES TO PHQ QUESTIONS 1-9: 17
SUM OF ALL RESPONSES TO PHQ QUESTIONS 1-9: 17
SUM OF ALL RESPONSES TO PHQ9 QUESTIONS 1 & 2: 4
1. LITTLE INTEREST OR PLEASURE IN DOING THINGS: SEVERAL DAYS
10. IF YOU CHECKED OFF ANY PROBLEMS, HOW DIFFICULT HAVE THESE PROBLEMS MADE IT FOR YOU TO DO YOUR WORK, TAKE CARE OF THINGS AT HOME, OR GET ALONG WITH OTHER PEOPLE: SOMEWHAT DIFFICULT
5. POOR APPETITE OR OVEREATING: MORE THAN HALF THE DAYS
4. FEELING TIRED OR HAVING LITTLE ENERGY: MORE THAN HALF THE DAYS
SUM OF ALL RESPONSES TO PHQ QUESTIONS 1-9: 17
9. THOUGHTS THAT YOU WOULD BE BETTER OFF DEAD, OR OF HURTING YOURSELF: NOT AT ALL
3. TROUBLE FALLING OR STAYING ASLEEP: MORE THAN HALF THE DAYS
SUM OF ALL RESPONSES TO PHQ QUESTIONS 1-9: 17

## 2024-05-17 ASSESSMENT — LIFESTYLE VARIABLES
HOW MANY STANDARD DRINKS CONTAINING ALCOHOL DO YOU HAVE ON A TYPICAL DAY: PATIENT DOES NOT DRINK
HOW OFTEN DO YOU HAVE A DRINK CONTAINING ALCOHOL: NEVER

## 2024-05-17 NOTE — PATIENT INSTRUCTIONS
acetaminophen (Tylenol).   When should you call for help?   Call 911 if you have symptoms of a heart attack. These may include:    Chest pain or pressure, or a strange feeling in the chest.     Sweating.     Shortness of breath.     Pain, pressure, or a strange feeling in the back, neck, jaw, or upper belly or in one or both shoulders or arms.     Lightheadedness or sudden weakness.     A fast or irregular heartbeat.   After you call 911, the  may tell you to chew 1 adult-strength or 2 to 4 low-dose aspirin. Wait for an ambulance. Do not try to drive yourself.  Watch closely for changes in your health, and be sure to contact your doctor if you have any problems.  Where can you learn more?  Go to https://www.StoreDot.net/patientEd and enter F075 to learn more about \"A Healthy Heart: Care Instructions.\"  Current as of: June 24, 2023               Content Version: 14.0  © 3380-5131 [employer].   Care instructions adapted under license by [employer]. If you have questions about a medical condition or this instruction, always ask your healthcare professional. [employer] disclaims any warranty or liability for your use of this information.      Personalized Preventive Plan for Jonelle Contreras - 5/17/2024  Medicare offers a range of preventive health benefits. Some of the tests and screenings are paid in full while other may be subject to a deductible, co-insurance, and/or copay.    Some of these benefits include a comprehensive review of your medical history including lifestyle, illnesses that may run in your family, and various assessments and screenings as appropriate.    After reviewing your medical record and screening and assessments performed today your provider may have ordered immunizations, labs, imaging, and/or referrals for you.  A list of these orders (if applicable) as well as your Preventive Care list are included within your After Visit Summary for your

## 2024-05-17 NOTE — PROGRESS NOTES
watching TV, or doing any of your daily activities because of your eyesight?: (!) Yes  Have you had an eye exam within the past year?: Yes  No results found.    Interventions:   Patient encouraged to make appointment with their eye specialist    Safety:  Do you have any tripping hazards - loose or unsecured carpets or rugs?: (!) Yes  Interventions:  See AVS for additional education material     Lung Cancer Screening:                    Objective   There were no vitals filed for this visit.   There is no height or weight on file to calculate BMI.               Allergies   Allergen Reactions    Iodine Itching and Nausea And Vomiting    Wellbutrin [Bupropion]      Dry mouth    Adhesive Tape Rash    Sertraline Other (See Comments)     Severe dry mouth    Sulfa Antibiotics Itching and Nausea Only     Prior to Visit Medications    Medication Sig Taking? Authorizing Provider   rOPINIRole (REQUIP) 1 MG tablet TAKE ONE TABLET BY MOUTH ONCE NIGHTLY FOR RLS Yes Candido Bentley MD   traMADol (ULTRAM) 50 MG tablet  Yes ProviderFunmilyao MD   ALPRAZolam (XANAX) 2 MG tablet Take 1 tablet by mouth nightly as needed for Sleep for up to 90 days. Max Daily Amount: 2 mg Yes Candido Bentley MD   cloNIDine (CATAPRES) 0.2 MG tablet Take 1 tablet by mouth 2 times daily Yes Sami Kasper MD   hydrALAZINE (APRESOLINE) 25 MG tablet Take 1 tablet by mouth 3 times daily Yes Sami Kasper MD   amLODIPine (NORVASC) 5 MG tablet Take 1 tablet by mouth in the morning and at bedtime Yes ProviderFunmilayo MD   carvedilol (COREG) 25 MG tablet Take 1 tablet by mouth 2 times daily Yes Candido Bentley MD   omeprazole (PRILOSEC) 40 MG delayed release capsule Take 1 capsule by mouth in the morning and at bedtime Yes Candido Bentley MD   famotidine (PEPCID) 20 MG tablet Take 1 tablet by mouth nightly as needed (GERD) Yes Candido Bentley MD   chlorthalidone (HYGROTON) 25 MG tablet Take 0.5 tablets by mouth daily Yes Mc

## 2024-06-03 RX ORDER — TIZANIDINE 4 MG/1
4 TABLET ORAL EVERY 6 HOURS PRN
Qty: 90 TABLET | Refills: 2 | Status: SHIPPED | OUTPATIENT
Start: 2024-06-03

## 2024-06-03 NOTE — TELEPHONE ENCOUNTER
Future Appointments   Date Time Provider Department Center   8/2/2024  9:45 AM Candido Bentley MD [facility] [place] - D[facility]   10/16/2024  2:00 PM Sami Kasper MD [facility]       Last appt 5/17/24

## 2024-06-19 DIAGNOSIS — K20.90 ESOPHAGITIS: ICD-10-CM

## 2024-06-19 RX ORDER — OMEPRAZOLE 40 MG/1
CAPSULE, DELAYED RELEASE ORAL
Qty: 90 CAPSULE | Refills: 1 | Status: SHIPPED | OUTPATIENT
Start: 2024-06-19

## 2024-06-19 NOTE — TELEPHONE ENCOUNTER
Future Appointments   Date Time Provider Department Center   6/20/2024  1:20 PM [facility]   8/2/2024  9:45 AM Candido Bentley MD [facility] [place] - [facility]   10/16/2024  2:00 PM Sami Kasper MD [facility]       Last appt 5/17/24

## 2024-06-20 ENCOUNTER — HOSPITAL ENCOUNTER (OUTPATIENT)
Dept: CT IMAGING | Age: 74
Discharge: HOME | End: 2024-06-20
Attending: STUDENT IN AN ORGANIZED HEALTH CARE EDUCATION/TRAINING PROGRAM
Payer: COMMERCIAL

## 2024-06-20 DIAGNOSIS — R10.84 GENERALIZED ABDOMINAL PAIN: ICD-10-CM

## 2024-06-20 PROCEDURE — 74176 CT ABD & PELVIS W/O CONTRAST: CPT

## 2024-08-02 ENCOUNTER — OFFICE VISIT (OUTPATIENT)
Dept: INTERNAL MEDICINE CLINIC | Age: 74
End: 2024-08-02
Payer: COMMERCIAL

## 2024-08-02 VITALS
SYSTOLIC BLOOD PRESSURE: 152 MMHG | WEIGHT: 130 LBS | HEART RATE: 72 BPM | BODY MASS INDEX: 22.3 KG/M2 | OXYGEN SATURATION: 91 % | DIASTOLIC BLOOD PRESSURE: 88 MMHG

## 2024-08-02 DIAGNOSIS — F41.9 ANXIETY: ICD-10-CM

## 2024-08-02 DIAGNOSIS — I10 UNCONTROLLED HYPERTENSION: Primary | ICD-10-CM

## 2024-08-02 LAB
ALBUMIN SERPL-MCNC: 3.5 G/DL (ref 3.4–5)
ANION GAP SERPL CALCULATED.3IONS-SCNC: 10 MMOL/L (ref 3–16)
BUN SERPL-MCNC: 33 MG/DL (ref 7–20)
CALCIUM SERPL-MCNC: 9.3 MG/DL (ref 8.3–10.6)
CHLORIDE SERPL-SCNC: 104 MMOL/L (ref 99–110)
CO2 SERPL-SCNC: 29 MMOL/L (ref 21–32)
CREAT SERPL-MCNC: 2.9 MG/DL (ref 0.6–1.2)
GFR SERPLBLD CREATININE-BSD FMLA CKD-EPI: 17 ML/MIN/{1.73_M2}
GLUCOSE SERPL-MCNC: 97 MG/DL (ref 70–99)
PHOSPHATE SERPL-MCNC: 4.2 MG/DL (ref 2.5–4.9)
POTASSIUM SERPL-SCNC: 5 MMOL/L (ref 3.5–5.1)
SODIUM SERPL-SCNC: 143 MMOL/L (ref 136–145)

## 2024-08-02 PROCEDURE — 3079F DIAST BP 80-89 MM HG: CPT | Performed by: STUDENT IN AN ORGANIZED HEALTH CARE EDUCATION/TRAINING PROGRAM

## 2024-08-02 PROCEDURE — 99213 OFFICE O/P EST LOW 20 MIN: CPT | Performed by: STUDENT IN AN ORGANIZED HEALTH CARE EDUCATION/TRAINING PROGRAM

## 2024-08-02 PROCEDURE — 3077F SYST BP >= 140 MM HG: CPT | Performed by: STUDENT IN AN ORGANIZED HEALTH CARE EDUCATION/TRAINING PROGRAM

## 2024-08-02 PROCEDURE — 1123F ACP DISCUSS/DSCN MKR DOCD: CPT | Performed by: STUDENT IN AN ORGANIZED HEALTH CARE EDUCATION/TRAINING PROGRAM

## 2024-08-02 RX ORDER — NIFEDIPINE 30 MG
TABLET, EXTENDED RELEASE ORAL
COMMUNITY
Start: 2024-07-08

## 2024-08-02 RX ORDER — ALPRAZOLAM 2 MG/1
2 TABLET ORAL NIGHTLY PRN
Qty: 90 TABLET | Refills: 0 | Status: SHIPPED | OUTPATIENT
Start: 2024-08-02 | End: 2024-10-31

## 2024-08-02 NOTE — PROGRESS NOTES
assistance device (cane, crutch, prosthetic device, wheelchair, etc.). Duration of need: 5 years, Disp: 1 each, Rfl: 0    umeclidinium-vilanterol (ANORO ELLIPTA) 62.5-25 MCG/ACT inhaler, INHALE ONE DOSE BY MOUTH DAILY, Disp: 60 each, Rfl: 5    albuterol sulfate HFA (PROVENTIL;VENTOLIN;PROAIR) 108 (90 Base) MCG/ACT inhaler, INHALE TWO PUFFS BY MOUTH EVERY 6 HOURS AS NEEDED FOR WHEEZING, Disp: 8.5 g, Rfl: 5    aspirin 81 MG chewable tablet, Take 1 tablet by mouth daily, Disp: 30 tablet, Rfl: 3     Examination  Vitals:    08/02/24 0952   BP: (!) 152/88   Site: Left Upper Arm   Position: Sitting   Cuff Size: Medium Adult   Pulse: 72   SpO2: 91%   Weight: 59 kg (130 lb)      Physical Exam  Constitutional:       General: She is not in acute distress.  HENT:      Mouth/Throat:      Mouth: Mucous membranes are moist.   Eyes:      General: No scleral icterus.     Pupils: Pupils are equal, round, and reactive to light.   Cardiovascular:      Rate and Rhythm: Normal rate and regular rhythm.      Pulses: Normal pulses.      Heart sounds: No murmur heard.     No gallop.   Pulmonary:      Effort: Pulmonary effort is normal. No respiratory distress.      Breath sounds: Normal breath sounds. No wheezing, rhonchi or rales.   Abdominal:      General: Abdomen is flat. Bowel sounds are normal. There is no distension.      Palpations: Abdomen is soft.      Tenderness: There is no abdominal tenderness.   Musculoskeletal:      Right lower leg: No edema.      Left lower leg: No edema.   Skin:     General: Skin is warm and dry.      Findings: No erythema or rash.   Neurological:      General: No focal deficit present.      Mental Status: She is alert and oriented to person, place, and time.   Psychiatric:         Mood and Affect: Mood normal.         Behavior: Behavior normal.          Assessment and Plan  Jonelle Contreras presents to clinic for follow up  Hypertension: Blood pressure elevated but did not take nifedipine today because she had

## 2024-08-11 DIAGNOSIS — F41.9 ANXIETY: ICD-10-CM

## 2024-08-12 RX ORDER — ALPRAZOLAM 2 MG/1
2 TABLET ORAL NIGHTLY PRN
Qty: 90 TABLET | Refills: 0 | Status: SHIPPED | OUTPATIENT
Start: 2024-08-12 | End: 2024-11-10

## 2024-08-12 NOTE — TELEPHONE ENCOUNTER
Medication:   Requested Prescriptions     Pending Prescriptions Disp Refills    ALPRAZolam (XANAX) 2 MG tablet 90 tablet 0     Sig: Take 1 tablet by mouth nightly as needed for Sleep for up to 90 days. Max Daily Amount: 2 mg       Last Filled:      Patient Phone Number: [phone] (home)     Last appt: 8/2/2024   Next appt: 11/4/2024  Future Appointments   Date Time Provider Department Center   10/16/2024  2:00 PM Sami Kasper MD [facility]   11/4/2024 10:15 AM Candido Bentley MD [facility] DEP

## 2024-09-03 DIAGNOSIS — I25.10 CORONARY ARTERY DISEASE INVOLVING NATIVE CORONARY ARTERY OF NATIVE HEART WITHOUT ANGINA PECTORIS: ICD-10-CM

## 2024-09-03 NOTE — TELEPHONE ENCOUNTER
Medication:   Requested Prescriptions     Pending Prescriptions Disp Refills    clopidogrel (PLAVIX) 75 MG tablet 90 tablet 3     Sig: Take 1 tablet by mouth daily       Last Filled:      Patient Phone Number: [phone] (home)     Last appt: 8/2/2024   Next appt: 11/4/2024  Future Appointments   Date Time Provider Department Center   10/16/2024  2:00 PM Sami Kasper MD [facility]   11/4/2024 10:15 AM Candido Bentley MD [facility] DEP

## 2024-09-04 RX ORDER — CLOPIDOGREL BISULFATE 75 MG/1
75 TABLET ORAL DAILY
Qty: 90 TABLET | Refills: 3 | Status: SHIPPED | OUTPATIENT
Start: 2024-09-04

## 2024-10-16 ENCOUNTER — OFFICE VISIT (OUTPATIENT)
Dept: CARDIOLOGY CLINIC | Age: 74
End: 2024-10-16
Payer: COMMERCIAL

## 2024-10-16 ENCOUNTER — TELEPHONE (OUTPATIENT)
Dept: CARDIOLOGY CLINIC | Age: 74
End: 2024-10-16

## 2024-10-16 VITALS
HEIGHT: 64 IN | SYSTOLIC BLOOD PRESSURE: 128 MMHG | OXYGEN SATURATION: 91 % | WEIGHT: 138.4 LBS | BODY MASS INDEX: 23.63 KG/M2 | HEART RATE: 67 BPM | DIASTOLIC BLOOD PRESSURE: 72 MMHG | RESPIRATION RATE: 18 BRPM

## 2024-10-16 DIAGNOSIS — E78.5 HYPERLIPIDEMIA LDL GOAL <70: ICD-10-CM

## 2024-10-16 DIAGNOSIS — I73.9 PAD (PERIPHERAL ARTERY DISEASE) (HCC): ICD-10-CM

## 2024-10-16 DIAGNOSIS — I10 ESSENTIAL HYPERTENSION: ICD-10-CM

## 2024-10-16 DIAGNOSIS — I25.10 CORONARY ARTERY DISEASE INVOLVING NATIVE CORONARY ARTERY OF NATIVE HEART WITHOUT ANGINA PECTORIS: Primary | ICD-10-CM

## 2024-10-16 DIAGNOSIS — I49.8 FLUTTERING HEART: ICD-10-CM

## 2024-10-16 DIAGNOSIS — N18.4 CKD (CHRONIC KIDNEY DISEASE), STAGE IV (HCC): ICD-10-CM

## 2024-10-16 PROCEDURE — 93000 ELECTROCARDIOGRAM COMPLETE: CPT | Performed by: INTERNAL MEDICINE

## 2024-10-16 PROCEDURE — 3074F SYST BP LT 130 MM HG: CPT | Performed by: INTERNAL MEDICINE

## 2024-10-16 PROCEDURE — 3078F DIAST BP <80 MM HG: CPT | Performed by: INTERNAL MEDICINE

## 2024-10-16 PROCEDURE — 1123F ACP DISCUSS/DSCN MKR DOCD: CPT | Performed by: INTERNAL MEDICINE

## 2024-10-16 PROCEDURE — 99214 OFFICE O/P EST MOD 30 MIN: CPT | Performed by: INTERNAL MEDICINE

## 2024-10-16 NOTE — TELEPHONE ENCOUNTER
Jonelle call back with medicine list after today visit with Dr. Kasper.    Albuterol inHaler   Anora InHaler  Carzediol 25 MG 2x day  Clopidogrel 75 MG 1 x day  Losartan 50 MG only  when BP is over 120  Omepravole 40 MG 2 x day  Ropinirole 1 MG 1 x day  Tizanidine 4 MG every 6 hours  Rovastatin 40 MG 1 at night  Alprazolam 2 MG 1 at night  Nifedpine 30 MG 1 in morning    Call back number [phone]

## 2024-10-16 NOTE — PROGRESS NOTES
[facility]    Jonelle Contreras  1950 October 16, 2024      CC:  \" I am tired\"    HPI:  The patient is 73 y.o. female with a past medical history significant for CAD, PAD, hyperlipidemia and hypertension. She underwent LHC on 10/21/13 resulting in PCI with BMS to mid RCA lesion. A peripheral angiogram was completed on 5/30/14 revealing 50% bilateral SFA stenoses. She was admitted 12/4-12/6/22 for a MI and underwent a left heart catheterization resulting in one JAYME to the RCA. She completed a normal echocardiogram on 12/5/22. She underwent a peripheral 12/19/22 that resulted in a balloon angioplasty to the right SFA.  Her dose of carvedilol was previously reduced by Dr. Pagan. She underwent a peripheral 1/10/22 that resulted in a balloon angioplasty to the left SFA. She was admitted 11/9-11/13/23 for hypertensive urgency.     Today she present for follow up and overall she is feeling tired.  She is now participating in parataenial dialysis daily.  She is having difficultly regulating her blood pressure.  She endorses that Dr. Pagan wanted her to be seen by us to help manage her blood pressure.  She did not bring her medications with her today.  She states she reviewed her medication on her mychart and made the corrections.  She reports medication compliance. She denies any abnormal bleeding or bruising. She denies exertional chest pain/pressure, dyspnea at rest, worsening KOVACS, PND, orthopnea, palpitations, weight changes, changes in LE edema, and syncope.     Review of Systems:  Constitutional: No fatigue, weakness, night sweats or fever.   HEENT: No new vision difficulties or ringing in the ears.  Respiratory: No new SOB, PND, orthopnea or cough.   Cardiovascular: See HPI   GI: No n/v, diarrhea, constipation, abdominal pain or changes in bowel habits.  No melena, no hematochezia  : No urinary frequency, urgency, incontinence, hematuria or dysuria.  Skin: No cyanosis or skin 
and start hydralazine 25 mg TID. Check plasma metanephrines, BMP and BNP.. Continue home monitoring of blood pressure and I advised she call with an update in two weeks with an update on her blood pressure control. I would suspect that once she begins PD her blood pressure will be better controlled. Encouraged continued smoking cessation. She will continue to follow with Dr. Pagan from Nephrology. Her most recent lipid profile was favorable on her current statin therapy. I have encouraged her to increase her aerobic activity as tolerated and adhere to a heart healthy diet. I have personally reviewed all previous testing for this visit today including imaging, lab results and EKG as detailed above.    Follow up in 6 months.

## 2024-10-21 NOTE — TELEPHONE ENCOUNTER
Verified med list with pt. She does not take aspirin daily, forgets to take at times. No pepcid and Tramadol is PRN.   Med list updated.

## 2024-10-29 ENCOUNTER — HOSPITAL ENCOUNTER (EMERGENCY)
Age: 74
Discharge: HOME | End: 2024-10-29
Attending: EMERGENCY MEDICINE
Payer: COMMERCIAL

## 2024-10-29 ENCOUNTER — HOSPITAL ENCOUNTER (OUTPATIENT)
Age: 74
Discharge: HOME | End: 2024-10-31
Attending: INTERNAL MEDICINE

## 2024-10-29 VITALS
BODY MASS INDEX: 23.62 KG/M2 | OXYGEN SATURATION: 96 % | HEART RATE: 68 BPM | DIASTOLIC BLOOD PRESSURE: 99 MMHG | TEMPERATURE: 97.8 F | RESPIRATION RATE: 21 BRPM | SYSTOLIC BLOOD PRESSURE: 214 MMHG | WEIGHT: 137.68 LBS

## 2024-10-29 DIAGNOSIS — N18.4 CKD (CHRONIC KIDNEY DISEASE), STAGE IV (HCC): ICD-10-CM

## 2024-10-29 DIAGNOSIS — I10 HYPERTENSION, UNSPECIFIED TYPE: Primary | ICD-10-CM

## 2024-10-29 DIAGNOSIS — I10 ESSENTIAL HYPERTENSION: ICD-10-CM

## 2024-10-29 LAB
ALBUMIN SERPL-MCNC: 3.5 G/DL (ref 3.4–5)
ALBUMIN/GLOB SERPL: 1.2 {RATIO} (ref 1.1–2.2)
ALP SERPL-CCNC: 113 U/L (ref 40–129)
ALT SERPL-CCNC: 8 U/L (ref 10–40)
ANION GAP SERPL CALCULATED.3IONS-SCNC: 8 MMOL/L (ref 3–16)
AST SERPL-CCNC: 23 U/L (ref 15–37)
BASOPHILS # BLD: 0.1 K/UL (ref 0–0.2)
BASOPHILS NFR BLD: 0.9 %
BILIRUB SERPL-MCNC: 0.3 MG/DL (ref 0–1)
BUN SERPL-MCNC: 32 MG/DL (ref 7–20)
CALCIUM SERPL-MCNC: 9.4 MG/DL (ref 8.3–10.6)
CHLORIDE SERPL-SCNC: 108 MMOL/L (ref 99–110)
CO2 SERPL-SCNC: 26 MMOL/L (ref 21–32)
CREAT SERPL-MCNC: 2.7 MG/DL (ref 0.6–1.2)
DEPRECATED RDW RBC AUTO: 16.6 % (ref 12.4–15.4)
EKG ATRIAL RATE: 60 BPM
EKG DIAGNOSIS: NORMAL
EKG P AXIS: 39 DEGREES
EKG P-R INTERVAL: 136 MS
EKG Q-T INTERVAL: 450 MS
EKG QRS DURATION: 78 MS
EKG QTC CALCULATION (BAZETT): 450 MS
EKG R AXIS: -2 DEGREES
EKG T AXIS: 22 DEGREES
EKG VENTRICULAR RATE: 60 BPM
EOSINOPHIL # BLD: 0.4 K/UL (ref 0–0.6)
EOSINOPHIL NFR BLD: 4.6 %
GFR SERPLBLD CREATININE-BSD FMLA CKD-EPI: 18 ML/MIN/{1.73_M2}
GLUCOSE SERPL-MCNC: 99 MG/DL (ref 70–99)
HCT VFR BLD AUTO: 35.6 % (ref 36–48)
HGB BLD-MCNC: 11.4 G/DL (ref 12–16)
LYMPHOCYTES # BLD: 2 K/UL (ref 1–5.1)
LYMPHOCYTES NFR BLD: 25 %
MCH RBC QN AUTO: 27.3 PG (ref 26–34)
MCHC RBC AUTO-ENTMCNC: 32.1 G/DL (ref 31–36)
MCV RBC AUTO: 85.2 FL (ref 80–100)
MONOCYTES # BLD: 0.7 K/UL (ref 0–1.3)
MONOCYTES NFR BLD: 8.5 %
NEUTROPHILS # BLD: 4.9 K/UL (ref 1.7–7.7)
NEUTROPHILS NFR BLD: 61 %
PLATELET # BLD AUTO: 322 K/UL (ref 135–450)
PMV BLD AUTO: 8.2 FL (ref 5–10.5)
POTASSIUM SERPL-SCNC: 4.6 MMOL/L (ref 3.5–5.1)
PROT SERPL-MCNC: 6.5 G/DL (ref 6.4–8.2)
RBC # BLD AUTO: 4.19 M/UL (ref 4–5.2)
SODIUM SERPL-SCNC: 142 MMOL/L (ref 136–145)
TROPONIN, HIGH SENSITIVITY: 24 NG/L (ref 0–14)
TROPONIN, HIGH SENSITIVITY: 29 NG/L (ref 0–14)
WBC # BLD AUTO: 8 K/UL (ref 4–11)

## 2024-10-29 PROCEDURE — 80053 COMPREHEN METABOLIC PANEL: CPT

## 2024-10-29 PROCEDURE — 85025 COMPLETE CBC W/AUTO DIFF WBC: CPT

## 2024-10-29 PROCEDURE — 99284 EMERGENCY DEPT VISIT MOD MDM: CPT

## 2024-10-29 PROCEDURE — 84484 ASSAY OF TROPONIN QUANT: CPT

## 2024-10-29 PROCEDURE — 93010 ELECTROCARDIOGRAM REPORT: CPT | Performed by: INTERNAL MEDICINE

## 2024-10-29 PROCEDURE — 6370000000 HC RX 637 (ALT 250 FOR IP): Performed by: EMERGENCY MEDICINE

## 2024-10-29 PROCEDURE — 96374 THER/PROPH/DIAG INJ IV PUSH: CPT

## 2024-10-29 PROCEDURE — 6360000002 HC RX W HCPCS: Performed by: EMERGENCY MEDICINE

## 2024-10-29 PROCEDURE — 93005 ELECTROCARDIOGRAM TRACING: CPT | Performed by: EMERGENCY MEDICINE

## 2024-10-29 RX ORDER — HYDRALAZINE HYDROCHLORIDE 20 MG/ML
5 INJECTION INTRAMUSCULAR; INTRAVENOUS ONCE
Status: COMPLETED | OUTPATIENT
Start: 2024-10-29 | End: 2024-10-29

## 2024-10-29 RX ORDER — NIFEDIPINE 30 MG/1
30 TABLET, EXTENDED RELEASE ORAL ONCE
Status: COMPLETED | OUTPATIENT
Start: 2024-10-29 | End: 2024-10-29

## 2024-10-29 RX ADMIN — NIFEDIPINE 30 MG: 30 TABLET, EXTENDED RELEASE ORAL at 13:34

## 2024-10-29 RX ADMIN — HYDRALAZINE HYDROCHLORIDE 5 MG: 20 INJECTION INTRAMUSCULAR; INTRAVENOUS at 11:58

## 2024-10-29 ASSESSMENT — PAIN - FUNCTIONAL ASSESSMENT
PAIN_FUNCTIONAL_ASSESSMENT: 0-10
PAIN_FUNCTIONAL_ASSESSMENT: PREVENTS OR INTERFERES SOME ACTIVE ACTIVITIES AND ADLS

## 2024-10-29 ASSESSMENT — PAIN DESCRIPTION - ONSET: ONSET: ON-GOING

## 2024-10-29 ASSESSMENT — PAIN DESCRIPTION - FREQUENCY: FREQUENCY: CONTINUOUS

## 2024-10-29 ASSESSMENT — PAIN DESCRIPTION - DESCRIPTORS: DESCRIPTORS: ACHING

## 2024-10-29 ASSESSMENT — PAIN SCALES - GENERAL: PAINLEVEL_OUTOF10: 6

## 2024-10-29 ASSESSMENT — PAIN DESCRIPTION - PAIN TYPE: TYPE: ACUTE PAIN

## 2024-10-29 ASSESSMENT — PAIN DESCRIPTION - LOCATION: LOCATION: HEAD

## 2024-10-29 NOTE — ED NOTES
D/C: Order noted for d/c. Pt confirmed d/c paperwork has correct name. Discharge and education instructions reviewed with patient. Teach-back successful.  Pt verbalized understanding and denied questions at this time. No acute distress noted. Patient instructed to follow-up as noted - return to emergency department if symptoms worsen. Patient verbalized understanding. Discharged per EDMD with discharge instructions. Pt discharged to private vehicle. Patient stable upon departure. Thanked patient for [facility] for care. Provider aware of patient blood pressure at time of discharge.

## 2024-10-29 NOTE — ED PROVIDER NOTES
[Bupropion]      Dry mouth    Adhesive Tape Rash    Sertraline Other (See Comments)     Severe dry mouth    Sulfa Antibiotics Itching and Nausea Only       PMH, Surgical Hx, FH, Social Hx reviewed by myself.   Patient's care impacted by the above conditions.    REVIEW OF SYSTEMS :      Review of Systems  10 systems reviewed, pertinent positives per HPI otherwise noted to be negative.      SCREENINGS        Pearl Coma Scale  Eye Opening: Spontaneous  Best Verbal Response: Oriented  Best Motor Response: Obeys commands  Pearl Coma Scale Score: 15                CIWA Assessment  BP: (!) 214/99  Pulse: 68           PHYSICAL EXAM  1 or more Elements     ED Triage Vitals 10/29/24 1118   BP Systolic BP Percentile Diastolic BP Percentile Temp Temp Source Pulse Respirations SpO2   (!) 212/69 -- -- 97.8 °F (36.6 °C) Oral 68 16 97 %      Height Weight - Scale         -- 62.5 kg (137 lb 10.8 oz)             GENERAL APPEARANCE: Awake and alert. No acute distress.  HENT: Normocephalic. Atraumatic. No facial droop.  HEART/CHEST: Hypertensive to 220 systolic  LUNGS: Respirations unlabored. Speaking comfortably in full sentences.  ABDOMEN: Soft, non-distended abdomen. Non tender to palpation. No guarding. No rebound.  Peritoneal dialysis present with no surrounding erythema, warmth, or drainage.  EXTREMITIES: No gross deformities. Moving all extremities.  SKIN: Warm and dry. No acute rashes.  NEUROLOGICAL: Alert and oriented. No gross facial drooping. Answering questions appropriately. Moving all extremities.  PSYCHIATRIC: Pleasant. Normal mood and affect.      DIAGNOSTIC RESULTS   LABS:  Labs Reviewed   CBC WITH AUTO DIFFERENTIAL - Abnormal; Notable for the following components:       Result Value    Hemoglobin 11.4 (*)     Hematocrit 35.6 (*)     RDW 16.6 (*)     All other components within normal limits   COMPREHENSIVE METABOLIC PANEL - Abnormal; Notable for the following components:    BUN 32 (*)     Creatinine 2.7 (*)      monitor for close observation.  She was given 5 mg of hydralazine.  Given home nifedipine.  She remains hypertensive to 200s.  I consulted PCP.  Dr. Bentley recommends discharge home as patient wanting to be discharged home.  States he will consult nephrology and will see the patient in clinic on Friday.  I told the patient that she needs to follow-up with Dr. Bentley on Friday at 9:30 AM and if she needs to reschedule, to call his office.  Patient verbalized understanding was agreeable with plan.  She verbalized understanding to return to the emergency department if she developed symptoms or had worsening blood pressure.  Discharge home.     CONSULTS: (Who and What was discussed)  IP CONSULT TO PRIMARY CARE PROVIDER    Is this patient to be included in the SEP-1 Core Measure due to severe sepsis or septic shock?   No     Exclusion criteria - the patient is NOT to be included for SEP-1 Core Measure due to:  2+ SIRS criteria are not met     During the patient's ED course, the patient was given:  Medications   hydrALAZINE (APRESOLINE) injection 5 mg (5 mg IntraVENous Given 10/29/24 1158)   NIFEdipine (PROCARDIA XL) extended release tablet 30 mg (30 mg Oral Given 10/29/24 1334)            I am the Primary Clinician of Record.    FINAL IMPRESSION      1. Hypertension, unspecified type          DISPOSITION/PLAN     DISPOSITION Decision To Discharge 10/29/2024 03:14:52 PM           PATIENT REFERRED TO:  Candido Bentley MD  [address] [address]  [phone]    On 11/1/2024  at 930am      DISCHARGE MEDICATIONS:  Patient was given scripts for the following medications. I counseled patient how to take these medications:  Discharge Medication List as of 10/29/2024  3:37 PM          DISCONTINUED MEDICATIONS:  Discharge Medication List as of 10/29/2024  3:37 PM          Pt was seen during the COVID 19 pandemic. Appropriate PPE worn by ME during patient encounters. Patient was cared for during a time

## 2024-10-29 NOTE — PROGRESS NOTES
Patient unable to proceed with 24 hour ambulatory blood pressure monitoring. Initial blood pressure readings recorded 207/115, 221/110, and 211/110. Patient stated vision was blurry and head hurt. Patient states she was advised to go to ER when experiencing those symptoms and readings. Patient agreed to be brought via wheelchair to ER.

## 2024-10-29 NOTE — ED NOTES
Pt ambulated to bathroom without need of assistance. Gait remains steady, proper footwear in place

## 2024-10-29 NOTE — DISCHARGE INSTRUCTIONS
Please follow up with your PCP on Friday at 930am in office.   If persistent or worsening symptoms, or if you have any concerns, return to ED immediately.

## 2024-10-30 ENCOUNTER — CARE COORDINATION (OUTPATIENT)
Dept: CARE COORDINATION | Age: 74
End: 2024-10-30

## 2024-10-30 NOTE — CARE COORDINATION
Ambulatory Care Coordination Note     10/30/2024 4:13 PM     ACM outreach attempt by this ACM today to perform hospital follow up. ACM was unable to reach the patient by telephone today; left voice message requesting a return phone call to this ACM.     ACM: Muna Nixon RN     Care Summary Note: ER follow up for  HTN, on peritoneal dialysishas follow up w PCP on 11/1    PCP/Specialist follow up:   Future Appointments         Provider Specialty Dept Phone    11/1/2024 9:30 AM Candido Bentley MD Internal Medicine [phone]    4/29/2025 11:00 AM Sami Kasper MD Cardiology [phone]            Follow Up:   Plan for next ACM outreach in approximately 1 week to complete:  - outreach attempt to offer care management services.

## 2024-11-01 ENCOUNTER — OFFICE VISIT (OUTPATIENT)
Dept: INTERNAL MEDICINE CLINIC | Age: 74
End: 2024-11-01

## 2024-11-01 VITALS — SYSTOLIC BLOOD PRESSURE: 150 MMHG | BODY MASS INDEX: 23.67 KG/M2 | DIASTOLIC BLOOD PRESSURE: 80 MMHG | WEIGHT: 138 LBS

## 2024-11-01 DIAGNOSIS — I10 UNCONTROLLED HYPERTENSION: Primary | ICD-10-CM

## 2024-11-01 DIAGNOSIS — I25.10 CORONARY ARTERY DISEASE INVOLVING NATIVE CORONARY ARTERY OF NATIVE HEART WITHOUT ANGINA PECTORIS: ICD-10-CM

## 2024-11-01 DIAGNOSIS — J44.9 CHRONIC OBSTRUCTIVE PULMONARY DISEASE, UNSPECIFIED COPD TYPE (HCC): ICD-10-CM

## 2024-11-01 DIAGNOSIS — I73.9 PAD (PERIPHERAL ARTERY DISEASE) (HCC): ICD-10-CM

## 2024-11-01 DIAGNOSIS — R19.7 DIARRHEA, UNSPECIFIED TYPE: ICD-10-CM

## 2024-11-01 DIAGNOSIS — F41.9 ANXIETY: ICD-10-CM

## 2024-11-01 RX ORDER — AMOXICILLIN AND CLAVULANATE POTASSIUM 500; 125 MG/1; MG/1
1 TABLET, FILM COATED ORAL 2 TIMES DAILY
Qty: 14 TABLET | Refills: 0 | Status: SHIPPED | OUTPATIENT
Start: 2024-11-01 | End: 2024-11-08

## 2024-11-01 RX ORDER — CHOLESTYRAMINE 4 G/9G
1 POWDER, FOR SUSPENSION ORAL 2 TIMES DAILY
Qty: 30 PACKET | Refills: 0 | Status: SHIPPED | OUTPATIENT
Start: 2024-11-01

## 2024-11-01 RX ORDER — ALPRAZOLAM 2 MG/1
2 TABLET ORAL NIGHTLY PRN
Qty: 90 TABLET | Refills: 0 | Status: SHIPPED | OUTPATIENT
Start: 2024-11-01 | End: 2025-01-30

## 2024-11-01 NOTE — PROGRESS NOTES
Jonelle Contreras (:  1950) is a 74 y.o. female,Established patient, here for evaluation of the following chief complaint(s):  3 Month Follow-Up (Concerns of belly button its red, itchy and painful)         Assessment & Plan  Uncontrolled hypertension  Blood pressure slightly elevated today.  She has started peritoneal dialysis reports blood pressure has not come down.  She follows with nephrology.  She has had some lows with different medications in the past.  -Continue nifedipine 30 mg p.o. daily  -Continue carvedilol 25 mg p.o. twice daily         Coronary artery disease involving native coronary artery of native heart without angina pectoris     No chest pain.  Needs better blood pressure control       PAD (peripheral artery disease) ([facility])  No ulcerations today.         Diarrhea, unspecified type    Colestid tyramine as needed         Anxiety       Orders:    ALPRAZolam (XANAX) 2 MG tablet; Take 1 tablet by mouth nightly as needed for Sleep for up to 90 days. Max Daily Amount: 2 mg    Chronic obstructive pulmonary disease, unspecified COPD type ([facility])              No follow-ups on file.       Subjective   Jonelle Contreras is a 73 year old female with resistant hypertension, CKD on peritoneal dialysis, coronary artery disease s/p stenting and GERD who presents for follow up  She was sent to the ED earlier this week after her blood pressure was 200/100 when she was getting her blood pressure monitor. She had a headache and blurred vision with the high blood pressure. She denies any chest pain or shortness of breath  She has been doing peritoneal dialysis. She has been having side pain and occasional chest pain when draining dialysis. Her dialysis fluid is clear. She has been feeling nauseated and itchy. She did vomit once. She has had severe diarrhea over the past 4 days. She has not noticed feeling febrile  She also notes some redness and pain in her abdomen around her belly button          Review of

## 2024-11-01 NOTE — ASSESSMENT & PLAN NOTE
Blood pressure slightly elevated today.  She has started peritoneal dialysis reports blood pressure has not come down.  She follows with nephrology.  She has had some lows with different medications in the past.  -Continue nifedipine 30 mg p.o. daily  -Continue carvedilol 25 mg p.o. twice daily

## 2024-11-01 NOTE — ASSESSMENT & PLAN NOTE
Orders:    ALPRAZolam (XANAX) 2 MG tablet; Take 1 tablet by mouth nightly as needed for Sleep for up to 90 days. Max Daily Amount: 2 mg

## 2024-11-08 ENCOUNTER — CARE COORDINATION (OUTPATIENT)
Dept: CARE COORDINATION | Age: 74
End: 2024-11-08

## 2024-11-08 DIAGNOSIS — R03.0 ELEVATED BLOOD PRESSURE READING WITHOUT DIAGNOSIS OF HYPERTENSION: Primary | ICD-10-CM

## 2024-11-12 ENCOUNTER — HOSPITAL ENCOUNTER (OUTPATIENT)
Age: 74
Discharge: HOME | End: 2024-11-14
Attending: INTERNAL MEDICINE
Payer: COMMERCIAL

## 2024-11-12 DIAGNOSIS — R03.0 ELEVATED BLOOD PRESSURE READING WITHOUT DIAGNOSIS OF HYPERTENSION: ICD-10-CM

## 2024-11-12 PROCEDURE — 93788 AMBL BP MNTR W/SW A/R: CPT

## 2024-11-14 ENCOUNTER — CARE COORDINATION (OUTPATIENT)
Dept: CARE COORDINATION | Age: 74
End: 2024-11-14

## 2024-11-14 NOTE — CARE COORDINATION
Ambulatory Care Coordination Note     11/14/2024 4:08 PM     patient outreach attempt by this ACM today to perform care management follow up . ACM was unable to reach the patient by telephone today;   left voice message requesting a return phone call to this ACM.     Patient closed (unable to reach patient) from the High Risk Care Management program on 11/14/2024.  Patient has the ability to self manage at this time..  Care management goals have been completed. No further Ambulatory Care Manager follow up scheduled.

## 2024-11-21 ENCOUNTER — TELEPHONE (OUTPATIENT)
Dept: CARDIOLOGY CLINIC | Age: 74
End: 2024-11-21

## 2024-11-21 RX ORDER — NIFEDIPINE 30 MG
30 TABLET, EXTENDED RELEASE ORAL 2 TIMES DAILY
Qty: 60 TABLET | Refills: 3 | Status: SHIPPED | OUTPATIENT
Start: 2024-11-21

## 2024-11-21 NOTE — TELEPHONE ENCOUNTER
Per Dr. Kasper can trial her on Nifedipine 30 mg BID.(She currently takes once daily)  Her average bp was 170's/80's. Check blood pressure at home once daily and keep record  Also consult Dr. Pagan for further recommendations.  Rx sent with new sig to [facility].  Please call pt.  Thank you.

## 2024-11-21 NOTE — TELEPHONE ENCOUNTER
Patient returned call. I gave message below.  States when she takes nifedipine 30 mg daily, states she crashes     This morning she took her B/P it was 205-113  Pulse-64    After she took the nifedipine   Her blood pressure was- 98/63 Pulse-48    States she can't take the medication twice a day.  She will call her PCP Dr Bentley to discuss.    States she has already spoke with Dr Pagan.     Please advise.

## 2024-11-21 NOTE — TELEPHONE ENCOUNTER
Results:    What test(s) did you have?  BP monitor    [place]    When was the test completed?   11/12    Who is the ordering provider?  Ranjith: would also wants to know if information regarding her change in medication would be relayed to her Nephrologist?

## 2024-11-21 NOTE — TELEPHONE ENCOUNTER
11/12/24 24 hour b/p results final.    Please advise: 10/16/24 office notes from Dr Kasper will be sent to Dr Pagan per patient.     Per office notes patient was following up with Dr Kasper per Dr Pagan.

## 2024-11-22 NOTE — TELEPHONE ENCOUNTER
I spoke with patient in detail and states she will discuss with her PCP Dr Bentley on medications he has prescribed for patient, to see what medication may be causing her to be so tired.

## 2024-11-24 DIAGNOSIS — K20.90 ESOPHAGITIS: ICD-10-CM

## 2024-11-25 RX ORDER — OMEPRAZOLE 40 MG/1
CAPSULE, DELAYED RELEASE ORAL
Qty: 90 CAPSULE | Refills: 1 | OUTPATIENT
Start: 2024-11-25

## 2024-11-25 NOTE — TELEPHONE ENCOUNTER
Jonelle called to schedule earlier appt than 4/29/25 as her Nephrologist advised she should see Ranjith sooner. Jonelle shares that she has been taking Nifedipine - 30 MG - 1 tablet in the morning and at bedtime and this has caused her BP to crash. Jonelle shares on 11/21, her BP was 205/113. She then took 1 tablet of Nifedipine and BP dropped down to 99/63 with HR of 48. Jonelle states today she started taking 1/2 dose of Nifedipine - 30 MG - in the morning and at night. She states today this morning, her BP was 206/146 with HR of 70. She then took her 1/2 dose of Nifedipine along with carvedilol (normal dose) and BP was 166/87 with HR of 70.     Jonelle is tentatively scheduled for 1/15/25. She would like to know if that date is OK or if she needs to be seen sooner than that?    Please advise    Jonelle's callback: [phone]

## 2024-11-25 NOTE — TELEPHONE ENCOUNTER
Future Appointments   Date Time Provider Department Center   2/7/2025  1:00 PM Candido Bentley MD [facility] DE[facility]   4/29/2025 11:00 AM Sami Kasper MD [facility]       Last appt 11/1/24

## 2024-11-26 RX ORDER — OMEPRAZOLE 40 MG/1
CAPSULE, DELAYED RELEASE ORAL
Qty: 90 CAPSULE | Refills: 1 | Status: SHIPPED | OUTPATIENT
Start: 2024-11-26

## 2024-11-26 NOTE — TELEPHONE ENCOUNTER
Spoke with Dr. Kasper we do not need to see her in clinic we can make changes over the phone.  Will call and discuss with pt.

## 2024-11-29 NOTE — TELEPHONE ENCOUNTER
We do not need to see pt sooner than her scheduled appt in April.   (We can cancel her scheduled 1/15 appt.)  Pt and pcp seem to be working on a good regimen for her blood pressure medications. Do not want \"too many hands in the pot\" making changes.  She can follow Dr. Bentley's recommendations.  Thank you.

## 2024-12-04 ENCOUNTER — OFFICE VISIT (OUTPATIENT)
Dept: INTERNAL MEDICINE CLINIC | Age: 74
End: 2024-12-04

## 2024-12-04 VITALS
OXYGEN SATURATION: 96 % | SYSTOLIC BLOOD PRESSURE: 152 MMHG | HEART RATE: 65 BPM | HEIGHT: 64 IN | DIASTOLIC BLOOD PRESSURE: 80 MMHG | WEIGHT: 138 LBS | BODY MASS INDEX: 23.56 KG/M2

## 2024-12-04 DIAGNOSIS — Z99.2 PERITONEAL DIALYSIS CATHETER IN PLACE (HCC): Primary | ICD-10-CM

## 2024-12-04 DIAGNOSIS — I10 UNCONTROLLED HYPERTENSION: ICD-10-CM

## 2024-12-04 DIAGNOSIS — I25.10 CORONARY ARTERY DISEASE INVOLVING NATIVE CORONARY ARTERY OF NATIVE HEART WITHOUT ANGINA PECTORIS: ICD-10-CM

## 2024-12-04 RX ORDER — ROSUVASTATIN CALCIUM 40 MG/1
40 TABLET, COATED ORAL NIGHTLY
Qty: 90 TABLET | Refills: 3 | Status: SHIPPED | OUTPATIENT
Start: 2024-12-04

## 2024-12-04 RX ORDER — CLOPIDOGREL BISULFATE 75 MG/1
75 TABLET ORAL DAILY
Qty: 90 TABLET | Refills: 3 | Status: SHIPPED | OUTPATIENT
Start: 2024-12-04

## 2024-12-04 RX ORDER — NIFEDIPINE 30 MG
30 TABLET, EXTENDED RELEASE ORAL 2 TIMES DAILY PRN
Qty: 90 TABLET | Refills: 3 | Status: SHIPPED | OUTPATIENT
Start: 2024-12-04

## 2024-12-04 RX ORDER — HYDRALAZINE HYDROCHLORIDE 25 MG/1
25 TABLET, FILM COATED ORAL 4 TIMES DAILY
Qty: 90 TABLET | Refills: 3 | Status: SHIPPED | OUTPATIENT
Start: 2024-12-04

## 2024-12-04 RX ORDER — LOSARTAN POTASSIUM 50 MG/1
50 TABLET ORAL DAILY
Qty: 90 TABLET | Refills: 0 | Status: SHIPPED | OUTPATIENT
Start: 2024-12-04

## 2024-12-04 RX ORDER — LOSARTAN POTASSIUM 50 MG/1
1 TABLET ORAL
COMMUNITY
Start: 2024-07-29 | End: 2024-12-04 | Stop reason: SDUPTHER

## 2024-12-04 RX ORDER — CARVEDILOL 25 MG/1
25 TABLET ORAL 2 TIMES DAILY
Qty: 180 TABLET | Refills: 3 | Status: SHIPPED | OUTPATIENT
Start: 2024-12-04

## 2024-12-04 RX ORDER — ROSUVASTATIN CALCIUM 40 MG/1
40 TABLET, COATED ORAL NIGHTLY
Qty: 90 TABLET | Refills: 3 | Status: SHIPPED | OUTPATIENT
Start: 2024-12-04 | End: 2024-12-04 | Stop reason: SDUPTHER

## 2024-12-04 NOTE — ASSESSMENT & PLAN NOTE
Chronic, not at goal (unstable), changes made today: Only change to your medication is adding hydralazine PRN for elevated blood pressures.  - STOP clonidine if you have the script at home (pt reports she was not taking it)  - Monitor blood pressures closely at home and adjust nifedipine dosing based on blood pressure after you empty your PD dwell.   - Change positions slowly.  - Home health referral placed for you to receive nursing visits and orthostatic blood pressure checks.

## 2024-12-04 NOTE — TELEPHONE ENCOUNTER
Future Appointments   Date Time Provider Department Center   12/4/2024  1:30 PM Cristiana Mazariegos APRN - CNP [facility] DEP   2/7/2025  1:00 PM Candido Bentley MD [facility] DEP   4/29/2025 11:00 AM Sami Kasper MD [facility]       Last appt 11/1/24

## 2024-12-04 NOTE — PATIENT INSTRUCTIONS
Check your BP at home daily and bring a log to next visit.   Avoid NSAID pain medicines.Tylenol or acetaminophen is OK to take.  Limit sodium intake to less than 2000 mg daily.     Follow with Lorie as discussed.

## 2024-12-04 NOTE — PROGRESS NOTES
Jonelle Contreras (:  1950) is a 74 y.o. female,Established patient, here for evaluation of the following chief complaint(s): Blood pressure issue    Pt us a 74 year old female that presents to the office today for complaints of     She has past medication history of MI, PAD, CAD, Afib, hypertensive urgency, CKD 4, dyslipidemia, GERD, and tobacco abuse.    Follows with nephrology- Dr Pagan. Recent initiation of PD about 7 months now. Has had some hypotensive readings in the past with different medications. Has been compliant with current regime other than the recommended clonidine that she never took. She reports her pressures seem to be worse in the morning and late at night. She notices her blood pressure drops after the morning nifedipine dose but, does not occur until early afternoon.     It was recommended by nephrology that she take clonidine PRN only if top BP number > 180 and start amlodipine 5 mg at night. She never started the amlodipine. So she is taking Nifedipine 30 mg twice daily, losartan 50 mg daily, and hydralazine 25 mg as needed.      Assessment & Plan  Uncontrolled hypertension   Chronic, not at goal (unstable), changes made today: Only change to your medication is adding hydralazine PRN for elevated blood pressures.  - STOP clonidine if you have the script at home (pt reports she was not taking it)  - Monitor blood pressures closely at home and adjust nifedipine dosing based on blood pressure after you empty your PD dwell.   - Change positions slowly.  - Home health referral placed for you to receive nursing visits and orthostatic blood pressure checks.           Return in about 3 months (around 3/4/2025).       Subjective       Review of Systems   Constitutional:  Negative for appetite change, fatigue, fever and unexpected weight change.   HENT:  Negative for trouble swallowing and voice change.    Eyes:  Negative for photophobia and visual disturbance.   Respiratory:  Negative for

## 2024-12-06 ENCOUNTER — TELEPHONE (OUTPATIENT)
Dept: INTERNAL MEDICINE CLINIC | Age: 74
End: 2024-12-06

## 2024-12-06 ASSESSMENT — ENCOUNTER SYMPTOMS
VOICE CHANGE: 0
NAUSEA: 0
ABDOMINAL PAIN: 0
SHORTNESS OF BREATH: 0
COLOR CHANGE: 0
COUGH: 0
VOMITING: 0
WHEEZING: 0
PHOTOPHOBIA: 0
TROUBLE SWALLOWING: 0
CONSTIPATION: 0
DIARRHEA: 0

## 2024-12-06 NOTE — TELEPHONE ENCOUNTER
Pt called, states she needs directions for all of her medications?  Said they were changed at her last appointment    Please advise    Thank you

## 2025-01-06 DIAGNOSIS — I10 UNCONTROLLED HYPERTENSION: ICD-10-CM

## 2025-01-06 RX ORDER — CARVEDILOL 25 MG/1
25 TABLET ORAL 2 TIMES DAILY
Qty: 180 TABLET | Refills: 3 | Status: SHIPPED | OUTPATIENT
Start: 2025-01-06

## 2025-01-06 NOTE — TELEPHONE ENCOUNTER
Last OV: 10/16/24 Ranjith   From last OV:  4. Essential Hypertension   - Not controlled.  - Continue current medical management Carvedilol 25 mg BID  Next OV: 4/9/25 Ranjith  Last labs: 10/29/24 CBC and CMP   Last EKG: 10/29/24  Pt needs 25 mg tabs sent to pharmacy

## 2025-02-04 SDOH — ECONOMIC STABILITY: FOOD INSECURITY: WITHIN THE PAST 12 MONTHS, THE FOOD YOU BOUGHT JUST DIDN'T LAST AND YOU DIDN'T HAVE MONEY TO GET MORE.: SOMETIMES TRUE

## 2025-02-04 SDOH — ECONOMIC STABILITY: FOOD INSECURITY: WITHIN THE PAST 12 MONTHS, YOU WORRIED THAT YOUR FOOD WOULD RUN OUT BEFORE YOU GOT MONEY TO BUY MORE.: SOMETIMES TRUE

## 2025-02-04 SDOH — ECONOMIC STABILITY: INCOME INSECURITY: IN THE LAST 12 MONTHS, WAS THERE A TIME WHEN YOU WERE NOT ABLE TO PAY THE MORTGAGE OR RENT ON TIME?: NO

## 2025-02-04 SDOH — ECONOMIC STABILITY: TRANSPORTATION INSECURITY
IN THE PAST 12 MONTHS, HAS THE LACK OF TRANSPORTATION KEPT YOU FROM MEDICAL APPOINTMENTS OR FROM GETTING MEDICATIONS?: NO

## 2025-02-04 ASSESSMENT — PATIENT HEALTH QUESTIONNAIRE - PHQ9
SUM OF ALL RESPONSES TO PHQ QUESTIONS 1-9: 14
9. THOUGHTS THAT YOU WOULD BE BETTER OFF DEAD, OR OF HURTING YOURSELF: NOT AT ALL
9. THOUGHTS THAT YOU WOULD BE BETTER OFF DEAD, OR OF HURTING YOURSELF: NOT AT ALL
SUM OF ALL RESPONSES TO PHQ QUESTIONS 1-9: 14
SUM OF ALL RESPONSES TO PHQ QUESTIONS 1-9: 14
3. TROUBLE FALLING OR STAYING ASLEEP: MORE THAN HALF THE DAYS
6. FEELING BAD ABOUT YOURSELF - OR THAT YOU ARE A FAILURE OR HAVE LET YOURSELF OR YOUR FAMILY DOWN: SEVERAL DAYS
SUM OF ALL RESPONSES TO PHQ9 QUESTIONS 1 & 2: 4
2. FEELING DOWN, DEPRESSED OR HOPELESS: MORE THAN HALF THE DAYS
10. IF YOU CHECKED OFF ANY PROBLEMS, HOW DIFFICULT HAVE THESE PROBLEMS MADE IT FOR YOU TO DO YOUR WORK, TAKE CARE OF THINGS AT HOME, OR GET ALONG WITH OTHER PEOPLE: VERY DIFFICULT
3. TROUBLE FALLING OR STAYING ASLEEP: MORE THAN HALF THE DAYS
2. FEELING DOWN, DEPRESSED OR HOPELESS: MORE THAN HALF THE DAYS
7. TROUBLE CONCENTRATING ON THINGS, SUCH AS READING THE NEWSPAPER OR WATCHING TELEVISION: SEVERAL DAYS
4. FEELING TIRED OR HAVING LITTLE ENERGY: NEARLY EVERY DAY
SUM OF ALL RESPONSES TO PHQ QUESTIONS 1-9: 14
7. TROUBLE CONCENTRATING ON THINGS, SUCH AS READING THE NEWSPAPER OR WATCHING TELEVISION: SEVERAL DAYS
1. LITTLE INTEREST OR PLEASURE IN DOING THINGS: MORE THAN HALF THE DAYS
4. FEELING TIRED OR HAVING LITTLE ENERGY: NEARLY EVERY DAY
10. IF YOU CHECKED OFF ANY PROBLEMS, HOW DIFFICULT HAVE THESE PROBLEMS MADE IT FOR YOU TO DO YOUR WORK, TAKE CARE OF THINGS AT HOME, OR GET ALONG WITH OTHER PEOPLE: VERY DIFFICULT
1. LITTLE INTEREST OR PLEASURE IN DOING THINGS: MORE THAN HALF THE DAYS
8. MOVING OR SPEAKING SO SLOWLY THAT OTHER PEOPLE COULD HAVE NOTICED. OR THE OPPOSITE, BEING SO FIGETY OR RESTLESS THAT YOU HAVE BEEN MOVING AROUND A LOT MORE THAN USUAL: SEVERAL DAYS
8. MOVING OR SPEAKING SO SLOWLY THAT OTHER PEOPLE COULD HAVE NOTICED. OR THE OPPOSITE - BEING SO FIDGETY OR RESTLESS THAT YOU HAVE BEEN MOVING AROUND A LOT MORE THAN USUAL: SEVERAL DAYS
5. POOR APPETITE OR OVEREATING: MORE THAN HALF THE DAYS
SUM OF ALL RESPONSES TO PHQ QUESTIONS 1-9: 14
6. FEELING BAD ABOUT YOURSELF - OR THAT YOU ARE A FAILURE OR HAVE LET YOURSELF OR YOUR FAMILY DOWN: SEVERAL DAYS
5. POOR APPETITE OR OVEREATING: MORE THAN HALF THE DAYS

## 2025-02-07 ENCOUNTER — OFFICE VISIT (OUTPATIENT)
Dept: INTERNAL MEDICINE CLINIC | Age: 75
End: 2025-02-07

## 2025-02-07 VITALS
BODY MASS INDEX: 24.19 KG/M2 | DIASTOLIC BLOOD PRESSURE: 95 MMHG | WEIGHT: 141 LBS | HEART RATE: 64 BPM | OXYGEN SATURATION: 96 % | SYSTOLIC BLOOD PRESSURE: 152 MMHG

## 2025-02-07 DIAGNOSIS — R51.9 CHRONIC NONINTRACTABLE HEADACHE, UNSPECIFIED HEADACHE TYPE: Primary | ICD-10-CM

## 2025-02-07 DIAGNOSIS — G89.29 CHRONIC NONINTRACTABLE HEADACHE, UNSPECIFIED HEADACHE TYPE: Primary | ICD-10-CM

## 2025-02-07 DIAGNOSIS — J44.9 CHRONIC OBSTRUCTIVE PULMONARY DISEASE, UNSPECIFIED COPD TYPE (HCC): ICD-10-CM

## 2025-02-07 DIAGNOSIS — K20.90 ESOPHAGITIS: ICD-10-CM

## 2025-02-07 DIAGNOSIS — N18.4 CKD (CHRONIC KIDNEY DISEASE), STAGE IV (HCC): ICD-10-CM

## 2025-02-07 DIAGNOSIS — M19.049 CMC ARTHRITIS: ICD-10-CM

## 2025-02-07 DIAGNOSIS — I48.0 PAROXYSMAL ATRIAL FIBRILLATION (HCC): ICD-10-CM

## 2025-02-07 DIAGNOSIS — R53.1 WEAKNESS: ICD-10-CM

## 2025-02-07 DIAGNOSIS — I25.10 CORONARY ARTERY DISEASE INVOLVING NATIVE CORONARY ARTERY OF NATIVE HEART WITHOUT ANGINA PECTORIS: ICD-10-CM

## 2025-02-07 DIAGNOSIS — I10 UNCONTROLLED HYPERTENSION: ICD-10-CM

## 2025-02-07 DIAGNOSIS — G25.81 RESTLESS LEG SYNDROME: ICD-10-CM

## 2025-02-07 DIAGNOSIS — H53.8 BLURRED VISION: ICD-10-CM

## 2025-02-07 RX ORDER — ROSUVASTATIN CALCIUM 40 MG/1
40 TABLET, COATED ORAL NIGHTLY
Qty: 90 TABLET | Refills: 3 | Status: SHIPPED | OUTPATIENT
Start: 2025-02-07

## 2025-02-07 RX ORDER — HYDRALAZINE HYDROCHLORIDE 25 MG/1
25 TABLET, FILM COATED ORAL 4 TIMES DAILY PRN
Qty: 90 TABLET | Refills: 3
Start: 2025-02-07

## 2025-02-07 RX ORDER — ROPINIROLE 1 MG/1
TABLET, FILM COATED ORAL
Qty: 90 TABLET | Refills: 3 | Status: SHIPPED | OUTPATIENT
Start: 2025-02-07

## 2025-02-07 RX ORDER — OMEPRAZOLE 40 MG/1
40 CAPSULE, DELAYED RELEASE ORAL DAILY
Qty: 90 CAPSULE | Refills: 3 | Status: SHIPPED | OUTPATIENT
Start: 2025-02-07

## 2025-02-07 SDOH — ECONOMIC STABILITY: FOOD INSECURITY: WITHIN THE PAST 12 MONTHS, YOU WORRIED THAT YOUR FOOD WOULD RUN OUT BEFORE YOU GOT MONEY TO BUY MORE.: NEVER TRUE

## 2025-02-07 SDOH — ECONOMIC STABILITY: FOOD INSECURITY: WITHIN THE PAST 12 MONTHS, THE FOOD YOU BOUGHT JUST DIDN'T LAST AND YOU DIDN'T HAVE MONEY TO GET MORE.: NEVER TRUE

## 2025-02-07 NOTE — PROGRESS NOTES
Jonelle Contreras is a 74 y.o. female with a past medical history noted below who presents for routine follow up on chronic conditions and discussion of preventative health care.  Chief Complaint   Patient presents with    Hypertension           Past Medical History:   Diagnosis Date    acute intermittent porphyria     sees Alfredo, mom had it too    agoraphobic     ongoing, prefers to be at home    Allergic rhinitis     Anxiety     Asthma     CAD (coronary artery disease) 2022    acute inf MI RCA stent successfully placed *[place] other arteries clear    Chronic obstructive pulmonary disease, unspecified COPD type ([facility]) 2025    ckd 4     from HTN,  sees Trini Caroway    collagenous colitis     Ayoub    COPD (chronic obstructive pulmonary disease) ([facility])     Depression 2013    eversince      GERD (gastroesophageal reflux disease)     Hyperlipidemia     Hypertension 2016    Left thyroid nodule     9mm, gets rechecked yearly on ct lung    Migraines     gets 4x per month    Neuropathy     PAD (peripheral artery disease) 2022    bilat ptca fem art Ranjith    Peripheral vascular disease     post menopausal 2020    osteopenia frax score under on calcium and D    Prediabetes     Pulmonary nodules     yearly ct lung followed by Dr Fuentes    Restless leg syndrome     STEMI involving oth coronary artery of inferior wall ([facility]) 2022    JAYME RCA    Urinary incontinence      Patient Active Problem List   Diagnosis    Anxiety    Panic disorder    Palpitations    IBS (irritable bowel syndrome)    Depression    Colitis    PAD (peripheral artery disease)    CAD (coronary artery disease)    Uncontrolled hypertension    Arthritis    Asthma    Atrial fibrillation (HCC)    Gastroesophageal reflux disease    Seasonal allergic rhinitis    Urinary incontinence    Stage 4 chronic kidney disease (HCC)    Left thyroid nodule    Tobacco abuse    ST elevation myocardial infarction involving right

## 2025-02-11 ENCOUNTER — PATIENT MESSAGE (OUTPATIENT)
Dept: INTERNAL MEDICINE CLINIC | Age: 75
End: 2025-02-11

## 2025-02-11 DIAGNOSIS — F41.9 ANXIETY: ICD-10-CM

## 2025-02-11 RX ORDER — ALPRAZOLAM 2 MG/1
TABLET ORAL
COMMUNITY
Start: 2024-11-08 | End: 2025-02-13

## 2025-02-11 RX ORDER — ALPRAZOLAM 2 MG/1
TABLET ORAL
OUTPATIENT
Start: 2025-02-11

## 2025-02-11 RX ORDER — ALPRAZOLAM 2 MG/1
2 TABLET ORAL NIGHTLY PRN
Qty: 90 TABLET | Refills: 0 | Status: SHIPPED | OUTPATIENT
Start: 2025-02-11 | End: 2025-02-13 | Stop reason: SDUPTHER

## 2025-02-11 RX ORDER — CLONIDINE HYDROCHLORIDE 0.1 MG/1
TABLET ORAL
COMMUNITY
Start: 2025-02-10

## 2025-02-11 RX ORDER — GENTAMICIN SULFATE 1 MG/G
CREAM TOPICAL
COMMUNITY
Start: 2025-01-02

## 2025-02-11 NOTE — TELEPHONE ENCOUNTER
Medication:   Requested Prescriptions     Pending Prescriptions Disp Refills    XANAX 2 MG tablet         Last Filled:      Patient Phone Number: [phone] (home)     Last appt: 2/7/2025   Next appt: 2/11/2025  Future Appointments   Date Time Provider Department Center   2/22/2025  8:30 AM [facility]   2/28/2025 11:00 AM Shant Saxena MD [facility]   4/29/2025 11:00 AM Sami Kasper MD [facility]   5/9/2025  1:00 PM Candido Bentley MD [facility] DEP

## 2025-02-13 RX ORDER — ALPRAZOLAM 2 MG/1
2 TABLET ORAL NIGHTLY PRN
Qty: 90 TABLET | Refills: 0 | Status: SHIPPED | OUTPATIENT
Start: 2025-02-13 | End: 2025-05-14

## 2025-02-22 ENCOUNTER — HOSPITAL ENCOUNTER (OUTPATIENT)
Dept: MRI IMAGING | Age: 75
Discharge: HOME | End: 2025-02-22
Attending: STUDENT IN AN ORGANIZED HEALTH CARE EDUCATION/TRAINING PROGRAM
Payer: MEDICARE

## 2025-02-22 DIAGNOSIS — H53.8 BLURRED VISION: ICD-10-CM

## 2025-02-22 DIAGNOSIS — I10 UNCONTROLLED HYPERTENSION: ICD-10-CM

## 2025-02-22 PROCEDURE — 70551 MRI BRAIN STEM W/O DYE: CPT

## 2025-02-27 ENCOUNTER — TELEPHONE (OUTPATIENT)
Dept: INTERNAL MEDICINE CLINIC | Age: 75
End: 2025-02-27

## 2025-02-27 SDOH — HEALTH STABILITY: PHYSICAL HEALTH: ON AVERAGE, HOW MANY MINUTES DO YOU ENGAGE IN EXERCISE AT THIS LEVEL?: 30 MIN

## 2025-02-27 SDOH — HEALTH STABILITY: PHYSICAL HEALTH: ON AVERAGE, HOW MANY DAYS PER WEEK DO YOU ENGAGE IN MODERATE TO STRENUOUS EXERCISE (LIKE A BRISK WALK)?: 2 DAYS

## 2025-02-28 ENCOUNTER — OFFICE VISIT (OUTPATIENT)
Dept: ORTHOPEDIC SURGERY | Age: 75
End: 2025-02-28

## 2025-02-28 VITALS — HEIGHT: 64 IN | WEIGHT: 141 LBS | BODY MASS INDEX: 24.07 KG/M2 | RESPIRATION RATE: 16 BRPM

## 2025-02-28 DIAGNOSIS — M79.644 BILATERAL THUMB PAIN: Primary | ICD-10-CM

## 2025-02-28 DIAGNOSIS — M79.645 BILATERAL THUMB PAIN: Primary | ICD-10-CM

## 2025-03-01 NOTE — PROGRESS NOTES
Ms. Jonelle Contreras is a 74 y.o. right handed woman  who is seen today in Hand Surgical Consultation at the request of Candido Bentley MD.'    She is seen today regarding a 6 month(s) history of bilateral basilar thumb pain without history of previous injury.  She  was seen for this concern by her primary care physician; previous treatment has included conservative measures and avoidance of bothersome tasks.  She  reports moderate Enlargement about the base of the Thumb, Basilar Thumb pain, and Pain with pinch & grasp located about the base of both thumbs at the level of the CMC Joint, no tenderness of the remaining hand, wrist, or elbow on either side.  She notes today, no neurologic symptoms in the hands. Symptoms show no change over time.      I have today reviewed with Jonelle Contreras the clinically relevant, past medical history, medications, allergies,  family history, social history, and Review Of Systems & I have documented any details relevant to today's presenting complaints in my history above.  Ms. Jonelle Weavers self-reported past medical history, medications, allergies,  family history, social history, and Review Of Systems have been scanned into the chart under the \"Media\" tab.    Physical Exam:  Ms. Jonelle Contreras's most recent vitals:  Vitals  Respirations: 16  Height: 162.6 cm (5' 4.02\")  Weight - Scale: 64 kg (141 lb)    She is well nourished, oriented to person, place & time.  She demonstrates appropriate mood and affect as well as normal gait and station.    Skin: Normal in appearance, Normal Color, and Free of Lesions Bilateral   Finger range of motion is limited by Osteoarthritis bilaterally.  Thumb range of motion is  decreased in all spheres at the basilar joint bilaterally   Wrist range of motion is without significant limitation bilaterally  There is no evidence of gross joint instability bilaterally.  Sensation is subjectively normal in the Whole Hand bilaterally  Vascular examination

## 2025-04-01 ENCOUNTER — HOSPITAL ENCOUNTER (OUTPATIENT)
Dept: CT IMAGING | Age: 75
Discharge: HOME | End: 2025-04-01
Attending: INTERNAL MEDICINE
Payer: MEDICARE

## 2025-04-01 DIAGNOSIS — F17.208 NICOTINE DEPENDENCE, UNSP, W OTH NICOTINE-INDUCED DISORDERS: ICD-10-CM

## 2025-04-01 PROCEDURE — 71271 CT THORAX LUNG CANCER SCR C-: CPT

## 2025-04-15 ENCOUNTER — TELEPHONE (OUTPATIENT)
Dept: CARDIOLOGY CLINIC | Age: 75
End: 2025-04-15

## 2025-04-15 NOTE — TELEPHONE ENCOUNTER
Jonelle called in she states she had a CT of her lungs done for Dr. Fuentes and it shows her heart is enlarged,she wants to know if she needs to be seen sooner than 5/13 with Dr. Kasper.      She can be reached at [phone].   Sski Pregnancy And Lactation Text: This medication is Pregnancy Category D and isn't considered safe during pregnancy. It is excreted in breast milk. 5-Fu Counseling: 5-Fluorouracil Counseling:  I discussed with the patient the risks of 5-fluorouracil including but not limited to erythema, scaling, itching, weeping, crusting, and pain. Infliximab Pregnancy And Lactation Text: This medication is Pregnancy Category B and is considered safe during pregnancy. It is unknown if this medication is excreted in breast milk. Topical Retinoid counseling:  Patient advised to apply a pea-sized amount only at bedtime and wait 30 minutes after washing their face before applying.  If too drying, patient may add a non-comedogenic moisturizer. The patient verbalized understanding of the proper use and possible adverse effects of retinoids.  All of the patient's questions and concerns were addressed. Carac Counseling:  I discussed with the patient the risks of Carac including but not limited to erythema, scaling, itching, weeping, crusting, and pain. Cimetidine Pregnancy And Lactation Text: This medication is Pregnancy Category B and is considered safe during pregnancy. It is also excreted in breast milk and breast feeding isn't recommended. Solaraze Pregnancy And Lactation Text: This medication is Pregnancy Category B and is considered safe. There is some data to suggest avoiding during the third trimester. It is unknown if this medication is excreted in breast milk. Sarecycline Pregnancy And Lactation Text: This medication is Pregnancy Category D and not consider safe during pregnancy. It is also excreted in breast milk. Enbrel Counseling:  I discussed with the patient the risks of etanercept including but not limited to myelosuppression, immunosuppression, autoimmune hepatitis, demyelinating diseases, lymphoma, and infections.  The patient understands that monitoring is required including a PPD at baseline and must alert us or the primary physician if symptoms of infection or other concerning signs are noted. Xolair Counseling:  Patient informed of potential adverse effects including but not limited to fever, muscle aches, rash and allergic reactions.  The patient verbalized understanding of the proper use and possible adverse effects of Xolair.  All of the patient's questions and concerns were addressed. Topical Sulfur Applications Counseling: Topical Sulfur Counseling: Patient counseled that this medication may cause skin irritation or allergic reactions.  In the event of skin irritation, the patient was advised to reduce the amount of the drug applied or use it less frequently.   The patient verbalized understanding of the proper use and possible adverse effects of topical sulfur application.  All of the patient's questions and concerns were addressed. Arava Pregnancy And Lactation Text: This medication is Pregnancy Category X and is absolutely contraindicated during pregnancy. It is unknown if it is excreted in breast milk. Otezla Pregnancy And Lactation Text: This medication is Pregnancy Category C and it isn't known if it is safe during pregnancy. It is unknown if it is excreted in breast milk. Spironolactone Pregnancy And Lactation Text: This medication can cause feminization of the male fetus and should be avoided during pregnancy. The active metabolite is also found in breast milk. Metronidazole Counseling:  I discussed with the patient the risks of metronidazole including but not limited to seizures, nausea/vomiting, a metallic taste in the mouth, nausea/vomiting and severe allergy. Doxepin Pregnancy And Lactation Text: This medication is Pregnancy Category C and it isn't known if it is safe during pregnancy. It is also excreted in breast milk and breast feeding isn't recommended. Rituxan Pregnancy And Lactation Text: This medication is Pregnancy Category C and it isn't know if it is safe during pregnancy. It is unknown if this medication is excreted in breast milk but similar antibodies are known to be excreted. Xolair Pregnancy And Lactation Text: This medication is Pregnancy Category B and is considered safe during pregnancy. This medication is excreted in breast milk. Xeljanz Counseling: I discussed with the patient the risks of Xeljanz therapy including increased risk of infection, liver issues, headache, diarrhea, or cold symptoms. Live vaccines should be avoided. They were instructed to call if they have any problems. Ilumya Counseling: I discussed with the patient the risks of tildrakizumab including but not limited to immunosuppression, malignancy, posterior leukoencephalopathy syndrome, and serious infections.  The patient understands that monitoring is required including a PPD at baseline and must alert us or the primary physician if symptoms of infection or other concerning signs are noted. Tremfya Pregnancy And Lactation Text: The risk during pregnancy and breastfeeding is uncertain with this medication. Ketoconazole Pregnancy And Lactation Text: This medication is Pregnancy Category C and it isn't know if it is safe during pregnancy. It is also excreted in breast milk and breast feeding isn't recommended. Oxybutynin Pregnancy And Lactation Text: This medication is Pregnancy Category B and is considered safe during pregnancy. It is unknown if it is excreted in breast milk. Fluconazole Pregnancy And Lactation Text: This medication is Pregnancy Category C and it isn't know if it is safe during pregnancy. It is also excreted in breast milk. Bactrim Pregnancy And Lactation Text: This medication is Pregnancy Category D and is known to cause fetal risk.  It is also excreted in breast milk. Use Enhanced Medication Counseling?: No Rifampin Pregnancy And Lactation Text: This medication is Pregnancy Category C and it isn't know if it is safe during pregnancy. It is also excreted in breast milk and should not be used if you are breast feeding. Infliximab Counseling:  I discussed with the patient the risks of infliximab including but not limited to myelosuppression, immunosuppression, autoimmune hepatitis, demyelinating diseases, lymphoma, and serious infections.  The patient understands that monitoring is required including a PPD at baseline and must alert us or the primary physician if symptoms of infection or other concerning signs are noted. Minoxidil Counseling: Minoxidil is a topical medication which can increase blood flow where it is applied. It is uncertain how this medication increases hair growth. Side effects are uncommon and include stinging and allergic reactions. Hydroxyzine Pregnancy And Lactation Text: This medication is not safe during pregnancy and should not be taken. It is also excreted in breast milk and breast feeding isn't recommended. Dupixent Counseling: I discussed with the patient the risks of dupilumab including but not limited to eye infection and irritation, cold sores, injection site reactions, worsening of asthma, allergic reactions and increased risk of parasitic infection.  Live vaccines should be avoided while taking dupilumab. Dupilumab will also interact with certain medications such as warfarin and cyclosporine. The patient understands that monitoring is required and they must alert us or the primary physician if symptoms of infection or other concerning signs are noted. Oxybutynin Counseling:  I discussed with the patient the risks of oxybutynin including but not limited to skin rash, drowsiness, dry mouth, difficulty urinating, and blurred vision. Picato Pregnancy And Lactation Text: This medication is Pregnancy Category C. It is unknown if this medication is excreted in breast milk. Cellcept Pregnancy And Lactation Text: This medication is Pregnancy Category D and isn't considered safe during pregnancy. It is unknown if this medication is excreted in breast milk. Colchicine Counseling:  Patient counseled regarding adverse effects including but not limited to stomach upset (nausea, vomiting, stomach pain, or diarrhea).  Patient instructed to limit alcohol consumption while taking this medication.  Colchicine may reduce blood counts especially with prolonged use.  The patient understands that monitoring of kidney function and blood counts may be required, especially at baseline. The patient verbalized understanding of the proper use and possible adverse effects of colchicine.  All of the patient's questions and concerns were addressed. Clindamycin Counseling: I counseled the patient regarding use of clindamycin as an antibiotic for prophylactic and/or therapeutic purposes. Clindamycin is active against numerous classes of bacteria, including skin bacteria. Side effects may include nausea, diarrhea, gastrointestinal upset, rash, hives, yeast infections, and in rare cases, colitis. High Dose Vitamin A Counseling: Side effects reviewed, pt to contact office should one occur. Minocycline Counseling: Patient advised regarding possible photosensitivity and discoloration of the teeth, skin, lips, tongue and gums.  Patient instructed to avoid sunlight, if possible.  When exposed to sunlight, patients should wear protective clothing, sunglasses, and sunscreen.  The patient was instructed to call the office immediately if the following severe adverse effects occur:  hearing changes, easy bruising/bleeding, severe headache, or vision changes.  The patient verbalized understanding of the proper use and possible adverse effects of minocycline.  All of the patient's questions and concerns were addressed. Protopic Pregnancy And Lactation Text: This medication is Pregnancy Category C. It is unknown if this medication is excreted in breast milk when applied topically. Taltz Counseling: I discussed with the patient the risks of ixekizumab including but not limited to immunosuppression, serious infections, worsening of inflammatory bowel disease and drug reactions.  The patient understands that monitoring is required including a PPD at baseline and must alert us or the primary physician if symptoms of infection or other concerning signs are noted. Methotrexate Counseling:  Patient counseled regarding adverse effects of methotrexate including but not limited to nausea, vomiting, abnormalities in liver function tests. Patients may develop mouth sores, rash, diarrhea, and abnormalities in blood counts. The patient understands that monitoring is required including LFT's and blood counts.  There is a rare possibility of scarring of the liver and lung problems that can occur when taking methotrexate. Persistent nausea, loss of appetite, pale stools, dark urine, cough, and shortness of breath should be reported immediately. Patient advised to discontinue methotrexate treatment at least three months before attempting to become pregnant.  I discussed the need for folate supplements while taking methotrexate.  These supplements can decrease side effects during methotrexate treatment. The patient verbalized understanding of the proper use and possible adverse effects of methotrexate.  All of the patient's questions and concerns were addressed. Siliq Counseling:  I discussed with the patient the risks of Siliq including but not limited to new or worsening depression, suicidal thoughts and behavior, immunosuppression, malignancy, posterior leukoencephalopathy syndrome, and serious infections.  The patient understands that monitoring is required including a PPD at baseline and must alert us or the primary physician if symptoms of infection or other concerning signs are noted. There is also a special program designed to monitor depression which is required with Siliq. Glycopyrrolate Pregnancy And Lactation Text: This medication is Pregnancy Category B and is considered safe during pregnancy. It is unknown if it is excreted breast milk. Sarecycline Counseling: Patient advised regarding possible photosensitivity and discoloration of the teeth, skin, lips, tongue and gums.  Patient instructed to avoid sunlight, if possible.  When exposed to sunlight, patients should wear protective clothing, sunglasses, and sunscreen.  The patient was instructed to call the office immediately if the following severe adverse effects occur:  hearing changes, easy bruising/bleeding, severe headache, or vision changes.  The patient verbalized understanding of the proper use and possible adverse effects of sarecycline.  All of the patient's questions and concerns were addressed. Topical Sulfur Applications Pregnancy And Lactation Text: This medication is Pregnancy Category C and has an unknown safety profile during pregnancy. It is unknown if this topical medication is excreted in breast milk. Fluconazole Counseling:  Patient counseled regarding adverse effects of fluconazole including but not limited to headache, diarrhea, nausea, upset stomach, liver function test abnormalities, taste disturbance, and stomach pain.  There is a rare possibility of liver failure that can occur when taking fluconazole.  The patient understands that monitoring of LFTs and kidney function test may be required, especially at baseline. The patient verbalized understanding of the proper use and possible adverse effects of fluconazole.  All of the patient's questions and concerns were addressed. Minoxidil Pregnancy And Lactation Text: This medication has not been assigned a Pregnancy Risk Category but animal studies failed to show danger with the topical medication. It is unknown if the medication is excreted in breast milk. Colchicine Pregnancy And Lactation Text: This medication is Pregnancy Category C and isn't considered safe during pregnancy. It is excreted in breast milk. Odomzo Counseling- I discussed with the patient the risks of Odomzo including but not limited to nausea, vomiting, diarrhea, constipation, weight loss, changes in the sense of taste, decreased appetite, muscle spasms, and hair loss.  The patient verbalized understanding of the proper use and possible adverse effects of Odomzo.  All of the patient's questions and concerns were addressed. Dapsone Counseling: I discussed with the patient the risks of dapsone including but not limited to hemolytic anemia, agranulocytosis, rashes, methemoglobinemia, kidney failure, peripheral neuropathy, headaches, GI upset, and liver toxicity.  Patients who start dapsone require monitoring including baseline LFTs and weekly CBCs for the first month, then every month thereafter.  The patient verbalized understanding of the proper use and possible adverse effects of dapsone.  All of the patient's questions and concerns were addressed. Gabapentin Counseling: I discussed with the patient the risks of gabapentin including but not limited to dizziness, somnolence, fatigue and ataxia. Zyclara Counseling:  I discussed with the patient the risks of imiquimod including but not limited to erythema, scaling, itching, weeping, crusting, and pain.  Patient understands that the inflammatory response to imiquimod is variable from person to person and was educated regarded proper titration schedule.  If flu-like symptoms develop, patient knows to discontinue the medication and contact us. Acitretin Pregnancy And Lactation Text: This medication is Pregnancy Category X and should not be given to women who are pregnant or may become pregnant in the future. This medication is excreted in breast milk. Griseofulvin Pregnancy And Lactation Text: This medication is Pregnancy Category X and is known to cause serious birth defects. It is unknown if this medication is excreted in breast milk but breast feeding should be avoided. Protopic Counseling: Patient may experience a mild burning sensation during topical application. Protopic is not approved in children less than 2 years of age. There have been case reports of hematologic and skin malignancies in patients using topical calcineurin inhibitors although causality is questionable. Doxycycline Counseling:  Patient counseled regarding possible photosensitivity and increased risk for sunburn.  Patient instructed to avoid sunlight, if possible.  When exposed to sunlight, patients should wear protective clothing, sunglasses, and sunscreen.  The patient was instructed to call the office immediately if the following severe adverse effects occur:  hearing changes, easy bruising/bleeding, severe headache, or vision changes.  The patient verbalized understanding of the proper use and possible adverse effects of doxycycline.  All of the patient's questions and concerns were addressed. Albendazole Pregnancy And Lactation Text: This medication is Pregnancy Category C and it isn't known if it is safe during pregnancy. It is also excreted in breast milk. Dapsone Pregnancy And Lactation Text: This medication is Pregnancy Category C and is not considered safe during pregnancy or breast feeding. Imiquimod Counseling:  I discussed with the patient the risks of imiquimod including but not limited to erythema, scaling, itching, weeping, crusting, and pain.  Patient understands that the inflammatory response to imiquimod is variable from person to person and was educated regarded proper titration schedule.  If flu-like symptoms develop, patient knows to discontinue the medication and contact us. Cyclophosphamide Pregnancy And Lactation Text: This medication is Pregnancy Category D and it isn't considered safe during pregnancy. This medication is excreted in breast milk. Eucrisa Counseling: Patient may experience a mild burning sensation during topical application. Eucrisa is not approved in children less than 2 years of age. Azithromycin Pregnancy And Lactation Text: This medication is considered safe during pregnancy and is also secreted in breast milk. Erythromycin Counseling:  I discussed with the patient the risks of erythromycin including but not limited to GI upset, allergic reaction, drug rash, diarrhea, increase in liver enzymes, and yeast infections. Picato Counseling:  I discussed with the patient the risks of Picato including but not limited to erythema, scaling, itching, weeping, crusting, and pain. Dupixent Pregnancy And Lactation Text: This medication likely crosses the placenta but the risk for the fetus is uncertain. This medication is excreted in breast milk. Albendazole Counseling:  I discussed with the patient the risks of albendazole including but not limited to cytopenia, kidney damage, nausea/vomiting and severe allergy.  The patient understands that this medication is being used in an off-label manner. SSKI Counseling:  I discussed with the patient the risks of SSKI including but not limited to thyroid abnormalities, metallic taste, GI upset, fever, headache, acne, arthralgias, paraesthesias, lymphadenopathy, easy bleeding, arrhythmias, and allergic reaction. Elidel Counseling: Patient may experience a mild burning sensation during topical application. Elidel is not approved in children less than 2 years of age. There have been case reports of hematologic and skin malignancies in patients using topical calcineurin inhibitors although causality is questionable. Erivedge Counseling- I discussed with the patient the risks of Erivedge including but not limited to nausea, vomiting, diarrhea, constipation, weight loss, changes in the sense of taste, decreased appetite, muscle spasms, and hair loss.  The patient verbalized understanding of the proper use and possible adverse effects of Erivedge.  All of the patient's questions and concerns were addressed. Cimetidine Counseling:  I discussed with the patient the risks of Cimetidine including but not limited to gynecomastia, headache, diarrhea, nausea, drowsiness, arrhythmias, pancreatitis, skin rashes, psychosis, bone marrow suppression and kidney toxicity. Doxycycline Pregnancy And Lactation Text: This medication is Pregnancy Category D and not consider safe during pregnancy. It is also excreted in breast milk but is considered safe for shorter treatment courses. Drysol Counseling:  I discussed with the patient the risks of drysol/aluminum chloride including but not limited to skin rash, itching, irritation, burning. Stelara Counseling:  I discussed with the patient the risks of ustekinumab including but not limited to immunosuppression, malignancy, posterior leukoencephalopathy syndrome, and serious infections.  The patient understands that monitoring is required including a PPD at baseline and must alert us or the primary physician if symptoms of infection or other concerning signs are noted. Topical Clindamycin Counseling: Patient counseled that this medication may cause skin irritation or allergic reactions.  In the event of skin irritation, the patient was advised to reduce the amount of the drug applied or use it less frequently.   The patient verbalized understanding of the proper use and possible adverse effects of clindamycin.  All of the patient's questions and concerns were addressed. Humira Counseling:  I discussed with the patient the risks of adalimumab including but not limited to myelosuppression, immunosuppression, autoimmune hepatitis, demyelinating diseases, lymphoma, and serious infections.  The patient understands that monitoring is required including a PPD at baseline and must alert us or the primary physician if symptoms of infection or other concerning signs are noted. Cyclosporine Counseling:  I discussed with the patient the risks of cyclosporine including but not limited to hypertension, gingival hyperplasia,myelosuppression, immunosuppression, liver damage, kidney damage, neurotoxicity, lymphoma, and serious infections. The patient understands that monitoring is required including baseline blood pressure, CBC, CMP, lipid panel and uric acid, and then 1-2 times monthly CMP and blood pressure. Griseofulvin Counseling:  I discussed with the patient the risks of griseofulvin including but not limited to photosensitivity, cytopenia, liver damage, nausea/vomiting and severe allergy.  The patient understands that this medication is best absorbed when taken with a fatty meal (e.g., ice cream or french fries). Detail Level: Simple Nsaids Counseling: NSAID Counseling: I discussed with the patient that NSAIDs should be taken with food. Prolonged use of NSAIDs can result in the development of stomach ulcers.  Patient advised to stop taking NSAIDs if abdominal pain occurs.  The patient verbalized understanding of the proper use and possible adverse effects of NSAIDs.  All of the patient's questions and concerns were addressed. Cimzia Counseling:  I discussed with the patient the risks of Cimzia including but not limited to immunosuppression, allergic reactions and infections.  The patient understands that monitoring is required including a PPD at baseline and must alert us or the primary physician if symptoms of infection or other concerning signs are noted. Cephalexin Counseling: I counseled the patient regarding use of cephalexin as an antibiotic for prophylactic and/or therapeutic purposes. Cephalexin (commonly prescribed under brand name Keflex) is a cephalosporin antibiotic which is active against numerous classes of bacteria, including most skin bacteria. Side effects may include nausea, diarrhea, gastrointestinal upset, rash, hives, yeast infections, and in rare cases, hepatitis, kidney disease, seizures, fever, confusion, neurologic symptoms, and others. Patients with severe allergies to penicillin medications are cautioned that there is about a 10% incidence of cross-reactivity with cephalosporins. When possible, patients with penicillin allergies should use alternatives to cephalosporins for antibiotic therapy. Cyclosporine Pregnancy And Lactation Text: This medication is Pregnancy Category C and it isn't know if it is safe during pregnancy. This medication is excreted in breast milk. Bexarotene Counseling:  I discussed with the patient the risks of bexarotene including but not limited to hair loss, dry lips/skin/eyes, liver abnormalities, hyperlipidemia, pancreatitis, depression/suicidal ideation, photosensitivity, drug rash/allergic reactions, hypothyroidism, anemia, leukopenia, infection, cataracts, and teratogenicity.  Patient understands that they will need regular blood tests to check lipid profile, liver function tests, white blood cell count, thyroid function tests and pregnancy test if applicable. Otezla Counseling: The side effects of Otezla were discussed with the patient, including but not limited to worsening or new depression, weight loss, diarrhea, nausea, upper respiratory tract infection, and headache. Patient instructed to call the office should any adverse effect occur.  The patient verbalized understanding of the proper use and possible adverse effects of Otezla.  All the patient's questions and concerns were addressed. Xelsevenz Pregnancy And Lactation Text: This medication is Pregnancy Category D and is not considered safe during pregnancy.  The risk during breast feeding is also uncertain. Hydroxyzine Counseling: Patient advised that the medication is sedating and not to drive a car after taking this medication.  Patient informed of potential adverse effects including but not limited to dry mouth, urinary retention, and blurry vision.  The patient verbalized understanding of the proper use and possible adverse effects of hydroxyzine.  All of the patient's questions and concerns were addressed. Birth Control Pills Pregnancy And Lactation Text: This medication should be avoided if pregnant and for the first 30 days post-partum. Isotretinoin Counseling: Patient should get monthly blood tests, not donate blood, not drive at night if vision affected, not share medication, and not undergo elective surgery for 6 months after tx completed. Side effects reviewed, pt to contact office should one occur. Ketoconazole Counseling:   Patient counseled regarding improving absorption with orange juice.  Adverse effects include but are not limited to breast enlargement, headache, diarrhea, nausea, upset stomach, liver function test abnormalities, taste disturbance, and stomach pain.  There is a rare possibility of liver failure that can occur when taking ketoconazole. The patient understands that monitoring of LFTs may be required, especially at baseline. The patient verbalized understanding of the proper use and possible adverse effects of ketoconazole.  All of the patient's questions and concerns were addressed. Doxepin Counseling:  Patient advised that the medication is sedating and not to drive a car after taking this medication. Patient informed of potential adverse effects including but not limited to dry mouth, urinary retention, and blurry vision.  The patient verbalized understanding of the proper use and possible adverse effects of doxepin.  All of the patient's questions and concerns were addressed. Hydroquinone Counseling:  Patient advised that medication may result in skin irritation, lightening (hypopigmentation), dryness, and burning.  In the event of skin irritation, the patient was advised to reduce the amount of the drug applied or use it less frequently.  Rarely, spots that are treated with hydroquinone can become darker (pseudoochronosis).  Should this occur, patient instructed to stop medication and call the office. The patient verbalized understanding of the proper use and possible adverse effects of hydroquinone.  All of the patient's questions and concerns were addressed. Rituxan Counseling:  I discussed with the patient the risks of Rituxan infusions. Side effects can include infusion reactions, severe drug rashes including mucocutaneous reactions, reactivation of latent hepatitis and other infections and rarely progressive multifocal leukoencephalopathy.  All of the patient's questions and concerns were addressed. Acitretin Counseling:  I discussed with the patient the risks of acitretin including but not limited to hair loss, dry lips/skin/eyes, liver damage, hyperlipidemia, depression/suicidal ideation, photosensitivity.  Serious rare side effects can include but are not limited to pancreatitis, pseudotumor cerebri, bony changes, clot formation/stroke/heart attack.  Patient understands that alcohol is contraindicated since it can result in liver toxicity and significantly prolong the elimination of the drug by many years. Benzoyl Peroxide Pregnancy And Lactation Text: This medication is Pregnancy Category C. It is unknown if benzoyl peroxide is excreted in breast milk. Cyclophosphamide Counseling:  I discussed with the patient the risks of cyclophosphamide including but not limited to hair loss, hormonal abnormalities, decreased fertility, abdominal pain, diarrhea, nausea and vomiting, bone marrow suppression and infection. The patient understands that monitoring is required while taking this medication. Cephalexin Pregnancy And Lactation Text: This medication is Pregnancy Category B and considered safe during pregnancy.  It is also excreted in breast milk but can be used safely for shorter doses. Drysol Pregnancy And Lactation Text: This medication is considered safe during pregnancy and breast feeding. Valtrex Pregnancy And Lactation Text: this medication is Pregnancy Category B and is considered safe during pregnancy. This medication is not directly found in breast milk but it's metabolite acyclovir is present. Cosentyx Counseling:  I discussed with the patient the risks of Cosentyx including but not limited to worsening of Crohn's disease, immunosuppression, allergic reactions and infections.  The patient understands that monitoring is required including a PPD at baseline and must alert us or the primary physician if symptoms of infection or other concerning signs are noted. Benzoyl Peroxide Counseling: Patient counseled that medicine may cause skin irritation and bleach clothing.  In the event of skin irritation, the patient was advised to reduce the amount of the drug applied or use it less frequently.   The patient verbalized understanding of the proper use and possible adverse effects of benzoyl peroxide.  All of the patient's questions and concerns were addressed. Itraconazole Counseling:  I discussed with the patient the risks of itraconazole including but not limited to liver damage, nausea/vomiting, neuropathy, and severe allergy.  The patient understands that this medication is best absorbed when taken with acidic beverages such as non-diet cola or ginger ale.  The patient understands that monitoring is required including baseline LFTs and repeat LFTs at intervals.  The patient understands that they are to contact us or the primary physician if concerning signs are noted. Ivermectin Counseling:  Patient instructed to take medication on an empty stomach with a full glass of water.  Patient informed of potential adverse effects including but not limited to nausea, diarrhea, dizziness, itching, and swelling of the extremities or lymph nodes.  The patient verbalized understanding of the proper use and possible adverse effects of ivermectin.  All of the patient's questions and concerns were addressed. Cellcept Counseling:  I discussed with the patient the risks of mycophenolate mofetil including but not limited to infection/immunosuppression, GI upset, hypokalemia, hypercholesterolemia, bone marrow suppression, lymphoproliferative disorders, malignancy, GI ulceration/bleed/perforation, colitis, interstitial lung disease, kidney failure, progressive multifocal leukoencephalopathy, and birth defects.  The patient understands that monitoring is required including a baseline creatinine and regular CBC testing. In addition, patient must alert us immediately if symptoms of infection or other concerning signs are noted. Azithromycin Counseling:  I discussed with the patient the risks of azithromycin including but not limited to GI upset, allergic reaction, drug rash, diarrhea, and yeast infections. Clindamycin Pregnancy And Lactation Text: This medication can be used in pregnancy if certain situations. Clindamycin is also present in breast milk. Erythromycin Pregnancy And Lactation Text: This medication is Pregnancy Category B and is considered safe during pregnancy. It is also excreted in breast milk. Hydroxychloroquine Counseling:  I discussed with the patient that a baseline ophthalmologic exam is needed at the start of therapy and every year thereafter while on therapy. A CBC may also be warranted for monitoring.  The side effects of this medication were discussed with the patient, including but not limited to agranulocytosis, aplastic anemia, seizures, rashes, retinopathy, and liver toxicity. Patient instructed to call the office should any adverse effect occur.  The patient verbalized understanding of the proper use and possible adverse effects of Plaquenil.  All the patient's questions and concerns were addressed. Carac Pregnancy And Lactation Text: This medication is Pregnancy Category X and contraindicated in pregnancy and in women who may become pregnant. It is unknown if this medication is excreted in breast milk. Quinolones Counseling:  I discussed with the patient the risks of fluoroquinolones including but not limited to GI upset, allergic reaction, drug rash, diarrhea, dizziness, photosensitivity, yeast infections, liver function test abnormalities, tendonitis/tendon rupture. Bexarotene Pregnancy And Lactation Text: This medication is Pregnancy Category X and should not be given to women who are pregnant or may become pregnant. This medication should not be used if you are breast feeding. Tremfya Counseling: I discussed with the patient the risks of guselkumab including but not limited to immunosuppression, serious infections, worsening of inflammatory bowel disease and drug reactions.  The patient understands that monitoring is required including a PPD at baseline and must alert us or the primary physician if symptoms of infection or other concerning signs are noted. Solaraze Counseling:  I discussed with the patient the risks of Solaraze including but not limited to erythema, scaling, itching, weeping, crusting, and pain. Simponi Counseling:  I discussed with the patient the risks of golimumab including but not limited to myelosuppression, immunosuppression, autoimmune hepatitis, demyelinating diseases, lymphoma, and serious infections.  The patient understands that monitoring is required including a PPD at baseline and must alert us or the primary physician if symptoms of infection or other concerning signs are noted. Azathioprine Counseling:  I discussed with the patient the risks of azathioprine including but not limited to myelosuppression, immunosuppression, hepatotoxicity, lymphoma, and infections.  The patient understands that monitoring is required including baseline LFTs, Creatinine, possible TPMP genotyping and weekly CBCs for the first month and then every 2 weeks thereafter.  The patient verbalized understanding of the proper use and possible adverse effects of azathioprine.  All of the patient's questions and concerns were addressed. Valtrex Counseling: I discussed with the patient the risks of valacyclovir including but not limited to kidney damage, nausea, vomiting and severe allergy.  The patient understands that if the infection seems to be worsening or is not improving, they are to call. Isotretinoin Pregnancy And Lactation Text: This medication is Pregnancy Category X and is considered extremely dangerous during pregnancy. It is unknown if it is excreted in breast milk. Bactrim Counseling:  I discussed with the patient the risks of sulfa antibiotics including but not limited to GI upset, allergic reaction, drug rash, diarrhea, dizziness, photosensitivity, and yeast infections.  Rarely, more serious reactions can occur including but not limited to aplastic anemia, agranulocytosis, methemoglobinemia, blood dyscrasias, liver or kidney failure, lung infiltrates or desquamative/blistering drug rashes. Skyrizi Counseling: I discussed with the patient the risks of risankizumab-rzaa including but not limited to immunosuppression, and serious infections.  The patient understands that monitoring is required including a PPD at baseline and must alert us or the primary physician if symptoms of infection or other concerning signs are noted. Tazorac Pregnancy And Lactation Text: This medication is not safe during pregnancy. It is unknown if this medication is excreted in breast milk. Terbinafine Counseling: Patient counseling regarding adverse effects of terbinafine including but not limited to headache, diarrhea, rash, upset stomach, liver function test abnormalities, itching, taste/smell disturbance, nausea, abdominal pain, and flatulence.  There is a rare possibility of liver failure that can occur when taking terbinafine.  The patient understands that a baseline LFT and kidney function test may be required. The patient verbalized understanding of the proper use and possible adverse effects of terbinafine.  All of the patient's questions and concerns were addressed. Cimzia Pregnancy And Lactation Text: This medication crosses the placenta but can be considered safe in certain situations. Cimzia may be excreted in breast milk. Thalidomide Counseling: I discussed with the patient the risks of thalidomide including but not limited to birth defects, anxiety, weakness, chest pain, dizziness, cough and severe allergy. Methotrexate Pregnancy And Lactation Text: This medication is Pregnancy Category X and is known to cause fetal harm. This medication is excreted in breast milk. Prednisone Counseling:  I discussed with the patient the risks of prolonged use of prednisone including but not limited to weight gain, insomnia, osteoporosis, mood changes, diabetes, susceptibility to infection, glaucoma and high blood pressure.  In cases where prednisone use is prolonged, patients should be monitored with blood pressure checks, serum glucose levels and an eye exam.  Additionally, the patient may need to be placed on GI prophylaxis, PCP prophylaxis, and calcium and vitamin D supplementation and/or a bisphosphonate.  The patient verbalized understanding of the proper use and the possible adverse effects of prednisone.  All of the patient's questions and concerns were addressed. Spironolactone Counseling: Patient advised regarding risks of diarrhea, abdominal pain, hyperkalemia, birth defects (for female patients), liver toxicity and renal toxicity. The patient may need blood work to monitor liver and kidney function and potassium levels while on therapy. The patient verbalized understanding of the proper use and possible adverse effects of spironolactone.  All of the patient's questions and concerns were addressed. Clofazimine Counseling:  I discussed with the patient the risks of clofazimine including but not limited to skin and eye pigmentation, liver damage, nausea/vomiting, gastrointestinal bleeding and allergy. Tazorac Counseling:  Patient advised that medication is irritating and drying.  Patient may need to apply sparingly and wash off after an hour before eventually leaving it on overnight.  The patient verbalized understanding of the proper use and possible adverse effects of tazorac.  All of the patient's questions and concerns were addressed. Metronidazole Pregnancy And Lactation Text: This medication is Pregnancy Category B and considered safe during pregnancy.  It is also excreted in breast milk. Glycopyrrolate Counseling:  I discussed with the patient the risks of glycopyrrolate including but not limited to skin rash, drowsiness, dry mouth, difficulty urinating, and blurred vision. Nsaids Pregnancy And Lactation Text: These medications are considered safe up to 30 weeks gestation. It is excreted in breast milk. Rifampin Counseling: I discussed with the patient the risks of rifampin including but not limited to liver damage, kidney damage, red-orange body fluids, nausea/vomiting and severe allergy. Arava Counseling:  Patient counseled regarding adverse effects of Arava including but not limited to nausea, vomiting, abnormalities in liver function tests. Patients may develop mouth sores, rash, diarrhea, and abnormalities in blood counts. The patient understands that monitoring is required including LFTs and blood counts.  There is a rare possibility of scarring of the liver and lung problems that can occur when taking methotrexate. Persistent nausea, loss of appetite, pale stools, dark urine, cough, and shortness of breath should be reported immediately. Patient advised to discontinue Arava treatment and consult with a physician prior to attempting conception. The patient will have to undergo a treatment to eliminate Arava from the body prior to conception. Hydroxychloroquine Pregnancy And Lactation Text: This medication has been shown to cause fetal harm but it isn't assigned a Pregnancy Risk Category. There are small amounts excreted in breast milk. Birth Control Pills Counseling: Birth Control Pill Counseling: I discussed with the patient the potential side effects of OCPs including but not limited to increased risk of stroke, heart attack, thrombophlebitis, deep venous thrombosis, hepatic adenomas, breast changes, GI upset, headaches, and depression.  The patient verbalized understanding of the proper use and possible adverse effects of OCPs. All of the patient's questions and concerns were addressed. High Dose Vitamin A Pregnancy And Lactation Text: High dose vitamin A therapy is contraindicated during pregnancy and breast feeding. Tetracycline Counseling: Patient counseled regarding possible photosensitivity and increased risk for sunburn.  Patient instructed to avoid sunlight, if possible.  When exposed to sunlight, patients should wear protective clothing, sunglasses, and sunscreen.  The patient was instructed to call the office immediately if the following severe adverse effects occur:  hearing changes, easy bruising/bleeding, severe headache, or vision changes.  The patient verbalized understanding of the proper use and possible adverse effects of tetracycline.  All of the patient's questions and concerns were addressed. Patient understands to avoid pregnancy while on therapy due to potential birth defects.

## 2025-05-09 ENCOUNTER — TELEPHONE (OUTPATIENT)
Dept: PRIMARY CARE CLINIC | Age: 75
End: 2025-05-09

## 2025-05-09 ENCOUNTER — OFFICE VISIT (OUTPATIENT)
Dept: INTERNAL MEDICINE CLINIC | Age: 75
End: 2025-05-09

## 2025-05-09 VITALS
HEART RATE: 62 BPM | DIASTOLIC BLOOD PRESSURE: 84 MMHG | OXYGEN SATURATION: 96 % | WEIGHT: 135.8 LBS | SYSTOLIC BLOOD PRESSURE: 130 MMHG | BODY MASS INDEX: 23.3 KG/M2

## 2025-05-09 DIAGNOSIS — H53.8 BLURRED VISION: ICD-10-CM

## 2025-05-09 DIAGNOSIS — M19.049 CMC ARTHRITIS: ICD-10-CM

## 2025-05-09 DIAGNOSIS — I25.10 CORONARY ARTERY DISEASE INVOLVING NATIVE CORONARY ARTERY OF NATIVE HEART WITHOUT ANGINA PECTORIS: Primary | ICD-10-CM

## 2025-05-09 DIAGNOSIS — G25.81 RESTLESS LEG SYNDROME: ICD-10-CM

## 2025-05-09 DIAGNOSIS — R53.1 GENERALIZED WEAKNESS: ICD-10-CM

## 2025-05-09 DIAGNOSIS — F41.9 ANXIETY: ICD-10-CM

## 2025-05-09 DIAGNOSIS — N39.46 MIXED STRESS AND URGE URINARY INCONTINENCE: ICD-10-CM

## 2025-05-09 LAB
ALBUMIN SERPL-MCNC: 3.7 G/DL (ref 3.4–5)
ALBUMIN/GLOB SERPL: 1.5 {RATIO} (ref 1.1–2.2)
ALP SERPL-CCNC: 74 U/L (ref 40–129)
ALT SERPL-CCNC: 13 U/L (ref 10–40)
ANION GAP SERPL CALCULATED.3IONS-SCNC: 11 MMOL/L (ref 3–16)
AST SERPL-CCNC: 23 U/L (ref 15–37)
BASOPHILS # BLD: 0.1 K/UL (ref 0–0.2)
BASOPHILS NFR BLD: 1 %
BILIRUB SERPL-MCNC: <0.2 MG/DL (ref 0–1)
BUN SERPL-MCNC: 85 MG/DL (ref 7–20)
CALCIUM SERPL-MCNC: 9 MG/DL (ref 8.3–10.6)
CHLORIDE SERPL-SCNC: 104 MMOL/L (ref 99–110)
CHOLEST SERPL-MCNC: 142 MG/DL (ref 0–199)
CO2 SERPL-SCNC: 24 MMOL/L (ref 21–32)
CREAT SERPL-MCNC: 7 MG/DL (ref 0.6–1.2)
DEPRECATED RDW RBC AUTO: 16.4 % (ref 12.4–15.4)
EOSINOPHIL # BLD: 0.3 K/UL (ref 0–0.6)
EOSINOPHIL NFR BLD: 3.9 %
FOLATE SERPL-MCNC: 6.71 NG/ML (ref 4.78–24.2)
GFR SERPLBLD CREATININE-BSD FMLA CKD-EPI: 6 ML/MIN/{1.73_M2}
GLUCOSE SERPL-MCNC: 83 MG/DL (ref 70–99)
HCT VFR BLD AUTO: 32.6 % (ref 36–48)
HDLC SERPL-MCNC: 51 MG/DL (ref 40–60)
HGB BLD-MCNC: 10.6 G/DL (ref 12–16)
LDLC SERPL CALC-MCNC: 75 MG/DL
LYMPHOCYTES # BLD: 1.5 K/UL (ref 1–5.1)
LYMPHOCYTES NFR BLD: 23.7 %
MCH RBC QN AUTO: 28.3 PG (ref 26–34)
MCHC RBC AUTO-ENTMCNC: 32.7 G/DL (ref 31–36)
MCV RBC AUTO: 86.6 FL (ref 80–100)
MONOCYTES # BLD: 0.5 K/UL (ref 0–1.3)
MONOCYTES NFR BLD: 8.5 %
NEUTROPHILS # BLD: 4 K/UL (ref 1.7–7.7)
NEUTROPHILS NFR BLD: 62.9 %
PLATELET # BLD AUTO: 250 K/UL (ref 135–450)
PMV BLD AUTO: 10.4 FL (ref 5–10.5)
POTASSIUM SERPL-SCNC: 7 MMOL/L (ref 3.5–5.1)
PROT SERPL-MCNC: 6.2 G/DL (ref 6.4–8.2)
RBC # BLD AUTO: 3.76 M/UL (ref 4–5.2)
SODIUM SERPL-SCNC: 139 MMOL/L (ref 136–145)
TRIGL SERPL-MCNC: 80 MG/DL (ref 0–150)
TSH SERPL DL<=0.005 MIU/L-ACNC: 1.69 UIU/ML (ref 0.27–4.2)
VIT B12 SERPL-MCNC: 588 PG/ML (ref 211–911)
VLDLC SERPL CALC-MCNC: 16 MG/DL
WBC # BLD AUTO: 6.4 K/UL (ref 4–11)

## 2025-05-09 RX ORDER — ALPRAZOLAM 2 MG/1
2 TABLET ORAL 3 TIMES DAILY PRN
Qty: 270 TABLET | Refills: 0 | Status: SHIPPED | OUTPATIENT
Start: 2025-05-09 | End: 2025-05-09

## 2025-05-09 RX ORDER — CLOTRIMAZOLE AND BETAMETHASONE DIPROPIONATE 10; .64 MG/G; MG/G
CREAM TOPICAL
Qty: 15 G | Refills: 0 | Status: SHIPPED | OUTPATIENT
Start: 2025-05-09

## 2025-05-09 RX ORDER — ALPRAZOLAM 2 MG/1
2 TABLET ORAL EVERY 12 HOURS PRN
Qty: 180 TABLET | Refills: 0 | Status: SHIPPED | OUTPATIENT
Start: 2025-05-09 | End: 2025-08-07

## 2025-05-09 RX ORDER — ALPRAZOLAM 2 MG/1
2 TABLET ORAL NIGHTLY PRN
COMMUNITY
End: 2025-05-09

## 2025-05-09 RX ORDER — SOLIFENACIN SUCCINATE 5 MG/1
5 TABLET, FILM COATED ORAL DAILY
Qty: 90 TABLET | Refills: 1 | Status: SHIPPED | OUTPATIENT
Start: 2025-05-09

## 2025-05-09 RX ORDER — ROPINIROLE 1 MG/1
TABLET, FILM COATED ORAL
Qty: 90 TABLET | Refills: 3 | Status: SHIPPED | OUTPATIENT
Start: 2025-05-09

## 2025-05-09 RX ORDER — CIPROFLOXACIN HYDROCHLORIDE 3.5 MG/ML
1 SOLUTION/ DROPS TOPICAL 2 TIMES DAILY
Qty: 2.5 ML | Refills: 0 | Status: SHIPPED | OUTPATIENT
Start: 2025-05-09 | End: 2025-06-03

## 2025-05-09 NOTE — PROGRESS NOTES
Jonelle Contreras is a 74 y.o. female with a past medical history noted below who presents for routine follow up on chronic conditions and discussion of preventative health care.  Chief Complaint   Patient presents with    6 Month Follow-Up     Refills; go over some issues she has going on     Patient reports she has been better  She notes uncontrolled blood pressure and severe intermittent headaches.   She reports that she is getting weaker overall, notes have to use her arms to help her get up. She reports nausea. She does have loss of appetite.  She reports insomnia has been persistnet. She has a hard time going to sleep. She does peritoneal dialysis at 3 am          Past Medical History:   Diagnosis Date    acute intermittent porphyria     sees Alfredo, mom had it too    agoraphobic     ongoing, prefers to be at home    Allergic rhinitis     Anxiety     Arthritis     Asthma     CAD (coronary artery disease) 2022    acute inf [place] RCA stent successfully placed *[place] other arteries clear    Chronic obstructive pulmonary disease, unspecified COPD type ([facility]) 2025    ckd 4     from HTN,  sees Trini Pagan    collagenous colitis     Ayoub    COPD (chronic obstructive pulmonary disease) ([facility])     Depression 2013    eversince      Disease of blood and blood forming organ     GERD (gastroesophageal reflux disease)     Heart disease     Hyperlipidemia     Hypertension 2016    Left thyroid nodule     9mm, gets rechecked yearly on ct lung    Migraines     gets 4x per month    Neuropathy     Osteoporosis     PAD (peripheral artery disease) 2022    bilat ptca fem art Ranjith    Peripheral vascular disease     post menopausal 2020    osteopenia frax score under on calcium and D    Prediabetes     Pulmonary nodules     yearly ct lung followed by Dr Fuentes    Restless leg syndrome     STEMI involving oth coronary artery of inferior wall ([facility]) 2022    JAYME RCA    Urinary

## 2025-05-10 LAB
EST. AVERAGE GLUCOSE BLD GHB EST-MCNC: 116.9 MG/DL
HBA1C MFR BLD: 5.7 %

## 2025-05-12 ENCOUNTER — APPOINTMENT (OUTPATIENT)
Dept: CT IMAGING | Age: 75
End: 2025-05-12
Payer: MEDICARE

## 2025-05-12 ENCOUNTER — HOSPITAL ENCOUNTER (INPATIENT)
Age: 75
LOS: 18 days | Discharge: NURSING, REHAB OR LONG-TERM CARE | End: 2025-05-30
Attending: EMERGENCY MEDICINE | Admitting: STUDENT IN AN ORGANIZED HEALTH CARE EDUCATION/TRAINING PROGRAM
Payer: MEDICARE

## 2025-05-12 ENCOUNTER — RESULTS FOLLOW-UP (OUTPATIENT)
Dept: INTERNAL MEDICINE CLINIC | Age: 75
End: 2025-05-12

## 2025-05-12 DIAGNOSIS — Z99.2 ESRD ON PERITONEAL DIALYSIS (HCC): Primary | ICD-10-CM

## 2025-05-12 DIAGNOSIS — E87.5 HYPERKALEMIA: ICD-10-CM

## 2025-05-12 DIAGNOSIS — I10 HYPERTENSION, UNSPECIFIED TYPE: ICD-10-CM

## 2025-05-12 DIAGNOSIS — I10 UNCONTROLLED HYPERTENSION: ICD-10-CM

## 2025-05-12 DIAGNOSIS — N18.6 ESRD ON PERITONEAL DIALYSIS (HCC): Primary | ICD-10-CM

## 2025-05-12 DIAGNOSIS — R06.02 SHORTNESS OF BREATH: ICD-10-CM

## 2025-05-12 DIAGNOSIS — T17.500A MUCUS PLUGGING OF BRONCHI: ICD-10-CM

## 2025-05-12 LAB
ALBUMIN SERPL-MCNC: 3.7 G/DL (ref 3.4–5)
ALBUMIN/GLOB SERPL: 1.2 {RATIO} (ref 1.1–2.2)
ALP SERPL-CCNC: 78 U/L (ref 40–129)
ALT SERPL-CCNC: 14 U/L (ref 10–40)
AMPHETAMINES UR QL SCN>1000 NG/ML: ABNORMAL
ANION GAP SERPL CALCULATED.3IONS-SCNC: 14 MMOL/L (ref 3–16)
AST SERPL-CCNC: 22 U/L (ref 15–37)
BARBITURATES UR QL SCN>200 NG/ML: ABNORMAL
BASOPHILS # BLD: 0 K/UL (ref 0–0.2)
BASOPHILS NFR BLD: 0.9 %
BENZODIAZ UR QL SCN>200 NG/ML: POSITIVE
BILIRUB SERPL-MCNC: <0.2 MG/DL (ref 0–1)
BUN SERPL-MCNC: 93 MG/DL (ref 7–20)
CALCIUM SERPL-MCNC: 9.2 MG/DL (ref 8.3–10.6)
CANNABINOIDS UR QL SCN>50 NG/ML: ABNORMAL
CHLORIDE SERPL-SCNC: 104 MMOL/L (ref 99–110)
CO2 SERPL-SCNC: 22 MMOL/L (ref 21–32)
COCAINE UR QL SCN: ABNORMAL
CREAT SERPL-MCNC: 7.7 MG/DL (ref 0.6–1.2)
DEPRECATED RDW RBC AUTO: 16.3 % (ref 12.4–15.4)
DRUG SCREEN COMMENT UR-IMP: ABNORMAL
EKG ATRIAL RATE: 63 BPM
EKG DIAGNOSIS: NORMAL
EKG P AXIS: 46 DEGREES
EKG P-R INTERVAL: 130 MS
EKG Q-T INTERVAL: 438 MS
EKG QRS DURATION: 76 MS
EKG QTC CALCULATION (BAZETT): 448 MS
EKG R AXIS: 2 DEGREES
EKG T AXIS: 17 DEGREES
EKG VENTRICULAR RATE: 63 BPM
EOSINOPHIL # BLD: 0.3 K/UL (ref 0–0.6)
EOSINOPHIL NFR BLD: 4.6 %
FENTANYL SCREEN, URINE: ABNORMAL
GFR SERPLBLD CREATININE-BSD FMLA CKD-EPI: 5 ML/MIN/{1.73_M2}
GLUCOSE SERPL-MCNC: 90 MG/DL (ref 70–99)
HCT VFR BLD AUTO: 31.5 % (ref 36–48)
HGB BLD-MCNC: 10.4 G/DL (ref 12–16)
LYMPHOCYTES # BLD: 1.4 K/UL (ref 1–5.1)
LYMPHOCYTES NFR BLD: 24.7 %
MCH RBC QN AUTO: 28.5 PG (ref 26–34)
MCHC RBC AUTO-ENTMCNC: 33.1 G/DL (ref 31–36)
MCV RBC AUTO: 86 FL (ref 80–100)
METHADONE UR QL SCN>300 NG/ML: ABNORMAL
MONOCYTES # BLD: 0.5 K/UL (ref 0–1.3)
MONOCYTES NFR BLD: 9.4 %
NEUTROPHILS # BLD: 3.3 K/UL (ref 1.7–7.7)
NEUTROPHILS NFR BLD: 60.4 %
OPIATES UR QL SCN>300 NG/ML: ABNORMAL
OXYCODONE UR QL SCN: ABNORMAL
PCP UR QL SCN>25 NG/ML: ABNORMAL
PH UR STRIP: 8 [PH]
PLATELET # BLD AUTO: 248 K/UL (ref 135–450)
PMV BLD AUTO: 9 FL (ref 5–10.5)
POTASSIUM SERPL-SCNC: 5.7 MMOL/L (ref 3.5–5.1)
PROT SERPL-MCNC: 6.7 G/DL (ref 6.4–8.2)
RBC # BLD AUTO: 3.67 M/UL (ref 4–5.2)
SODIUM SERPL-SCNC: 140 MMOL/L (ref 136–145)
TROPONIN, HIGH SENSITIVITY: 66 NG/L (ref 0–14)
WBC # BLD AUTO: 5.5 K/UL (ref 4–11)

## 2025-05-12 PROCEDURE — 6370000000 HC RX 637 (ALT 250 FOR IP): Performed by: EMERGENCY MEDICINE

## 2025-05-12 PROCEDURE — 90945 DIALYSIS ONE EVALUATION: CPT

## 2025-05-12 PROCEDURE — 2500000003 HC RX 250 WO HCPCS: Performed by: STUDENT IN AN ORGANIZED HEALTH CARE EDUCATION/TRAINING PROGRAM

## 2025-05-12 PROCEDURE — 6370000000 HC RX 637 (ALT 250 FOR IP): Performed by: STUDENT IN AN ORGANIZED HEALTH CARE EDUCATION/TRAINING PROGRAM

## 2025-05-12 PROCEDURE — 96374 THER/PROPH/DIAG INJ IV PUSH: CPT

## 2025-05-12 PROCEDURE — A4722 DIALYS SOL FLD VOL > 1999CC: HCPCS | Performed by: INTERNAL MEDICINE

## 2025-05-12 PROCEDURE — 6360000002 HC RX W HCPCS: Performed by: STUDENT IN AN ORGANIZED HEALTH CARE EDUCATION/TRAINING PROGRAM

## 2025-05-12 PROCEDURE — 2700000000 HC OXYGEN THERAPY PER DAY

## 2025-05-12 PROCEDURE — 70450 CT HEAD/BRAIN W/O DYE: CPT

## 2025-05-12 PROCEDURE — 80053 COMPREHEN METABOLIC PANEL: CPT

## 2025-05-12 PROCEDURE — 80307 DRUG TEST PRSMV CHEM ANLYZR: CPT

## 2025-05-12 PROCEDURE — 6370000000 HC RX 637 (ALT 250 FOR IP): Performed by: INTERNAL MEDICINE

## 2025-05-12 PROCEDURE — 93010 ELECTROCARDIOGRAM REPORT: CPT | Performed by: INTERNAL MEDICINE

## 2025-05-12 PROCEDURE — 6360000002 HC RX W HCPCS: Performed by: EMERGENCY MEDICINE

## 2025-05-12 PROCEDURE — 99285 EMERGENCY DEPT VISIT HI MDM: CPT

## 2025-05-12 PROCEDURE — 1200000000 HC SEMI PRIVATE

## 2025-05-12 PROCEDURE — 94640 AIRWAY INHALATION TREATMENT: CPT

## 2025-05-12 PROCEDURE — 6360000002 HC RX W HCPCS: Performed by: INTERNAL MEDICINE

## 2025-05-12 PROCEDURE — 84484 ASSAY OF TROPONIN QUANT: CPT

## 2025-05-12 PROCEDURE — 85025 COMPLETE CBC W/AUTO DIFF WBC: CPT

## 2025-05-12 PROCEDURE — 93005 ELECTROCARDIOGRAM TRACING: CPT | Performed by: EMERGENCY MEDICINE

## 2025-05-12 RX ORDER — SPIRONOLACTONE 25 MG/1
25 TABLET ORAL DAILY
Status: ON HOLD | COMMUNITY
End: 2025-05-15

## 2025-05-12 RX ORDER — SODIUM CHLORIDE 9 MG/ML
INJECTION, SOLUTION INTRAVENOUS PRN
Status: DISCONTINUED | OUTPATIENT
Start: 2025-05-12 | End: 2025-05-30 | Stop reason: HOSPADM

## 2025-05-12 RX ORDER — NIFEDIPINE 30 MG
30 TABLET, EXTENDED RELEASE ORAL DAILY
COMMUNITY
End: 2025-05-12

## 2025-05-12 RX ORDER — CALCIUM GLUCONATE 20 MG/ML
1000 INJECTION, SOLUTION INTRAVENOUS ONCE
Status: COMPLETED | OUTPATIENT
Start: 2025-05-12 | End: 2025-05-12

## 2025-05-12 RX ORDER — NIFEDIPINE 30 MG/1
60 TABLET, EXTENDED RELEASE ORAL DAILY
Status: DISCONTINUED | OUTPATIENT
Start: 2025-05-13 | End: 2025-05-13

## 2025-05-12 RX ORDER — ONDANSETRON 2 MG/ML
4 INJECTION INTRAMUSCULAR; INTRAVENOUS EVERY 6 HOURS PRN
Status: DISCONTINUED | OUTPATIENT
Start: 2025-05-12 | End: 2025-05-30 | Stop reason: HOSPADM

## 2025-05-12 RX ORDER — ASPIRIN 81 MG/1
81 TABLET, CHEWABLE ORAL DAILY
Status: DISCONTINUED | OUTPATIENT
Start: 2025-05-13 | End: 2025-05-30 | Stop reason: HOSPADM

## 2025-05-12 RX ORDER — HYDRALAZINE HYDROCHLORIDE 20 MG/ML
5 INJECTION INTRAMUSCULAR; INTRAVENOUS ONCE
Status: COMPLETED | OUTPATIENT
Start: 2025-05-12 | End: 2025-05-12

## 2025-05-12 RX ORDER — CLOPIDOGREL BISULFATE 75 MG/1
75 TABLET ORAL DAILY
Status: DISCONTINUED | OUTPATIENT
Start: 2025-05-13 | End: 2025-05-30 | Stop reason: HOSPADM

## 2025-05-12 RX ORDER — NIFEDIPINE 30 MG/1
30 TABLET, EXTENDED RELEASE ORAL DAILY
Status: DISCONTINUED | OUTPATIENT
Start: 2025-05-12 | End: 2025-05-12

## 2025-05-12 RX ORDER — SPIRONOLACTONE 25 MG/1
50 TABLET ORAL DAILY
Status: DISCONTINUED | OUTPATIENT
Start: 2025-05-12 | End: 2025-05-30 | Stop reason: HOSPADM

## 2025-05-12 RX ORDER — SODIUM CHLORIDE 0.9 % (FLUSH) 0.9 %
5-40 SYRINGE (ML) INJECTION EVERY 12 HOURS SCHEDULED
Status: DISCONTINUED | OUTPATIENT
Start: 2025-05-12 | End: 2025-05-30 | Stop reason: HOSPADM

## 2025-05-12 RX ORDER — ALPRAZOLAM 0.5 MG
2 TABLET ORAL EVERY 12 HOURS PRN
Status: DISCONTINUED | OUTPATIENT
Start: 2025-05-12 | End: 2025-05-21

## 2025-05-12 RX ORDER — ACETAMINOPHEN 650 MG/1
650 SUPPOSITORY RECTAL EVERY 6 HOURS PRN
Status: DISCONTINUED | OUTPATIENT
Start: 2025-05-12 | End: 2025-05-30 | Stop reason: HOSPADM

## 2025-05-12 RX ORDER — SODIUM CHLORIDE, SODIUM LACTATE, CALCIUM CHLORIDE, MAGNESIUM CHLORIDE AND DEXTROSE 1.5; 538; 448; 18.3; 5.08 G/100ML; MG/100ML; MG/100ML; MG/100ML; MG/100ML
3000 INJECTION, SOLUTION INTRAPERITONEAL CONTINUOUS
Status: DISCONTINUED | OUTPATIENT
Start: 2025-05-12 | End: 2025-05-13

## 2025-05-12 RX ORDER — HEPARIN SODIUM 5000 [USP'U]/ML
5000 INJECTION, SOLUTION INTRAVENOUS; SUBCUTANEOUS EVERY 8 HOURS SCHEDULED
Status: DISCONTINUED | OUTPATIENT
Start: 2025-05-12 | End: 2025-05-30 | Stop reason: HOSPADM

## 2025-05-12 RX ORDER — TROSPIUM CHLORIDE 20 MG/1
20 TABLET, FILM COATED ORAL NIGHTLY
Status: DISCONTINUED | OUTPATIENT
Start: 2025-05-12 | End: 2025-05-30 | Stop reason: HOSPADM

## 2025-05-12 RX ORDER — GENTAMICIN SULFATE 1 MG/G
CREAM TOPICAL DAILY PRN
Status: DISCONTINUED | OUTPATIENT
Start: 2025-05-12 | End: 2025-05-30 | Stop reason: HOSPADM

## 2025-05-12 RX ORDER — ROSUVASTATIN CALCIUM 10 MG/1
10 TABLET, COATED ORAL NIGHTLY
Status: DISCONTINUED | OUTPATIENT
Start: 2025-05-12 | End: 2025-05-30 | Stop reason: HOSPADM

## 2025-05-12 RX ORDER — ONDANSETRON 4 MG/1
4 TABLET, ORALLY DISINTEGRATING ORAL EVERY 8 HOURS PRN
Status: DISCONTINUED | OUTPATIENT
Start: 2025-05-12 | End: 2025-05-30 | Stop reason: HOSPADM

## 2025-05-12 RX ORDER — SODIUM CHLORIDE 0.9 % (FLUSH) 0.9 %
5-40 SYRINGE (ML) INJECTION PRN
Status: DISCONTINUED | OUTPATIENT
Start: 2025-05-12 | End: 2025-05-30 | Stop reason: HOSPADM

## 2025-05-12 RX ORDER — HYDRALAZINE HYDROCHLORIDE 25 MG/1
25 TABLET, FILM COATED ORAL 4 TIMES DAILY PRN
Status: DISCONTINUED | OUTPATIENT
Start: 2025-05-12 | End: 2025-05-12

## 2025-05-12 RX ORDER — ACETAMINOPHEN 325 MG/1
650 TABLET ORAL ONCE
Status: COMPLETED | OUTPATIENT
Start: 2025-05-12 | End: 2025-05-12

## 2025-05-12 RX ORDER — LOSARTAN POTASSIUM 100 MG/1
100 TABLET ORAL NIGHTLY
Status: ON HOLD | COMMUNITY

## 2025-05-12 RX ORDER — SEVELAMER CARBONATE 800 MG/1
800 TABLET, FILM COATED ORAL
Status: DISCONTINUED | OUTPATIENT
Start: 2025-05-12 | End: 2025-05-21

## 2025-05-12 RX ORDER — ACETAMINOPHEN 325 MG/1
650 TABLET ORAL EVERY 6 HOURS PRN
Status: DISCONTINUED | OUTPATIENT
Start: 2025-05-12 | End: 2025-05-30 | Stop reason: HOSPADM

## 2025-05-12 RX ORDER — INDOMETHACIN 25 MG/1
50 CAPSULE ORAL ONCE
Status: COMPLETED | OUTPATIENT
Start: 2025-05-12 | End: 2025-05-12

## 2025-05-12 RX ORDER — CLONIDINE HYDROCHLORIDE 0.1 MG/1
0.1 TABLET ORAL EVERY 4 HOURS PRN
Status: DISCONTINUED | OUTPATIENT
Start: 2025-05-12 | End: 2025-05-13

## 2025-05-12 RX ORDER — ROPINIROLE 1 MG/1
1 TABLET, FILM COATED ORAL NIGHTLY
Status: DISCONTINUED | OUTPATIENT
Start: 2025-05-12 | End: 2025-05-30 | Stop reason: HOSPADM

## 2025-05-12 RX ORDER — ALBUTEROL SULFATE 90 UG/1
2 INHALANT RESPIRATORY (INHALATION) EVERY 6 HOURS PRN
Status: DISCONTINUED | OUTPATIENT
Start: 2025-05-12 | End: 2025-05-30 | Stop reason: HOSPADM

## 2025-05-12 RX ORDER — CARVEDILOL 12.5 MG/1
12.5 TABLET ORAL 2 TIMES DAILY WITH MEALS
Status: DISCONTINUED | OUTPATIENT
Start: 2025-05-12 | End: 2025-05-30 | Stop reason: HOSPADM

## 2025-05-12 RX ORDER — LOSARTAN POTASSIUM 100 MG/1
100 TABLET ORAL NIGHTLY
Status: DISCONTINUED | OUTPATIENT
Start: 2025-05-13 | End: 2025-05-30 | Stop reason: HOSPADM

## 2025-05-12 RX ORDER — POLYETHYLENE GLYCOL 3350 17 G/17G
17 POWDER, FOR SOLUTION ORAL DAILY PRN
Status: DISCONTINUED | OUTPATIENT
Start: 2025-05-12 | End: 2025-05-21

## 2025-05-12 RX ADMIN — SODIUM CHLORIDE, PRESERVATIVE FREE 10 ML: 5 INJECTION INTRAVENOUS at 21:03

## 2025-05-12 RX ADMIN — ROPINIROLE HYDROCHLORIDE 1 MG: 1 TABLET, FILM COATED ORAL at 21:02

## 2025-05-12 RX ADMIN — ALPRAZOLAM 2 MG: 0.5 TABLET ORAL at 15:44

## 2025-05-12 RX ADMIN — CLONIDINE HYDROCHLORIDE 0.1 MG: 0.1 TABLET ORAL at 14:22

## 2025-05-12 RX ADMIN — CALCIUM GLUCONATE 1000 MG: 20 INJECTION, SOLUTION INTRAVENOUS at 15:39

## 2025-05-12 RX ADMIN — ROSUVASTATIN CALCIUM 10 MG: 10 TABLET, FILM COATED ORAL at 21:02

## 2025-05-12 RX ADMIN — SODIUM CHLORIDE, SODIUM LACTATE, CALCIUM CHLORIDE, MAGNESIUM CHLORIDE AND DEXTROSE 3000 ML: 1.5; 538; 448; 18.3; 5.08 INJECTION, SOLUTION INTRAPERITONEAL at 21:20

## 2025-05-12 RX ADMIN — SODIUM BICARBONATE 50 MEQ: 84 INJECTION INTRAVENOUS at 15:31

## 2025-05-12 RX ADMIN — ALBUTEROL SULFATE 2 PUFF: 90 AEROSOL, METERED RESPIRATORY (INHALATION) at 21:50

## 2025-05-12 RX ADMIN — SEVELAMER CARBONATE 800 MG: 800 TABLET, FILM COATED ORAL at 17:24

## 2025-05-12 RX ADMIN — ACETAMINOPHEN 650 MG: 325 TABLET ORAL at 12:40

## 2025-05-12 RX ADMIN — HEPARIN SODIUM 5000 UNITS: 5000 INJECTION INTRAVENOUS; SUBCUTANEOUS at 21:02

## 2025-05-12 RX ADMIN — TROSPIUM CHLORIDE 20 MG: 20 TABLET, FILM COATED ORAL at 21:02

## 2025-05-12 RX ADMIN — HYDRALAZINE HYDROCHLORIDE 5 MG: 20 INJECTION INTRAMUSCULAR; INTRAVENOUS at 12:40

## 2025-05-12 RX ADMIN — SODIUM ZIRCONIUM CYCLOSILICATE 5 G: 5 POWDER, FOR SUSPENSION ORAL at 15:27

## 2025-05-12 RX ADMIN — CARVEDILOL 12.5 MG: 12.5 TABLET, FILM COATED ORAL at 17:24

## 2025-05-12 RX ADMIN — TIZANIDINE 4 MG: 4 TABLET ORAL at 21:06

## 2025-05-12 RX ADMIN — SODIUM CHLORIDE, SODIUM LACTATE, CALCIUM CHLORIDE, MAGNESIUM CHLORIDE AND DEXTROSE 3000 ML: 1.5; 538; 448; 18.3; 5.08 INJECTION, SOLUTION INTRAPERITONEAL at 21:19

## 2025-05-12 ASSESSMENT — PAIN SCALES - GENERAL
PAINLEVEL_OUTOF10: 6
PAINLEVEL_OUTOF10: 0
PAINLEVEL_OUTOF10: 0
PAINLEVEL_OUTOF10: 1

## 2025-05-12 ASSESSMENT — PAIN DESCRIPTION - DESCRIPTORS
DESCRIPTORS: ACHING
DESCRIPTORS: ACHING

## 2025-05-12 ASSESSMENT — LIFESTYLE VARIABLES
HOW OFTEN DO YOU HAVE A DRINK CONTAINING ALCOHOL: NEVER
HOW MANY STANDARD DRINKS CONTAINING ALCOHOL DO YOU HAVE ON A TYPICAL DAY: PATIENT DOES NOT DRINK

## 2025-05-12 ASSESSMENT — PAIN DESCRIPTION - ORIENTATION: ORIENTATION: LOWER

## 2025-05-12 ASSESSMENT — PAIN DESCRIPTION - FREQUENCY: FREQUENCY: INTERMITTENT

## 2025-05-12 ASSESSMENT — PAIN DESCRIPTION - LOCATION
LOCATION: NECK
LOCATION: HEAD

## 2025-05-12 ASSESSMENT — PAIN DESCRIPTION - PAIN TYPE: TYPE: CHRONIC PAIN

## 2025-05-12 NOTE — PROGRESS NOTES
4 Eyes Skin Assessment     NAME:  Jonelle Contreras  YOB: 1950  MEDICAL RECORD NUMBER:  [id number]    The patient is being assessed for  Admission    I agree that at least one RN has performed a thorough Head to Toe Skin Assessment on the patient. ALL assessment sites listed below have been assessed.      Areas assessed by both nurses:    Head, Face, Ears, Shoulders, Back, Chest, Arms, Elbows, Hands, Sacrum. Buttock, Coccyx, Ischium, Legs. Feet and Heels, and Under Medical Devices         Does the Patient have a Wound? No noted wound(s)       Hakeem Prevention initiated by RN: No  Wound Care Orders initiated by RN: No    Pressure Injury (Stage 3,4, Unstageable, DTI, NWPT, and Complex wounds) if present, place Wound referral order by RN under : No    New Ostomies, if present place, Ostomy referral order under : No     Nurse 1 eSignature: Electronically signed by Jocelin Earl RN on 5/12/25 at 3:16 PM EDT    **SHARE this note so that the co-signing nurse can place an eSignature**    Nurse 2 eSignature: Electronically signed by Kay Estrada RN on 5/12/25 at 3:16 PM EDT

## 2025-05-12 NOTE — ED PROVIDER NOTES
I independently examined and evaluated Jonelle Contreras.    In brief, patient is a 74-year-old female presents to the emergency department for evaluation of high blood pressure.  I received a phone call from [facility] by Trini Jasmine.  Patient reports doing home peritoneal dialysis daily.  Had a follow-up appointment today.  She was noted to have high blood pressure.  Reports she has been having headaches and blurry vision for some time.  She followed up with the eye doctor and was told that it was related to her blood pressure.  Denies having any new symptoms.  She was recently taken off of the hydralazine because it was making her blood pressure \"crash.\"  My assessment, she was answering questions appropriately, in no acute distress, has no nuchal rigidity.  She was given Tylenol for the headache.  She was given IV hydralazine as her systolic blood pressure noted to be 230s.  On my reassessment, she remained hypertensive to 230s. She has been taking losartan, clonidine, carvedilol. plan for admission.    All diagnostic, treatment, and disposition decisions were made by myself in conjunction with the advanced practice provider/resident physician.     I personally saw the patient and performed a substantive portion of the visit including aspects of the medical decision making. I approved management plan and take responsibility for the patient management.     I personally saw the patient and independently provided 10 minutes of non-concurrent critical care out of the total shared critical care time provided.    Comment: Please note this report has been produced using speech recognition software and may contain errors related to that system including errors in grammar, punctuation, and spelling, as well as words and phrases that may be inappropriate. If there are any questions or concerns please feel free to contact the dictating provider for clarification.    For all further details of the  patient's emergency department visit, please see the advanced practice provider's documentation.        Nany Mason MD  05/12/25 1397

## 2025-05-12 NOTE — PROGRESS NOTES
Medication Reconciliation    List of medications patient is currently taking is complete.     Source of information: 1. Conversation with patient/family at bedside                                      2. EPIC records     Notes regarding home medications:   1. Patient received all of her morning home medications prior to arrival to the ER today.  2. Updated patient's home medication admission orders with Dr. Mc Trinidad, [facility], PharmD, BCPS  5/12/2025 2:42 PM

## 2025-05-12 NOTE — PLAN OF CARE
Problem: Discharge Planning  Goal: Discharge to home or other facility with appropriate resources  Outcome: Progressing  Flowsheets (Taken 5/12/2025 1731)  Discharge to home or other facility with appropriate resources:   Identify barriers to discharge with patient and caregiver   Arrange for needed discharge resources and transportation as appropriate   Identify discharge learning needs (meds, wound care, etc)     Problem: ABCDS Injury Assessment  Goal: Absence of physical injury  Outcome: Progressing  Flowsheets (Taken 5/12/2025 1731)  Absence of Physical Injury: Implement safety measures based on patient assessment     Problem: Safety - Adult  Goal: Free from fall injury  Outcome: Progressing  Flowsheets (Taken 5/12/2025 1731)  Free From Fall Injury: Instruct family/caregiver on patient safety

## 2025-05-12 NOTE — ED PROVIDER NOTES
EMERGENCY DEPARTMENT ENCOUNTER        Pt Name: Jonelle Contreras  MRN: [id number]  Birthdate 1950  Date of evaluation: 5/12/2025  Provider: Marian Case PA-C  PCP: Candido Bentley MD  Note Started: 12:07 PM EDT 5/12/25       I have seen and evaluated this patient with my supervising physician Nany Mason MD.      CHIEF COMPLAINT       Chief Complaint   Patient presents with    Hypertension     Went to doctor's appointment this morning, BP there was 240s/130s and they called the ambulance. First BP w/ EMS was 240/130 manual, second one was 195/107. Pot c/o blurred vision that she normally gets when her BP is high.       HISTORY OF PRESENT ILLNESS: 1 or more Elements     History From: patient, pt's nephrologist Dr. Pagan    Jonelle Contreras is a 74 y.o. female who presents for hypertension.  Pt has hx of ESRD on PD.  Had routine follow up today with her nephrologist Dr. Pagan where BP was noted to be 230s/100s so she sent patient to ED for further management.  Patient has hx of HTN managed on carvedilol 25 mg twice daily and losartan 50 mg daily.  She also takes clonidine 0.1 mg as needed up to 3 times daily for systolic blood pressure greater than 190.  Patient has taken her medications today.  Patient reports her systolic blood pressure is rarely less than systolic 170-180. Pt reports headache and blurry vision for months.  Vision has been blurry in both eyes for months.  She has seen her eye doctor who told her it was related to her hypertension.  Also reports headache for months.  Denies chest pain shortness of breath.  Denies dizziness, numbness, tingling, difficulty walking or talking.  Has not done her peritoneal dialysis yet today because she had her doctors appointment.  She did do peritoneal dialysis yesterday.    Nursing Notes were reviewed and agreed with or any disagreements were addressed in the HPI.    REVIEW OF SYSTEMS :      Review of Systems    Positives and Pertinent

## 2025-05-12 NOTE — PROGRESS NOTES
Clinical Pharmacy Note  Renal Dose Adjustment    Jonelle Contreras is receiving Rosuvastatin.  This medication is renally eliminated.  Based on the patient's Estimated Creatinine Clearance: 6 mL/min (A) (based on SCr of 7.7 mg/dL (HH)). and urine output, the dose has been adjusted to Rosuvastatin 10 mg po QPM per protocol.    Sven Trinidad RPH, PharmD, BCPS  5/12/2025 2:43 PM

## 2025-05-12 NOTE — CONSULTS
Patient ID: Jonelle Contreras  Referring/ Physician: Candido Bentley MD      Summary:   Jonelle Contreras is being seen by nephrology for ESRD and HTN.     Reason for admission: hypertensive urgency       Interval Hx:   Seen HPI    Assessment/Plan:   - she normally does one exchange per day at home manually but her adequacy is not at goal so will do cycler inpatient. 2 L x 3 exchanges over 8 hrs.   - s/o lokelma   - add back home aldactone 50 mfg daily and nifedipine 30 mg daily .   - clonidine 0.1 PRN for SBP > 180          ESRD  ESRD secondary to hypertension.  She does peritoneal dialysis at home 1 exchange of 2 L 1.5% solutions daily.  Based on her most recent labs in the outpatient setting her adequacy is not at goal so we are going to be increasing her to 3 exchanges, 2 L at 1.5% solutions.  Will use the cycler while inpatient.  Euvolemic.    Resistant hypertension  Has had an overall negative secondary work up. Has been extremely difficult to manage outpatient as she reports \"bottoming out and passing out\" when her medications are increased.   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   She has had 24 hr ambulatory BP monitoring showing consistently elevated bP 180s -200s systolic.   Presenting with hypertensive urgency, having ongoing headaches blurry vision and fatigue.  CT head showed no stroke.,  Has chronic microvascular changes.  Troponin 66.no ECG changes.   Her home regimen : nifedpine 30 mg , losartan 100 mg aldactone 50 mg , coreg 12.5 mg BID. Clonidine 0./1 tID for SBP > 180    Renal artery duplex 2022 was negative.   Long time tobacco user, says she quit 2 months ago.   Riht adrenal gland normal, left adrenal gland has thickening, either adenoma or hyperplasia.       Hyperkalemia  5.7 at time of consult.   S/p lokelma   Due to ESRD and inadequate dialysis.         [facility] would like to  Average packs/day: 1 pack/day for 57.4 years (57.4 ttl pk-yrs)     Types: Cigarettes     Start date: 1/1/1968     Passive exposure: Past (on patch)    Smokeless tobacco: Never    Tobacco comments:     I have quite on and off.i have used [employer] product.the patch was working until i had a reaction.   Substance Use Topics    Alcohol use: No        Family hx: reviewed and updated as appropriate  family history includes Cancer in her brother, brother, sister, and another family member; Coronary Art Dis in her father; Heart Attack in her father; High Blood Pressure in her mother; Hypertension in her father; Kidney Disease in her mother; No Known Problems in her maternal grandfather, maternal grandmother, paternal grandfather, and paternal grandmother; Other in her father; Prostate Cancer in her brother.    Medications:   sodium chloride flush, 5-40 mL, 2 times per day  heparin (porcine), 5,000 Units, 3 times per day  calcium gluconate-NaCl, 1,000 mg, Once  [START ON 5/13/2025] aspirin, 81 mg, Daily  carvedilol, 12.5 mg, BID WC  [START ON 5/13/2025] clopidogrel, 75 mg, Daily  [START ON 5/13/2025] losartan, 100 mg, Nightly  rOPINIRole, 1 mg, Nightly  rosuvastatin, 10 mg, Nightly  trospium, 20 mg, Nightly  [START ON 5/13/2025] tiotropium-olodaterol, 2 puff, Daily  spironolactone, 50 mg, Daily  NIFEdipine, 30 mg, Daily       Iodine, Iodinated contrast media, Wellbutrin [bupropion], Adhesive tape, Sertraline, and Sulfa antibiotics    Allergies:   Allergies   Allergen Reactions    Iodine Itching and Nausea And Vomiting    Iodinated Contrast Media     Wellbutrin [Bupropion]      Dry mouth    Adhesive Tape Rash    Sertraline Other (See Comments)     Severe dry mouth    Sulfa Antibiotics Itching and Nausea Only         Physical Exam/Objective:   Vitals:    05/12/25 1555   BP: (!) 186/120   Pulse: 67   Resp: 25   Temp:    SpO2:      No intake or output data in the 24 hours ending 05/12/25 1613      General appearance: in no acute

## 2025-05-13 LAB
ALBUMIN SERPL-MCNC: 3.1 G/DL (ref 3.4–5)
ANION GAP SERPL CALCULATED.3IONS-SCNC: 13 MMOL/L (ref 3–16)
BUN SERPL-MCNC: 81 MG/DL (ref 7–20)
CALCIUM SERPL-MCNC: 8.5 MG/DL (ref 8.3–10.6)
CHLORIDE SERPL-SCNC: 104 MMOL/L (ref 99–110)
CO2 SERPL-SCNC: 23 MMOL/L (ref 21–32)
CREAT SERPL-MCNC: 6.5 MG/DL (ref 0.6–1.2)
GFR SERPLBLD CREATININE-BSD FMLA CKD-EPI: 6 ML/MIN/{1.73_M2}
GLUCOSE SERPL-MCNC: 103 MG/DL (ref 70–99)
PHOSPHATE SERPL-MCNC: 6.1 MG/DL (ref 2.5–4.9)
POTASSIUM SERPL-SCNC: 4.9 MMOL/L (ref 3.5–5.1)
SODIUM SERPL-SCNC: 140 MMOL/L (ref 136–145)

## 2025-05-13 PROCEDURE — 6360000002 HC RX W HCPCS: Performed by: INTERNAL MEDICINE

## 2025-05-13 PROCEDURE — 2500000003 HC RX 250 WO HCPCS: Performed by: STUDENT IN AN ORGANIZED HEALTH CARE EDUCATION/TRAINING PROGRAM

## 2025-05-13 PROCEDURE — 94760 N-INVAS EAR/PLS OXIMETRY 1: CPT

## 2025-05-13 PROCEDURE — A4722 DIALYS SOL FLD VOL > 1999CC: HCPCS | Performed by: INTERNAL MEDICINE

## 2025-05-13 PROCEDURE — 6370000000 HC RX 637 (ALT 250 FOR IP): Performed by: STUDENT IN AN ORGANIZED HEALTH CARE EDUCATION/TRAINING PROGRAM

## 2025-05-13 PROCEDURE — 6370000000 HC RX 637 (ALT 250 FOR IP): Performed by: INTERNAL MEDICINE

## 2025-05-13 PROCEDURE — 80069 RENAL FUNCTION PANEL: CPT

## 2025-05-13 PROCEDURE — 2700000000 HC OXYGEN THERAPY PER DAY

## 2025-05-13 PROCEDURE — 36415 COLL VENOUS BLD VENIPUNCTURE: CPT

## 2025-05-13 PROCEDURE — 90945 DIALYSIS ONE EVALUATION: CPT

## 2025-05-13 PROCEDURE — 6360000002 HC RX W HCPCS: Performed by: STUDENT IN AN ORGANIZED HEALTH CARE EDUCATION/TRAINING PROGRAM

## 2025-05-13 PROCEDURE — 94640 AIRWAY INHALATION TREATMENT: CPT

## 2025-05-13 PROCEDURE — 1200000000 HC SEMI PRIVATE

## 2025-05-13 PROCEDURE — 99223 1ST HOSP IP/OBS HIGH 75: CPT | Performed by: STUDENT IN AN ORGANIZED HEALTH CARE EDUCATION/TRAINING PROGRAM

## 2025-05-13 RX ORDER — HYDRALAZINE HYDROCHLORIDE 50 MG/1
50 TABLET, FILM COATED ORAL EVERY 8 HOURS SCHEDULED
Status: DISCONTINUED | OUTPATIENT
Start: 2025-05-13 | End: 2025-05-14

## 2025-05-13 RX ORDER — POLYETHYLENE GLYCOL 3350 17 G/17G
17 POWDER, FOR SOLUTION ORAL 2 TIMES DAILY
Status: DISPENSED | OUTPATIENT
Start: 2025-05-13 | End: 2025-05-15

## 2025-05-13 RX ORDER — NIFEDIPINE 30 MG/1
60 TABLET, EXTENDED RELEASE ORAL 2 TIMES DAILY
Status: DISCONTINUED | OUTPATIENT
Start: 2025-05-13 | End: 2025-05-14

## 2025-05-13 RX ORDER — CLONIDINE HYDROCHLORIDE 0.1 MG/1
0.1 TABLET ORAL 3 TIMES DAILY
Status: DISCONTINUED | OUTPATIENT
Start: 2025-05-13 | End: 2025-05-13

## 2025-05-13 RX ORDER — PROCHLORPERAZINE EDISYLATE 5 MG/ML
10 INJECTION INTRAMUSCULAR; INTRAVENOUS EVERY 6 HOURS PRN
Status: DISCONTINUED | OUTPATIENT
Start: 2025-05-13 | End: 2025-05-14

## 2025-05-13 RX ORDER — SODIUM CHLORIDE, SODIUM LACTATE, CALCIUM CHLORIDE, MAGNESIUM CHLORIDE AND DEXTROSE 1.5; 538; 448; 18.3; 5.08 G/100ML; MG/100ML; MG/100ML; MG/100ML; MG/100ML
3000 INJECTION, SOLUTION INTRAPERITONEAL NIGHTLY
Status: DISCONTINUED | OUTPATIENT
Start: 2025-05-13 | End: 2025-05-15

## 2025-05-13 RX ORDER — HYDRALAZINE HYDROCHLORIDE 20 MG/ML
10 INJECTION INTRAMUSCULAR; INTRAVENOUS ONCE
Status: COMPLETED | OUTPATIENT
Start: 2025-05-13 | End: 2025-05-13

## 2025-05-13 RX ORDER — TRAMADOL HYDROCHLORIDE 50 MG/1
50 TABLET ORAL EVERY 6 HOURS PRN
Status: DISCONTINUED | OUTPATIENT
Start: 2025-05-13 | End: 2025-05-16

## 2025-05-13 RX ADMIN — PROCHLORPERAZINE EDISYLATE 10 MG: 5 INJECTION INTRAMUSCULAR; INTRAVENOUS at 19:51

## 2025-05-13 RX ADMIN — TROSPIUM CHLORIDE 20 MG: 20 TABLET, FILM COATED ORAL at 21:01

## 2025-05-13 RX ADMIN — SODIUM CHLORIDE, SODIUM LACTATE, CALCIUM CHLORIDE, MAGNESIUM CHLORIDE AND DEXTROSE 3000 ML: 1.5; 538; 448; 18.3; 5.08 INJECTION, SOLUTION INTRAPERITONEAL at 21:13

## 2025-05-13 RX ADMIN — SPIRONOLACTONE 50 MG: 25 TABLET ORAL at 08:10

## 2025-05-13 RX ADMIN — HEPARIN SODIUM 5000 UNITS: 5000 INJECTION INTRAVENOUS; SUBCUTANEOUS at 21:02

## 2025-05-13 RX ADMIN — TIOTROPIUM BROMIDE AND OLODATEROL 2 PUFF: 3.124; 2.736 SPRAY, METERED RESPIRATORY (INHALATION) at 08:30

## 2025-05-13 RX ADMIN — HYDRALAZINE HYDROCHLORIDE 50 MG: 50 TABLET ORAL at 21:01

## 2025-05-13 RX ADMIN — CLONIDINE HYDROCHLORIDE 0.1 MG: 0.1 TABLET ORAL at 09:28

## 2025-05-13 RX ADMIN — CLONIDINE HYDROCHLORIDE 0.1 MG: 0.1 TABLET ORAL at 01:53

## 2025-05-13 RX ADMIN — HEPARIN SODIUM 5000 UNITS: 5000 INJECTION INTRAVENOUS; SUBCUTANEOUS at 13:43

## 2025-05-13 RX ADMIN — SEVELAMER CARBONATE 800 MG: 800 TABLET, FILM COATED ORAL at 08:09

## 2025-05-13 RX ADMIN — CARVEDILOL 12.5 MG: 12.5 TABLET, FILM COATED ORAL at 17:53

## 2025-05-13 RX ADMIN — SODIUM CHLORIDE, PRESERVATIVE FREE 10 ML: 5 INJECTION INTRAVENOUS at 08:12

## 2025-05-13 RX ADMIN — ALPRAZOLAM 2 MG: 0.5 TABLET ORAL at 12:27

## 2025-05-13 RX ADMIN — HEPARIN SODIUM 5000 UNITS: 5000 INJECTION INTRAVENOUS; SUBCUTANEOUS at 06:23

## 2025-05-13 RX ADMIN — TRAMADOL HYDROCHLORIDE 50 MG: 50 TABLET, COATED ORAL at 12:08

## 2025-05-13 RX ADMIN — ALBUTEROL SULFATE 2 PUFF: 90 AEROSOL, METERED RESPIRATORY (INHALATION) at 19:47

## 2025-05-13 RX ADMIN — ACETAMINOPHEN 650 MG: 325 TABLET ORAL at 01:52

## 2025-05-13 RX ADMIN — ONDANSETRON 4 MG: 4 TABLET, ORALLY DISINTEGRATING ORAL at 13:42

## 2025-05-13 RX ADMIN — POLYETHYLENE GLYCOL 3350 17 G: 17 POWDER, FOR SOLUTION ORAL at 18:01

## 2025-05-13 RX ADMIN — NIFEDIPINE 60 MG: 30 TABLET, FILM COATED, EXTENDED RELEASE ORAL at 08:10

## 2025-05-13 RX ADMIN — TRAMADOL HYDROCHLORIDE 50 MG: 50 TABLET, COATED ORAL at 18:45

## 2025-05-13 RX ADMIN — CLOPIDOGREL BISULFATE 75 MG: 75 TABLET, FILM COATED ORAL at 08:10

## 2025-05-13 RX ADMIN — HYDRALAZINE HYDROCHLORIDE 50 MG: 50 TABLET ORAL at 14:10

## 2025-05-13 RX ADMIN — HYDRALAZINE HYDROCHLORIDE 10 MG: 20 INJECTION INTRAMUSCULAR; INTRAVENOUS at 12:28

## 2025-05-13 RX ADMIN — ASPIRIN 81 MG: 81 TABLET, CHEWABLE ORAL at 08:10

## 2025-05-13 RX ADMIN — SEVELAMER CARBONATE 800 MG: 800 TABLET, FILM COATED ORAL at 17:53

## 2025-05-13 RX ADMIN — LOSARTAN POTASSIUM 100 MG: 100 TABLET, FILM COATED ORAL at 21:01

## 2025-05-13 RX ADMIN — ROSUVASTATIN CALCIUM 10 MG: 10 TABLET, FILM COATED ORAL at 21:01

## 2025-05-13 RX ADMIN — SODIUM CHLORIDE, PRESERVATIVE FREE 10 ML: 5 INJECTION INTRAVENOUS at 21:02

## 2025-05-13 RX ADMIN — ALPRAZOLAM 2 MG: 0.5 TABLET ORAL at 01:53

## 2025-05-13 RX ADMIN — SEVELAMER CARBONATE 800 MG: 800 TABLET, FILM COATED ORAL at 12:08

## 2025-05-13 RX ADMIN — NIFEDIPINE 60 MG: 30 TABLET, FILM COATED, EXTENDED RELEASE ORAL at 21:02

## 2025-05-13 RX ADMIN — CARVEDILOL 12.5 MG: 12.5 TABLET, FILM COATED ORAL at 08:09

## 2025-05-13 RX ADMIN — ROPINIROLE HYDROCHLORIDE 1 MG: 1 TABLET, FILM COATED ORAL at 21:02

## 2025-05-13 RX ADMIN — GENTAMICIN SULFATE: 1 CREAM TOPICAL at 14:11

## 2025-05-13 ASSESSMENT — PAIN SCALES - GENERAL
PAINLEVEL_OUTOF10: 3
PAINLEVEL_OUTOF10: 6
PAINLEVEL_OUTOF10: 1
PAINLEVEL_OUTOF10: 10
PAINLEVEL_OUTOF10: 9
PAINLEVEL_OUTOF10: 8
PAINLEVEL_OUTOF10: 0
PAINLEVEL_OUTOF10: 0
PAINLEVEL_OUTOF10: 8

## 2025-05-13 ASSESSMENT — PAIN DESCRIPTION - DESCRIPTORS
DESCRIPTORS: ACHING

## 2025-05-13 ASSESSMENT — PAIN DESCRIPTION - ORIENTATION
ORIENTATION: MID
ORIENTATION: MID
ORIENTATION: MID;LEFT
ORIENTATION: MID

## 2025-05-13 ASSESSMENT — PAIN DESCRIPTION - LOCATION
LOCATION: HEAD

## 2025-05-13 ASSESSMENT — PAIN - FUNCTIONAL ASSESSMENT: PAIN_FUNCTIONAL_ASSESSMENT: ACTIVITIES ARE NOT PREVENTED

## 2025-05-13 ASSESSMENT — PAIN DESCRIPTION - PAIN TYPE: TYPE: ACUTE PAIN

## 2025-05-13 ASSESSMENT — PAIN DESCRIPTION - FREQUENCY: FREQUENCY: INTERMITTENT

## 2025-05-13 NOTE — PROGRESS NOTES
/108 after morning BP meds. Nephrologist notified. Hydralazine given per order. PRN tramadol given for 9/10 headache.

## 2025-05-13 NOTE — PROGRESS NOTES
Patient ID: Jonelle Contreras  Referring/ Physician: Candido Bentley MD      Summary:   Jonelle Contreras is being seen by nephrology for ESRD and HTN.     Reason for admission: hypertensive urgency       Interval Hx:   Seen at bedside.   Awake and alert.   No edema.   Has headache.   Has not had a bowel movement in 3 days.     /86 > 220/108 > 176/83  On room air    CC    Na 140 K 4.9   BUN 81   Cr 6.5  Phos 6.1     Assessment/Plan:   - continue cycler inpatient 2 L x 3 exchanges over 8 hrs.   - add miralax   - increase nifedipine to 60 mg BID.   - add hydralazine 50 mg TID.   - stop clonidine, has rebound HTN and can not get clonidine patch outpatient.  - would like to keep her inpatient until BP stable < 180 systolic            ESRD  ESRD secondary to hypertension.  She does peritoneal dialysis at home 1 exchange of 2 L 1.5% solutions daily.  Based on her most recent labs in the outpatient setting her adequacy is not at goal so we are going to be increasing her to 3 exchanges, 2 L at 1.5% solutions.  Will use the cycler while inpatient.  Euvolemic.    Resistant hypertension  Has had an overall negative secondary work up. Has been extremely difficult to manage outpatient as she reports \"bottoming out and passing out\" when her medications are increased.   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   She has had 24 hr ambulatory BP monitoring showing consistently elevated bP 180s -200s systolic.   Presenting with hypertensive urgency, having ongoing headaches blurry vision and fatigue.  CT head showed no stroke.,  Has chronic microvascular changes.  Troponin 66.no ECG changes.   Her home regimen : nifedpine 30 mg , losartan 100 mg aldactone 50 mg , coreg 12.5 mg BID. Clonidine 0./1 tID for SBP > 180    Renal artery duplex 2022 was negative.   Long time tobacco user, says she quit 2 months ago.   Riht adrenal

## 2025-05-13 NOTE — H&P
Mother     Kidney Disease Mother     Hypertension Father     Other Father         MI    Coronary Art Dis Father     Heart Attack Father     Cancer Brother         testicular    Prostate Cancer Brother     No Known Problems Maternal Grandmother     No Known Problems Maternal Grandfather     No Known Problems Paternal Grandmother     No Known Problems Paternal Grandfather     Cancer Other     Cancer Sister     Cancer Brother         Esophagus cancer     ROS:     All other systems reviewed and are negative.    PHYSICAL EXAM:  BP (!) 206/92   Pulse 71   Temp 97.4 °F (36.3 °C) (Oral)   Resp 18   Ht 1.651 m (5' 5\")   Wt 62.1 kg (136 lb 14.5 oz)   SpO2 97%   BMI 22.78 kg/m²   BP (!) 149/68   Pulse 79   Temp 98.1 °F (36.7 °C) (Oral)   Resp 19   Ht 1.651 m (5' 5\")   Wt 62.1 kg (136 lb 14.5 oz)   SpO2 94%   BMI 22.78 kg/m²     General Appearance:    Alert, cooperative, no distress, appears stated age   Head:    Normocephalic, without obvious abnormality, atraumatic   Eyes:    PERRL, conjunctiva/corneas clear, EOM's intact, fundi     benign, both eyes       Nose:   Nares normal, septum midline, mucosa normal, no drainage    or sinus tenderness   Throat:   Lips, mucosa, and tongue normal; teeth and gums normal   Neck:   Supple, symmetrical, trachea midline, no adenopathy;     thyroid:  no enlargement/tenderness/nodules; no carotid    bruit or JVD   Back:     Symmetric, no curvature, ROM normal, no CVA tenderness   Lungs:     Clear to auscultation bilaterally, respirations unlabored   Chest Wall:    No tenderness or deformity    Heart:    Regular rate and rhythm, S1 and S2 normal, no murmur, rub   or gallop       Abdomen:     Soft, non-tender, bowel sounds active all four quadrants,     no masses, no organomegaly           Extremities:   Extremities normal, atraumatic, no cyanosis or edema   Pulses:   2+ and symmetric all extremities   Skin:   Skin color, texture, turgor normal, no rashes or lesions   Lymph nodes:    Cervical, supraclavicular, and axillary nodes normal   Neurologic:   CNII-XII intact, normal strength, sensation and reflexes     throughout       LABS:  Recent Labs     05/12/25  1215   WBC 5.5   HGB 10.4*   HCT 31.5*                                                                     Recent Labs     05/12/25  1215 05/13/25  0512    140   K 5.7* 4.9    104   CO2 22 23   BUN 93* 81*   CREATININE 7.7* 6.5*   GLUCOSE 90 103*   PHOS  --  6.1*     Recent Labs     05/12/25  1215   AST 22   ALT 14   BILITOT <0.2   ALKPHOS 78     No results for input(s): \"TROPONINI\" in the last 72 hours.  No results for input(s): \"BNP\" in the last 72 hours.  No results found for: \"PHART\", \"QPQ0ZNI\", \"PO2ART\"  No results for input(s): \"INR\" in the last 72 hours.  Recent Labs     05/12/25  1634   PHUR 8.0         U/A:    Lab Results   Component Value Date/Time    NITRITE n 01/11/2019 05:38 PM    COLORU Yellow 11/12/2023 09:20 PM    WBCUA 1 11/12/2023 09:20 PM    RBCUA 3-4 11/12/2023 09:20 PM    MUCUS Rare 08/12/2022 09:16 AM    BACTERIA None Seen 11/12/2023 09:20 PM    CLARITYU Clear 11/12/2023 09:20 PM    SPECGRAV 1.020 01/11/2019 05:38 PM    LEUKOCYTESUR Negative 11/12/2023 09:20 PM    BLOODU MODERATE 11/12/2023 09:20 PM    GLUCOSEU Negative 11/12/2023 09:20 PM    AMORPHOUS 1+ 10/18/2013 03:50 PM     CT HEAD WO CONTRAST reviewed    EKG: Sinus rhythm      Assessment & Plan:    Jonelle Contreras is a 74 y.o. female who was admitted with Hypertensive urgency.  Principal Problem:    Hypertensive urgency  - Continue home regimen, spironolactone and nifedipine increased this admission. Hydralazine PRN  - telemetry   - She has had a secondary hypertension workup      ESRD on PD  Hyperkalemia  - received lokelma, calcium gluconate  - Appreciate nephrology consultation  - PD nightly     HLD  - cont statin    Restless legs  - cont ropinirole    Anxiety   - cont alprazolam PRN      COPD  - albuterol PRN, stiolto    OAB  - cont

## 2025-05-13 NOTE — CARE COORDINATION
Case Management Assessment  Initial Evaluation    Date/Time of Evaluation: 5/13/2025 1:44 PM  Assessment Completed by: Claudia Owen RN    If patient is discharged prior to next notation, then this note serves as note for discharge by case management.    Patient Name: Jonelle Contreras                   YOB: 1950  Diagnosis: Hyperkalemia [E87.5]  Hypertensive urgency [I16.0]  ESRD on peritoneal dialysis (HCC) [N18.6, Z99.2]  Hypertension, unspecified type [I10]                   Date / Time: 5/12/2025 11:35 AM    Patient Admission Status: Inpatient   Readmission Risk (Low < 19, Mod (19-27), High > 27): Readmission Risk Score: 18    Current PCP: Candido Bentley MD  PCP verified by CM? Yes    Chart Reviewed: Yes      History Provided by: Patient  Patient Orientation: Alert and Oriented    Patient Cognition: Alert    Hospitalization in the last 30 days (Readmission):  No    If yes, Readmission Assessment in  Navigator will be completed.    Advance Directives:      Code Status: Full Code   Patient's Primary Decision Maker is: Legal Next of Kin    Primary Decision Maker: DevoraThiago - Child - [phone]    Secondary Decision Maker: Marguerite Lozoya  Child  [phone]    Secondary Decision Maker: DevoraKirit  Child - [phone]    Discharge Planning:    Patient lives with: Alone Type of Home: House  Primary Care Giver: Self  Patient Support Systems include: Children, Family Members   Current Financial resources: Medicare  Current community resources: Other (Comment) (PD followed by Dr Santo)  Current services prior to admission: Durable Medical Equipment            Current DME: Walker (doesn't always use)            Type of Home Care services:  None    ADLS  Prior functional level: Independent in ADLs/IADLs  Current functional level: Independent in ADLs/IADLs    No pt/ot orders    Family can provide assistance at DC: Yes  Would you like Case Management to discuss the discharge plan with any other  family members/significant others, and if so, who? No  Plans to Return to Present Housing: Yes  Other Identified Issues/Barriers to RETURNING to current housing: hypertension  Potential Assistance needed at discharge: N/A            Potential DME:  none  Patient expects to discharge to: House  Plan for transportation at discharge: Family    Financial    Payor: [employer]KARNI MEDICARE / Plan: [place] [employer]ARE / Product Type: *No Product type* /     Does insurance require precert for SNF: Yes    Potential assistance Purchasing Medications: No  Meds-to-Beds request:        [facility] [id number] - [place], [place] - [address] - P [phone] - F [phone]  [address] [facility] [address]  Phone: [phone] Fax: [phone]    [facility] - [place] - [address] [facility] - P [phone] - F [phone]  [facility]  [place] [address]  Phone: [phone] Fax: [phone]      Notes:    Factors facilitating achievement of predicted outcomes: Family support, Cooperative, Pleasant, Sense of humor, Good insight into deficits, and Has needed Durable Medical Equipment at home    Barriers to discharge: Decreased endurance    Additional Case Management Notes:   Dc plan to return home alone with family support.  PD at home followed by Dr Santo.  Denied any dc needs at this time.    The Plan for Transition of Care is related to the following treatment goals of Hyperkalemia [E87.5]  Hypertensive urgency [I16.0]  ESRD on peritoneal dialysis (HCC) [N18.6, Z99.2]  Hypertension, unspecified type [I10]    IF APPLICABLE: The Patient and/or patient representative Jonelle and her family were provided with a choice of provider and agrees with the discharge plan. Freedom of choice list with basic dialogue that supports the patient's individualized plan of care/goals and shares the quality data associated with the providers was provided to: Patient       The Patient and/or

## 2025-05-13 NOTE — ACP (ADVANCE CARE PLANNING)
Advance Care Planning   Healthcare Decision Maker:    Primary Decision Maker: DevoraThiago - Child - [phone]    Secondary Decision Maker: Marguerite Lozoya - Child - [phone]    Secondary Decision Maker: Kirit Lozoya - Child - [phone]    Today we documented Decision Maker(s) consistent with Legal Next of Kin hierarchy.     #[phone]  Electronically signed by Claudia Owen RN on 5/13/2025 at 1:40 PM

## 2025-05-13 NOTE — PLAN OF CARE
Problem: Safety - Adult  Goal: Free from fall injury  5/13/2025 0136 by Armando Escalera RN  Outcome: Progressing     Problem: Pain  Goal: Verbalizes/displays adequate comfort level or baseline comfort level  Outcome: Progressing     Problem: Genitourinary - Adult  Goal: Absence of urinary retention  Outcome: Progressing     Problem: Metabolic/Fluid and Electrolytes - Adult  Goal: Electrolytes maintained within normal limits  Outcome: Progressing

## 2025-05-14 ENCOUNTER — APPOINTMENT (OUTPATIENT)
Age: 75
End: 2025-05-14
Attending: STUDENT IN AN ORGANIZED HEALTH CARE EDUCATION/TRAINING PROGRAM
Payer: MEDICARE

## 2025-05-14 ENCOUNTER — APPOINTMENT (OUTPATIENT)
Dept: GENERAL RADIOLOGY | Age: 75
End: 2025-05-14
Payer: MEDICARE

## 2025-05-14 LAB
ALBUMIN SERPL-MCNC: 3.5 G/DL (ref 3.4–5)
ANION GAP SERPL CALCULATED.3IONS-SCNC: 11 MMOL/L (ref 3–16)
BASOPHILS # BLD: 0 K/UL (ref 0–0.2)
BASOPHILS NFR BLD: 0.7 %
BUN SERPL-MCNC: 75 MG/DL (ref 7–20)
CALCIUM SERPL-MCNC: 8.6 MG/DL (ref 8.3–10.6)
CHLORIDE SERPL-SCNC: 103 MMOL/L (ref 99–110)
CO2 SERPL-SCNC: 27 MMOL/L (ref 21–32)
CREAT SERPL-MCNC: 6.5 MG/DL (ref 0.6–1.2)
DEPRECATED RDW RBC AUTO: 16.3 % (ref 12.4–15.4)
ECHO AO ASC DIAM: 3.2 CM
ECHO AO ASCENDING AORTA INDEX: 1.93 CM/M2
ECHO AO ROOT DIAM: 3.2 CM
ECHO AO ROOT INDEX: 1.93 CM/M2
ECHO AV AREA PEAK VELOCITY: 2.2 CM2
ECHO AV AREA VTI: 2.2 CM2
ECHO AV AREA/BSA PEAK VELOCITY: 1.3 CM2/M2
ECHO AV AREA/BSA VTI: 1.3 CM2/M2
ECHO AV CUSP MM: 2 CM
ECHO AV MEAN GRADIENT: 8 MMHG
ECHO AV MEAN VELOCITY: 1.3 M/S
ECHO AV PEAK GRADIENT: 14 MMHG
ECHO AV PEAK VELOCITY: 1.8 M/S
ECHO AV VELOCITY RATIO: 0.83
ECHO AV VTI: 36.4 CM
ECHO BSA: 1.71 M2
ECHO EST RA PRESSURE: 3 MMHG
ECHO LA AREA 2C: 19.2 CM2
ECHO LA AREA 4C: 17.7 CM2
ECHO LA DIAMETER INDEX: 1.75 CM/M2
ECHO LA DIAMETER: 2.9 CM
ECHO LA MAJOR AXIS: 5.9 CM
ECHO LA MINOR AXIS: 5.9 CM
ECHO LA TO AORTIC ROOT RATIO: 0.91
ECHO LA VOL BP: 48 ML (ref 22–52)
ECHO LA VOL MOD A2C: 52 ML (ref 22–52)
ECHO LA VOL MOD A4C: 44 ML (ref 22–52)
ECHO LA VOL/BSA BIPLANE: 29 ML/M2 (ref 16–34)
ECHO LA VOLUME INDEX MOD A2C: 31 ML/M2 (ref 16–34)
ECHO LA VOLUME INDEX MOD A4C: 27 ML/M2 (ref 16–34)
ECHO LV E' LATERAL VELOCITY: 9.2 CM/S
ECHO LV E' SEPTAL VELOCITY: 6.47 CM/S
ECHO LV EDV 3D: 103 ML
ECHO LV EDV A4C: 71 ML
ECHO LV EDV INDEX 3D: 62 ML/M2
ECHO LV EDV INDEX A4C: 43 ML/M2
ECHO LV EF PHYSICIAN: 60 %
ECHO LV EJECTION FRACTION A4C: 57 %
ECHO LV ESV 3D: 46 ML
ECHO LV ESV A4C: 30 ML
ECHO LV ESV INDEX 3D: 28 ML/M2
ECHO LV ESV INDEX A4C: 18 ML/M2
ECHO LV FRACTIONAL SHORTENING: 36 % (ref 28–44)
ECHO LV INTERNAL DIMENSION DIASTOLE INDEX: 2.65 CM/M2
ECHO LV INTERNAL DIMENSION DIASTOLIC: 4.4 CM (ref 3.9–5.3)
ECHO LV INTERNAL DIMENSION SYSTOLIC INDEX: 1.69 CM/M2
ECHO LV INTERNAL DIMENSION SYSTOLIC: 2.8 CM
ECHO LV IVSD: 1.2 CM (ref 0.6–0.9)
ECHO LV MASS 2D: 168.9 G (ref 67–162)
ECHO LV MASS 3D INDEX: 77.7 G/M2
ECHO LV MASS 3D: 129 G
ECHO LV MASS INDEX 2D: 101.8 G/M2 (ref 43–95)
ECHO LV POSTERIOR WALL DIASTOLIC: 1 CM (ref 0.6–0.9)
ECHO LV RELATIVE WALL THICKNESS RATIO: 0.45
ECHO LVOT AREA: 2.5 CM2
ECHO LVOT AV VTI INDEX: 0.81
ECHO LVOT DIAM: 1.8 CM
ECHO LVOT MEAN GRADIENT: 5 MMHG
ECHO LVOT PEAK GRADIENT: 9 MMHG
ECHO LVOT PEAK VELOCITY: 1.5 M/S
ECHO LVOT STROKE VOLUME INDEX: 45.2 ML/M2
ECHO LVOT SV: 75 ML
ECHO LVOT VTI: 29.5 CM
ECHO MV A VELOCITY: 1.21 M/S
ECHO MV AREA VTI: 2.8 CM2
ECHO MV E DECELERATION TIME (DT): 261 MS
ECHO MV E VELOCITY: 0.81 M/S
ECHO MV E/A RATIO: 0.67
ECHO MV E/E' LATERAL: 8.8
ECHO MV E/E' RATIO (AVERAGED): 10.66
ECHO MV E/E' SEPTAL: 12.52
ECHO MV LVOT VTI INDEX: 0.92
ECHO MV MAX VELOCITY: 1.2 M/S
ECHO MV MEAN GRADIENT: 2 MMHG
ECHO MV MEAN VELOCITY: 0.7 M/S
ECHO MV PEAK GRADIENT: 5 MMHG
ECHO MV REGURGITANT PEAK GRADIENT: 88 MMHG
ECHO MV REGURGITANT PEAK VELOCITY: 4.7 M/S
ECHO MV VTI: 27.1 CM
ECHO PULMONARY ARTERY END DIASTOLIC PRESSURE: 10 MMHG
ECHO PV ACCELERATION TIME (AT): 94 MS
ECHO PV MAX VELOCITY: 1 M/S
ECHO PV MAX VELOCITY: 1.6 M/S
ECHO PV PEAK GRADIENT: 4 MMHG
ECHO RA AREA 4C: 15.6 CM2
ECHO RA END SYSTOLIC VOLUME APICAL 4 CHAMBER INDEX BSA: 23 ML/M2
ECHO RA VOLUME: 39 ML
ECHO RIGHT VENTRICULAR SYSTOLIC PRESSURE (RVSP): 35 MMHG
ECHO RV FREE WALL PEAK S': 17.5 CM/S
ECHO RV INTERNAL DIMENSION: 3 CM
ECHO RV TAPSE: 2.6 CM (ref 1.7–?)
ECHO TV E WAVE: 0.6 M/S
ECHO TV PEAK GRADIENT: 3 MMHG
ECHO TV REGURGITANT MAX VELOCITY: 2.83 M/S
ECHO TV REGURGITANT PEAK GRADIENT: 32 MMHG
EOSINOPHIL # BLD: 0.2 K/UL (ref 0–0.6)
EOSINOPHIL NFR BLD: 3.6 %
GFR SERPLBLD CREATININE-BSD FMLA CKD-EPI: 6 ML/MIN/{1.73_M2}
GLUCOSE SERPL-MCNC: 100 MG/DL (ref 70–99)
HCT VFR BLD AUTO: 33.2 % (ref 36–48)
HGB BLD-MCNC: 10.9 G/DL (ref 12–16)
LYMPHOCYTES # BLD: 1 K/UL (ref 1–5.1)
LYMPHOCYTES NFR BLD: 14.9 %
MCH RBC QN AUTO: 28.2 PG (ref 26–34)
MCHC RBC AUTO-ENTMCNC: 32.7 G/DL (ref 31–36)
MCV RBC AUTO: 86.2 FL (ref 80–100)
MONOCYTES # BLD: 0.5 K/UL (ref 0–1.3)
MONOCYTES NFR BLD: 8.4 %
NEUTROPHILS # BLD: 4.7 K/UL (ref 1.7–7.7)
NEUTROPHILS NFR BLD: 72.4 %
PHOSPHATE SERPL-MCNC: 5.7 MG/DL (ref 2.5–4.9)
PLATELET # BLD AUTO: 277 K/UL (ref 135–450)
PMV BLD AUTO: 8.9 FL (ref 5–10.5)
POTASSIUM SERPL-SCNC: 4.9 MMOL/L (ref 3.5–5.1)
RBC # BLD AUTO: 3.86 M/UL (ref 4–5.2)
SODIUM SERPL-SCNC: 141 MMOL/L (ref 136–145)
WBC # BLD AUTO: 6.5 K/UL (ref 4–11)

## 2025-05-14 PROCEDURE — 94640 AIRWAY INHALATION TREATMENT: CPT

## 2025-05-14 PROCEDURE — 93306 TTE W/DOPPLER COMPLETE: CPT | Performed by: INTERNAL MEDICINE

## 2025-05-14 PROCEDURE — 83835 ASSAY OF METANEPHRINES: CPT

## 2025-05-14 PROCEDURE — 6370000000 HC RX 637 (ALT 250 FOR IP): Performed by: STUDENT IN AN ORGANIZED HEALTH CARE EDUCATION/TRAINING PROGRAM

## 2025-05-14 PROCEDURE — 97530 THERAPEUTIC ACTIVITIES: CPT

## 2025-05-14 PROCEDURE — 90945 DIALYSIS ONE EVALUATION: CPT

## 2025-05-14 PROCEDURE — 97166 OT EVAL MOD COMPLEX 45 MIN: CPT

## 2025-05-14 PROCEDURE — 71045 X-RAY EXAM CHEST 1 VIEW: CPT

## 2025-05-14 PROCEDURE — 2500000003 HC RX 250 WO HCPCS: Performed by: STUDENT IN AN ORGANIZED HEALTH CARE EDUCATION/TRAINING PROGRAM

## 2025-05-14 PROCEDURE — 93306 TTE W/DOPPLER COMPLETE: CPT

## 2025-05-14 PROCEDURE — 2700000000 HC OXYGEN THERAPY PER DAY

## 2025-05-14 PROCEDURE — 1200000000 HC SEMI PRIVATE

## 2025-05-14 PROCEDURE — 6370000000 HC RX 637 (ALT 250 FOR IP): Performed by: INTERNAL MEDICINE

## 2025-05-14 PROCEDURE — 99232 SBSQ HOSP IP/OBS MODERATE 35: CPT | Performed by: STUDENT IN AN ORGANIZED HEALTH CARE EDUCATION/TRAINING PROGRAM

## 2025-05-14 PROCEDURE — 85025 COMPLETE CBC W/AUTO DIFF WBC: CPT

## 2025-05-14 PROCEDURE — 94761 N-INVAS EAR/PLS OXIMETRY MLT: CPT

## 2025-05-14 PROCEDURE — 6360000002 HC RX W HCPCS: Performed by: INTERNAL MEDICINE

## 2025-05-14 PROCEDURE — 76376 3D RENDER W/INTRP POSTPROCES: CPT | Performed by: INTERNAL MEDICINE

## 2025-05-14 PROCEDURE — 6360000002 HC RX W HCPCS: Performed by: STUDENT IN AN ORGANIZED HEALTH CARE EDUCATION/TRAINING PROGRAM

## 2025-05-14 PROCEDURE — 97162 PT EVAL MOD COMPLEX 30 MIN: CPT

## 2025-05-14 PROCEDURE — 80069 RENAL FUNCTION PANEL: CPT

## 2025-05-14 PROCEDURE — A4722 DIALYS SOL FLD VOL > 1999CC: HCPCS | Performed by: INTERNAL MEDICINE

## 2025-05-14 PROCEDURE — 36415 COLL VENOUS BLD VENIPUNCTURE: CPT

## 2025-05-14 RX ORDER — SENNA AND DOCUSATE SODIUM 50; 8.6 MG/1; MG/1
2 TABLET, FILM COATED ORAL DAILY PRN
Status: DISCONTINUED | OUTPATIENT
Start: 2025-05-14 | End: 2025-05-17

## 2025-05-14 RX ORDER — HYDRALAZINE HYDROCHLORIDE 25 MG/1
25 TABLET, FILM COATED ORAL EVERY 8 HOURS SCHEDULED
Status: DISCONTINUED | OUTPATIENT
Start: 2025-05-14 | End: 2025-05-21

## 2025-05-14 RX ORDER — NIFEDIPINE 30 MG/1
60 TABLET, EXTENDED RELEASE ORAL DAILY
Status: DISCONTINUED | OUTPATIENT
Start: 2025-05-15 | End: 2025-05-22

## 2025-05-14 RX ORDER — PROCHLORPERAZINE EDISYLATE 5 MG/ML
10 INJECTION INTRAMUSCULAR; INTRAVENOUS ONCE
Status: DISCONTINUED | OUTPATIENT
Start: 2025-05-14 | End: 2025-05-22

## 2025-05-14 RX ADMIN — POLYETHYLENE GLYCOL 3350 17 G: 17 POWDER, FOR SOLUTION ORAL at 10:04

## 2025-05-14 RX ADMIN — HEPARIN SODIUM 5000 UNITS: 5000 INJECTION INTRAVENOUS; SUBCUTANEOUS at 20:59

## 2025-05-14 RX ADMIN — HEPARIN SODIUM 5000 UNITS: 5000 INJECTION INTRAVENOUS; SUBCUTANEOUS at 05:51

## 2025-05-14 RX ADMIN — SENNOSIDES AND DOCUSATE SODIUM 2 TABLET: 50; 8.6 TABLET ORAL at 18:39

## 2025-05-14 RX ADMIN — SODIUM CHLORIDE, SODIUM LACTATE, CALCIUM CHLORIDE, MAGNESIUM CHLORIDE AND DEXTROSE 3000 ML: 1.5; 538; 448; 18.3; 5.08 INJECTION, SOLUTION INTRAPERITONEAL at 21:05

## 2025-05-14 RX ADMIN — ALPRAZOLAM 2 MG: 0.5 TABLET ORAL at 00:13

## 2025-05-14 RX ADMIN — SODIUM CHLORIDE, PRESERVATIVE FREE 10 ML: 5 INJECTION INTRAVENOUS at 10:07

## 2025-05-14 RX ADMIN — ALBUTEROL SULFATE 2 PUFF: 90 AEROSOL, METERED RESPIRATORY (INHALATION) at 20:32

## 2025-05-14 RX ADMIN — SODIUM CHLORIDE, PRESERVATIVE FREE 10 ML: 5 INJECTION INTRAVENOUS at 21:01

## 2025-05-14 RX ADMIN — HYDRALAZINE HYDROCHLORIDE 50 MG: 50 TABLET ORAL at 05:51

## 2025-05-14 RX ADMIN — TROSPIUM CHLORIDE 20 MG: 20 TABLET, FILM COATED ORAL at 21:02

## 2025-05-14 RX ADMIN — HYDRALAZINE HYDROCHLORIDE 25 MG: 25 TABLET ORAL at 14:30

## 2025-05-14 RX ADMIN — CLOPIDOGREL BISULFATE 75 MG: 75 TABLET, FILM COATED ORAL at 10:03

## 2025-05-14 RX ADMIN — ROPINIROLE HYDROCHLORIDE 1 MG: 1 TABLET, FILM COATED ORAL at 21:02

## 2025-05-14 RX ADMIN — NIFEDIPINE 60 MG: 30 TABLET, FILM COATED, EXTENDED RELEASE ORAL at 10:05

## 2025-05-14 RX ADMIN — SEVELAMER CARBONATE 800 MG: 800 TABLET, FILM COATED ORAL at 17:31

## 2025-05-14 RX ADMIN — ALPRAZOLAM 2 MG: 0.5 TABLET ORAL at 10:11

## 2025-05-14 RX ADMIN — CARVEDILOL 12.5 MG: 12.5 TABLET, FILM COATED ORAL at 17:31

## 2025-05-14 RX ADMIN — ONDANSETRON 4 MG: 2 INJECTION, SOLUTION INTRAMUSCULAR; INTRAVENOUS at 22:34

## 2025-05-14 RX ADMIN — SEVELAMER CARBONATE 800 MG: 800 TABLET, FILM COATED ORAL at 12:48

## 2025-05-14 RX ADMIN — TIOTROPIUM BROMIDE AND OLODATEROL 2 PUFF: 3.124; 2.736 SPRAY, METERED RESPIRATORY (INHALATION) at 08:26

## 2025-05-14 RX ADMIN — HEPARIN SODIUM 5000 UNITS: 5000 INJECTION INTRAVENOUS; SUBCUTANEOUS at 14:29

## 2025-05-14 RX ADMIN — ASPIRIN 81 MG: 81 TABLET, CHEWABLE ORAL at 10:04

## 2025-05-14 RX ADMIN — TRAMADOL HYDROCHLORIDE 50 MG: 50 TABLET, COATED ORAL at 22:04

## 2025-05-14 RX ADMIN — POLYETHYLENE GLYCOL 3350 17 G: 17 POWDER, FOR SOLUTION ORAL at 20:58

## 2025-05-14 RX ADMIN — SEVELAMER CARBONATE 800 MG: 800 TABLET, FILM COATED ORAL at 10:04

## 2025-05-14 RX ADMIN — GENTAMICIN SULFATE: 1 CREAM TOPICAL at 21:04

## 2025-05-14 RX ADMIN — ALBUTEROL SULFATE 2 PUFF: 90 AEROSOL, METERED RESPIRATORY (INHALATION) at 08:24

## 2025-05-14 RX ADMIN — ROSUVASTATIN CALCIUM 10 MG: 10 TABLET, FILM COATED ORAL at 21:02

## 2025-05-14 RX ADMIN — ALPRAZOLAM 2 MG: 0.5 TABLET ORAL at 22:04

## 2025-05-14 ASSESSMENT — PAIN - FUNCTIONAL ASSESSMENT
PAIN_FUNCTIONAL_ASSESSMENT: ACTIVITIES ARE NOT PREVENTED
PAIN_FUNCTIONAL_ASSESSMENT: ACTIVITIES ARE NOT PREVENTED

## 2025-05-14 ASSESSMENT — PAIN DESCRIPTION - LOCATION
LOCATION: THROAT
LOCATION: NECK

## 2025-05-14 ASSESSMENT — PAIN SCALES - GENERAL
PAINLEVEL_OUTOF10: 0
PAINLEVEL_OUTOF10: 8
PAINLEVEL_OUTOF10: 4
PAINLEVEL_OUTOF10: 8
PAINLEVEL_OUTOF10: 8

## 2025-05-14 ASSESSMENT — PAIN DESCRIPTION - DESCRIPTORS
DESCRIPTORS: ACHING;DISCOMFORT
DESCRIPTORS: SORE

## 2025-05-14 ASSESSMENT — PAIN DESCRIPTION - FREQUENCY: FREQUENCY: INTERMITTENT

## 2025-05-14 ASSESSMENT — PAIN DESCRIPTION - ONSET: ONSET: ON-GOING

## 2025-05-14 ASSESSMENT — PAIN DESCRIPTION - PAIN TYPE: TYPE: ACUTE PAIN

## 2025-05-14 NOTE — PLAN OF CARE
Problem: Discharge Planning  Goal: Discharge to home or other facility with appropriate resources  Outcome: Progressing  Flowsheets (Taken 5/14/2025 1103)  Discharge to home or other facility with appropriate resources:   Identify barriers to discharge with patient and caregiver   Arrange for needed discharge resources and transportation as appropriate   Identify discharge learning needs (meds, wound care, etc)     Problem: ABCDS Injury Assessment  Goal: Absence of physical injury  Outcome: Progressing  Flowsheets (Taken 5/12/2025 1731)  Absence of Physical Injury: Implement safety measures based on patient assessment     Problem: Safety - Adult  Goal: Free from fall injury  5/14/2025 1103 by Kay Estrada RN  Outcome: Progressing  Flowsheets (Taken 5/12/2025 1731)  Free From Fall Injury: Instruct family/caregiver on patient safety  5/14/2025 0006 by Armando Escalera RN  Outcome: Progressing     Problem: Pain  Goal: Verbalizes/displays adequate comfort level or baseline comfort level  5/14/2025 1103 by Kay Estrada RN  Outcome: Progressing  Flowsheets (Taken 5/14/2025 1103)  Verbalizes/displays adequate comfort level or baseline comfort level:   Encourage patient to monitor pain and request assistance   Assess pain using appropriate pain scale   Implement non-pharmacological measures as appropriate and evaluate response   Administer analgesics based on type and severity of pain and evaluate response  5/14/2025 0006 by Armando Escalera RN  Outcome: Progressing     Problem: Genitourinary - Adult  Goal: Absence of urinary retention  5/14/2025 1103 by Kay Estrada RN  Outcome: Progressing  Flowsheets (Taken 5/14/2025 0006 by Armando Escalera RN)  Absence of urinary retention: Assess patient’s ability to void and empty bladder  5/14/2025 0006 by Armando Escalera RN  Outcome: Progressing  Flowsheets (Taken 5/14/2025 0006)  Absence of urinary retention: Assess patient’s ability to void and empty bladder

## 2025-05-14 NOTE — PROGRESS NOTES
[facility] - Physical Therapy   Phone: [phone]    Physical Therapy  Facility/Department:[address] PROGRESSIVE CARE    [x] Initial Evaluation            [] Daily Treatment Note         [] Discharge Summary      Patient: Jonelle Contreras   : 1950   MRN: [id number]   Date of Service:  2025  Staff Mobility Recommendation: RW x 1 with gait belt    AM-PAC score: 17/24  Discharge Recommendations: SNF    Jonelle Contreras scored a 17/24 on the AM-PAC short mobility form. Current research shows that an AM-PAC score of 17 or less is typically not associated with a discharge to the patient's home setting. Based on the patient's AM-PAC score and their current functional mobility deficits, it is recommended that the patient have 3-5 sessions per week of Physical Therapy at d/c to increase the patient's independence.  Please see assessment section for further patient specific details.    If patient discharges prior to next session this note will serve as a discharge summary.  Please see below for the latest assessment towards goals.      Admitting Diagnosis: Hypertensive urgency  Ordering Physician: Dr. Bentley  Current Admission Summary: Pt is a 74 y.o. female who presented to the ED with dizziness - noted to be hypertensive.    Past Medical History:  has a past medical history of acute intermittent porphyria, agoraphobic, Allergic rhinitis, Anxiety, Arthritis, Asthma, CAD (coronary artery disease), Chronic obstructive pulmonary disease, unspecified COPD type (HCC), ckd 4, collagenous colitis, COPD (chronic obstructive pulmonary disease) (HCC), Depression, Disease of blood and blood forming organ, GERD (gastroesophageal reflux disease), Heart disease, Hyperlipidemia, Hypertension, Left thyroid nodule, Migraines, Neuropathy, Osteoporosis, PAD (peripheral artery disease), Peripheral vascular disease, post menopausal, Prediabetes, Pulmonary nodules, Restless leg syndrome, STEMI involving oth coronary artery of

## 2025-05-14 NOTE — PROGRESS NOTES
[facility] - Occupational Therapy   Phone: [phone]    Occupational Therapy  Facility/Department:[facility] 5W PROGRESSIVE CARE    [x] Initial Evaluation            [] Daily Treatment Note         [] Discharge Summary      Patient: Jonelle Contreras   : 1950   MRN: [id number]   Date of Service:  2025    Staff Mobility Recommendation: RW x1 w/gait belt     AM-PAC Score:   Discharge Recommendations: SNF    Admitting Diagnosis:  Hypertensive urgency  Ordering Physician: Candido Bentley MD   Current Admission Summary: \"74-year-old lady with a past medical history significant for ESRD on PD, hypertension and CAD who presented for evaluation of hyperkalemia and hypertension. She had been at baseline health yesterday and referred to the emergency department because of hyperkalemia.  Her blood pressure has been elevated she is struggled with control outpatient.  She has had ambulatory blood pressure monitoring with systolic often in excess of 180.  She reports adherence with her home medication regimen.  She is not on any NSAIDs or decongestants.  She reports a headache today.  No chest pain or shortness of breath.  She does not report any fever or chills.\"    Past Medical History:  has a past medical history of acute intermittent porphyria, agoraphobic, Allergic rhinitis, Anxiety, Arthritis, Asthma, CAD (coronary artery disease), Chronic obstructive pulmonary disease, unspecified COPD type ([facility]), ckd 4, collagenous colitis, COPD (chronic obstructive pulmonary disease) (HCC), Depression, Disease of blood and blood forming organ, GERD (gastroesophageal reflux disease), Heart disease, Hyperlipidemia, Hypertension, Left thyroid nodule, Migraines, Neuropathy, Osteoporosis, PAD (peripheral artery disease), Peripheral vascular disease, post menopausal, Prediabetes, Pulmonary nodules, Restless leg syndrome, STEMI involving oth coronary artery of inferior wall ([facility]), and Urinary incontinence.  Past Surgical

## 2025-05-14 NOTE — PROGRESS NOTES
Patient ID: Jonelle Contreras  Referring/ Physician: Candido Bentley MD      Summary:   Jonelle Contreras is being seen by nephrology for ESRD and HTN.     Reason for admission: hypertensive urgency       Interval Hx:   Seen at bedside.   Awake and alert.   Headaches is better.   No edema.   Still no bowel movement.     BP  134/72  96% on 2 L    Na 141 K 4.9   BUN 81 > 75  Cr 6.5  Phos 6.1 > 5.7    Assessment/Plan:   - continue cycler inpatient 2 L x 3 exchanges over 8 hrs.   - add miralax and senna  - hold parameters on all meds for SBP < 130  - decrease hydralazine to 25 mg TID.           ESRD  ESRD secondary to hypertension.  She does peritoneal dialysis at home 1 exchange of 2 L 1.5% solutions daily.  Based on her most recent labs in the outpatient setting her adequacy is not at goal so we are going to be increasing her to 3 exchanges, 2 L at 1.5% solutions.  Will use the cycler while inpatient.  Euvolemic.    Resistant hypertension  Has had an overall negative secondary work up. Has been extremely difficult to manage outpatient as she reports \"bottoming out and passing out\" when her medications are increased.   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   She has had 24 hr ambulatory BP monitoring showing consistently elevated bP 180s -200s systolic.   Presenting with hypertensive urgency, having ongoing headaches blurry vision and fatigue.  CT head showed no stroke.,  Has chronic microvascular changes.  Troponin 66.no ECG changes.   Her home regimen : nifedpine 30 mg , losartan 100 mg aldactone 50 mg , coreg 12.5 mg BID. Clonidine 0./1 tID for SBP > 180    Renal artery duplex 2022 was negative.   Long time tobacco user, says she quit 2 months ago.   Riht adrenal gland normal, left adrenal gland has thickening, either adenoma or hyperplasia.       Hyperkalemia  5.7 at time of consult.   S/p lokelma   Due to ESRD and

## 2025-05-14 NOTE — PROGRESS NOTES
Internal Medicine Hospital Progress Note    Patient:  Jonelle Contreras 74 y.o. female MRN: [id number]     Date of Service: 5/14/2025    Allergy: Iodine, Iodinated contrast media, Wellbutrin [bupropion], Adhesive tape, Sertraline, and Sulfa antibiotics  CC: F/u:   Brief Course   Jonelle Contreras was admitted on 5/12/2025 for Hypertensive urgency. Jonelle Contreras has been admitted for 2 days    24 hr interval hx:  Patient feels better.     Objective     Physical Exam:  Vitals: BP (!) 152/67   Pulse 80   Temp 98.4 °F (36.9 °C) (Oral)   Resp 17   Ht 1.651 m (5' 5\")   Wt 60.2 kg (132 lb 11.5 oz)   SpO2 94%   BMI 22.09 kg/m²     General: Alert and oriented X3. No acute distress  Eyes: PEERL. No scleral icterus noted. Normal appearing conjunctiva bilaterally  Ears: Normal TM and external canal bilaterally.  Oral cavity/Throat: No pharyngeal erythema. No oral lesions.  Cardiovascular: Heart is regular rate and rhythm. No murmurs noted. Radial pulses 2+. Posterior tibial pulses 2+. No lower extremity edema noted  Pulmonary: Lungs clear to auscultation bilaterally  Skin: Dry, warm. No obvious skin lesions or rashes noted.  Neuro: PEERL. EOM intact. No facial droop. Normal sensation in bilateral upper and lower extremities. Gait is norm    Pertinent/ New Labs and Imaging Studies   Labs reviewed. Pertinent labs noted in assessment and plan      Assessment and Plan   Jonelle Contreras was admitted to hospital for Hypertensive urgency    Hypertensive urgency  - Nephrology consulted  - Continue hydralazine q8h   - Continue losartan 100 mg PO nightly  - Continue nifedipine extended release 60 mg PO BID  - Continue spironolactone 50 mg PO daily      ESRD on PD  - Nephrology consulted, managing PD orders    Hyperlipidemia  - Continue home statin    Restless leg syndrom  - Continue ropinirole    Anxiety  - Continue home alprazolam    COPD  - Continue albuterol and stiolto    Overactive bladder  - Continue home trospium    DVT  [Recurrent Ear Infections] : recurrent ear infections [Nasal Congestion] : nasal congestion [Snoring With Pauses] : snoring with pauses [Throat Infections] : throat infections [Negative] : Heme/Lymph

## 2025-05-14 NOTE — CONSULTS
Comprehensive Nutrition Assessment    Type and Reason for Visit:  Initial, Consult    Nutrition Recommendations/Plan:   Continue No Added Salt / Low Potassium  Add Nepro bid to start (pt prefers butter pecan)  Encouraged pt to contact Dietitian should any questions or problems arise with diet     Malnutrition Assessment:  Malnutrition Status:  At risk for malnutrition (05/14/25 3239)    Context:  Acute Illness     Findings of the 6 clinical characteristics of malnutrition:  Energy Intake:  75% or less of estimated energy requirements for 7 or more days  Weight Loss:  Unable to assess (fluid shifts r/t PD)     Body Fat Loss:  No body fat loss     Muscle Mass Loss:  No muscle mass loss    Fluid Accumulation:  No fluid accumulation     Strength:  Not Performed    Nutrition Assessment:    Consult received for diet education. PMH includes; CKD (4) on PD, heart stent, agoraphobia, COPD, HLD, HTN, Pre DM. Pt adm due to headache and blurry vision. Found to have Hypertensive Urgency. Diet adv to No Added Salt / Low Potassium. Pt reported a small appetite at this time. Agreed to adding Nepro bid to start. Feedback revealed that pt tries to follow a low potassium and low phosphorus diet but has trouble ensuring nutritional adequacy. Pt does not want to risk elevating K+ and Phos. Information given and discussed on healthy eating with CKD. Discussed importance of including a variety of nutrient dence choices to help ensure adequacy. Discussed label reading to help identify higher sources of sodium and potassium. Discussed portion control to help maintain labs, like using a smaller dinner plate. Comprehended per feedback. Appears motivated to follow diet prescribed. Will continue to follow progress.    Nutrition Related Findings:    Labs reviewed. Renal labs out of range, including K+ (which has improved from 7 to 4.9). Phos improved at 5.7 (Renevela on board).Noted no edema. Pt reported feeling constipated with last BM 4  days ago. MD addressing. Wound Type: None       Current Nutrition Intake & Therapies:    Average Meal Intake: 51-75%     ADULT DIET; Regular; No Added Salt (3-4 gm); Low Potassium (Less than 3000 mg/day)  ADULT ORAL NUTRITION SUPPLEMENT; Breakfast, Dinner; Renal Oral Supplement    Anthropometric Measures:  Height: 165.1 cm (5' 5\")  Ideal Body Weight (IBW): 125 lbs (57 kg)    Admission Body Weight: 64 kg (141 lb)  Current Body Weight: 60.3 kg (133 lb),   IBW. Weight Source: Bed scale  Current BMI (kg/m2): 22.1                             BMI Categories: Normal Weight (BMI 18.5-24.9)    Estimated Daily Nutrient Needs:        Energy (kcal/day): 3791-6667 (30-35 x ABW 60 kg (adj for PD)     Protein (g/day): 72-78 (1.2-1.3 x ABW 60 kg (adj for PD)     Fluid (ml/day): per Provider    Nutrition Diagnosis:   Food & nutrition-related knowledge deficit related to renal dysfunction as evidenced by other (pt report)  Inadequate oral intake related to inadequate protein-energy intake, decreased appetite as evidenced by poor intake prior to admission    Nutrition Interventions:   Food and/or Nutrient Delivery: Continue Current Diet, Start Oral Nutrition Supplement  Nutrition Education/Counseling: Education/Counseling completed  Coordination of Nutrition Care: Continue to monitor while inpatient       Goals:  Goals: PO intake 50% or greater  Type of Goal: New goal       Nutrition Monitoring and Evaluation:   Behavioral-Environmental Outcomes: None Identified  Food/Nutrient Intake Outcomes: Food and Nutrient Intake  Physical Signs/Symptoms Outcomes: Biochemical Data, Constipation, Diarrhea, Fluid Status or Edema, Skin, Weight    Discharge Planning:    Continue current diet (continue Nepro if intake < 50 %)     Emiliano Haywood RD, LD  Contact: [phone]

## 2025-05-14 NOTE — PROGRESS NOTES
Patient BP abnormal than usual at 109/64. Message sent to MD Bentley regarding BP and if BP meds should be held this morning. MD states to give nifedipine but hold coreg and spironolactone.    Call received from MD Pagan after AM med pass stating to start holding BP meds for any systolic BP less than 130. MD to add parameters on meds.

## 2025-05-14 NOTE — PLAN OF CARE
Problem: Safety - Adult  Goal: Free from fall injury  5/14/2025 0006 by Armando Escalera RN  Outcome: Progressing     Problem: Pain  Goal: Verbalizes/displays adequate comfort level or baseline comfort level  5/14/2025 0006 by Armando Escalera RN  Outcome: Progressing     Problem: Genitourinary - Adult  Goal: Absence of urinary retention  5/14/2025 0006 by Armando Escalera RN  Outcome: Progressing  Flowsheets (Taken 5/14/2025 0006)  Absence of urinary retention: Assess patient’s ability to void and empty bladder     Problem: Metabolic/Fluid and Electrolytes - Adult  Goal: Electrolytes maintained within normal limits  5/14/2025 0006 by Armando Escalera RN  Outcome: Progressing

## 2025-05-15 LAB
ALBUMIN SERPL-MCNC: 3.5 G/DL (ref 3.4–5)
ANION GAP SERPL CALCULATED.3IONS-SCNC: 11 MMOL/L (ref 3–16)
BASOPHILS # BLD: 0.1 K/UL (ref 0–0.2)
BASOPHILS NFR BLD: 0.7 %
BUN SERPL-MCNC: 68 MG/DL (ref 7–20)
CALCIUM SERPL-MCNC: 8.8 MG/DL (ref 8.3–10.6)
CHLORIDE SERPL-SCNC: 97 MMOL/L (ref 99–110)
CO2 SERPL-SCNC: 28 MMOL/L (ref 21–32)
CREAT SERPL-MCNC: 7.4 MG/DL (ref 0.6–1.2)
DEPRECATED RDW RBC AUTO: 16.5 % (ref 12.4–15.4)
EOSINOPHIL # BLD: 0.1 K/UL (ref 0–0.6)
EOSINOPHIL NFR BLD: 0.8 %
GFR SERPLBLD CREATININE-BSD FMLA CKD-EPI: 5 ML/MIN/{1.73_M2}
GLUCOSE SERPL-MCNC: 157 MG/DL (ref 70–99)
HCT VFR BLD AUTO: 33.8 % (ref 36–48)
HGB BLD-MCNC: 11.1 G/DL (ref 12–16)
LYMPHOCYTES # BLD: 0.8 K/UL (ref 1–5.1)
LYMPHOCYTES NFR BLD: 8.3 %
MCH RBC QN AUTO: 28.3 PG (ref 26–34)
MCHC RBC AUTO-ENTMCNC: 32.9 G/DL (ref 31–36)
MCV RBC AUTO: 86.2 FL (ref 80–100)
MONOCYTES # BLD: 0.5 K/UL (ref 0–1.3)
MONOCYTES NFR BLD: 4.9 %
NEUTROPHILS # BLD: 8 K/UL (ref 1.7–7.7)
NEUTROPHILS NFR BLD: 85.3 %
PHOSPHATE SERPL-MCNC: 4.8 MG/DL (ref 2.5–4.9)
PLATELET # BLD AUTO: 264 K/UL (ref 135–450)
PMV BLD AUTO: 9.2 FL (ref 5–10.5)
POTASSIUM SERPL-SCNC: 4.1 MMOL/L (ref 3.5–5.1)
RBC # BLD AUTO: 3.92 M/UL (ref 4–5.2)
SODIUM SERPL-SCNC: 136 MMOL/L (ref 136–145)
WBC # BLD AUTO: 9.4 K/UL (ref 4–11)

## 2025-05-15 PROCEDURE — 6360000002 HC RX W HCPCS: Performed by: STUDENT IN AN ORGANIZED HEALTH CARE EDUCATION/TRAINING PROGRAM

## 2025-05-15 PROCEDURE — 2700000000 HC OXYGEN THERAPY PER DAY

## 2025-05-15 PROCEDURE — 36415 COLL VENOUS BLD VENIPUNCTURE: CPT

## 2025-05-15 PROCEDURE — 99232 SBSQ HOSP IP/OBS MODERATE 35: CPT | Performed by: STUDENT IN AN ORGANIZED HEALTH CARE EDUCATION/TRAINING PROGRAM

## 2025-05-15 PROCEDURE — 2060000000 HC ICU INTERMEDIATE R&B

## 2025-05-15 PROCEDURE — 97116 GAIT TRAINING THERAPY: CPT

## 2025-05-15 PROCEDURE — 94761 N-INVAS EAR/PLS OXIMETRY MLT: CPT

## 2025-05-15 PROCEDURE — 2500000003 HC RX 250 WO HCPCS: Performed by: STUDENT IN AN ORGANIZED HEALTH CARE EDUCATION/TRAINING PROGRAM

## 2025-05-15 PROCEDURE — 6370000000 HC RX 637 (ALT 250 FOR IP): Performed by: INTERNAL MEDICINE

## 2025-05-15 PROCEDURE — 97530 THERAPEUTIC ACTIVITIES: CPT

## 2025-05-15 PROCEDURE — 6370000000 HC RX 637 (ALT 250 FOR IP): Performed by: STUDENT IN AN ORGANIZED HEALTH CARE EDUCATION/TRAINING PROGRAM

## 2025-05-15 PROCEDURE — 2580000003 HC RX 258: Performed by: INTERNAL MEDICINE

## 2025-05-15 PROCEDURE — 94640 AIRWAY INHALATION TREATMENT: CPT

## 2025-05-15 PROCEDURE — 80069 RENAL FUNCTION PANEL: CPT

## 2025-05-15 PROCEDURE — 90945 DIALYSIS ONE EVALUATION: CPT

## 2025-05-15 PROCEDURE — 85025 COMPLETE CBC W/AUTO DIFF WBC: CPT

## 2025-05-15 RX ORDER — NIFEDIPINE 30 MG/1
60 TABLET, EXTENDED RELEASE ORAL DAILY
Qty: 60 TABLET | Refills: 2 | Status: ON HOLD | OUTPATIENT
Start: 2025-05-16

## 2025-05-15 RX ORDER — FUROSEMIDE 10 MG/ML
40 INJECTION INTRAMUSCULAR; INTRAVENOUS ONCE
Status: COMPLETED | OUTPATIENT
Start: 2025-05-15 | End: 2025-05-15

## 2025-05-15 RX ORDER — HYDRALAZINE HYDROCHLORIDE 25 MG/1
25 TABLET, FILM COATED ORAL 3 TIMES DAILY
Qty: 90 TABLET | Refills: 3 | Status: ON HOLD | OUTPATIENT
Start: 2025-05-15

## 2025-05-15 RX ORDER — CARVEDILOL 12.5 MG/1
12.5 TABLET ORAL 2 TIMES DAILY WITH MEALS
Qty: 180 TABLET | Refills: 1 | Status: ON HOLD | OUTPATIENT
Start: 2025-05-15

## 2025-05-15 RX ORDER — SODIUM CHLORIDE, SODIUM LACTATE, CALCIUM CHLORIDE, MAGNESIUM CHLORIDE AND DEXTROSE 2.5; 538; 448; 18.3; 5.08 G/100ML; MG/100ML; MG/100ML; MG/100ML; MG/100ML
3000 INJECTION, SOLUTION INTRAPERITONEAL
Status: DISCONTINUED | OUTPATIENT
Start: 2025-05-15 | End: 2025-05-16

## 2025-05-15 RX ORDER — PROCHLORPERAZINE EDISYLATE 5 MG/ML
10 INJECTION INTRAMUSCULAR; INTRAVENOUS EVERY 6 HOURS PRN
Status: DISCONTINUED | OUTPATIENT
Start: 2025-05-15 | End: 2025-05-22

## 2025-05-15 RX ORDER — SODIUM CHLORIDE, SODIUM LACTATE, CALCIUM CHLORIDE, MAGNESIUM CHLORIDE AND DEXTROSE 2.5; 538; 448; 18.3; 5.08 G/100ML; MG/100ML; MG/100ML; MG/100ML; MG/100ML
3000 INJECTION, SOLUTION INTRAPERITONEAL CONTINUOUS
Status: DISCONTINUED | OUTPATIENT
Start: 2025-05-15 | End: 2025-05-15

## 2025-05-15 RX ORDER — SEVELAMER CARBONATE 800 MG/1
800 TABLET, FILM COATED ORAL
Qty: 90 TABLET | Refills: 0 | Status: ON HOLD | OUTPATIENT
Start: 2025-05-15

## 2025-05-15 RX ORDER — SPIRONOLACTONE 50 MG/1
50 TABLET, FILM COATED ORAL DAILY
Qty: 90 TABLET | Refills: 1 | Status: ON HOLD | OUTPATIENT
Start: 2025-05-15

## 2025-05-15 RX ADMIN — TIZANIDINE 4 MG: 4 TABLET ORAL at 15:38

## 2025-05-15 RX ADMIN — CARVEDILOL 12.5 MG: 12.5 TABLET, FILM COATED ORAL at 08:15

## 2025-05-15 RX ADMIN — LIDOCAINE HYDROCHLORIDE: 20 SOLUTION ORAL at 01:29

## 2025-05-15 RX ADMIN — ALPRAZOLAM 2 MG: 0.5 TABLET ORAL at 22:07

## 2025-05-15 RX ADMIN — TROSPIUM CHLORIDE 20 MG: 20 TABLET, FILM COATED ORAL at 22:07

## 2025-05-15 RX ADMIN — HEPARIN SODIUM 5000 UNITS: 5000 INJECTION INTRAVENOUS; SUBCUTANEOUS at 22:09

## 2025-05-15 RX ADMIN — PROCHLORPERAZINE EDISYLATE 10 MG: 5 INJECTION INTRAMUSCULAR; INTRAVENOUS at 01:29

## 2025-05-15 RX ADMIN — ROPINIROLE HYDROCHLORIDE 1 MG: 1 TABLET, FILM COATED ORAL at 22:08

## 2025-05-15 RX ADMIN — SODIUM CHLORIDE, PRESERVATIVE FREE 10 ML: 5 INJECTION INTRAVENOUS at 22:10

## 2025-05-15 RX ADMIN — TIOTROPIUM BROMIDE AND OLODATEROL 2 PUFF: 3.124; 2.736 SPRAY, METERED RESPIRATORY (INHALATION) at 07:50

## 2025-05-15 RX ADMIN — SODIUM CHLORIDE, PRESERVATIVE FREE 10 ML: 5 INJECTION INTRAVENOUS at 08:17

## 2025-05-15 RX ADMIN — SPIRONOLACTONE 50 MG: 25 TABLET ORAL at 08:16

## 2025-05-15 RX ADMIN — HEPARIN SODIUM 5000 UNITS: 5000 INJECTION INTRAVENOUS; SUBCUTANEOUS at 06:51

## 2025-05-15 RX ADMIN — HYDRALAZINE HYDROCHLORIDE 25 MG: 25 TABLET ORAL at 15:38

## 2025-05-15 RX ADMIN — ASPIRIN 81 MG: 81 TABLET, CHEWABLE ORAL at 08:16

## 2025-05-15 RX ADMIN — NIFEDIPINE 60 MG: 30 TABLET, FILM COATED, EXTENDED RELEASE ORAL at 08:16

## 2025-05-15 RX ADMIN — TRAMADOL HYDROCHLORIDE 50 MG: 50 TABLET, COATED ORAL at 11:57

## 2025-05-15 RX ADMIN — Medication 1 LOZENGE: at 22:20

## 2025-05-15 RX ADMIN — SODIUM CHLORIDE, SODIUM LACTATE, CALCIUM CHLORIDE, MAGNESIUM CHLORIDE AND DEXTROSE 3000 ML: 2.5; 538; 448; 18.3; 5.08 INJECTION, SOLUTION INTRAPERITONEAL at 23:00

## 2025-05-15 RX ADMIN — SEVELAMER CARBONATE 800 MG: 800 TABLET, FILM COATED ORAL at 16:41

## 2025-05-15 RX ADMIN — ALBUTEROL SULFATE 2 PUFF: 90 AEROSOL, METERED RESPIRATORY (INHALATION) at 20:04

## 2025-05-15 RX ADMIN — POLYETHYLENE GLYCOL 3350 17 G: 17 POWDER, FOR SOLUTION ORAL at 08:15

## 2025-05-15 RX ADMIN — FUROSEMIDE 40 MG: 10 INJECTION, SOLUTION INTRAMUSCULAR; INTRAVENOUS at 11:49

## 2025-05-15 RX ADMIN — HEPARIN SODIUM 5000 UNITS: 5000 INJECTION INTRAVENOUS; SUBCUTANEOUS at 15:38

## 2025-05-15 RX ADMIN — GENTAMICIN SULFATE: 1 CREAM TOPICAL at 22:10

## 2025-05-15 RX ADMIN — Medication 1 LOZENGE: at 10:16

## 2025-05-15 RX ADMIN — SEVELAMER CARBONATE 800 MG: 800 TABLET, FILM COATED ORAL at 11:49

## 2025-05-15 RX ADMIN — SEVELAMER CARBONATE 800 MG: 800 TABLET, FILM COATED ORAL at 08:15

## 2025-05-15 RX ADMIN — ROSUVASTATIN CALCIUM 10 MG: 10 TABLET, FILM COATED ORAL at 22:07

## 2025-05-15 RX ADMIN — HYDRALAZINE HYDROCHLORIDE 25 MG: 25 TABLET ORAL at 06:51

## 2025-05-15 RX ADMIN — CLOPIDOGREL BISULFATE 75 MG: 75 TABLET, FILM COATED ORAL at 08:16

## 2025-05-15 RX ADMIN — TIZANIDINE 4 MG: 4 TABLET ORAL at 22:08

## 2025-05-15 ASSESSMENT — PAIN DESCRIPTION - LOCATION
LOCATION: MOUTH;THROAT
LOCATION: HEAD
LOCATION: NECK

## 2025-05-15 ASSESSMENT — PAIN DESCRIPTION - ORIENTATION
ORIENTATION: MID
ORIENTATION: LEFT

## 2025-05-15 ASSESSMENT — PAIN SCALES - WONG BAKER: WONGBAKER_NUMERICALRESPONSE: NO HURT

## 2025-05-15 ASSESSMENT — PAIN DESCRIPTION - DESCRIPTORS
DESCRIPTORS: SHARP
DESCRIPTORS: DISCOMFORT

## 2025-05-15 ASSESSMENT — PAIN SCALES - GENERAL
PAINLEVEL_OUTOF10: 7

## 2025-05-15 NOTE — PROGRESS NOTES
[facility] - Physical Therapy   Phone: [phone]    Physical Therapy  Facility/Department:[address] PROGRESSIVE CARE    [] Initial Evaluation            [x] Daily Treatment Note         [] Discharge Summary      Patient: Jonelle Contreras   : 1950   MRN: [id number]   Date of Service:  5/15/2025  Staff Mobility Recommendation: RW x 1 with gait belt    AM-PAC score: 17/24  Discharge Recommendations: SNF    Joenlle Contreras scored a 17/24 on the AM-PAC short mobility form. Current research shows that an AM-PAC score of 17 or less is typically not associated with a discharge to the patient's home setting. Based on the patient's AM-PAC score and their current functional mobility deficits, it is recommended that the patient have 3-5 sessions per week of Physical Therapy at d/c to increase the patient's independence.  Please see assessment section for further patient specific details.    If patient discharges prior to next session this note will serve as a discharge summary.  Please see below for the latest assessment towards goals.      Admitting Diagnosis: Hypertensive urgency  Ordering Physician: Dr. Bentley  Current Admission Summary: Pt is a 74 y.o. female who presented to the ED with dizziness - noted to be hypertensive.    Past Medical History:  has a past medical history of acute intermittent porphyria, agoraphobic, Allergic rhinitis, Anxiety, Arthritis, Asthma, CAD (coronary artery disease), Chronic obstructive pulmonary disease, unspecified COPD type (HCC), ckd 4, collagenous colitis, COPD (chronic obstructive pulmonary disease) (HCC), Depression, Disease of blood and blood forming organ, GERD (gastroesophageal reflux disease), Heart disease, Hyperlipidemia, Hypertension, Left thyroid nodule, Migraines, Neuropathy, Osteoporosis, PAD (peripheral artery disease), Peripheral vascular disease, post menopausal, Prediabetes, Pulmonary nodules, Restless leg syndrome, STEMI involving oth coronary artery of  Minutes: 24 Minutes    Minutes per charge:  Therapeutic activity: 10 minutes  Gait trainin minutes    Electronically signed by Arleth Moreira PT on 5/15/2025 at 3:25 PM   Arleth Moreira PT, DPT [id number]

## 2025-05-15 NOTE — PROGRESS NOTES
Internal Medicine Hospital Progress Note    Patient:  Jonelle Conrteras 74 y.o. female MRN: [id number]     Date of Service: 5/15/2025    Allergy: Iodine, Iodinated contrast media, Wellbutrin [bupropion], Adhesive tape, Sertraline, and Sulfa antibiotics  CC: F/u:   Brief Course   Jonelle Contreras was admitted on 5/12/2025 for Hypertensive urgency. Jonelle Contreras has been admitted for 3 days    24 hr interval hx:  Patient feels better.     Objective     Physical Exam:  Vitals: BP (!) 150/74   Pulse 83   Temp 98 °F (36.7 °C) (Oral)   Resp 18   Ht 1.651 m (5' 5\")   Wt 60.4 kg (133 lb 2.5 oz)   SpO2 93%   BMI 22.16 kg/m²     General: Alert and oriented X3. No acute distress  Eyes: PEERL. No scleral icterus noted. Normal appearing conjunctiva bilaterally  Ears: Normal TM and external canal bilaterally.  Oral cavity/Throat: No pharyngeal erythema. No oral lesions.  Cardiovascular: Heart is regular rate and rhythm. No murmurs noted. Radial pulses 2+. Posterior tibial pulses 2+. No lower extremity edema noted  Pulmonary: Lungs clear to auscultation bilaterally  Skin: Dry, warm. No obvious skin lesions or rashes noted.  Neuro: PEERL. EOM intact. No facial droop. Normal sensation in bilateral upper and lower extremities. Gait is norm    Pertinent/ New Labs and Imaging Studies   Labs reviewed. Pertinent labs noted in assessment and plan      Assessment and Plan   Jonelle Contreras was admitted to hospital for Hypertensive urgency    Hypertensive urgency  - Nephrology consulted  - Continue hydralazine q8h   - Continue losartan 100 mg PO nightly  - Continue nifedipine extended release 60 mg PO BID  - Continue spironolactone 50 mg PO daily      ESRD on PD  - Nephrology consulted, managing PD orders    Hyperlipidemia  - Continue home statin    Restless leg syndrom  - Continue ropinirole    Anxiety  - Continue home alprazolam    COPD  - Continue albuterol and stiolto    Overactive bladder  - Continue home trospium    DVT Prophylaxis:  heparin  Code:Full  Disposition: Bp control    Candido Bentley MD  [facility]  Office [phone]  Cell [phone]

## 2025-05-15 NOTE — DISCHARGE INSTRUCTIONS
Communication from Nephrology:   Your BP regimen was adjusted with the goal to have your BP be < 150/80. Please plan to check your BP before taking medications and skip the dose if your top BP reading is less than 130.   Renvela (sevelamer) was started. You take one pill with each meal. This is to lower phosphorus levels.   You have scheduled follow up with me at [facility] dialysis unit.     Raheem Sepulveda [facility]  [address] [place] [id number]  Phone: [phone]  Fax: [phone]

## 2025-05-15 NOTE — PROGRESS NOTES
Pt reports headache- RN aware   Pain Interventions: RN notified, repositioned , and therapy activities modified    Home Environment / Functional History  Lives With: Alone  Home Layout: Multi-level, Bed/Bath upstairs, Work area in basement  Home Access: Stairs to enter with rails  Entrance Stairs - Number of Steps: 5  Entrance Stairs - Rails: Left  Bathroom Shower/Tub: Tub only  Bathroom Toilet: Handicap height  Bathroom Equipment: None  Bathroom Accessibility: Wheelchair accessible  Home Equipment:  (tri-walker)  Has the patient had two or more falls in the past year or any fall with injury in the past year?: Yes  Receives Help From: Neighbor  Prior Level of Assist for ADLs: Independent  Prior Level of Assist for Homemaking: Needs assistance (yard work, heavy lifting, house work)  Prior Level of Assist for Ambulation: Independent household ambulator, with or without device  Prior Level of Assist for Transfers: Independent  Active : Yes  Mode of Transportation: Car  Occupation: Retired  Type of Occupation: Manager for [employer]  Leisure & Hobbies: Walks    Available Assistance at Discharge: available PRN    Examination   Vision:   Vision: Within Functional Limits     Hearing:   Hearing: Within functional limits     ROM:   (B) UE AROM WFL  Strength:   (B) UE strength grossly WFL    Therapist Clinical Decision Making (Complexity): medium complexity       Activities of Daily Living  Basic Activities of Daily Living  Feeding: setup assistance  Feeding Comments: seated in recliner to eat lunch   Grooming: contact guard assistance  Grooming Comments: standing at sink to complete oral care  General Comments: Anticipate pt to require min-modA for LB ADLs and CGA/Shamir for UB ADLs based on strength, balance, and endurance observed this date.      Instrumental Activities of Daily Living  No IADL completed on this date.    Functional Mobility  Bed Mobility:  Supine to Sit: stand by assistance, use of bed rail, head  of bed elevated  Comments: pt seated in recliner at end of session  Transfers:  Sit to stand transfer:contact guard assistance  Stand to sit transfer: contact guard assistance  Comments: EOB and recliner chair to/from RW w/cues for hand placement   Functional Mobility  Functional Mobility Activity: bed>sink>recliner   Device Use: rolling walker  Required Assistance: contact guard assistance  Comments: 2x posterior leans noted requiring up to Shamir to correct; no overt LOB noted however  Balance:  Static Sitting Balance: fair (+): maintains balance at SBA/supervision without use of UE support  Static Standing Balance: fair (-): maintains balance at CGA with use of UE support  Dynamic Standing Balance: fair (-): maintains balance at CGA with use of UE support  Comments: seated EOB and in stance at RW during fxl mobility and sink side grooming    Cognition  WFL  Orientation:    alert and oriented x 4     Education  Barriers To Learning: none  Patient Education: patient educated on goals, OT role and benefits, plan of care, energy conservation, transfer training, discharge recommendations, extensive time spent educating pt and family on therapy discharge recommendations   Learning Assessment:  patient verbalizes and demonstrates understanding  Safety Interventions: patient at risk for falls, call light within reach, patient left in chair, chair alarm in place, gait belt, and nurse notified    Plan  Frequency: 3-5 x/week  Current Treatment Recommendations: strengthening, ROM, balance training, functional mobility training, transfer training, endurance training, patient/caregiver education, ADL/self-care training, safety education, and equipment evaluation/education    Goals  Patient Goals: to get stronger    Short Term Goals:  Time Frame: By acute discharge  Patient will complete lower body dressing with supervision   Patient will complete toileting with supervision  Patient will complete functional transfers with

## 2025-05-15 NOTE — PLAN OF CARE
Problem: Discharge Planning  Goal: Discharge to home or other facility with appropriate resources  5/15/2025 1751 by Reinier Wynn RN  Outcome: Progressing  5/15/2025 0413 by Song Irvin RN  Outcome: Progressing     Problem: ABCDS Injury Assessment  Goal: Absence of physical injury  5/15/2025 1751 by Reinier Wynn RN  Outcome: Progressing  5/15/2025 0413 by Song Irvin RN  Outcome: Progressing     Problem: Safety - Adult  Goal: Free from fall injury  5/15/2025 1751 by Reinier Wynn RN  Outcome: Progressing  5/15/2025 0413 by Song Irvin RN  Outcome: Progressing     Problem: Pain  Goal: Verbalizes/displays adequate comfort level or baseline comfort level  5/15/2025 1751 by Reinier Wynn RN  Outcome: Progressing  5/15/2025 0413 by Song Irvin RN  Outcome: Progressing     Problem: Genitourinary - Adult  Goal: Absence of urinary retention  5/15/2025 1751 by Reinier Wynn RN  Outcome: Progressing  5/15/2025 0413 by Song Irvin RN  Outcome: Progressing     Problem: Metabolic/Fluid and Electrolytes - Adult  Goal: Electrolytes maintained within normal limits  5/15/2025 1751 by Reinier Wynn RN  Outcome: Progressing  5/15/2025 0413 by Song Irvin RN  Outcome: Progressing     Problem: Nutrition Deficit:  Goal: Optimize nutritional status  5/15/2025 1751 by Reinier Wynn RN  Outcome: Progressing  5/15/2025 0413 by Song Irvin RN  Outcome: Progressing

## 2025-05-15 NOTE — PROGRESS NOTES
Patient PD cycler stopped at 0700 for low flow alert. 685 mL of dwell remaining. Nephrology notified. Stated that it is okay to finish the treatment. Patient disconnected from PD and PD access capped.

## 2025-05-15 NOTE — CARE COORDINATION
DISCHARGE PLANNING:    Spoke to patient at bedside regarding discharge planning.  SNF list left with patient at bedside.  Explained discharge process.  Patient wants to speak with her family. Will follow up with family tomorrow for choices.    The Plan for Transition of Care is related to the following treatment goals: strengthening    The Patient was provided with a choice of provider and agrees   with the discharge plan. [x] Yes [] No    Freedom of choice list was provided with basic dialogue that supports the patient's individualized plan of care/goals, treatment preferences and shares the quality data associated with the providers. [x] Yes [] No    Electronically signed by Claudia Owen RN on 5/15/2025 at 3:40 PM

## 2025-05-15 NOTE — PROGRESS NOTES
Patient ID: Jonelle Contreras  Referring/ Physician: Candido Bentley MD      Summary:   Jonelle Contreras is being seen by nephrology for ESRD and HTN.     Reason for admission: hypertensive urgency       Interval Hx:   Seen at bedside.   Says the Gi cocktail she had yesterday irritated her throat.   Daughter and son at bedside.   Negative  cc so she held on to some fluids.   On lasix. IV 40 mg once.     BP has been under 180 systolic consistently past 24 hrs.     K 4.1   BUN 68   Ca 8.8  Phos 4.8       Assessment/Plan:   - current bP regimen working well, continue.   - change dianeal to 2.5 % to help with fluid removal. She has some plueral effusions and on oxygen.   - ok with discharge from a renal aspect.         ESRD  ESRD secondary to hypertension.  She does peritoneal dialysis at home 1 exchange of 2 L 1.5% solutions daily.  Based on her most recent labs in the outpatient setting her adequacy is not at goal so we are going to be increasing her to 3 exchanges, 2 L at 1.5% solutions.  Will use the cycler while inpatient.  Euvolemic.    Resistant hypertension  Has had an overall negative secondary work up. Has been extremely difficult to manage outpatient as she reports \"bottoming out and passing out\" when her medications are increased.   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   She has had 24 hr ambulatory BP monitoring showing consistently elevated bP 180s -200s systolic.   Presenting with hypertensive urgency, having ongoing headaches blurry vision and fatigue.  CT head showed no stroke.,  Has chronic microvascular changes.  Troponin 66.no ECG changes.   Her home regimen : nifedpine 30 mg , losartan 100 mg aldactone 50 mg , coreg 12.5 mg BID. Clonidine 0./1 tID for SBP > 180    Renal artery duplex 2022 was negative.   Long time tobacco user, says she quit 2 months ago.   Riht adrenal gland normal, left

## 2025-05-15 NOTE — PLAN OF CARE
Problem: Discharge Planning  Goal: Discharge to home or other facility with appropriate resources  Outcome: Progressing     Problem: ABCDS Injury Assessment  Goal: Absence of physical injury  Outcome: Progressing     Problem: Safety - Adult  Goal: Free from fall injury  Outcome: Progressing     Problem: Pain  Goal: Verbalizes/displays adequate comfort level or baseline comfort level  Outcome: Progressing     Problem: Genitourinary - Adult  Goal: Absence of urinary retention  Outcome: Progressing     Problem: Metabolic/Fluid and Electrolytes - Adult  Goal: Electrolytes maintained within normal limits  Outcome: Progressing     Problem: Nutrition Deficit:  Goal: Optimize nutritional status  Outcome: Progressing

## 2025-05-16 ENCOUNTER — APPOINTMENT (OUTPATIENT)
Dept: CT IMAGING | Age: 75
End: 2025-05-16
Payer: MEDICARE

## 2025-05-16 LAB
ALBUMIN SERPL-MCNC: 3.4 G/DL (ref 3.4–5)
ANION GAP SERPL CALCULATED.3IONS-SCNC: 14 MMOL/L (ref 3–16)
BASOPHILS # BLD: 0 K/UL (ref 0–0.2)
BASOPHILS NFR BLD: 0.3 %
BUN SERPL-MCNC: 75 MG/DL (ref 7–20)
CALCIUM SERPL-MCNC: 9 MG/DL (ref 8.3–10.6)
CHLORIDE SERPL-SCNC: 98 MMOL/L (ref 99–110)
CO2 SERPL-SCNC: 26 MMOL/L (ref 21–32)
CREAT SERPL-MCNC: 8.5 MG/DL (ref 0.6–1.2)
DEPRECATED RDW RBC AUTO: 16.1 % (ref 12.4–15.4)
EOSINOPHIL # BLD: 0.4 K/UL (ref 0–0.6)
EOSINOPHIL NFR BLD: 5.1 %
GFR SERPLBLD CREATININE-BSD FMLA CKD-EPI: 5 ML/MIN/{1.73_M2}
GLUCOSE SERPL-MCNC: 88 MG/DL (ref 70–99)
HCT VFR BLD AUTO: 30.6 % (ref 36–48)
HGB BLD-MCNC: 10 G/DL (ref 12–16)
LYMPHOCYTES # BLD: 1.5 K/UL (ref 1–5.1)
LYMPHOCYTES NFR BLD: 17.7 %
MCH RBC QN AUTO: 28.1 PG (ref 26–34)
MCHC RBC AUTO-ENTMCNC: 32.5 G/DL (ref 31–36)
MCV RBC AUTO: 86.5 FL (ref 80–100)
MONOCYTES # BLD: 0.6 K/UL (ref 0–1.3)
MONOCYTES NFR BLD: 7.3 %
NEUTROPHILS # BLD: 5.8 K/UL (ref 1.7–7.7)
NEUTROPHILS NFR BLD: 69.6 %
PHOSPHATE SERPL-MCNC: 6 MG/DL (ref 2.5–4.9)
PLATELET # BLD AUTO: 251 K/UL (ref 135–450)
PMV BLD AUTO: 8.9 FL (ref 5–10.5)
POTASSIUM SERPL-SCNC: 4.9 MMOL/L (ref 3.5–5.1)
RBC # BLD AUTO: 3.54 M/UL (ref 4–5.2)
SODIUM SERPL-SCNC: 138 MMOL/L (ref 136–145)
WBC # BLD AUTO: 8.4 K/UL (ref 4–11)

## 2025-05-16 PROCEDURE — 85025 COMPLETE CBC W/AUTO DIFF WBC: CPT

## 2025-05-16 PROCEDURE — 2580000003 HC RX 258: Performed by: INTERNAL MEDICINE

## 2025-05-16 PROCEDURE — 71250 CT THORAX DX C-: CPT

## 2025-05-16 PROCEDURE — 6370000000 HC RX 637 (ALT 250 FOR IP): Performed by: INTERNAL MEDICINE

## 2025-05-16 PROCEDURE — 36415 COLL VENOUS BLD VENIPUNCTURE: CPT

## 2025-05-16 PROCEDURE — 6360000002 HC RX W HCPCS: Performed by: STUDENT IN AN ORGANIZED HEALTH CARE EDUCATION/TRAINING PROGRAM

## 2025-05-16 PROCEDURE — 90945 DIALYSIS ONE EVALUATION: CPT

## 2025-05-16 PROCEDURE — 99232 SBSQ HOSP IP/OBS MODERATE 35: CPT | Performed by: STUDENT IN AN ORGANIZED HEALTH CARE EDUCATION/TRAINING PROGRAM

## 2025-05-16 PROCEDURE — 6370000000 HC RX 637 (ALT 250 FOR IP): Performed by: STUDENT IN AN ORGANIZED HEALTH CARE EDUCATION/TRAINING PROGRAM

## 2025-05-16 PROCEDURE — 97116 GAIT TRAINING THERAPY: CPT

## 2025-05-16 PROCEDURE — 2500000003 HC RX 250 WO HCPCS: Performed by: STUDENT IN AN ORGANIZED HEALTH CARE EDUCATION/TRAINING PROGRAM

## 2025-05-16 PROCEDURE — 97530 THERAPEUTIC ACTIVITIES: CPT

## 2025-05-16 PROCEDURE — 94761 N-INVAS EAR/PLS OXIMETRY MLT: CPT

## 2025-05-16 PROCEDURE — 80069 RENAL FUNCTION PANEL: CPT

## 2025-05-16 PROCEDURE — 2700000000 HC OXYGEN THERAPY PER DAY

## 2025-05-16 PROCEDURE — 94640 AIRWAY INHALATION TREATMENT: CPT

## 2025-05-16 PROCEDURE — 2060000000 HC ICU INTERMEDIATE R&B

## 2025-05-16 RX ORDER — IPRATROPIUM BROMIDE AND ALBUTEROL SULFATE 2.5; .5 MG/3ML; MG/3ML
1 SOLUTION RESPIRATORY (INHALATION)
Status: DISCONTINUED | OUTPATIENT
Start: 2025-05-16 | End: 2025-05-21

## 2025-05-16 RX ORDER — AZITHROMYCIN 250 MG/1
250 TABLET, FILM COATED ORAL EVERY EVENING
Status: COMPLETED | OUTPATIENT
Start: 2025-05-17 | End: 2025-05-20

## 2025-05-16 RX ORDER — TORSEMIDE 100 MG/1
100 TABLET ORAL DAILY
Status: DISCONTINUED | OUTPATIENT
Start: 2025-05-16 | End: 2025-05-30 | Stop reason: HOSPADM

## 2025-05-16 RX ORDER — SODIUM CHLORIDE, SODIUM LACTATE, CALCIUM CHLORIDE, MAGNESIUM CHLORIDE AND DEXTROSE 2.5; 538; 448; 18.3; 5.08 G/100ML; MG/100ML; MG/100ML; MG/100ML; MG/100ML
6000 INJECTION, SOLUTION INTRAPERITONEAL NIGHTLY
Status: DISCONTINUED | OUTPATIENT
Start: 2025-05-16 | End: 2025-05-19

## 2025-05-16 RX ORDER — SODIUM CHLORIDE, SODIUM LACTATE, CALCIUM CHLORIDE, MAGNESIUM CHLORIDE AND DEXTROSE 2.5; 538; 448; 18.3; 5.08 G/100ML; MG/100ML; MG/100ML; MG/100ML; MG/100ML
2000 INJECTION, SOLUTION INTRAPERITONEAL ONCE
Status: COMPLETED | OUTPATIENT
Start: 2025-05-16 | End: 2025-05-16

## 2025-05-16 RX ORDER — AZITHROMYCIN 500 MG/1
500 TABLET, FILM COATED ORAL ONCE
Status: COMPLETED | OUTPATIENT
Start: 2025-05-16 | End: 2025-05-16

## 2025-05-16 RX ORDER — TRAMADOL HYDROCHLORIDE 50 MG/1
50 TABLET ORAL EVERY 12 HOURS PRN
Status: DISCONTINUED | OUTPATIENT
Start: 2025-05-16 | End: 2025-05-30 | Stop reason: HOSPADM

## 2025-05-16 RX ADMIN — HYDRALAZINE HYDROCHLORIDE 25 MG: 25 TABLET ORAL at 06:00

## 2025-05-16 RX ADMIN — SODIUM CHLORIDE, SODIUM LACTATE, CALCIUM CHLORIDE, MAGNESIUM CHLORIDE AND DEXTROSE 3000 ML: 2.5; 538; 448; 18.3; 5.08 INJECTION, SOLUTION INTRAPERITONEAL at 00:06

## 2025-05-16 RX ADMIN — HYDRALAZINE HYDROCHLORIDE 25 MG: 25 TABLET ORAL at 14:13

## 2025-05-16 RX ADMIN — Medication 1 LOZENGE: at 06:08

## 2025-05-16 RX ADMIN — TIZANIDINE 4 MG: 4 TABLET ORAL at 10:00

## 2025-05-16 RX ADMIN — CARVEDILOL 12.5 MG: 12.5 TABLET, FILM COATED ORAL at 10:00

## 2025-05-16 RX ADMIN — TRAMADOL HYDROCHLORIDE 50 MG: 50 TABLET, COATED ORAL at 22:07

## 2025-05-16 RX ADMIN — ALPRAZOLAM 2 MG: 0.5 TABLET ORAL at 22:07

## 2025-05-16 RX ADMIN — HYDRALAZINE HYDROCHLORIDE 25 MG: 25 TABLET ORAL at 21:02

## 2025-05-16 RX ADMIN — AZITHROMYCIN 500 MG: 500 TABLET, FILM COATED ORAL at 17:40

## 2025-05-16 RX ADMIN — WATER 1000 MG: 1 INJECTION INTRAMUSCULAR; INTRAVENOUS; SUBCUTANEOUS at 17:28

## 2025-05-16 RX ADMIN — IPRATROPIUM BROMIDE AND ALBUTEROL SULFATE 1 DOSE: 2.5; .5 SOLUTION RESPIRATORY (INHALATION) at 13:42

## 2025-05-16 RX ADMIN — DIPHENHYDRAMINE HYDROCHLORIDE 5 ML: 12.5 LIQUID ORAL at 18:30

## 2025-05-16 RX ADMIN — ASPIRIN 81 MG: 81 TABLET, CHEWABLE ORAL at 10:00

## 2025-05-16 RX ADMIN — SEVELAMER CARBONATE 800 MG: 800 TABLET, FILM COATED ORAL at 10:00

## 2025-05-16 RX ADMIN — SODIUM CHLORIDE, SODIUM LACTATE, CALCIUM CHLORIDE, MAGNESIUM CHLORIDE AND DEXTROSE 2000 ML: 2.5; 538; 448; 18.3; 5.08 INJECTION, SOLUTION INTRAPERITONEAL at 10:54

## 2025-05-16 RX ADMIN — IPRATROPIUM BROMIDE AND ALBUTEROL SULFATE 1 DOSE: 2.5; .5 SOLUTION RESPIRATORY (INHALATION) at 19:19

## 2025-05-16 RX ADMIN — SODIUM CHLORIDE, SODIUM LACTATE, CALCIUM CHLORIDE, MAGNESIUM CHLORIDE AND DEXTROSE 6000 ML: 2.5; 538; 448; 18.3; 5.08 INJECTION, SOLUTION INTRAPERITONEAL at 21:48

## 2025-05-16 RX ADMIN — CLOPIDOGREL BISULFATE 75 MG: 75 TABLET, FILM COATED ORAL at 10:00

## 2025-05-16 RX ADMIN — Medication 1 LOZENGE: at 00:22

## 2025-05-16 RX ADMIN — LOSARTAN POTASSIUM 100 MG: 100 TABLET, FILM COATED ORAL at 21:02

## 2025-05-16 RX ADMIN — CARVEDILOL 12.5 MG: 12.5 TABLET, FILM COATED ORAL at 16:35

## 2025-05-16 RX ADMIN — ROPINIROLE HYDROCHLORIDE 1 MG: 1 TABLET, FILM COATED ORAL at 21:02

## 2025-05-16 RX ADMIN — TORSEMIDE 100 MG: 100 TABLET ORAL at 10:00

## 2025-05-16 RX ADMIN — TIOTROPIUM BROMIDE AND OLODATEROL 2 PUFF: 3.124; 2.736 SPRAY, METERED RESPIRATORY (INHALATION) at 13:40

## 2025-05-16 RX ADMIN — ROSUVASTATIN CALCIUM 10 MG: 10 TABLET, FILM COATED ORAL at 21:02

## 2025-05-16 RX ADMIN — TROSPIUM CHLORIDE 20 MG: 20 TABLET, FILM COATED ORAL at 21:02

## 2025-05-16 RX ADMIN — HEPARIN SODIUM 5000 UNITS: 5000 INJECTION INTRAVENOUS; SUBCUTANEOUS at 21:02

## 2025-05-16 RX ADMIN — NIFEDIPINE 60 MG: 30 TABLET, FILM COATED, EXTENDED RELEASE ORAL at 10:00

## 2025-05-16 RX ADMIN — SPIRONOLACTONE 50 MG: 25 TABLET ORAL at 10:00

## 2025-05-16 RX ADMIN — SENNOSIDES AND DOCUSATE SODIUM 2 TABLET: 50; 8.6 TABLET ORAL at 00:16

## 2025-05-16 RX ADMIN — SEVELAMER CARBONATE 800 MG: 800 TABLET, FILM COATED ORAL at 16:35

## 2025-05-16 RX ADMIN — HEPARIN SODIUM 5000 UNITS: 5000 INJECTION INTRAVENOUS; SUBCUTANEOUS at 14:13

## 2025-05-16 RX ADMIN — Medication 1 LOZENGE: at 16:35

## 2025-05-16 RX ADMIN — HEPARIN SODIUM 5000 UNITS: 5000 INJECTION INTRAVENOUS; SUBCUTANEOUS at 06:00

## 2025-05-16 RX ADMIN — SEVELAMER CARBONATE 800 MG: 800 TABLET, FILM COATED ORAL at 14:13

## 2025-05-16 RX ADMIN — ONDANSETRON 4 MG: 2 INJECTION, SOLUTION INTRAMUSCULAR; INTRAVENOUS at 17:23

## 2025-05-16 ASSESSMENT — PAIN DESCRIPTION - ORIENTATION
ORIENTATION: MID

## 2025-05-16 ASSESSMENT — PAIN DESCRIPTION - LOCATION
LOCATION: THROAT
LOCATION: NECK;THROAT
LOCATION: THROAT
LOCATION: THROAT

## 2025-05-16 ASSESSMENT — PAIN DESCRIPTION - DESCRIPTORS
DESCRIPTORS: BURNING;THROBBING;STABBING
DESCRIPTORS: ACHING;SHARP;BURNING
DESCRIPTORS: ACHING
DESCRIPTORS: DISCOMFORT

## 2025-05-16 ASSESSMENT — PAIN - FUNCTIONAL ASSESSMENT
PAIN_FUNCTIONAL_ASSESSMENT: PREVENTS OR INTERFERES SOME ACTIVE ACTIVITIES AND ADLS
PAIN_FUNCTIONAL_ASSESSMENT: ACTIVITIES ARE NOT PREVENTED
PAIN_FUNCTIONAL_ASSESSMENT: PREVENTS OR INTERFERES SOME ACTIVE ACTIVITIES AND ADLS

## 2025-05-16 ASSESSMENT — PAIN SCALES - GENERAL
PAINLEVEL_OUTOF10: 0
PAINLEVEL_OUTOF10: 0
PAINLEVEL_OUTOF10: 10
PAINLEVEL_OUTOF10: 4
PAINLEVEL_OUTOF10: 8
PAINLEVEL_OUTOF10: 4

## 2025-05-16 ASSESSMENT — PAIN SCALES - WONG BAKER
WONGBAKER_NUMERICALRESPONSE: NO HURT
WONGBAKER_NUMERICALRESPONSE: NO HURT

## 2025-05-16 NOTE — PLAN OF CARE
Problem: Discharge Planning  Goal: Discharge to home or other facility with appropriate resources  5/16/2025 0337 by Daphne Gutierrez RN  Outcome: Progressing  Flowsheets (Taken 5/16/2025 0337)  Discharge to home or other facility with appropriate resources:   Identify barriers to discharge with patient and caregiver   Arrange for needed discharge resources and transportation as appropriate   Identify discharge learning needs (meds, wound care, etc)     Problem: ABCDS Injury Assessment  Goal: Absence of physical injury  5/16/2025 0337 by Daphne Gutierrez RN  Outcome: Progressing  Flowsheets (Taken 5/16/2025 0337)  Absence of Physical Injury: Implement safety measures based on patient assessment     Problem: Safety - Adult  Goal: Free from fall injury  5/16/2025 0337 by Daphne Gutierrez RN  Outcome: Progressing  Flowsheets (Taken 5/16/2025 0337)  Free From Fall Injury: Instruct family/caregiver on patient safety     Problem: Pain  Goal: Verbalizes/displays adequate comfort level or baseline comfort level  5/16/2025 0337 by Daphne Gutierrez RN  Outcome: Progressing  Flowsheets (Taken 5/16/2025 0337)  Verbalizes/displays adequate comfort level or baseline comfort level:   Encourage patient to monitor pain and request assistance   Assess pain using appropriate pain scale   Administer analgesics based on type and severity of pain and evaluate response   Implement non-pharmacological measures as appropriate and evaluate response     Problem: Genitourinary - Adult  Goal: Absence of urinary retention  5/16/2025 0337 by Daphne Gutierrez RN  Outcome: Progressing  Flowsheets (Taken 5/16/2025 0337)  Absence of urinary retention:   Assess patient’s ability to void and empty bladder   Monitor intake/output and perform bladder scan as needed   Place urinary catheter per Licensed Independent Practitioner order if needed     Problem: Metabolic/Fluid and Electrolytes - Adult  Goal: Electrolytes maintained within

## 2025-05-16 NOTE — PROGRESS NOTES
[facility] - Physical Therapy   Phone: [phone]    Physical Therapy  Facility/Department:[address] PROGRESSIVE CARE    [] Initial Evaluation            [x] Daily Treatment Note         [] Discharge Summary      Patient: Jonelle Contreras   : 1950   MRN: [id number]   Date of Service:  2025  Staff Mobility Recommendation: Walker, assist x 1.   AM-PAC score: 17/24  Discharge Recommendations: SNF    Jonelle Contreras scored a 17/24 on the AM-PAC short mobility form. Current research shows that an AM-PAC score of 17 or less is typically not associated with a discharge to the patient's home setting. Based on the patient's AM-PAC score and their current functional mobility deficits, it is recommended that the patient have 3-5 sessions per week of Physical Therapy at d/c to increase the patient's independence.  Please see assessment section for further patient specific details.    If patient discharges prior to next session this note will serve as a discharge summary.  Please see below for the latest assessment towards goals.      Admitting Diagnosis: Hypertensive urgency  Ordering Physician: Dr. Bentley  Current Admission Summary: 75 y/o female admit 2025 with HTN Urgency,  PMH as noted including COPD, CKD (PD pt), PAD, NSTEMI.     Past Medical History:  has a past medical history of acute intermittent porphyria, agoraphobic, Allergic rhinitis, Anxiety, Arthritis, Asthma, CAD (coronary artery disease), Chronic obstructive pulmonary disease, unspecified COPD type (HCC), ckd 4, collagenous colitis, COPD (chronic obstructive pulmonary disease) (HCC), Depression, Disease of blood and blood forming organ, GERD (gastroesophageal reflux disease), Heart disease, Hyperlipidemia, Hypertension, Left thyroid nodule, Migraines, Neuropathy, Osteoporosis, PAD (peripheral artery disease), Peripheral vascular disease, post menopausal, Prediabetes, Pulmonary nodules, Restless leg syndrome, STEMI involving oth

## 2025-05-16 NOTE — PROGRESS NOTES
Internal Medicine Hospital Progress Note    Patient:  Jonelle Contreras 74 y.o. female MRN: [id number]     Date of Service: 5/16/2025    Allergy: Iodine, Iodinated contrast media, Wellbutrin [bupropion], Adhesive tape, Sertraline, and Sulfa antibiotics  CC: F/u:   Brief Course   Jonelle Contreras was admitted on 5/12/2025 for Hypertensive urgency. Jonelle Contreras has been admitted for 4 days    24 hr interval hx:  Oxygen increased to 3L overnight  Feeling that her cough is worse  Objective     Physical Exam:  Vitals: BP (!) 148/70   Pulse 76   Temp 97.9 °F (36.6 °C)   Resp 20   Ht 1.651 m (5' 5\")   Wt 60.5 kg (133 lb 6.1 oz)   SpO2 93%   BMI 22.20 kg/m²     General: Alert and oriented X3. No acute distress  Eyes: PEERL. No scleral icterus noted. Normal appearing conjunctiva bilaterally  Ears: Normal TM and external canal bilaterally.  Oral cavity/Throat: No pharyngeal erythema. No oral lesions.  Cardiovascular: Heart is regular rate and rhythm. No murmurs noted. Radial pulses 2+. Posterior tibial pulses 2+. No lower extremity edema noted  Pulmonary: Lungs clear to auscultation bilaterally  Skin: Dry, warm. No obvious skin lesions or rashes noted.  Neuro: PEERL. EOM intact. No facial droop. Normal sensation in bilateral upper and lower extremities. Gait is norm    Pertinent/ New Labs and Imaging Studies   Labs reviewed. Pertinent labs noted in assessment and plan      Assessment and Plan   Jonelle Contreras was admitted to hospital for Hypertensive urgency    Hypertensive urgency  - Nephrology consulted    ESRD on PD  - Nephrology consulted, managing PD orders    Hyperlipidemia  - Continue home statin    Restless leg syndrom  - Continue ropinirole    Anxiety  - Continue home alprazolam    COPD  - Continue albuterol and stiolto    Overactive bladder  - Continue home trospium    Cough  CT chest with some signs of bronchitis vs early pneumonia  - Start ceftriaxone and azithromycin to cover CAP    DVT Prophylaxis:

## 2025-05-16 NOTE — PROGRESS NOTES
PD connected and treatment started around 2310. After multiple attempts to initial drain had failed placed call to [facility] per protocol. Went through all steps as instructed and patient occlusion continued to alarm. Instructed to end treatment 6000 lost in treatment.   ml . Est UF-420. Call to nephrology to update.Daphne Gutierrez RN

## 2025-05-16 NOTE — CARE COORDINATION
DISCHARGE PLANNING:    Spoke to patient and son at bedside.  Referrals have been placed to the following facilities:    [facility]-cannot accept d/t PD  [facility]-no bed, can accept at [facility]-can accept  [facility]-awaiting response.    Family would like additional referral placed to facilities closer to their house, awaiting SNF choices at this time.    #[phone]  Electronically signed by Claudia Owen RN on 5/16/2025 at 11:22 AM    Patient and family would like a referral placed to [facility].  Spoke to [facility] liaison at [phone].  Referral faxed to [phone].  Awaiting response.    #[phone]  Electronically signed by Claudia Owen RN on 5/16/2025 at 1:50 PM    Received call back from [facility] Liaison, patient does not have a diagnosis for ARU and is recommended SNF per therapy.  Not a candidate at this time.  This CM asked liaison to notify family.    #[phone]  Electronically signed by Claudia Owen RN on 5/16/2025 at 2:46 PM    Received message from [facility], they cannot accommodate PD at this time.    #[phone]  Electronically signed by Claudia Owen RN on 5/16/2025 at 2:52 PM

## 2025-05-16 NOTE — PROGRESS NOTES
Patient ID: Jonelle Contreras  Referring/ Physician: Candido Bentley MD      Summary:   Jonelle Contreras is being seen by nephrology for ESRD and HTN.     Reason for admission: hypertensive urgency       Interval Hx:   Seen at bedside.   Son at bedside.   Has SOB today and on O2 3 L.  PD cycler issues last night. Did not get her treatment.   No fibrin reported in drain bags.     bP 157/81      K 4.9  BUN 75  Ca 9  Phos 6      Assessment/Plan:   -  dianeal to 2.5 % to help with fluid removal. She has some plueral effusions and on oxygen.   - try cycler again tonight.   - one manual exchange today to make sure she is filling and draining ok   - continue current BP meds.         ESRD  ESRD secondary to hypertension.  She does peritoneal dialysis at home 1 exchange of 2 L 1.5% solutions daily.  Based on her most recent labs in the outpatient setting her adequacy is not at goal so we are going to be increasing her to 3 exchanges, 2 L at 1.5% solutions.  Will use the cycler while inpatient.  Euvolemic.    Resistant hypertension  Has had an overall negative secondary work up. Has been extremely difficult to manage outpatient as she reports \"bottoming out and passing out\" when her medications are increased.   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   She has had 24 hr ambulatory BP monitoring showing consistently elevated bP 180s -200s systolic.   Presenting with hypertensive urgency, having ongoing headaches blurry vision and fatigue.  CT head showed no stroke.,  Has chronic microvascular changes.  Troponin 66.no ECG changes.   Her home regimen : nifedpine 30 mg , losartan 100 mg aldactone 50 mg , coreg 12.5 mg BID. Clonidine 0./1 tID for SBP > 180    Renal artery duplex 2022 was negative.   Long time tobacco user, says she quit 2 months ago.   Riht adrenal gland normal, left adrenal gland has thickening, either adenoma or

## 2025-05-17 LAB
ALBUMIN SERPL-MCNC: 3.3 G/DL (ref 3.4–5)
ANION GAP SERPL CALCULATED.3IONS-SCNC: 15 MMOL/L (ref 3–16)
ANNOTATION COMMENT IMP: ABNORMAL
BASOPHILS # BLD: 0 K/UL (ref 0–0.2)
BASOPHILS NFR BLD: 0.2 %
BUN SERPL-MCNC: 67 MG/DL (ref 7–20)
CALCIUM SERPL-MCNC: 9.2 MG/DL (ref 8.3–10.6)
CHLORIDE SERPL-SCNC: 94 MMOL/L (ref 99–110)
CO2 SERPL-SCNC: 25 MMOL/L (ref 21–32)
CREAT SERPL-MCNC: 8.9 MG/DL (ref 0.6–1.2)
DEPRECATED RDW RBC AUTO: 16.1 % (ref 12.4–15.4)
EOSINOPHIL # BLD: 0.1 K/UL (ref 0–0.6)
EOSINOPHIL NFR BLD: 1 %
GFR SERPLBLD CREATININE-BSD FMLA CKD-EPI: 4 ML/MIN/{1.73_M2}
GLUCOSE SERPL-MCNC: 156 MG/DL (ref 70–99)
HCT VFR BLD AUTO: 29.9 % (ref 36–48)
HGB BLD-MCNC: 9.9 G/DL (ref 12–16)
LYMPHOCYTES # BLD: 0.6 K/UL (ref 1–5.1)
LYMPHOCYTES NFR BLD: 7.4 %
MCH RBC QN AUTO: 28.3 PG (ref 26–34)
MCHC RBC AUTO-ENTMCNC: 33.3 G/DL (ref 31–36)
MCV RBC AUTO: 85.1 FL (ref 80–100)
METANEPHS SERPL-SCNC: 0.16 NMOL/L (ref 0–0.49)
MONOCYTES # BLD: 0.5 K/UL (ref 0–1.3)
MONOCYTES NFR BLD: 5.8 %
NEUTROPHILS # BLD: 7.3 K/UL (ref 1.7–7.7)
NEUTROPHILS NFR BLD: 85.6 %
NORMETANEPHRINE SERPL-SCNC: 1.92 NMOL/L (ref 0–0.89)
PHOSPHATE SERPL-MCNC: 5.6 MG/DL (ref 2.5–4.9)
PLATELET # BLD AUTO: 242 K/UL (ref 135–450)
PMV BLD AUTO: 9.1 FL (ref 5–10.5)
POTASSIUM SERPL-SCNC: 4.4 MMOL/L (ref 3.5–5.1)
RBC # BLD AUTO: 3.51 M/UL (ref 4–5.2)
SODIUM SERPL-SCNC: 134 MMOL/L (ref 136–145)
WBC # BLD AUTO: 8.5 K/UL (ref 4–11)

## 2025-05-17 PROCEDURE — 6360000002 HC RX W HCPCS: Performed by: STUDENT IN AN ORGANIZED HEALTH CARE EDUCATION/TRAINING PROGRAM

## 2025-05-17 PROCEDURE — 6370000000 HC RX 637 (ALT 250 FOR IP): Performed by: STUDENT IN AN ORGANIZED HEALTH CARE EDUCATION/TRAINING PROGRAM

## 2025-05-17 PROCEDURE — 2580000003 HC RX 258: Performed by: INTERNAL MEDICINE

## 2025-05-17 PROCEDURE — 90945 DIALYSIS ONE EVALUATION: CPT

## 2025-05-17 PROCEDURE — 36415 COLL VENOUS BLD VENIPUNCTURE: CPT

## 2025-05-17 PROCEDURE — 6370000000 HC RX 637 (ALT 250 FOR IP): Performed by: INTERNAL MEDICINE

## 2025-05-17 PROCEDURE — 85025 COMPLETE CBC W/AUTO DIFF WBC: CPT

## 2025-05-17 PROCEDURE — 99232 SBSQ HOSP IP/OBS MODERATE 35: CPT | Performed by: INTERNAL MEDICINE

## 2025-05-17 PROCEDURE — 2700000000 HC OXYGEN THERAPY PER DAY

## 2025-05-17 PROCEDURE — 80069 RENAL FUNCTION PANEL: CPT

## 2025-05-17 PROCEDURE — 2060000000 HC ICU INTERMEDIATE R&B

## 2025-05-17 PROCEDURE — 94761 N-INVAS EAR/PLS OXIMETRY MLT: CPT

## 2025-05-17 PROCEDURE — 2500000003 HC RX 250 WO HCPCS: Performed by: STUDENT IN AN ORGANIZED HEALTH CARE EDUCATION/TRAINING PROGRAM

## 2025-05-17 PROCEDURE — 94640 AIRWAY INHALATION TREATMENT: CPT

## 2025-05-17 RX ORDER — POLYETHYLENE GLYCOL 3350 17 G/17G
17 POWDER, FOR SOLUTION ORAL 2 TIMES DAILY
Status: DISCONTINUED | OUTPATIENT
Start: 2025-05-17 | End: 2025-05-30 | Stop reason: HOSPADM

## 2025-05-17 RX ORDER — SENNA AND DOCUSATE SODIUM 50; 8.6 MG/1; MG/1
2 TABLET, FILM COATED ORAL DAILY
Status: DISCONTINUED | OUTPATIENT
Start: 2025-05-17 | End: 2025-05-18

## 2025-05-17 RX ADMIN — HYDRALAZINE HYDROCHLORIDE 25 MG: 25 TABLET ORAL at 05:07

## 2025-05-17 RX ADMIN — TIOTROPIUM BROMIDE AND OLODATEROL 2 PUFF: 3.124; 2.736 SPRAY, METERED RESPIRATORY (INHALATION) at 07:58

## 2025-05-17 RX ADMIN — DIPHENHYDRAMINE HYDROCHLORIDE 5 ML: 12.5 LIQUID ORAL at 00:52

## 2025-05-17 RX ADMIN — HYDRALAZINE HYDROCHLORIDE 25 MG: 25 TABLET ORAL at 20:59

## 2025-05-17 RX ADMIN — TROSPIUM CHLORIDE 20 MG: 20 TABLET, FILM COATED ORAL at 20:59

## 2025-05-17 RX ADMIN — LOSARTAN POTASSIUM 100 MG: 100 TABLET, FILM COATED ORAL at 20:59

## 2025-05-17 RX ADMIN — HEPARIN SODIUM 5000 UNITS: 5000 INJECTION INTRAVENOUS; SUBCUTANEOUS at 20:59

## 2025-05-17 RX ADMIN — IPRATROPIUM BROMIDE AND ALBUTEROL SULFATE 1 DOSE: 2.5; .5 SOLUTION RESPIRATORY (INHALATION) at 16:33

## 2025-05-17 RX ADMIN — SEVELAMER CARBONATE 800 MG: 800 TABLET, FILM COATED ORAL at 12:19

## 2025-05-17 RX ADMIN — SODIUM CHLORIDE, PRESERVATIVE FREE 10 ML: 5 INJECTION INTRAVENOUS at 11:02

## 2025-05-17 RX ADMIN — SODIUM CHLORIDE, PRESERVATIVE FREE 10 ML: 5 INJECTION INTRAVENOUS at 21:00

## 2025-05-17 RX ADMIN — HEPARIN SODIUM 5000 UNITS: 5000 INJECTION INTRAVENOUS; SUBCUTANEOUS at 05:07

## 2025-05-17 RX ADMIN — ROPINIROLE HYDROCHLORIDE 1 MG: 1 TABLET, FILM COATED ORAL at 20:59

## 2025-05-17 RX ADMIN — TIZANIDINE 4 MG: 4 TABLET ORAL at 00:51

## 2025-05-17 RX ADMIN — CARVEDILOL 12.5 MG: 12.5 TABLET, FILM COATED ORAL at 12:18

## 2025-05-17 RX ADMIN — SPIRONOLACTONE 50 MG: 25 TABLET ORAL at 10:59

## 2025-05-17 RX ADMIN — SENNOSIDES, DOCUSATE SODIUM 2 TABLET: 50; 8.6 TABLET, FILM COATED ORAL at 12:18

## 2025-05-17 RX ADMIN — SODIUM CHLORIDE, SODIUM LACTATE, CALCIUM CHLORIDE, MAGNESIUM CHLORIDE AND DEXTROSE 6000 ML: 2.5; 538; 448; 18.3; 5.08 INJECTION, SOLUTION INTRAPERITONEAL at 21:37

## 2025-05-17 RX ADMIN — CLOPIDOGREL BISULFATE 75 MG: 75 TABLET, FILM COATED ORAL at 10:59

## 2025-05-17 RX ADMIN — HEPARIN SODIUM 5000 UNITS: 5000 INJECTION INTRAVENOUS; SUBCUTANEOUS at 14:33

## 2025-05-17 RX ADMIN — ROSUVASTATIN CALCIUM 10 MG: 10 TABLET, FILM COATED ORAL at 20:59

## 2025-05-17 RX ADMIN — SEVELAMER CARBONATE 800 MG: 800 TABLET, FILM COATED ORAL at 18:01

## 2025-05-17 RX ADMIN — TIZANIDINE 4 MG: 4 TABLET ORAL at 20:58

## 2025-05-17 RX ADMIN — ASPIRIN 81 MG: 81 TABLET, CHEWABLE ORAL at 10:59

## 2025-05-17 RX ADMIN — AZITHROMYCIN 250 MG: 250 TABLET, FILM COATED ORAL at 18:01

## 2025-05-17 RX ADMIN — DIPHENHYDRAMINE HYDROCHLORIDE 5 ML: 12.5 LIQUID ORAL at 12:21

## 2025-05-17 RX ADMIN — TORSEMIDE 100 MG: 100 TABLET ORAL at 11:00

## 2025-05-17 RX ADMIN — NIFEDIPINE 60 MG: 30 TABLET, FILM COATED, EXTENDED RELEASE ORAL at 11:00

## 2025-05-17 RX ADMIN — ALPRAZOLAM 2 MG: 0.5 TABLET ORAL at 20:58

## 2025-05-17 RX ADMIN — IPRATROPIUM BROMIDE AND ALBUTEROL SULFATE 1 DOSE: 2.5; .5 SOLUTION RESPIRATORY (INHALATION) at 20:45

## 2025-05-17 RX ADMIN — IPRATROPIUM BROMIDE AND ALBUTEROL SULFATE 1 DOSE: 2.5; .5 SOLUTION RESPIRATORY (INHALATION) at 12:33

## 2025-05-17 RX ADMIN — CARVEDILOL 12.5 MG: 12.5 TABLET, FILM COATED ORAL at 18:00

## 2025-05-17 RX ADMIN — HYDRALAZINE HYDROCHLORIDE 25 MG: 25 TABLET ORAL at 14:30

## 2025-05-17 RX ADMIN — IPRATROPIUM BROMIDE AND ALBUTEROL SULFATE 1 DOSE: 2.5; .5 SOLUTION RESPIRATORY (INHALATION) at 07:58

## 2025-05-17 RX ADMIN — WATER 1000 MG: 1 INJECTION INTRAMUSCULAR; INTRAVENOUS; SUBCUTANEOUS at 18:05

## 2025-05-17 RX ADMIN — SODIUM CHLORIDE, PRESERVATIVE FREE 10 ML: 5 INJECTION INTRAVENOUS at 18:06

## 2025-05-17 ASSESSMENT — PAIN SCALES - GENERAL
PAINLEVEL_OUTOF10: 4
PAINLEVEL_OUTOF10: 0
PAINLEVEL_OUTOF10: 0
PAINLEVEL_OUTOF10: 8
PAINLEVEL_OUTOF10: 8

## 2025-05-17 ASSESSMENT — PAIN DESCRIPTION - LOCATION
LOCATION: NECK

## 2025-05-17 ASSESSMENT — PAIN DESCRIPTION - DESCRIPTORS
DESCRIPTORS: ACHING
DESCRIPTORS: ACHING;DISCOMFORT;BURNING

## 2025-05-17 ASSESSMENT — PAIN DESCRIPTION - ORIENTATION: ORIENTATION: MID

## 2025-05-17 ASSESSMENT — PAIN SCALES - WONG BAKER
WONGBAKER_NUMERICALRESPONSE: NO HURT
WONGBAKER_NUMERICALRESPONSE: NO HURT

## 2025-05-17 NOTE — PROGRESS NOTES
Pt's PD treatment complete. Treatment summary:    Initial drain volume: 21mL  Average dwell time: 119 min  Average drain time: 31 min  Total therapy volume: 5912 mL  Total UF: 708 mL  Day UF: 0 mL  Night UF: 708 mL  Total therapy time 8 hr 0 min    Electronically signed by Lisa Buchanan RN on 5/17/2025 at 7:10 PM

## 2025-05-17 NOTE — PLAN OF CARE
Problem: Discharge Planning  Goal: Discharge to home or other facility with appropriate resources  5/17/2025 1226 by Lisa Buchanan RN  Outcome: Progressing  5/17/2025 0215 by Joel Branham RN  Outcome: Progressing  Flowsheets (Taken 5/16/2025 2000)  Discharge to home or other facility with appropriate resources:   Identify barriers to discharge with patient and caregiver   Arrange for needed discharge resources and transportation as appropriate   Identify discharge learning needs (meds, wound care, etc)     Problem: ABCDS Injury Assessment  Goal: Absence of physical injury  5/17/2025 1226 by Lisa Buchanan RN  Outcome: Progressing  5/17/2025 0215 by Joel Branham RN  Outcome: Progressing     Problem: Safety - Adult  Goal: Free from fall injury  5/17/2025 1226 by Lisa Buchanan RN  Outcome: Progressing  5/17/2025 0215 by Joel Branham RN  Outcome: Progressing     Problem: Pain  Goal: Verbalizes/displays adequate comfort level or baseline comfort level  5/17/2025 1226 by Lisa Buchanan RN  Outcome: Progressing  5/17/2025 0215 by Joel Branham RN  Outcome: Progressing     Problem: Genitourinary - Adult  Goal: Absence of urinary retention  5/17/2025 1226 by Lisa Buchanan RN  Outcome: Progressing  5/17/2025 0215 by Joel Branham RN  Outcome: Progressing     Problem: Metabolic/Fluid and Electrolytes - Adult  Goal: Electrolytes maintained within normal limits  5/17/2025 1226 by Lisa Buchanan RN  Outcome: Progressing  5/17/2025 0215 by Joel Branham RN  Outcome: Progressing     Problem: Nutrition Deficit:  Goal: Optimize nutritional status  5/17/2025 1226 by Lisa Buchanan RN  Outcome: Progressing  5/17/2025 0215 by Joel Branham RN  Outcome: Progressing

## 2025-05-17 NOTE — PROGRESS NOTES
Patient ID: Jonelle Contreras  Referring/ Physician: Candido Bentley MD      Summary:   Jonelle Contreras is being seen by nephrology for ESRD and HTN.     Reason for admission: hypertensive urgency       Interval Hx:   The patient was seen and examined in her room  She was resting in her bed  She appears to be miserable  She complains of feeling awful  She has stomach upset  She also has a feeling of burning sensation in her esophagus  She says she has not been eating for a week and she also had no bowel movement for 1 week  She has no leg edema  Her most recent set of vital signs showed temperature 90.8 heart rate 91 blood pressure 137/63    Her abdomen was benign    Lab work from today showed sodium 134 potassium 4.4 bicarb 25 BUN 67 creatinine was 8.9 glucose 156 calcium 9.2 phosphorus 5.6    WBC 8.5 hemoglobin 9.9 and the platelet was 242      Assessment/Plan:   Constipation is a major issue for patient peritoneal dialysis  Per patient, she had no bowel movement for 6 days she has upset stomach which might be her the cause of her upset stomach  Reviewed her medication list, I did not see any concerning medication  I will start patient on MiraLAX at 17 g p.o. twice daily  She will need to have a bowel movement over the next 10 to 12 hours 1 way or the other this is very important    For her peritoneal dialysis, will continue to use 2.5% solution            ESRD  ESRD secondary to hypertension.  She does peritoneal dialysis at home 1 exchange of 2 L 1.5% solutions daily.  Based on her most recent labs in the outpatient setting her adequacy is not at goal so we are going to be increasing her to 3 exchanges, 2 L at 1.5% solutions.  Will use the cycler while inpatient.  Euvolemic.    Resistant hypertension  Has had an overall negative secondary work up. Has been extremely difficult to manage outpatient as she reports \"bottoming out and passing out\" when her medications are increased.   She does    Component Value Date/Time    COLORU Yellow 11/12/2023 09:20 PM    NITRU Negative 11/12/2023 09:20 PM    GLUCOSEU Negative 11/12/2023 09:20 PM    KETUA Negative 11/12/2023 09:20 PM    UROBILINOGEN 0.2 11/12/2023 09:20 PM    BILIRUBINUR Negative 11/12/2023 09:20 PM    BILIRUBINUR n 01/11/2019 05:38 PM

## 2025-05-17 NOTE — PROGRESS NOTES
[facility] Note      Chief Complaint: I am having a hard time swallowing    Subjective/Interval History:    This morning the patient is reporting that she has had difficulty swallowing for approximately 3 weeks.  She states she has a hard time even swallowing some liquids because of discomfort that extends the entire length of her esophagus.  She reports that her p.o. intake during this entire hospitalization has been very poor.    She also complains of constipation.    Her cough is not significantly productive.  She remains on 4 L of oxygen.    Nursing at the bedside, no other new problems noted    No chest pain. No nausea, vomiting, diarrhea. No abdominal pain. No dysuria.  The remainder of the review of systems is negative.     PMH, PSH, FH/SH reviewed and unchanged in the H&P dated 25    Medication list reviewed    Objective:    BP (!) 141/69   Pulse 76   Temp 98.2 °F (36.8 °C) (Oral)   Resp 18   Ht 1.651 m (5' 5\")   Wt 60.3 kg (133 lb)   SpO2 94%   BMI 22.13 kg/m²   Temp  Av °F (36.7 °C)  Min: 97.7 °F (36.5 °C)  Max: 98.2 °F (36.8 °C)    RRR  Chest- respirations easy, 4 L per NC.  Bilateral expiratory rhonchi noted  Abd- soft, NTND  Ext- no edema    The Following Labs Were Reviewed Today:       Recent Results (from the past 24 hours)   Renal Function Panel    Collection Time: 25  4:31 AM   Result Value Ref Range    Sodium 134 (L) 136 - 145 mmol/L    Potassium 4.4 3.5 - 5.1 mmol/L    Chloride 94 (L) 99 - 110 mmol/L    CO2 25 21 - 32 mmol/L    Anion Gap 15 3 - 16    Glucose 156 (H) 70 - 99 mg/dL    BUN 67 (H) 7 - 20 mg/dL    Creatinine 8.9 (HH) 0.6 - 1.2 mg/dL    Est, Glom Filt Rate 4 (A) >60    Calcium 9.2 8.3 - 10.6 mg/dL    Phosphorus 5.6 (H) 2.5 - 4.9 mg/dL    Albumin 3.3 (L) 3.4 - 5.0 g/dL   CBC with Auto Differential    Collection Time: 25  4:31 AM   Result Value Ref Range    WBC 8.5 4.0 - 11.0 K/uL    RBC 3.51 (L) 4.00 - 5.20 M/uL    Hemoglobin 9.9 (L) 12.0 -

## 2025-05-17 NOTE — PROGRESS NOTES
New supplies placed for peritoneal dialysis per customer support staff. Peritoneal dialysis started around 1 am this shift and pt tolerating well. Pt currently on 3rd cycle. Pt in bed resting. Call light in reach. No distress or discomfort noted.

## 2025-05-17 NOTE — PROGRESS NOTES
Pt peritoneal dialysis cycle machine continued to alarm stating that pt was occluded. After several attempts to fix machine, RN called support on [phone].  tried to help RN out and did a troubleshoot. We were unable to fix issue.  stated to discontinue current treatment and retry with new supplies. Called pharmacy for new bag to restart treatment. Waiting on supplies and will re-start. Pt in bed resting. Call light in reach. No distress or discomfort noted.

## 2025-05-18 LAB
ALBUMIN SERPL-MCNC: 3.4 G/DL (ref 3.4–5)
ANION GAP SERPL CALCULATED.3IONS-SCNC: 16 MMOL/L (ref 3–16)
BASOPHILS # BLD: 0.1 K/UL (ref 0–0.2)
BASOPHILS NFR BLD: 0.8 %
BUN SERPL-MCNC: 65 MG/DL (ref 7–20)
CALCIUM SERPL-MCNC: 9.3 MG/DL (ref 8.3–10.6)
CHLORIDE SERPL-SCNC: 97 MMOL/L (ref 99–110)
CO2 SERPL-SCNC: 26 MMOL/L (ref 21–32)
CREAT SERPL-MCNC: 9.5 MG/DL (ref 0.6–1.2)
DEPRECATED RDW RBC AUTO: 16 % (ref 12.4–15.4)
EOSINOPHIL # BLD: 0.2 K/UL (ref 0–0.6)
EOSINOPHIL NFR BLD: 2.8 %
GFR SERPLBLD CREATININE-BSD FMLA CKD-EPI: 4 ML/MIN/{1.73_M2}
GLUCOSE SERPL-MCNC: 125 MG/DL (ref 70–99)
HCT VFR BLD AUTO: 32.1 % (ref 36–48)
HGB BLD-MCNC: 10.4 G/DL (ref 12–16)
LYMPHOCYTES # BLD: 1.1 K/UL (ref 1–5.1)
LYMPHOCYTES NFR BLD: 14.5 %
MCH RBC QN AUTO: 28.2 PG (ref 26–34)
MCHC RBC AUTO-ENTMCNC: 32.6 G/DL (ref 31–36)
MCV RBC AUTO: 86.6 FL (ref 80–100)
MONOCYTES # BLD: 0.8 K/UL (ref 0–1.3)
MONOCYTES NFR BLD: 11.3 %
NEUTROPHILS # BLD: 5.3 K/UL (ref 1.7–7.7)
NEUTROPHILS NFR BLD: 70.6 %
PHOSPHATE SERPL-MCNC: 5.7 MG/DL (ref 2.5–4.9)
PLATELET # BLD AUTO: 285 K/UL (ref 135–450)
PMV BLD AUTO: 9.3 FL (ref 5–10.5)
POTASSIUM SERPL-SCNC: 4.3 MMOL/L (ref 3.5–5.1)
RBC # BLD AUTO: 3.71 M/UL (ref 4–5.2)
SODIUM SERPL-SCNC: 139 MMOL/L (ref 136–145)
WBC # BLD AUTO: 7.5 K/UL (ref 4–11)

## 2025-05-18 PROCEDURE — 85025 COMPLETE CBC W/AUTO DIFF WBC: CPT

## 2025-05-18 PROCEDURE — 6370000000 HC RX 637 (ALT 250 FOR IP): Performed by: INTERNAL MEDICINE

## 2025-05-18 PROCEDURE — 2700000000 HC OXYGEN THERAPY PER DAY

## 2025-05-18 PROCEDURE — 36415 COLL VENOUS BLD VENIPUNCTURE: CPT

## 2025-05-18 PROCEDURE — 6360000002 HC RX W HCPCS: Performed by: INTERNAL MEDICINE

## 2025-05-18 PROCEDURE — 6370000000 HC RX 637 (ALT 250 FOR IP): Performed by: REGISTERED NURSE

## 2025-05-18 PROCEDURE — 80069 RENAL FUNCTION PANEL: CPT

## 2025-05-18 PROCEDURE — 94640 AIRWAY INHALATION TREATMENT: CPT

## 2025-05-18 PROCEDURE — 6370000000 HC RX 637 (ALT 250 FOR IP): Performed by: STUDENT IN AN ORGANIZED HEALTH CARE EDUCATION/TRAINING PROGRAM

## 2025-05-18 PROCEDURE — 2060000000 HC ICU INTERMEDIATE R&B

## 2025-05-18 PROCEDURE — 2500000003 HC RX 250 WO HCPCS: Performed by: STUDENT IN AN ORGANIZED HEALTH CARE EDUCATION/TRAINING PROGRAM

## 2025-05-18 PROCEDURE — 90945 DIALYSIS ONE EVALUATION: CPT

## 2025-05-18 PROCEDURE — 2580000003 HC RX 258: Performed by: INTERNAL MEDICINE

## 2025-05-18 PROCEDURE — 6360000002 HC RX W HCPCS: Performed by: STUDENT IN AN ORGANIZED HEALTH CARE EDUCATION/TRAINING PROGRAM

## 2025-05-18 PROCEDURE — 99232 SBSQ HOSP IP/OBS MODERATE 35: CPT | Performed by: INTERNAL MEDICINE

## 2025-05-18 PROCEDURE — 94761 N-INVAS EAR/PLS OXIMETRY MLT: CPT

## 2025-05-18 PROCEDURE — 2500000003 HC RX 250 WO HCPCS: Performed by: INTERNAL MEDICINE

## 2025-05-18 RX ORDER — MIRTAZAPINE 15 MG/1
7.5 TABLET, FILM COATED ORAL ONCE
Status: COMPLETED | OUTPATIENT
Start: 2025-05-18 | End: 2025-05-18

## 2025-05-18 RX ORDER — SENNA AND DOCUSATE SODIUM 50; 8.6 MG/1; MG/1
2 TABLET, FILM COATED ORAL DAILY
Status: DISCONTINUED | OUTPATIENT
Start: 2025-05-18 | End: 2025-05-30 | Stop reason: HOSPADM

## 2025-05-18 RX ORDER — LACTULOSE 10 G/15ML
30 SOLUTION ORAL 3 TIMES DAILY
Status: DISCONTINUED | OUTPATIENT
Start: 2025-05-18 | End: 2025-05-20

## 2025-05-18 RX ADMIN — PROCHLORPERAZINE EDISYLATE 10 MG: 5 INJECTION INTRAMUSCULAR; INTRAVENOUS at 20:54

## 2025-05-18 RX ADMIN — ROSUVASTATIN CALCIUM 10 MG: 10 TABLET, FILM COATED ORAL at 20:54

## 2025-05-18 RX ADMIN — SPIRONOLACTONE 50 MG: 25 TABLET ORAL at 09:55

## 2025-05-18 RX ADMIN — TORSEMIDE 100 MG: 100 TABLET ORAL at 09:56

## 2025-05-18 RX ADMIN — SEVELAMER CARBONATE 800 MG: 800 TABLET, FILM COATED ORAL at 12:21

## 2025-05-18 RX ADMIN — DIPHENHYDRAMINE HYDROCHLORIDE 5 ML: 12.5 LIQUID ORAL at 20:52

## 2025-05-18 RX ADMIN — WATER 1000 MG: 1 INJECTION INTRAMUSCULAR; INTRAVENOUS; SUBCUTANEOUS at 18:26

## 2025-05-18 RX ADMIN — HEPARIN SODIUM 5000 UNITS: 5000 INJECTION INTRAVENOUS; SUBCUTANEOUS at 05:50

## 2025-05-18 RX ADMIN — HEPARIN SODIUM 5000 UNITS: 5000 INJECTION INTRAVENOUS; SUBCUTANEOUS at 14:58

## 2025-05-18 RX ADMIN — IPRATROPIUM BROMIDE AND ALBUTEROL SULFATE 1 DOSE: 2.5; .5 SOLUTION RESPIRATORY (INHALATION) at 12:31

## 2025-05-18 RX ADMIN — LACTULOSE 30 G: 10 SOLUTION ORAL at 10:23

## 2025-05-18 RX ADMIN — HYDRALAZINE HYDROCHLORIDE 25 MG: 25 TABLET ORAL at 14:58

## 2025-05-18 RX ADMIN — SODIUM CHLORIDE, PRESERVATIVE FREE 10 ML: 5 INJECTION INTRAVENOUS at 13:18

## 2025-05-18 RX ADMIN — SODIUM CHLORIDE, SODIUM LACTATE, CALCIUM CHLORIDE, MAGNESIUM CHLORIDE AND DEXTROSE 6000 ML: 2.5; 538; 448; 18.3; 5.08 INJECTION, SOLUTION INTRAPERITONEAL at 21:22

## 2025-05-18 RX ADMIN — NIFEDIPINE 60 MG: 30 TABLET, FILM COATED, EXTENDED RELEASE ORAL at 09:54

## 2025-05-18 RX ADMIN — HEPARIN SODIUM 5000 UNITS: 5000 INJECTION INTRAVENOUS; SUBCUTANEOUS at 20:53

## 2025-05-18 RX ADMIN — AZITHROMYCIN 250 MG: 250 TABLET, FILM COATED ORAL at 18:19

## 2025-05-18 RX ADMIN — PROCHLORPERAZINE EDISYLATE 10 MG: 5 INJECTION INTRAMUSCULAR; INTRAVENOUS at 13:17

## 2025-05-18 RX ADMIN — SODIUM CHLORIDE, PRESERVATIVE FREE 10 ML: 5 INJECTION INTRAVENOUS at 10:46

## 2025-05-18 RX ADMIN — GENTAMICIN SULFATE: 1 CREAM TOPICAL at 05:51

## 2025-05-18 RX ADMIN — ROPINIROLE HYDROCHLORIDE 1 MG: 1 TABLET, FILM COATED ORAL at 20:54

## 2025-05-18 RX ADMIN — TIOTROPIUM BROMIDE AND OLODATEROL 2 PUFF: 3.124; 2.736 SPRAY, METERED RESPIRATORY (INHALATION) at 08:28

## 2025-05-18 RX ADMIN — POLYETHYLENE GLYCOL 3350 17 G: 17 POWDER, FOR SOLUTION ORAL at 09:59

## 2025-05-18 RX ADMIN — POLYETHYLENE GLYCOL 3350 17 G: 17 POWDER, FOR SOLUTION ORAL at 20:54

## 2025-05-18 RX ADMIN — IPRATROPIUM BROMIDE AND ALBUTEROL SULFATE 1 DOSE: 2.5; .5 SOLUTION RESPIRATORY (INHALATION) at 15:16

## 2025-05-18 RX ADMIN — SODIUM CHLORIDE, PRESERVATIVE FREE 10 ML: 5 INJECTION INTRAVENOUS at 20:54

## 2025-05-18 RX ADMIN — LACTULOSE 30 G: 10 SOLUTION ORAL at 20:54

## 2025-05-18 RX ADMIN — CARVEDILOL 12.5 MG: 12.5 TABLET, FILM COATED ORAL at 09:55

## 2025-05-18 RX ADMIN — SENNOSIDES AND DOCUSATE SODIUM 2 TABLET: 50; 8.6 TABLET ORAL at 10:23

## 2025-05-18 RX ADMIN — SEVELAMER CARBONATE 800 MG: 800 TABLET, FILM COATED ORAL at 09:55

## 2025-05-18 RX ADMIN — SEVELAMER CARBONATE 800 MG: 800 TABLET, FILM COATED ORAL at 18:22

## 2025-05-18 RX ADMIN — ONDANSETRON 4 MG: 2 INJECTION, SOLUTION INTRAMUSCULAR; INTRAVENOUS at 10:45

## 2025-05-18 RX ADMIN — MIRTAZAPINE 7.5 MG: 15 TABLET, FILM COATED ORAL at 00:19

## 2025-05-18 RX ADMIN — HYDRALAZINE HYDROCHLORIDE 25 MG: 25 TABLET ORAL at 05:51

## 2025-05-18 RX ADMIN — ASPIRIN 81 MG: 81 TABLET, CHEWABLE ORAL at 09:54

## 2025-05-18 RX ADMIN — SODIUM CHLORIDE, PRESERVATIVE FREE 10 ML: 5 INJECTION INTRAVENOUS at 18:28

## 2025-05-18 RX ADMIN — SODIUM CHLORIDE, PRESERVATIVE FREE 40 MG: 5 INJECTION INTRAVENOUS at 10:45

## 2025-05-18 RX ADMIN — SODIUM CHLORIDE, PRESERVATIVE FREE 10 ML: 5 INJECTION INTRAVENOUS at 10:01

## 2025-05-18 RX ADMIN — TROSPIUM CHLORIDE 20 MG: 20 TABLET, FILM COATED ORAL at 20:54

## 2025-05-18 RX ADMIN — HYDRALAZINE HYDROCHLORIDE 25 MG: 25 TABLET ORAL at 20:54

## 2025-05-18 RX ADMIN — ALPRAZOLAM 2 MG: 0.5 TABLET ORAL at 20:54

## 2025-05-18 RX ADMIN — LACTULOSE 30 G: 10 SOLUTION ORAL at 14:59

## 2025-05-18 RX ADMIN — LOSARTAN POTASSIUM 100 MG: 100 TABLET, FILM COATED ORAL at 20:54

## 2025-05-18 RX ADMIN — IPRATROPIUM BROMIDE AND ALBUTEROL SULFATE 1 DOSE: 2.5; .5 SOLUTION RESPIRATORY (INHALATION) at 08:28

## 2025-05-18 RX ADMIN — CARVEDILOL 12.5 MG: 12.5 TABLET, FILM COATED ORAL at 18:22

## 2025-05-18 ASSESSMENT — PAIN SCALES - GENERAL
PAINLEVEL_OUTOF10: 0
PAINLEVEL_OUTOF10: 4

## 2025-05-18 ASSESSMENT — PAIN DESCRIPTION - LOCATION: LOCATION: MOUTH

## 2025-05-18 ASSESSMENT — PAIN - FUNCTIONAL ASSESSMENT: PAIN_FUNCTIONAL_ASSESSMENT: ACTIVITIES ARE NOT PREVENTED

## 2025-05-18 ASSESSMENT — PAIN SCALES - WONG BAKER: WONGBAKER_NUMERICALRESPONSE: NO HURT

## 2025-05-18 ASSESSMENT — PAIN DESCRIPTION - DESCRIPTORS: DESCRIPTORS: ACHING;BURNING;DISCOMFORT

## 2025-05-18 NOTE — PROGRESS NOTES
Patient ID: Jonelle Contreras  Referring/ Physician: Candido Bentley MD      Summary:   Jonelle Contreras is being seen by nephrology for ESRD and HTN.     Reason for admission: hypertensive urgency       Interval Hx:   The patient was seen and examined in her room  She was resting her bed  She was awake, alert and conversant  She still appears to be miserable  She told me that she has not had a bowel movement yet  Asked patient nurse, she actually had refused her MiraLAX dose last night.  I have changed her MiraLAX to twice daily for the time being  Per patient, her last BM was 6 days ago  Her most recent set of vital signs showed temperature 98.3 heart rate 95 blood pressure 133/71 pulse of 97% on 3 L of oxygen    Lab work from this morning showed sodium 139 potassium 4.3 bicarb 26 BUN 65 creatinine was 9.5 glucose 125 calcium 9.3 phosphorus 5.7    WBC 7.5 hemoglobin 10.4 and the platelet was 285      Assessment/Plan:   Spoke with the patient nurse, will need to make sure to have bowel movement 1 way or the other today.  This is very important especially for peritoneal dialysis patient.  Constipation can cause a lot of complications including peritonitis    Spoke with patient and explained to her the importance  Per patient, she was taken Senokot at home    Reviewed her medication list.  Senokot 2 tablets has been ordered for her since yesterday  For the time being, I will give her lactulose 20 g 3 times daily and will stop it after she has loose BM.    For her peritoneal dialysis, will continue to use 2.5% solution          ESRD  ESRD secondary to hypertension.  She does peritoneal dialysis at home 1 exchange of 2 L 1.5% solutions daily.  Based on her most recent labs in the outpatient setting her adequacy is not at goal so we are going to be increasing her to 3 exchanges, 2 L at 1.5% solutions.  Will use the cycler while inpatient.  Euvolemic.    Resistant hypertension  Has had an overall

## 2025-05-18 NOTE — PROGRESS NOTES
Pt administered 10 GM/15mL solution 30 g of lactulose at 10:23. Pt only consumed 3.75mL of the solution and refused the remaining.     AT 14:59 patient was administered second scheduled dose. Pt given time to consume by RN. At 18:52 this RN checked to see if Pt had consumed any of the second dose. 0mL consumed. Electronically signed by Lisa Buchanan RN on 5/18/2025 at 6:53 PM

## 2025-05-18 NOTE — CONSULTS
GASTROENTEROLOGY INPATIENT CONSULTATION:        IDENTIFYING DATA/REASON FOR CONSULTATION   PATIENT:  Jonelle Contreras  MRN:  [id number]  ADMIT DATE: 2025  TIME OF EVALUATION: 2025 9:46 AM  HOSPITAL STAY:   LOS: 6 days     REASON FOR CONSULTATION:  dysphagia     HISTORY OF PRESENT ILLNESS   Jonelle Contreras is a 74 y.o. female with hypertension, ESRD on PD, CAD s/p PCI in  with JAYME to RCA, PAD,  who presents for hypertensive urgency. She notes overall weakness. She has a hx of COPD but not normally on oxygen.   She is having difficulty swallowing over the past 2-3 weeks. She notes it feels like food gets stuck in the throat, and having difficulty with liquids as well.     She has previously had an upper endoscopy In  with Dr. Ayoub, without strictures or rings. She had mild inflammation in the distal esophagus but biopsies were negative for EOE.     She is on plavix and aspirin - last dose of plavix was 25     Prior endoscopic evaluation:   Colonoscopy 2022 - normal colonoscopy, biopsies negative for microscopic colitis     EGD 2022 - biopsies negative for EOE, roberts's     PAST MEDICAL, SURGICAL, FAMILY, and SOCIAL HISTORY     Past Medical History:   Diagnosis Date    acute intermittent porphyria     sees [place], mom had it too    agoraphobic     ongoing, prefers to be at home    Allergic rhinitis     Anxiety     Arthritis     Asthma     CAD (coronary artery disease) 2022    acute inf [place] RCA stent successfully placed *[place] other arteries clear    Chronic obstructive pulmonary disease, unspecified COPD type (HCC) 2025    ckd 4     from HTN,  sees Trini Pagan    collagenous colitis     Mega    COPD (chronic obstructive pulmonary disease) (HCC)     Depression 2013    eversince      Disease of blood and blood forming organ     GERD (gastroesophageal reflux disease)     Heart disease     Hyperlipidemia     Hypertension 2016    Left thyroid nodule      9mm, gets rechecked yearly on ct lung    Migraines     gets 4x per month    Neuropathy     Osteoporosis     PAD (peripheral artery disease) 12/2022    bilat ptca fem art Ranjith    Peripheral vascular disease     post menopausal 08/2020    osteopenia frax score under on calcium and D    Prediabetes     Pulmonary nodules     yearly ct lung followed by Dr Fuentes    Restless leg syndrome     STEMI involving oth coronary artery of inferior wall (HCC) 12/04/2022    JAYME RCA    Urinary incontinence      Past Surgical History:   Procedure Laterality Date    CARDIAC SURGERY      CHOLECYSTECTOMY      COLONOSCOPY  07/06/2017    DR. CARMONA    COLONOSCOPY N/A 11/16/2022    COLONOSCOPY WITH BIOPSY performed by Aston Carmona MD at [facility] ENDOSCOPY    CORONARY ANGIOPLASTY WITH STENT PLACEMENT  10/2013 [place]    HYSTERECTOMY (CERVIX STATUS UNKNOWN)      JAMEL only for ? age 31    HYSTERECTOMY, VAGINAL      UPPER GASTROINTESTINAL ENDOSCOPY N/A 11/16/2022    EGD BIOPSY performed by Aston Carmona MD at [facility] ENDOSCOPY    UPPER GASTROINTESTINAL ENDOSCOPY  11/16/2022     Family History   Problem Relation Age of Onset    High Blood Pressure Mother     Kidney Disease Mother     Hypertension Father     Other Father         MI    Coronary Art Dis Father     Heart Attack Father     Cancer Brother         testicular    Prostate Cancer Brother     No Known Problems Maternal Grandmother     No Known Problems Maternal Grandfather     No Known Problems Paternal Grandmother     No Known Problems Paternal Grandfather     Cancer Other     Cancer Sister     Cancer Brother         Esophagus cancer     Social History     Socioeconomic History    Marital status:    Tobacco Use    Smoking status: Some Days     Current packs/day: 1.00     Average packs/day: 1 pack/day for 57.4 years (57.4 ttl pk-yrs)     Types: Cigarettes     Start date: 1/1/1968     Passive exposure: Past (on patch)    Smokeless tobacco: Never    Tobacco comments:     I have quite

## 2025-05-18 NOTE — PLAN OF CARE
Problem: Discharge Planning  Goal: Discharge to home or other facility with appropriate resources  5/18/2025 0041 by Jalyn Hayes RN  Outcome: Progressing  5/17/2025 1226 by Lisa Buchanan RN  Outcome: Progressing     Problem: ABCDS Injury Assessment  Goal: Absence of physical injury  5/18/2025 0041 by Jalyn Hayes RN  Outcome: Progressing  5/17/2025 1226 by Lisa Buchanan RN  Outcome: Progressing     Problem: Safety - Adult  Goal: Free from fall injury  5/18/2025 0041 by Jalyn Hayes RN  Outcome: Progressing  5/17/2025 1226 by Lisa Buchanan RN  Outcome: Progressing     Problem: Pain  Goal: Verbalizes/displays adequate comfort level or baseline comfort level  5/18/2025 0041 by Jalyn Hayes RN  Outcome: Progressing  5/17/2025 1226 by Lisa Buchanan RN  Outcome: Progressing     Problem: Genitourinary - Adult  Goal: Absence of urinary retention  5/18/2025 0041 by Jalyn Hayes RN  Outcome: Progressing  5/17/2025 1226 by Lisa Buchanan RN  Outcome: Progressing     Problem: Metabolic/Fluid and Electrolytes - Adult  Goal: Electrolytes maintained within normal limits  5/18/2025 0041 by Jalyn Hayes RN  Outcome: Progressing  5/17/2025 1226 by Lisa Buchanan RN  Outcome: Progressing     Problem: Nutrition Deficit:  Goal: Optimize nutritional status  5/18/2025 0041 by Jalyn Hayes RN  Outcome: Progressing  5/17/2025 1226 by Lisa Buchanan RN  Outcome: Progressing   Alert and oriented x4 Dyspnic with exertion Sats. WNL on 3L O2 per n/c Lungs diminished Cough and deep breathing exercises encouraged Medicated x1 for c/o neck pain with good effect. PD as rxd. Going. Turns and repositions self Free from fall/injury

## 2025-05-18 NOTE — PLAN OF CARE
Problem: Discharge Planning  Goal: Discharge to home or other facility with appropriate resources  5/18/2025 1949 by Joel Branham RN  Outcome: Progressing  5/18/2025 1705 by Lisa Buchanan RN  Outcome: Progressing     Problem: ABCDS Injury Assessment  Goal: Absence of physical injury  5/18/2025 1949 by Joel Branham RN  Outcome: Progressing  5/18/2025 1705 by Lisa Buchanan RN  Outcome: Progressing     Problem: Safety - Adult  Goal: Free from fall injury  5/18/2025 1949 by Joel Branham RN  Outcome: Progressing  5/18/2025 1705 by iLsa Buchanan RN  Outcome: Progressing     Problem: Pain  Goal: Verbalizes/displays adequate comfort level or baseline comfort level  5/18/2025 1949 by Joel Branham RN  Outcome: Progressing  5/18/2025 1705 by Lisa Buchanan RN  Outcome: Progressing     Problem: Genitourinary - Adult  Goal: Absence of urinary retention  5/18/2025 1949 by Joel Branham RN  Outcome: Progressing  5/18/2025 1705 by Lisa Buchanan RN  Outcome: Progressing     Problem: Metabolic/Fluid and Electrolytes - Adult  Goal: Electrolytes maintained within normal limits  5/18/2025 1949 by Joel Branham RN  Outcome: Progressing  5/18/2025 1705 by Lisa Buchanan RN  Outcome: Progressing     Problem: Nutrition Deficit:  Goal: Optimize nutritional status  5/18/2025 1949 by Joel Branham RN  Outcome: Progressing  5/18/2025 1705 by Lisa Buchanan RN  Outcome: Progressing

## 2025-05-18 NOTE — PROGRESS NOTES
[facility] Note      Chief Complaint: I am having a hard time swallowing    Subjective/Interval History:    This morning the patient continues to complain of constipation and dysphagia..  GI note reviewed and appreciated.  Plavix placed on hold for possible EGD later this week.  No other new problems noted.    Discussed with nursing at the bedside.    No chest pain. No nausea, vomiting, diarrhea. No abdominal pain. No dysuria.  The remainder of the review of systems is negative.     PMH, PSH, FH/SH reviewed and unchanged in the H&P dated 25    Medication list reviewed    Objective:    /71   Pulse 85   Temp 98.3 °F (36.8 °C) (Oral)   Resp 20   Ht 1.651 m (5' 5\")   Wt 59 kg (130 lb 1.1 oz)   SpO2 94%   BMI 21.64 kg/m²   Temp  Av.4 °F (36.9 °C)  Min: 98.1 °F (36.7 °C)  Max: 98.8 °F (37.1 °C)    RRR  Chest- respirations easy, 3 L per NC.  Bilateral expiratory rhonchi noted  Abd- soft, NTND  Ext- no edema    The Following Labs Were Reviewed Today:       Recent Results (from the past 24 hours)   Renal Function Panel    Collection Time: 25  6:13 AM   Result Value Ref Range    Sodium 139 136 - 145 mmol/L    Potassium 4.3 3.5 - 5.1 mmol/L    Chloride 97 (L) 99 - 110 mmol/L    CO2 26 21 - 32 mmol/L    Anion Gap 16 3 - 16    Glucose 125 (H) 70 - 99 mg/dL    BUN 65 (H) 7 - 20 mg/dL    Creatinine 9.5 (HH) 0.6 - 1.2 mg/dL    Est, Glom Filt Rate 4 (A) >60    Calcium 9.3 8.3 - 10.6 mg/dL    Phosphorus 5.7 (H) 2.5 - 4.9 mg/dL    Albumin 3.4 3.4 - 5.0 g/dL   CBC with Auto Differential    Collection Time: 25  6:13 AM   Result Value Ref Range    WBC 7.5 4.0 - 11.0 K/uL    RBC 3.71 (L) 4.00 - 5.20 M/uL    Hemoglobin 10.4 (L) 12.0 - 16.0 g/dL    Hematocrit 32.1 (L) 36.0 - 48.0 %    MCV 86.6 80.0 - 100.0 fL    MCH 28.2 26.0 - 34.0 pg    MCHC 32.6 31.0 - 36.0 g/dL    RDW 16.0 (H) 12.4 - 15.4 %    Platelets 285 135 - 450 K/uL    MPV 9.3 5.0 - 10.5 fL    Neutrophils % 70.6 %    Lymphocytes %

## 2025-05-19 ENCOUNTER — APPOINTMENT (OUTPATIENT)
Dept: CT IMAGING | Age: 75
End: 2025-05-19
Payer: MEDICARE

## 2025-05-19 ENCOUNTER — APPOINTMENT (OUTPATIENT)
Dept: GENERAL RADIOLOGY | Age: 75
End: 2025-05-19
Payer: MEDICARE

## 2025-05-19 LAB
ALBUMIN SERPL-MCNC: 3.5 G/DL (ref 3.4–5)
ANION GAP SERPL CALCULATED.3IONS-SCNC: 16 MMOL/L (ref 3–16)
BASE EXCESS BLDV CALC-SCNC: 2.2 MMOL/L
BUN SERPL-MCNC: 66 MG/DL (ref 7–20)
CALCIUM SERPL-MCNC: 9.7 MG/DL (ref 8.3–10.6)
CHLORIDE SERPL-SCNC: 97 MMOL/L (ref 99–110)
CO2 BLDV-SCNC: 31 MMOL/L
CO2 SERPL-SCNC: 26 MMOL/L (ref 21–32)
COHGB MFR BLDV: 0.9 %
CREAT SERPL-MCNC: 10 MG/DL (ref 0.6–1.2)
D-DIMER QUANTITATIVE: 3.26 UG/ML FEU (ref 0–0.6)
GFR SERPLBLD CREATININE-BSD FMLA CKD-EPI: 4 ML/MIN/{1.73_M2}
GLUCOSE SERPL-MCNC: 129 MG/DL (ref 70–99)
HCO3 BLDV-SCNC: 29 MMOL/L (ref 23–29)
METHGB MFR BLDV: 0.6 %
O2 THERAPY: ABNORMAL
PCO2 BLDV: 57.7 MMHG (ref 40–50)
PH BLDV: 7.32 [PH] (ref 7.35–7.45)
PHOSPHATE SERPL-MCNC: 6.3 MG/DL (ref 2.5–4.9)
PO2 BLDV: 42 MMHG
POTASSIUM SERPL-SCNC: 4.6 MMOL/L (ref 3.5–5.1)
SAO2 % BLDV: 73 %
SODIUM SERPL-SCNC: 139 MMOL/L (ref 136–145)

## 2025-05-19 PROCEDURE — 6360000002 HC RX W HCPCS: Performed by: INTERNAL MEDICINE

## 2025-05-19 PROCEDURE — 2700000000 HC OXYGEN THERAPY PER DAY

## 2025-05-19 PROCEDURE — 6370000000 HC RX 637 (ALT 250 FOR IP): Performed by: INTERNAL MEDICINE

## 2025-05-19 PROCEDURE — 6370000000 HC RX 637 (ALT 250 FOR IP)

## 2025-05-19 PROCEDURE — 94761 N-INVAS EAR/PLS OXIMETRY MLT: CPT

## 2025-05-19 PROCEDURE — 6360000004 HC RX CONTRAST MEDICATION: Performed by: STUDENT IN AN ORGANIZED HEALTH CARE EDUCATION/TRAINING PROGRAM

## 2025-05-19 PROCEDURE — 6360000002 HC RX W HCPCS: Performed by: STUDENT IN AN ORGANIZED HEALTH CARE EDUCATION/TRAINING PROGRAM

## 2025-05-19 PROCEDURE — 97535 SELF CARE MNGMENT TRAINING: CPT

## 2025-05-19 PROCEDURE — 99232 SBSQ HOSP IP/OBS MODERATE 35: CPT | Performed by: STUDENT IN AN ORGANIZED HEALTH CARE EDUCATION/TRAINING PROGRAM

## 2025-05-19 PROCEDURE — 71045 X-RAY EXAM CHEST 1 VIEW: CPT

## 2025-05-19 PROCEDURE — 90945 DIALYSIS ONE EVALUATION: CPT

## 2025-05-19 PROCEDURE — 2060000000 HC ICU INTERMEDIATE R&B

## 2025-05-19 PROCEDURE — 80069 RENAL FUNCTION PANEL: CPT

## 2025-05-19 PROCEDURE — 94640 AIRWAY INHALATION TREATMENT: CPT

## 2025-05-19 PROCEDURE — 82803 BLOOD GASES ANY COMBINATION: CPT

## 2025-05-19 PROCEDURE — 2500000003 HC RX 250 WO HCPCS: Performed by: STUDENT IN AN ORGANIZED HEALTH CARE EDUCATION/TRAINING PROGRAM

## 2025-05-19 PROCEDURE — 6370000000 HC RX 637 (ALT 250 FOR IP): Performed by: STUDENT IN AN ORGANIZED HEALTH CARE EDUCATION/TRAINING PROGRAM

## 2025-05-19 PROCEDURE — 74177 CT ABD & PELVIS W/CONTRAST: CPT

## 2025-05-19 PROCEDURE — 71275 CT ANGIOGRAPHY CHEST: CPT

## 2025-05-19 PROCEDURE — 2500000003 HC RX 250 WO HCPCS: Performed by: INTERNAL MEDICINE

## 2025-05-19 PROCEDURE — 36415 COLL VENOUS BLD VENIPUNCTURE: CPT

## 2025-05-19 PROCEDURE — 85379 FIBRIN DEGRADATION QUANT: CPT

## 2025-05-19 PROCEDURE — 97530 THERAPEUTIC ACTIVITIES: CPT

## 2025-05-19 PROCEDURE — 2580000003 HC RX 258: Performed by: INTERNAL MEDICINE

## 2025-05-19 RX ORDER — DIPHENHYDRAMINE HYDROCHLORIDE 50 MG/ML
50 INJECTION, SOLUTION INTRAMUSCULAR; INTRAVENOUS ONCE
Status: DISCONTINUED | OUTPATIENT
Start: 2025-05-19 | End: 2025-05-19

## 2025-05-19 RX ORDER — SODIUM CHLORIDE, SODIUM LACTATE, CALCIUM CHLORIDE, MAGNESIUM CHLORIDE AND DEXTROSE 2.5; 538; 448; 18.3; 5.08 G/100ML; MG/100ML; MG/100ML; MG/100ML; MG/100ML
6000 INJECTION, SOLUTION INTRAPERITONEAL NIGHTLY
Status: DISCONTINUED | OUTPATIENT
Start: 2025-05-19 | End: 2025-05-27 | Stop reason: SDUPTHER

## 2025-05-19 RX ORDER — METHYLPREDNISOLONE 16 MG/1
32 TABLET ORAL ONCE
Status: DISCONTINUED | OUTPATIENT
Start: 2025-05-19 | End: 2025-05-19

## 2025-05-19 RX ORDER — IOPAMIDOL 755 MG/ML
75 INJECTION, SOLUTION INTRAVASCULAR
Status: COMPLETED | OUTPATIENT
Start: 2025-05-19 | End: 2025-05-19

## 2025-05-19 RX ORDER — FLUCONAZOLE 100 MG/1
200 TABLET ORAL DAILY
Status: DISCONTINUED | OUTPATIENT
Start: 2025-05-19 | End: 2025-05-30 | Stop reason: HOSPADM

## 2025-05-19 RX ORDER — HYDROCORTISONE SODIUM SUCCINATE 100 MG/2ML
200 INJECTION INTRAMUSCULAR; INTRAVENOUS EVERY 6 HOURS
Status: COMPLETED | OUTPATIENT
Start: 2025-05-19 | End: 2025-05-19

## 2025-05-19 RX ORDER — DIPHENHYDRAMINE HYDROCHLORIDE 50 MG/ML
12.5 INJECTION, SOLUTION INTRAMUSCULAR; INTRAVENOUS ONCE
Status: COMPLETED | OUTPATIENT
Start: 2025-05-19 | End: 2025-05-19

## 2025-05-19 RX ORDER — SODIUM CHLORIDE, SODIUM LACTATE, CALCIUM CHLORIDE, MAGNESIUM CHLORIDE AND DEXTROSE 2.5; 538; 448; 18.3; 5.08 G/100ML; MG/100ML; MG/100ML; MG/100ML; MG/100ML
8000 INJECTION, SOLUTION INTRAPERITONEAL NIGHTLY
Status: DISCONTINUED | OUTPATIENT
Start: 2025-05-19 | End: 2025-05-19 | Stop reason: SDUPTHER

## 2025-05-19 RX ORDER — SODIUM CHLORIDE, SODIUM LACTATE, CALCIUM CHLORIDE, MAGNESIUM CHLORIDE AND DEXTROSE 2.5; 538; 448; 18.3; 5.08 G/100ML; MG/100ML; MG/100ML; MG/100ML; MG/100ML
2000 INJECTION, SOLUTION INTRAPERITONEAL NIGHTLY
Status: DISCONTINUED | OUTPATIENT
Start: 2025-05-19 | End: 2025-05-27 | Stop reason: SDUPTHER

## 2025-05-19 RX ADMIN — SPIRONOLACTONE 50 MG: 25 TABLET ORAL at 10:32

## 2025-05-19 RX ADMIN — ROPINIROLE HYDROCHLORIDE 1 MG: 1 TABLET, FILM COATED ORAL at 20:35

## 2025-05-19 RX ADMIN — HYDROCORTISONE SODIUM SUCCINATE 200 MG: 100 INJECTION, POWDER, FOR SOLUTION INTRAMUSCULAR; INTRAVENOUS at 10:32

## 2025-05-19 RX ADMIN — HYDROCORTISONE SODIUM SUCCINATE 200 MG: 100 INJECTION, POWDER, FOR SOLUTION INTRAMUSCULAR; INTRAVENOUS at 05:27

## 2025-05-19 RX ADMIN — NIFEDIPINE 60 MG: 30 TABLET, FILM COATED, EXTENDED RELEASE ORAL at 10:31

## 2025-05-19 RX ADMIN — IPRATROPIUM BROMIDE AND ALBUTEROL SULFATE 1 DOSE: 2.5; .5 SOLUTION RESPIRATORY (INHALATION) at 09:30

## 2025-05-19 RX ADMIN — TROSPIUM CHLORIDE 20 MG: 20 TABLET, FILM COATED ORAL at 20:35

## 2025-05-19 RX ADMIN — ASPIRIN 81 MG: 81 TABLET, CHEWABLE ORAL at 10:32

## 2025-05-19 RX ADMIN — SEVELAMER CARBONATE 800 MG: 800 TABLET, FILM COATED ORAL at 17:22

## 2025-05-19 RX ADMIN — SEVELAMER CARBONATE 800 MG: 800 TABLET, FILM COATED ORAL at 10:31

## 2025-05-19 RX ADMIN — SODIUM CHLORIDE, PRESERVATIVE FREE 10 ML: 5 INJECTION INTRAVENOUS at 10:33

## 2025-05-19 RX ADMIN — CARVEDILOL 12.5 MG: 12.5 TABLET, FILM COATED ORAL at 17:23

## 2025-05-19 RX ADMIN — AZITHROMYCIN 250 MG: 250 TABLET, FILM COATED ORAL at 17:22

## 2025-05-19 RX ADMIN — HYDROCORTISONE SODIUM SUCCINATE 200 MG: 100 INJECTION, POWDER, FOR SOLUTION INTRAMUSCULAR; INTRAVENOUS at 17:22

## 2025-05-19 RX ADMIN — POLYETHYLENE GLYCOL 3350 17 G: 17 POWDER, FOR SOLUTION ORAL at 20:35

## 2025-05-19 RX ADMIN — SODIUM CHLORIDE, PRESERVATIVE FREE 40 MG: 5 INJECTION INTRAVENOUS at 10:31

## 2025-05-19 RX ADMIN — IPRATROPIUM BROMIDE AND ALBUTEROL SULFATE 1 DOSE: 2.5; .5 SOLUTION RESPIRATORY (INHALATION) at 12:36

## 2025-05-19 RX ADMIN — IPRATROPIUM BROMIDE AND ALBUTEROL SULFATE 1 DOSE: 2.5; .5 SOLUTION RESPIRATORY (INHALATION) at 20:09

## 2025-05-19 RX ADMIN — POLYETHYLENE GLYCOL 3350 17 G: 17 POWDER, FOR SOLUTION ORAL at 10:44

## 2025-05-19 RX ADMIN — HEPARIN SODIUM 5000 UNITS: 5000 INJECTION INTRAVENOUS; SUBCUTANEOUS at 14:29

## 2025-05-19 RX ADMIN — FLUCONAZOLE 200 MG: 100 TABLET ORAL at 10:31

## 2025-05-19 RX ADMIN — LACTULOSE 30 G: 10 SOLUTION ORAL at 20:35

## 2025-05-19 RX ADMIN — ALPRAZOLAM 2 MG: 0.5 TABLET ORAL at 17:34

## 2025-05-19 RX ADMIN — WATER 1000 MG: 1 INJECTION INTRAMUSCULAR; INTRAVENOUS; SUBCUTANEOUS at 17:22

## 2025-05-19 RX ADMIN — LACTULOSE 30 G: 10 SOLUTION ORAL at 10:35

## 2025-05-19 RX ADMIN — HEPARIN SODIUM 5000 UNITS: 5000 INJECTION INTRAVENOUS; SUBCUTANEOUS at 20:35

## 2025-05-19 RX ADMIN — SODIUM CHLORIDE, PRESERVATIVE FREE 10 ML: 5 INJECTION INTRAVENOUS at 20:36

## 2025-05-19 RX ADMIN — SENNOSIDES AND DOCUSATE SODIUM 2 TABLET: 50; 8.6 TABLET ORAL at 10:32

## 2025-05-19 RX ADMIN — TIOTROPIUM BROMIDE AND OLODATEROL 2 PUFF: 3.124; 2.736 SPRAY, METERED RESPIRATORY (INHALATION) at 09:30

## 2025-05-19 RX ADMIN — HEPARIN SODIUM 5000 UNITS: 5000 INJECTION INTRAVENOUS; SUBCUTANEOUS at 05:28

## 2025-05-19 RX ADMIN — LOSARTAN POTASSIUM 100 MG: 100 TABLET, FILM COATED ORAL at 20:35

## 2025-05-19 RX ADMIN — SODIUM CHLORIDE, SODIUM LACTATE, CALCIUM CHLORIDE, MAGNESIUM CHLORIDE AND DEXTROSE 6000 ML: 2.5; 538; 448; 18.3; 5.08 INJECTION, SOLUTION INTRAPERITONEAL at 22:14

## 2025-05-19 RX ADMIN — SODIUM CHLORIDE, SODIUM LACTATE, CALCIUM CHLORIDE, MAGNESIUM CHLORIDE AND DEXTROSE 2000 ML: 2.5; 538; 448; 18.3; 5.08 INJECTION, SOLUTION INTRAPERITONEAL at 22:14

## 2025-05-19 RX ADMIN — TORSEMIDE 100 MG: 100 TABLET ORAL at 10:31

## 2025-05-19 RX ADMIN — CARVEDILOL 12.5 MG: 12.5 TABLET, FILM COATED ORAL at 10:53

## 2025-05-19 RX ADMIN — ROSUVASTATIN CALCIUM 10 MG: 10 TABLET, FILM COATED ORAL at 20:35

## 2025-05-19 RX ADMIN — ONDANSETRON 4 MG: 2 INJECTION, SOLUTION INTRAMUSCULAR; INTRAVENOUS at 03:41

## 2025-05-19 RX ADMIN — ONDANSETRON 4 MG: 4 TABLET, ORALLY DISINTEGRATING ORAL at 17:35

## 2025-05-19 RX ADMIN — IOPAMIDOL 75 ML: 755 INJECTION, SOLUTION INTRAVENOUS at 06:50

## 2025-05-19 RX ADMIN — SEVELAMER CARBONATE 800 MG: 800 TABLET, FILM COATED ORAL at 12:48

## 2025-05-19 RX ADMIN — DIPHENHYDRAMINE HYDROCHLORIDE 12.5 MG: 50 INJECTION INTRAMUSCULAR; INTRAVENOUS at 04:22

## 2025-05-19 NOTE — PROGRESS NOTES
Internal Medicine Hospital Progress Note    Patient:  Jonelle Contreras 74 y.o. female MRN: [id number]     Date of Service: 5/19/2025    Allergy: Iodine, Iodinated contrast media, Wellbutrin [bupropion], Adhesive tape, Sertraline, and Sulfa antibiotics  CC: F/u:   Brief Course   Jonelle Contreras was admitted on 5/12/2025 for Hypertensive urgency. Jonelle Contreras has been admitted for 7 days    24 hr interval hx:  Oxygen increased to 10L overnight.   She is having abdominal pain    Objective     Physical Exam:  Vitals: BP (!) 94/58   Pulse 80   Temp 98 °F (36.7 °C) (Oral)   Resp 20   Ht 1.651 m (5' 5\")   Wt 59 kg (130 lb 1.1 oz)   SpO2 98%   BMI 21.64 kg/m²     General: Alert and oriented X3. No acute distress  Eyes: PEERL. No scleral icterus noted. Normal appearing conjunctiva bilaterally  Ears: Normal TM and external canal bilaterally.  Oral cavity/Throat: No pharyngeal erythema. No oral lesions.  Cardiovascular: Heart is regular rate and rhythm. No murmurs noted. Radial pulses 2+. Posterior tibial pulses 2+. No lower extremity edema noted  Pulmonary: Lungs clear to auscultation bilaterally  Skin: Dry, warm. No obvious skin lesions or rashes noted.  Neuro: PEERL. EOM intact. No facial droop. Normal sensation in bilateral upper and lower extremities. Gait is norm    Pertinent/ New Labs and Imaging Studies   Labs reviewed. Pertinent labs noted in assessment and plan      Assessment and Plan   Jonelle Contrersa was admitted to hospital for Hypertensive urgency    Hypertensive urgency  - Nephrology consulted    Abdominal pain  - CT abd pelvis  - GI consulted    ESRD on PD  - Nephrology consulted, managing PD orders    Hyperlipidemia  - Continue home statin    Restless leg syndrom  - Continue ropinirole    Anxiety  - Continue home alprazolam    COPD  - Continue albuterol and stiolto    Overactive bladder  - Continue home trospium    Cough  CT chest with some signs of bronchitis vs early pneumonia  - Continue ceftriaxone

## 2025-05-19 NOTE — CARE COORDINATION
DISCHARGE PLANNING:    VM received Saturday regarding family questions and possible referral to [facility].  Spoke to [facility] Liaison, they are unable to accommodate PD at this time.  Will speak to family at bedside.    #[phone]  Electronically signed by Claudia Oewn RN on 5/19/2025 at 11:14 AM    Spoke to patient and family at bedside.  Family would prefer a [place] facility so they are close.  Spoke to facility liaison for [facility], [place], and [facility].  None of those facilities take PD at this time.      #[phone]  Electronically signed by Claudia Owen RN on 5/19/2025 at 2:26 PM    Will speak with family regarding [place] facilties:    [facility]  [facility]  [place]    Verifying if these facilities take PD.    #[phone]  Electronically signed by Claudia Owen RN on 5/19/2025 at 2:28 PM

## 2025-05-19 NOTE — PROGRESS NOTES
Called radha Das and gave all updates. Pt currently in bed resting. Call light in reach. No distress or discomfort noted.

## 2025-05-19 NOTE — PROGRESS NOTES
Patient in bed, resting comfortably during my shift. Oxygen was weaned down from 10L to 7L & patient is tolerating it well. VSS this shift. Patient still has not had a BM and is refusing lactulose. Patient has been educated on the need for the medication. Per Dr. Bentley, patient is okay to have a regular diet. EGD was cancelled today due to overnight events. The patient went from 1L of oxygen to 10L and AMS. Patient's speech is still delayed with slight expressive aphasia. Neurology was consulted and saw the patient this AM. MRI ordered. MRI screening form complete with daughter at bedside. Care ongoing.

## 2025-05-19 NOTE — PROGRESS NOTES
[facility] - Occupational Therapy   Phone: [phone]    Occupational Therapy  Facility/Department:[facility] 5W PROGRESSIVE CARE    [] Initial Evaluation            [x] Daily Treatment Note         [] Discharge Summary      Patient: Jonelle Contreras   : 1950   MRN: [id number]   Date of Service:  2025    Staff Mobility Recommendation: RW x1 w/gait belt     AM-PAC Score:   Discharge Recommendations: SNF    Admitting Diagnosis:  Hypertensive urgency  Ordering Physician: Candido Bentley MD   Current Admission Summary: \"74-year-old lady with a past medical history significant for ESRD on PD, hypertension and CAD who presented for evaluation of hyperkalemia and hypertension. She had been at baseline health yesterday and referred to the emergency department because of hyperkalemia.  Her blood pressure has been elevated she is struggled with control outpatient.  She has had ambulatory blood pressure monitoring with systolic often in excess of 180.  She reports adherence with her home medication regimen.  She is not on any NSAIDs or decongestants.  She reports a headache today.  No chest pain or shortness of breath.  She does not report any fever or chills.\"    Past Medical History:  has a past medical history of acute intermittent porphyria, agoraphobic, Allergic rhinitis, Anxiety, Arthritis, Asthma, CAD (coronary artery disease), Chronic obstructive pulmonary disease, unspecified COPD type ([facility]), ckd 4, collagenous colitis, COPD (chronic obstructive pulmonary disease) (HCC), Depression, Disease of blood and blood forming organ, GERD (gastroesophageal reflux disease), Heart disease, Hyperlipidemia, Hypertension, Left thyroid nodule, Migraines, Neuropathy, Osteoporosis, PAD (peripheral artery disease), Peripheral vascular disease, post menopausal, Prediabetes, Pulmonary nodules, Restless leg syndrome, STEMI involving oth coronary artery of inferior wall ([facility]), and Urinary incontinence.  Past Surgical  basement  Home Access: Stairs to enter with rails  Entrance Stairs - Number of Steps: 5  Entrance Stairs - Rails: Left  Bathroom Shower/Tub: Tub only  Bathroom Toilet: Handicap height  Bathroom Equipment: None  Bathroom Accessibility: Accessible  Home Equipment:  (Walker (3 wheel).)  Has the patient had two or more falls in the past year or any fall with injury in the past year?: Yes  Receives Help From: Neighbor  Prior Level of Assist for ADLs: Independent  Prior Level of Assist for Homemaking: Needs assistance (Family/neighbor  assists as needed.)  Prior Level of Assist for Ambulation: Independent household ambulator, with or without device, Independent community ambulator, with or without device (Walker prn.)  Prior Level of Assist for Transfers: Independent  Active : Yes  Mode of Transportation: Car  Occupation: Retired  Type of Occupation: Manager for [employer]  Leisure & Hobbies: Enjoys taking walks.    Available Assistance at Discharge: available PRN    Examination   Vision:   Vision: Within Functional Limits     Hearing:   Hearing: Within functional limits     ROM:   (B) UE AROM WFL  Strength:   (B) UE strength grossly WFL    Therapist Clinical Decision Making (Complexity): medium complexity       Activities of Daily Living  Basic Activities of Daily Living  Grooming: contact guard assistance minimal assistance  Grooming Comments: standing at sink to complete oral care and combing hair with CGA/Shamir for balance  General Comments: Anticipate pt to require min-modA for LB ADLs and CGA/Shamir for UB ADLs based on strength, balance, and endurance observed this date.      Instrumental Activities of Daily Living  No IADL completed on this date.    Functional Mobility  Bed Mobility:  Supine to Sit: stand by assistance, use of bed rail, head of bed elevated  Comments: pt seated in recliner at end of session  Transfers:  Sit to stand transfer:contact guard assistance  Stand to sit transfer: contact guard

## 2025-05-19 NOTE — PROGRESS NOTES
Patient is refusing lactulose. Patient stated this medicine is bad for her kidneys and she will not take. RN educated patient on the need for the medication due to her constipation/concerns for an ileus. Patient states she will talk to the Dr about \" a better way\" to deal with that.

## 2025-05-19 NOTE — PROGRESS NOTES
INPATIENT CONSULTATION:    IDENTIFYING DATA/REASON FOR CONSULTATION   PATIENT:  Jonelel Contreras  MRN:  [id number]  ADMIT DATE: 5/12/2025  TIME OF EVALUATION: 5/19/2025 7:07 AM  HOSPITAL STAY:   LOS: 7 days   CONSULTING PHYSICIAN: Candido Bentley MD   REASON FOR CONSULTATION: Dysphagia    Subjective:    Patient seen and examined in follow-up. Patient reports ongoing dysphagia and odynophagia.  She reports using a tongue scraper prior to admission due to thrush.  She reports passing gas this morning, but previously had not had a bowel movement or passed gas in 6 days.    MEDICATIONS   SCHEDULED:  hydrocortisone sodium succinate PF, 200 mg, Q6H  lactulose, 30 g, TID  pantoprazole (PROTONIX) 40 mg in sodium chloride (PF) 0.9 % 10 mL injection, 40 mg, Daily  sennosides-docusate sodium, 2 tablet, Daily  polyethylene glycol, 17 g, BID  torsemide, 100 mg, Daily  ipratropium 0.5 mg-albuterol 2.5 mg, 1 Dose, Q4H WA RT  dianeal lo-neal 2.5%, 6,000 mL, Nightly  cefTRIAXone (ROCEPHIN) IV, 1,000 mg, Q24H  azithromycin, 250 mg, QPM  prochlorperazine, 10 mg, Once  hydrALAZINE, 25 mg, 3 times per day  NIFEdipine, 60 mg, Daily  sodium chloride flush, 5-40 mL, 2 times per day  heparin (porcine), 5,000 Units, 3 times per day  aspirin, 81 mg, Daily  carvedilol, 12.5 mg, BID WC  [Held by provider] clopidogrel, 75 mg, Daily  losartan, 100 mg, Nightly  rOPINIRole, 1 mg, Nightly  rosuvastatin, 10 mg, Nightly  trospium, 20 mg, Nightly  tiotropium-olodaterol, 2 puff, Daily  spironolactone, 50 mg, Daily  sevelamer, 800 mg, TID WC      FLUIDS/DRIPS:     sodium chloride       PRNs: traMADol, 50 mg, Q12H PRN  magic (miracle) mouthwash, 5 mL, 4x Daily PRN  prochlorperazine, 10 mg, Q6H PRN  benzocaine-menthol, 1 lozenge, Q2H PRN  sodium chloride flush, 5-40 mL, PRN  sodium chloride, , PRN  ondansetron, 4 mg, Q8H PRN   Or  ondansetron, 4 mg, Q6H PRN  polyethylene glycol, 17 g, Daily PRN  acetaminophen, 650 mg, Q6H PRN   Or  acetaminophen, 650 mg, Q6H    - Hold Plavix with plan for EGD 5/22/2025.  - Will follow-up remotely    If you have any questions or need any further information, please feel free to contact anyone on our consult team.  Thank you for allowing us to participate in the care of Jonelle Contreras.    Parker Rdz MD  [facility]

## 2025-05-19 NOTE — PROGRESS NOTES
Patient ID: Jonelle Contreras  Referring/ Physician: Candido Bentley MD      Summary:   Jonelle Contreras is being seen by nephrology for ESRD and HTN.     Reason for admission: hypertensive urgency       Interval Hx:   Seen at bedside.   Son at bedside.   Had increased work of breathing last night, was on 10 L.   Now neurology was consulted because she reportedly had work finding difficulties and left ear pain.     She had some BP readings 82-94 systolic.   MAP lowest was 66 and BP meds have hold parameters.   Seen by GI, has not had a bowel movement. Plan for EGD now for odynophagia. Started on fluconazole.     No issues reported with PD.     K 4.6   Bicarb 26  BUN 66 Cr 10   Phos 6.3    CT showed  IMPRESSION:  1. Negative for pulmonary embolus.  2. Bibasilar endobronchial mucous plugging with associated postobstructive  atelectasis versus pneumonia.  3. Hiatal hernia with gastroesophageal reflux.  4. Mild ileus with diverticulosis.  5. Mild ascites.    Assessment/Plan:   -  will hold hydralazine since BP running lower   - continue other BP meds with hold parameters.   - will continue on dianeal 2.5% but increased to 4 exchanges to get better clearance.   - cont renvela for binder.   - still no BM, has ileus it seems. GI consulted. On lactulose.   - check PTH      ESRD  ESRD secondary to hypertension.  She does peritoneal dialysis at home 1 exchange of 2 L 1.5% solutions daily.  Based on her most recent labs in the outpatient setting her adequacy is not at goal so we are going to be increasing her to 4 exchanges, 2 L at 2.5% solutions.  Will use the cycler while inpatient.  Euvolemic.   - daily BM, on lactulose. Has ileus > GI consulted.    - gent to PD exit site.        Resistant hypertension  The BP has been controlled inpatient on her home regimen, actually low at times suggesting that she may not have been taking her medications as prescribed.   Has had an overall negative secondary work  disease) (HCC)     Depression 2013    eversince      Disease of blood and blood forming organ     GERD (gastroesophageal reflux disease)     Heart disease     Hyperlipidemia     Hypertension 2016    Left thyroid nodule     9mm, gets rechecked yearly on ct lung    Migraines     gets 4x per month    Neuropathy     Osteoporosis     PAD (peripheral artery disease) 2022    bilat ptca fem art Ranjith    Peripheral vascular disease     post menopausal 2020    osteopenia frax score under on calcium and D    Prediabetes     Pulmonary nodules     yearly ct lung followed by Dr Fuentes    Restless leg syndrome     STEMI involving oth coronary artery of inferior wall (HCC) 2022    JAYME RCA    Urinary incontinence          Surgical Hx: reviewed and updated as appropriate   has a past surgical history that includes Hysterectomy; Cholecystectomy; Coronary angioplasty with stent (10/2013 [place]); Colonoscopy (2017); Colonoscopy (N/A, 2022); Upper gastrointestinal endoscopy (N/A, 2022); Cardiac surgery; Hysterectomy, vaginal; and Upper gastrointestinal endoscopy (2022).     Social Hx: reviewed and updated as appropriate  Social History     Tobacco Use    Smoking status: Some Days     Current packs/day: 1.00     Average packs/day: 1 pack/day for 57.4 years (57.4 ttl pk-yrs)     Types: Cigarettes     Start date: 1968     Passive exposure: Past (on patch)    Smokeless tobacco: Never    Tobacco comments:     I have quite on and off.i have used [place] product.the patch was working until i had a reaction.   Substance Use Topics    Alcohol use: No        Family hx: reviewed and updated as appropriate  family history includes Cancer in her brother, brother, sister, and another family member; Coronary Art Dis in her father; Heart Attack in her father; High Blood Pressure in her mother; Hypertension in her father; Kidney Disease in her mother; No Known Problems in her

## 2025-05-19 NOTE — CONSULTS
Neurology Consult Note  Reason for Consult: Difficulty with word finding. mental status change.     Chief complaint: \"Can't swallow, slurred speech\"    Candido Gant MD asked me to see Jonelle Contreras in consultation for evaluation of Difficulty with word finding. mental status change.     History of Present Illness:  I obtained my information via the patient, supplemented with chart review.     Jonelle Contreras is a 74 y.o. female with hx noted below including: Migraines, HTN, HLD, agoraphobia, anxiety, PVD, pre diabetes, ESRD on PD. She was admitted on 5/12/25 for evaluation of hypertension with reported home and EMS blood pressure of 240/130's.   Additionally reported dysphagia for which GI evaluated and plan for EGD 5/22-delayed 2/2 to Plavix. There are concerns for candida esophagitis. She had CT head 5/12 for month long blurry vision, headaches which was without acute findings per report.     Overnight patient was noted to have increasing confusion and worsening hypoxia ( 1L NC to 10L). She was receiving PD during time of symptoms. Blood pressure was 89/53. She underwent STAT respiratory work up which was negative for PE. Reported mucous plugging with associated post obstructive atalectasis vs. Pneumonia.     In conversation with the patient and her daughter she agrees that she did have worsening mentation overnight as above and currently is doing much better. She and nursing also report some right sided dysfunction as well, most evident when walking.     Patient reports that for some time, (weeks to months) she has had more mild cognitive changes described as confusion to day to day activities, what she did the day prior. This has been worse during hospitalization and daughter reports she is not sleeping at night; largely due to lowed PD machine.     It wasn't until she had to rapidly drink some sort of GI cocktail that patient and family report she started to have difficulty swallowing and speech  sulfate HFA (PROVENTIL;VENTOLIN;PROAIR) 108 (90 Base) MCG/ACT inhaler, INHALE TWO PUFFS BY MOUTH EVERY 6 HOURS AS NEEDED FOR WHEEZING  aspirin 81 MG chewable tablet, Take 1 tablet by mouth daily  [DISCONTINUED] spironolactone (ALDACTONE) 25 MG tablet, Take 1 tablet by mouth daily  [DISCONTINUED] cloNIDine (CATAPRES) 0.1 MG tablet, Take 1 tablet by mouth 2 times daily as needed for High Blood Pressure  [DISCONTINUED] hydrALAZINE (APRESOLINE) 25 MG tablet, Take 1 tablet by mouth 4 times daily as needed (Systolic blood pressure greater than 180) Take if your blood pressure is over 160 systolic.  [DISCONTINUED] carvedilol (COREG) 25 MG tablet, TAKE 1 TABLET BY MOUTH TWICE A DAY (Patient taking differently: Take 0.5 tablets by mouth 2 times daily)  [DISCONTINUED] vitamin D (CHOLECALCIFEROL) 50 MCG (2000 UT) CAPS capsule, Take 1 capsule by mouth daily    Allergies   Allergen Reactions    Iodine Itching and Nausea And Vomiting    Iodinated Contrast Media     Wellbutrin [Bupropion]      Dry mouth    Adhesive Tape Rash    Sertraline Other (See Comments)     Severe dry mouth    Sulfa Antibiotics Itching and Nausea Only       Family History   Problem Relation Age of Onset    High Blood Pressure Mother     Kidney Disease Mother     Hypertension Father     Other Father         MI    Coronary Art Dis Father     Heart Attack Father     Cancer Brother         testicular    Prostate Cancer Brother     No Known Problems Maternal Grandmother     No Known Problems Maternal Grandfather     No Known Problems Paternal Grandmother     No Known Problems Paternal Grandfather     Cancer Other     Cancer Sister     Cancer Brother         Esophagus cancer       Social History     Tobacco Use   Smoking Status Some Days    Current packs/day: 1.00    Average packs/day: 1 pack/day for 57.4 years (57.4 ttl pk-yrs)    Types: Cigarettes    Start date: 1/1/1968    Passive exposure: Past (on patch)   Smokeless Tobacco Never   Tobacco Comments    I have

## 2025-05-19 NOTE — PROGRESS NOTES
Pt went from 1 L NC 02 to 10 L NC with increased confusion. Pt was currently on 2nd of 3rd cycle of PD. Pt b/p noted at 89/53 with HR 81. Pt resting comfortable in bed. No distress or discomfort noted. Notified Dr. Rose. New order for STAT portable chest x-ray and VBG blood gas ordered.   Final results for chest x-ray:     Small left-sided pleural effusion atelectasis.Gas-filled hepatic flexure could relate to an ileus.       Ph resulted: 7.316  pCO2: 57.7    Notified Dr. Rose. New order for STAT CTA of chest.     Taking pt down to CT now in bed. Pt currently running 3rd cycle of 3 for PD. Pt continues on 10L 02    30

## 2025-05-20 ENCOUNTER — APPOINTMENT (OUTPATIENT)
Dept: MRI IMAGING | Age: 75
End: 2025-05-20
Payer: MEDICARE

## 2025-05-20 ENCOUNTER — APPOINTMENT (OUTPATIENT)
Dept: GENERAL RADIOLOGY | Age: 75
End: 2025-05-20
Payer: MEDICARE

## 2025-05-20 LAB
ALBUMIN SERPL-MCNC: 3.7 G/DL (ref 3.4–5)
ANION GAP SERPL CALCULATED.3IONS-SCNC: 18 MMOL/L (ref 3–16)
BUN SERPL-MCNC: 68 MG/DL (ref 7–20)
CALCIUM SERPL-MCNC: 10.1 MG/DL (ref 8.3–10.6)
CHLORIDE SERPL-SCNC: 96 MMOL/L (ref 99–110)
CO2 SERPL-SCNC: 24 MMOL/L (ref 21–32)
CREAT SERPL-MCNC: 10.6 MG/DL (ref 0.6–1.2)
GFR SERPLBLD CREATININE-BSD FMLA CKD-EPI: 4 ML/MIN/{1.73_M2}
GLUCOSE SERPL-MCNC: 181 MG/DL (ref 70–99)
PHOSPHATE SERPL-MCNC: 5.9 MG/DL (ref 2.5–4.9)
POTASSIUM SERPL-SCNC: 4.3 MMOL/L (ref 3.5–5.1)
SODIUM SERPL-SCNC: 138 MMOL/L (ref 136–145)

## 2025-05-20 PROCEDURE — 6370000000 HC RX 637 (ALT 250 FOR IP): Performed by: STUDENT IN AN ORGANIZED HEALTH CARE EDUCATION/TRAINING PROGRAM

## 2025-05-20 PROCEDURE — 6360000002 HC RX W HCPCS: Performed by: STUDENT IN AN ORGANIZED HEALTH CARE EDUCATION/TRAINING PROGRAM

## 2025-05-20 PROCEDURE — 72148 MRI LUMBAR SPINE W/O DYE: CPT

## 2025-05-20 PROCEDURE — 6370000000 HC RX 637 (ALT 250 FOR IP): Performed by: INTERNAL MEDICINE

## 2025-05-20 PROCEDURE — 2700000000 HC OXYGEN THERAPY PER DAY

## 2025-05-20 PROCEDURE — 74018 RADEX ABDOMEN 1 VIEW: CPT

## 2025-05-20 PROCEDURE — 2500000003 HC RX 250 WO HCPCS: Performed by: STUDENT IN AN ORGANIZED HEALTH CARE EDUCATION/TRAINING PROGRAM

## 2025-05-20 PROCEDURE — 9990000010 HC NO CHARGE VISIT

## 2025-05-20 PROCEDURE — 90945 DIALYSIS ONE EVALUATION: CPT

## 2025-05-20 PROCEDURE — 97116 GAIT TRAINING THERAPY: CPT

## 2025-05-20 PROCEDURE — 94640 AIRWAY INHALATION TREATMENT: CPT

## 2025-05-20 PROCEDURE — 80069 RENAL FUNCTION PANEL: CPT

## 2025-05-20 PROCEDURE — 70551 MRI BRAIN STEM W/O DYE: CPT

## 2025-05-20 PROCEDURE — 94761 N-INVAS EAR/PLS OXIMETRY MLT: CPT

## 2025-05-20 PROCEDURE — 36415 COLL VENOUS BLD VENIPUNCTURE: CPT

## 2025-05-20 PROCEDURE — 2060000000 HC ICU INTERMEDIATE R&B

## 2025-05-20 PROCEDURE — 6360000002 HC RX W HCPCS: Performed by: INTERNAL MEDICINE

## 2025-05-20 PROCEDURE — 2500000003 HC RX 250 WO HCPCS: Performed by: INTERNAL MEDICINE

## 2025-05-20 PROCEDURE — 72141 MRI NECK SPINE W/O DYE: CPT

## 2025-05-20 PROCEDURE — 97530 THERAPEUTIC ACTIVITIES: CPT

## 2025-05-20 PROCEDURE — 6370000000 HC RX 637 (ALT 250 FOR IP)

## 2025-05-20 PROCEDURE — 99232 SBSQ HOSP IP/OBS MODERATE 35: CPT | Performed by: STUDENT IN AN ORGANIZED HEALTH CARE EDUCATION/TRAINING PROGRAM

## 2025-05-20 RX ORDER — PANTOPRAZOLE SODIUM 40 MG/1
40 TABLET, DELAYED RELEASE ORAL EVERY MORNING
Status: DISCONTINUED | OUTPATIENT
Start: 2025-05-21 | End: 2025-05-22

## 2025-05-20 RX ORDER — LACTULOSE 10 G/15ML
30 SOLUTION ORAL
Status: DISCONTINUED | OUTPATIENT
Start: 2025-05-20 | End: 2025-05-21

## 2025-05-20 RX ADMIN — SEVELAMER CARBONATE 800 MG: 800 TABLET, FILM COATED ORAL at 11:24

## 2025-05-20 RX ADMIN — SEVELAMER CARBONATE 800 MG: 800 TABLET, FILM COATED ORAL at 17:34

## 2025-05-20 RX ADMIN — Medication 1 LOZENGE: at 17:45

## 2025-05-20 RX ADMIN — AZITHROMYCIN 250 MG: 250 TABLET, FILM COATED ORAL at 17:34

## 2025-05-20 RX ADMIN — ONDANSETRON 4 MG: 2 INJECTION, SOLUTION INTRAMUSCULAR; INTRAVENOUS at 01:37

## 2025-05-20 RX ADMIN — SODIUM CHLORIDE, PRESERVATIVE FREE 10 ML: 5 INJECTION INTRAVENOUS at 08:04

## 2025-05-20 RX ADMIN — IPRATROPIUM BROMIDE AND ALBUTEROL SULFATE 1 DOSE: 2.5; .5 SOLUTION RESPIRATORY (INHALATION) at 21:00

## 2025-05-20 RX ADMIN — IPRATROPIUM BROMIDE AND ALBUTEROL SULFATE 1 DOSE: 2.5; .5 SOLUTION RESPIRATORY (INHALATION) at 08:11

## 2025-05-20 RX ADMIN — TIOTROPIUM BROMIDE AND OLODATEROL 2 PUFF: 3.124; 2.736 SPRAY, METERED RESPIRATORY (INHALATION) at 08:21

## 2025-05-20 RX ADMIN — WATER 1000 MG: 1 INJECTION INTRAMUSCULAR; INTRAVENOUS; SUBCUTANEOUS at 17:34

## 2025-05-20 RX ADMIN — CARVEDILOL 12.5 MG: 12.5 TABLET, FILM COATED ORAL at 17:34

## 2025-05-20 RX ADMIN — LACTULOSE 30 G: 10 SOLUTION ORAL at 08:08

## 2025-05-20 RX ADMIN — ASPIRIN 81 MG: 81 TABLET, CHEWABLE ORAL at 08:04

## 2025-05-20 RX ADMIN — SEVELAMER CARBONATE 800 MG: 800 TABLET, FILM COATED ORAL at 08:03

## 2025-05-20 RX ADMIN — FLUCONAZOLE 200 MG: 100 TABLET ORAL at 08:03

## 2025-05-20 RX ADMIN — HEPARIN SODIUM 5000 UNITS: 5000 INJECTION INTRAVENOUS; SUBCUTANEOUS at 05:48

## 2025-05-20 RX ADMIN — SENNOSIDES AND DOCUSATE SODIUM 2 TABLET: 50; 8.6 TABLET ORAL at 08:03

## 2025-05-20 RX ADMIN — LACTULOSE 30 G: 10 SOLUTION ORAL at 14:35

## 2025-05-20 RX ADMIN — IPRATROPIUM BROMIDE AND ALBUTEROL SULFATE 1 DOSE: 2.5; .5 SOLUTION RESPIRATORY (INHALATION) at 11:43

## 2025-05-20 RX ADMIN — SODIUM CHLORIDE, PRESERVATIVE FREE 40 MG: 5 INJECTION INTRAVENOUS at 08:04

## 2025-05-20 RX ADMIN — HEPARIN SODIUM 5000 UNITS: 5000 INJECTION INTRAVENOUS; SUBCUTANEOUS at 14:35

## 2025-05-20 NOTE — PROGRESS NOTES
[facility] - Physical Therapy   Phone: [phone]    Physical Therapy  Facility/Department:[address] PROGRESSIVE CARE    [] Initial Evaluation            [x] Daily Treatment Note         [] Discharge Summary      Patient: Jonelle Contreras   : 1950   MRN: [id number]   Date of Service:  2025  Staff Mobility Recommendation: Walker, assist x 1.   AM-PAC score: 17/24  Discharge Recommendations: SNF    Jonelle Contreras scored a 17/24 on the AM-PAC short mobility form. Current research shows that an AM-PAC score of 17 or less is typically not associated with a discharge to the patient's home setting. Based on the patient's AM-PAC score and their current functional mobility deficits, it is recommended that the patient have 3-5 sessions per week of Physical Therapy at d/c to increase the patient's independence.  Please see assessment section for further patient specific details.    If patient discharges prior to next session this note will serve as a discharge summary.  Please see below for the latest assessment towards goals.      Admitting Diagnosis: Hypertensive urgency  Ordering Physician: Dr. Bentley  Current Admission Summary: 73 y/o female admit 2025 with HTN Urgency,  PMH as noted including COPD, CKD (PD pt), PAD, NSTEMI.     Past Medical History:  has a past medical history of acute intermittent porphyria, agoraphobic, Allergic rhinitis, Anxiety, Arthritis, Asthma, CAD (coronary artery disease), Chronic obstructive pulmonary disease, unspecified COPD type (HCC), ckd 4, collagenous colitis, COPD (chronic obstructive pulmonary disease) (HCC), Depression, Disease of blood and blood forming organ, GERD (gastroesophageal reflux disease), Heart disease, Hyperlipidemia, Hypertension, Left thyroid nodule, Migraines, Neuropathy, Osteoporosis, PAD (peripheral artery disease), Peripheral vascular disease, post menopausal, Prediabetes, Pulmonary nodules, Restless leg syndrome, STEMI involving oth  charge:  Therapeutic activity: 10 minutes  Gait training: 15 minutes    Electronically signed by Susu Rosenbaum PT on 5/20/2025 at 1:26 PM

## 2025-05-20 NOTE — PLAN OF CARE
Problem: Discharge Planning  Goal: Discharge to home or other facility with appropriate resources  5/20/2025 0934 by Jesusita Carrera RN  Outcome: Progressing     Problem: ABCDS Injury Assessment  Goal: Absence of physical injury  5/20/2025 0934 by Jesusita Carrera RN  Outcome: Progressing     Problem: Safety - Adult  Goal: Free from fall injury  5/20/2025 0934 by Jesusita Carrera RN  Outcome: Progressing     Problem: Pain  Goal: Verbalizes/displays adequate comfort level or baseline comfort level  5/20/2025 0934 by Jesusita Carrera RN  Outcome: Progressing     Problem: Genitourinary - Adult  Goal: Absence of urinary retention  5/20/2025 0934 by Jesusita Carrera RN  Outcome: Progressing     Problem: Metabolic/Fluid and Electrolytes - Adult  Goal: Electrolytes maintained within normal limits  5/20/2025 0934 by Jesusita Carrera RN  Outcome: Progressing     Problem: Nutrition Deficit:  Goal: Optimize nutritional status  5/20/2025 0934 by Jesusita Carrera RN  Outcome: Progressing

## 2025-05-20 NOTE — CARE COORDINATION
DISCHARGE PLANNING:    DC plan to SNF, most [place] facilities do not accept PD.  Facilities that possibly accept PD are [facility], [facility] and [facility].  [facility] referrals placed earlier this afternoon, awaiting response.  Unknown if these facilities take the patient's insurance at this time.  Will follow up with the facilities again in the AM. Family would prefer a [place] facility which is closer to them.  PD is a barrier for placement in [place]. Will speak to family when updates are available.    Patient currently with increased oxygen needs, currently at 5 lpm.  Once accepted to a SNF, patient will need precert and transport.      #[phone]  Electronically signed by Claudia Owen RN on 5/20/2025 at 4:13 PM

## 2025-05-20 NOTE — PROGRESS NOTES
Comprehensive Nutrition Assessment    Type and Reason for Visit:  Reassess, Consult (poor po intake x 5 days)    Nutrition Recommendations/Plan:   Encourage fluids to promote BM, fruit juices like prune mixed with apple and cranberry may help  Encourage nutrition supplements since po intake less than 25%, switched Magic cup supplements on trays so they are delivered (snack time supplements to not interface with [place])  If a softer diet needed, consider easy to chew textures.  Monitor tolerance of nutrition regimen  If po intake doesn't improve post EGD this week, may need to start alternate nutrition by this weekend     Malnutrition Assessment:  Malnutrition Status:  At risk for malnutrition (05/20/25 1241)    Context:  Acute Illness     Findings of the 6 clinical characteristics of malnutrition:  Energy Intake:  50% or less of estimated energy requirements for 5 or more days  Weight Loss:  Unable to assess     Body Fat Loss:  No body fat loss     Muscle Mass Loss:  No muscle mass loss    Fluid Accumulation:  No fluid accumulation     Strength:  Not Performed    Nutrition Assessment:    Follow up:  PO intake decreased.  Sore throat and difficulty swallowing noted, EGD planned for 5/22.  Constipation noted, Lactulose ordered on 5/18.  KUB also ordered this am.  Protonix to start on 5/21.  Neuro consulted due to altered mental status.  MRI of the brain ordered for today.  History of COPD, on 4-5 liters nasal cannula.    Nutrition Related Findings:    Phos 5.9, BUN 68, Creat 10.6 on 5/20; last BM on 5/16 Wound Type:  (PD insertion site)       Current Nutrition Intake & Therapies:    Average Meal Intake: 0%, 1-25%  Average Supplements Intake: Unable to assess  ADULT ORAL NUTRITION SUPPLEMENT; Breakfast, Dinner; Renal Oral Supplement  ADULT DIET; Regular; No Added Salt (3-4 gm)  ADULT ORAL NUTRITION SUPPLEMENT; Dinner, Lunch; Frozen Oral Supplement    Anthropometric Measures:  Height: 165.1 cm (5' 5\")  Ideal Body

## 2025-05-20 NOTE — PROGRESS NOTES
Patient ID: Jonelle Contreras  Referring/ Physician: Candido Bentley MD      Summary:   Jonelle Contreras is being seen by nephrology for ESRD and HTN.     Reason for admission: hypertensive urgency       Interval Hx:   Seen at bedside.   Had MRI of neck and spine today.   She has had a small bowel movement.   No edema.     Still on 5 L  /72     K 4.3   BUN 68   CR 10.6  Phos 5.9  Ca 10.1        Assessment/Plan:   -  BP is acceptable and in goal range.   - will continue on dianeal 2.5% 4 exchanges   - cont renvela for binder.   - increase lactulose to q 4 hrs until she has large bowel movement.       ESRD  ESRD secondary to hypertension.  She does peritoneal dialysis at home 1 exchange of 2 L 1.5% solutions daily.  Based on her most recent labs in the outpatient setting her adequacy is not at goal so we are going to be increasing her to 4 exchanges, 2 L at 2.5% solutions.  Will use the cycler while inpatient.  Euvolemic.   - daily BM, on lactulose. Has ileus > GI consulted.    - gent to PD exit site.        Resistant hypertension  The BP has been controlled inpatient on her home regimen, actually low at times suggesting that she may not have been taking her medications as prescribed.   Has had an overall negative secondary work up. Has been extremely difficult to manage outpatient as she reports \"bottoming out and passing out\" when her medications are increased.   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   She has had 24 hr ambulatory BP monitoring showing consistently elevated bP 180s -200s systolic.   Presenting with hypertensive urgency, having ongoing headaches blurry vision and fatigue.  CT head showed no stroke.,  Has chronic microvascular changes.  Troponin 66.no ECG changes.   Her home regimen : nifedpine 30 mg , losartan 100 mg aldactone 50 mg , coreg 12.5 mg BID. Clonidine 0./1 tID for SBP >

## 2025-05-20 NOTE — PLAN OF CARE
Problem: Discharge Planning  Goal: Discharge to home or other facility with appropriate resources  5/19/2025 2317 by Sabrina Gomes RN  Outcome: Progressing     Problem: ABCDS Injury Assessment  Goal: Absence of physical injury  5/19/2025 2317 by Sabrina Gomes RN  Outcome: Progressing     Problem: Safety - Adult  Goal: Free from fall injury  5/19/2025 2317 by Sabrina Gomes RN  Outcome: Progressing     Problem: Pain  Goal: Verbalizes/displays adequate comfort level or baseline comfort level  5/19/2025 2317 by Sabrina Gomes RN  Outcome: Progressing     Problem: Genitourinary - Adult  Goal: Absence of urinary retention  5/19/2025 2317 by Sabrina Gomes RN  Outcome: Progressing     Problem: Metabolic/Fluid and Electrolytes - Adult  Goal: Electrolytes maintained within normal limits  5/19/2025 2317 by Sabrina Gomes RN  Outcome: Progressing     Problem: Nutrition Deficit:  Goal: Optimize nutritional status  5/19/2025 2317 by Sabrina Gomes RN  Outcome: Progressing     Problem: Discharge Planning  Goal: Discharge to home or other facility with appropriate resources  5/19/2025 2317 by Sabrina Gomes RN  Outcome: Progressing

## 2025-05-20 NOTE — PROGRESS NOTES
Patient has been hallucination all throughout the day. Patients daughter bedside had raised concerns that hallucinations had torrey more frequent today and that she had been making less sense than usual, hearing and seeing things that are not there. Patient is able to verbalize that she is hallucinating and is aware when it is happening. Patient stated she is having no hallucinations currently but \"they come and go\". Patient also reported with 2 PM lactulose med pass that her throat burned, but reported no side effects and reported no burning with AM lactulose medication pass. MD made aware. Care ongoing.   Electronically signed by Jesusita Carrera RN on 5/20/2025 at 5:19 PM

## 2025-05-20 NOTE — PROGRESS NOTES
Occupational Therapy  Jonelle Contreras  1950  [id number]    OT attempting to see pt for tx session this date, however pt currently out of room at MRI. Will attempt back as therapist schedule allows. If pt discharges prior to next therapy session, please refer to last therapy note as discharge summary.    Electronically signed by CRISTOPHER Rodriguez/L on 5/20/2025 at 1:03 PM

## 2025-05-20 NOTE — PROGRESS NOTES
INPATIENT CONSULTATION:    IDENTIFYING DATA/REASON FOR CONSULTATION   PATIENT:  Jonelle Contreras  MRN:  [id number]  ADMIT DATE: 5/12/2025  TIME OF EVALUATION: 5/20/2025 7:05 AM  HOSPITAL STAY:   LOS: 8 days   CONSULTING PHYSICIAN: Candido Bentley MD   REASON FOR CONSULTATION: Dysphagia    Subjective:    Patient seen and examined in follow-up. Patient reports ongoing dysphagia and odynophagia.  She otherwise denies complaints.    MEDICATIONS   SCHEDULED:  fluconazole, 200 mg, Daily  dianeal lo-neal 2.5%, 6,000 mL, Nightly   And  dianeal lo-neal 2.5%, 2,000 mL, Nightly  lactulose, 30 g, TID  pantoprazole (PROTONIX) 40 mg in sodium chloride (PF) 0.9 % 10 mL injection, 40 mg, Daily  sennosides-docusate sodium, 2 tablet, Daily  polyethylene glycol, 17 g, BID  torsemide, 100 mg, Daily  ipratropium 0.5 mg-albuterol 2.5 mg, 1 Dose, Q4H WA RT  cefTRIAXone (ROCEPHIN) IV, 1,000 mg, Q24H  azithromycin, 250 mg, QPM  prochlorperazine, 10 mg, Once  [Held by provider] hydrALAZINE, 25 mg, 3 times per day  NIFEdipine, 60 mg, Daily  sodium chloride flush, 5-40 mL, 2 times per day  heparin (porcine), 5,000 Units, 3 times per day  aspirin, 81 mg, Daily  carvedilol, 12.5 mg, BID WC  [Held by provider] clopidogrel, 75 mg, Daily  losartan, 100 mg, Nightly  rOPINIRole, 1 mg, Nightly  rosuvastatin, 10 mg, Nightly  trospium, 20 mg, Nightly  tiotropium-olodaterol, 2 puff, Daily  spironolactone, 50 mg, Daily  sevelamer, 800 mg, TID WC      FLUIDS/DRIPS:     sodium chloride       PRNs: traMADol, 50 mg, Q12H PRN  magic (miracle) mouthwash, 5 mL, 4x Daily PRN  prochlorperazine, 10 mg, Q6H PRN  benzocaine-menthol, 1 lozenge, Q2H PRN  sodium chloride flush, 5-40 mL, PRN  sodium chloride, , PRN  ondansetron, 4 mg, Q8H PRN   Or  ondansetron, 4 mg, Q6H PRN  polyethylene glycol, 17 g, Daily PRN  acetaminophen, 650 mg, Q6H PRN   Or  acetaminophen, 650 mg, Q6H PRN  albuterol sulfate HFA, 2 puff, Q6H PRN  ALPRAZolam, 2 mg, Q12H PRN  tiZANidine, 4 mg, Q6H  Mild ascites.         CTA PULMONARY W CONTRAST   Final Result   1. Negative for pulmonary embolus.   2. Bibasilar endobronchial mucous plugging with associated postobstructive   atelectasis versus pneumonia.   3. Hiatal hernia with gastroesophageal reflux.   4. Mild ileus with diverticulosis.   5. Mild ascites.         XR CHEST PORTABLE   Final Result   Small left-sided pleural effusion atelectasis.      Gas-filled hepatic flexure could relate to an ileus.         CT CHEST WO CONTRAST   Final Result   Suspected mild bronchitis with interval development of mild   consolidative/ground-glass opacities in the right greater than left lower   lung fields which could represent mild bibasilar pneumonia, aspiration and or   atelectasis.      Interval development of very small right and trace left pleural effusions.      Other nonacute findings as above.         XR CHEST PORTABLE   Final Result   Trace bilateral pleural effusions with atelectasis.         CT HEAD WO CONTRAST   Final Result   No acute intracranial abnormality.         MRI BRAIN WO CONTRAST    (Results Pending)   MRI CERVICAL SPINE WO CONTRAST    (Results Pending)   MRI LUMBAR SPINE WO CONTRAST    (Results Pending)      ASSESSMENT AND RECOMMENDATIONS   Jonelle Contreras is a 74 y.o. female with a past medical history of CAD with history of PCI on Plavix, COPD, hypertension, hyperlipidemia, peripheral vascular disease, migraines, anxiety, depression, CKD who presented to [facility] 5/12/2025.    Dysphagia: Patient with dysphagia to solids and liquids, prior EGD in 2022 unrevealing.  Patient currently Plavix, with last dose 5/17/2025.  Will plan for EGD 5/22/2025.  Recommend speech therapy evaluation for oropharyngeal dysphagia.  Odynophagia: Given reported thrush prior to admission, and a dyne aphasia, concern for Candida esophagitis.  Will start fluconazole.  Per above, perform EGD 5/22/2025.  CAD with h/o PCI on Plavix: Plavix on hold for planned EGD

## 2025-05-20 NOTE — PROGRESS NOTES
Resting bed. Medicated for reports of nausea. Denies SOB or chest discomfort.PD in place and tolerating well. No BM noted at this time. Care ongoing.

## 2025-05-20 NOTE — PROGRESS NOTES
Internal Medicine Hospital Progress Note    Patient:  Jonelle Contreras 74 y.o. female MRN: [id number]     Date of Service: 5/20/2025    Allergy: Iodine, Iodinated contrast media, Wellbutrin [bupropion], Adhesive tape, Sertraline, and Sulfa antibiotics  CC: F/u:   Brief Course   Jonelle Contreras was admitted on 5/12/2025 for Hypertensive urgency. Jonelle Contreras has been admitted for 8 days    24 hr interval hx:  Oxygen requirement has decreased  She is still having sore throat. She did not eat well yesterday secondary to her sore throat  She has not had a bowel movement, but is passing gas. Her abdomen is still distended      Objective     Physical Exam:  Vitals: /74   Pulse 85   Temp 98.4 °F (36.9 °C) (Oral)   Resp 18   Ht 1.651 m (5' 5\")   Wt 59 kg (130 lb 1.1 oz)   SpO2 93%   BMI 21.64 kg/m²     General: Alert and oriented X3. No acute distress  Eyes: PEERL. No scleral icterus noted. Normal appearing conjunctiva bilaterally  Ears: Normal TM and external canal bilaterally.  Oral cavity/Throat: No pharyngeal erythema. No oral lesions.  Cardiovascular: Heart is regular rate and rhythm. No murmurs noted. Radial pulses 2+. Posterior tibial pulses 2+. No lower extremity edema noted  Pulmonary: Lungs clear to auscultation bilaterally  Skin: Dry, warm. No obvious skin lesions or rashes noted.  Neuro: PEERL. EOM intact. No facial droop. Normal sensation in bilateral upper and lower extremities. Gait is norm    Pertinent/ New Labs and Imaging Studies   Labs reviewed. Pertinent labs noted in assessment and plan      Assessment and Plan   Jonelle Contreras was admitted to hospital for Hypertensive urgency    Hypertensive urgency  - Nephrology consulted    Abdominal pain  - GI following  - Lactulose ordered for constipation  - Kub today    Sore throat, difficulty swallowing  - EGD planned for 5/22/2025    ESRD on PD  - Nephrology consulted, managing PD orders    Hyperlipidemia  - Continue home statin    Restless leg

## 2025-05-21 LAB
ALBUMIN SERPL-MCNC: 3.4 G/DL (ref 3.4–5)
ANION GAP SERPL CALCULATED.3IONS-SCNC: 18 MMOL/L (ref 3–16)
APPEARANCE FLUID: CLEAR
BDY FLUID QUALITY: NORMAL
BUN SERPL-MCNC: 65 MG/DL (ref 7–20)
CALCIUM SERPL-MCNC: 9.3 MG/DL (ref 8.3–10.6)
CELL COUNT FLUID TYPE: NORMAL
CHLORIDE SERPL-SCNC: 98 MMOL/L (ref 99–110)
CO2 SERPL-SCNC: 21 MMOL/L (ref 21–32)
COLOR FLUID: COLORLESS
CREAT SERPL-MCNC: 11.1 MG/DL (ref 0.6–1.2)
DIFF PNL FLD: NORMAL
GFR SERPLBLD CREATININE-BSD FMLA CKD-EPI: 3 ML/MIN/{1.73_M2}
GLUCOSE SERPL-MCNC: 119 MG/DL (ref 70–99)
NUC CELL # FLD: 3 /CUMM
PHOSPHATE SERPL-MCNC: 6.5 MG/DL (ref 2.5–4.9)
POTASSIUM SERPL-SCNC: 3.1 MMOL/L (ref 3.5–5.1)
RBC FLUID: <2000 /CUMM
SODIUM SERPL-SCNC: 137 MMOL/L (ref 136–145)

## 2025-05-21 PROCEDURE — 90945 DIALYSIS ONE EVALUATION: CPT

## 2025-05-21 PROCEDURE — 97530 THERAPEUTIC ACTIVITIES: CPT

## 2025-05-21 PROCEDURE — 6370000000 HC RX 637 (ALT 250 FOR IP): Performed by: INTERNAL MEDICINE

## 2025-05-21 PROCEDURE — 6370000000 HC RX 637 (ALT 250 FOR IP): Performed by: STUDENT IN AN ORGANIZED HEALTH CARE EDUCATION/TRAINING PROGRAM

## 2025-05-21 PROCEDURE — 2700000000 HC OXYGEN THERAPY PER DAY

## 2025-05-21 PROCEDURE — 97535 SELF CARE MNGMENT TRAINING: CPT

## 2025-05-21 PROCEDURE — 89050 BODY FLUID CELL COUNT: CPT

## 2025-05-21 PROCEDURE — 2060000000 HC ICU INTERMEDIATE R&B

## 2025-05-21 PROCEDURE — 51798 US URINE CAPACITY MEASURE: CPT

## 2025-05-21 PROCEDURE — 84311 SPECTROPHOTOMETRY: CPT

## 2025-05-21 PROCEDURE — 6370000000 HC RX 637 (ALT 250 FOR IP)

## 2025-05-21 PROCEDURE — 9990000010 HC NO CHARGE VISIT

## 2025-05-21 PROCEDURE — 6360000002 HC RX W HCPCS: Performed by: STUDENT IN AN ORGANIZED HEALTH CARE EDUCATION/TRAINING PROGRAM

## 2025-05-21 PROCEDURE — 94761 N-INVAS EAR/PLS OXIMETRY MLT: CPT

## 2025-05-21 PROCEDURE — 99232 SBSQ HOSP IP/OBS MODERATE 35: CPT | Performed by: STUDENT IN AN ORGANIZED HEALTH CARE EDUCATION/TRAINING PROGRAM

## 2025-05-21 PROCEDURE — 2580000003 HC RX 258: Performed by: INTERNAL MEDICINE

## 2025-05-21 PROCEDURE — 2500000003 HC RX 250 WO HCPCS: Performed by: STUDENT IN AN ORGANIZED HEALTH CARE EDUCATION/TRAINING PROGRAM

## 2025-05-21 PROCEDURE — 36415 COLL VENOUS BLD VENIPUNCTURE: CPT

## 2025-05-21 PROCEDURE — 94640 AIRWAY INHALATION TREATMENT: CPT

## 2025-05-21 PROCEDURE — 80069 RENAL FUNCTION PANEL: CPT

## 2025-05-21 RX ORDER — IPRATROPIUM BROMIDE AND ALBUTEROL SULFATE 2.5; .5 MG/3ML; MG/3ML
1 SOLUTION RESPIRATORY (INHALATION)
Status: DISCONTINUED | OUTPATIENT
Start: 2025-05-21 | End: 2025-05-30 | Stop reason: HOSPADM

## 2025-05-21 RX ORDER — SODIUM CHLORIDE, SODIUM LACTATE, CALCIUM CHLORIDE, MAGNESIUM CHLORIDE AND DEXTROSE 2.5; 538; 448; 18.3; 5.08 G/100ML; MG/100ML; MG/100ML; MG/100ML; MG/100ML
2000 INJECTION, SOLUTION INTRAPERITONEAL ONCE
Status: COMPLETED | OUTPATIENT
Start: 2025-05-21 | End: 2025-05-21

## 2025-05-21 RX ORDER — SEVELAMER CARBONATE 800 MG/1
1600 TABLET, FILM COATED ORAL
Status: DISCONTINUED | OUTPATIENT
Start: 2025-05-21 | End: 2025-05-24

## 2025-05-21 RX ORDER — POTASSIUM CHLORIDE 1500 MG/1
40 TABLET, EXTENDED RELEASE ORAL ONCE
Status: DISCONTINUED | OUTPATIENT
Start: 2025-05-21 | End: 2025-05-21

## 2025-05-21 RX ORDER — ALPRAZOLAM 0.5 MG
1.5 TABLET ORAL EVERY 12 HOURS PRN
Status: DISCONTINUED | OUTPATIENT
Start: 2025-05-21 | End: 2025-05-30 | Stop reason: HOSPADM

## 2025-05-21 RX ORDER — LACTULOSE 10 G/15ML
30 SOLUTION ORAL EVERY 4 HOURS PRN
Status: DISCONTINUED | OUTPATIENT
Start: 2025-05-21 | End: 2025-05-30 | Stop reason: HOSPADM

## 2025-05-21 RX ADMIN — LOSARTAN POTASSIUM 100 MG: 100 TABLET, FILM COATED ORAL at 20:56

## 2025-05-21 RX ADMIN — SODIUM CHLORIDE, SODIUM LACTATE, CALCIUM CHLORIDE, MAGNESIUM CHLORIDE AND DEXTROSE 2000 ML: 2.5; 538; 448; 18.3; 5.08 INJECTION, SOLUTION INTRAPERITONEAL at 21:40

## 2025-05-21 RX ADMIN — SODIUM CHLORIDE, PRESERVATIVE FREE 10 ML: 5 INJECTION INTRAVENOUS at 09:18

## 2025-05-21 RX ADMIN — ASPIRIN 81 MG: 81 TABLET, CHEWABLE ORAL at 09:08

## 2025-05-21 RX ADMIN — IPRATROPIUM BROMIDE AND ALBUTEROL SULFATE 1 DOSE: .5; 2.5 SOLUTION RESPIRATORY (INHALATION) at 19:39

## 2025-05-21 RX ADMIN — SEVELAMER CARBONATE 1600 MG: 800 TABLET, FILM COATED ORAL at 13:54

## 2025-05-21 RX ADMIN — SODIUM CHLORIDE, SODIUM LACTATE, CALCIUM CHLORIDE, MAGNESIUM CHLORIDE AND DEXTROSE 2000 ML: 2.5; 538; 448; 18.3; 5.08 INJECTION, SOLUTION INTRAPERITONEAL at 00:05

## 2025-05-21 RX ADMIN — NIFEDIPINE 60 MG: 30 TABLET, FILM COATED, EXTENDED RELEASE ORAL at 09:07

## 2025-05-21 RX ADMIN — FLUCONAZOLE 200 MG: 100 TABLET ORAL at 09:07

## 2025-05-21 RX ADMIN — CARVEDILOL 12.5 MG: 12.5 TABLET, FILM COATED ORAL at 09:07

## 2025-05-21 RX ADMIN — HEPARIN SODIUM 5000 UNITS: 5000 INJECTION INTRAVENOUS; SUBCUTANEOUS at 13:54

## 2025-05-21 RX ADMIN — SODIUM CHLORIDE, SODIUM LACTATE, CALCIUM CHLORIDE, MAGNESIUM CHLORIDE AND DEXTROSE 6000 ML: 2.5; 538; 448; 18.3; 5.08 INJECTION, SOLUTION INTRAPERITONEAL at 00:05

## 2025-05-21 RX ADMIN — PANTOPRAZOLE SODIUM 40 MG: 40 TABLET, DELAYED RELEASE ORAL at 09:07

## 2025-05-21 RX ADMIN — ROPINIROLE HYDROCHLORIDE 1 MG: 1 TABLET, FILM COATED ORAL at 20:56

## 2025-05-21 RX ADMIN — POTASSIUM BICARBONATE 40 MEQ: 391 TABLET, EFFERVESCENT ORAL at 09:14

## 2025-05-21 RX ADMIN — TORSEMIDE 100 MG: 100 TABLET ORAL at 09:06

## 2025-05-21 RX ADMIN — POLYETHYLENE GLYCOL 3350 17 G: 17 POWDER, FOR SOLUTION ORAL at 20:56

## 2025-05-21 RX ADMIN — SODIUM CHLORIDE, SODIUM LACTATE, CALCIUM CHLORIDE, MAGNESIUM CHLORIDE AND DEXTROSE 2000 ML: 2.5; 538; 448; 18.3; 5.08 INJECTION, SOLUTION INTRAPERITONEAL at 12:38

## 2025-05-21 RX ADMIN — ALPRAZOLAM 2 MG: 0.5 TABLET ORAL at 01:19

## 2025-05-21 RX ADMIN — SPIRONOLACTONE 50 MG: 25 TABLET ORAL at 09:07

## 2025-05-21 RX ADMIN — SODIUM CHLORIDE, PRESERVATIVE FREE 10 ML: 5 INJECTION INTRAVENOUS at 22:08

## 2025-05-21 RX ADMIN — CARVEDILOL 12.5 MG: 12.5 TABLET, FILM COATED ORAL at 18:58

## 2025-05-21 RX ADMIN — HEPARIN SODIUM 5000 UNITS: 5000 INJECTION INTRAVENOUS; SUBCUTANEOUS at 22:09

## 2025-05-21 RX ADMIN — TROSPIUM CHLORIDE 20 MG: 20 TABLET, FILM COATED ORAL at 20:56

## 2025-05-21 RX ADMIN — SODIUM CHLORIDE, SODIUM LACTATE, CALCIUM CHLORIDE, MAGNESIUM CHLORIDE AND DEXTROSE 6000 ML: 2.5; 538; 448; 18.3; 5.08 INJECTION, SOLUTION INTRAPERITONEAL at 21:40

## 2025-05-21 RX ADMIN — SEVELAMER CARBONATE 1600 MG: 800 TABLET, FILM COATED ORAL at 18:58

## 2025-05-21 RX ADMIN — ROSUVASTATIN CALCIUM 10 MG: 10 TABLET, FILM COATED ORAL at 20:56

## 2025-05-21 RX ADMIN — ALPRAZOLAM 1.5 MG: 0.5 TABLET ORAL at 21:00

## 2025-05-21 RX ADMIN — SEVELAMER CARBONATE 1600 MG: 800 TABLET, FILM COATED ORAL at 09:06

## 2025-05-21 NOTE — PROGRESS NOTES
Patient talked to Thiago Ascencio on her cell phone while nurse was in the room.  They told her they were not coming in tonight, but would see her in the morning.  They got her to agree to be cleaned up & let the nurse take care of her, that she was not ready to go home yet.  Nurse encouraged patient also, & patient agreed.  Restraints were removed, Patient was cleaned up, & she agreed to take her nightly medications.  PD cycler treatment  was also started.

## 2025-05-21 NOTE — PROGRESS NOTES
Patient ID: Jonelle Contreras  Referring/ Physician: Candido Bentley MD      Summary:   Jonelle Contreras is being seen by nephrology for ESRD and HTN.     Reason for admission: hypertensive urgency       Interval Hx:   Seen at bedside.   Had multiple bowel movements yesterday .   No issues reported with PD.   The effluent was clear this AM.     /74  On 4 L  WT trending down 63.8 > 59 kilos. On 2.5% dianeal     K 3.1   Bicarb 21  Cr 10.6 > 11.1  BUN 65  Phos 6.5        Assessment/Plan:   -  BP is acceptable and in goal range. Has been very stable the past week.   - will continue on dianeal 2.5% 4 exchanges + adding day time exchange 2 L 4 hrs for better solute clearance.   - check cell count on PD fluid  - cont renvela for binder. Increase to 1600 mg TID  - must have bowel movement daily.   - 40 meq KCL ordered K 3.1        ESRD  ESRD secondary to hypertension.  She does peritoneal dialysis at home 1 exchange of 2 L 1.5% solutions daily.  Based on her most recent labs in the outpatient setting her adequacy is not at goal so we are going to be increasing her to 4 exchanges, 2 L at 2.5% solutions.  Will use the cycler while inpatient.  Euvolemic.   - daily BM, on lactulose. Has ileus > GI consulted.    - gent to PD exit site.        Resistant hypertension  The BP has been controlled inpatient on her home regimen, actually low at times suggesting that she may not have been taking her medications as prescribed.   Has had an overall negative secondary work up. Has been extremely difficult to manage outpatient as she reports \"bottoming out and passing out\" when her medications are increased.   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   She has had 24 hr ambulatory BP monitoring showing consistently elevated bP 180s -200s systolic.   Presenting with hypertensive urgency, having ongoing headaches blurry vision and

## 2025-05-21 NOTE — PROGRESS NOTES
[facility] - Occupational Therapy   Phone: [phone]    Occupational Therapy  Facility/Department:[address] PROGRESSIVE CARE    [] Initial Evaluation            [x] Daily Treatment Note         [] Discharge Summary      Patient: Jonelle Contreras   : 1950   MRN: [id number]   Date of Service:  2025    Staff Mobility Recommendation: STEDY x1-2     AM-PAC Score: 15/24  Discharge Recommendations: SNF, 3-5x/week    Admitting Diagnosis:  Hypertensive urgency  Ordering Physician: Candido Bentley MD   Current Admission Summary: \"74-year-old lady with a past medical history significant for ESRD on PD, hypertension and CAD who presented for evaluation of hyperkalemia and hypertension. She had been at baseline health yesterday and referred to the emergency department because of hyperkalemia.  Her blood pressure has been elevated she is struggled with control outpatient.  She has had ambulatory blood pressure monitoring with systolic often in excess of 180.  She reports adherence with her home medication regimen.  She is not on any NSAIDs or decongestants.  She reports a headache today.  No chest pain or shortness of breath.  She does not report any fever or chills.\"    Past Medical History:  has a past medical history of acute intermittent porphyria, agoraphobic, Allergic rhinitis, Anxiety, Arthritis, Asthma, CAD (coronary artery disease), Chronic obstructive pulmonary disease, unspecified COPD type ([facility]), ckd 4, collagenous colitis, COPD (chronic obstructive pulmonary disease) ([facility]), Depression, Disease of blood and blood forming organ, GERD (gastroesophageal reflux disease), Heart disease, Hyperlipidemia, Hypertension, Left thyroid nodule, Migraines, Neuropathy, Osteoporosis, PAD (peripheral artery disease), Peripheral vascular disease, post menopausal, Prediabetes, Pulmonary nodules, Restless leg syndrome, STEMI involving oth coronary artery of inferior wall ([facility]), and Urinary incontinence.  Past Surgical  History:  has a past surgical history that includes Hysterectomy; Cholecystectomy; Coronary angioplasty with stent (10/2013 [place]); Colonoscopy (07/06/2017); Colonoscopy (N/A, 11/16/2022); Upper gastrointestinal endoscopy (N/A, 11/16/2022); Cardiac surgery; Hysterectomy, vaginal; and Upper gastrointestinal endoscopy (11/16/2022).    Assessment  Activity Tolerance: Poor  Impairments Requiring Therapeutic Intervention: decreased functional mobility, decreased ADL status, decreased strength, decreased safety awareness, decreased endurance, decreased balance, increased pain, decreased posture  Prognosis: good    Clinical Assessment: PTA pt lives at home alone in a multi level house w/5 CONSTANCE. Pt is typically IND w/ADL/IADLs and fxl transfers/mobility w/use of tri walker. Today pt is functioning significantly below baseline level, requiring up to modA bed mobility, Shamir for fxl transfers and Shamir fxl mobility household distances with RW. totalA via STEDY from commode>bed 2/2 fatigue and pt reporting \"I feel like I'm going to pass out\" while seated on commode. BP once supine in bed 122/73. Extensive time spent with pt as she was incontinent of loose stool ambulating to bathroom and had large, watery BM while seated on commode and continued once assisted back into bed. totalA toileting completing pericare/donning depends rolling side to side in bed. maxA LB dressing/footwear and does not complete further ADLs at this time. Pt still on 4L O2 this date. Pt would benefit from cont skilled OT services (3-5x/wk) in order to promote return to PLOF.     DME Required For Discharge: DME to be determined at next level of care    Restrictions:  Precautions/Restrictions:  4L of O2 via nasal cannula, medium fall risk, PD attached  Weight Bearing Restrictions: no restrictions    Required Braces/Orthotics: no braces required  Positional Restrictions:no positional restrictions    Subjective  General: Pt met bedside upon arrival to room,

## 2025-05-21 NOTE — PLAN OF CARE
Problem: Discharge Planning  Goal: Discharge to home or other facility with appropriate resources  Outcome: Progressing     Problem: ABCDS Injury Assessment  Goal: Absence of physical injury  Outcome: Progressing     Problem: Safety - Adult  Goal: Free from fall injury  Outcome: Progressing     Problem: Pain  Goal: Verbalizes/displays adequate comfort level or baseline comfort level  Outcome: Progressing     Problem: Genitourinary - Adult  Goal: Absence of urinary retention  Outcome: Progressing     Problem: Metabolic/Fluid and Electrolytes - Adult  Goal: Electrolytes maintained within normal limits  Outcome: Progressing     Problem: Nutrition Deficit:  Goal: Optimize nutritional status  Outcome: Progressing     Problem: Safety - Medical Restraint  Goal: Remains free of injury from restraints (Restraint for Interference with Medical Device)  Description: INTERVENTIONS:1. Determine that other, less restrictive measures have been tried or would not be effective before applying the restraint2. Evaluate the patient's condition at the time of restraint application3. Inform patient/family regarding the reason for restraint4. Q2H: Monitor safety, psychosocial status, comfort, nutrition and hydration  Outcome: Progressing  Flowsheets  Taken 5/20/2025 2330  Remains free of injury from restraints (restraint for interference with medical device): Every 2 hours: Monitor safety, psychosocial status, comfort, nutrition and hydration  Taken 5/20/2025 2130  Remains free of injury from restraints (restraint for interference with medical device): Inform patient/family regarding the reason for restraint     Problem: Skin/Tissue Integrity  Goal: Skin integrity remains intact  Description: 1.  Monitor for areas of redness and/or skin breakdown2.  Assess vascular access sites hourly3.  Every 4-6 hours minimum:  Change oxygen saturation probe site4.  Every 4-6 hours:  If on nasal continuous positive airway pressure, respiratory therapy

## 2025-05-21 NOTE — PLAN OF CARE
Problem: Discharge Planning  Goal: Discharge to home or other facility with appropriate resources  5/21/2025 1703 by Mainor Abernathy RN  Outcome: Progressing     Problem: ABCDS Injury Assessment  Goal: Absence of physical injury  5/21/2025 1703 by Mainor Abernathy RN  Outcome: Progressing     Problem: Safety - Adult  Goal: Free from fall injury  5/21/2025 1703 by Mainor Abernathy RN  Outcome: Progressing     Problem: Pain  Goal: Verbalizes/displays adequate comfort level or baseline comfort level  5/21/2025 1703 by Mainor Abernathy RN  Outcome: Progressing     Problem: Genitourinary - Adult  Goal: Absence of urinary retention  5/21/2025 1703 by Mainor Abernathy RN  Outcome: Progressing     Problem: Metabolic/Fluid and Electrolytes - Adult  Goal: Electrolytes maintained within normal limits  5/21/2025 1703 by Mainor Abernathy RN  Outcome: Progressing     Problem: Nutrition Deficit:  Goal: Optimize nutritional status  5/21/2025 1703 by Mainor Abernathy RN  Outcome: Progressing     Problem: Safety - Medical Restraint  Goal: Remains free of injury from restraints (Restraint for Interference with Medical Device)  Description: INTERVENTIONS:1. Determine that other, less restrictive measures have been tried or would not be effective before applying the restraint2. Evaluate the patient's condition at the time of restraint application3. Inform patient/family regarding the reason for restraint4. Q2H: Monitor safety, psychosocial status, comfort, nutrition and hydration  5/21/2025 1703 by Mainor Abernathy RN  Outcome: Progressing     Problem: Skin/Tissue Integrity  Goal: Skin integrity remains intact  Description: 1.  Monitor for areas of redness and/or skin breakdown2.  Assess vascular access sites hourly3.  Every 4-6 hours minimum:  Change oxygen saturation probe site4.  Every 4-6 hours:  If on nasal continuous positive airway pressure, respiratory therapy assess nares and determine need for appliance change or resting

## 2025-05-21 NOTE — PROGRESS NOTES
Internal Medicine Hospital Progress Note    Patient:  Jonelle Contreras 74 y.o. female MRN: [id number]     Date of Service: 5/21/2025    Allergy: Iodine, Iodinated contrast media, Wellbutrin [bupropion], Adhesive tape, Sertraline, and Sulfa antibiotics  CC: F/u:   Brief Course   Jonelle Contreras was admitted on 5/12/2025 for Hypertensive urgency. Jonelle Contreras has been admitted for 9 days    24 hr interval hx:  Diarrhea and confusion overnight.    Objective     Physical Exam:  Vitals: /82   Pulse 84   Temp 98.3 °F (36.8 °C) (Oral)   Resp 20   Ht 1.651 m (5' 5\")   Wt 59 kg (130 lb 1.1 oz)   SpO2 94%   BMI 21.64 kg/m²     General: Alert and oriented X3. No acute distress  Eyes: PEERL. No scleral icterus noted. Normal appearing conjunctiva bilaterally  Ears: Normal TM and external canal bilaterally.  Oral cavity/Throat: No pharyngeal erythema. No oral lesions.  Cardiovascular: Heart is regular rate and rhythm. No murmurs noted. Radial pulses 2+. Posterior tibial pulses 2+. No lower extremity edema noted  Pulmonary: Lungs clear to auscultation bilaterally  Skin: Dry, warm. No obvious skin lesions or rashes noted.  Neuro: PEERL. EOM intact. No facial droop. Normal sensation in bilateral upper and lower extremities. Gait is norm    Pertinent/ New Labs and Imaging Studies   Labs reviewed. Pertinent labs noted in assessment and plan      Assessment and Plan   Jonelle Contreras was admitted to hospital for Hypertensive urgency    Hypertensive urgency  - Nephrology consulted    Abdominal pain  - GI following  - Lactulose ordered for constipation  - Kub today    Sore throat, difficulty swallowing  - EGD planned for 5/22/2025    ESRD on PD  - Nephrology consulted, managing PD orders    Hyperlipidemia  - Continue home statin    Restless leg syndrom  - Continue ropinirole    Anxiety  - Continue home alprazolam    COPD  - Continue albuterol and stiolto    Overactive bladder  - Continue home trospium    Cough  CT chest with

## 2025-05-21 NOTE — CARE COORDINATION
DISCHARGE PLANNING:    Spoke to patient and family at bedside regarding SNF placement.  Awaiting response from the following facilities:    [facility]    Family would prefer a facility in [place] so that patient is closer to them during recovery.  VM left for above facility liaisons, awaiting return call. Will follow up with family as information becomes available.    Following facilities have declined at this time as most cannot facilitate the PD.      [facility]  [facility]-cannot accept d/t PD.  [facility]-no bed can accept at [facility] as of 5/16, will need to verify if still able to accept.  [facility] [place]-can accept as of 5/16, will need to verify if they are still able to accept  [facility]-no beds available  [facility]-cannot accept d/t PD.    [place] [facility]  [facility]-only accepts nuns/.  [facility], [facility], and [facility]-cannot accept d/t PD.  [facility]-no PD  [facility]-no PD    [facility]  [facility]-does not meet criteria for admission.    #[phone]  Electronically signed by Claudia Owen, RN on 5/21/2025 at 11:48 AM    [facility]-no longer does PD    #[phone]  Electronically signed by Claudia Owen RN on 5/21/2025 at 12:26 PM

## 2025-05-21 NOTE — PROGRESS NOTES
INPATIENT CONSULTATION:    IDENTIFYING DATA/REASON FOR CONSULTATION   PATIENT:  Jonelle Contreras  MRN:  [id number]  ADMIT DATE: 5/12/2025  TIME OF EVALUATION: 5/21/2025 6:57 AM  HOSPITAL STAY:   LOS: 9 days   CONSULTING PHYSICIAN: Candido Bentley MD   REASON FOR CONSULTATION: Dysphagia    Subjective:    Patient seen and examined in follow-up. Patient reports improvement in dysphagia and odynophagia since starting fluconazole and tolerated meals yesterday including cheeseburger for dinner.  She otherwise denies complaints.    MEDICATIONS   SCHEDULED:  potassium chloride, 40 mEq, Once  sevelamer, 1,600 mg, TID WC  dianeal lo-neal 2.5%, 2,000 mL, Once  pantoprazole, 40 mg, QAM  lactulose, 30 g, 6 times per day  fluconazole, 200 mg, Daily  dianeal lo-neal 2.5%, 6,000 mL, Nightly   And  dianeal lo-neal 2.5%, 2,000 mL, Nightly  sennosides-docusate sodium, 2 tablet, Daily  polyethylene glycol, 17 g, BID  torsemide, 100 mg, Daily  ipratropium 0.5 mg-albuterol 2.5 mg, 1 Dose, Q4H WA RT  prochlorperazine, 10 mg, Once  NIFEdipine, 60 mg, Daily  sodium chloride flush, 5-40 mL, 2 times per day  heparin (porcine), 5,000 Units, 3 times per day  aspirin, 81 mg, Daily  carvedilol, 12.5 mg, BID WC  [Held by provider] clopidogrel, 75 mg, Daily  losartan, 100 mg, Nightly  rOPINIRole, 1 mg, Nightly  rosuvastatin, 10 mg, Nightly  trospium, 20 mg, Nightly  tiotropium-olodaterol, 2 puff, Daily  spironolactone, 50 mg, Daily      FLUIDS/DRIPS:     sodium chloride       PRNs: traMADol, 50 mg, Q12H PRN  magic (miracle) mouthwash, 5 mL, 4x Daily PRN  prochlorperazine, 10 mg, Q6H PRN  benzocaine-menthol, 1 lozenge, Q2H PRN  sodium chloride flush, 5-40 mL, PRN  sodium chloride, , PRN  ondansetron, 4 mg, Q8H PRN   Or  ondansetron, 4 mg, Q6H PRN  polyethylene glycol, 17 g, Daily PRN  acetaminophen, 650 mg, Q6H PRN   Or  acetaminophen, 650 mg, Q6H PRN  albuterol sulfate HFA, 2 puff, Q6H PRN  ALPRAZolam, 2 mg, Q12H PRN  tiZANidine, 4 mg, Q6H  with a past medical history of CAD with history of PCI on Plavix, COPD, hypertension, hyperlipidemia, peripheral vascular disease, migraines, anxiety, depression, CKD who presented to [facility]12/2025.    Dysphagia: Patient with dysphagia to solids and liquids, prior EGD in 2022 unrevealing.  Patient currently Plavix, with last dose 5/17/2025.  Will plan for EGD 5/22/2025.  Recommend speech therapy evaluation for oropharyngeal dysphagia.  Odynophagia: Given reported thrush prior to admission, and a dyne aphasia, concern for Candida esophagitis.  Will start fluconazole.  Per above, perform EGD 5/22/2025.  CAD with h/o PCI on Plavix: Plavix on hold for planned EGD 5/22/2025.  Bronchitis versus community-acquired pneumonia: On azithromycin and ceftriaxone.  Will defer management to primary team.  ESRD on PD  Porphyria: Per patient's family, patient with history of porphyria  COPD  CKD    RECOMMENDATIONS:    - Hold Plavix with plan for EGD 5/22/2025.    If you have any questions or need any further information, please feel free to contact anyone on our consult team.  Thank you for allowing us to participate in the care of Jonelle Contreras.    Parker Rdz MD  [facility]

## 2025-05-21 NOTE — PROGRESS NOTES
Neurology Progress Note    - MRI brain wo acute findings  - MRI C and L spine very motion degraded - does appear to have significant degenerative changes, but difficult to draw definitive conclusions given motion artifact    Mental Status remains at baseline.    Assessment:    Her AMS is resolved, and given no acute finding on MRI no further neurological work-up is recommended. As per initial consult note, suspect her original AMS was multifactorial.    Re: her weakness and balance change -- likely multifactorial, but remain concerned for spinal contributions. Spine MRIs non-diagnostic given motion artifact. Will not pursue repeat here, but recommend as outpatient seeing neurosurgery to follow-up on her neck and back pain and consideration of repeat spinal imaging at that time    Otherwise neurology will sign off, but remain available for questions    Keith Castro MD  Neurology

## 2025-05-21 NOTE — PROGRESS NOTES
Patient woke up & set off the bed alarm by getting up on her own.  PCA  was the first to arrive to bedside.  Patient was unsteady on feet.  When asked to stay in the bed, so she don't fall, patient got mean & violent, threatening to hit & kick staff. When asked where she was going, she said , home.  Reoriented to time & place.  She said she didn't care, she would walk home.  She was very unsteady on her feet. It took 3 staff members to get patient into bed.  She was kicking & swinging at staff. She pulled off her oxygen, heart monitor & threatened to poop the bed.  Bedpan offered, but she said she no, she was going to make a mess, play in it & eat it if she wanted. Restraints were ordered & applied to keep her in bed & from pulling everything out.  She refused all medications & care.  Even though she was incontinent of stool, she refused to be cleaned up.  She wanted her family to come in & see her like this.  She had her cell phone in one hand & dialed [place] & then the police.  Family was notified of her behavior & the need for restraints.

## 2025-05-21 NOTE — PROGRESS NOTES
Occupational Therapy  Jonelle Contreras  1950  [id number]    OT spoke to RN and pt currently receiving PD until 5pm this date. Will attempt tomorrow 5/22. If pt discharges prior to next therapy session, please refer to last therapy note as discharge summary.    Electronically signed by CRISTOPHER Rodriguez/L on 5/21/2025 at 1:23 PM

## 2025-05-22 ENCOUNTER — ANESTHESIA (OUTPATIENT)
Dept: ENDOSCOPY | Age: 75
End: 2025-05-22
Payer: MEDICARE

## 2025-05-22 ENCOUNTER — ANESTHESIA EVENT (OUTPATIENT)
Dept: ENDOSCOPY | Age: 75
End: 2025-05-22
Payer: MEDICARE

## 2025-05-22 LAB
ALBUMIN SERPL-MCNC: 3.3 G/DL (ref 3.4–5)
ANION GAP SERPL CALCULATED.3IONS-SCNC: 20 MMOL/L (ref 3–16)
BUN SERPL-MCNC: 54 MG/DL (ref 7–20)
CALCIUM SERPL-MCNC: 9.3 MG/DL (ref 8.3–10.6)
CHLORIDE SERPL-SCNC: 98 MMOL/L (ref 99–110)
CO2 SERPL-SCNC: 20 MMOL/L (ref 21–32)
CREAT SERPL-MCNC: 10.9 MG/DL (ref 0.6–1.2)
DEPRECATED RDW RBC AUTO: 16.2 % (ref 12.4–15.4)
EKG ATRIAL RATE: 91 BPM
EKG DIAGNOSIS: NORMAL
EKG P AXIS: -16 DEGREES
EKG P-R INTERVAL: 106 MS
EKG Q-T INTERVAL: 394 MS
EKG QRS DURATION: 88 MS
EKG QTC CALCULATION (BAZETT): 484 MS
EKG R AXIS: -12 DEGREES
EKG T AXIS: 7 DEGREES
EKG VENTRICULAR RATE: 91 BPM
GFR SERPLBLD CREATININE-BSD FMLA CKD-EPI: 3 ML/MIN/{1.73_M2}
GLUCOSE SERPL-MCNC: 156 MG/DL (ref 70–99)
HCT VFR BLD AUTO: 33.8 % (ref 36–48)
HGB BLD-MCNC: 11.3 G/DL (ref 12–16)
MCH RBC QN AUTO: 28.9 PG (ref 26–34)
MCHC RBC AUTO-ENTMCNC: 33.3 G/DL (ref 31–36)
MCV RBC AUTO: 86.7 FL (ref 80–100)
PHOSPHATE SERPL-MCNC: 7.1 MG/DL (ref 2.5–4.9)
PLATELET # BLD AUTO: 371 K/UL (ref 135–450)
PMV BLD AUTO: 9.4 FL (ref 5–10.5)
POTASSIUM SERPL-SCNC: 3.4 MMOL/L (ref 3.5–5.1)
RBC # BLD AUTO: 3.89 M/UL (ref 4–5.2)
REASON FOR REJECTION: NORMAL
REASON FOR REJECTION: NORMAL
REJECTED TEST: NORMAL
REJECTED TEST: NORMAL
SODIUM SERPL-SCNC: 138 MMOL/L (ref 136–145)
TROPONIN, HIGH SENSITIVITY: 80 NG/L (ref 0–14)
TROPONIN, HIGH SENSITIVITY: 88 NG/L (ref 0–14)
WBC # BLD AUTO: 9.4 K/UL (ref 4–11)

## 2025-05-22 PROCEDURE — 93010 ELECTROCARDIOGRAM REPORT: CPT | Performed by: INTERNAL MEDICINE

## 2025-05-22 PROCEDURE — 9990000010 HC NO CHARGE VISIT

## 2025-05-22 PROCEDURE — 2500000003 HC RX 250 WO HCPCS: Performed by: STUDENT IN AN ORGANIZED HEALTH CARE EDUCATION/TRAINING PROGRAM

## 2025-05-22 PROCEDURE — 6370000000 HC RX 637 (ALT 250 FOR IP): Performed by: STUDENT IN AN ORGANIZED HEALTH CARE EDUCATION/TRAINING PROGRAM

## 2025-05-22 PROCEDURE — 93005 ELECTROCARDIOGRAM TRACING: CPT | Performed by: STUDENT IN AN ORGANIZED HEALTH CARE EDUCATION/TRAINING PROGRAM

## 2025-05-22 PROCEDURE — 2500000003 HC RX 250 WO HCPCS: Performed by: ANESTHESIOLOGY

## 2025-05-22 PROCEDURE — 6370000000 HC RX 637 (ALT 250 FOR IP)

## 2025-05-22 PROCEDURE — 3700000000 HC ANESTHESIA ATTENDED CARE: Performed by: INTERNAL MEDICINE

## 2025-05-22 PROCEDURE — 99232 SBSQ HOSP IP/OBS MODERATE 35: CPT | Performed by: STUDENT IN AN ORGANIZED HEALTH CARE EDUCATION/TRAINING PROGRAM

## 2025-05-22 PROCEDURE — 84484 ASSAY OF TROPONIN QUANT: CPT

## 2025-05-22 PROCEDURE — 0DJ08ZZ INSPECTION OF UPPER INTESTINAL TRACT, VIA NATURAL OR ARTIFICIAL OPENING ENDOSCOPIC: ICD-10-PCS | Performed by: INTERNAL MEDICINE

## 2025-05-22 PROCEDURE — 80069 RENAL FUNCTION PANEL: CPT

## 2025-05-22 PROCEDURE — 90945 DIALYSIS ONE EVALUATION: CPT

## 2025-05-22 PROCEDURE — 94761 N-INVAS EAR/PLS OXIMETRY MLT: CPT

## 2025-05-22 PROCEDURE — 1200000000 HC SEMI PRIVATE

## 2025-05-22 PROCEDURE — 2700000000 HC OXYGEN THERAPY PER DAY

## 2025-05-22 PROCEDURE — 7100000000 HC PACU RECOVERY - FIRST 15 MIN: Performed by: INTERNAL MEDICINE

## 2025-05-22 PROCEDURE — 94640 AIRWAY INHALATION TREATMENT: CPT

## 2025-05-22 PROCEDURE — 3609017100 HC EGD: Performed by: INTERNAL MEDICINE

## 2025-05-22 PROCEDURE — 2580000003 HC RX 258: Performed by: NURSE ANESTHETIST, CERTIFIED REGISTERED

## 2025-05-22 PROCEDURE — 6360000002 HC RX W HCPCS: Performed by: STUDENT IN AN ORGANIZED HEALTH CARE EDUCATION/TRAINING PROGRAM

## 2025-05-22 PROCEDURE — 99223 1ST HOSP IP/OBS HIGH 75: CPT | Performed by: INTERNAL MEDICINE

## 2025-05-22 PROCEDURE — 2580000003 HC RX 258: Performed by: INTERNAL MEDICINE

## 2025-05-22 PROCEDURE — 97530 THERAPEUTIC ACTIVITIES: CPT

## 2025-05-22 PROCEDURE — 6360000002 HC RX W HCPCS: Performed by: NURSE ANESTHETIST, CERTIFIED REGISTERED

## 2025-05-22 PROCEDURE — 6370000000 HC RX 637 (ALT 250 FOR IP): Performed by: INTERNAL MEDICINE

## 2025-05-22 PROCEDURE — 2580000003 HC RX 258: Performed by: STUDENT IN AN ORGANIZED HEALTH CARE EDUCATION/TRAINING PROGRAM

## 2025-05-22 PROCEDURE — 2709999900 HC NON-CHARGEABLE SUPPLY: Performed by: INTERNAL MEDICINE

## 2025-05-22 PROCEDURE — 85027 COMPLETE CBC AUTOMATED: CPT

## 2025-05-22 PROCEDURE — 36415 COLL VENOUS BLD VENIPUNCTURE: CPT

## 2025-05-22 PROCEDURE — 7100000001 HC PACU RECOVERY - ADDTL 15 MIN: Performed by: INTERNAL MEDICINE

## 2025-05-22 RX ORDER — 0.9 % SODIUM CHLORIDE 0.9 %
500 INTRAVENOUS SOLUTION INTRAVENOUS ONCE
Status: COMPLETED | OUTPATIENT
Start: 2025-05-22 | End: 2025-05-22

## 2025-05-22 RX ORDER — SODIUM CHLORIDE 0.9 % (FLUSH) 0.9 %
5-40 SYRINGE (ML) INJECTION EVERY 12 HOURS SCHEDULED
Status: DISCONTINUED | OUTPATIENT
Start: 2025-05-22 | End: 2025-05-23

## 2025-05-22 RX ORDER — PHENYLEPHRINE HCL IN 0.9% NACL 1 MG/10 ML
SYRINGE (ML) INTRAVENOUS
Status: DISCONTINUED | OUTPATIENT
Start: 2025-05-22 | End: 2025-05-22 | Stop reason: SDUPTHER

## 2025-05-22 RX ORDER — NIFEDIPINE 30 MG/1
30 TABLET, EXTENDED RELEASE ORAL DAILY
Status: DISCONTINUED | OUTPATIENT
Start: 2025-05-23 | End: 2025-05-30 | Stop reason: HOSPADM

## 2025-05-22 RX ORDER — DIPHENHYDRAMINE HYDROCHLORIDE 50 MG/ML
12.5 INJECTION, SOLUTION INTRAMUSCULAR; INTRAVENOUS
Status: DISCONTINUED | OUTPATIENT
Start: 2025-05-22 | End: 2025-05-23

## 2025-05-22 RX ORDER — SODIUM CHLORIDE 0.9 % (FLUSH) 0.9 %
5-40 SYRINGE (ML) INJECTION PRN
Status: DISCONTINUED | OUTPATIENT
Start: 2025-05-22 | End: 2025-05-23

## 2025-05-22 RX ORDER — POTASSIUM CHLORIDE 1500 MG/1
40 TABLET, EXTENDED RELEASE ORAL 2 TIMES DAILY
Status: COMPLETED | OUTPATIENT
Start: 2025-05-22 | End: 2025-05-22

## 2025-05-22 RX ORDER — ONDANSETRON 2 MG/ML
4 INJECTION INTRAMUSCULAR; INTRAVENOUS
Status: DISCONTINUED | OUTPATIENT
Start: 2025-05-22 | End: 2025-05-23

## 2025-05-22 RX ORDER — LIDOCAINE HYDROCHLORIDE 20 MG/ML
INJECTION, SOLUTION INFILTRATION; PERINEURAL
Status: DISCONTINUED | OUTPATIENT
Start: 2025-05-22 | End: 2025-05-22 | Stop reason: SDUPTHER

## 2025-05-22 RX ORDER — SODIUM CHLORIDE 9 MG/ML
INJECTION, SOLUTION INTRAVENOUS
Status: DISCONTINUED | OUTPATIENT
Start: 2025-05-22 | End: 2025-05-22 | Stop reason: SDUPTHER

## 2025-05-22 RX ORDER — NALOXONE HYDROCHLORIDE 0.4 MG/ML
INJECTION, SOLUTION INTRAMUSCULAR; INTRAVENOUS; SUBCUTANEOUS PRN
Status: DISCONTINUED | OUTPATIENT
Start: 2025-05-22 | End: 2025-05-23

## 2025-05-22 RX ORDER — METOCLOPRAMIDE 10 MG/1
5 TABLET ORAL
Status: DISCONTINUED | OUTPATIENT
Start: 2025-05-22 | End: 2025-05-30 | Stop reason: HOSPADM

## 2025-05-22 RX ORDER — SODIUM CHLORIDE 9 MG/ML
INJECTION, SOLUTION INTRAVENOUS PRN
Status: DISCONTINUED | OUTPATIENT
Start: 2025-05-22 | End: 2025-05-23

## 2025-05-22 RX ORDER — PROPOFOL 10 MG/ML
INJECTION, EMULSION INTRAVENOUS
Status: DISCONTINUED | OUTPATIENT
Start: 2025-05-22 | End: 2025-05-22 | Stop reason: SDUPTHER

## 2025-05-22 RX ADMIN — TROSPIUM CHLORIDE 20 MG: 20 TABLET, FILM COATED ORAL at 21:32

## 2025-05-22 RX ADMIN — ROSUVASTATIN CALCIUM 10 MG: 10 TABLET, FILM COATED ORAL at 21:33

## 2025-05-22 RX ADMIN — POTASSIUM CHLORIDE 40 MEQ: 1500 TABLET, EXTENDED RELEASE ORAL at 10:45

## 2025-05-22 RX ADMIN — SODIUM CHLORIDE, PRESERVATIVE FREE 10 ML: 5 INJECTION INTRAVENOUS at 08:42

## 2025-05-22 RX ADMIN — ROPINIROLE HYDROCHLORIDE 1 MG: 1 TABLET, FILM COATED ORAL at 21:32

## 2025-05-22 RX ADMIN — METOCLOPRAMIDE 5 MG: 10 TABLET ORAL at 16:03

## 2025-05-22 RX ADMIN — LIDOCAINE HYDROCHLORIDE 50 MG: 20 INJECTION, SOLUTION INFILTRATION; PERINEURAL at 13:09

## 2025-05-22 RX ADMIN — FLUCONAZOLE 200 MG: 100 TABLET ORAL at 08:39

## 2025-05-22 RX ADMIN — ALPRAZOLAM 1.5 MG: 0.5 TABLET ORAL at 08:48

## 2025-05-22 RX ADMIN — SEVELAMER CARBONATE 1600 MG: 800 TABLET, FILM COATED ORAL at 16:03

## 2025-05-22 RX ADMIN — PROPOFOL 50 MG: 10 INJECTION, EMULSION INTRAVENOUS at 13:09

## 2025-05-22 RX ADMIN — Medication 100 MCG: at 13:13

## 2025-05-22 RX ADMIN — POTASSIUM CHLORIDE 40 MEQ: 1500 TABLET, EXTENDED RELEASE ORAL at 21:32

## 2025-05-22 RX ADMIN — SODIUM CHLORIDE, PRESERVATIVE FREE 40 MG: 5 INJECTION INTRAVENOUS at 21:39

## 2025-05-22 RX ADMIN — SODIUM CHLORIDE, SODIUM LACTATE, CALCIUM CHLORIDE, MAGNESIUM CHLORIDE AND DEXTROSE 2000 ML: 2.5; 538; 448; 18.3; 5.08 INJECTION, SOLUTION INTRAPERITONEAL at 22:29

## 2025-05-22 RX ADMIN — POLYETHYLENE GLYCOL 3350 17 G: 17 POWDER, FOR SOLUTION ORAL at 21:33

## 2025-05-22 RX ADMIN — TRAMADOL HYDROCHLORIDE 50 MG: 50 TABLET, COATED ORAL at 06:09

## 2025-05-22 RX ADMIN — HEPARIN SODIUM 5000 UNITS: 5000 INJECTION INTRAVENOUS; SUBCUTANEOUS at 06:10

## 2025-05-22 RX ADMIN — PANTOPRAZOLE SODIUM 40 MG: 40 TABLET, DELAYED RELEASE ORAL at 08:42

## 2025-05-22 RX ADMIN — Medication 100 MCG: at 13:15

## 2025-05-22 RX ADMIN — IPRATROPIUM BROMIDE AND ALBUTEROL SULFATE 1 DOSE: .5; 2.5 SOLUTION RESPIRATORY (INHALATION) at 19:43

## 2025-05-22 RX ADMIN — SODIUM CHLORIDE, SODIUM LACTATE, CALCIUM CHLORIDE, MAGNESIUM CHLORIDE AND DEXTROSE 6000 ML: 2.5; 538; 448; 18.3; 5.08 INJECTION, SOLUTION INTRAPERITONEAL at 22:29

## 2025-05-22 RX ADMIN — TIOTROPIUM BROMIDE AND OLODATEROL 2 PUFF: 3.124; 2.736 SPRAY, METERED RESPIRATORY (INHALATION) at 07:51

## 2025-05-22 RX ADMIN — SODIUM CHLORIDE: 9 INJECTION, SOLUTION INTRAVENOUS at 13:04

## 2025-05-22 RX ADMIN — SODIUM CHLORIDE, PRESERVATIVE FREE 10 ML: 5 INJECTION INTRAVENOUS at 21:47

## 2025-05-22 RX ADMIN — SODIUM CHLORIDE, PRESERVATIVE FREE 10 ML: 5 INJECTION INTRAVENOUS at 21:48

## 2025-05-22 RX ADMIN — HEPARIN SODIUM 5000 UNITS: 5000 INJECTION INTRAVENOUS; SUBCUTANEOUS at 14:26

## 2025-05-22 RX ADMIN — SEVELAMER CARBONATE 1600 MG: 800 TABLET, FILM COATED ORAL at 08:42

## 2025-05-22 RX ADMIN — ASPIRIN 81 MG: 81 TABLET, CHEWABLE ORAL at 08:42

## 2025-05-22 RX ADMIN — HEPARIN SODIUM 5000 UNITS: 5000 INJECTION INTRAVENOUS; SUBCUTANEOUS at 21:45

## 2025-05-22 RX ADMIN — NIFEDIPINE 60 MG: 30 TABLET, FILM COATED, EXTENDED RELEASE ORAL at 08:42

## 2025-05-22 RX ADMIN — ALPRAZOLAM 1.5 MG: 0.5 TABLET ORAL at 21:33

## 2025-05-22 RX ADMIN — IPRATROPIUM BROMIDE AND ALBUTEROL SULFATE 1 DOSE: .5; 2.5 SOLUTION RESPIRATORY (INHALATION) at 07:51

## 2025-05-22 RX ADMIN — SODIUM CHLORIDE 500 ML: 0.9 INJECTION, SOLUTION INTRAVENOUS at 09:45

## 2025-05-22 ASSESSMENT — PAIN SCALES - GENERAL
PAINLEVEL_OUTOF10: 8
PAINLEVEL_OUTOF10: 6
PAINLEVEL_OUTOF10: 9
PAINLEVEL_OUTOF10: 4

## 2025-05-22 ASSESSMENT — LIFESTYLE VARIABLES: SMOKING_STATUS: 0

## 2025-05-22 ASSESSMENT — PAIN DESCRIPTION - ONSET: ONSET: ON-GOING

## 2025-05-22 ASSESSMENT — PAIN DESCRIPTION - ORIENTATION
ORIENTATION: MID
ORIENTATION: LEFT

## 2025-05-22 ASSESSMENT — PAIN DESCRIPTION - DESCRIPTORS
DESCRIPTORS: BURNING
DESCRIPTORS: BURNING;DISCOMFORT

## 2025-05-22 ASSESSMENT — PAIN DESCRIPTION - LOCATION
LOCATION: FOOT
LOCATION: CHEST
LOCATION: OTHER (COMMENT)

## 2025-05-22 ASSESSMENT — PAIN - FUNCTIONAL ASSESSMENT
PAIN_FUNCTIONAL_ASSESSMENT: ADULT NONVERBAL PAIN SCALE (NPVS)
PAIN_FUNCTIONAL_ASSESSMENT: ACTIVITIES ARE NOT PREVENTED
PAIN_FUNCTIONAL_ASSESSMENT: ACTIVITIES ARE NOT PREVENTED

## 2025-05-22 ASSESSMENT — PAIN DESCRIPTION - FREQUENCY: FREQUENCY: CONTINUOUS

## 2025-05-22 ASSESSMENT — PAIN DESCRIPTION - PAIN TYPE: TYPE: ACUTE PAIN

## 2025-05-22 NOTE — ANESTHESIA POSTPROCEDURE EVALUATION
Department of Anesthesiology  Postprocedure Note    Patient: Jonelle Contreras  MRN: [id number]  YOB: 1950  Date of evaluation: 5/22/2025    Procedure Summary       Date: 05/22/25 Room / Location: [place] [address] / [facility]    Anesthesia Start: 1304 Anesthesia Stop: 1318    Procedure: ESOPHAGOGASTRODUODENOSCOPY Diagnosis:       Dysphagia, unspecified type      (Dysphagia, unspecified type [R13.10])    Surgeons: Parker Rdz MD Responsible Provider: Griffin Bates MD    Anesthesia Type: MAC ASA Status: 3            Anesthesia Type: No value filed.    Missy Phase I: Missy Score: 8    Missy Phase II:      Anesthesia Post Evaluation    Patient location during evaluation: bedside  Patient participation: complete - patient participated  Level of consciousness: awake and alert  Pain score: 0  Nausea & Vomiting: no nausea  Cardiovascular status: hemodynamically stable  Respiratory status: acceptable  Hydration status: stable  Pain management: adequate    No notable events documented.

## 2025-05-22 NOTE — PROGRESS NOTES
Physical Therapy  Chart reviewed and spoke with nursing.  PT Rx held; + Orthostatics and episode of \"passing out.\"  Will hold PT Rx today; follow-up tomorrow as approp. Susu Rosenbaum, PT

## 2025-05-22 NOTE — PROGRESS NOTES
[facility] - Occupational Therapy   Phone: [phone]    Occupational Therapy  Facility/Department:[address] PROGRESSIVE CARE    [] Initial Evaluation            [x] Daily Treatment Note         [] Discharge Summary      Patient: Jonelle Contreras   : 1950   MRN: [id number]   Date of Service:  2025    Staff Mobility Recommendation: STEDY x1-2     AM-PAC Score:   Discharge Recommendations: SNF, 3-5x/week    Admitting Diagnosis:  Hypertensive urgency  Ordering Physician: Candido Bentley MD   Current Admission Summary: \"74-year-old lady with a past medical history significant for ESRD on PD, hypertension and CAD who presented for evaluation of hyperkalemia and hypertension. She had been at baseline health yesterday and referred to the emergency department because of hyperkalemia.  Her blood pressure has been elevated she is struggled with control outpatient.  She has had ambulatory blood pressure monitoring with systolic often in excess of 180.  She reports adherence with her home medication regimen.  She is not on any NSAIDs or decongestants.  She reports a headache today.  No chest pain or shortness of breath.  She does not report any fever or chills.\"    Past Medical History:  has a past medical history of acute intermittent porphyria, agoraphobic, Allergic rhinitis, Anxiety, Arthritis, Asthma, CAD (coronary artery disease), Chronic obstructive pulmonary disease, unspecified COPD type ([facility]), ckd 4, collagenous colitis, COPD (chronic obstructive pulmonary disease) ([facility]), Depression, Disease of blood and blood forming organ, GERD (gastroesophageal reflux disease), Heart disease, Hyperlipidemia, Hypertension, Left thyroid nodule, Migraines, Neuropathy, Osteoporosis, PAD (peripheral artery disease), Peripheral vascular disease, post menopausal, Prediabetes, Pulmonary nodules, Restless leg syndrome, STEMI involving oth coronary artery of inferior wall ([facility]), and Urinary incontinence.  Past Surgical

## 2025-05-22 NOTE — CONSULTS
[facility]    Jonelle Contreras  1950    May 22, 2025    Reason for Consult: Chest Pain    CC: Chest Pain    HPI:  The patient is 74 y.o. female with a past medical history significant for CAD, PAD, hyperlipidemia and hypertension. She underwent LHC on 10/21/13 resulting in PCI with BMS to mid RCA lesion. A peripheral angiogram was completed on 5/30/14 revealing 50% bilateral SFA stenoses. She was admitted 12/4-12/6/22 for a MI and underwent a left heart catheterization resulting in one JAYME to the RCA. She completed a normal echocardiogram on 12/5/22. She underwent a peripheral 12/19/22 that resulted in a balloon angioplasty to the right SFA. Her dose of carvedilol was previously reduced by Dr. Pagan. She underwent a peripheral 1/10/22 that resulted in a balloon angioplasty to the left SFA.     She was admitted 5/12/25 for hypertensive urgency after presenting to dialysis with BP of 230/100's. I was asked to see her for an episode of syncope today with chest pain.    She did peritoneal dialysis overnight and was working with PT this morning. Her BP was low. She lost consciousness briefly and awoke with chest pain. She describes the pain as \"real stabbing\" and in her lower left chest.  She has no nausea or shortness of breath associated with this.    She just returned from her EGD and is mildly sedated but able to converse.     Review of Systems:  Constitutional: No fatigue, weakness, night sweats or fever.   HEENT: No new vision difficulties or ringing in the ears.  Respiratory: No new SOB, PND, orthopnea or cough.   Cardiovascular: See HPI   GI: No n/v, diarrhea, constipation, abdominal pain or changes in bowel habits.  No melena, no hematochezia  : No urinary frequency, urgency, incontinence, hematuria or dysuria.  Skin: No cyanosis or skin lesions.  Musculoskeletal: No new muscle or joint pain.  Neurological: No syncope or TIA-like symptoms.  Psychiatric: No anxiety, insomnia or  unspecified  I think that her hypotension was likely related to too much volume removal.  It appears that her dialysis may have been adjusted last night to take off more fluid.  Per the RN, nephrology has made adjustments back down.  I would hold her losartan, carvedilol and nifedipine for the rest of today.  Okay to give spironolactone and torsemide tomorrow as ordered.  If her blood pressure improves we can give her additional antihypertensive medications tomorrow.  She does not appear septic at present.    Chest Pain, atypical  This chest pain does not seem to be anginal in nature.  It only lasts a few seconds and her ECG is normal.  Her troponin assays are minimally elevated in the setting of end-stage renal disease.  I would actually stop checking troponins and not pursue this further.  This is not similar to her prior angina when she had stents placed.    ESRD on hemodialysis  Continue peritoneal dialysis with recommendations per nephrology.  It appears that her dialysate fluid has been made less hypertonic for this evening.    4.  CAD of native coronary arteries without angina  Okay to continue aspirin and clopidogrel for dual antiplatelet therapy  Continue statin therapy.  Hold beta-blockade while hypotensive.    5.  Hyperlipidemia with goal LDL less than 70 mg/dL  I am not sure how her dose of rosuvastatin was reduced to 10 mg.  If there is no contraindication I will increase this back to 40 mg.  I will defer this to Dr. Mendoza and the primary team.    Thank you very much for allowing me to participate in the care of your patient.

## 2025-05-22 NOTE — PROGRESS NOTES
Patient ID: Jonelle Contreras  Referring/ Physician: Candido Bentley MD      Summary:   Jonelle Contreras is being seen by nephrology for ESRD and HTN.     Reason for admission: hypertensive urgency       Interval Hx:   Seen at bedside.   BP 96/72  On 5 L oxygen via nasal cannula  Potassium down to 3.4 today.  Phosphorus up to 7.1  Weight 59 kg today.  She is below dry weight.  562 mL UF yesterday    Assessment/Plan:   - BP now running low.  Already got nifedipine 60 mg this morning.  Will reduce down to 30 mg daily from tomorrow.  - Continue PD with 2.5% exchanges.  - Replace potassium  - Continue Renvela 1600 mg 3 times daily with meals.      ESRD  ESRD secondary to hypertension.  She does peritoneal dialysis at home 1 exchange of 2 L 1.5% solutions daily.  Based on her most recent labs in the outpatient setting her adequacy is not at goal so we are going to be increasing her to 4 exchanges, 2 L at 2.5% solutions.  Will use the cycler while inpatient.  EDW 61.5 kg   - daily BM, on lactulose. Has ileus > GI consulted.    - gent to PD exit site.        Resistant hypertension  The BP has been controlled inpatient on her home regimen, actually low at times suggesting that she may not have been taking her medications as prescribed.   Has had an overall negative secondary work up. Has been extremely difficult to manage outpatient as she reports \"bottoming out and passing out\" when her medications are increased.   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   She has had 24 hr ambulatory BP monitoring showing consistently elevated bP 180s -200s systolic.   Presenting with hypertensive urgency, having ongoing headaches blurry vision and fatigue.  CT head showed no stroke.,  Has chronic microvascular changes.  Troponin 66.no ECG changes.   Her home regimen : nifedpine 30 mg , losartan 100 mg aldactone 50 mg , coreg 12.5 mg BID.

## 2025-05-22 NOTE — PLAN OF CARE
Problem: Discharge Planning  Goal: Discharge to home or other facility with appropriate resources  5/22/2025 0239 by Yogesh Mondragon RN  Outcome: Progressing     Problem: ABCDS Injury Assessment  Goal: Absence of physical injury  5/22/2025 0239 by Yogesh Mondragon RN  Outcome: Progressing     Problem: Safety - Adult  Goal: Free from fall injury  5/22/2025 0239 by Yogesh Mondragon RN  Outcome: Progressing     Problem: Pain  Goal: Verbalizes/displays adequate comfort level or baseline comfort level  5/22/2025 0239 by Yogesh Mondragon RN  Outcome: Progressing     Problem: Genitourinary - Adult  Goal: Absence of urinary retention  5/22/2025 0239 by Yogesh Mondragon RN  Outcome: Progressing     Problem: Metabolic/Fluid and Electrolytes - Adult  Goal: Electrolytes maintained within normal limits  5/22/2025 0239 by Yogesh Mondragon RN  Outcome: Progressing     Problem: Nutrition Deficit:  Goal: Optimize nutritional status  5/22/2025 0239 by Yogesh Mondragon RN  Outcome: Progressing     Problem: Safety - Medical Restraint  Goal: Remains free of injury from restraints (Restraint for Interference with Medical Device)  Description: INTERVENTIONS:1. Determine that other, less restrictive measures have been tried or would not be effective before applying the restraint2. Evaluate the patient's condition at the time of restraint application3. Inform patient/family regarding the reason for restraint4. Q2H: Monitor safety, psychosocial status, comfort, nutrition and hydration  5/22/2025 0239 by Yogesh Mondragon RN  Outcome: Progressing     Problem: Skin/Tissue Integrity  Goal: Skin integrity remains intact  Description: 1.  Monitor for areas of redness and/or skin breakdown2.  Assess vascular access sites hourly3.  Every 4-6 hours minimum:  Change oxygen saturation probe site4.  Every 4-6 hours:  If on nasal continuous positive airway pressure, respiratory therapy assess nares and determine need for appliance

## 2025-05-22 NOTE — PROGRESS NOTES
Patient passed out with therapy. Patient is positive for orthostatic hypotension per therapy. Patient woke up with stabbing chest pain. Dr. Bentley alerted via phone call. New Order: STAT Troponin, STAT EKG, 500 ml Normal Saline Bolus one time.

## 2025-05-22 NOTE — ANESTHESIA PRE PROCEDURE
MD Candido   20 mg at 25    tiZANidine (ZANAFLEX) tablet 4 mg  4 mg Oral Q6H PRN Candido Bentley MD   4 mg at 25    tiotropium-olodaterol (STIOLTO) 2.5-2.5 MCG/ACT inhaler 2 puff  2 puff Inhalation Daily Candido Bentley MD   2 puff at 25 0751    gentamicin (GARAMYCIN) 0.1 % cream   Topical Daily PRN Trini Pagan MD   Given at 25 0551    spironolactone (ALDACTONE) tablet 50 mg  50 mg Oral Daily Trini Pagan MD   50 mg at 25 0907     Vital Signs (Current)   Vitals:    25 0815 25 0849 25 1102 25 1222   BP: 96/72 96/72 120/76 123/77   Pulse: 89 89  86   Resp: 18   18   Temp: 98.5 °F (36.9 °C)   98.1 °F (36.7 °C)   TempSrc: Axillary      SpO2:  96%  98%   Weight:       Height:                                                Vital Signs Statistics (for past 48 hrs)     Temp  Av.9 °F (36.6 °C)  Min: 96.8 °F (36 °C)   Min taken time: 25 1148  Max: 98.5 °F (36.9 °C)   Max taken time: 25 0815  Pulse  Av.6  Min: 80   Min taken time: 25 1628  Max: 94   Max taken time: 25 2221  Resp  Av.9  Min: 16   Min taken time: 25 2330  Max: 23   Max taken time: 25 2221  BP  Min: 96/72   Min taken time: 25 0849  Max: 140/74   Max taken time: 25 0302  MAP (mmHg)  Av.8  Min: 78   Min taken time: 25 1628  Max: 101   Max taken time: 25 1507  SpO2  Av.7 %  Min: 90 %   Min taken time: 25 1939  Max: 100 %   Max taken time: 25 0805  BP Readings from Last 3 Encounters:   25 123/77   25 130/84   25 (!) 152/95       BMI  Body mass index is 21.64 kg/m².  Estimated body mass index is 21.64 kg/m² as calculated from the following:    Height as of this encounter: 1.651 m (5' 5\").    Weight as of this encounter: 59 kg (130 lb 1.1 oz).    CBC   Lab Results   Component Value Date/Time    WBC 9.4 2025 05:14 AM    RBC 3.89 2025 05:14 AM    HGB 11.3 2025 05:14 AM

## 2025-05-22 NOTE — PROGRESS NOTES
Patient stating she had something to eat and drink around 0800. Call placed to Floor RN to verify, stating that he doesn't think that the patient got a tray. Asked to clarify with PCA who agreed they did not think the patient had a tray. Asked patient again what she would have had this morning, patient stated she thinks it was just juice and water. MD Bates is aware.

## 2025-05-22 NOTE — PLAN OF CARE
Problem: Discharge Planning  Goal: Discharge to home or other facility with appropriate resources  5/22/2025 1446 by Mainor Abernathy RN  Outcome: Progressing     Problem: ABCDS Injury Assessment  Goal: Absence of physical injury  5/22/2025 1446 by Mainor Abernathy RN  Outcome: Progressing     Problem: Safety - Adult  Goal: Free from fall injury  5/22/2025 1446 by Mainor Abernathy RN  Outcome: Progressing     Problem: Pain  Goal: Verbalizes/displays adequate comfort level or baseline comfort level  5/22/2025 1446 by Mainor Abernathy RN  Outcome: Progressing     Problem: Genitourinary - Adult  Goal: Absence of urinary retention  5/22/2025 1446 by Mainor Abernathy RN  Outcome: Progressing     Problem: Metabolic/Fluid and Electrolytes - Adult  Goal: Electrolytes maintained within normal limits  5/22/2025 1446 by Mainor Abernathy RN  Outcome: Progressing     Problem: Nutrition Deficit:  Goal: Optimize nutritional status  5/22/2025 1446 by Mainor Abernathy RN  Outcome: Progressing     Problem: Safety - Medical Restraint  Goal: Remains free of injury from restraints (Restraint for Interference with Medical Device)  Description: INTERVENTIONS:1. Determine that other, less restrictive measures have been tried or would not be effective before applying the restraint2. Evaluate the patient's condition at the time of restraint application3. Inform patient/family regarding the reason for restraint4. Q2H: Monitor safety, psychosocial status, comfort, nutrition and hydration  5/22/2025 1446 by aMinor Abernathy RN  Outcome: Progressing     Problem: Skin/Tissue Integrity  Goal: Skin integrity remains intact  Description: 1.  Monitor for areas of redness and/or skin breakdown2.  Assess vascular access sites hourly3.  Every 4-6 hours minimum:  Change oxygen saturation probe site4.  Every 4-6 hours:  If on nasal continuous positive airway pressure, respiratory therapy assess nares and determine need for appliance change or resting

## 2025-05-22 NOTE — OP NOTE
Endoscopy Note    Patient: Jonelle Contreras  : 1950  Acct#:     Procedure: Esophagogastroduodenoscopy                          Date:  2025     Surgeon:   Parker Rdz MD    Referring Physician:  Candido Bentley MD    Indications: This is a 74 y.o. female  who presents for EGD due to dysphagia and odynophagia.     Postoperative Diagnosis:    -LA grade D esophagitis  -Large volume retained food in the gastric lumen, consistent with gastroparesis    Anesthesia:  MAC    Consent:  The patient or their legal guardian has signed an informed consent, and is aware of the potential risks, benefits, alternatives, and potential complications of this procedure.  These include, but are not limited to hemorrhage, bleeding, post procedural pain, perforation, phlebitis, aspiration, hypotension, hypoxia, cardiovascular events such as arryhthmia, and possibly death.     Description of Procedure: The patient was then taken to the endoscopy suite, placed in the left lateral decubitus position and the above IV sedation was administrered.    The Olympus video endoscope was placed through the patient's oropharynx without difficulty to the extent of the 2nd portion of the duodenum.  Both forward and retroflexed views of the stomach were obtained.      Findings:    Esophagus: The esophagus was notable for LA grade D erosive esophagitis, with diffuse mucosal ulcerations in the distal and midesophagus.  Z-line was slightly irregular, located 37 cm from the incisors without obvious evidence of Chavira's esophagus  Stomach: The stomach was notable for a large amount of food in the gastric lumen, precluding complete mucosal evaluation.  The pyloric channel was widely patent  Duodenum: The first and 2nd portions of the duodenum appeared normal with normal villous pattern    The scope was then withdrawn back into the stomach, it was decompressed, and the scope was completely withdrawn.    The patient tolerated the procedure

## 2025-05-22 NOTE — PROGRESS NOTES
Internal Medicine Hospital Progress Note    Patient:  Jonelle Contreras 74 y.o. female MRN: [id number]     Date of Service: 5/22/2025    Allergy: Iodine, Iodinated contrast media, Wellbutrin [bupropion], Adhesive tape, Sertraline, and Sulfa antibiotics  CC: F/u:   Brief Course   Jonelle Contreras was admitted on 5/12/2025 for Hypertensive urgency. Jonelle Contreras has been admitted for 10 days    24 hr interval hx:  Slept well overnight  EGD today    Objective     Physical Exam:  Vitals: /78   Pulse 88   Temp 97.9 °F (36.6 °C) (Oral)   Resp 19   Ht 1.651 m (5' 5\")   Wt 59 kg (130 lb 1.1 oz)   SpO2 95%   BMI 21.64 kg/m²     General: Alert and oriented X3. No acute distress  Eyes: PEERL. No scleral icterus noted. Normal appearing conjunctiva bilaterally  Ears: Normal TM and external canal bilaterally.  Oral cavity/Throat: No pharyngeal erythema. No oral lesions.  Cardiovascular: Heart is regular rate and rhythm. No murmurs noted. Radial pulses 2+. Posterior tibial pulses 2+. No lower extremity edema noted  Pulmonary: Lungs clear to auscultation bilaterally  Skin: Dry, warm. No obvious skin lesions or rashes noted.  Neuro: PEERL. EOM intact. No facial droop. Normal sensation in bilateral upper and lower extremities. Gait is norm    Pertinent/ New Labs and Imaging Studies   Labs reviewed. Pertinent labs noted in assessment and plan      Assessment and Plan   Jonelle Contreras was admitted to hospital for Hypertensive urgency    Hypertensive urgency  - Nephrology consulted  - Blood pressure has been soft    Abdominal pain  - GI following  - Lactulose ordered for constipation  - Kub today    Sore throat, difficulty swallowing  - EGD planned for 5/22/2025    ESRD on PD  - Nephrology consulted, managing PD orders    Hyperlipidemia  - Continue home statin    Restless leg syndrom  - Continue ropinirole    Anxiety  - Continue home alprazolam    COPD  - Continue albuterol and stiolto    Overactive bladder  - Continue home

## 2025-05-22 NOTE — H&P
Pre-operative History and Physical    Patient: Jonelle Contreras  : 1950  Acct#:     Intended Procedure: EGD    HISTORY OF PRESENT ILLNESS:  The patient is a 74 y.o. female  who presents for EGD due to dysphagia and odynophagia.      Past Medical History:        Diagnosis Date    acute intermittent porphyria     sees Alfredo, mom had it too    agoraphobic     ongoing, prefers to be at home    Allergic rhinitis     Anxiety     Arthritis     Asthma     CAD (coronary artery disease) 2022    acute inf [place] RCA stent successfully placed *[place] other arteries clear    Chronic obstructive pulmonary disease, unspecified COPD type ([facility]) 2025    ckd 4     from HTN,  sees Trini Caroway    collagenous colitis     Mega    COPD (chronic obstructive pulmonary disease) ([facility])     Depression 2013    eversince      Disease of blood and blood forming organ     GERD (gastroesophageal reflux disease)     Heart disease     Hyperlipidemia     Hypertension 2016    Left thyroid nodule     9mm, gets rechecked yearly on ct lung    Migraines     gets 4x per month    Neuropathy     Osteoporosis     PAD (peripheral artery disease) 2022    bilat ptca fem art Ranjith    Peripheral vascular disease     post menopausal 2020    osteopenia frax score under on calcium and D    Prediabetes     Pulmonary nodules     yearly ct lung followed by Dr Fuentes    Restless leg syndrome     STEMI involving oth coronary artery of inferior wall (HCC) 2022    JAYME RCA    Urinary incontinence      Past Surgical History:        Procedure Laterality Date    CARDIAC SURGERY      CHOLECYSTECTOMY      COLONOSCOPY  2017    DR. AYOUB    COLONOSCOPY N/A 2022    COLONOSCOPY WITH BIOPSY performed by Aston Ayoub MD at [facility] ENDOSCOPY    CORONARY ANGIOPLASTY WITH STENT PLACEMENT  10/2013 [place]    HYSTERECTOMY (CERVIX STATUS UNKNOWN)      JAMEL only for ? age 31    HYSTERECTOMY, VAGINAL      UPPER

## 2025-05-23 ENCOUNTER — ANESTHESIA (OUTPATIENT)
Dept: ENDOSCOPY | Age: 75
End: 2025-05-23
Payer: MEDICARE

## 2025-05-23 ENCOUNTER — APPOINTMENT (OUTPATIENT)
Dept: GENERAL RADIOLOGY | Age: 75
End: 2025-05-23
Payer: MEDICARE

## 2025-05-23 ENCOUNTER — ANESTHESIA EVENT (OUTPATIENT)
Dept: ENDOSCOPY | Age: 75
End: 2025-05-23
Payer: MEDICARE

## 2025-05-23 PROBLEM — I95.9 HYPOTENSION: Status: ACTIVE | Noted: 2025-05-23

## 2025-05-23 PROBLEM — N18.6 ESRD ON PERITONEAL DIALYSIS (HCC): Status: ACTIVE | Noted: 2025-05-23

## 2025-05-23 PROBLEM — Z99.2 ESRD ON PERITONEAL DIALYSIS (HCC): Status: ACTIVE | Noted: 2025-05-23

## 2025-05-23 LAB
ALBUMIN SERPL-MCNC: 3.3 G/DL (ref 3.4–5)
ANION GAP SERPL CALCULATED.3IONS-SCNC: 16 MMOL/L (ref 3–16)
APPEARANCE BRONCH: ABNORMAL
BASE EXCESS BLDV CALC-SCNC: -2.9 MMOL/L
BUN SERPL-MCNC: 51 MG/DL (ref 7–20)
CALCIUM SERPL-MCNC: 9.2 MG/DL (ref 8.3–10.6)
CHLORIDE SERPL-SCNC: 101 MMOL/L (ref 99–110)
CLOT SPEC QL: ABNORMAL
CO2 BLDV-SCNC: 25 MMOL/L
CO2 SERPL-SCNC: 21 MMOL/L (ref 21–32)
COHGB MFR BLDV: 1.1 %
COLOR BRONCH: COLORLESS
CREAT SERPL-MCNC: 11.4 MG/DL (ref 0.6–1.2)
GFR SERPLBLD CREATININE-BSD FMLA CKD-EPI: 3 ML/MIN/{1.73_M2}
GLUCOSE SERPL-MCNC: 134 MG/DL (ref 70–99)
HCO3 BLDV-SCNC: 23 MMOL/L (ref 23–29)
LYMPHOCYTES NFR BRONCH MANUAL: 1 % (ref 5–10)
MACROPHAGES NFR BRONCH MANUAL: 2 % (ref 90–95)
METHGB MFR BLDV: 0.5 %
MONONUC CELLS NFR FLD MANUAL: 1 %
NEUTROPHILS NFR BRONCH MANUAL: 96 % (ref 5–10)
NUC CELL # BRONCH MANUAL: 322 /CUMM
O2 THERAPY: ABNORMAL
ORGANISM: ABNORMAL
PATH REV: YES
PCO2 BLDV: 46 MMHG (ref 40–50)
PH BLDV: 7.32 [PH] (ref 7.35–7.45)
PHOSPHATE SERPL-MCNC: 6 MG/DL (ref 2.5–4.9)
PO2 BLDV: 39 MMHG
POTASSIUM SERPL-SCNC: 4.7 MMOL/L (ref 3.5–5.1)
RBC # FLD MANUAL: 9 /CUMM
REPORT: NORMAL
RESP PATH DNA+RNA PNL L RESP NAA+NON-PRB: ABNORMAL
SAO2 % BLDV: 70 %
SODIUM SERPL-SCNC: 138 MMOL/L (ref 136–145)
SPECIMEN VOL FLD: 30 ML
TOTAL CELLS COUNTED BRONCH: 100

## 2025-05-23 PROCEDURE — 2580000003 HC RX 258: Performed by: INTERNAL MEDICINE

## 2025-05-23 PROCEDURE — 2580000003 HC RX 258: Performed by: STUDENT IN AN ORGANIZED HEALTH CARE EDUCATION/TRAINING PROGRAM

## 2025-05-23 PROCEDURE — 6360000002 HC RX W HCPCS: Performed by: STUDENT IN AN ORGANIZED HEALTH CARE EDUCATION/TRAINING PROGRAM

## 2025-05-23 PROCEDURE — 94660 CPAP INITIATION&MGMT: CPT

## 2025-05-23 PROCEDURE — 87633 RESP VIRUS 12-25 TARGETS: CPT

## 2025-05-23 PROCEDURE — 6370000000 HC RX 637 (ALT 250 FOR IP): Performed by: INTERNAL MEDICINE

## 2025-05-23 PROCEDURE — 80069 RENAL FUNCTION PANEL: CPT

## 2025-05-23 PROCEDURE — 2500000003 HC RX 250 WO HCPCS: Performed by: INTERNAL MEDICINE

## 2025-05-23 PROCEDURE — 31645 BRNCHSC W/THER ASPIR 1ST: CPT | Performed by: INTERNAL MEDICINE

## 2025-05-23 PROCEDURE — 7100000011 HC PHASE II RECOVERY - ADDTL 15 MIN: Performed by: INTERNAL MEDICINE

## 2025-05-23 PROCEDURE — 99232 SBSQ HOSP IP/OBS MODERATE 35: CPT | Performed by: STUDENT IN AN ORGANIZED HEALTH CARE EDUCATION/TRAINING PROGRAM

## 2025-05-23 PROCEDURE — 3700000000 HC ANESTHESIA ATTENDED CARE: Performed by: INTERNAL MEDICINE

## 2025-05-23 PROCEDURE — 0B9J8ZX DRAINAGE OF LEFT LOWER LUNG LOBE, VIA NATURAL OR ARTIFICIAL OPENING ENDOSCOPIC, DIAGNOSTIC: ICD-10-PCS | Performed by: INTERNAL MEDICINE

## 2025-05-23 PROCEDURE — 87077 CULTURE AEROBIC IDENTIFY: CPT

## 2025-05-23 PROCEDURE — 94669 MECHANICAL CHEST WALL OSCILL: CPT

## 2025-05-23 PROCEDURE — 87070 CULTURE OTHR SPECIMN AEROBIC: CPT

## 2025-05-23 PROCEDURE — 2709999900 HC NON-CHARGEABLE SUPPLY: Performed by: INTERNAL MEDICINE

## 2025-05-23 PROCEDURE — 5A0935A ASSISTANCE WITH RESPIRATORY VENTILATION, LESS THAN 24 CONSECUTIVE HOURS, HIGH NASAL FLOW/VELOCITY: ICD-10-PCS | Performed by: STUDENT IN AN ORGANIZED HEALTH CARE EDUCATION/TRAINING PROGRAM

## 2025-05-23 PROCEDURE — 0BH17EZ INSERTION OF ENDOTRACHEAL AIRWAY INTO TRACHEA, VIA NATURAL OR ARTIFICIAL OPENING: ICD-10-PCS | Performed by: INTERNAL MEDICINE

## 2025-05-23 PROCEDURE — 90945 DIALYSIS ONE EVALUATION: CPT

## 2025-05-23 PROCEDURE — 94761 N-INVAS EAR/PLS OXIMETRY MLT: CPT

## 2025-05-23 PROCEDURE — 6360000002 HC RX W HCPCS: Performed by: INTERNAL MEDICINE

## 2025-05-23 PROCEDURE — 87102 FUNGUS ISOLATION CULTURE: CPT

## 2025-05-23 PROCEDURE — 2700000000 HC OXYGEN THERAPY PER DAY

## 2025-05-23 PROCEDURE — 94640 AIRWAY INHALATION TREATMENT: CPT

## 2025-05-23 PROCEDURE — 6360000002 HC RX W HCPCS

## 2025-05-23 PROCEDURE — 71045 X-RAY EXAM CHEST 1 VIEW: CPT

## 2025-05-23 PROCEDURE — 2000000000 HC ICU R&B

## 2025-05-23 PROCEDURE — 0B9F8ZX DRAINAGE OF RIGHT LOWER LUNG LOBE, VIA NATURAL OR ARTIFICIAL OPENING ENDOSCOPIC, DIAGNOSTIC: ICD-10-PCS | Performed by: INTERNAL MEDICINE

## 2025-05-23 PROCEDURE — 97530 THERAPEUTIC ACTIVITIES: CPT

## 2025-05-23 PROCEDURE — 2720000010 HC SURG SUPPLY STERILE: Performed by: INTERNAL MEDICINE

## 2025-05-23 PROCEDURE — 36415 COLL VENOUS BLD VENIPUNCTURE: CPT

## 2025-05-23 PROCEDURE — 97110 THERAPEUTIC EXERCISES: CPT

## 2025-05-23 PROCEDURE — 87205 SMEAR GRAM STAIN: CPT

## 2025-05-23 PROCEDURE — 99223 1ST HOSP IP/OBS HIGH 75: CPT | Performed by: INTERNAL MEDICINE

## 2025-05-23 PROCEDURE — 3609010800 HC BRONCHOSCOPY ALVEOLAR LAVAGE: Performed by: INTERNAL MEDICINE

## 2025-05-23 PROCEDURE — 5A09357 ASSISTANCE WITH RESPIRATORY VENTILATION, LESS THAN 24 CONSECUTIVE HOURS, CONTINUOUS POSITIVE AIRWAY PRESSURE: ICD-10-PCS | Performed by: STUDENT IN AN ORGANIZED HEALTH CARE EDUCATION/TRAINING PROGRAM

## 2025-05-23 PROCEDURE — 74018 RADEX ABDOMEN 1 VIEW: CPT

## 2025-05-23 PROCEDURE — 6370000000 HC RX 637 (ALT 250 FOR IP): Performed by: STUDENT IN AN ORGANIZED HEALTH CARE EDUCATION/TRAINING PROGRAM

## 2025-05-23 PROCEDURE — 99233 SBSQ HOSP IP/OBS HIGH 50: CPT | Performed by: INTERNAL MEDICINE

## 2025-05-23 PROCEDURE — 88184 FLOWCYTOMETRY/ TC 1 MARKER: CPT

## 2025-05-23 PROCEDURE — 3700000001 HC ADD 15 MINUTES (ANESTHESIA): Performed by: INTERNAL MEDICINE

## 2025-05-23 PROCEDURE — 82803 BLOOD GASES ANY COMBINATION: CPT

## 2025-05-23 PROCEDURE — 2500000003 HC RX 250 WO HCPCS

## 2025-05-23 PROCEDURE — 31624 DX BRONCHOSCOPE/LAVAGE: CPT | Performed by: INTERNAL MEDICINE

## 2025-05-23 PROCEDURE — 88185 FLOWCYTOMETRY/TC ADD-ON: CPT

## 2025-05-23 PROCEDURE — 89051 BODY FLUID CELL COUNT: CPT

## 2025-05-23 PROCEDURE — 7100000010 HC PHASE II RECOVERY - FIRST 15 MIN: Performed by: INTERNAL MEDICINE

## 2025-05-23 RX ORDER — DEXAMETHASONE SODIUM PHOSPHATE 4 MG/ML
INJECTION, SOLUTION INTRA-ARTICULAR; INTRALESIONAL; INTRAMUSCULAR; INTRAVENOUS; SOFT TISSUE
Status: DISCONTINUED | OUTPATIENT
Start: 2025-05-23 | End: 2025-05-23 | Stop reason: SDUPTHER

## 2025-05-23 RX ORDER — PROPOFOL 10 MG/ML
INJECTION, EMULSION INTRAVENOUS
Status: DISCONTINUED | OUTPATIENT
Start: 2025-05-23 | End: 2025-05-23 | Stop reason: SDUPTHER

## 2025-05-23 RX ORDER — NALOXONE HYDROCHLORIDE 0.4 MG/ML
INJECTION, SOLUTION INTRAMUSCULAR; INTRAVENOUS; SUBCUTANEOUS PRN
Status: DISCONTINUED | OUTPATIENT
Start: 2025-05-23 | End: 2025-05-24 | Stop reason: HOSPADM

## 2025-05-23 RX ORDER — ONDANSETRON 2 MG/ML
4 INJECTION INTRAMUSCULAR; INTRAVENOUS
Status: DISCONTINUED | OUTPATIENT
Start: 2025-05-23 | End: 2025-05-24 | Stop reason: HOSPADM

## 2025-05-23 RX ORDER — DEXTROSE MONOHYDRATE AND SODIUM CHLORIDE 5; .45 G/100ML; G/100ML
INJECTION, SOLUTION INTRAVENOUS CONTINUOUS
Status: DISCONTINUED | OUTPATIENT
Start: 2025-05-23 | End: 2025-05-27

## 2025-05-23 RX ORDER — LIDOCAINE HYDROCHLORIDE 20 MG/ML
INJECTION, SOLUTION EPIDURAL; INFILTRATION; INTRACAUDAL; PERINEURAL
Status: DISCONTINUED | OUTPATIENT
Start: 2025-05-23 | End: 2025-05-23 | Stop reason: SDUPTHER

## 2025-05-23 RX ORDER — ONDANSETRON 2 MG/ML
INJECTION INTRAMUSCULAR; INTRAVENOUS
Status: DISCONTINUED | OUTPATIENT
Start: 2025-05-23 | End: 2025-05-23 | Stop reason: SDUPTHER

## 2025-05-23 RX ORDER — MINERAL OIL
OIL (ML) MISCELLANEOUS PRN
Status: DISCONTINUED | OUTPATIENT
Start: 2025-05-23 | End: 2025-05-23 | Stop reason: ALTCHOICE

## 2025-05-23 RX ORDER — SUCCINYLCHOLINE/SOD CL,ISO/PF 200MG/10ML
SYRINGE (ML) INTRAVENOUS
Status: DISCONTINUED | OUTPATIENT
Start: 2025-05-23 | End: 2025-05-23 | Stop reason: SDUPTHER

## 2025-05-23 RX ORDER — SODIUM CHLORIDE, SODIUM LACTATE, CALCIUM CHLORIDE, MAGNESIUM CHLORIDE AND DEXTROSE 2.5; 538; 448; 18.3; 5.08 G/100ML; MG/100ML; MG/100ML; MG/100ML; MG/100ML
2000 INJECTION, SOLUTION INTRAPERITONEAL
Status: DISCONTINUED | OUTPATIENT
Start: 2025-05-23 | End: 2025-05-24 | Stop reason: SDUPTHER

## 2025-05-23 RX ADMIN — METOCLOPRAMIDE 5 MG: 10 TABLET ORAL at 05:57

## 2025-05-23 RX ADMIN — SODIUM CHLORIDE, PRESERVATIVE FREE 40 MG: 5 INJECTION INTRAVENOUS at 09:26

## 2025-05-23 RX ADMIN — IPRATROPIUM BROMIDE AND ALBUTEROL SULFATE 1 DOSE: .5; 2.5 SOLUTION RESPIRATORY (INHALATION) at 19:29

## 2025-05-23 RX ADMIN — ONDANSETRON 4 MG: 2 INJECTION INTRAMUSCULAR; INTRAVENOUS at 16:03

## 2025-05-23 RX ADMIN — TIOTROPIUM BROMIDE AND OLODATEROL 2 PUFF: 3.124; 2.736 SPRAY, METERED RESPIRATORY (INHALATION) at 07:39

## 2025-05-23 RX ADMIN — DEXAMETHASONE SODIUM PHOSPHATE 8 MG: 4 INJECTION, SOLUTION INTRAMUSCULAR; INTRAVENOUS at 16:03

## 2025-05-23 RX ADMIN — SODIUM CHLORIDE: 9 INJECTION, SOLUTION INTRAVENOUS at 15:55

## 2025-05-23 RX ADMIN — SEVELAMER CARBONATE 1600 MG: 800 TABLET, FILM COATED ORAL at 09:27

## 2025-05-23 RX ADMIN — POLYETHYLENE GLYCOL 3350 17 G: 17 POWDER, FOR SOLUTION ORAL at 09:29

## 2025-05-23 RX ADMIN — PROPOFOL 100 MG: 10 INJECTION, EMULSION INTRAVENOUS at 15:58

## 2025-05-23 RX ADMIN — IPRATROPIUM BROMIDE AND ALBUTEROL SULFATE 1 DOSE: .5; 2.5 SOLUTION RESPIRATORY (INHALATION) at 07:39

## 2025-05-23 RX ADMIN — CLOPIDOGREL BISULFATE 75 MG: 75 TABLET, FILM COATED ORAL at 09:28

## 2025-05-23 RX ADMIN — SODIUM CHLORIDE, SODIUM LACTATE, CALCIUM CHLORIDE, MAGNESIUM CHLORIDE AND DEXTROSE 6000 ML: 2.5; 538; 448; 18.3; 5.08 INJECTION, SOLUTION INTRAPERITONEAL at 23:00

## 2025-05-23 RX ADMIN — HEPARIN SODIUM 5000 UNITS: 5000 INJECTION INTRAVENOUS; SUBCUTANEOUS at 20:20

## 2025-05-23 RX ADMIN — SENNOSIDES AND DOCUSATE SODIUM 2 TABLET: 50; 8.6 TABLET ORAL at 09:27

## 2025-05-23 RX ADMIN — TORSEMIDE 100 MG: 100 TABLET ORAL at 09:28

## 2025-05-23 RX ADMIN — TROSPIUM CHLORIDE 20 MG: 20 TABLET, FILM COATED ORAL at 20:19

## 2025-05-23 RX ADMIN — SODIUM CHLORIDE, PRESERVATIVE FREE 10 ML: 5 INJECTION INTRAVENOUS at 09:28

## 2025-05-23 RX ADMIN — HEPARIN SODIUM 5000 UNITS: 5000 INJECTION INTRAVENOUS; SUBCUTANEOUS at 05:58

## 2025-05-23 RX ADMIN — LIDOCAINE HYDROCHLORIDE 80 MG: 20 INJECTION, SOLUTION EPIDURAL; INFILTRATION; INTRACAUDAL; PERINEURAL at 15:58

## 2025-05-23 RX ADMIN — ALPRAZOLAM 1.5 MG: 0.5 TABLET ORAL at 20:19

## 2025-05-23 RX ADMIN — Medication 120 MG: at 15:58

## 2025-05-23 RX ADMIN — LIDOCAINE HYDROCHLORIDE 100 MG: 20 INJECTION, SOLUTION EPIDURAL; INFILTRATION; INTRACAUDAL; PERINEURAL at 16:09

## 2025-05-23 RX ADMIN — ROPINIROLE HYDROCHLORIDE 1 MG: 1 TABLET, FILM COATED ORAL at 20:19

## 2025-05-23 RX ADMIN — SPIRONOLACTONE 50 MG: 25 TABLET ORAL at 09:28

## 2025-05-23 RX ADMIN — CEFEPIME 1000 MG: 1 INJECTION, POWDER, FOR SOLUTION INTRAMUSCULAR; INTRAVENOUS at 18:25

## 2025-05-23 RX ADMIN — SEVELAMER CARBONATE 1600 MG: 800 TABLET, FILM COATED ORAL at 11:43

## 2025-05-23 RX ADMIN — SODIUM CHLORIDE, PRESERVATIVE FREE 40 MG: 5 INJECTION INTRAVENOUS at 20:20

## 2025-05-23 RX ADMIN — METOCLOPRAMIDE 5 MG: 10 TABLET ORAL at 09:28

## 2025-05-23 RX ADMIN — FLUCONAZOLE 200 MG: 100 TABLET ORAL at 09:27

## 2025-05-23 RX ADMIN — ASPIRIN 81 MG: 81 TABLET, CHEWABLE ORAL at 09:27

## 2025-05-23 RX ADMIN — DEXTROSE AND SODIUM CHLORIDE: 5; .45 INJECTION, SOLUTION INTRAVENOUS at 11:44

## 2025-05-23 RX ADMIN — ROSUVASTATIN CALCIUM 10 MG: 10 TABLET, FILM COATED ORAL at 20:19

## 2025-05-23 RX ADMIN — SODIUM CHLORIDE, SODIUM LACTATE, CALCIUM CHLORIDE, MAGNESIUM CHLORIDE AND DEXTROSE 2000 ML: 2.5; 538; 448; 18.3; 5.08 INJECTION, SOLUTION INTRAPERITONEAL at 23:00

## 2025-05-23 RX ADMIN — TRAMADOL HYDROCHLORIDE 50 MG: 50 TABLET, COATED ORAL at 09:27

## 2025-05-23 RX ADMIN — SODIUM CHLORIDE, PRESERVATIVE FREE 10 ML: 5 INJECTION INTRAVENOUS at 20:28

## 2025-05-23 ASSESSMENT — PAIN SCALES - GENERAL
PAINLEVEL_OUTOF10: 6
PAINLEVEL_OUTOF10: 0
PAINLEVEL_OUTOF10: 0

## 2025-05-23 ASSESSMENT — PAIN DESCRIPTION - ORIENTATION: ORIENTATION: RIGHT;LEFT;MID

## 2025-05-23 ASSESSMENT — ENCOUNTER SYMPTOMS: SHORTNESS OF BREATH: 0

## 2025-05-23 ASSESSMENT — PAIN DESCRIPTION - DESCRIPTORS: DESCRIPTORS: ACHING;CRAMPING

## 2025-05-23 ASSESSMENT — LIFESTYLE VARIABLES: SMOKING_STATUS: 0

## 2025-05-23 ASSESSMENT — PAIN SCALES - WONG BAKER: WONGBAKER_NUMERICALRESPONSE: NO HURT

## 2025-05-23 ASSESSMENT — PAIN DESCRIPTION - LOCATION: LOCATION: ABDOMEN

## 2025-05-23 NOTE — CONSULTS
[facility]    Consult Note      Requesting Physician:  kari    CHIEF COMPLAINT:  abd pain sob confusion      HISTORY OF PRESENT ILLNESS:    Ms. Contreras  is a 74 y.o. female we are seeing in consultation for possible porphyria flare  The pt has been following me for years-at first because of an apparent diagnosis of aip  She has seen me also for iron def and lung ca screening  Most recently she has been battling renal failure and htn  The pt had told me she has porphryia-  When I first met her the history did not seem to always fit this  She never really had severe hosp admissions for abd pain without explanation nor neurologic issues   I tried d5 in my office in the past to see if it helped any non specific symptoms   I never have given hemin  She is in now for resp failure  I have heard she had issues with restraints confusion thsi admit  She states she has severe abd pain     ICD-10-CM    1. ESRD on peritoneal dialysis (HCC)  N18.6     Z99.2       2. Hypertension, unspecified type  I10       3. Hyperkalemia  E87.5       4. Shortness of breath  R06.02 Echo (TTE) complete (PRN contrast/bubble/strain/3D)     Echo (TTE) complete (PRN contrast/bubble/strain/3D)     CANCELED: Echo (TTE) limited (PRN contrast/bubble/strain/3D)     CANCELED: Echo (TTE) limited (PRN contrast/bubble/strain/3D)      5. Uncontrolled hypertension  I10 carvedilol (COREG) 12.5 MG tablet      6. Mucus plugging of bronchi  T17.500A Culture, Fungus     Culture, Fungus     Culture, Respiratory (with Gram Stain)     Culture, Respiratory (with Gram Stain)     Flow cytometry CD4/CD8 BAL     Flow cytometry CD4/CD8 BAL     Cell Count with Differential, BAL Fluid     Cell Count with Differential, BAL Fluid     Pneumonia Panel, Molecular     Pneumonia Panel, Molecular             Past Medical History:  Past Medical History:  tried D5 iv fluids in the office a few times as you are supposed to load with glucose -this had no impact on any symptoms in the past and thus I never did the uptick to hemin    Here she has abd pain and she says she has become delusional   She had an egd showing pathology with ulcers and esophagitis so hard to really say if her abd pain is even porphyria    I can't test for it now because her lack of urine     Weighing this -I can't promise hemin will work   But there is not a lot of harm   Thus will go ahead and order it -  3mg/kg x 4 days-however the hospital may not be able to get it -for a couple of days--they have to prob borrow if another hosp has it -but if not they have to drop ship which wont be till Tuesday   So it may be a few days depending    I can't even order it in [facility]=the pharmacy has to enter it-I called and they are aware that I want to give     Again I dont know if this will havve any impacte on her -as she never met any criteria in the past but there is not a lot of harm in the drug

## 2025-05-23 NOTE — PROGRESS NOTES
Patient ID: Jonelle Contreras  Referring/ Physician: Candido Bentley MD      Summary:   Jonelle Contreras is being seen by nephrology for ESRD and HTN.     Reason for admission: hypertensive urgency       Interval Hx:   Seen at bedside.   /74, BP stronger today  On 4 L oxygen via nasal cannula  Potassium 4.7  Phosphorus down to 6.0 today  Weight 59 kg today.  She is below dry weight.  744 mL UF yesterday  EGD yesterday showed Grade D erosive esophagitis.  Some concern for porphyria. Discussed with Dr. Fuentes.    Assessment/Plan:   - BP  better today. Reduced nifedipine to 30 mg daily.  - Continue PD with 2.5% exchanges.  - Continue Renvela 1600 mg 3 times daily with meals.  - ok to trial low volume D5W infusion until work up for porphyria underway.    ESRD  ESRD secondary to hypertension.  She does peritoneal dialysis at home 1 exchange of 2 L 1.5% solutions daily.  Based on her most recent labs in the outpatient setting her adequacy is not at goal so we are going to be increasing her to 4 exchanges, 2 L at 2.5% solutions.  Will use the cycler while inpatient.  EDW 61.5 kg   - daily BM, on lactulose. Has ileus > GI consulted.    - gent to PD exit site.        Resistant hypertension  The BP has been controlled inpatient on her home regimen, actually low at times suggesting that she may not have been taking her medications as prescribed.   Has had an overall negative secondary work up. Has been extremely difficult to manage outpatient as she reports \"bottoming out and passing out\" when her medications are increased.   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   She has had 24 hr ambulatory BP monitoring showing consistently elevated bP 180s -200s systolic.   Presenting with hypertensive urgency, having ongoing headaches blurry vision and fatigue.  CT head showed no stroke.,  Has chronic microvascular

## 2025-05-23 NOTE — PROGRESS NOTES
4 Eyes Skin Assessment     NAME:  Jonelle Contreras  YOB: 1950  MEDICAL RECORD NUMBER:  [id number]    The patient is being assessed for  Transfer to New Unit    I agree that at least one RN has performed a thorough Head to Toe Skin Assessment on the patient. ALL assessment sites listed below have been assessed.      Areas assessed by both nurses:    Head, Face, Ears, Shoulders, Back, Chest, Arms, Elbows, Hands, Sacrum. Buttock, Coccyx, Ischium, Legs. Feet and Heels, and Under Medical Devices         Does the Patient have a Wound? No noted wound(s)       Hakeem Prevention initiated by RN: Yes  Wound Care Orders initiated by RN: No    Pressure Injury (Stage 3,4, Unstageable, DTI, NWPT, and Complex wounds) if present, place Wound referral order by RN under : No    New Ostomies, if present place, Ostomy referral order under : No     Nurse 1 eSignature: Electronically signed by Jennifer Ha RN on 5/23/25 at 5:02 PM EDT    **SHARE this note so that the co-signing nurse can place an eSignature**    Nurse 2 eSignature: Electronically signed by Ramya Welsh RN on 5/23/25 at 5:03 PM EDT

## 2025-05-23 NOTE — PLAN OF CARE
Problem: Discharge Planning  Goal: Discharge to home or other facility with appropriate resources  5/23/2025 0234 by Yogesh Mondragon RN  Outcome: Progressing     Problem: ABCDS Injury Assessment  Goal: Absence of physical injury  5/23/2025 0234 by Yogesh Mondragon RN  Outcome: Progressing     Problem: Safety - Adult  Goal: Free from fall injury  5/23/2025 0234 by Yogesh Mondragon RN  Outcome: Progressing     Problem: Pain  Goal: Verbalizes/displays adequate comfort level or baseline comfort level  5/23/2025 0234 by Yogesh Mondragon RN  Outcome: Progressing     Problem: Genitourinary - Adult  Goal: Absence of urinary retention  5/23/2025 0234 by Yogesh Mondragon RN  Outcome: Progressing     Problem: Metabolic/Fluid and Electrolytes - Adult  Goal: Electrolytes maintained within normal limits  5/23/2025 0234 by Yogesh Mondragon RN  Outcome: Progressing     Problem: Nutrition Deficit:  Goal: Optimize nutritional status  5/23/2025 0234 by Yogesh Mondragon RN  Outcome: Progressing     Problem: Safety - Medical Restraint  Goal: Remains free of injury from restraints (Restraint for Interference with Medical Device)  Description: INTERVENTIONS:1. Determine that other, less restrictive measures have been tried or would not be effective before applying the restraint2. Evaluate the patient's condition at the time of restraint application3. Inform patient/family regarding the reason for restraint4. Q2H: Monitor safety, psychosocial status, comfort, nutrition and hydration  5/23/2025 0234 by Yogesh Mondragon RN  Outcome: Progressing     Problem: Skin/Tissue Integrity  Goal: Skin integrity remains intact  Description: 1.  Monitor for areas of redness and/or skin breakdown2.  Assess vascular access sites hourly3.  Every 4-6 hours minimum:  Change oxygen saturation probe site4.  Every 4-6 hours:  If on nasal continuous positive airway pressure, respiratory therapy assess nares and determine need for appliance

## 2025-05-23 NOTE — PROGRESS NOTES
Patient been in bed all  day barely able to maneuver without O2 % dropping to the 60's. Tried to work with PT OT but unable due to O2 states dropping soon she she moves. But goes back up to 95% after a couple minutes of rest.   Patient has been requiring 15 liters of O2.   Dr Bentley ordered VBG and Pulmonology consult.

## 2025-05-23 NOTE — PROGRESS NOTES
Internal Medicine Hospital Progress Note    Patient:  Jonelle Contreras 74 y.o. female MRN: [id number]     Date of Service: 5/23/2025    Allergy: Iodine, Iodinated contrast media, Wellbutrin [bupropion], Adhesive tape, Sertraline, and Sulfa antibiotics  CC: F/u:   Brief Course   Jonelle Contreras was admitted on 5/12/2025 for Hypertensive urgency. Jonelle Contreras has been admitted for 11 days    24 hr interval hx:  Slept well overnight  Still not eating well  Did tolerate dialysis overnight  Objective     Physical Exam:  Vitals: /71   Pulse 92   Temp 97.9 °F (36.6 °C) (Oral)   Resp 20   Ht 1.651 m (5' 5\")   Wt 59 kg (130 lb 1.1 oz)   SpO2 (S) (!) 87%   BMI 21.64 kg/m²     General: Alert and oriented X3. No acute distress  Eyes: PEERL. No scleral icterus noted. Normal appearing conjunctiva bilaterally  Ears: Normal TM and external canal bilaterally.  Oral cavity/Throat: No pharyngeal erythema. No oral lesions.  Cardiovascular: Heart is regular rate and rhythm. No murmurs noted. Radial pulses 2+. Posterior tibial pulses 2+. No lower extremity edema noted  Pulmonary: Lungs clear to auscultation bilaterally  Skin: Dry, warm. No obvious skin lesions or rashes noted.  Neuro: PEERL. EOM intact. No facial droop. Normal sensation in bilateral upper and lower extremities. Gait is norm    Pertinent/ New Labs and Imaging Studies   Labs reviewed. Pertinent labs noted in assessment and plan      Assessment and Plan   Jonelle Contreras was admitted to hospital for Hypertensive urgency    Hypertensive urgency  - Nephrology consulted  - Blood pressure has been soft    Abdominal pain  - GI following  - Lactulose ordered for constipation  - Kub today  - Heme/onc consulted, unable to test for acute intermittent porphyria giventhat she does not make urine.     Sore throat, difficulty swallowing  Esophagitis.    ESRD on PD  - Nephrology consulted, managing PD orders    Hyperlipidemia  - Continue home statin    Restless leg syndrom  -

## 2025-05-23 NOTE — PROGRESS NOTES
NAME:  Jonelle Contreras  YOB: 1950  MEDICAL RECORD NUMBER:  [id number]    Shift Summary: Patient transfer to ICU for close monitoring. 83% on RA, placed on BIPAP. Fluids and ABX started. Oriented x4. Pt lethargic.     Family updated: Yes:  Thiago    Rhythm: Normal Sinus Rhythm     Most recent vitals:   Visit Vitals  BP (!) 143/76   Pulse 87   Temp 98 °F (36.7 °C) (Axillary)   Resp 18   Ht 1.651 m (5' 5\")   Wt 63 kg (138 lb 14.2 oz)   SpO2 99%   BMI 23.11 kg/m²           No data found.    No data found.      Respiratory support needed (if any):  - BiPAP   IPAP: 14 cmH20, CPAP/EPAP: 8 cmH2O continuous    Admission weight Weight - Scale: 63.8 kg (140 lb 10.5 oz) (05/12/25 1145)    Today's weight    Wt Readings from Last 1 Encounters:   05/23/25 63 kg (138 lb 14.2 oz)        Enamorado need assessed each shift: N/A - no enamorado present  UOP >30ml/hr: NO - pt transferred to floor at 17:00  Last documented BM (in last 48 hrs):  Patient Vitals for the past 48 hrs:   Last BM (including prior to admit) Stool Occurrence   05/21/25 2330 -- 0   05/22/25 0341 -- 0   05/22/25 0750 05/20/25 --   05/23/25 0028 -- 0                Restraints (in use currently or dc'd in last 12 hrs): No    Order current and documentation up to date? No    Lines/Drains reviewed @ bedside.  Peripheral IV 05/19/25 Right Forearm (Active)   Number of days: 4       Peripheral IV 05/23/25 Right Forearm (Active)   Number of days: 0         Drip rates at handoff:    dextrose 5 % and 0.45 % NaCl 50 mL/hr at 05/23/25 1144    sodium chloride         Lab Data:   CBC:   Recent Labs     05/22/25  0514   WBC 9.4   HGB 11.3*   HCT 33.8*   MCV 86.7        BMP:    Recent Labs     05/22/25  0514 05/23/25  0515    138   K 3.4* 4.7   CO2 20* 21   BUN 54* 51*   CREATININE 10.9* 11.4*     ABG: No results for input(s): \"PHART\", \"XJR7UKN\", \"PO2ART\" in the last 72 hours.    Any consults during the shift? No    Any signed and held orders to be released?

## 2025-05-23 NOTE — PLAN OF CARE
Problem: Discharge Planning  Goal: Discharge to home or other facility with appropriate resources  5/23/2025 1019 by Elizabeth Klein, RN  Outcome: Progressing     Problem: ABCDS Injury Assessment  Goal: Absence of physical injury  5/23/2025 1019 by Elizabeth Klein, RN  Outcome: Progressing     Problem: Safety - Adult  Goal: Free from fall injury  5/23/2025 1019 by Elizabeth Klein, RN  Outcome: Progressing     Problem: Pain  Goal: Verbalizes/displays adequate comfort level or baseline comfort level  5/23/2025 1019 by Elizabeth Klein, RN  Outcome: Progressing

## 2025-05-23 NOTE — OP NOTE
Bronchoscopy Procedure Note    Date of Operation: 5/23/2025    Pre-op Diagnosis: Pre-Op Diagnosis Codes:      * Mucus plugging of bronchi [T17.500A]      Post-op Diagnosis: same      Surgeon: Sven Ryder MD       Anesthesia:    General Anesthesia . Please see separate flow sheet.      Operation:      Description CPT   [] Bronchoscopy Wash w/o BAL 95761   [] Bronchoscopy with Lavage 75269   [] Mucus plug removal - Initial 22993   [] Mucus plug removal - subsequent  27591   [] Bronchoscopy with Endobronchial Biopsy - protected brush 11334   [] Bronchoscopy with Endobronchial Biopsy - forceps 96992   [] Transbronchial Needle Aspiration of Lung nodule x 1 93283   [] Navigational Bronchoscopy 43541   [] Bronchoscopy with Fluoroscopy guided Transbronchial Biopsy - Single lobe - forceps 97183   [] Bronchoscopy with Fluoroscopy guided Transbronchial Biopsy - each additional site - forceps or needle 93628   [] EBUS 1 or 2 lymph nodes 34738   [] EBUS 3 or more lymph nodes 06589   [] Radial probe or EBUS used for diagnostic or therapeutic peripheral lesion(s) 17278   [] Bronchoscopy guided fiducial marker placement  80478   [] Computed tomography guidance for needle placement (eg, biopsy, aspiration, injection, localization device), radiological supervision and interpretation 61431   [] Destruction of tumor or relief of stenosis (cryotherapy) 65732       Procedure(s):  BRONCHOSCOPY ALVEOLAR LAVAGE      Type of Anesthesia General. Please see separate flow sheet.      Estimated Blood Loss: Minimal    Complications: None    Indications and History:  The patient is a 74 y.o. female with  .    Consent: The risks, benefits, complications, treatment options and expected outcomes were discussed with the patient.       EQUIPMENT:     [] 7.5 MHz endobronchial ultrasound bronchoscope with dedicated  -gauge needle.    [] 20 MHz radial probe, endobronchial ultrasound Adult therapeutic video bronchoscope (or specific type)    []

## 2025-05-23 NOTE — ANESTHESIA PRE PROCEDURE
[facility] Department of Anesthesiology  Pre-Anesthesia Evaluation/Consultation       Name:  Jonelle Contreras  : 1950  Age:  74 y.o.                                           MRN:  [id number]  Date: 2025           Surgeon: Surgeon(s):  Sven Ryder MD    Procedure: Procedure(s):  BRONCHOSCOPY ALVEOLAR LAVAGE     Allergies   Allergen Reactions   • Iodine Itching and Nausea And Vomiting   • Iodinated Contrast Media    • Wellbutrin [Bupropion]      Dry mouth   • Adhesive Tape Rash   • Sertraline Other (See Comments)     Severe dry mouth   • Sulfa Antibiotics Itching and Nausea Only     Patient Active Problem List   Diagnosis   • Anxiety   • Panic disorder   • Palpitations   • IBS (irritable bowel syndrome)   • Depression   • Colitis   • PAD (peripheral artery disease)   • CAD (coronary artery disease)   • Uncontrolled hypertension   • Arthritis   • Asthma   • Atrial fibrillation (HCC)   • Gastroesophageal reflux disease   • Seasonal allergic rhinitis   • Urinary incontinence   • Stage 4 chronic kidney disease (HCC)   • Left thyroid nodule   • Tobacco abuse   • ST elevation myocardial infarction involving right coronary artery (HCC)   • Inferior MI (HCC)   • Chronic kidney disease   • Dyslipidemia   • Encounter for tobacco use cessation counseling   • History of intermittent claudication   • Leg pain   • Hyperlipidemia   • post menopausal   • Restless leg syndrome   • Hypertensive urgency   • Chest pain   • Blurred vision, bilateral   • Hypertensive emergency   • Chronic obstructive pulmonary disease, unspecified COPD type (HCC)   • ESRD on peritoneal dialysis (HCC)     Past Medical History:   Diagnosis Date   • acute intermittent porphyria     sees Alfredo, mom had it too   • agoraphobic     ongoing, prefers to be at home   • Allergic rhinitis    • Anxiety    • Arthritis    • Asthma    • CAD (coronary artery disease) 2022    acute inf MI RCA stent successfully placed *Katt lowery

## 2025-05-23 NOTE — PROGRESS NOTES
Comprehensive Nutrition Assessment    Type and Reason for Visit:  Reassess    Nutrition Recommendations/Plan:   Nutrition as medically appropriate, monitor need for tube feeding closely as able with decreased po intake this admission  Modified nutrition supplements to Nepro bid due to elevated phosphorus     Malnutrition Assessment:  Malnutrition Status:  At risk for malnutrition (05/20/25 1241)    Context:  Acute Illness     Findings of the 6 clinical characteristics of malnutrition:  Energy Intake:  50% or less of estimated energy requirements for 5 or more days  Weight Loss:  Unable to assess     Body Fat Loss:  No body fat loss     Muscle Mass Loss:  No muscle mass loss    Fluid Accumulation:  No fluid accumulation     Strength:  Not Performed    Nutrition Assessment:    Follow up:  Patient transferred to ICU post bronch earlier today.  On 15 liters O2 at this time.  Continues on a regular diet with Ensure and Magic cup bid.  History of ESRD, on peritoneal dialysis.  PHos 6.0 this am.  RD discontinued Ensure plus and Magic cup, ordered Nepro bid which is much lower in phosphorus.    Nutrition Related Findings:    Phos up to 6 this am; last BM on 5/20 Wound Type:  (PD insertion site)       Current Nutrition Intake & Therapies:    Average Meal Intake: 26-50%, 1-25%  Average Supplements Intake: Unable to assess  ADULT DIET; Regular  ADULT ORAL NUTRITION SUPPLEMENT; Breakfast, Dinner; Renal Oral Supplement    Anthropometric Measures:  Height: 165.1 cm (5' 5\")  Ideal Body Weight (IBW): 125 lbs (57 kg)    Admission Body Weight: 64 kg (141 lb)  Current Body Weight: 59 kg (130 lb 1.1 oz),   IBW. Weight Source: Standing scale  Current BMI (kg/m2): 21.6                             BMI Categories: Normal Weight (BMI 18.5-24.9)    Estimated Daily Nutrient Needs:  Energy Requirements Based On: Kcal/kg  Weight Used for Energy Requirements: Current  Energy (kcal/day): 4388-2911 (32-35 kcal/59 kg)  Weight Used for Protein

## 2025-05-23 NOTE — PROGRESS NOTES
[facility]   Daily Progress Note      Admit Date:  5/12/2025      Subjective:   Ms. Contreras is a 73yo female with a past medical history significant for ESRD on PD, CAD, PAD, hyperlipidemia and hypertension. She underwent LHC on 10/21/13 resulting in PCI with BMS to mid RCA lesion. A peripheral angiogram was completed on 5/30/14 revealing 50% bilateral SFA stenoses. She was admitted 12/4-12/6/22 for a MI and underwent a left heart catheterization resulting in one JAYME to the RCA. She completed a normal echocardiogram on 12/5/22. She underwent a peripheral 12/19/22 that resulted in a balloon angioplasty to the right SFA. Her dose of carvedilol was previously reduced by Dr. Pagan. She underwent a peripheral 1/10/22 that resulted in a balloon angioplasty to the left SFA. She was admitted 5/12/25 for hypertensive urgency after presenting to dialysis with BP of 230/100's. I was asked to see her for an episode of syncope with chest pain.     Interval History:  Jonelle is in the ICU on Bipap ventilation after her bronchoscopy. Sinus rhythm on telemetry. She is lethargic.     Objective:     /74   Pulse 90   Temp 98.4 °F (36.9 °C) (Axillary)   Resp 20   Ht 1.651 m (5' 5\")   Wt 59 kg (130 lb 1.1 oz)   SpO2 (S) (!) 87%   BMI 21.64 kg/m²      Intake/Output Summary (Last 24 hours) at 5/23/2025 0850  Last data filed at 5/23/2025 0845  Gross per 24 hour   Intake 200 ml   Output 726 ml   Net -526 ml       Physical Exam:  General:  Lethargic NAD  Skin:  Warm and dry  Neck:  JVD<8, no carotid bruits  Chest:  Clear to auscultation, no wheezes/rhonchi/rales  Cardiovascular:  RRR, normal S1/S2, no M/R/G  Abdomen:  Soft, nontender, +bowel sounds  Extremities:  No edema  Pulses: 2+ bilat carotid    2+ bilat radial    2+ bilat femoral        Medications:    pantoprazole (PROTONIX) 40 mg in sodium chloride (PF) 0.9 % 10 mL injection  40 mg IntraVENous Q12H    [Held by provider] NIFEdipine  30 mg Oral Daily

## 2025-05-23 NOTE — CONSULTS
reasonably  achievable.    COMPARISON:  Chest x-ray May 14, 2025 and lung cancer screening April 1, 2025    HISTORY:  ORDERING SYSTEM PROVIDED HISTORY: Hypoxia, cough  TECHNOLOGIST PROVIDED HISTORY:  Reason for exam:->Hypoxia, cough  Reason for Exam: hypoxia, SOB    FINDINGS:  Mediastinum: The heart is normal in size with multivessel coronary artery  calcification.  No adenopathy.  Mild calcified plaque in the thoracic aorta.    Lungs/pleura: Very small right basilar pleural effusion and trace left  pleural effusion with adjacent ground-glass/consolidative opacities in the  right greater than left lung base and to lesser degree right middle lobe and  lingula.  Mild elevation the right hemidiaphragm similar to prior  examination.  Pleuroparenchymal scarring in the lung apices.  No  pneumothorax.  Mild bronchial wall thickening.    Upper Abdomen: Small hiatal hernia.  Renal atrophy.  No acute upper abdominal  abnormality.  Prior cholecystectomy.    Soft Tissues/Bones: No acute osseous abnormality.    Impression  Suspected mild bronchitis with interval development of mild  consolidative/ground-glass opacities in the right greater than left lower  lung fields which could represent mild bibasilar pneumonia, aspiration and or  atelectasis.    Interval development of very small right and trace left pleural effusions.    Other nonacute findings as above.      CTPA: No results found for this or any previous visit.      CXR PA/LAT: Results for orders placed during the hospital encounter of 11/09/23    XR CHEST (2 VW)    Narrative  EXAMINATION:  TWO XRAY VIEWS OF THE CHEST    11/9/2023 4:20 pm    COMPARISON:  None.    HISTORY:  ORDERING SYSTEM PROVIDED HISTORY: chest pain yesterday  TECHNOLOGIST PROVIDED HISTORY:  Reason for exam:->chest pain yesterday  Reason for Exam: cp    FINDINGS:  Normal cardiomediastinal silhouette.  No acute airspace infiltrate.  No  pneumothorax or pleural effusion    Impression  No acute  cardiopulmonary findings      CXR portable: Results for orders placed during the hospital encounter of 05/12/25    XR CHEST PORTABLE    Narrative  EXAMINATION:  ONE XRAY VIEW OF THE CHEST; ONE SUPINE XRAY VIEW(S) OF THE ABDOMEN    5/23/2025 8:36 am    COMPARISON:  None.    HISTORY:  ORDERING SYSTEM PROVIDED HISTORY: Pleural effusion, hypoxia  TECHNOLOGIST PROVIDED HISTORY:  Reason for exam:->Pleural effusion, hypoxia; ORDERING SYSTEM PROVIDED  HISTORY: Abdominal pain  TECHNOLOGIST PROVIDED HISTORY:  Reason for exam:->Abdominal pain    FINDINGS:  Chronic dense consolidation is again noted within the medial left lung base.  The right lung is clear.  Heart size and vascularity are stable.  Trace left  effusion is stable.  There is mild gaseous distention of the proximal colon.  There is no significant small bowel dilatation.  Postop changes of  cholecystectomy are present.  Peritoneal dialysis catheter is coiled within  the left hemipelvis.  There is no organomegaly or suspicious calcification.    Impression  1. Left basilar atelectasis versus pneumonia.  2. Mild ileus.

## 2025-05-23 NOTE — CARE COORDINATION
SW received call from Verito at [facility] advising that they are now PD certified and are able to take PD patients, HOWEVER, they don't accept them on weekends. They are full today and won't have a ready bed until Tuesday.     Insurance is not working on Monday for precerts, etc. They will work with her home unit for PD around whatever supplies she needs. They are aware that family also made a request for her to rehab closer to them in [place]. SW will follow up with nursing tomorrow to see how she is doing and if appropriate and it looks like she's close to discharge readiness we will call family for final facility selection.    Respectfully submitted,    Radha WOLFF, SHAVONNE  [facility]   [phone]    Electronically signed by NEW Golden, SAKINA on 5/23/2025 at 3:55 PM

## 2025-05-23 NOTE — PROGRESS NOTES
Dr Ryder from Pulmology came to bedside. Going to take patient for Bronch.  Consent obtained.   Thiago son informed.

## 2025-05-23 NOTE — ANESTHESIA POSTPROCEDURE EVALUATION
Department of Anesthesiology  Postprocedure Note    Patient: Jonelle Contreras  MRN: [id number]  YOB: 1950  Date of evaluation: 5/23/2025    Procedure Summary       Date: 05/23/25 Room / Location: [address]    Anesthesia Start: 1555 Anesthesia Stop: 1639    Procedure: BRONCHOSCOPY ALVEOLAR LAVAGE Diagnosis:       Mucus plugging of bronchi      (Mucus plugging of bronchi [T17.500A])    Surgeons: Sven Ryder MD Responsible Provider: Dada Holland MD    Anesthesia Type: general ASA Status: 3            Anesthesia Type: No value filed.    Missy Phase I: Missy Score: 8    Missy Phase II:      Anesthesia Post Evaluation    Patient location during evaluation: ICU  Level of consciousness: awake and alert  Airway patency: patent  Nausea & Vomiting: no nausea and no vomiting  Cardiovascular status: blood pressure returned to baseline  Respiratory status: acceptable and BIPAP  Hydration status: euvolemic  Comments: Postoperative Anesthesia Note    Name:    Jonelle Contreras  MRN:      [id number]    Patient Vitals in the past 12 hrs:  05/23/25 1537, BP:(!) 146/71, Pulse:94, Resp:18, SpO2:90 %  05/23/25 1254, BP:(!) 142/73, Temp:99.1 °F (37.3 °C), Temp src:Oral, Pulse:92, Resp:19, SpO2:93 %  05/23/25 1150, Pulse:91, SpO2:(!) 88 %  05/23/25 0957, Resp:16  05/23/25 0915, BP:128/71, Temp:97.9 °F (36.6 °C), Temp src:Oral, Pulse:92, Resp:20  05/23/25 0801, SpO2:(S) (!) 87 %  05/23/25 0740, Pulse:90, Resp:20, SpO2:(!) 75 %     LABS:    CBC  Lab Results       Component                Value               Date/Time                  WBC                      9.4                 05/22/2025 05:14 AM        HGB                      11.3 (L)            05/22/2025 05:14 AM        HCT                      33.8 (L)            05/22/2025 05:14 AM        PLT                      371                 05/22/2025 05:14 AM   RENAL  Lab Results       Component                Value

## 2025-05-23 NOTE — PROGRESS NOTES
INPATIENT CONSULTATION:    IDENTIFYING DATA/REASON FOR CONSULTATION   PATIENT:  Jonelle Contreras  MRN:  [id number]  ADMIT DATE: 5/12/2025  TIME OF EVALUATION: 5/23/2025 7:07 AM  HOSPITAL STAY:   LOS: 11 days   CONSULTING PHYSICIAN: Candido Bentley MD   REASON FOR CONSULTATION: Dysphagia    Subjective:    Patient seen and examined in follow-up. Patient reports ongoing dysphagia and odynophagia however EGD 5/22/2025 was notable for volume of food debris in the gastric lumen.    MEDICATIONS   SCHEDULED:  pantoprazole (PROTONIX) 40 mg in sodium chloride (PF) 0.9 % 10 mL injection, 40 mg, Q12H  [Held by provider] NIFEdipine, 30 mg, Daily  metoclopramide, 5 mg, TID AC  sevelamer, 1,600 mg, TID WC  ipratropium 0.5 mg-albuterol 2.5 mg, 1 Dose, BID RT  fluconazole, 200 mg, Daily  dianeal lo-neal 2.5%, 6,000 mL, Nightly   And  dianeal lo-neal 2.5%, 2,000 mL, Nightly  sennosides-docusate sodium, 2 tablet, Daily  polyethylene glycol, 17 g, BID  torsemide, 100 mg, Daily  sodium chloride flush, 5-40 mL, 2 times per day  heparin (porcine), 5,000 Units, 3 times per day  aspirin, 81 mg, Daily  [Held by provider] carvedilol, 12.5 mg, BID WC  clopidogrel, 75 mg, Daily  [Held by provider] losartan, 100 mg, Nightly  rOPINIRole, 1 mg, Nightly  rosuvastatin, 10 mg, Nightly  trospium, 20 mg, Nightly  tiotropium-olodaterol, 2 puff, Daily  spironolactone, 50 mg, Daily      FLUIDS/DRIPS:     sodium chloride       PRNs: ALPRAZolam, 1.5 mg, Q12H PRN  lactulose, 30 g, Q4H PRN  traMADol, 50 mg, Q12H PRN  magic (miracle) mouthwash, 5 mL, 4x Daily PRN  benzocaine-menthol, 1 lozenge, Q2H PRN  sodium chloride flush, 5-40 mL, PRN  sodium chloride, , PRN  ondansetron, 4 mg, Q8H PRN   Or  ondansetron, 4 mg, Q6H PRN  acetaminophen, 650 mg, Q6H PRN   Or  acetaminophen, 650 mg, Q6H PRN  albuterol sulfate HFA, 2 puff, Q6H PRN  tiZANidine, 4 mg, Q6H PRN  gentamicin, , Daily PRN      ALLERGIES:    Allergies   Allergen Reactions    Iodine Itching and Nausea And  greater than left lower   lung fields which could represent mild bibasilar pneumonia, aspiration and or   atelectasis.      Interval development of very small right and trace left pleural effusions.      Other nonacute findings as above.         XR CHEST PORTABLE   Final Result   Trace bilateral pleural effusions with atelectasis.         CT HEAD WO CONTRAST   Final Result   No acute intracranial abnormality.            ASSESSMENT AND RECOMMENDATIONS   Jonelle Contreras is a 74 y.o. female with a past medical history of CAD with history of PCI on Plavix, COPD, hypertension, hyperlipidemia, peripheral vascular disease, migraines, anxiety, depression, CKD who presented to [facility] 5/12/2025.    Dysphagia: Patient with dysphagia to solids and liquids, likely due to Candida esophagitis and reflux esophagitis as seen on EGD 5/22/2025.  Recommend speech therapy evaluation for oropharyngeal dysphagia.  Odynophagia: Given reported thrush prior to admission, and odynophagia, concern for Candida esophagitis.  Continue fluconazole.   Erosive esophagitis: Seen on EGD 5/22/2025.  Recommend IV PPI twice daily, transition to p.o. twice daily at discharge  Presumed gastroparesis: Patient with large amount of food in the gastric lumen on EGD 5/22/2025.  I discussed with patient and patient's family about starting Reglan.  Will start Reglan 5 mg and assess response.  Patient may benefit from outpatient gastric emptying study  CAD with h/o PCI on Plavix: Okay to resume Plavix from GI standpoint  Bronchitis versus community-acquired pneumonia: On azithromycin and ceftriaxone.  Will defer management to primary team.  ESRD on PD  Porphyria: Per patient's family, patient with history of porphyria  COPD  CKD    RECOMMENDATIONS:    - IV pantoprazole twice daily while inpatient, transitioning to p.o. twice daily at discharge  - Complete fluconazole  - Reglan  - Consider outpatient gastric emptying study  - Okay to resume Plavix  -

## 2025-05-23 NOTE — PROGRESS NOTES
[facility] - Physical Therapy   Phone: [phone]    Physical Therapy  Facility/Department:[address] PROGRESSIVE CARE    [] Initial Evaluation            [x] Daily Treatment Note         [] Discharge Summary      Patient: Jonelle Contreras   : 1950   MRN: [id number]   Date of Service:  2025  Staff Mobility Recommendation: stedy x1  AM-PAC score: 14/24  Discharge Recommendations: SNF    Jonelle Contreras scored a 14/24 on the AM-PAC short mobility form. Current research shows that an AM-PAC score of 17 or less is typically not associated with a discharge to the patient's home setting. Based on the patient's AM-PAC score and their current functional mobility deficits, it is recommended that the patient have 3-5 sessions per week of Physical Therapy at d/c to increase the patient's independence.  Please see assessment section for further patient specific details.    If patient discharges prior to next session this note will serve as a discharge summary.  Please see below for the latest assessment towards goals.      Admitting Diagnosis: Hypertensive urgency  Ordering Physician: Dr. Bentley  Current Admission Summary: 75 y/o female admit 2025 with HTN Urgency,  PMH as noted including COPD, CKD (PD pt), PAD, NSTEMI.     Past Medical History:  has a past medical history of acute intermittent porphyria, agoraphobic, Allergic rhinitis, Anxiety, Arthritis, Asthma, CAD (coronary artery disease), Chronic obstructive pulmonary disease, unspecified COPD type (HCC), ckd 4, collagenous colitis, COPD (chronic obstructive pulmonary disease) (HCC), Depression, Disease of blood and blood forming organ, GERD (gastroesophageal reflux disease), Heart disease, Hyperlipidemia, Hypertension, Left thyroid nodule, Migraines, Neuropathy, Osteoporosis, PAD (peripheral artery disease), Peripheral vascular disease, post menopausal, Prediabetes, Pulmonary nodules, Restless leg syndrome, STEMI involving oth coronary artery of

## 2025-05-23 NOTE — PROGRESS NOTES
05/23/25 0801   Oxygen Therapy/Pulse Ox   O2 Therapy (S)  Oxygen humidified   O2 Device (S)  High flow nasal cannula   O2 Flow Rate (L/min) (S)  15 L/min   SpO2 (!) (S)  87 %

## 2025-05-23 NOTE — CARE COORDINATION
COSTA left message for Verito in admissions at [facility] regarding PD training and certification.     COSTA spoke to Kaye at [facility] and she stated that if patient uses the cycler they would want it on site before patient arrives. They would also want the company who provides the home supplies to also supply them to the facility.     SW reviewed chart and she was last breathing at 15L so not ready for discharge today but she just went down for a bronch. If we can get her oxygen sats down we will reach out to family to see which facility they really want so we can start procedure with them.     Chart review reveals that patient's home PD program is [facility]. Their phone number is [phone]. Address is [address] [place] [address].     Respectfully submitted,    Radha WOLFF, LISW-S  [facility]   [phone]    Electronically signed by NEW Golden, SAKINA on 5/23/2025 at 3:32 PM

## 2025-05-23 NOTE — PROGRESS NOTES
[facility] - Occupational Therapy   Phone: [phone]    Occupational Therapy  Facility/Department:[address] PROGRESSIVE CARE    [] Initial Evaluation            [x] Daily Treatment Note         [] Discharge Summary      Patient: Jonelle Contreras   : 1950   MRN: [id number]   Date of Service:  2025    Staff Mobility Recommendation: STEDY x1-2     AM-PAC Score:   Discharge Recommendations: SNF, 3-5x/week    Admitting Diagnosis:  Hypertensive urgency  Ordering Physician: Candido Bentley MD   Current Admission Summary: \"74-year-old lady with a past medical history significant for ESRD on PD, hypertension and CAD who presented for evaluation of hyperkalemia and hypertension. She had been at baseline health yesterday and referred to the emergency department because of hyperkalemia.  Her blood pressure has been elevated she is struggled with control outpatient.  She has had ambulatory blood pressure monitoring with systolic often in excess of 180.  She reports adherence with her home medication regimen.  She is not on any NSAIDs or decongestants.  She reports a headache today.  No chest pain or shortness of breath.  She does not report any fever or chills.\"    Past Medical History:  has a past medical history of acute intermittent porphyria, agoraphobic, Allergic rhinitis, Anxiety, Arthritis, Asthma, CAD (coronary artery disease), Chronic obstructive pulmonary disease, unspecified COPD type ([facility]), ckd 4, collagenous colitis, COPD (chronic obstructive pulmonary disease) ([facility]), Depression, Disease of blood and blood forming organ, GERD (gastroesophageal reflux disease), Heart disease, Hyperlipidemia, Hypertension, Left thyroid nodule, Migraines, Neuropathy, Osteoporosis, PAD (peripheral artery disease), Peripheral vascular disease, post menopausal, Prediabetes, Pulmonary nodules, Restless leg syndrome, STEMI involving oth coronary artery of inferior wall ([facility]), and Urinary incontinence.  Past Surgical  Home: House  Home Layout: Multi-level, Bed/Bath upstairs, Work area in basement  Home Access: Stairs to enter with rails  Entrance Stairs - Number of Steps: 5  Entrance Stairs - Rails: Left  Bathroom Shower/Tub: Tub only  Bathroom Toilet: Handicap height  Bathroom Equipment: None  Bathroom Accessibility: Accessible  Home Equipment:  (Walker (3 wheel).)  Has the patient had two or more falls in the past year or any fall with injury in the past year?: Yes  Receives Help From: Neighbor  Prior Level of Assist for ADLs: Independent  Prior Level of Assist for Homemaking: Needs assistance (Family/neighbor  assists as needed.)  Prior Level of Assist for Ambulation: Independent household ambulator, with or without device, Independent community ambulator, with or without device (Walker prn.)  Prior Level of Assist for Transfers: Independent  Active : Yes  Mode of Transportation: Car  Occupation: Retired  Type of Occupation: Manager for [employer]  Leisure & Hobbies: Enjoys taking walks.    Available Assistance at Discharge: available PRN    Examination   Vision:   Vision: Within Functional Limits     Hearing:   Hearing: Within functional limits     ROM:   (B) UE AROM WFL  Strength:   (B) UE strength grossly WFL    Therapist Clinical Decision Making (Complexity): medium complexity       Activities of Daily Living  Basic Activities of Daily Living  General Comments: Does not complete this date due to medical limitations.     Instrumental Activities of Daily Living  No IADL completed on this date.    Functional Mobility  Bed Mobility:  Supine to Sit: contact guard assistance, use of bed rail, head of bed elevated, cues for hand placement  Sit to Supine: minimal assistance  Scooting: minimal assistance  Comments: Pt able to assist with scooting up toward the HOB while seated.  Transfers:  No transfers completed on this date secondary to orthostatic hypotension.  Comments: pt's oxygen sat's dropped to 68% with sitting

## 2025-05-24 ENCOUNTER — APPOINTMENT (OUTPATIENT)
Dept: GENERAL RADIOLOGY | Age: 75
End: 2025-05-24
Payer: MEDICARE

## 2025-05-24 PROBLEM — B37.81 CANDIDA ESOPHAGITIS (HCC): Status: ACTIVE | Noted: 2025-05-24

## 2025-05-24 PROBLEM — E87.5 HYPERKALEMIA: Status: ACTIVE | Noted: 2025-05-24

## 2025-05-24 PROBLEM — R06.02 SHORTNESS OF BREATH: Status: ACTIVE | Noted: 2025-05-24

## 2025-05-24 PROBLEM — N18.6 ESRD ON PERITONEAL DIALYSIS (HCC): Status: ACTIVE | Noted: 2025-05-24

## 2025-05-24 PROBLEM — J96.01 ACUTE HYPOXIC RESPIRATORY FAILURE (HCC): Status: ACTIVE | Noted: 2025-05-24

## 2025-05-24 PROBLEM — K31.84 GASTROPARESIS: Status: ACTIVE | Noted: 2025-05-24

## 2025-05-24 PROBLEM — T17.500A MUCUS PLUGGING OF BRONCHI: Status: ACTIVE | Noted: 2025-05-24

## 2025-05-24 PROBLEM — Z99.2 ESRD ON PERITONEAL DIALYSIS (HCC): Status: ACTIVE | Noted: 2025-05-24

## 2025-05-24 PROBLEM — J14 PNEUMONIA DUE TO HAEMOPHILUS INFLUENZAE: Status: ACTIVE | Noted: 2025-05-24

## 2025-05-24 LAB
ALBUMIN SERPL-MCNC: 3.3 G/DL (ref 3.4–5)
ANION GAP SERPL CALCULATED.3IONS-SCNC: 15 MMOL/L (ref 3–16)
BASOPHILS # BLD: 0 K/UL (ref 0–0.2)
BASOPHILS NFR BLD: 0.1 %
BUN SERPL-MCNC: 49 MG/DL (ref 7–20)
CALCIUM SERPL-MCNC: 9.6 MG/DL (ref 8.3–10.6)
CHLORIDE SERPL-SCNC: 100 MMOL/L (ref 99–110)
CO2 SERPL-SCNC: 19 MMOL/L (ref 21–32)
CREAT SERPL-MCNC: 11.1 MG/DL (ref 0.6–1.2)
DEPRECATED RDW RBC AUTO: 16.3 % (ref 12.4–15.4)
EOSINOPHIL # BLD: 0 K/UL (ref 0–0.6)
EOSINOPHIL NFR BLD: 0 %
GFR SERPLBLD CREATININE-BSD FMLA CKD-EPI: 3 ML/MIN/{1.73_M2}
GLUCOSE SERPL-MCNC: 185 MG/DL (ref 70–99)
HCT VFR BLD AUTO: 35.8 % (ref 36–48)
HGB BLD-MCNC: 11.8 G/DL (ref 12–16)
LOEFFLER MB STN SPEC: NORMAL
LYMPHOCYTES # BLD: 0.4 K/UL (ref 1–5.1)
LYMPHOCYTES NFR BLD: 3.2 %
MCH RBC QN AUTO: 28.6 PG (ref 26–34)
MCHC RBC AUTO-ENTMCNC: 33.1 G/DL (ref 31–36)
MCV RBC AUTO: 86.5 FL (ref 80–100)
MONOCYTES # BLD: 0.3 K/UL (ref 0–1.3)
MONOCYTES NFR BLD: 2 %
NEUTROPHILS # BLD: 12.6 K/UL (ref 1.7–7.7)
NEUTROPHILS NFR BLD: 94.7 %
PHOSPHATE SERPL-MCNC: 4.4 MG/DL (ref 2.5–4.9)
PLATELET # BLD AUTO: 341 K/UL (ref 135–450)
PMV BLD AUTO: 9.3 FL (ref 5–10.5)
POTASSIUM SERPL-SCNC: 4.8 MMOL/L (ref 3.5–5.1)
RBC # BLD AUTO: 4.14 M/UL (ref 4–5.2)
SODIUM SERPL-SCNC: 134 MMOL/L (ref 136–145)
WBC # BLD AUTO: 13.3 K/UL (ref 4–11)

## 2025-05-24 PROCEDURE — 2060000000 HC ICU INTERMEDIATE R&B

## 2025-05-24 PROCEDURE — 6360000002 HC RX W HCPCS: Performed by: INTERNAL MEDICINE

## 2025-05-24 PROCEDURE — 99233 SBSQ HOSP IP/OBS HIGH 50: CPT | Performed by: INTERNAL MEDICINE

## 2025-05-24 PROCEDURE — 2580000003 HC RX 258: Performed by: INTERNAL MEDICINE

## 2025-05-24 PROCEDURE — 6370000000 HC RX 637 (ALT 250 FOR IP): Performed by: INTERNAL MEDICINE

## 2025-05-24 PROCEDURE — 90945 DIALYSIS ONE EVALUATION: CPT

## 2025-05-24 PROCEDURE — 2700000000 HC OXYGEN THERAPY PER DAY

## 2025-05-24 PROCEDURE — 6370000000 HC RX 637 (ALT 250 FOR IP): Performed by: STUDENT IN AN ORGANIZED HEALTH CARE EDUCATION/TRAINING PROGRAM

## 2025-05-24 PROCEDURE — 36415 COLL VENOUS BLD VENIPUNCTURE: CPT

## 2025-05-24 PROCEDURE — 94640 AIRWAY INHALATION TREATMENT: CPT

## 2025-05-24 PROCEDURE — 2500000003 HC RX 250 WO HCPCS: Performed by: INTERNAL MEDICINE

## 2025-05-24 PROCEDURE — 85025 COMPLETE CBC W/AUTO DIFF WBC: CPT

## 2025-05-24 PROCEDURE — 94669 MECHANICAL CHEST WALL OSCILL: CPT

## 2025-05-24 PROCEDURE — 94761 N-INVAS EAR/PLS OXIMETRY MLT: CPT

## 2025-05-24 PROCEDURE — 71045 X-RAY EXAM CHEST 1 VIEW: CPT

## 2025-05-24 PROCEDURE — 80069 RENAL FUNCTION PANEL: CPT

## 2025-05-24 RX ORDER — SEVELAMER CARBONATE 800 MG/1
800 TABLET, FILM COATED ORAL
Status: DISCONTINUED | OUTPATIENT
Start: 2025-05-24 | End: 2025-05-30 | Stop reason: HOSPADM

## 2025-05-24 RX ORDER — GUAIFENESIN 600 MG/1
1200 TABLET, EXTENDED RELEASE ORAL 2 TIMES DAILY
Status: DISCONTINUED | OUTPATIENT
Start: 2025-05-24 | End: 2025-05-30 | Stop reason: HOSPADM

## 2025-05-24 RX ORDER — SODIUM CHLORIDE FOR INHALATION 3 %
4 VIAL, NEBULIZER (ML) INHALATION
Status: DISCONTINUED | OUTPATIENT
Start: 2025-05-24 | End: 2025-05-30 | Stop reason: HOSPADM

## 2025-05-24 RX ADMIN — TRAMADOL HYDROCHLORIDE 50 MG: 50 TABLET, COATED ORAL at 02:51

## 2025-05-24 RX ADMIN — FLUCONAZOLE 200 MG: 100 TABLET ORAL at 09:22

## 2025-05-24 RX ADMIN — SODIUM CHLORIDE, SODIUM LACTATE, CALCIUM CHLORIDE, MAGNESIUM CHLORIDE AND DEXTROSE 6000 ML: 2.5; 538; 448; 18.3; 5.08 INJECTION, SOLUTION INTRAPERITONEAL at 21:14

## 2025-05-24 RX ADMIN — CARVEDILOL 12.5 MG: 12.5 TABLET, FILM COATED ORAL at 16:30

## 2025-05-24 RX ADMIN — SEVELAMER CARBONATE 1600 MG: 800 TABLET, FILM COATED ORAL at 09:21

## 2025-05-24 RX ADMIN — SEVELAMER CARBONATE 800 MG: 800 TABLET, FILM COATED ORAL at 13:06

## 2025-05-24 RX ADMIN — IPRATROPIUM BROMIDE AND ALBUTEROL SULFATE 1 DOSE: .5; 2.5 SOLUTION RESPIRATORY (INHALATION) at 08:08

## 2025-05-24 RX ADMIN — Medication 4 ML: at 11:40

## 2025-05-24 RX ADMIN — CARVEDILOL 12.5 MG: 12.5 TABLET, FILM COATED ORAL at 09:24

## 2025-05-24 RX ADMIN — IPRATROPIUM BROMIDE AND ALBUTEROL SULFATE 1 DOSE: .5; 2.5 SOLUTION RESPIRATORY (INHALATION) at 20:26

## 2025-05-24 RX ADMIN — TROSPIUM CHLORIDE 20 MG: 20 TABLET, FILM COATED ORAL at 20:17

## 2025-05-24 RX ADMIN — SODIUM CHLORIDE, PRESERVATIVE FREE 10 ML: 5 INJECTION INTRAVENOUS at 09:25

## 2025-05-24 RX ADMIN — ASPIRIN 81 MG: 81 TABLET, CHEWABLE ORAL at 09:23

## 2025-05-24 RX ADMIN — SODIUM CHLORIDE, PRESERVATIVE FREE 10 ML: 5 INJECTION INTRAVENOUS at 20:29

## 2025-05-24 RX ADMIN — GUAIFENESIN 1200 MG: 600 TABLET, EXTENDED RELEASE ORAL at 09:22

## 2025-05-24 RX ADMIN — ROSUVASTATIN CALCIUM 10 MG: 10 TABLET, FILM COATED ORAL at 20:17

## 2025-05-24 RX ADMIN — METOCLOPRAMIDE 5 MG: 10 TABLET ORAL at 09:21

## 2025-05-24 RX ADMIN — SPIRONOLACTONE 50 MG: 25 TABLET ORAL at 09:21

## 2025-05-24 RX ADMIN — HEPARIN SODIUM 5000 UNITS: 5000 INJECTION INTRAVENOUS; SUBCUTANEOUS at 16:31

## 2025-05-24 RX ADMIN — GUAIFENESIN 1200 MG: 600 TABLET, EXTENDED RELEASE ORAL at 20:17

## 2025-05-24 RX ADMIN — METOCLOPRAMIDE 5 MG: 10 TABLET ORAL at 16:30

## 2025-05-24 RX ADMIN — TORSEMIDE 100 MG: 100 TABLET ORAL at 09:24

## 2025-05-24 RX ADMIN — METOCLOPRAMIDE 5 MG: 10 TABLET ORAL at 06:07

## 2025-05-24 RX ADMIN — SODIUM CHLORIDE, PRESERVATIVE FREE 40 MG: 5 INJECTION INTRAVENOUS at 20:17

## 2025-05-24 RX ADMIN — ALPRAZOLAM 1.5 MG: 0.5 TABLET ORAL at 21:25

## 2025-05-24 RX ADMIN — Medication 1 LOZENGE: at 02:58

## 2025-05-24 RX ADMIN — CEFTRIAXONE SODIUM 2000 MG: 2 INJECTION, POWDER, FOR SOLUTION INTRAMUSCULAR; INTRAVENOUS at 11:39

## 2025-05-24 RX ADMIN — SEVELAMER CARBONATE 800 MG: 800 TABLET, FILM COATED ORAL at 16:30

## 2025-05-24 RX ADMIN — SODIUM CHLORIDE, SODIUM LACTATE, CALCIUM CHLORIDE, MAGNESIUM CHLORIDE AND DEXTROSE 2000 ML: 2.5; 538; 448; 18.3; 5.08 INJECTION, SOLUTION INTRAPERITONEAL at 21:14

## 2025-05-24 RX ADMIN — HEPARIN SODIUM 5000 UNITS: 5000 INJECTION INTRAVENOUS; SUBCUTANEOUS at 06:07

## 2025-05-24 RX ADMIN — HEPARIN SODIUM 5000 UNITS: 5000 INJECTION INTRAVENOUS; SUBCUTANEOUS at 21:25

## 2025-05-24 RX ADMIN — SODIUM CHLORIDE, PRESERVATIVE FREE 40 MG: 5 INJECTION INTRAVENOUS at 09:24

## 2025-05-24 RX ADMIN — CLOPIDOGREL BISULFATE 75 MG: 75 TABLET, FILM COATED ORAL at 09:21

## 2025-05-24 RX ADMIN — ROPINIROLE HYDROCHLORIDE 1 MG: 1 TABLET, FILM COATED ORAL at 20:17

## 2025-05-24 RX ADMIN — SENNOSIDES AND DOCUSATE SODIUM 2 TABLET: 50; 8.6 TABLET ORAL at 09:21

## 2025-05-24 RX ADMIN — Medication 4 ML: at 20:26

## 2025-05-24 RX ADMIN — TIOTROPIUM BROMIDE AND OLODATEROL 2 PUFF: 3.124; 2.736 SPRAY, METERED RESPIRATORY (INHALATION) at 08:08

## 2025-05-24 RX ADMIN — TRAMADOL HYDROCHLORIDE 50 MG: 50 TABLET, COATED ORAL at 21:43

## 2025-05-24 RX ADMIN — DEXTROSE AND SODIUM CHLORIDE: 5; .45 INJECTION, SOLUTION INTRAVENOUS at 02:59

## 2025-05-24 RX ADMIN — POLYETHYLENE GLYCOL 3350 17 G: 17 POWDER, FOR SOLUTION ORAL at 09:24

## 2025-05-24 ASSESSMENT — PAIN DESCRIPTION - FREQUENCY: FREQUENCY: CONTINUOUS

## 2025-05-24 ASSESSMENT — PAIN DESCRIPTION - ORIENTATION
ORIENTATION: RIGHT;LEFT;MID;POSTERIOR
ORIENTATION: RIGHT;LEFT;MID;POSTERIOR

## 2025-05-24 ASSESSMENT — PAIN DESCRIPTION - PAIN TYPE: TYPE: ACUTE PAIN

## 2025-05-24 ASSESSMENT — PAIN SCALES - GENERAL
PAINLEVEL_OUTOF10: 6
PAINLEVEL_OUTOF10: 5
PAINLEVEL_OUTOF10: 5
PAINLEVEL_OUTOF10: 0
PAINLEVEL_OUTOF10: 9
PAINLEVEL_OUTOF10: 6
PAINLEVEL_OUTOF10: 7

## 2025-05-24 ASSESSMENT — PAIN DESCRIPTION - LOCATION
LOCATION: GENERALIZED

## 2025-05-24 ASSESSMENT — PAIN DESCRIPTION - DESCRIPTORS
DESCRIPTORS: ACHING;DULL
DESCRIPTORS: ACHING;DULL

## 2025-05-24 ASSESSMENT — PAIN DESCRIPTION - ONSET: ONSET: ON-GOING

## 2025-05-24 NOTE — PROGRESS NOTES
Patient ID: Jonelle Contreras  Referring/ Physician: Candido Bentley MD      Summary:   Jonelle Contreras is being seen by nephrology for ESRD and HTN.     Reason for admission: hypertensive urgency       Interval Hx:   Seen at bedside.   /80  Transferred to the ICU after bronchoscopy yesterday due to hypoxia.  Extensive mucus plugging on bronchoscopy.  On 12 LPM O2 today  Potassium 4.8  Phos 4.4  Weight 66 kg. Bed weight  Some concern for porphyria, on D5W+half NS at 50 mL/h    Assessment/Plan:   - BP  better today acceptable.  - Continue PD with 2.5% exchanges.  - reduce renvela to 800 mg TID with meals.    ESRD  ESRD secondary to hypertension.  She does peritoneal dialysis at home 1 exchange of 2 L 1.5% solutions daily.  Based on her most recent labs in the outpatient setting her adequacy is not at goal so we are going to be increasing her to 4 exchanges, 2 L at 2.5% solutions.  Will use the cycler while inpatient.  EDW 61.5 kg   - daily BM, on lactulose. Has ileus > GI consulted.    - gent to PD exit site.        Resistant hypertension  The BP has been controlled inpatient on her home regimen, actually low at times suggesting that she may not have been taking her medications as prescribed.   Has had an overall negative secondary work up. Has been extremely difficult to manage outpatient as she reports \"bottoming out and passing out\" when her medications are increased.   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   She has had 24 hr ambulatory BP monitoring showing consistently elevated bP 180s -200s systolic.   Presenting with hypertensive urgency, having ongoing headaches blurry vision and fatigue.  CT head showed no stroke.,  Has chronic microvascular changes.  Troponin 66.no ECG changes.   Her home regimen : nifedpine 30 mg , losartan 100 mg aldactone 50 mg , coreg 12.5 mg BID. Clonidine 0./1 tID for SBP >

## 2025-05-24 NOTE — PLAN OF CARE
RN  Outcome: Progressing  Flowsheets (Taken 5/24/2025 0800)  Verbalizes/displays adequate comfort level or baseline comfort level:   Encourage patient to monitor pain and request assistance   Assess pain using appropriate pain scale   Administer analgesics based on type and severity of pain and evaluate response   Implement non-pharmacological measures as appropriate and evaluate response   Consider cultural and social influences on pain and pain management   Notify Licensed Independent Practitioner if interventions unsuccessful or patient reports new pain  5/24/2025 0229 by Josephine Quesada RN  Outcome: Progressing  Flowsheets  Taken 5/23/2025 2000 by Josephine Quesada RN  Verbalizes/displays adequate comfort level or baseline comfort level:   Encourage patient to monitor pain and request assistance   Assess pain using appropriate pain scale  Taken 5/23/2025 1700 by Jennifer Ha RN  Verbalizes/displays adequate comfort level or baseline comfort level: Encourage patient to monitor pain and request assistance     Problem: Genitourinary - Adult  Goal: Absence of urinary retention  5/24/2025 1200 by Marilia Gunn RN  Outcome: Progressing  Flowsheets (Taken 5/24/2025 0800)  Absence of urinary retention:   Assess patient’s ability to void and empty bladder   Monitor intake/output and perform bladder scan as needed   Place urinary catheter per Licensed Independent Practitioner order if needed   Discuss catheterization for long term situations as appropriate   Discuss with Licensed Independent Practitioner  medications to alleviate retention as needed  5/24/2025 0229 by Josephine Quesada RN  Outcome: Progressing  Flowsheets (Taken 5/24/2025 0000)  Absence of urinary retention: Assess patient’s ability to void and empty bladder     Problem: Metabolic/Fluid and Electrolytes - Adult  Goal: Electrolytes maintained within normal limits  5/24/2025 1200 by Marilia Gunn RN  Outcome: Progressing  Flowsheets  stability and activity tolerance for standing, transferring and ambulating with or without assistive devices     Problem: Gastrointestinal - Adult  Goal: Minimal or absence of nausea and vomiting  5/24/2025 1200 by Marilia Gunn RN  Outcome: Progressing  Flowsheets (Taken 5/24/2025 0800)  Minimal or absence of nausea and vomiting:   Administer IV fluids as ordered to ensure adequate hydration   Nasogastric tube to low intermittent suction as ordered   Administer ordered antiemetic medications as needed   Provide nonpharmacologic comfort measures as appropriate  5/24/2025 0229 by Josephine Quesada RN  Outcome: Progressing  Flowsheets (Taken 5/24/2025 0000)  Minimal or absence of nausea and vomiting: Administer IV fluids as ordered to ensure adequate hydration     Problem: Infection - Adult  Goal: Absence of infection at discharge  5/24/2025 1200 by Marilia Gunn RN  Outcome: Progressing  Flowsheets (Taken 5/24/2025 0800)  Absence of infection at discharge:   Assess and monitor for signs and symptoms of infection   Monitor all insertion sites i.e., indwelling lines, tubes and drains   Monitor endotracheal (as able) and nasal secretions for changes in amount and color   [place] appropriate cooling/warming therapies per order   Administer medications as ordered  5/24/2025 0229 by Josephine Quesada RN  Outcome: Progressing  Flowsheets (Taken 5/24/2025 0000)  Absence of infection at discharge:   Assess and monitor for signs and symptoms of infection   Monitor lab/diagnostic results   Monitor all insertion sites i.e., indwelling lines, tubes and drains

## 2025-05-24 NOTE — PROGRESS NOTES
Occupational Therapy      Pt transferred to ICU following bronch on 5/23/2025. Please re-order therapy when pt medically stable to participate.    Electronically signed by Yanci Oro OT on 5/24/2025 at 11:42 AM

## 2025-05-24 NOTE — PROGRESS NOTES
Patient looking for belongings. This RN unable to find requested items besides cell phone. 5W charge RN and security notified. Family updated as well.

## 2025-05-24 NOTE — PROGRESS NOTES
NAME:  Jonelle Contreras  YOB: 1950  MEDICAL RECORD NUMBER:  [id number]    Shift Summary: PD throughout night. VSS. AAOx4. On/off bipap. HTN overnight, DO aware, no new orders. Xanax x1. Tramadol x1.    Family updated: Yes:  Son, Thiago    Rhythm: Normal Sinus Rhythm     Most recent vitals:   Visit Vitals  BP (!) 166/93   Pulse (!) 101   Temp 96.8 °F (36 °C) (Axillary)   Resp 21   Ht 1.651 m (5' 5\")   Wt 66.6 kg (146 lb 13.2 oz)   SpO2 100%   BMI 24.43 kg/m²           No data found.    No data found.      Respiratory support needed (if any):  - O2 - HFNC 15 lpm vs bipap    Admission weight Weight - Scale: 63.8 kg (140 lb 10.5 oz) (05/12/25 1145)    Today's weight    Wt Readings from Last 1 Encounters:   05/24/25 66.6 kg (146 lb 13.2 oz)        Enamorado need assessed each shift: N/A - no enamorado present  UOP >30ml/hr: NO - anuric  Last documented BM (in last 48 hrs):  Patient Vitals for the past 48 hrs:   Last BM (including prior to admit) Stool Occurrence   05/22/25 0750 05/20/25 --   05/23/25 0028 -- 0                Restraints (in use currently or dc'd in last 12 hrs): No    Order current and documentation up to date? No    Lines/Drains reviewed @ bedside.  Peripheral IV 05/19/25 Right Forearm (Active)   Number of days: 5       Peripheral IV 05/23/25 Right Forearm (Active)   Number of days: 0         Drip rates at handoff:    dextrose 5 % and 0.45 % NaCl 50 mL/hr at 05/24/25 0259    sodium chloride         Lab Data:   CBC:   Recent Labs     05/22/25  0514   WBC 9.4   HGB 11.3*   HCT 33.8*   MCV 86.7        BMP:    Recent Labs     05/22/25  0514 05/23/25  0515    138   K 3.4* 4.7   CO2 20* 21   BUN 54* 51*   CREATININE 10.9* 11.4*     ABG: No results for input(s): \"PHART\", \"WXE4YAG\", \"PO2ART\" in the last 72 hours.    Any consults during the shift? No    Any signed and held orders to be released?  No        4 Eyes Skin Assessment       The patient is being assessed for  Shift Handoff    I agree

## 2025-05-24 NOTE — PROGRESS NOTES
Progress Note  [facility] INTERNAL MEDICINE HOSPITAL PROGRESS    Patient: JULEE CONTRERAS  :  1950  PCP: Candido Bentley MD    Reason for visit: hyperkalemia    SUBJECTIVE     History of present illness per Dr. Trujillo:  Julee Contreras is a 74-year-old lady with a past medical history significant for ESRD on PD, hypertension and CAD who presented for evaluation of hyperkalemia and hypertension.     She had been at baseline health yesterday and referred to the emergency department because of hyperkalemia.  Her blood pressure has been elevated she is struggled with control outpatient.  She has had ambulatory blood pressure monitoring with systolic often in excess of 180.  She reports adherence with her home medication regimen.  She is not on any NSAIDs or decongestants.  She reports a headache today.  No chest pain or shortness of breath.  She does not report any fever or chills.    Last 24hr: bronch yesterday w/sig clearing of BL mucus plugging    Pt still has no appetite, however overall she does feel better. Her SOB has improved.    Review of systems: as above    Past Medical History:   Diagnosis Date    acute intermittent porphyria     sees Alfredo, mom had it too    agoraphobic     ongoing, prefers to be at home    Allergic rhinitis     Anxiety     Arthritis     Asthma     CAD (coronary artery disease) 2022    acute inf [place] RCA stent successfully placed *[place] other arteries clear    Chronic obstructive pulmonary disease, unspecified COPD type (HCC) 2025    ckd 4     from HTN,  sees Trini Pagan    collagenous colitis     Ayoub    COPD (chronic obstructive pulmonary disease) (HCC)     Depression 2013    eversince      Disease of blood and blood forming organ     GERD (gastroesophageal reflux disease)     Heart disease     Hyperlipidemia     Hypertension 2016    Left thyroid nodule     9mm, gets rechecked yearly on ct lung    Migraines     gets 4x per month

## 2025-05-24 NOTE — PLAN OF CARE
Problem: Discharge Planning  Goal: Discharge to home or other facility with appropriate resources  Outcome: Progressing  Flowsheets  Taken 5/24/2025 0000 by Josephine Quesada RN  Discharge to home or other facility with appropriate resources:   Identify barriers to discharge with patient and caregiver   Arrange for needed discharge resources and transportation as appropriate   Identify discharge learning needs (meds, wound care, etc)   Refer to discharge planning if patient needs post-hospital services based on physician order or complex needs related to functional status, cognitive ability or social support system  Taken 5/23/2025 1719 by Jennifer Ha RN  Discharge to home or other facility with appropriate resources: Identify barriers to discharge with patient and caregiver     Problem: ABCDS Injury Assessment  Goal: Absence of physical injury  Outcome: Progressing     Problem: Safety - Adult  Goal: Free from fall injury  Outcome: Progressing     Problem: Pain  Goal: Verbalizes/displays adequate comfort level or baseline comfort level  Outcome: Progressing  Flowsheets  Taken 5/23/2025 2000 by Josephine Quesada RN  Verbalizes/displays adequate comfort level or baseline comfort level:   Encourage patient to monitor pain and request assistance   Assess pain using appropriate pain scale  Taken 5/23/2025 1700 by Jennifer Ha RN  Verbalizes/displays adequate comfort level or baseline comfort level: Encourage patient to monitor pain and request assistance     Problem: Genitourinary - Adult  Goal: Absence of urinary retention  Outcome: Progressing  Flowsheets (Taken 5/24/2025 0000)  Absence of urinary retention: Assess patient’s ability to void and empty bladder     Problem: Metabolic/Fluid and Electrolytes - Adult  Goal: Electrolytes maintained within normal limits  Outcome: Progressing  Flowsheets  Taken 5/24/2025 0000 by Josephine Quesada RN  Electrolytes maintained within normal limits:   Monitor

## 2025-05-24 NOTE — PROGRESS NOTES
Pulmonary Progress Note    Date of Admission: 5/12/2025   LOS: 12 days       CC:  Chief Complaint   Patient presents with    Hypertension     Went to doctor's appointment this morning, BP there was 240s/130s and they called the ambulance. First BP w/ EMS was 240/130 manual, second one was 195/107. Pot c/o blurred vision that she normally gets when her BP is high.        Subjective:  Status post bronchoscopy yesterday.  Used BiPAP overnight after bronchoscopy with improving hypoxemia.  Was able to wean off of BiPAP.  Oxygen was 15 L overnight and 100%.  Decreased to 4 L today.         Assessment:          Plan:     This note may have been transcribed using Dragon Dictation software. Please disregard any translational errors.       Hospital Day: 12     Acute hypoxemia  Mucous plug  Haemophilus influenzae pneumonia  Bronchoscopy yesterday with extensive mucous plugs removed bilaterally.  Chest x-ray demonstrates significant improvement of aeration bilaterally  Given the patient's abdominal pain, she probably would not tolerate chest vest.  Talk to the son and patient.  Strongly encouraged incentive spirometry and Acapella every hour  Add 3% saline nebulizer  Schedule Mucinex  Patient was escalated from ceftriaxone to cefepime yesterday.  With Haemophilus influenzae, decrease back to ceftriaxone      Hypertension  End-stage renal disease on peritoneal dialysis  Currently receiving peritoneal dialysis      COPD  Albuterol  Stiolto      Abdominal pain  Defer to GI    Nutrition  - ADULT DIET; Regular  ADULT ORAL NUTRITION SUPPLEMENT; Breakfast, Dinner; Renal Oral Supplement  -   Intake/Output Summary (Last 24 hours) at 5/24/2025 0828  Last data filed at 5/23/2025 1859  Gross per 24 hour   Intake 1311.52 ml   Output 749 ml   Net 562.52 ml       Mobility       Access  Arterial      PICC          CVC             Discussed with Dr. Costa at bedside         Data:        PHYSICAL EXAM:   Blood pressure (!) 154/80, pulse (!) 107,  temperature 96.8 °F (36 °C), temperature source Axillary, resp. rate 19, height 1.651 m (5' 5\"), weight 66 kg (145 lb 8.1 oz), SpO2 100%, not currently breastfeeding.'  Body mass index is 24.21 kg/m².   Gen: No distress.    ENT:   Resp: No accessory muscle use. No crackles. No wheezes. No rhonchi.    CV: Regular rate. Regular rhythm. No murmur or rub. No edema.   Skin: Warm, dry, normal texture and turgor. No nodule on exposed extremities.   M/S: No cyanosis. No clubbing. No joint deformity.  Psych: Oriented x 3. No anxiety.  Awake. Alert. Intact judgement and insight. Good Mood / Affect.  Memory appears in tact       Medications:    Scheduled Meds:   cefTRIAXone (ROCEPHIN) IV  2,000 mg IntraVENous Q24H    pantoprazole (PROTONIX) 40 mg in sodium chloride (PF) 0.9 % 10 mL injection  40 mg IntraVENous Q12H    [START ON 5/27/2025] hemin  180 mg IntraVENous Daily    dianeal lo-neal 2.5%  2,000 mL IntraPERitoneal QHS    [Held by provider] NIFEdipine  30 mg Oral Daily    metoclopramide  5 mg Oral TID AC    sevelamer  1,600 mg Oral TID WC    ipratropium 0.5 mg-albuterol 2.5 mg  1 Dose Inhalation BID RT    fluconazole  200 mg Oral Daily    dianeal lo-neal 2.5%  6,000 mL IntraPERitoneal Nightly    And    dianeal lo-neal 2.5%  2,000 mL IntraPERitoneal Nightly    sennosides-docusate sodium  2 tablet Oral Daily    polyethylene glycol  17 g Oral BID    torsemide  100 mg Oral Daily    sodium chloride flush  5-40 mL IntraVENous 2 times per day    heparin (porcine)  5,000 Units SubCUTAneous 3 times per day    aspirin  81 mg Oral Daily    carvedilol  12.5 mg Oral BID WC    clopidogrel  75 mg Oral Daily    [Held by provider] losartan  100 mg Oral Nightly    rOPINIRole  1 mg Oral Nightly    rosuvastatin  10 mg Oral Nightly    trospium  20 mg Oral Nightly    tiotropium-olodaterol  2 puff Inhalation Daily    spironolactone  50 mg Oral Daily       Continuous Infusions:   dextrose 5 % and 0.45 % NaCl 50 mL/hr at 05/24/25 0259    sodium

## 2025-05-24 NOTE — PROGRESS NOTES
NAME:  Jonelle Contreras  YOB: 1950  MEDICAL RECORD NUMBER:  [id number]    Shift Summary: Patient's VSS, alert and oriented X 4. Weaned O2, on 4L NC. Downgraded to PCU.    Family updated: Yes:  Son    Rhythm: Normal Sinus Rhythm     Most recent vitals:   Visit Vitals  BP (!) 144/97   Pulse 88   Temp 97.6 °F (36.4 °C) (Axillary)   Resp 17   Ht 1.651 m (5' 5\")   Wt 66 kg (145 lb 8.1 oz)   SpO2 96%   BMI 24.21 kg/m²           No data found.    No data found.      Respiratory support needed (if any):  - O2 - NC - 4 lpm    Admission weight Weight - Scale: 63.8 kg (140 lb 10.5 oz) (05/12/25 1145)    Today's weight    Wt Readings from Last 1 Encounters:   05/24/25 66 kg (145 lb 8.1 oz)        Enamorado need assessed each shift: N/A - no enamorado present  UOP >30ml/hr: NO - Anuric  Last documented BM (in last 48 hrs):  Patient Vitals for the past 48 hrs:   Last BM (including prior to admit) Stool Occurrence   05/23/25 0028 -- 0   05/24/25 0800 05/20/25 --                Restraints (in use currently or dc'd in last 12 hrs): No    Order current and documentation up to date? No    Lines/Drains reviewed @ bedside.  Peripheral IV 05/19/25 Right Forearm (Active)   Number of days: 5       Peripheral IV 05/23/25 Right Forearm (Active)   Number of days: 1         Drip rates at handoff:    dextrose 5 % and 0.45 % NaCl 50 mL/hr at 05/24/25 0259    sodium chloride         Lab Data:   CBC:   Recent Labs     05/22/25  0514 05/24/25  0658   WBC 9.4 13.3*   HGB 11.3* 11.8*   HCT 33.8* 35.8*   MCV 86.7 86.5    341     BMP:    Recent Labs     05/23/25  0515 05/24/25  0658    134*   K 4.7 4.8   CO2 21 19*   BUN 51* 49*   CREATININE 11.4* 11.1*     ABG: No results for input(s): \"PHART\", \"TCZ1NWH\", \"PO2ART\" in the last 72 hours.    Any consults during the shift? No    Any signed and held orders to be released?  No        4 Eyes Skin Assessment       The patient is being assessed for  Shift Handoff    I agree that at least one

## 2025-05-24 NOTE — PROGRESS NOTES
Secure message sent to Alexander Costa DO, regarding high pressures. DO aware, no new orders at this time.    Electronically signed by Josephine Quesada RN on 5/24/2025 at 5:56 AM

## 2025-05-24 NOTE — PROGRESS NOTES
ONCOLOGY HEMATOLOGY CARE PROGRESS NOTE      SUBJECTIVE:  Pt was in icu for resp issues   Pt seen around 1030 am  She seems a little better      ROS:     Constitutional:  No weight loss, No fever, No chills, No night sweats.  Energy level good.  Eyes:  No impairment or change in vision  ENT / Mouth:  No pain, abnormal ulceration, bleeding, nasal drip or change in voice or hearing  Cardiovascular:  No chest pain, palpitations, new edema, or calf discomfort  Respiratory:  No pain, hemoptysis, change to breathing  Breast:  No pain, discharge, change in appearance or texture  Gastrointestinal:  No pain, cramping, jaundice, change to eating and bowel habits  Urinary:  No pain, bleeding or change in continence  Genitalia: No pain, bleeding or discharge  Musculoskeletal:  No redness, pain, edema or weakness  Skin:  No pruritus, rash, change to nodules or lesions  Neurologic:  No discomfort, change in mental status, speech, sensory or motor activity  Psychiatric:  No change in concentration or change to affect or mood  Endocrine:  No hot flashes, increased thirst, or change to urine production  Hematologic: No petechiae, ecchymosis or bleeding  Lymphatic:  No lymphadenopathy or lymphedema  Allergy / Immunologic:  No eczema, hives, frequent or recurrent infections    OBJECTIVE        Physical    VITALS:  Patient Vitals for the past 24 hrs:   BP Temp Temp src Pulse Resp SpO2 Weight   05/24/25 1300 (!) 144/76 -- -- 84 17 97 % --   05/24/25 1200 (!) 144/76 97.6 °F (36.4 °C) Axillary 86 18 95 % --   05/24/25 1141 -- -- -- 85 15 98 % --   05/24/25 1100 (!) 108/94 -- -- 94 23 -- --   05/24/25 1000 (!) 157/89 -- -- (!) 102 21 96 % --   05/24/25 0900 (!) 163/72 -- -- (!) 105 18 96 % --   05/24/25 0820 -- -- -- (!) 107 19 100 % --   05/24/25 0813 -- -- -- (!) 107 20 99 % --   05/24/25 0800 (!) 152/87 97.2 °F (36.2 °C) Axillary (!) 109 25 100 % --   05/24/25 0700 (!) 154/80 -- -- (!) 106 18 100 %

## 2025-05-25 LAB
ALBUMIN SERPL-MCNC: 3.1 G/DL (ref 3.4–5)
ANION GAP SERPL CALCULATED.3IONS-SCNC: 16 MMOL/L (ref 3–16)
ANISOCYTOSIS BLD QL SMEAR: ABNORMAL
ANISOCYTOSIS BLD QL SMEAR: ABNORMAL
BACTERIA SPEC RESP CULT: NORMAL
BACTERIA URNS QL MICRO: ABNORMAL /HPF
BASOPHILS # BLD: 0 K/UL (ref 0–0.2)
BASOPHILS # BLD: 0 K/UL (ref 0–0.2)
BASOPHILS NFR BLD: 0 %
BASOPHILS NFR BLD: 0 %
BILIRUB UR QL STRIP.AUTO: NEGATIVE
BUN SERPL-MCNC: 50 MG/DL (ref 7–20)
CALCIUM SERPL-MCNC: 9.3 MG/DL (ref 8.3–10.6)
CHLORIDE SERPL-SCNC: 101 MMOL/L (ref 99–110)
CLARITY UR: CLEAR
CO2 SERPL-SCNC: 19 MMOL/L (ref 21–32)
COLOR UR: YELLOW
CREAT SERPL-MCNC: 9.4 MG/DL (ref 0.6–1.2)
DEPRECATED RDW RBC AUTO: 16 % (ref 12.4–15.4)
DEPRECATED RDW RBC AUTO: 16.3 % (ref 12.4–15.4)
EOSINOPHIL # BLD: 0 K/UL (ref 0–0.6)
EOSINOPHIL # BLD: 0.2 K/UL (ref 0–0.6)
EOSINOPHIL NFR BLD: 0 %
EOSINOPHIL NFR BLD: 1 %
EPI CELLS #/AREA URNS AUTO: 0 /HPF (ref 0–5)
GFR SERPLBLD CREATININE-BSD FMLA CKD-EPI: 4 ML/MIN/{1.73_M2}
GLUCOSE SERPL-MCNC: 160 MG/DL (ref 70–99)
GLUCOSE UR STRIP.AUTO-MCNC: 100 MG/DL
GRAM STN SPEC: NORMAL
HCT VFR BLD AUTO: 33 % (ref 36–48)
HCT VFR BLD AUTO: 34.9 % (ref 36–48)
HGB BLD-MCNC: 10.7 G/DL (ref 12–16)
HGB BLD-MCNC: 11.3 G/DL (ref 12–16)
HGB UR QL STRIP.AUTO: ABNORMAL
HYALINE CASTS #/AREA URNS AUTO: 0 /LPF (ref 0–8)
KETONES UR STRIP.AUTO-MCNC: NEGATIVE MG/DL
LEUKOCYTE ESTERASE UR QL STRIP.AUTO: NEGATIVE
LYMPHOCYTES # BLD: 1 K/UL (ref 1–5.1)
LYMPHOCYTES # BLD: 3.5 K/UL (ref 1–5.1)
LYMPHOCYTES NFR BLD: 21 %
LYMPHOCYTES NFR BLD: 6 %
MCH RBC QN AUTO: 28 PG (ref 26–34)
MCH RBC QN AUTO: 28.2 PG (ref 26–34)
MCHC RBC AUTO-ENTMCNC: 32.3 G/DL (ref 31–36)
MCHC RBC AUTO-ENTMCNC: 32.5 G/DL (ref 31–36)
MCV RBC AUTO: 86.8 FL (ref 80–100)
MCV RBC AUTO: 86.8 FL (ref 80–100)
MONOCYTES # BLD: 1.2 K/UL (ref 0–1.3)
MONOCYTES # BLD: 1.3 K/UL (ref 0–1.3)
MONOCYTES NFR BLD: 7 %
MONOCYTES NFR BLD: 8 %
NEUTROPHILS # BLD: 11.6 K/UL (ref 1.7–7.7)
NEUTROPHILS # BLD: 14.6 K/UL (ref 1.7–7.7)
NEUTROPHILS NFR BLD: 70 %
NEUTROPHILS NFR BLD: 87 %
NITRITE UR QL STRIP.AUTO: NEGATIVE
PH UR STRIP.AUTO: 5.5 [PH] (ref 5–8)
PHOSPHATE SERPL-MCNC: 5.2 MG/DL (ref 2.5–4.9)
PLATELET # BLD AUTO: 290 K/UL (ref 135–450)
PLATELET # BLD AUTO: 360 K/UL (ref 135–450)
PLATELET BLD QL SMEAR: ABNORMAL
PLATELET BLD QL SMEAR: ABNORMAL
PMV BLD AUTO: 9 FL (ref 5–10.5)
PMV BLD AUTO: 9.4 FL (ref 5–10.5)
POTASSIUM SERPL-SCNC: 4.4 MMOL/L (ref 3.5–5.1)
PROT UR STRIP.AUTO-MCNC: 100 MG/DL
RBC # BLD AUTO: 3.8 M/UL (ref 4–5.2)
RBC # BLD AUTO: 4.03 M/UL (ref 4–5.2)
RBC CLUMPS #/AREA URNS AUTO: 2 /HPF (ref 0–4)
SLIDE REVIEW: ABNORMAL
SLIDE REVIEW: ABNORMAL
SODIUM SERPL-SCNC: 136 MMOL/L (ref 136–145)
SP GR UR STRIP.AUTO: 1.01 (ref 1–1.03)
UA DIPSTICK W REFLEX MICRO PNL UR: YES
URN SPEC COLLECT METH UR: ABNORMAL
UROBILINOGEN UR STRIP-ACNC: 0.2 E.U./DL
WBC # BLD AUTO: 16.5 K/UL (ref 4–11)
WBC # BLD AUTO: 16.8 K/UL (ref 4–11)
WBC #/AREA URNS AUTO: 1 /HPF (ref 0–5)

## 2025-05-25 PROCEDURE — 6370000000 HC RX 637 (ALT 250 FOR IP): Performed by: INTERNAL MEDICINE

## 2025-05-25 PROCEDURE — 6360000002 HC RX W HCPCS: Performed by: INTERNAL MEDICINE

## 2025-05-25 PROCEDURE — 36415 COLL VENOUS BLD VENIPUNCTURE: CPT

## 2025-05-25 PROCEDURE — 94761 N-INVAS EAR/PLS OXIMETRY MLT: CPT

## 2025-05-25 PROCEDURE — 2580000003 HC RX 258: Performed by: INTERNAL MEDICINE

## 2025-05-25 PROCEDURE — 6370000000 HC RX 637 (ALT 250 FOR IP): Performed by: STUDENT IN AN ORGANIZED HEALTH CARE EDUCATION/TRAINING PROGRAM

## 2025-05-25 PROCEDURE — 94640 AIRWAY INHALATION TREATMENT: CPT

## 2025-05-25 PROCEDURE — 81001 URINALYSIS AUTO W/SCOPE: CPT

## 2025-05-25 PROCEDURE — 90945 DIALYSIS ONE EVALUATION: CPT

## 2025-05-25 PROCEDURE — 2700000000 HC OXYGEN THERAPY PER DAY

## 2025-05-25 PROCEDURE — 97530 THERAPEUTIC ACTIVITIES: CPT

## 2025-05-25 PROCEDURE — 80069 RENAL FUNCTION PANEL: CPT

## 2025-05-25 PROCEDURE — 2500000003 HC RX 250 WO HCPCS: Performed by: INTERNAL MEDICINE

## 2025-05-25 PROCEDURE — 94669 MECHANICAL CHEST WALL OSCILL: CPT

## 2025-05-25 PROCEDURE — 1200000000 HC SEMI PRIVATE

## 2025-05-25 PROCEDURE — 99232 SBSQ HOSP IP/OBS MODERATE 35: CPT | Performed by: INTERNAL MEDICINE

## 2025-05-25 PROCEDURE — 85025 COMPLETE CBC W/AUTO DIFF WBC: CPT

## 2025-05-25 PROCEDURE — 99233 SBSQ HOSP IP/OBS HIGH 50: CPT | Performed by: INTERNAL MEDICINE

## 2025-05-25 RX ADMIN — TRAMADOL HYDROCHLORIDE 50 MG: 50 TABLET, COATED ORAL at 21:18

## 2025-05-25 RX ADMIN — SODIUM CHLORIDE, SODIUM LACTATE, CALCIUM CHLORIDE, MAGNESIUM CHLORIDE AND DEXTROSE 6000 ML: 2.5; 538; 448; 18.3; 5.08 INJECTION, SOLUTION INTRAPERITONEAL at 18:30

## 2025-05-25 RX ADMIN — ONDANSETRON 4 MG: 4 TABLET, ORALLY DISINTEGRATING ORAL at 19:49

## 2025-05-25 RX ADMIN — ROSUVASTATIN CALCIUM 10 MG: 10 TABLET, FILM COATED ORAL at 21:17

## 2025-05-25 RX ADMIN — GUAIFENESIN 1200 MG: 600 TABLET, EXTENDED RELEASE ORAL at 21:17

## 2025-05-25 RX ADMIN — METOCLOPRAMIDE 5 MG: 10 TABLET ORAL at 17:13

## 2025-05-25 RX ADMIN — SEVELAMER CARBONATE 800 MG: 800 TABLET, FILM COATED ORAL at 12:45

## 2025-05-25 RX ADMIN — SEVELAMER CARBONATE 800 MG: 800 TABLET, FILM COATED ORAL at 09:11

## 2025-05-25 RX ADMIN — HEPARIN SODIUM 5000 UNITS: 5000 INJECTION INTRAVENOUS; SUBCUTANEOUS at 06:41

## 2025-05-25 RX ADMIN — POLYETHYLENE GLYCOL 3350 17 G: 17 POWDER, FOR SOLUTION ORAL at 09:12

## 2025-05-25 RX ADMIN — ASPIRIN 81 MG: 81 TABLET, CHEWABLE ORAL at 09:12

## 2025-05-25 RX ADMIN — HEPARIN SODIUM 5000 UNITS: 5000 INJECTION INTRAVENOUS; SUBCUTANEOUS at 21:24

## 2025-05-25 RX ADMIN — LOSARTAN POTASSIUM 100 MG: 100 TABLET, FILM COATED ORAL at 19:46

## 2025-05-25 RX ADMIN — GUAIFENESIN 1200 MG: 600 TABLET, EXTENDED RELEASE ORAL at 09:11

## 2025-05-25 RX ADMIN — SEVELAMER CARBONATE 800 MG: 800 TABLET, FILM COATED ORAL at 17:13

## 2025-05-25 RX ADMIN — CARVEDILOL 12.5 MG: 12.5 TABLET, FILM COATED ORAL at 17:14

## 2025-05-25 RX ADMIN — SPIRONOLACTONE 50 MG: 25 TABLET ORAL at 09:12

## 2025-05-25 RX ADMIN — HEPARIN SODIUM 5000 UNITS: 5000 INJECTION INTRAVENOUS; SUBCUTANEOUS at 12:45

## 2025-05-25 RX ADMIN — IPRATROPIUM BROMIDE AND ALBUTEROL SULFATE 1 DOSE: .5; 2.5 SOLUTION RESPIRATORY (INHALATION) at 11:26

## 2025-05-25 RX ADMIN — CLOPIDOGREL BISULFATE 75 MG: 75 TABLET, FILM COATED ORAL at 09:12

## 2025-05-25 RX ADMIN — POLYETHYLENE GLYCOL 3350 17 G: 17 POWDER, FOR SOLUTION ORAL at 21:18

## 2025-05-25 RX ADMIN — Medication 4 ML: at 11:26

## 2025-05-25 RX ADMIN — SODIUM CHLORIDE, SODIUM LACTATE, CALCIUM CHLORIDE, MAGNESIUM CHLORIDE AND DEXTROSE 2000 ML: 2.5; 538; 448; 18.3; 5.08 INJECTION, SOLUTION INTRAPERITONEAL at 18:30

## 2025-05-25 RX ADMIN — DEXTROSE AND SODIUM CHLORIDE: 5; .45 INJECTION, SOLUTION INTRAVENOUS at 02:27

## 2025-05-25 RX ADMIN — SODIUM CHLORIDE, PRESERVATIVE FREE 10 ML: 5 INJECTION INTRAVENOUS at 09:13

## 2025-05-25 RX ADMIN — CARVEDILOL 12.5 MG: 12.5 TABLET, FILM COATED ORAL at 09:12

## 2025-05-25 RX ADMIN — ALPRAZOLAM 1.5 MG: 0.5 TABLET ORAL at 21:18

## 2025-05-25 RX ADMIN — FLUCONAZOLE 200 MG: 100 TABLET ORAL at 09:11

## 2025-05-25 RX ADMIN — TORSEMIDE 100 MG: 100 TABLET ORAL at 09:12

## 2025-05-25 RX ADMIN — METOCLOPRAMIDE 5 MG: 10 TABLET ORAL at 09:12

## 2025-05-25 RX ADMIN — SODIUM CHLORIDE, PRESERVATIVE FREE 40 MG: 5 INJECTION INTRAVENOUS at 21:21

## 2025-05-25 RX ADMIN — SENNOSIDES AND DOCUSATE SODIUM 2 TABLET: 50; 8.6 TABLET ORAL at 09:11

## 2025-05-25 RX ADMIN — SODIUM CHLORIDE, PRESERVATIVE FREE 40 MG: 5 INJECTION INTRAVENOUS at 09:12

## 2025-05-25 RX ADMIN — DEXTROSE AND SODIUM CHLORIDE: 5; .45 INJECTION, SOLUTION INTRAVENOUS at 23:46

## 2025-05-25 RX ADMIN — CEFTRIAXONE SODIUM 2000 MG: 2 INJECTION, POWDER, FOR SOLUTION INTRAMUSCULAR; INTRAVENOUS at 09:49

## 2025-05-25 RX ADMIN — IPRATROPIUM BROMIDE AND ALBUTEROL SULFATE 1 DOSE: .5; 2.5 SOLUTION RESPIRATORY (INHALATION) at 20:15

## 2025-05-25 RX ADMIN — Medication 1 SPRAY: at 21:16

## 2025-05-25 RX ADMIN — Medication 4 ML: at 20:15

## 2025-05-25 RX ADMIN — SODIUM CHLORIDE, PRESERVATIVE FREE 10 ML: 5 INJECTION INTRAVENOUS at 21:25

## 2025-05-25 RX ADMIN — TIOTROPIUM BROMIDE AND OLODATEROL 2 PUFF: 3.124; 2.736 SPRAY, METERED RESPIRATORY (INHALATION) at 11:26

## 2025-05-25 RX ADMIN — TROSPIUM CHLORIDE 20 MG: 20 TABLET, FILM COATED ORAL at 21:17

## 2025-05-25 RX ADMIN — ROPINIROLE HYDROCHLORIDE 1 MG: 1 TABLET, FILM COATED ORAL at 21:17

## 2025-05-25 RX ADMIN — METOCLOPRAMIDE 5 MG: 10 TABLET ORAL at 12:45

## 2025-05-25 ASSESSMENT — PAIN SCALES - GENERAL
PAINLEVEL_OUTOF10: 7
PAINLEVEL_OUTOF10: 6

## 2025-05-25 ASSESSMENT — PAIN DESCRIPTION - ONSET: ONSET: ON-GOING

## 2025-05-25 ASSESSMENT — PAIN DESCRIPTION - LOCATION: LOCATION: GENERALIZED

## 2025-05-25 ASSESSMENT — PAIN DESCRIPTION - FREQUENCY: FREQUENCY: CONTINUOUS

## 2025-05-25 NOTE — PROGRESS NOTES
NAME:  Jonelle Contreras  YOB: 1950  MEDICAL RECORD NUMBER:  [id number]    Shift Summary: Patient VSS. Losartan restarted for BP. Downgraded to Med/surg with tele. Uneventful shift.    Family updated: Yes:  Son    Rhythm: Normal Sinus Rhythm     Most recent vitals:   Visit Vitals  BP (!) 161/110   Pulse 87   Temp 97.4 °F (36.3 °C) (Oral)   Resp 21   Ht 1.651 m (5' 5\")   Wt 66.5 kg (146 lb 9.7 oz)   SpO2 (!) 89%   BMI 24.40 kg/m²           No data found.    No data found.      Respiratory support needed (if any):  - RA    Admission weight Weight - Scale: 63.8 kg (140 lb 10.5 oz) (05/12/25 1145)    Today's weight    Wt Readings from Last 1 Encounters:   05/25/25 66.5 kg (146 lb 9.7 oz)        Enamorado need assessed each shift: N/A - no enamorado present  UOP >30ml/hr: No, anuric  Last documented BM (in last 48 hrs):  Patient Vitals for the past 48 hrs:   Last BM (including prior to admit)   05/24/25 0800 05/20/25                Restraints (in use currently or dc'd in last 12 hrs): No    Order current and documentation up to date? No    Lines/Drains reviewed @ bedside.  Peripheral IV 05/19/25 Right Forearm (Active)   Number of days: 6       Peripheral IV 05/23/25 Right Forearm (Active)   Number of days: 2         Drip rates at handoff:    dextrose 5 % and 0.45 % NaCl 50 mL/hr at 05/25/25 0500    sodium chloride         Lab Data:   CBC:   Recent Labs     05/25/25  0537 05/25/25  1404   WBC 16.8* 16.5*   HGB 10.7* 11.3*   HCT 33.0* 34.9*   MCV 86.8 86.8    290     BMP:    Recent Labs     05/24/25  0658 05/25/25  0537   * 136   K 4.8 4.4   CO2 19* 19*   BUN 49* 50*   CREATININE 11.1* 9.4*     ABG: No results for input(s): \"PHART\", \"JBV9RLO\", \"PO2ART\" in the last 72 hours.    Any consults during the shift? No    Any signed and held orders to be released?  No        4 Eyes Skin Assessment       The patient is being assessed for  Shift Handoff    I agree that at least one RN has performed a thorough Head

## 2025-05-25 NOTE — PROGRESS NOTES
[facility] - Physical Therapy   Phone: [phone]    Physical Therapy  Facility/Department:[address] PROGRESSIVE CARE    [] Initial Evaluation            [x] Re-assessment - Daily Treatment Note         [] Discharge Summary      Patient: Jonelle Contreras   : 1950   MRN: [id number]   Date of Service:  2025  Staff Mobility Recommendation: RW bed to chair; stedy to toilet  AM-PAC score: 13  Discharge Recommendations: ARU    Jonelle Contreras scored a 13 on the AM-PAC short mobility form. Current research shows that an AM-PAC score of 17 or less is typically not associated with a discharge to the patient's home setting. Based on the patient's AM-PAC score and their current functional mobility deficits, it is recommended that the patient have 5-7 sessions per week of Physical Therapy at d/c to increase the patient's independence.  At this time, this patient demonstrates complex nursing, medical, and rehabilitative needs, and would benefit from intensive rehabilitation services upon discharge from the Inpatient setting.  This patient demonstrates the ability to participate in and benefit from an intensive therapy program with a coordinated interdisciplinary team approach to foster frequent, structured, and documented communication among disciplines, who will work together to establish, prioritize, and achieve treatment goals. Please see assessment section for further patient specific details.    If patient discharges prior to next session this note will serve as a discharge summary.  Please see below for the latest assessment towards goals.      Admitting Diagnosis: Hypertensive urgency  Ordering Physician: Dr. Bentley  Current Admission Summary: 75 y/o female admit 2025 with HTN Urgency,  Underwent bronchoscopy alveolar lavage on 25 per Dr. Ryder. Transferred to ICU on 25 due to respiratory status.    Past Medical History:  has a past medical history of acute intermittent  cirilo  Decreased step length  Decreased step height  Comments: O2 sats in low 90s on 2L O2. Mildly unsteady. Difficulty managing RW due to weakness.    Balance:  Static Sitting Balance: fair (+): maintains balance at SBA/supervision without use of UE support  Dynamic Sitting Balance: fair (+): maintains balance at SBA/supervision without use of UE support  Static Standing Balance: poor (+): requires min (A) to maintain balance  Dynamic Standing Balance: poor (+): requires min (A) to maintain balance    Exercise:   No exercises performed this session.        Cognition  WFL  Orientation:    alert and oriented x 4    Education  Barriers To Learning: none  Patient Education: patient educated on PT role and benefits, plan of care  Learning Assessment: patient verbalizes understanding, would benefit from continued reinforcement  Safety Interventions: patient at risk for falls, call light within reach, patient left in chair, chair alarm in place, gait belt, family/caregiver present, and nurse notified    Plan  Frequency: 3-5 x/week  Current Treatment Recommendations: strengthening, balance training, functional mobility training, transfer training, gait training, stair training, endurance training, neuromuscular re-education, equipment evaluation/education, patient/caregiver education, and safety education    Goals  Patient Goals: none   Short Term Goals:  Time Frame: By acute discharge  Patient will complete bed mobility with stand by assistance  Patient will complete transfers with stand by assistance   Patient will ambulate 50 ft with use of rolling walker with stand by assistance       Above goals reviewed on 5/25/2025.  All goals are ongoing at this time unless indicated above.      Therapy Session Time      Individual Group Co-treatment   Time In 1040       Time Out 1120       Minutes 40           Timed Code Treatment Minutes: 40 Minutes  Total Treatment Minutes: 40 Minutes    Minutes per charge:  Therapeutic

## 2025-05-25 NOTE — PROGRESS NOTES
ONCOLOGY HEMATOLOGY CARE PROGRESS NOTE      SUBJECTIVE:  Pt continues to improve  We again had a discussion on if we should give hemin       ROS:     Constitutional:  No weight loss, No fever, No chills, No night sweats.  Energy level good.  Eyes:  No impairment or change in vision  ENT / Mouth:  No pain, abnormal ulceration, bleeding, nasal drip or change in voice or hearing  Cardiovascular:  No chest pain, palpitations, new edema, or calf discomfort  Respiratory:  No pain, hemoptysis, change to breathing  Breast:  No pain, discharge, change in appearance or texture  Gastrointestinal:  No pain, cramping, jaundice, change to eating and bowel habits  Urinary:  No pain, bleeding or change in continence  Genitalia: No pain, bleeding or discharge  Musculoskeletal:  No redness, pain, edema or weakness  Skin:  No pruritus, rash, change to nodules or lesions  Neurologic:  No discomfort, change in mental status, speech, sensory or motor activity  Psychiatric:  No change in concentration or change to affect or mood  Endocrine:  No hot flashes, increased thirst, or change to urine production  Hematologic: No petechiae, ecchymosis or bleeding  Lymphatic:  No lymphadenopathy or lymphedema  Allergy / Immunologic:  No eczema, hives, frequent or recurrent infections    OBJECTIVE        Physical    VITALS:  Patient Vitals for the past 24 hrs:   BP Temp Temp src Pulse Resp SpO2   05/25/25 0600 (!) 164/101 -- -- 86 15 94 %   05/25/25 0539 (!) 151/89 -- -- 87 17 95 %   05/25/25 0500 (!) 170/105 96.9 °F (36.1 °C) Oral 86 17 91 %   05/25/25 0400 (!) 165/95 -- -- 83 15 94 %   05/25/25 0338 -- -- -- 83 16 94 %   05/25/25 0300 (!) 163/87 -- -- 79 14 96 %   05/25/25 0200 (!) 154/82 -- -- 79 14 95 %   05/25/25 0100 (!) 142/87 -- -- 77 12 95 %   05/25/25 0000 (!) 144/77 -- -- 84 16 95 %   05/24/25 2329 -- -- -- 88 18 94 %   05/24/25 2300 (!) 140/82 -- -- 89 15 97 %   05/24/25 2200 (!) 164/77 -- -- 88 17

## 2025-05-25 NOTE — PROGRESS NOTES
NAME:  Jonelle Contreras  YOB: 1950  MEDICAL RECORD NUMBER:  [id number]    Shift Summary: Pt slept well. BP creeping up and I notified Bentley team. Overnight  Said they will address it in the AM. Pt claims she doesn't feel good today. Nose is stuffy and her stomach hurts. PD ran smoothly.     Family updated: No    Rhythm: Normal Sinus Rhythm     Most recent vitals:   Visit Vitals  BP (!) 164/101   Pulse 86   Temp 96.9 °F (36.1 °C) (Oral)   Resp 15   Ht 1.651 m (5' 5\")   Wt 66 kg (145 lb 8.1 oz)   SpO2 94%   BMI 24.21 kg/m²           No data found.    No data found.      Respiratory support needed (if any):  - O2 - NC - 2 lpm    Admission weight Weight - Scale: 63.8 kg (140 lb 10.5 oz) (05/12/25 1145)    Today's weight    Wt Readings from Last 1 Encounters:   05/24/25 66 kg (145 lb 8.1 oz)        Enamorado need assessed each shift: N/A - no enamorado present  UOP >30ml/hr: NO - anuric  Last documented BM (in last 48 hrs):  Patient Vitals for the past 48 hrs:   Last BM (including prior to admit)   05/24/25 0800 05/20/25                Restraints (in use currently or dc'd in last 12 hrs): No    Order current and documentation up to date? No    Lines/Drains reviewed @ bedside.  Peripheral IV 05/19/25 Right Forearm (Active)   Number of days: 6       Peripheral IV 05/23/25 Right Forearm (Active)   Number of days: 1         Drip rates at handoff:    dextrose 5 % and 0.45 % NaCl 50 mL/hr at 05/25/25 0500    sodium chloride         Lab Data:   CBC:   Recent Labs     05/24/25  0658   WBC 13.3*   HGB 11.8*   HCT 35.8*   MCV 86.5        BMP:    Recent Labs     05/23/25  0515 05/24/25  0658    134*   K 4.7 4.8   CO2 21 19*   BUN 51* 49*   CREATININE 11.4* 11.1*     ABG: No results for input(s): \"PHART\", \"EAV3NSR\", \"PO2ART\" in the last 72 hours.    Any consults during the shift? No    Any signed and held orders to be released?  No        4 Eyes Skin Assessment       The patient is being assessed for  Shift

## 2025-05-25 NOTE — PLAN OF CARE
Problem: Pain  Goal: Verbalizes/displays adequate comfort level or baseline comfort level  5/24/2025 2312 by Saba Rodriguez RN  Verbalizes/displays adequate comfort level or baseline comfort level:   Encourage patient to monitor pain and request assistance   Assess pain using appropriate pain scale   Administer analgesics based on type and severity of pain and evaluate response   Implement non-pharmacological measures as appropriate and evaluate response   Consider cultural and social influences on pain and pain management   Notify Licensed Independent Practitioner if interventions unsuccessful or patient reports new pain  5/24/2025 2312 by Saba Rodriguez RN  Outcome: Progressing

## 2025-05-25 NOTE — PROGRESS NOTES
Progress Note  [facility] INTERNAL MEDICINE HOSPITAL PROGRESS    Patient: JULEE CONTRERAS  :  1950  PCP: Candido Bentley MD    Reason for visit: hyperkalemia    SUBJECTIVE     History of present illness per Dr. Trujillo:  Julee Contreras is a 74-year-old lady with a past medical history significant for ESRD on PD, hypertension and CAD who presented for evaluation of hyperkalemia and hypertension.     She had been at baseline health yesterday and referred to the emergency department because of hyperkalemia.  Her blood pressure has been elevated she is struggled with control outpatient.  She has had ambulatory blood pressure monitoring with systolic often in excess of 180.  She reports adherence with her home medication regimen.  She is not on any NSAIDs or decongestants.  She reports a headache today.  No chest pain or shortness of breath.  She does not report any fever or chills.    Last 24hr: unremarkable    Appetite remains poor. However, she has increased her diet. She was able to eat cereal, bagel, and banana yesterday. Seemed to tolerate this ok. She has similar abdominal pain and pain with swallowing, however it was not any worse.    Review of systems: as above    Past Medical History:   Diagnosis Date    acute intermittent porphyria     sees Alfredo, mom had it too    agoraphobic     ongoing, prefers to be at home    Allergic rhinitis     Anxiety     Arthritis     Asthma     CAD (coronary artery disease) 2022    acute inf [place] RCA stent successfully placed *[place] other arteries clear    Chronic obstructive pulmonary disease, unspecified COPD type ([facility]) 2025    ckd 4     from HTN,  sees Trini Pagan    collagenous colitis     Ayoub    COPD (chronic obstructive pulmonary disease) ([facility])     Depression 2013    eversince      Disease of blood and blood forming organ     GERD (gastroesophageal reflux disease)     Heart disease     Hyperlipidemia     Hypertension 2016     this afternoon to confirm stability     Code: full  DVT ppx: heparin  Dispo: transfer to [facility]    Alexander Costa D.O.  Internal Medicine  [facility] Internal Medicine

## 2025-05-25 NOTE — PROGRESS NOTES
Pulmonary Progress Note    Date of Admission: 5/12/2025   LOS: 13 days       CC:  Chief Complaint   Patient presents with    Hypertension     Went to doctor's appointment this morning, BP there was 240s/130s and they called the ambulance. First BP w/ EMS was 240/130 manual, second one was 195/107. Pot c/o blurred vision that she normally gets when her BP is high.        Subjective:  Feeling better  Sitting up working with PT       Assessment:          Plan:     This note may have been transcribed using Dragon Dictation software. Please disregard any translational errors.       Hospital Day: 13     Acute hypoxemia  Mucous plug  Haemophilus influenzae pneumonia  Bronchoscopy   with extensive mucous plugs removed bilaterally.  Hypoxemia improving.     spirometry and Acapella every hour    3% saline nebulizer  Schedule Mucinex  Haemophilus influenzae,   ceftriaxone  Repeat chest X-ray tomorrow.     Hypertension  End-stage renal disease on peritoneal dialysis  Currently receiving peritoneal dialysis      COPD  Albuterol  Stiolto      Abdominal pain  Defer to GI            Data:        PHYSICAL EXAM:   Blood pressure (!) 166/97, pulse 89, temperature 97.2 °F (36.2 °C), temperature source Oral, resp. rate 22, height 1.651 m (5' 5\"), weight 66.5 kg (146 lb 9.7 oz), SpO2 95%, not currently breastfeeding.'  Body mass index is 24.4 kg/m².   Gen: No distress.    ENT:   Resp: No accessory muscle use. No crackles. No wheezes. No rhonchi.    CV: Regular rate. Regular rhythm. No murmur or rub. No edema.   Skin: Warm, dry, normal texture and turgor. No nodule on exposed extremities.   M/S: No cyanosis. No clubbing. No joint deformity.  Psych: Oriented x 3. No anxiety.  Awake. Alert. Intact judgement and insight. Good Mood / Affect.  Memory appears in tact       Medications:    Scheduled Meds:   cefTRIAXone (ROCEPHIN) IV  2,000 mg IntraVENous Q24H    sodium chloride (Inhalant)  4 mL Nebulization BID RT    guaiFENesin  1,200 mg Oral

## 2025-05-25 NOTE — PLAN OF CARE
Problem: Discharge Planning  Goal: Discharge to home or other facility with appropriate resources  Outcome: Progressing  Flowsheets (Taken 5/25/2025 0800)  Discharge to home or other facility with appropriate resources:   Identify barriers to discharge with patient and caregiver   Arrange for needed discharge resources and transportation as appropriate   Identify discharge learning needs (meds, wound care, etc)   Arrange for interpreters to assist at discharge as needed   Refer to discharge planning if patient needs post-hospital services based on physician order or complex needs related to functional status, cognitive ability or social support system     Problem: ABCDS Injury Assessment  Goal: Absence of physical injury  Outcome: Progressing     Problem: Safety - Adult  Goal: Free from fall injury  5/25/2025 1116 by Marilia Gunn RN  Outcome: Progressing  5/24/2025 2312 by Saba Rodriguez RN  Outcome: Progressing     Problem: Pain  Goal: Verbalizes/displays adequate comfort level or baseline comfort level  5/25/2025 1116 by Marilia Gunn RN  Outcome: Progressing  Flowsheets (Taken 5/25/2025 0800)  Verbalizes/displays adequate comfort level or baseline comfort level:   Encourage patient to monitor pain and request assistance   Assess pain using appropriate pain scale   Administer analgesics based on type and severity of pain and evaluate response   Implement non-pharmacological measures as appropriate and evaluate response   Consider cultural and social influences on pain and pain management   Notify Licensed Independent Practitioner if interventions unsuccessful or patient reports new pain  5/24/2025 2312 by Saba Rodriguez, RN  Flowsheets (Taken 5/24/2025 0800 by Marilia Gunn RN)  Verbalizes/displays adequate comfort level or baseline comfort level:   Encourage patient to monitor pain and request assistance   Assess pain using appropriate pain scale   Administer analgesics based on type and severity of

## 2025-05-25 NOTE — PROGRESS NOTES
Patient ID: Jonelle Contreras  Referring/ Physician: Candido Bentley MD      Summary:   Jonelle Contreras is being seen by nephrology for ESRD and HTN.     Reason for admission: hypertensive urgency       Interval Hx:   Seen at bedside.   /101, running high again. Losartan and nifedipine are on hold  O2 improved to 2 LPM now.  Potassium 4.4  Phos 5.2, reduced renvela to 800 mg TID  Weight 66 kg. Bed weight  Some concern for porphyria, on D5W+half NS at 50 mL/h    Assessment/Plan:   - BP now running high. Resume home losartan 100 mg daily.  - Continue PD with 2.5% exchanges.  - clearances adequate    ESRD  ESRD secondary to hypertension.  She does peritoneal dialysis at home 1 exchange of 2 L 1.5% solutions daily.  Based on her most recent labs in the outpatient setting her adequacy is not at goal so we are going to be increasing her to 4 exchanges, 2 L at 2.5% solutions.  Will use the cycler while inpatient.  EDW 61.5 kg   - daily BM, on lactulose. Has ileus > GI consulted.    - gent to PD exit site.        Resistant hypertension  The BP has been controlled inpatient on her home regimen, actually low at times suggesting that she may not have been taking her medications as prescribed.   Has had an overall negative secondary work up. Has been extremely difficult to manage outpatient as she reports \"bottoming out and passing out\" when her medications are increased.   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   She has had 24 hr ambulatory BP monitoring showing consistently elevated bP 180s -200s systolic.   Presenting with hypertensive urgency, having ongoing headaches blurry vision and fatigue.  CT head showed no stroke.,  Has chronic microvascular changes.  Troponin 66.no ECG changes.   Her home regimen : nifedpine 30 mg , losartan 100 mg aldactone 50 mg , coreg 12.5 mg BID. Clonidine 0./1 tID for SBP > 180    Renal  serious enough to call 911. She has no chest pain or SOB. Has long standing HTN that has been resistant to multidrug regimen and reports extreme lightheadedness and passing out when her medications are increased and tends to adjust her medications on her own outpatient. She recently quit smoking but has been doing so for decades. She has anxiety and chronic pain issues. Also has a history of acute intermittent porphyria. Has periipheral vascular disease as well. She was sent to ER from dialysis clinic for hypertensive urgency.     No abdominal pain. No fevers no chills.   No NVD.   Has been having regular bowel movements.         Review of Systems:   As above.     PMH/SH/FH:    Medical Hx: reviewed and updated as appropriate  Past Medical History:   Diagnosis Date    acute intermittent porphyria     sees Alfredo, mom had it too    agoraphobic     ongoing, prefers to be at home    Allergic rhinitis     Anxiety     Arthritis     Asthma     CAD (coronary artery disease) 2022    acute inf [place] RCA stent successfully placed *Bolivar other arteries clear    Chronic obstructive pulmonary disease, unspecified COPD type (HCC) 2025    ckd 4     from HTN,  sees Trini Caroway    collagenous colitis     Ayoub    COPD (chronic obstructive pulmonary disease) (HCC)     Depression 2013    eversince      Disease of blood and blood forming organ     GERD (gastroesophageal reflux disease)     Heart disease     Hyperlipidemia     Hypertension 2016    Left thyroid nodule     9mm, gets rechecked yearly on ct lung    Migraines     gets 4x per month    Neuropathy     Osteoporosis     PAD (peripheral artery disease) 2022    bilat ptca fem art Ranjith    Peripheral vascular disease     post menopausal 2020    osteopenia frax score under on calcium and D    Prediabetes     Pulmonary nodules     yearly ct lung followed by Dr Fuentes    Restless leg syndrome     STEMI involving oth coronary artery of

## 2025-05-26 ENCOUNTER — APPOINTMENT (OUTPATIENT)
Dept: GENERAL RADIOLOGY | Age: 75
End: 2025-05-26
Payer: MEDICARE

## 2025-05-26 LAB
ALBUMIN SERPL-MCNC: 2.9 G/DL (ref 3.4–5)
ANION GAP SERPL CALCULATED.3IONS-SCNC: 14 MMOL/L (ref 3–16)
BASOPHILS # BLD: 0 K/UL (ref 0–0.2)
BASOPHILS NFR BLD: 0.4 %
BUN SERPL-MCNC: 46 MG/DL (ref 7–20)
CALCIUM SERPL-MCNC: 8.8 MG/DL (ref 8.3–10.6)
CHLORIDE SERPL-SCNC: 99 MMOL/L (ref 99–110)
CO2 SERPL-SCNC: 22 MMOL/L (ref 21–32)
CREAT SERPL-MCNC: 8.9 MG/DL (ref 0.6–1.2)
DEPRECATED RDW RBC AUTO: 16.3 % (ref 12.4–15.4)
EOSINOPHIL # BLD: 0.5 K/UL (ref 0–0.6)
EOSINOPHIL NFR BLD: 4 %
GFR SERPLBLD CREATININE-BSD FMLA CKD-EPI: 4 ML/MIN/{1.73_M2}
GLUCOSE SERPL-MCNC: 96 MG/DL (ref 70–99)
HCT VFR BLD AUTO: 32.3 % (ref 36–48)
HGB BLD-MCNC: 10.5 G/DL (ref 12–16)
LYMPHOCYTES # BLD: 1.8 K/UL (ref 1–5.1)
LYMPHOCYTES NFR BLD: 15.7 %
MCH RBC QN AUTO: 27.9 PG (ref 26–34)
MCHC RBC AUTO-ENTMCNC: 32.6 G/DL (ref 31–36)
MCV RBC AUTO: 85.7 FL (ref 80–100)
MONOCYTES # BLD: 1.3 K/UL (ref 0–1.3)
MONOCYTES NFR BLD: 11.5 %
NEUTROPHILS # BLD: 7.9 K/UL (ref 1.7–7.7)
NEUTROPHILS NFR BLD: 68.4 %
PHOSPHATE SERPL-MCNC: 4.1 MG/DL (ref 2.5–4.9)
PLATELET # BLD AUTO: 304 K/UL (ref 135–450)
PMV BLD AUTO: 9.4 FL (ref 5–10.5)
POTASSIUM SERPL-SCNC: 3.9 MMOL/L (ref 3.5–5.1)
RBC # BLD AUTO: 3.77 M/UL (ref 4–5.2)
SODIUM SERPL-SCNC: 135 MMOL/L (ref 136–145)
WBC # BLD AUTO: 11.5 K/UL (ref 4–11)

## 2025-05-26 PROCEDURE — 90945 DIALYSIS ONE EVALUATION: CPT

## 2025-05-26 PROCEDURE — 99233 SBSQ HOSP IP/OBS HIGH 50: CPT | Performed by: INTERNAL MEDICINE

## 2025-05-26 PROCEDURE — 2580000003 HC RX 258: Performed by: INTERNAL MEDICINE

## 2025-05-26 PROCEDURE — 6370000000 HC RX 637 (ALT 250 FOR IP): Performed by: INTERNAL MEDICINE

## 2025-05-26 PROCEDURE — 6370000000 HC RX 637 (ALT 250 FOR IP): Performed by: STUDENT IN AN ORGANIZED HEALTH CARE EDUCATION/TRAINING PROGRAM

## 2025-05-26 PROCEDURE — 1200000000 HC SEMI PRIVATE

## 2025-05-26 PROCEDURE — 94640 AIRWAY INHALATION TREATMENT: CPT

## 2025-05-26 PROCEDURE — 6360000002 HC RX W HCPCS: Performed by: INTERNAL MEDICINE

## 2025-05-26 PROCEDURE — 36415 COLL VENOUS BLD VENIPUNCTURE: CPT

## 2025-05-26 PROCEDURE — 2500000003 HC RX 250 WO HCPCS: Performed by: INTERNAL MEDICINE

## 2025-05-26 PROCEDURE — 71045 X-RAY EXAM CHEST 1 VIEW: CPT

## 2025-05-26 PROCEDURE — 97530 THERAPEUTIC ACTIVITIES: CPT

## 2025-05-26 PROCEDURE — 2060000000 HC ICU INTERMEDIATE R&B

## 2025-05-26 PROCEDURE — 94760 N-INVAS EAR/PLS OXIMETRY 1: CPT

## 2025-05-26 PROCEDURE — 80069 RENAL FUNCTION PANEL: CPT

## 2025-05-26 PROCEDURE — 85025 COMPLETE CBC W/AUTO DIFF WBC: CPT

## 2025-05-26 PROCEDURE — 99232 SBSQ HOSP IP/OBS MODERATE 35: CPT | Performed by: INTERNAL MEDICINE

## 2025-05-26 RX ADMIN — Medication 4 ML: at 07:56

## 2025-05-26 RX ADMIN — SEVELAMER CARBONATE 800 MG: 800 TABLET, FILM COATED ORAL at 11:36

## 2025-05-26 RX ADMIN — GENTAMICIN SULFATE: 1 CREAM TOPICAL at 06:37

## 2025-05-26 RX ADMIN — TORSEMIDE 100 MG: 100 TABLET ORAL at 08:31

## 2025-05-26 RX ADMIN — GUAIFENESIN 1200 MG: 600 TABLET, EXTENDED RELEASE ORAL at 08:31

## 2025-05-26 RX ADMIN — DEXTROSE AND SODIUM CHLORIDE: 5; .45 INJECTION, SOLUTION INTRAVENOUS at 20:29

## 2025-05-26 RX ADMIN — ASPIRIN 81 MG: 81 TABLET, CHEWABLE ORAL at 08:31

## 2025-05-26 RX ADMIN — TIOTROPIUM BROMIDE AND OLODATEROL 2 PUFF: 3.124; 2.736 SPRAY, METERED RESPIRATORY (INHALATION) at 07:57

## 2025-05-26 RX ADMIN — SODIUM CHLORIDE, PRESERVATIVE FREE 40 MG: 5 INJECTION INTRAVENOUS at 20:30

## 2025-05-26 RX ADMIN — ROSUVASTATIN CALCIUM 10 MG: 10 TABLET, FILM COATED ORAL at 20:29

## 2025-05-26 RX ADMIN — CARVEDILOL 12.5 MG: 12.5 TABLET, FILM COATED ORAL at 16:51

## 2025-05-26 RX ADMIN — IPRATROPIUM BROMIDE AND ALBUTEROL SULFATE 1 DOSE: .5; 2.5 SOLUTION RESPIRATORY (INHALATION) at 18:59

## 2025-05-26 RX ADMIN — SODIUM CHLORIDE, PRESERVATIVE FREE 10 ML: 5 INJECTION INTRAVENOUS at 07:51

## 2025-05-26 RX ADMIN — ALPRAZOLAM 1.5 MG: 0.5 TABLET ORAL at 21:00

## 2025-05-26 RX ADMIN — FLUCONAZOLE 200 MG: 100 TABLET ORAL at 08:31

## 2025-05-26 RX ADMIN — SEVELAMER CARBONATE 800 MG: 800 TABLET, FILM COATED ORAL at 16:51

## 2025-05-26 RX ADMIN — CLOPIDOGREL BISULFATE 75 MG: 75 TABLET, FILM COATED ORAL at 08:30

## 2025-05-26 RX ADMIN — SEVELAMER CARBONATE 800 MG: 800 TABLET, FILM COATED ORAL at 08:31

## 2025-05-26 RX ADMIN — SODIUM CHLORIDE, SODIUM LACTATE, CALCIUM CHLORIDE, MAGNESIUM CHLORIDE AND DEXTROSE 6000 ML: 2.5; 538; 448; 18.3; 5.08 INJECTION, SOLUTION INTRAPERITONEAL at 21:02

## 2025-05-26 RX ADMIN — HEPARIN SODIUM 5000 UNITS: 5000 INJECTION INTRAVENOUS; SUBCUTANEOUS at 13:58

## 2025-05-26 RX ADMIN — HEPARIN SODIUM 5000 UNITS: 5000 INJECTION INTRAVENOUS; SUBCUTANEOUS at 06:21

## 2025-05-26 RX ADMIN — ACETAMINOPHEN 650 MG: 325 TABLET ORAL at 03:09

## 2025-05-26 RX ADMIN — SODIUM CHLORIDE, SODIUM LACTATE, CALCIUM CHLORIDE, MAGNESIUM CHLORIDE AND DEXTROSE 2000 ML: 2.5; 538; 448; 18.3; 5.08 INJECTION, SOLUTION INTRAPERITONEAL at 21:02

## 2025-05-26 RX ADMIN — CEFTRIAXONE SODIUM 2000 MG: 2 INJECTION, POWDER, FOR SOLUTION INTRAMUSCULAR; INTRAVENOUS at 10:08

## 2025-05-26 RX ADMIN — ACETAMINOPHEN 650 MG: 325 TABLET ORAL at 15:17

## 2025-05-26 RX ADMIN — SPIRONOLACTONE 50 MG: 25 TABLET ORAL at 08:31

## 2025-05-26 RX ADMIN — ROPINIROLE HYDROCHLORIDE 1 MG: 1 TABLET, FILM COATED ORAL at 20:29

## 2025-05-26 RX ADMIN — METOCLOPRAMIDE 5 MG: 10 TABLET ORAL at 06:21

## 2025-05-26 RX ADMIN — SENNOSIDES AND DOCUSATE SODIUM 2 TABLET: 50; 8.6 TABLET ORAL at 08:31

## 2025-05-26 RX ADMIN — CARVEDILOL 12.5 MG: 12.5 TABLET, FILM COATED ORAL at 08:30

## 2025-05-26 RX ADMIN — METOCLOPRAMIDE 5 MG: 10 TABLET ORAL at 15:11

## 2025-05-26 RX ADMIN — TROSPIUM CHLORIDE 20 MG: 20 TABLET, FILM COATED ORAL at 20:29

## 2025-05-26 RX ADMIN — SODIUM CHLORIDE, PRESERVATIVE FREE 40 MG: 5 INJECTION INTRAVENOUS at 08:32

## 2025-05-26 RX ADMIN — LOSARTAN POTASSIUM 100 MG: 100 TABLET, FILM COATED ORAL at 20:46

## 2025-05-26 RX ADMIN — Medication 4 ML: at 18:59

## 2025-05-26 RX ADMIN — IPRATROPIUM BROMIDE AND ALBUTEROL SULFATE 1 DOSE: .5; 2.5 SOLUTION RESPIRATORY (INHALATION) at 07:56

## 2025-05-26 RX ADMIN — SODIUM CHLORIDE, PRESERVATIVE FREE 10 ML: 5 INJECTION INTRAVENOUS at 20:32

## 2025-05-26 RX ADMIN — HEPARIN SODIUM 5000 UNITS: 5000 INJECTION INTRAVENOUS; SUBCUTANEOUS at 20:29

## 2025-05-26 RX ADMIN — METOCLOPRAMIDE 5 MG: 10 TABLET ORAL at 10:08

## 2025-05-26 RX ADMIN — TRAMADOL HYDROCHLORIDE 50 MG: 50 TABLET, COATED ORAL at 21:00

## 2025-05-26 RX ADMIN — GUAIFENESIN 1200 MG: 600 TABLET, EXTENDED RELEASE ORAL at 20:29

## 2025-05-26 RX ADMIN — POLYETHYLENE GLYCOL 3350 17 G: 17 POWDER, FOR SOLUTION ORAL at 20:30

## 2025-05-26 RX ADMIN — POLYETHYLENE GLYCOL 3350 17 G: 17 POWDER, FOR SOLUTION ORAL at 08:31

## 2025-05-26 ASSESSMENT — PAIN SCALES - GENERAL
PAINLEVEL_OUTOF10: 6
PAINLEVEL_OUTOF10: 9
PAINLEVEL_OUTOF10: 8
PAINLEVEL_OUTOF10: 0
PAINLEVEL_OUTOF10: 0
PAINLEVEL_OUTOF10: 3
PAINLEVEL_OUTOF10: 0

## 2025-05-26 ASSESSMENT — PAIN DESCRIPTION - ORIENTATION
ORIENTATION: UPPER;LEFT
ORIENTATION: LEFT

## 2025-05-26 ASSESSMENT — PAIN DESCRIPTION - LOCATION
LOCATION: BACK
LOCATION: GENERALIZED
LOCATION: BACK

## 2025-05-26 ASSESSMENT — PAIN DESCRIPTION - FREQUENCY: FREQUENCY: CONTINUOUS

## 2025-05-26 ASSESSMENT — PAIN DESCRIPTION - DESCRIPTORS
DESCRIPTORS: DISCOMFORT
DESCRIPTORS: ACHING

## 2025-05-26 ASSESSMENT — PAIN SCALES - WONG BAKER
WONGBAKER_NUMERICALRESPONSE: NO HURT
WONGBAKER_NUMERICALRESPONSE: NO HURT
WONGBAKER_NUMERICALRESPONSE: HURTS EVEN MORE
WONGBAKER_NUMERICALRESPONSE: NO HURT

## 2025-05-26 ASSESSMENT — PAIN DESCRIPTION - PAIN TYPE: TYPE: ACUTE PAIN

## 2025-05-26 ASSESSMENT — PAIN - FUNCTIONAL ASSESSMENT: PAIN_FUNCTIONAL_ASSESSMENT: ACTIVITIES ARE NOT PREVENTED

## 2025-05-26 ASSESSMENT — PAIN DESCRIPTION - ONSET: ONSET: ON-GOING

## 2025-05-26 NOTE — PROGRESS NOTES
[facility] - Physical Therapy   Phone: [phone]    Physical Therapy  Facility/Department:[address] PROGRESSIVE CARE    [] Initial Evaluation            [x] Daily Treatment Note         [] Discharge Summary      Patient: Jonelle Contreras   : 1950   MRN: [id number]   Date of Service:  2025  Staff Mobility Recommendation: RW bed to chair; stedy to toilet  AM-PAC score: 1724  Discharge Recommendations: ARU    Jonelle Contreras scored a 17/ on the AM-PAC short mobility form. Current research shows that an AM-PAC score of 17 or less is typically not associated with a discharge to the patient's home setting. Based on the patient's AM-PAC score and their current functional mobility deficits, it is recommended that the patient have 5-7 sessions per week of Physical Therapy at d/c to increase the patient's independence.  At this time, this patient demonstrates complex nursing, medical, and rehabilitative needs, and would benefit from intensive rehabilitation services upon discharge from the Inpatient setting.  This patient demonstrates the ability to participate in and benefit from an intensive therapy program with a coordinated interdisciplinary team approach to foster frequent, structured, and documented communication among disciplines, who will work together to establish, prioritize, and achieve treatment goals. Please see assessment section for further patient specific details.    If patient discharges prior to next session this note will serve as a discharge summary.  Please see below for the latest assessment towards goals.      Admitting Diagnosis: Hypertensive urgency  Ordering Physician: Dr. Bentley  Current Admission Summary: 75 y/o female admit 2025 with HTN Urgency,  Underwent bronchoscopy alveolar lavage on 25 per Dr. Ryder. Transferred to ICU on 25 due to respiratory status.    Past Medical History:  has a past medical history of acute intermittent porphyria, agoraphobic,  assistance  Stand to sit transfer:     Ambulation Trial:  Surface:level surface  Assistive Device: rolling walker  Assistance: minimal assistance  Distance: 5'  Gait Mechanics: Slow cirilo  Decreased step length  Decreased step height  Comments: O2 sats in mid 90s on room air    Balance:  Static Sitting Balance: fair (+): maintains balance at SBA/supervision without use of UE support  Dynamic Sitting Balance: fair (+): maintains balance at SBA/supervision without use of UE support  Static Standing Balance: poor (+): requires min (A) to maintain balance  Dynamic Standing Balance: poor (+): requires min (A) to maintain balance    Exercise:   No exercises performed this session.        Cognition  WFL  Orientation:    alert and oriented x 4    Education  Barriers To Learning: none  Patient Education: patient educated on PT role and benefits, plan of care  Learning Assessment: patient verbalizes understanding, would benefit from continued reinforcement  Safety Interventions: patient at risk for falls, call light within reach, patient left in chair, chair alarm in place, gait belt, family/caregiver present, and nurse notified    Plan  Frequency: 3-5 x/week  Current Treatment Recommendations: strengthening, balance training, functional mobility training, transfer training, gait training, stair training, endurance training, neuromuscular re-education, equipment evaluation/education, patient/caregiver education, and safety education    Goals  Patient Goals: none   Short Term Goals:  Time Frame: By acute discharge  Patient will complete bed mobility with stand by assistance  Patient will complete transfers with stand by assistance   Patient will ambulate 50 ft with use of rolling walker with stand by assistance       Above goals reviewed on 5/26/2025.  All goals are ongoing at this time unless indicated above.      Therapy Session Time      Individual Group Co-treatment   Time In 1210       Time Out 1235       Minutes 25

## 2025-05-26 NOTE — PROGRESS NOTES
Nursing handoff report given to ANTONI Cordova RN, who will be resuming patient care. Patient is to be transferred to [facility]. Thiago, patient son, updated on patient transfer to .

## 2025-05-26 NOTE — PLAN OF CARE
Problem: Discharge Planning  Goal: Discharge to home or other facility with appropriate resources  Outcome: Progressing     Problem: ABCDS Injury Assessment  Goal: Absence of physical injury  Outcome: Progressing     Problem: Safety - Adult  Goal: Free from fall injury  Outcome: Progressing     Problem: Pain  Goal: Verbalizes/displays adequate comfort level or baseline comfort level  5/26/2025 1117 by Claire Cornejo, RN  Outcome: Progressing  5/25/2025 2243 by Saba Rodriguez, RN  Outcome: Progressing  Flowsheets (Taken 5/25/2025 0800 by Marilia Gunn, RN)  Verbalizes/displays adequate comfort level or baseline comfort level:   Encourage patient to monitor pain and request assistance   Assess pain using appropriate pain scale   Administer analgesics based on type and severity of pain and evaluate response   Implement non-pharmacological measures as appropriate and evaluate response   Consider cultural and social influences on pain and pain management   Notify Licensed Independent Practitioner if interventions unsuccessful or patient reports new pain     Problem: Genitourinary - Adult  Goal: Absence of urinary retention  Outcome: Progressing     Problem: Metabolic/Fluid and Electrolytes - Adult  Goal: Electrolytes maintained within normal limits  Outcome: Progressing     Problem: Neurosensory - Adult  Goal: Achieves stable or improved neurological status  Outcome: Progressing     Problem: Respiratory - Adult  Goal: Achieves optimal ventilation and oxygenation  Outcome: Progressing     Problem: Cardiovascular - Adult  Goal: Maintains optimal cardiac output and hemodynamic stability  Outcome: Progressing     Problem: Skin/Tissue Integrity - Adult  Goal: Skin integrity remains intact  Description: 1.  Monitor for areas of redness and/or skin breakdown2.  Assess vascular access sites hourly3.  Every 4-6 hours minimum:  Change oxygen saturation probe site4.  Every 4-6 hours:  If on nasal continuous positive airway  pressure, respiratory therapy assess nares and determine need for appliance change or resting period  Outcome: Progressing  Flowsheets (Taken 5/25/2025 2200 by Saba Rodriguez, RN)  Skin Integrity Remains Intact: Monitor for areas of redness and/or skin breakdown     Problem: Musculoskeletal - Adult  Goal: Return mobility to safest level of function  Outcome: Progressing     Problem: Gastrointestinal - Adult  Goal: Minimal or absence of nausea and vomiting  Outcome: Progressing     Problem: Infection - Adult  Goal: Absence of infection at discharge  Outcome: Progressing     Problem: Nutrition Deficit:  Goal: Optimize nutritional status  Outcome: Progressing     Problem: Safety - Medical Restraint  Goal: Remains free of injury from restraints (Restraint for Interference with Medical Device)  Description: INTERVENTIONS:1. Determine that other, less restrictive measures have been tried or would not be effective before applying the restraint2. Evaluate the patient's condition at the time of restraint application3. Inform patient/family regarding the reason for restraint4. Q2H: Monitor safety, psychosocial status, comfort, nutrition and hydration  Outcome: Completed     Problem: Skin/Tissue Integrity  Goal: Skin integrity remains intact  Description: 1.  Monitor for areas of redness and/or skin breakdown2.  Assess vascular access sites hourly3.  Every 4-6 hours minimum:  Change oxygen saturation probe site4.  Every 4-6 hours:  If on nasal continuous positive airway pressure, respiratory therapy assess nares and determine need for appliance change or resting period  Outcome: Progressing  Flowsheets (Taken 5/25/2025 2200 by Saba Rodriguez, RN)  Skin Integrity Remains Intact: Monitor for areas of redness and/or skin breakdown

## 2025-05-26 NOTE — PROGRESS NOTES
Patient ID: Jonelle Contreras  Referring/ Physician: Candido Bentley MD      Summary:   Jonelle Contreras is being seen by nephrology for ESRD and HTN.     Reason for admission: hypertensive urgency       Interval Hx:   Seen at bedside.   /84, running high again. nifedipine is on hold  O2 improved to room air now.  Potassium 3.9  Phos 4.1, reduced renvela to 800 mg TID  Weight 59.5 kg. Standing weight  1180 mL UF yesterday  Some concern for porphyria, on D5W+half NS at 50 mL/h    Assessment/Plan:   - BP better. Was low last evening so will keep the procardia on hold for now.  - Continue PD with 2.5% exchanges.  - clearances adequate    ESRD  ESRD secondary to hypertension.  She does peritoneal dialysis at home 1 exchange of 2 L 1.5% solutions daily.  Based on her most recent labs in the outpatient setting her adequacy is not at goal so we are going to be increasing her to 4 exchanges, 2 L at 2.5% solutions.  Will use the cycler while inpatient.  EDW 61.5 kg   - daily BM, on lactulose. Has ileus > GI consulted.    - gent to PD exit site.        Resistant hypertension  The BP has been controlled inpatient on her home regimen, actually low at times suggesting that she may not have been taking her medications as prescribed.   Has had an overall negative secondary work up. Has been extremely difficult to manage outpatient as she reports \"bottoming out and passing out\" when her medications are increased.   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   She has had 24 hr ambulatory BP monitoring showing consistently elevated bP 180s -200s systolic.   Presenting with hypertensive urgency, having ongoing headaches blurry vision and fatigue.  CT head showed no stroke.,  Has chronic microvascular changes.  Troponin 66.no ECG changes.   Her home regimen : nifedpine 30 mg , losartan 100 mg aldactone 50 mg , coreg 12.5 mg BID.

## 2025-05-26 NOTE — PROGRESS NOTES
NAME:  Jonelle Contreras  YOB: 1950  MEDICAL RECORD NUMBER:  [id number]    Shift Summary: Up with PT/OT & tested positive for orthostatics. ARU consulted- needs precert. Transfer status changed to PCU so that pt can receive PD this PM.     Family updated: Yes, Thiago (son).     Rhythm: Normal Sinus Rhythm     Most recent vitals:   Visit Vitals  BP (!) 168/84   Pulse 84   Temp 97.3 °F (36.3 °C) (Oral)   Resp 15   Ht 1.651 m (5' 5\")   Wt 59.5 kg (131 lb 2.8 oz)   SpO2 93%   BMI 21.83 kg/m²           No data found.    No data found.      Respiratory support needed (if any):  - RA    Admission weight Weight - Scale: 63.8 kg (140 lb 10.5 oz) (05/12/25 1145)    Today's weight    Wt Readings from Last 1 Encounters:   05/26/25 59.5 kg (131 lb 2.8 oz)        Enamorado need assessed each shift: N/A - no enamorado present  UOP >30ml/hr: NO - oliguric- PD patient   Last documented BM (in last 48 hrs):  Patient Vitals for the past 48 hrs:   Last BM (including prior to admit)   05/26/25 0800 05/20/25                Restraints (in use currently or dc'd in last 12 hrs): No        Lines/Drains reviewed @ bedside.  Peripheral IV 05/19/25 Right Forearm (Active)   Number of days: 7       Peripheral IV 05/23/25 Right Forearm (Active)   Number of days: 3         Drip rates at handoff:    dextrose 5 % and 0.45 % NaCl 50 mL/hr at 05/26/25 1324    sodium chloride         Lab Data:   CBC:   Recent Labs     05/25/25  1404 05/26/25  0528   WBC 16.5* 11.5*   HGB 11.3* 10.5*   HCT 34.9* 32.3*   MCV 86.8 85.7    304     BMP:    Recent Labs     05/25/25  0537 05/26/25  0528    135*   K 4.4 3.9   CO2 19* 22   BUN 50* 46*   CREATININE 9.4* 8.9*     ABG: No results for input(s): \"PHART\", \"NHA8ODI\", \"PO2ART\" in the last 72 hours.    Any consults during the shift? Yes: Consults received: :   ARU    Any signed and held orders to be released?  No        4 Eyes Skin Assessment       The patient is being assessed for  Shift Handoff    I

## 2025-05-26 NOTE — CARE COORDINATION
Rec'd a call that ARU can accept pt but a precert is needed.     Electronically signed by Aimee Lowry RN MSN on 5/26/2025 at 2:44 PM

## 2025-05-26 NOTE — CONSULTS
Jonelle Contreras  2025  [id number]    Chief Complaint: Hypertensive urgency    Subjective:   This is 74-year-old female with past medical history including:  Past Medical History:   Diagnosis Date    acute intermittent porphyria     sees Alfredo, mom had it too    agoraphobic     ongoing, prefers to be at home    Allergic rhinitis     Anxiety     Arthritis     Asthma     CAD (coronary artery disease) 2022    acute inf [place] RCA stent successfully placed *Bolivar other arteries clear    Chronic obstructive pulmonary disease, unspecified COPD type ([facility]) 2025    ckd 4     from HTN,  sees Trini Caroway    collagenous colitis     Ayoub    COPD (chronic obstructive pulmonary disease) ([facility])     Depression 2013    eversince      Disease of blood and blood forming organ     GERD (gastroesophageal reflux disease)     Heart disease     Hyperlipidemia     Hypertension 2016    Left thyroid nodule     9mm, gets rechecked yearly on ct lung    Migraines     gets 4x per month    Neuropathy     Osteoporosis     PAD (peripheral artery disease) 2022    bilat ptca fem art Ranjith    Peripheral vascular disease     post menopausal 2020    osteopenia frax score under on calcium and D    Prediabetes     Pulmonary nodules     yearly ct lung followed by Dr Fuentes    Restless leg syndrome     STEMI involving oth coronary artery of inferior wall (HCC) 2022    JAYME RCA    Urinary incontinence      Who came to the hospital for hyperkalemia and hyper tension.  Hypertensive urgency has resolved and the patient is following with nephrology and cardiology.  Patient has ESRD and is on PD.  Patient also came in with a cough and underwent a bronc on 2025 for bilateral mucous plugs.  Patient was treated for pneumonia and started on IV antibiotics for 7 days.  Patient is requiring min assistance for sit to stand/stand to sit/bed transfers.  She continues to be limited in functional mobility and

## 2025-05-26 NOTE — PLAN OF CARE
Problem: Pain  Goal: Verbalizes/displays adequate comfort level or baseline comfort level  5/25/2025 2243 by Saba Rodriguez RN  Outcome: Progressing  Verbalizes/displays adequate comfort level or baseline comfort level:   Encourage patient to monitor pain and request assistance   Assess pain using appropriate pain scale   Administer analgesics based on type and severity of pain and evaluate response   Implement non-pharmacological measures as appropriate and evaluate response   Consider cultural and social influences on pain and pain management   Notify Licensed Independent Practitioner if interventions unsuccessful or patient reports new pain

## 2025-05-26 NOTE — PROGRESS NOTES
[facility] [place] - Occupational Therapy   Phone: [phone]    Occupational Therapy  Facility/Department: [address] ICU      [] Initial Evaluation            [x] Daily Treatment Note         [] Discharge Summary      Patient: Jonelle Contreras   : 1950   MRN: [id number]   Date of Service:  2025    Staff Mobility Recommendation: walker x1    AM-PAC Score: 16/24  Discharge Recommendations: 5-7  Jonelle Contreras scored a 16/24 on the AM-PAC ADL Inpatient form. Current research shows that an AM-PAC score of 17 or less is typically not associated with a discharge to the patient's home setting. Based on the patient's AM-PAC score and their current ADL deficits, it is recommended that the patient have 5-7 sessions per week of Occupational Therapy at d/c to increase the patient's independence.  At this time, this patient demonstrates complex nursing, medical, and rehabilitative needs, and would benefit from intensive rehabilitation services upon discharge from the Inpatient setting.  This patient demonstrates the ability to participate in and benefit from an intensive therapy program with a coordinated interdisciplinary team approach to foster frequent, structured, and documented communication among disciplines, who will work together to establish, prioritize, and achieve treatment goals. Please see assessment section for further patient specific details.    If patient discharges prior to next session this note will serve as a discharge summary.  Please see below for the latest assessment towards goals.      Admitting Diagnosis:  Hypertensive urgency  Ordering Physician: Candido Bentley MD   Current Admission Summary: \"74-year-old lady with a past medical history significant for ESRD on PD, hypertension and CAD who presented for evaluation of hyperkalemia and hypertension. She had been at baseline health yesterday and referred to the emergency department because of hyperkalemia.  Her blood pressure has been  few steps to chair and BP standin/65  Sitting in chair: 157/86  RN aware    Clinical Assessment: PTA pt lives at home alone in a multi level house w/5 CONSTANCE. Pt is typically IND w/ADL/IADLs and fxl transfers/mobility w/use of tri walker. Today, pt required min A for transfers and to take a few steps to chair with walker. Further ambulation deferred 2/2 positive orthostatic vitals. Anticipate pt will require up to min/mod A for full ADL.   Pt would benefit from cont skilled OT services in order to promote return to PLOF.     DME Required For Discharge: DME to be determined at next level of care    Restrictions:  Precautions/Restrictions:  2L of O2 via nasal cannula, medium fall risk, weaned to room air at end of session- RN aware  Weight Bearing Restrictions: no restrictions    Required Braces/Orthotics: no braces required  Positional Restrictions:no positional restrictions    Subjective  General: Pt met bedside and agreed to therapy.  Pt with no c/o dizziness.   Family/Caregiver Present: No  Pain: Patient reports no pain  Pain Interventions: not applicable    Home Environment / Functional History  Lives With: Alone  Type of Home: House  Home Layout: Multi-level, Bed/Bath upstairs, Work area in basement  Home Access: Stairs to enter with rails  Entrance Stairs - Number of Steps: 5  Entrance Stairs - Rails: Left  Bathroom Shower/Tub: Tub only  Bathroom Toilet: Handicap height  Bathroom Equipment: None  Bathroom Accessibility: Accessible  Home Equipment:  (Walker (3 wheel).)  Has the patient had two or more falls in the past year or any fall with injury in the past year?: Yes  Receives Help From: Neighbor  Prior Level of Assist for ADLs: Independent  Prior Level of Assist for Homemaking: Needs assistance (Family/neighbor  assists as needed.)  Prior Level of Assist for Ambulation: Independent household ambulator, with or without device, Independent community ambulator, with or without device (Walker prn.)  Prior

## 2025-05-26 NOTE — PROGRESS NOTES
ONCOLOGY HEMATOLOGY CARE PROGRESS NOTE      SUBJECTIVE:  NO new issues having trouble with sleeping and constipation       ROS:     Constitutional:  No weight loss, No fever, No chills, No night sweats.  Energy level good.  Eyes:  No impairment or change in vision  ENT / Mouth:  No pain, abnormal ulceration, bleeding, nasal drip or change in voice or hearing  Cardiovascular:  No chest pain, palpitations, new edema, or calf discomfort  Respiratory:  No pain, hemoptysis, change to breathing  Breast:  No pain, discharge, change in appearance or texture  Gastrointestinal:  No pain, cramping, jaundice, change to eating and bowel habits  Urinary:  No pain, bleeding or change in continence  Genitalia: No pain, bleeding or discharge  Musculoskeletal:  No redness, pain, edema or weakness  Skin:  No pruritus, rash, change to nodules or lesions  Neurologic:  No discomfort, change in mental status, speech, sensory or motor activity  Psychiatric:  No change in concentration or change to affect or mood  Endocrine:  No hot flashes, increased thirst, or change to urine production  Hematologic: No petechiae, ecchymosis or bleeding  Lymphatic:  No lymphadenopathy or lymphedema  Allergy / Immunologic:  No eczema, hives, frequent or recurrent infections    OBJECTIVE        Physical    VITALS:  Patient Vitals for the past 24 hrs:   BP Temp Temp src Pulse Resp SpO2 Height Weight   05/26/25 0900 (!) 162/84 -- -- 93 22 92 % -- --   05/26/25 0800 (!) 154/84 97.7 °F (36.5 °C) Oral 81 16 100 % -- --   05/26/25 0759 -- -- -- 81 12 100 % -- --   05/26/25 0743 -- -- -- -- -- -- 1.651 m (5' 5\") 59.5 kg (131 lb 2.8 oz)   05/26/25 0700 (!) 160/84 -- -- 79 16 93 % -- --   05/26/25 0600 120/73 97.6 °F (36.4 °C) Oral 73 15 93 % -- --   05/26/25 0500 130/76 -- -- 79 15 91 % -- --   05/26/25 0400 120/78 -- -- 78 14 91 % -- --   05/26/25 0300 117/63 -- -- 76 19 93 % -- --   05/26/25 0200 127/80 -- -- 74 16 93 % -- --  non-tender, no masses palpated, no hepatosplenomgaly   MUSCULOSKELETAL: full range of motion noted, tone is normal  NEUROLOGIC: awake, alert, oriented to name, place and time. Motor skills grossly intact.   SKIN: Normal skin color, texture, turgor and no jaundice. appears intact   EXTREMITIES: no LE edema     DATA:  CBC:    Recent Labs     05/26/25  0528 05/25/25  1404 05/25/25  0537 05/24/25  0658   WBC 11.5* 16.5* 16.8* 13.3*   NEUTROABS 7.9* 11.6* 14.6* 12.6*   LYMPHOPCT 15.7 21.0 6.0 3.2   RBC 3.77* 4.03 3.80* 4.14   HGB 10.5* 11.3* 10.7* 11.8*   HCT 32.3* 34.9* 33.0* 35.8*   MCV 85.7 86.8 86.8 86.5   MCH 27.9 28.2 28.0 28.6   MCHC 32.6 32.5 32.3 33.1   RDW 16.3* 16.3* 16.0* 16.3*    290 360 341       PT/INR:  No results for input(s): \"INR\" in the last 720 hours.    Invalid input(s): \"PROT\"  PTT:  No results for input(s): \"APTT\" in the last 720 hours.    CMP:    Recent Labs     05/26/25  0528 05/25/25  0537 05/24/25  0658 05/23/25  0515 05/13/25  0512 05/12/25  1215 05/09/25  1347   * 136 134* 138   < > 140 139   K 3.9 4.4 4.8 4.7   < > 5.7* 7.0*   CL 99 101 100 101   < > 104 104   CO2 22 19* 19* 21   < > 22 24   GLUCOSE 96 160* 185* 134*   < > 90 83   BUN 46* 50* 49* 51*   < > 93* 85*   CREATININE 8.9* 9.4* 11.1* 11.4*   < > 7.7* 7.0*   LABGLOM 4* 4* 3* 3*   < > 5* 6*   CALCIUM 8.8 9.3 9.6 9.2   < > 9.2 9.0   AGRATIO  --   --   --   --   --  1.2 1.5   BILITOT  --   --   --   --   --  <0.2 <0.2   ALKPHOS  --   --   --   --   --  78 74   ALT  --   --   --   --   --  14 13   AST  --   --   --   --   --  22 23    < > = values in this interval not displayed.       Lab Results   Component Value Date    CALCIUM 8.8 05/26/2025    PHOS 4.1 05/26/2025       LDH:No results for input(s): \"LDH\" in the last 720 hours.    Radiology Review:  XR CHEST PORTABLE  Narrative: EXAMINATION:  ONE XRAY VIEW OF THE CHEST    5/26/2025 6:09 am    COMPARISON:  05/24/2025 and earlier imaging    HISTORY:  ORDERING SYSTEM

## 2025-05-26 NOTE — PROGRESS NOTES
NAME:  Jonelle Contreras  YOB: 1950  MEDICAL RECORD NUMBER:  [id number]    Shift Summary: PD complete, dry nose- ordered ocean spray, PT recommended ARU.     Family updated: No    Rhythm: Normal Sinus Rhythm     Most recent vitals:   Visit Vitals  /73   Pulse 73   Temp 97.6 °F (36.4 °C) (Oral)   Resp 15   Ht 1.651 m (5' 5\")   Wt 66.5 kg (146 lb 9.7 oz)   SpO2 93%   BMI 24.40 kg/m²           No data found.    No data found.      Respiratory support needed (if any):  - RA    Admission weight Weight - Scale: 63.8 kg (140 lb 10.5 oz) (05/12/25 1145)    Today's weight    Wt Readings from Last 1 Encounters:   05/25/25 66.5 kg (146 lb 9.7 oz)        Enamorado need assessed each shift: N/A - no enamorado present  UOP >30ml/hr: YES  Last documented BM (in last 48 hrs):  Patient Vitals for the past 48 hrs:   Last BM (including prior to admit)   05/24/25 0800 05/20/25                Restraints (in use currently or dc'd in last 12 hrs): No    Order current and documentation up to date? No    Lines/Drains reviewed @ bedside.  Peripheral IV 05/19/25 Right Forearm (Active)   Number of days: 7       Peripheral IV 05/23/25 Right Forearm (Active)   Number of days: 2         Drip rates at handoff:    dextrose 5 % and 0.45 % NaCl 50 mL/hr at 05/26/25 0615    sodium chloride         Lab Data:   CBC:   Recent Labs     05/25/25  1404 05/26/25  0528   WBC 16.5* 11.5*   HGB 11.3* 10.5*   HCT 34.9* 32.3*   MCV 86.8 85.7    304     BMP:    Recent Labs     05/25/25  0537 05/26/25  0528    135*   K 4.4 3.9   CO2 19* 22   BUN 50* 46*   CREATININE 9.4* 8.9*     ABG: No results for input(s): \"PHART\", \"RUI9ZSC\", \"PO2ART\" in the last 72 hours.    Any consults during the shift? No    Any signed and held orders to be released?  No        4 Eyes Skin Assessment       The patient is being assessed for  Shift Handoff    I agree that at least one RN has performed a thorough Head to Toe Skin Assessment on the patient. ALL assessment

## 2025-05-26 NOTE — PROGRESS NOTES
Pt transferred from ICU to 5251. Pt alert and oriented.   VS and 4eyes completed.  Pt placed on wall telemetry and verified with [facility].  Pt oriented to room and safety measures.  Pt resting quietly in bed with alarm on and call light within reach.   Electronically signed by Tasha Monroe RN on 5/26/25 at 6:16 PM EDT

## 2025-05-26 NOTE — PROGRESS NOTES
Pulmonary Progress Note    Date of Admission: 5/12/2025   LOS: 14 days       CC:  Chief Complaint   Patient presents with    Hypertension     Went to doctor's appointment this morning, BP there was 240s/130s and they called the ambulance. First BP w/ EMS was 240/130 manual, second one was 195/107. Pot c/o blurred vision that she normally gets when her BP is high.        Subjective:  Sitting up in bed, room air, talking to son       Assessment:          Plan:     This note may have been transcribed using Dragon Dictation software. Please disregard any translational errors.       Hospital Day: 14     Acute hypoxemia  Mucous plug  Haemophilus influenzae pneumonia  Bronchoscopy   with extensive mucous plugs removed bilaterally.  Hypoxemia improving.     spirometry and Acapella every hour    3% saline nebulizer  Schedule Mucinex  Haemophilus influenzae,   ceftriaxone x 7 days.  Okay to switch to oral  Repeat chest X-ray continues to improve.     Hypertension  End-stage renal disease on peritoneal dialysis  Currently receiving peritoneal dialysis      COPD  Albuterol  Stiolto      Abdominal pain  Defer to GI     With pulmonary issues resolving, will sign off at this time.  Thank for consultation.       Data:        PHYSICAL EXAM:   Blood pressure (!) 162/84, pulse 93, temperature 97.7 °F (36.5 °C), temperature source Oral, resp. rate 22, height 1.651 m (5' 5\"), weight 59.5 kg (131 lb 2.8 oz), SpO2 92%, not currently breastfeeding.'  Body mass index is 21.83 kg/m².   Gen: No distress.    ENT:   Resp: No accessory muscle use. No crackles. No wheezes. No rhonchi.    CV: Regular rate. Regular rhythm. No murmur or rub. No edema.   Skin: Warm, dry, normal texture and turgor. No nodule on exposed extremities.   M/S: No cyanosis. No clubbing. No joint deformity.  Psych: Oriented x 3. No anxiety.  Awake. Alert. Intact judgement and insight. Good Mood / Affect.  Memory appears in tact       Medications:    Scheduled Meds:    clear.  Heart size and vascularity are stable.  Trace left  effusion is stable.  There is mild gaseous distention of the proximal colon.  There is no significant small bowel dilatation.  Postop changes of  cholecystectomy are present.  Peritoneal dialysis catheter is coiled within  the left hemipelvis.  There is no organomegaly or suspicious calcification.    Impression  1. Left basilar atelectasis versus pneumonia.  2. Mild ileus.             This note was transcribed using Dragon Dictation software. Please disregard any translational errors.      ITZ NARVAEZ   [facility] Sleep [facility]  [phone]

## 2025-05-26 NOTE — PROGRESS NOTES
1.00     Average packs/day: 1 pack/day for 57.4 years (57.4 ttl pk-yrs)     Types: Cigarettes     Start date: 1/1/1968     Passive exposure: Past (on patch)    Smokeless tobacco: Never    Tobacco comments:     I have quite on and off.i have used Nicorett product.the patch was working until i had a reaction.   Vaping Use    Vaping status: Never Used   Substance Use Topics    Alcohol use: No    Drug use: No       Current Facility-Administered Medications:     sodium chloride (OCEAN, BABY AYR) 0.65 % nasal spray 1 spray, 1 spray, Each Nostril, Q2H PRN, Candido Bentley MD, 1 spray at 05/25/25 2116    cefTRIAXone (ROCEPHIN) 2,000 mg in sodium chloride 0.9 % 50 mL IVPB (addEASE), 2,000 mg, IntraVENous, Q24H, Sven Ryder MD, Last Rate: 100 mL/hr at 05/26/25 1008, 2,000 mg at 05/26/25 1008    sodium chloride (Inhalant) 3 % nebulizer solution 4 mL, 4 mL, Nebulization, BID RT, Sven Ryder MD, 4 mL at 05/26/25 0756    guaiFENesin (MUCINEX) extended release tablet 1,200 mg, 1,200 mg, Oral, BID, Sven Ryder MD, 1,200 mg at 05/26/25 0831    sevelamer (RENVELA) tablet 800 mg, 800 mg, Oral, TID WC, Sapna Brito MD, 800 mg at 05/26/25 0831    pantoprazole (PROTONIX) 40 mg in sodium chloride (PF) 0.9 % 10 mL injection, 40 mg, IntraVENous, Q12H, Parker Rdz MD, 40 mg at 05/26/25 0832    dextrose 5 % and 0.45 % sodium chloride infusion, , IntraVENous, Continuous, Susu Fuentes MD, Last Rate: 50 mL/hr at 05/26/25 0850, Rate Verify at 05/26/25 0850    [START ON 5/27/2025] hemin (PANHEMATIN) injection 180 mg, 180 mg, IntraVENous, Daily, Susu Fuentes MD    [Held by provider] NIFEdipine (PROCARDIA XL) extended release tablet 30 mg, 30 mg, Oral, Daily, Parker Rdz MD    metoclopramide (REGLAN) tablet 5 mg, 5 mg, Oral, TID AC, Candido Bentley MD, 5 mg at 05/26/25 1008    ALPRAZolam (XANAX) tablet 1.5 mg, 1.5 mg, Oral, Q12H PRN, Parker Rdz MD, 1.5 mg at 05/25/25 2118    lactulose  rec ARU, PMR consult    Leukocytosis - improving  Normocytic anemia - stable  -leukocytosis likely related to above  -Hg stable  -no signs of bleeding/bruising  -cbc daily    Code: full  DVT ppx: heparin  Dispo: transfer to [facility]    Alexander Costa D.O.  Internal Medicine  [facility] Internal Medicine

## 2025-05-27 LAB
ALBUMIN SERPL-MCNC: 2.9 G/DL (ref 3.4–5)
ANION GAP SERPL CALCULATED.3IONS-SCNC: 16 MMOL/L (ref 3–16)
BUN SERPL-MCNC: 48 MG/DL (ref 7–20)
CALCIUM SERPL-MCNC: 8.7 MG/DL (ref 8.3–10.6)
CHLORIDE SERPL-SCNC: 100 MMOL/L (ref 99–110)
CO2 SERPL-SCNC: 21 MMOL/L (ref 21–32)
CREAT SERPL-MCNC: 9.1 MG/DL (ref 0.6–1.2)
GFR SERPLBLD CREATININE-BSD FMLA CKD-EPI: 4 ML/MIN/{1.73_M2}
GLUCOSE SERPL-MCNC: 125 MG/DL (ref 70–99)
PATH CONSULT FLUID: NORMAL
PHOSPHATE SERPL-MCNC: 4.5 MG/DL (ref 2.5–4.9)
POTASSIUM SERPL-SCNC: 3.9 MMOL/L (ref 3.5–5.1)
SODIUM SERPL-SCNC: 137 MMOL/L (ref 136–145)

## 2025-05-27 PROCEDURE — 90945 DIALYSIS ONE EVALUATION: CPT

## 2025-05-27 PROCEDURE — 6370000000 HC RX 637 (ALT 250 FOR IP): Performed by: INTERNAL MEDICINE

## 2025-05-27 PROCEDURE — 94760 N-INVAS EAR/PLS OXIMETRY 1: CPT

## 2025-05-27 PROCEDURE — 2500000003 HC RX 250 WO HCPCS: Performed by: INTERNAL MEDICINE

## 2025-05-27 PROCEDURE — 6370000000 HC RX 637 (ALT 250 FOR IP): Performed by: STUDENT IN AN ORGANIZED HEALTH CARE EDUCATION/TRAINING PROGRAM

## 2025-05-27 PROCEDURE — 99232 SBSQ HOSP IP/OBS MODERATE 35: CPT | Performed by: STUDENT IN AN ORGANIZED HEALTH CARE EDUCATION/TRAINING PROGRAM

## 2025-05-27 PROCEDURE — 6360000002 HC RX W HCPCS: Performed by: INTERNAL MEDICINE

## 2025-05-27 PROCEDURE — 36415 COLL VENOUS BLD VENIPUNCTURE: CPT

## 2025-05-27 PROCEDURE — 80069 RENAL FUNCTION PANEL: CPT

## 2025-05-27 PROCEDURE — 2580000003 HC RX 258: Performed by: INTERNAL MEDICINE

## 2025-05-27 PROCEDURE — 97530 THERAPEUTIC ACTIVITIES: CPT

## 2025-05-27 PROCEDURE — 2060000000 HC ICU INTERMEDIATE R&B

## 2025-05-27 PROCEDURE — 94640 AIRWAY INHALATION TREATMENT: CPT

## 2025-05-27 PROCEDURE — 94669 MECHANICAL CHEST WALL OSCILL: CPT

## 2025-05-27 RX ORDER — SODIUM CHLORIDE, SODIUM LACTATE, CALCIUM CHLORIDE, MAGNESIUM CHLORIDE AND DEXTROSE 2.5; 538; 448; 18.3; 5.08 G/100ML; MG/100ML; MG/100ML; MG/100ML; MG/100ML
2000 INJECTION, SOLUTION INTRAPERITONEAL NIGHTLY
Status: DISCONTINUED | OUTPATIENT
Start: 2025-05-27 | End: 2025-05-28

## 2025-05-27 RX ADMIN — TRAMADOL HYDROCHLORIDE 50 MG: 50 TABLET, COATED ORAL at 23:35

## 2025-05-27 RX ADMIN — ROPINIROLE HYDROCHLORIDE 1 MG: 1 TABLET, FILM COATED ORAL at 19:57

## 2025-05-27 RX ADMIN — SODIUM CHLORIDE, PRESERVATIVE FREE 10 ML: 5 INJECTION INTRAVENOUS at 19:59

## 2025-05-27 RX ADMIN — METOCLOPRAMIDE 5 MG: 10 TABLET ORAL at 09:22

## 2025-05-27 RX ADMIN — TORSEMIDE 100 MG: 100 TABLET ORAL at 09:23

## 2025-05-27 RX ADMIN — GUAIFENESIN 1200 MG: 600 TABLET, EXTENDED RELEASE ORAL at 09:22

## 2025-05-27 RX ADMIN — SEVELAMER CARBONATE 800 MG: 800 TABLET, FILM COATED ORAL at 09:22

## 2025-05-27 RX ADMIN — Medication 1 SPRAY: at 09:21

## 2025-05-27 RX ADMIN — GENTAMICIN SULFATE: 1 CREAM TOPICAL at 05:42

## 2025-05-27 RX ADMIN — TIOTROPIUM BROMIDE AND OLODATEROL 2 PUFF: 3.124; 2.736 SPRAY, METERED RESPIRATORY (INHALATION) at 11:33

## 2025-05-27 RX ADMIN — Medication 4 ML: at 21:11

## 2025-05-27 RX ADMIN — ASPIRIN 81 MG: 81 TABLET, CHEWABLE ORAL at 09:23

## 2025-05-27 RX ADMIN — CLOPIDOGREL BISULFATE 75 MG: 75 TABLET, FILM COATED ORAL at 09:23

## 2025-05-27 RX ADMIN — ALPRAZOLAM 1.5 MG: 0.5 TABLET ORAL at 09:23

## 2025-05-27 RX ADMIN — GUAIFENESIN 1200 MG: 600 TABLET, EXTENDED RELEASE ORAL at 19:57

## 2025-05-27 RX ADMIN — LOSARTAN POTASSIUM 100 MG: 100 TABLET, FILM COATED ORAL at 19:57

## 2025-05-27 RX ADMIN — TIZANIDINE 4 MG: 4 TABLET ORAL at 01:33

## 2025-05-27 RX ADMIN — POLYETHYLENE GLYCOL 3350 17 G: 17 POWDER, FOR SOLUTION ORAL at 09:22

## 2025-05-27 RX ADMIN — METOCLOPRAMIDE 5 MG: 10 TABLET ORAL at 16:24

## 2025-05-27 RX ADMIN — TIZANIDINE 4 MG: 4 TABLET ORAL at 09:23

## 2025-05-27 RX ADMIN — NIFEDIPINE 30 MG: 30 TABLET, FILM COATED, EXTENDED RELEASE ORAL at 09:22

## 2025-05-27 RX ADMIN — SEVELAMER CARBONATE 800 MG: 800 TABLET, FILM COATED ORAL at 16:24

## 2025-05-27 RX ADMIN — SODIUM CHLORIDE, PRESERVATIVE FREE 10 ML: 5 INJECTION INTRAVENOUS at 09:25

## 2025-05-27 RX ADMIN — Medication 1 LOZENGE: at 09:23

## 2025-05-27 RX ADMIN — ALPRAZOLAM 1.5 MG: 0.5 TABLET ORAL at 23:34

## 2025-05-27 RX ADMIN — SPIRONOLACTONE 50 MG: 25 TABLET ORAL at 09:23

## 2025-05-27 RX ADMIN — ROSUVASTATIN CALCIUM 10 MG: 10 TABLET, FILM COATED ORAL at 19:57

## 2025-05-27 RX ADMIN — ACETAMINOPHEN 650 MG: 325 TABLET ORAL at 01:33

## 2025-05-27 RX ADMIN — CARVEDILOL 12.5 MG: 12.5 TABLET, FILM COATED ORAL at 16:24

## 2025-05-27 RX ADMIN — POLYETHYLENE GLYCOL 3350 17 G: 17 POWDER, FOR SOLUTION ORAL at 19:56

## 2025-05-27 RX ADMIN — TRAMADOL HYDROCHLORIDE 50 MG: 50 TABLET, COATED ORAL at 09:22

## 2025-05-27 RX ADMIN — IPRATROPIUM BROMIDE AND ALBUTEROL SULFATE 1 DOSE: .5; 2.5 SOLUTION RESPIRATORY (INHALATION) at 21:11

## 2025-05-27 RX ADMIN — SODIUM CHLORIDE, SODIUM LACTATE, CALCIUM CHLORIDE, MAGNESIUM CHLORIDE AND DEXTROSE 2000 ML: 2.5; 538; 448; 18.3; 5.08 INJECTION, SOLUTION INTRAPERITONEAL at 22:39

## 2025-05-27 RX ADMIN — DEXTROSE AND SODIUM CHLORIDE: 5; .45 INJECTION, SOLUTION INTRAVENOUS at 16:26

## 2025-05-27 RX ADMIN — Medication 4 ML: at 11:32

## 2025-05-27 RX ADMIN — METOCLOPRAMIDE 5 MG: 10 TABLET ORAL at 05:22

## 2025-05-27 RX ADMIN — CEFTRIAXONE SODIUM 2000 MG: 2 INJECTION, POWDER, FOR SOLUTION INTRAMUSCULAR; INTRAVENOUS at 09:31

## 2025-05-27 RX ADMIN — FLUCONAZOLE 200 MG: 100 TABLET ORAL at 09:23

## 2025-05-27 RX ADMIN — SODIUM CHLORIDE, SODIUM LACTATE, CALCIUM CHLORIDE, MAGNESIUM CHLORIDE AND DEXTROSE 2000 ML: 2.5; 538; 448; 18.3; 5.08 INJECTION, SOLUTION INTRAPERITONEAL at 23:39

## 2025-05-27 RX ADMIN — IPRATROPIUM BROMIDE AND ALBUTEROL SULFATE 1 DOSE: .5; 2.5 SOLUTION RESPIRATORY (INHALATION) at 11:29

## 2025-05-27 RX ADMIN — HEPARIN SODIUM 5000 UNITS: 5000 INJECTION INTRAVENOUS; SUBCUTANEOUS at 05:23

## 2025-05-27 RX ADMIN — SODIUM CHLORIDE, PRESERVATIVE FREE 40 MG: 5 INJECTION INTRAVENOUS at 19:57

## 2025-05-27 RX ADMIN — HEPARIN SODIUM 5000 UNITS: 5000 INJECTION INTRAVENOUS; SUBCUTANEOUS at 23:34

## 2025-05-27 RX ADMIN — SEVELAMER CARBONATE 800 MG: 800 TABLET, FILM COATED ORAL at 12:18

## 2025-05-27 RX ADMIN — CARVEDILOL 12.5 MG: 12.5 TABLET, FILM COATED ORAL at 09:23

## 2025-05-27 RX ADMIN — SENNOSIDES AND DOCUSATE SODIUM 2 TABLET: 50; 8.6 TABLET ORAL at 09:23

## 2025-05-27 RX ADMIN — SODIUM CHLORIDE, PRESERVATIVE FREE 40 MG: 5 INJECTION INTRAVENOUS at 09:22

## 2025-05-27 RX ADMIN — DIPHENHYDRAMINE HYDROCHLORIDE 5 ML: 12.5 LIQUID ORAL at 14:52

## 2025-05-27 RX ADMIN — HEPARIN SODIUM 5000 UNITS: 5000 INJECTION INTRAVENOUS; SUBCUTANEOUS at 14:21

## 2025-05-27 RX ADMIN — TROSPIUM CHLORIDE 20 MG: 20 TABLET, FILM COATED ORAL at 19:57

## 2025-05-27 ASSESSMENT — PAIN DESCRIPTION - ONSET
ONSET: AWAKENED FROM SLEEP
ONSET: AWAKENED FROM SLEEP

## 2025-05-27 ASSESSMENT — PAIN DESCRIPTION - LOCATION
LOCATION: THROAT
LOCATION: SHOULDER
LOCATION: BACK
LOCATION: MOUTH

## 2025-05-27 ASSESSMENT — PAIN SCALES - GENERAL
PAINLEVEL_OUTOF10: 6
PAINLEVEL_OUTOF10: 0
PAINLEVEL_OUTOF10: 0
PAINLEVEL_OUTOF10: 10
PAINLEVEL_OUTOF10: 0
PAINLEVEL_OUTOF10: 3
PAINLEVEL_OUTOF10: 5

## 2025-05-27 ASSESSMENT — PAIN SCALES - WONG BAKER
WONGBAKER_NUMERICALRESPONSE: NO HURT

## 2025-05-27 ASSESSMENT — PAIN DESCRIPTION - DESCRIPTORS
DESCRIPTORS: ACHING

## 2025-05-27 ASSESSMENT — PAIN DESCRIPTION - FREQUENCY
FREQUENCY: INTERMITTENT
FREQUENCY: INTERMITTENT

## 2025-05-27 ASSESSMENT — PAIN DESCRIPTION - ORIENTATION
ORIENTATION: LEFT
ORIENTATION: INNER
ORIENTATION: MID
ORIENTATION: MID

## 2025-05-27 ASSESSMENT — PAIN DESCRIPTION - PAIN TYPE
TYPE: ACUTE PAIN
TYPE: ACUTE PAIN

## 2025-05-27 NOTE — PLAN OF CARE
Problem: ABCDS Injury Assessment  Goal: Absence of physical injury  5/27/2025 1055 by Silva Porter RN  Outcome: Progressing  5/27/2025 0058 by Sabrina Gomes RN  Outcome: Progressing     Problem: Safety - Adult  Goal: Free from fall injury  5/27/2025 1055 by Silva Porter RN  Outcome: Progressing  5/27/2025 0058 by Sabrina Gomes RN  Outcome: Progressing

## 2025-05-27 NOTE — PLAN OF CARE
Problem: Discharge Planning  Goal: Discharge to home or other facility with appropriate resources  5/27/2025 0058 by Sabrina Gomes RN  Outcome: Progressing     Problem: ABCDS Injury Assessment  Goal: Absence of physical injury  5/27/2025 0058 by Sabrina Gomes RN  Outcome: Progressing     Problem: Safety - Adult  Goal: Free from fall injury  5/27/2025 0058 by Sabrina Gomes RN  Outcome: Progressing     Problem: Pain  Goal: Verbalizes/displays adequate comfort level or baseline comfort level  5/27/2025 0058 by Sabrina Gomes RN  Outcome: Progressing     Problem: Genitourinary - Adult  Goal: Absence of urinary retention  5/27/2025 0058 by Sabrina Gomes RN  Outcome: Progressing     Problem: Metabolic/Fluid and Electrolytes - Adult  Goal: Electrolytes maintained within normal limits  5/27/2025 0058 by Sabrina Gomes RN  Outcome: Progressing     Problem: Neurosensory - Adult  Goal: Achieves stable or improved neurological status  5/27/2025 0058 by Sabrina Gomes RN  Outcome: Progressing     Problem: Respiratory - Adult  Goal: Achieves optimal ventilation and oxygenation  5/27/2025 0058 by Sabrina Gomes RN  Outcome: Progressing     Problem: Cardiovascular - Adult  Goal: Maintains optimal cardiac output and hemodynamic stability  5/27/2025 0058 by Sabrina Gomes RN  Outcome: Progressing     Problem: Skin/Tissue Integrity - Adult  Goal: Skin integrity remains intact  Description: 1.  Monitor for areas of redness and/or skin breakdown2.  Assess vascular access sites hourly3.  Every 4-6 hours minimum:  Change oxygen saturation probe site4.  Every 4-6 hours:  If on nasal continuous positive airway pressure, respiratory therapy assess nares and determine need for appliance change or resting period  5/27/2025 0058 by Sabrina Gomes RN  Outcome: Progressing     Problem: Musculoskeletal - Adult  Goal: Return mobility to safest level of function  5/27/2025 0058 by Sabrina Gomes

## 2025-05-27 NOTE — PROGRESS NOTES
Slept off and on throughout the night. Denies SOB or chest discomfort. Assist of 1 to the BR. Voided sufficient amount and assisted back to bed. PD dressing changed. Medicated for complaints of back pain. Patient states she had a small sore in her nose and attempted to blow her nose which resulted in a small nose bleed. Bleeding resolved. PD in place and tolerating well. Care ongoing.

## 2025-05-27 NOTE — CARE COORDINATION
The Plan for Transition of Care is related to the following treatment goals:     Acute rehab at 5-7 days a week.    The Patient and/or patient representative was provided with a choice of provider and agrees   with the discharge plan. [x] Yes [] No    Freedom of choice list was provided with basic dialogue that supports the patient's individualized plan of care/goals, treatment preferences and shares the quality data associated with the providers. [x] Yes [] No    SW met with patient and daughter Marguerite and reviewed updated therapy recommendations. They are in agreement with acute rehab and would like [facility].     SW did explain that we will have to obtain authorization through [place]. If we are unsuccessful [facility] and [facility] both stated that clinically they could accept if needed. They are both comfortable working with [facility] PD program.    Respectfully submitted,    Radha WOLFF, LISW-S  [facility]   [phone]    Electronically signed by NEW Golden, SAKINA on 5/27/2025 at 9:51 AM

## 2025-05-27 NOTE — PROGRESS NOTES
Internal Medicine Hospital Progress Note    Patient:  Jonelle Contreras 74 y.o. female MRN: [id number]     Date of Service: 5/27/2025    Allergy: Iodine, Iodinated contrast media, Wellbutrin [bupropion], Adhesive tape, Sertraline, and Sulfa antibiotics  CC: F/u:   Brief Course   Jonelle Contreras was admitted on 5/12/2025 for Hypertensive urgency. Jonelle Contreras has been admitted for 15 days    24 hr interval hx:  Slept well overnight  Eating more  Did tolerate dialysis overnight  Objective     Physical Exam:  Vitals: BP (!) 140/89   Pulse 71   Temp 97.5 °F (36.4 °C) (Axillary)   Resp 17   Ht 1.651 m (5' 5\")   Wt 59.1 kg (130 lb 4.7 oz)   SpO2 90%   BMI 21.68 kg/m²     General: Alert and oriented X3. No acute distress  Eyes: PEERL. No scleral icterus noted. Normal appearing conjunctiva bilaterally  Ears: Normal TM and external canal bilaterally.  Oral cavity/Throat: No pharyngeal erythema. No oral lesions.  Cardiovascular: Heart is regular rate and rhythm. No murmurs noted. Radial pulses 2+. Posterior tibial pulses 2+. No lower extremity edema noted  Pulmonary: Lungs clear to auscultation bilaterally  Skin: Dry, warm. No obvious skin lesions or rashes noted.  Neuro: PEERL. EOM intact. No facial droop. Normal sensation in bilateral upper and lower extremities. Gait is norm    Pertinent/ New Labs and Imaging Studies   Labs reviewed. Pertinent labs noted in assessment and plan      Assessment and Plan   Jonelle Contreras was admitted to hospital for Hypertensive urgency    Hypertensive urgency  - Nephrology consulted  - Blood pressure has been soft    Abdominal pain  - GI following  - Lactulose ordered for constipation  - Kub today  - Heme/onc consulted, unable to test for acute intermittent porphyria giventhat she does not make urine.     Sore throat, difficulty swallowing  Esophagitis.    ESRD on PD  - Nephrology consulted, managing PD orders    Hyperlipidemia  - Continue home statin    Restless leg syndrom  - Continue

## 2025-05-27 NOTE — PROGRESS NOTES
1600 (!) 168/84 97.3 °F (36.3 °C) Oral 84 15 93 % --   05/26/25 1500 (!) 171/78 -- -- 87 18 93 % --   05/26/25 1446 (!) 154/89 -- -- 86 19 93 % --   05/26/25 1400 (!) 163/88 -- -- 82 18 96 % --   05/26/25 1300 (!) 144/132 -- -- 82 21 94 % --   05/26/25 1230 (!) 157/86 -- -- 81 16 96 % --   05/26/25 1200 (!) 142/75 97.5 °F (36.4 °C) Oral 79 14 97 % --   05/26/25 1131 -- -- -- 80 16 (!) 86 % --   05/26/25 1100 136/77 -- -- 78 14 92 % --   05/26/25 1000 134/80 -- -- 82 18 94 % --   05/26/25 0900 (!) 162/84 -- -- 93 22 92 % --       24HR INTAKE/OUTPUT:    Intake/Output Summary (Last 24 hours) at 5/27/2025 0829  Last data filed at 5/27/2025 0600  Gross per 24 hour   Intake 2077.53 ml   Output 150 ml   Net 1927.53 ml       CONSTITUTIONAL: awake, alert, cooperative, no apparent distress   EYES: pupils equal, round and reactive to light, sclera clear and conjunctiva normal  ENT: Normocephalic, without obvious abnormality, atraumatic  NECK: supple, symmetrical, no jugular venous distension and no carotid bruits   HEMATOLOGIC/LYMPHATIC: no cervical, supraclavicular or axillary lymphadenopathy   LUNGS: no increased work of breathing and clear to auscultation   CARDIOVASCULAR: regular rate and rhythm, normal S1 and S2, no murmur noted  ABDOMEN: normal bowel sounds x 4, soft, non-distended, non-tender, no masses palpated, no hepatosplenomgaly   MUSCULOSKELETAL: full range of motion noted, tone is normal  NEUROLOGIC: awake, alert, oriented to name, place and time. Motor skills grossly intact.   SKIN: Normal skin color, texture, turgor and no jaundice. appears intact   EXTREMITIES: no LE edema     DATA:  CBC:    Recent Labs     05/26/25  0528 05/25/25  1404 05/25/25  0537 05/24/25  0658   WBC 11.5* 16.5* 16.8* 13.3*   NEUTROABS 7.9* 11.6* 14.6* 12.6*   LYMPHOPCT 15.7 21.0 6.0 3.2   RBC 3.77* 4.03 3.80* 4.14   HGB 10.5* 11.3* 10.7* 11.8*   HCT 32.3* 34.9* 33.0* 35.8*   MCV 85.7 86.8 86.8 86.5   MCH 27.9 28.2 28.0 28.6   MCHC 32.6  32.5 32.3 33.1   RDW 16.3* 16.3* 16.0* 16.3*    290 360 341       PT/INR:  No results for input(s): \"INR\" in the last 720 hours.    Invalid input(s): \"PROT\"  PTT:  No results for input(s): \"APTT\" in the last 720 hours.    CMP:    Recent Labs     05/27/25  0609 05/26/25  0528 05/25/25  0537 05/24/25  0658 05/13/25  0512 05/12/25  1215 05/09/25  1347    135* 136 134*   < > 140 139   K 3.9 3.9 4.4 4.8   < > 5.7* 7.0*    99 101 100   < > 104 104   CO2 21 22 19* 19*   < > 22 24   GLUCOSE 125* 96 160* 185*   < > 90 83   BUN 48* 46* 50* 49*   < > 93* 85*   CREATININE 9.1* 8.9* 9.4* 11.1*   < > 7.7* 7.0*   LABGLOM 4* 4* 4* 3*   < > 5* 6*   CALCIUM 8.7 8.8 9.3 9.6   < > 9.2 9.0   AGRATIO  --   --   --   --   --  1.2 1.5   BILITOT  --   --   --   --   --  <0.2 <0.2   ALKPHOS  --   --   --   --   --  78 74   ALT  --   --   --   --   --  14 13   AST  --   --   --   --   --  22 23    < > = values in this interval not displayed.       Lab Results   Component Value Date    CALCIUM 8.7 05/27/2025    PHOS 4.5 05/27/2025       LDH:No results for input(s): \"LDH\" in the last 720 hours.    Radiology Review:  XR CHEST PORTABLE  Narrative: EXAMINATION:  ONE XRAY VIEW OF THE CHEST    5/26/2025 6:09 am    COMPARISON:  05/24/2025 and earlier imaging    HISTORY:  ORDERING SYSTEM PROVIDED HISTORY: hypoxemia  TECHNOLOGIST PROVIDED HISTORY:  Reason for exam:-> hypoxemia  Reason for Exam: hypoxemia    FINDINGS:  Bowel gas again seen at the right upper quadrant of the abdomen.  Heart size  and mediastinal contours are similar to prior.  Mild bibasilar airspace  opacities.  Mild left pleural effusion.  Degenerative changes of the  shoulders and the spine.  Impression: Mild bibasilar airspace opacities and mild left pleural effusion.      Problem List  Patient Active Problem List   Diagnosis    Anxiety    Panic disorder    Palpitations    IBS (irritable bowel syndrome)    Depression    Colitis    PAD (peripheral artery disease)

## 2025-05-27 NOTE — PROGRESS NOTES
RT  fluconazole, 200 mg, Daily  dianeal lo-neal 2.5%, 6,000 mL, Nightly   And  dianeal lo-neal 2.5%, 2,000 mL, Nightly  sennosides-docusate sodium, 2 tablet, Daily  polyethylene glycol, 17 g, BID  torsemide, 100 mg, Daily  sodium chloride flush, 5-40 mL, 2 times per day  heparin (porcine), 5,000 Units, 3 times per day  aspirin, 81 mg, Daily  carvedilol, 12.5 mg, BID WC  clopidogrel, 75 mg, Daily  losartan, 100 mg, Nightly  rOPINIRole, 1 mg, Nightly  rosuvastatin, 10 mg, Nightly  trospium, 20 mg, Nightly  tiotropium-olodaterol, 2 puff, Daily  spironolactone, 50 mg, Daily       Iodine, Iodinated contrast media, Wellbutrin [bupropion], Adhesive tape, Sertraline, and Sulfa antibiotics    Allergies:   Allergies   Allergen Reactions    Iodine Itching and Nausea And Vomiting    Iodinated Contrast Media     Wellbutrin [Bupropion]      Dry mouth    Adhesive Tape Rash    Sertraline Other (See Comments)     Severe dry mouth    Sulfa Antibiotics Itching and Nausea Only         Physical Exam/Objective:   Vitals:    05/27/25 1618   BP: (!) 140/89   Pulse: 71   Resp: 17   Temp: 97.5 °F (36.4 °C)   SpO2: 90%       Intake/Output Summary (Last 24 hours) at 5/27/2025 1650  Last data filed at 5/27/2025 1632  Gross per 24 hour   Intake 2221.1 ml   Output 700 ml   Net 1521.1 ml         General appearance: in no acute distress.   Respiratory: Respiratory effort normal, bilateral equal chest rise. No wheeze, no crackles   Cardiovascular: Ausculation shows RRR and no edema   Skin: no rashes,  no jaundice   Neuro:  Follows commands, moves all extremities spontaneously       Data:   CBC:   Recent Labs     05/25/25  0537 05/25/25  1404 05/26/25  0528   WBC 16.8* 16.5* 11.5*   HGB 10.7* 11.3* 10.5*   HCT 33.0* 34.9* 32.3*    290 304       BMP:    Recent Labs     05/25/25  0537 05/26/25  0528 05/27/25  0609    135* 137   K 4.4 3.9 3.9    99 100   CO2 19* 22 21   BUN 50* 46* 48*   CREATININE 9.4* 8.9* 9.1*   GLUCOSE 160* 96 125*    PHOS 5.2* 4.1 4.5     Lab Results   Component Value Date/Time    COLORU Yellow 05/25/2025 09:35 PM    NITRU Negative 05/25/2025 09:35 PM    GLUCOSEU 100 05/25/2025 09:35 PM    KETUA Negative 05/25/2025 09:35 PM    UROBILINOGEN 0.2 05/25/2025 09:35 PM    BILIRUBINUR Negative 05/25/2025 09:35 PM

## 2025-05-27 NOTE — PROGRESS NOTES
Prior auth initiated through [facility] portal for [facility] with pending auth #[id number]. Will monitor portal for changes and update CM and MD as needed.

## 2025-05-27 NOTE — PROGRESS NOTES
[facility] - Occupational Therapy   Phone: [phone]    Occupational Therapy  Facility/Department: [address] ICU      [] Initial Evaluation            [x] Daily Treatment Note         [] Discharge Summary      Patient: Jonelle Contreras   : 1950   MRN: [id number]   Date of Service:  2025    Staff Mobility Recommendation: walker x1 bed <> chair, stedy to bathroom d/t orthostatic vitals    AM-PAC Score: 16/24  Discharge Recommendations: 5-7  Jonelle Contreras scored a 16/24 on the AM-PAC ADL Inpatient form. Current research shows that an AM-PAC score of 17 or less is typically not associated with a discharge to the patient's home setting. Based on the patient's AM-PAC score and their current ADL deficits, it is recommended that the patient have 5-7 sessions per week of Occupational Therapy at d/c to increase the patient's independence.  At this time, this patient demonstrates complex nursing, medical, and rehabilitative needs, and would benefit from intensive rehabilitation services upon discharge from the Inpatient setting.  This patient demonstrates the ability to participate in and benefit from an intensive therapy program with a coordinated interdisciplinary team approach to foster frequent, structured, and documented communication among disciplines, who will work together to establish, prioritize, and achieve treatment goals. Please see assessment section for further patient specific details.    If patient discharges prior to next session this note will serve as a discharge summary.  Please see below for the latest assessment towards goals.      Admitting Diagnosis:  Hypertensive urgency  Ordering Physician: Candido Bentley MD   Current Admission Summary: \"74-year-old lady with a past medical history significant for ESRD on PD, hypertension and CAD who presented for evaluation of hyperkalemia and hypertension. She had been at baseline health yesterday and referred to the emergency department

## 2025-05-27 NOTE — PROGRESS NOTES
Comprehensive Nutrition Assessment    Type and Reason for Visit:  Reassess    Nutrition Recommendations/Plan:   Continue regular diet  Discontinue Nepro per patient request  Encourage nutrition and fluid intake as appropriate, at risk for malnutrition     Malnutrition Assessment:  Malnutrition Status:  At risk for malnutrition (05/27/25 7096)    Context:  Acute Illness     Findings of the 6 clinical characteristics of malnutrition:  Energy Intake:  75% or less of estimated energy requirements for 7 or more days  Weight Loss:  Unable to assess     Body Fat Loss:  Unable to assess     Muscle Mass Loss:  No muscle mass loss    Fluid Accumulation:  Unable to assess     Strength:  Not Performed    Nutrition Assessment:    Follow up: Patient receiving peritoneal dialysis.   Patient reported not having much of an appetite.  On average patient is eating 50% of regular diet.  Not drinking Nepro, will discontinue.  Patient sleepy at time of visit.  Will continue to monitor for any nutrition progression.    Nutrition Related Findings:    Phos up to 6 this am; last BM on 5/20 Wound Type:  (PD insertion site)       Current Nutrition Intake & Therapies:    Average Meal Intake: 51-75%  Average Supplements Intake: Refusing to take  ADULT DIET; Regular    Anthropometric Measures:  Height: 165.1 cm (5' 5\")  Ideal Body Weight (IBW): 125 lbs (57 kg)    Admission Body Weight: 64 kg (141 lb)  Current Body Weight: 59 kg (130 lb 1.1 oz),   IBW. Weight Source: Standing scale  Current BMI (kg/m2): 21.6                             BMI Categories: Normal Weight (BMI 18.5-24.9)    Estimated Daily Nutrient Needs:  Energy Requirements Based On: Kcal/kg  Weight Used for Energy Requirements: Current  Energy (kcal/day): 3306-7415 (32-35 kcal/59 kg)  Weight Used for Protein Requirements: Current  Protein (g/day): 77-89 (1.3-1.5 g/59 kg)  Method Used for Fluid Requirements: 1 ml/kcal  Fluid (ml/day): per Provider    Nutrition Diagnosis:

## 2025-05-28 LAB
ANION GAP SERPL CALCULATED.3IONS-SCNC: 17 MMOL/L (ref 3–16)
BASOPHILS # BLD: 0.1 K/UL (ref 0–0.2)
BASOPHILS NFR BLD: 0.4 %
BUN SERPL-MCNC: 49 MG/DL (ref 7–20)
CALCIUM SERPL-MCNC: 9.2 MG/DL (ref 8.3–10.6)
CHLORIDE SERPL-SCNC: 98 MMOL/L (ref 99–110)
CO2 SERPL-SCNC: 21 MMOL/L (ref 21–32)
CREAT SERPL-MCNC: 9.1 MG/DL (ref 0.6–1.2)
DEPRECATED RDW RBC AUTO: 16.7 % (ref 12.4–15.4)
EOSINOPHIL # BLD: 0.5 K/UL (ref 0–0.6)
EOSINOPHIL NFR BLD: 3.8 %
GFR SERPLBLD CREATININE-BSD FMLA CKD-EPI: 4 ML/MIN/{1.73_M2}
GLUCOSE SERPL-MCNC: 122 MG/DL (ref 70–99)
HCT VFR BLD AUTO: 33 % (ref 36–48)
HGB BLD-MCNC: 11 G/DL (ref 12–16)
LYMPHOCYTES # BLD: 1.1 K/UL (ref 1–5.1)
LYMPHOCYTES NFR BLD: 8.3 %
MCH RBC QN AUTO: 28.4 PG (ref 26–34)
MCHC RBC AUTO-ENTMCNC: 33.2 G/DL (ref 31–36)
MCV RBC AUTO: 85.8 FL (ref 80–100)
MONOCYTES # BLD: 1.4 K/UL (ref 0–1.3)
MONOCYTES NFR BLD: 10.6 %
NEUTROPHILS # BLD: 10.4 K/UL (ref 1.7–7.7)
NEUTROPHILS NFR BLD: 76.9 %
PLATELET # BLD AUTO: 284 K/UL (ref 135–450)
PMV BLD AUTO: 8.4 FL (ref 5–10.5)
POTASSIUM SERPL-SCNC: 4.5 MMOL/L (ref 3.5–5.1)
RBC # BLD AUTO: 3.85 M/UL (ref 4–5.2)
SODIUM SERPL-SCNC: 136 MMOL/L (ref 136–145)
WBC # BLD AUTO: 13.6 K/UL (ref 4–11)

## 2025-05-28 PROCEDURE — 6370000000 HC RX 637 (ALT 250 FOR IP): Performed by: INTERNAL MEDICINE

## 2025-05-28 PROCEDURE — 94669 MECHANICAL CHEST WALL OSCILL: CPT

## 2025-05-28 PROCEDURE — 2060000000 HC ICU INTERMEDIATE R&B

## 2025-05-28 PROCEDURE — 6370000000 HC RX 637 (ALT 250 FOR IP): Performed by: STUDENT IN AN ORGANIZED HEALTH CARE EDUCATION/TRAINING PROGRAM

## 2025-05-28 PROCEDURE — 2500000003 HC RX 250 WO HCPCS: Performed by: INTERNAL MEDICINE

## 2025-05-28 PROCEDURE — 2580000003 HC RX 258: Performed by: INTERNAL MEDICINE

## 2025-05-28 PROCEDURE — 90945 DIALYSIS ONE EVALUATION: CPT

## 2025-05-28 PROCEDURE — 94640 AIRWAY INHALATION TREATMENT: CPT

## 2025-05-28 PROCEDURE — 94760 N-INVAS EAR/PLS OXIMETRY 1: CPT

## 2025-05-28 PROCEDURE — 6360000002 HC RX W HCPCS: Performed by: INTERNAL MEDICINE

## 2025-05-28 PROCEDURE — 80048 BASIC METABOLIC PNL TOTAL CA: CPT

## 2025-05-28 PROCEDURE — A4722 DIALYS SOL FLD VOL > 1999CC: HCPCS | Performed by: INTERNAL MEDICINE

## 2025-05-28 PROCEDURE — 36415 COLL VENOUS BLD VENIPUNCTURE: CPT

## 2025-05-28 PROCEDURE — 99232 SBSQ HOSP IP/OBS MODERATE 35: CPT | Performed by: STUDENT IN AN ORGANIZED HEALTH CARE EDUCATION/TRAINING PROGRAM

## 2025-05-28 PROCEDURE — 97530 THERAPEUTIC ACTIVITIES: CPT

## 2025-05-28 PROCEDURE — 85025 COMPLETE CBC W/AUTO DIFF WBC: CPT

## 2025-05-28 PROCEDURE — 97535 SELF CARE MNGMENT TRAINING: CPT

## 2025-05-28 RX ORDER — PANTOPRAZOLE SODIUM 40 MG/1
40 TABLET, DELAYED RELEASE ORAL 2 TIMES DAILY
Status: DISCONTINUED | OUTPATIENT
Start: 2025-05-28 | End: 2025-05-30 | Stop reason: HOSPADM

## 2025-05-28 RX ORDER — SODIUM CHLORIDE, SODIUM LACTATE, CALCIUM CHLORIDE, MAGNESIUM CHLORIDE AND DEXTROSE 1.5; 538; 448; 18.3; 5.08 G/100ML; MG/100ML; MG/100ML; MG/100ML; MG/100ML
6000 INJECTION, SOLUTION INTRAPERITONEAL NIGHTLY
Status: DISCONTINUED | OUTPATIENT
Start: 2025-05-28 | End: 2025-05-30 | Stop reason: HOSPADM

## 2025-05-28 RX ORDER — TRAMADOL HYDROCHLORIDE 50 MG/1
50 TABLET ORAL ONCE
Status: COMPLETED | OUTPATIENT
Start: 2025-05-28 | End: 2025-05-28

## 2025-05-28 RX ORDER — SODIUM CHLORIDE, SODIUM LACTATE, CALCIUM CHLORIDE, MAGNESIUM CHLORIDE AND DEXTROSE 1.5; 538; 448; 18.3; 5.08 G/100ML; MG/100ML; MG/100ML; MG/100ML; MG/100ML
2000 INJECTION, SOLUTION INTRAPERITONEAL NIGHTLY
Status: DISCONTINUED | OUTPATIENT
Start: 2025-05-28 | End: 2025-05-30 | Stop reason: HOSPADM

## 2025-05-28 RX ADMIN — SEVELAMER CARBONATE 800 MG: 800 TABLET, FILM COATED ORAL at 09:53

## 2025-05-28 RX ADMIN — Medication 1 SPRAY: at 09:49

## 2025-05-28 RX ADMIN — DIPHENHYDRAMINE HYDROCHLORIDE 5 ML: 12.5 LIQUID ORAL at 09:50

## 2025-05-28 RX ADMIN — GUAIFENESIN 1200 MG: 600 TABLET, EXTENDED RELEASE ORAL at 09:53

## 2025-05-28 RX ADMIN — Medication 1 LOZENGE: at 09:56

## 2025-05-28 RX ADMIN — Medication 4 ML: at 19:25

## 2025-05-28 RX ADMIN — ROPINIROLE HYDROCHLORIDE 1 MG: 1 TABLET, FILM COATED ORAL at 20:06

## 2025-05-28 RX ADMIN — PANTOPRAZOLE SODIUM 40 MG: 40 TABLET, DELAYED RELEASE ORAL at 09:54

## 2025-05-28 RX ADMIN — HEPARIN SODIUM 5000 UNITS: 5000 INJECTION INTRAVENOUS; SUBCUTANEOUS at 05:53

## 2025-05-28 RX ADMIN — CLOPIDOGREL BISULFATE 75 MG: 75 TABLET, FILM COATED ORAL at 09:54

## 2025-05-28 RX ADMIN — TIOTROPIUM BROMIDE AND OLODATEROL 2 PUFF: 3.124; 2.736 SPRAY, METERED RESPIRATORY (INHALATION) at 07:47

## 2025-05-28 RX ADMIN — TIZANIDINE 4 MG: 4 TABLET ORAL at 20:06

## 2025-05-28 RX ADMIN — TRAMADOL HYDROCHLORIDE 50 MG: 50 TABLET, COATED ORAL at 09:53

## 2025-05-28 RX ADMIN — SODIUM CHLORIDE, PRESERVATIVE FREE 10 ML: 5 INJECTION INTRAVENOUS at 20:08

## 2025-05-28 RX ADMIN — CARVEDILOL 12.5 MG: 12.5 TABLET, FILM COATED ORAL at 09:55

## 2025-05-28 RX ADMIN — ASPIRIN 81 MG: 81 TABLET, CHEWABLE ORAL at 09:55

## 2025-05-28 RX ADMIN — METOCLOPRAMIDE 5 MG: 10 TABLET ORAL at 05:53

## 2025-05-28 RX ADMIN — TROSPIUM CHLORIDE 20 MG: 20 TABLET, FILM COATED ORAL at 20:06

## 2025-05-28 RX ADMIN — SODIUM CHLORIDE, SODIUM LACTATE, CALCIUM CHLORIDE, MAGNESIUM CHLORIDE AND DEXTROSE 2000 ML: 2.5; 538; 448; 18.3; 5.08 INJECTION, SOLUTION INTRAPERITONEAL at 00:59

## 2025-05-28 RX ADMIN — SEVELAMER CARBONATE 800 MG: 800 TABLET, FILM COATED ORAL at 16:36

## 2025-05-28 RX ADMIN — NIFEDIPINE 30 MG: 30 TABLET, FILM COATED, EXTENDED RELEASE ORAL at 09:53

## 2025-05-28 RX ADMIN — Medication 4 ML: at 07:46

## 2025-05-28 RX ADMIN — PANTOPRAZOLE SODIUM 40 MG: 40 TABLET, DELAYED RELEASE ORAL at 20:06

## 2025-05-28 RX ADMIN — IPRATROPIUM BROMIDE AND ALBUTEROL SULFATE 1 DOSE: .5; 2.5 SOLUTION RESPIRATORY (INHALATION) at 19:25

## 2025-05-28 RX ADMIN — TIZANIDINE 4 MG: 4 TABLET ORAL at 09:53

## 2025-05-28 RX ADMIN — HEPARIN SODIUM 5000 UNITS: 5000 INJECTION INTRAVENOUS; SUBCUTANEOUS at 21:24

## 2025-05-28 RX ADMIN — SODIUM CHLORIDE, PRESERVATIVE FREE 10 ML: 5 INJECTION INTRAVENOUS at 09:44

## 2025-05-28 RX ADMIN — TORSEMIDE 100 MG: 100 TABLET ORAL at 09:53

## 2025-05-28 RX ADMIN — CEFTRIAXONE SODIUM 2000 MG: 2 INJECTION, POWDER, FOR SOLUTION INTRAMUSCULAR; INTRAVENOUS at 09:49

## 2025-05-28 RX ADMIN — LOSARTAN POTASSIUM 100 MG: 100 TABLET, FILM COATED ORAL at 20:06

## 2025-05-28 RX ADMIN — ROSUVASTATIN CALCIUM 10 MG: 10 TABLET, FILM COATED ORAL at 20:06

## 2025-05-28 RX ADMIN — METOCLOPRAMIDE 5 MG: 10 TABLET ORAL at 16:36

## 2025-05-28 RX ADMIN — LACTULOSE 30 G: 10 SOLUTION ORAL at 09:50

## 2025-05-28 RX ADMIN — SEVELAMER CARBONATE 800 MG: 800 TABLET, FILM COATED ORAL at 12:21

## 2025-05-28 RX ADMIN — SODIUM CHLORIDE, SODIUM LACTATE, CALCIUM CHLORIDE, MAGNESIUM CHLORIDE AND DEXTROSE 2000 ML: 1.5; 538; 448; 18.3; 5.08 INJECTION, SOLUTION INTRAPERITONEAL at 21:24

## 2025-05-28 RX ADMIN — SENNOSIDES AND DOCUSATE SODIUM 2 TABLET: 50; 8.6 TABLET ORAL at 09:54

## 2025-05-28 RX ADMIN — HEPARIN SODIUM 5000 UNITS: 5000 INJECTION INTRAVENOUS; SUBCUTANEOUS at 13:28

## 2025-05-28 RX ADMIN — SPIRONOLACTONE 50 MG: 25 TABLET ORAL at 09:54

## 2025-05-28 RX ADMIN — METOCLOPRAMIDE 5 MG: 10 TABLET ORAL at 09:53

## 2025-05-28 RX ADMIN — GUAIFENESIN 1200 MG: 600 TABLET, EXTENDED RELEASE ORAL at 20:06

## 2025-05-28 RX ADMIN — POLYETHYLENE GLYCOL 3350 17 G: 17 POWDER, FOR SOLUTION ORAL at 09:51

## 2025-05-28 RX ADMIN — GENTAMICIN SULFATE: 1 CREAM TOPICAL at 05:58

## 2025-05-28 RX ADMIN — IPRATROPIUM BROMIDE AND ALBUTEROL SULFATE 1 DOSE: .5; 2.5 SOLUTION RESPIRATORY (INHALATION) at 07:46

## 2025-05-28 RX ADMIN — FLUCONAZOLE 200 MG: 100 TABLET ORAL at 09:55

## 2025-05-28 RX ADMIN — SODIUM CHLORIDE, SODIUM LACTATE, CALCIUM CHLORIDE, MAGNESIUM CHLORIDE AND DEXTROSE 6000 ML: 1.5; 538; 448; 18.3; 5.08 INJECTION, SOLUTION INTRAPERITONEAL at 21:24

## 2025-05-28 ASSESSMENT — PAIN SCALES - GENERAL
PAINLEVEL_OUTOF10: 0
PAINLEVEL_OUTOF10: 7
PAINLEVEL_OUTOF10: 0
PAINLEVEL_OUTOF10: 0
PAINLEVEL_OUTOF10: 7
PAINLEVEL_OUTOF10: 0
PAINLEVEL_OUTOF10: 0

## 2025-05-28 ASSESSMENT — PAIN SCALES - WONG BAKER
WONGBAKER_NUMERICALRESPONSE: NO HURT

## 2025-05-28 ASSESSMENT — PAIN DESCRIPTION - ONSET
ONSET: GRADUAL
ONSET: AWAKENED FROM SLEEP

## 2025-05-28 ASSESSMENT — PAIN DESCRIPTION - LOCATION
LOCATION: GENERALIZED
LOCATION: NECK

## 2025-05-28 ASSESSMENT — PAIN DESCRIPTION - FREQUENCY
FREQUENCY: INTERMITTENT
FREQUENCY: INTERMITTENT

## 2025-05-28 ASSESSMENT — PAIN DESCRIPTION - PAIN TYPE
TYPE: ACUTE PAIN
TYPE: ACUTE PAIN

## 2025-05-28 ASSESSMENT — PAIN DESCRIPTION - ORIENTATION: ORIENTATION: MID

## 2025-05-28 ASSESSMENT — PAIN DESCRIPTION - DESCRIPTORS
DESCRIPTORS: ACHING
DESCRIPTORS: ACHING

## 2025-05-28 NOTE — PLAN OF CARE
Problem: Discharge Planning  Goal: Discharge to home or other facility with appropriate resources  Outcome: Progressing     Problem: ABCDS Injury Assessment  Goal: Absence of physical injury  Outcome: Progressing     Problem: Safety - Adult  Goal: Free from fall injury  Outcome: Progressing     Problem: Pain  Goal: Verbalizes/displays adequate comfort level or baseline comfort level  Outcome: Progressing     Problem: Genitourinary - Adult  Goal: Absence of urinary retention  Outcome: Progressing     Problem: Metabolic/Fluid and Electrolytes - Adult  Goal: Electrolytes maintained within normal limits  Outcome: Progressing     Problem: Neurosensory - Adult  Goal: Achieves stable or improved neurological status  Outcome: Progressing     Problem: Respiratory - Adult  Goal: Achieves optimal ventilation and oxygenation  Outcome: Progressing     Problem: Cardiovascular - Adult  Goal: Maintains optimal cardiac output and hemodynamic stability  Outcome: Progressing     Problem: Skin/Tissue Integrity - Adult  Goal: Skin integrity remains intact  Description: 1.  Monitor for areas of redness and/or skin breakdown2.  Assess vascular access sites hourly3.  Every 4-6 hours minimum:  Change oxygen saturation probe site4.  Every 4-6 hours:  If on nasal continuous positive airway pressure, respiratory therapy assess nares and determine need for appliance change or resting period  Outcome: Progressing     Problem: Musculoskeletal - Adult  Goal: Return mobility to safest level of function  Outcome: Progressing     Problem: Gastrointestinal - Adult  Goal: Minimal or absence of nausea and vomiting  Outcome: Progressing     Problem: Infection - Adult  Goal: Absence of infection at discharge  Outcome: Progressing     Problem: Nutrition Deficit:  Goal: Optimize nutritional status  Outcome: Progressing     Problem: Skin/Tissue Integrity  Goal: Skin integrity remains intact  Description: 1.  Monitor for areas of redness and/or skin

## 2025-05-28 NOTE — PROGRESS NOTES
Patient ID: Jonelle Contreras  Referring/ Physician: Candido Bentley MD      Summary:   Jonelle Contreras is being seen by nephrology for ESRD and HTN.     Reason for admission: hypertensive urgency       Interval Hx:   Seen at bedside.   No more SOB. Off oxygen when seen.   She does not have edema.   Had issues with 2.5% dianeal availability so had to use alternative bags manually.  Still getting manual exchanges when seen     /86 > 143/82 > 93/76      Na 136 k 4.5   Bicarb 21  Ca 9.2  Phs 4.5     Assessment/Plan:   - BP acceptable on current regimen. Labile so hold parameters on meds.   - Continue PD but with  1.5% exchanges. She appears euvolemic now.   - use PRN laxative to ensure daily bowel movement.     Ok for rehab from nephrology perspective    ESRD  ESRD secondary to hypertension.  She does peritoneal dialysis at home 1 exchange of 2 L 1.5% solutions daily.  Based on her most recent labs in the outpatient setting her adequacy is not at goal so we are going to be increasing her to 4 exchanges, 2 L at 2.5% solutions.  Will use the cycler while inpatient.  EDW 61.5 kg   - daily BM, on lactulose. Has ileus > GI consulted.    - gent to PD exit site.        Resistant hypertension  The BP has been controlled inpatient on her home regimen, actually low at times suggesting that she may not have been taking her medications as prescribed.   Has had an overall negative secondary work up. Has been extremely difficult to manage outpatient as she reports \"bottoming out and passing out\" when her medications are increased.   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   She has had 24 hr ambulatory BP monitoring showing consistently elevated bP 180s -200s systolic.   Presenting with hypertensive urgency, having ongoing headaches blurry vision and fatigue.  CT head showed no stroke.,  Has chronic microvascular

## 2025-05-28 NOTE — PROGRESS NOTES
Occupational Therapy      Attempted to see pt for OT Tx. Per RN ok for therapy- Pt currently on bed pan and hooked up to PD. Pt rolled R/L twice with min A to remove bedpan, cleanse pt, and change bed linens. OOB deferred 2/2 orthostatic this morning (77/62 standing). Pt positioned for comfort and RN aware. Will follow up as schedule and pt condition permit.      Therapy Time     Individual Co-treatment   Time In 1405     Time Out 1420     Minutes 15     Time coded minutes: 15 min    Electronically signed by AMOS Mendez on 5/28/2025 at 2:28 PM

## 2025-05-28 NOTE — PROGRESS NOTES
Internal Medicine Hospital Progress Note    Patient:  Jonelle Contreras 74 y.o. female MRN: [id number]     Date of Service: 5/28/2025    Allergy: Iodine, Iodinated contrast media, Wellbutrin [bupropion], Adhesive tape, Sertraline, and Sulfa antibiotics  CC: F/u:   Brief Course   Jonelle Contreras was admitted on 5/12/2025 for Hypertensive urgency. Jonelle Contreras has been admitted for 16 days    24 hr interval hx:  Slept well overnight  Eating more  Did tolerate dialysis overnight  Orthostatic with PT today  Objective     Physical Exam:  Vitals: BP 93/76   Pulse 75   Temp 98.2 °F (36.8 °C) (Oral)   Resp 17   Ht 1.651 m (5' 5\")   Wt 61.8 kg (136 lb 3.9 oz)   SpO2 90%   BMI 22.67 kg/m²     General: Alert and oriented X3. No acute distress  Eyes: PEERL. No scleral icterus noted. Normal appearing conjunctiva bilaterally  Ears: Normal TM and external canal bilaterally.  Oral cavity/Throat: No pharyngeal erythema. No oral lesions.  Cardiovascular: Heart is regular rate and rhythm. No murmurs noted. Radial pulses 2+. Posterior tibial pulses 2+. No lower extremity edema noted  Pulmonary: Lungs clear to auscultation bilaterally  Skin: Dry, warm. No obvious skin lesions or rashes noted.  Neuro: PEERL. EOM intact. No facial droop. Normal sensation in bilateral upper and lower extremities. Gait is norm    Pertinent/ New Labs and Imaging Studies   Labs reviewed. Pertinent labs noted in assessment and plan      Assessment and Plan   Jonelle Contreras was admitted to hospital for Hypertensive urgency    Hypertensive urgency  - Nephrology consulted  - Blood pressure has been controlled and she has been orthostatic.    Sore throat, difficulty swallowing  Esophagitis.      ESRD on PD  - Nephrology consulted, managing PD orders    Hyperlipidemia  - Continue home statin    Restless leg syndrom  - Continue ropinirole    Anxiety  - Continue home alprazolam    COPD  - Continue albuterol and stiolto    Overactive bladder  - Continue home

## 2025-05-28 NOTE — PROGRESS NOTES
Call placed to nephrology to clarify PD orders. Please see nursing communication for clarification. Patient resting in bed. No complaints at this time. Care ongoing.

## 2025-05-28 NOTE — PROGRESS NOTES
[employer] prior auth remains pending per [employer] portal for [place] with pending auth #[id number]. Will continue to monitor for changes and update CM and MD as needed.

## 2025-05-28 NOTE — PROGRESS NOTES
Pharmacy to send up smaller PD bags. Call placed to nephrologist to make him aware of possible manual PD. Verified with pharmacy that concentration of solution would not change. MD is aware. Care ongoing.

## 2025-05-28 NOTE — PLAN OF CARE
Problem: ABCDS Injury Assessment  Goal: Absence of physical injury  5/28/2025 1029 by Silva Porter RN  Outcome: Progressing  5/28/2025 0127 by Sabrina Gomes RN  Outcome: Progressing     Problem: Safety - Adult  Goal: Free from fall injury  5/28/2025 1029 by Silva Porter, RN  Outcome: Progressing  5/28/2025 0127 by Sabrina Gomes RN  Outcome: Progressing

## 2025-05-28 NOTE — PROGRESS NOTES
[facility] - Physical Therapy   Phone: [phone]    Physical Therapy  Facility/Department:[address] PROGRESSIVE CARE    [] Initial Evaluation            [x] Daily Treatment Note         [] Discharge Summary      Patient: Jonelle Contreras   : 1950   MRN: [id number]   Date of Service:  2025  Staff Mobility Recommendation: RW bed to chair; stedy to toilet  AM-PAC score: 15/24  Discharge Recommendations: ARU    Jonelle Contreras scored a 15/24 on the AM-PAC short mobility form. Current research shows that an AM-PAC score of 17 or less is typically not associated with a discharge to the patient's home setting. Based on the patient's AM-PAC score and their current functional mobility deficits, it is recommended that the patient have 5-7 sessions per week of Physical Therapy at d/c to increase the patient's independence.  At this time, this patient demonstrates complex nursing, medical, and rehabilitative needs, and would benefit from intensive rehabilitation services upon discharge from the Inpatient setting.  This patient demonstrates the ability to participate in and benefit from an intensive therapy program with a coordinated interdisciplinary team approach to foster frequent, structured, and documented communication among disciplines, who will work together to establish, prioritize, and achieve treatment goals. Please see assessment section for further patient specific details.    If patient discharges prior to next session this note will serve as a discharge summary.  Please see below for the latest assessment towards goals.      Admitting Diagnosis: Hypertensive urgency  Ordering Physician: Dr. Bentley  Current Admission Summary: 75 y/o female admit 2025 with HTN Urgency,  Underwent bronchoscopy alveolar lavage on 25 per Dr. Ryder. Transferred to ICU on 25 due to respiratory status.    Past Medical History:  has a past medical history of acute intermittent porphyria, agoraphobic,

## 2025-05-28 NOTE — PROGRESS NOTES
Department of Physical Medicine & Rehabilitation  Dr. Drummond Progress Note  5/28/2025  9:36 AM    Patient Name:   Jonelle Contreras  YOB: 1950    Diagnosis: Hypertensive urgency        Subjective: Rehab consult follow-up.  74-year-old female who came to hospital with hyperkalemia and hypertension, found to be in a hypertensive emergency.  Her blood pressure was brought down and the patient is following with nephrology and cardiology.  Patient has ESRD and is on PD.  Patient also came in with a cough and underwent a bronc on 5/23/2025 for bilateral mucous plugs.  Patient was treated for pneumonia and started on IV antibiotics for 7 days.  Patient is requiring min assistance for sit to stand/stand to sit/bed transfers.  She continues to be limited in functional mobility and ADLs.  She is interested in going to the acute rehab unit for functional mobility and retraining before discharge home.  Patient lives at home alone in a multilevel house.  Patient has [place] [employer], [employer] insurance.       BP (!) 157/92   Pulse 82   Temp 97.8 °F (36.6 °C)   Resp 16   Ht 1.651 m (5' 5\")   Wt 61.8 kg (136 lb 3.9 oz)   SpO2 94%   BMI 22.67 kg/m²     Last 24 hour lab  Recent Results (from the past 24 hours)   CBC with Auto Differential    Collection Time: 05/28/25  8:49 AM   Result Value Ref Range    WBC 13.6 (H) 4.0 - 11.0 K/uL    RBC 3.85 (L) 4.00 - 5.20 M/uL    Hemoglobin 11.0 (L) 12.0 - 16.0 g/dL    Hematocrit 33.0 (L) 36.0 - 48.0 %    MCV 85.8 80.0 - 100.0 fL    MCH 28.4 26.0 - 34.0 pg    MCHC 33.2 31.0 - 36.0 g/dL    RDW 16.7 (H) 12.4 - 15.4 %    Platelets 284 135 - 450 K/uL    MPV 8.4 5.0 - 10.5 fL    Neutrophils % 76.9 %    Lymphocytes % 8.3 %    Monocytes % 10.6 %    Eosinophils % 3.8 %    Basophils % 0.4 %    Neutrophils Absolute 10.4 (H) 1.7 - 7.7 K/uL    Lymphocytes Absolute 1.1 1.0 - 5.1 K/uL    Monocytes Absolute 1.4 (H) 0.0 - 1.3 K/uL    Eosinophils Absolute 0.5 0.0 - 0.6 K/uL    Basophils

## 2025-05-29 ENCOUNTER — APPOINTMENT (OUTPATIENT)
Dept: GENERAL RADIOLOGY | Age: 75
End: 2025-05-29
Payer: MEDICARE

## 2025-05-29 LAB
ANION GAP SERPL CALCULATED.3IONS-SCNC: 18 MMOL/L (ref 3–16)
BASOPHILS # BLD: 0.1 K/UL (ref 0–0.2)
BASOPHILS NFR BLD: 0.8 %
BUN SERPL-MCNC: 48 MG/DL (ref 7–20)
CALCIUM SERPL-MCNC: 8.8 MG/DL (ref 8.3–10.6)
CHLORIDE SERPL-SCNC: 97 MMOL/L (ref 99–110)
CO2 SERPL-SCNC: 21 MMOL/L (ref 21–32)
CREAT SERPL-MCNC: 8.8 MG/DL (ref 0.6–1.2)
DEPRECATED RDW RBC AUTO: 16.1 % (ref 12.4–15.4)
EOSINOPHIL # BLD: 0.4 K/UL (ref 0–0.6)
EOSINOPHIL NFR BLD: 4 %
GFR SERPLBLD CREATININE-BSD FMLA CKD-EPI: 4 ML/MIN/{1.73_M2}
GLUCOSE SERPL-MCNC: 100 MG/DL (ref 70–99)
HCT VFR BLD AUTO: 27.8 % (ref 36–48)
HGB BLD-MCNC: 9.5 G/DL (ref 12–16)
LYMPHOCYTES # BLD: 2.3 K/UL (ref 1–5.1)
LYMPHOCYTES NFR BLD: 21.1 %
MCH RBC QN AUTO: 29 PG (ref 26–34)
MCHC RBC AUTO-ENTMCNC: 34 G/DL (ref 31–36)
MCV RBC AUTO: 85.3 FL (ref 80–100)
MONOCYTES # BLD: 1.4 K/UL (ref 0–1.3)
MONOCYTES NFR BLD: 12.7 %
NEUTROPHILS # BLD: 6.6 K/UL (ref 1.7–7.7)
NEUTROPHILS NFR BLD: 61.4 %
PLATELET # BLD AUTO: 265 K/UL (ref 135–450)
PMV BLD AUTO: 8.7 FL (ref 5–10.5)
POTASSIUM SERPL-SCNC: 4.2 MMOL/L (ref 3.5–5.1)
RBC # BLD AUTO: 3.26 M/UL (ref 4–5.2)
SODIUM SERPL-SCNC: 136 MMOL/L (ref 136–145)
WBC # BLD AUTO: 10.8 K/UL (ref 4–11)

## 2025-05-29 PROCEDURE — 6370000000 HC RX 637 (ALT 250 FOR IP): Performed by: INTERNAL MEDICINE

## 2025-05-29 PROCEDURE — 2060000000 HC ICU INTERMEDIATE R&B

## 2025-05-29 PROCEDURE — 2700000000 HC OXYGEN THERAPY PER DAY

## 2025-05-29 PROCEDURE — 90945 DIALYSIS ONE EVALUATION: CPT

## 2025-05-29 PROCEDURE — 6370000000 HC RX 637 (ALT 250 FOR IP): Performed by: STUDENT IN AN ORGANIZED HEALTH CARE EDUCATION/TRAINING PROGRAM

## 2025-05-29 PROCEDURE — A4722 DIALYS SOL FLD VOL > 1999CC: HCPCS | Performed by: INTERNAL MEDICINE

## 2025-05-29 PROCEDURE — 99232 SBSQ HOSP IP/OBS MODERATE 35: CPT | Performed by: STUDENT IN AN ORGANIZED HEALTH CARE EDUCATION/TRAINING PROGRAM

## 2025-05-29 PROCEDURE — 94761 N-INVAS EAR/PLS OXIMETRY MLT: CPT

## 2025-05-29 PROCEDURE — 94640 AIRWAY INHALATION TREATMENT: CPT

## 2025-05-29 PROCEDURE — 85025 COMPLETE CBC W/AUTO DIFF WBC: CPT

## 2025-05-29 PROCEDURE — 6360000002 HC RX W HCPCS: Performed by: INTERNAL MEDICINE

## 2025-05-29 PROCEDURE — 71045 X-RAY EXAM CHEST 1 VIEW: CPT

## 2025-05-29 PROCEDURE — 80048 BASIC METABOLIC PNL TOTAL CA: CPT

## 2025-05-29 PROCEDURE — 2580000003 HC RX 258: Performed by: INTERNAL MEDICINE

## 2025-05-29 PROCEDURE — 97530 THERAPEUTIC ACTIVITIES: CPT

## 2025-05-29 PROCEDURE — 2500000003 HC RX 250 WO HCPCS: Performed by: INTERNAL MEDICINE

## 2025-05-29 PROCEDURE — 94669 MECHANICAL CHEST WALL OSCILL: CPT

## 2025-05-29 PROCEDURE — 36415 COLL VENOUS BLD VENIPUNCTURE: CPT

## 2025-05-29 RX ORDER — SODIUM CHLORIDE 0.9 % (FLUSH) 0.9 %
5-40 SYRINGE (ML) INJECTION EVERY 12 HOURS SCHEDULED
Status: CANCELLED | OUTPATIENT
Start: 2025-05-29

## 2025-05-29 RX ORDER — POLYETHYLENE GLYCOL 3350 17 G/17G
17 POWDER, FOR SOLUTION ORAL 2 TIMES DAILY
Status: CANCELLED | OUTPATIENT
Start: 2025-05-29

## 2025-05-29 RX ORDER — SODIUM CHLORIDE, SODIUM LACTATE, CALCIUM CHLORIDE, MAGNESIUM CHLORIDE AND DEXTROSE 1.5; 538; 448; 18.3; 5.08 G/100ML; MG/100ML; MG/100ML; MG/100ML; MG/100ML
6000 INJECTION, SOLUTION INTRAPERITONEAL NIGHTLY
Status: CANCELLED | OUTPATIENT
Start: 2025-05-29

## 2025-05-29 RX ORDER — SODIUM CHLORIDE, SODIUM LACTATE, CALCIUM CHLORIDE, MAGNESIUM CHLORIDE AND DEXTROSE 1.5; 538; 448; 18.3; 5.08 G/100ML; MG/100ML; MG/100ML; MG/100ML; MG/100ML
2000 INJECTION, SOLUTION INTRAPERITONEAL NIGHTLY
Status: CANCELLED | OUTPATIENT
Start: 2025-05-29

## 2025-05-29 RX ORDER — METOCLOPRAMIDE 5 MG/1
5 TABLET ORAL
Qty: 120 TABLET | Refills: 3 | Status: ON HOLD | OUTPATIENT
Start: 2025-05-29

## 2025-05-29 RX ORDER — CARVEDILOL 12.5 MG/1
12.5 TABLET ORAL 2 TIMES DAILY WITH MEALS
Status: CANCELLED | OUTPATIENT
Start: 2025-05-29

## 2025-05-29 RX ORDER — TORSEMIDE 100 MG/1
100 TABLET ORAL DAILY
Status: CANCELLED | OUTPATIENT
Start: 2025-05-30

## 2025-05-29 RX ORDER — LACTULOSE 10 G/15ML
30 SOLUTION ORAL EVERY 4 HOURS PRN
Status: CANCELLED | OUTPATIENT
Start: 2025-05-29

## 2025-05-29 RX ORDER — CLOPIDOGREL BISULFATE 75 MG/1
75 TABLET ORAL DAILY
Status: CANCELLED | OUTPATIENT
Start: 2025-05-30

## 2025-05-29 RX ORDER — ALPRAZOLAM 0.5 MG
1.5 TABLET ORAL ONCE
Status: COMPLETED | OUTPATIENT
Start: 2025-05-29 | End: 2025-05-29

## 2025-05-29 RX ORDER — SODIUM CHLORIDE 0.9 % (FLUSH) 0.9 %
5-40 SYRINGE (ML) INJECTION PRN
Status: CANCELLED | OUTPATIENT
Start: 2025-05-29

## 2025-05-29 RX ORDER — PANTOPRAZOLE SODIUM 40 MG/1
40 TABLET, DELAYED RELEASE ORAL 2 TIMES DAILY
Status: CANCELLED | OUTPATIENT
Start: 2025-05-29

## 2025-05-29 RX ORDER — HEPARIN SODIUM 5000 [USP'U]/ML
5000 INJECTION, SOLUTION INTRAVENOUS; SUBCUTANEOUS EVERY 8 HOURS SCHEDULED
Status: CANCELLED | OUTPATIENT
Start: 2025-05-29

## 2025-05-29 RX ORDER — ALPRAZOLAM 0.5 MG
1.5 TABLET ORAL EVERY 12 HOURS PRN
Status: CANCELLED | OUTPATIENT
Start: 2025-05-29

## 2025-05-29 RX ORDER — SENNA AND DOCUSATE SODIUM 50; 8.6 MG/1; MG/1
2 TABLET, FILM COATED ORAL DAILY
Status: CANCELLED | OUTPATIENT
Start: 2025-05-30

## 2025-05-29 RX ORDER — ALBUTEROL SULFATE 90 UG/1
2 INHALANT RESPIRATORY (INHALATION) EVERY 6 HOURS PRN
Status: CANCELLED | OUTPATIENT
Start: 2025-05-29

## 2025-05-29 RX ORDER — TRAMADOL HYDROCHLORIDE 50 MG/1
50 TABLET ORAL EVERY 12 HOURS PRN
Status: CANCELLED | OUTPATIENT
Start: 2025-05-29

## 2025-05-29 RX ORDER — ONDANSETRON 2 MG/ML
4 INJECTION INTRAMUSCULAR; INTRAVENOUS EVERY 6 HOURS PRN
Status: CANCELLED | OUTPATIENT
Start: 2025-05-29

## 2025-05-29 RX ORDER — SEVELAMER CARBONATE 800 MG/1
800 TABLET, FILM COATED ORAL
Status: CANCELLED | OUTPATIENT
Start: 2025-05-29

## 2025-05-29 RX ORDER — ASPIRIN 81 MG/1
81 TABLET, CHEWABLE ORAL DAILY
Status: CANCELLED | OUTPATIENT
Start: 2025-05-30

## 2025-05-29 RX ORDER — NIFEDIPINE 30 MG/1
30 TABLET, EXTENDED RELEASE ORAL DAILY
Status: CANCELLED | OUTPATIENT
Start: 2025-05-30

## 2025-05-29 RX ORDER — FLUCONAZOLE 100 MG/1
200 TABLET ORAL DAILY
Status: CANCELLED | OUTPATIENT
Start: 2025-05-30

## 2025-05-29 RX ORDER — METOCLOPRAMIDE 10 MG/1
5 TABLET ORAL
Status: CANCELLED | OUTPATIENT
Start: 2025-05-29

## 2025-05-29 RX ORDER — ROSUVASTATIN CALCIUM 10 MG/1
10 TABLET, COATED ORAL NIGHTLY
Status: CANCELLED | OUTPATIENT
Start: 2025-05-29

## 2025-05-29 RX ORDER — ONDANSETRON 4 MG/1
4 TABLET, ORALLY DISINTEGRATING ORAL EVERY 8 HOURS PRN
Status: CANCELLED | OUTPATIENT
Start: 2025-05-29

## 2025-05-29 RX ORDER — LOSARTAN POTASSIUM 100 MG/1
100 TABLET ORAL NIGHTLY
Status: CANCELLED | OUTPATIENT
Start: 2025-05-29

## 2025-05-29 RX ORDER — SODIUM CHLORIDE FOR INHALATION 3 %
4 VIAL, NEBULIZER (ML) INHALATION
Status: CANCELLED | OUTPATIENT
Start: 2025-05-29

## 2025-05-29 RX ORDER — ACETAMINOPHEN 650 MG/1
650 SUPPOSITORY RECTAL EVERY 6 HOURS PRN
Status: CANCELLED | OUTPATIENT
Start: 2025-05-29

## 2025-05-29 RX ORDER — ACETAMINOPHEN 325 MG/1
650 TABLET ORAL EVERY 6 HOURS PRN
Status: CANCELLED | OUTPATIENT
Start: 2025-05-29

## 2025-05-29 RX ORDER — ROPINIROLE 1 MG/1
1 TABLET, FILM COATED ORAL NIGHTLY
Status: CANCELLED | OUTPATIENT
Start: 2025-05-29

## 2025-05-29 RX ORDER — SPIRONOLACTONE 25 MG/1
50 TABLET ORAL DAILY
Status: CANCELLED | OUTPATIENT
Start: 2025-05-30

## 2025-05-29 RX ORDER — GUAIFENESIN 600 MG/1
1200 TABLET, EXTENDED RELEASE ORAL 2 TIMES DAILY
Status: CANCELLED | OUTPATIENT
Start: 2025-05-29

## 2025-05-29 RX ORDER — GENTAMICIN SULFATE 1 MG/G
CREAM TOPICAL DAILY PRN
Status: CANCELLED | OUTPATIENT
Start: 2025-05-29

## 2025-05-29 RX ORDER — TROSPIUM CHLORIDE 20 MG/1
20 TABLET, FILM COATED ORAL NIGHTLY
Status: CANCELLED | OUTPATIENT
Start: 2025-05-29

## 2025-05-29 RX ORDER — IPRATROPIUM BROMIDE AND ALBUTEROL SULFATE 2.5; .5 MG/3ML; MG/3ML
1 SOLUTION RESPIRATORY (INHALATION)
Status: CANCELLED | OUTPATIENT
Start: 2025-05-29

## 2025-05-29 RX ORDER — ACETAMINOPHEN 325 MG/1
650 TABLET ORAL EVERY 4 HOURS PRN
Status: CANCELLED | OUTPATIENT
Start: 2025-05-29

## 2025-05-29 RX ADMIN — SODIUM CHLORIDE, PRESERVATIVE FREE 10 ML: 5 INJECTION INTRAVENOUS at 21:20

## 2025-05-29 RX ADMIN — METOCLOPRAMIDE 5 MG: 10 TABLET ORAL at 06:14

## 2025-05-29 RX ADMIN — NIFEDIPINE 30 MG: 30 TABLET, FILM COATED, EXTENDED RELEASE ORAL at 09:20

## 2025-05-29 RX ADMIN — ROSUVASTATIN CALCIUM 10 MG: 10 TABLET, FILM COATED ORAL at 21:06

## 2025-05-29 RX ADMIN — SEVELAMER CARBONATE 800 MG: 800 TABLET, FILM COATED ORAL at 11:12

## 2025-05-29 RX ADMIN — TORSEMIDE 100 MG: 100 TABLET ORAL at 09:19

## 2025-05-29 RX ADMIN — SENNOSIDES AND DOCUSATE SODIUM 2 TABLET: 50; 8.6 TABLET ORAL at 09:20

## 2025-05-29 RX ADMIN — GENTAMICIN SULFATE: 1 CREAM TOPICAL at 09:14

## 2025-05-29 RX ADMIN — GUAIFENESIN 1200 MG: 600 TABLET, EXTENDED RELEASE ORAL at 09:20

## 2025-05-29 RX ADMIN — SPIRONOLACTONE 50 MG: 25 TABLET ORAL at 09:20

## 2025-05-29 RX ADMIN — SODIUM CHLORIDE, SODIUM LACTATE, CALCIUM CHLORIDE, MAGNESIUM CHLORIDE AND DEXTROSE 6000 ML: 1.5; 538; 448; 18.3; 5.08 INJECTION, SOLUTION INTRAPERITONEAL at 21:50

## 2025-05-29 RX ADMIN — METOCLOPRAMIDE 5 MG: 10 TABLET ORAL at 09:20

## 2025-05-29 RX ADMIN — ALPRAZOLAM 1.5 MG: 0.5 TABLET ORAL at 01:35

## 2025-05-29 RX ADMIN — FLUCONAZOLE 200 MG: 100 TABLET ORAL at 09:20

## 2025-05-29 RX ADMIN — POLYETHYLENE GLYCOL 3350 17 G: 17 POWDER, FOR SOLUTION ORAL at 09:18

## 2025-05-29 RX ADMIN — ASPIRIN 81 MG: 81 TABLET, CHEWABLE ORAL at 09:20

## 2025-05-29 RX ADMIN — ALPRAZOLAM 1.5 MG: 0.5 TABLET ORAL at 11:12

## 2025-05-29 RX ADMIN — SEVELAMER CARBONATE 800 MG: 800 TABLET, FILM COATED ORAL at 09:20

## 2025-05-29 RX ADMIN — DIPHENHYDRAMINE HYDROCHLORIDE 5 ML: 12.5 LIQUID ORAL at 09:21

## 2025-05-29 RX ADMIN — METOCLOPRAMIDE 5 MG: 10 TABLET ORAL at 15:28

## 2025-05-29 RX ADMIN — TRAMADOL HYDROCHLORIDE 50 MG: 50 TABLET, COATED ORAL at 21:17

## 2025-05-29 RX ADMIN — IPRATROPIUM BROMIDE AND ALBUTEROL SULFATE 1 DOSE: .5; 2.5 SOLUTION RESPIRATORY (INHALATION) at 07:28

## 2025-05-29 RX ADMIN — PANTOPRAZOLE SODIUM 40 MG: 40 TABLET, DELAYED RELEASE ORAL at 09:20

## 2025-05-29 RX ADMIN — CARVEDILOL 12.5 MG: 12.5 TABLET, FILM COATED ORAL at 09:20

## 2025-05-29 RX ADMIN — CEFTRIAXONE SODIUM 2000 MG: 2 INJECTION, POWDER, FOR SOLUTION INTRAMUSCULAR; INTRAVENOUS at 09:19

## 2025-05-29 RX ADMIN — SEVELAMER CARBONATE 800 MG: 800 TABLET, FILM COATED ORAL at 18:09

## 2025-05-29 RX ADMIN — SODIUM CHLORIDE, SODIUM LACTATE, CALCIUM CHLORIDE, MAGNESIUM CHLORIDE AND DEXTROSE 2000 ML: 1.5; 538; 448; 18.3; 5.08 INJECTION, SOLUTION INTRAPERITONEAL at 21:50

## 2025-05-29 RX ADMIN — ALPRAZOLAM 1.5 MG: 0.5 TABLET ORAL at 21:17

## 2025-05-29 RX ADMIN — POLYETHYLENE GLYCOL 3350 17 G: 17 POWDER, FOR SOLUTION ORAL at 21:07

## 2025-05-29 RX ADMIN — HEPARIN SODIUM 5000 UNITS: 5000 INJECTION INTRAVENOUS; SUBCUTANEOUS at 06:14

## 2025-05-29 RX ADMIN — IPRATROPIUM BROMIDE AND ALBUTEROL SULFATE 1 DOSE: .5; 2.5 SOLUTION RESPIRATORY (INHALATION) at 19:24

## 2025-05-29 RX ADMIN — HEPARIN SODIUM 5000 UNITS: 5000 INJECTION INTRAVENOUS; SUBCUTANEOUS at 15:28

## 2025-05-29 RX ADMIN — Medication 4 ML: at 07:28

## 2025-05-29 RX ADMIN — ROPINIROLE HYDROCHLORIDE 1 MG: 1 TABLET, FILM COATED ORAL at 21:06

## 2025-05-29 RX ADMIN — ACETAMINOPHEN 650 MG: 325 TABLET ORAL at 09:20

## 2025-05-29 RX ADMIN — TIOTROPIUM BROMIDE AND OLODATEROL 2 PUFF: 3.124; 2.736 SPRAY, METERED RESPIRATORY (INHALATION) at 07:27

## 2025-05-29 RX ADMIN — TROSPIUM CHLORIDE 20 MG: 20 TABLET, FILM COATED ORAL at 21:06

## 2025-05-29 RX ADMIN — GUAIFENESIN 1200 MG: 600 TABLET, EXTENDED RELEASE ORAL at 21:05

## 2025-05-29 RX ADMIN — TIZANIDINE 4 MG: 4 TABLET ORAL at 09:21

## 2025-05-29 RX ADMIN — HEPARIN SODIUM 5000 UNITS: 5000 INJECTION INTRAVENOUS; SUBCUTANEOUS at 21:07

## 2025-05-29 RX ADMIN — TRAMADOL HYDROCHLORIDE 50 MG: 50 TABLET, COATED ORAL at 01:35

## 2025-05-29 RX ADMIN — SODIUM CHLORIDE, PRESERVATIVE FREE 10 ML: 5 INJECTION INTRAVENOUS at 09:14

## 2025-05-29 RX ADMIN — CLOPIDOGREL BISULFATE 75 MG: 75 TABLET, FILM COATED ORAL at 09:20

## 2025-05-29 RX ADMIN — PANTOPRAZOLE SODIUM 40 MG: 40 TABLET, DELAYED RELEASE ORAL at 21:05

## 2025-05-29 ASSESSMENT — PAIN DESCRIPTION - PAIN TYPE: TYPE: ACUTE PAIN

## 2025-05-29 ASSESSMENT — PAIN SCALES - GENERAL
PAINLEVEL_OUTOF10: 6
PAINLEVEL_OUTOF10: 5
PAINLEVEL_OUTOF10: 3
PAINLEVEL_OUTOF10: 0

## 2025-05-29 ASSESSMENT — PAIN DESCRIPTION - ONSET: ONSET: AWAKENED FROM SLEEP

## 2025-05-29 ASSESSMENT — PAIN DESCRIPTION - DESCRIPTORS: DESCRIPTORS: ACHING

## 2025-05-29 ASSESSMENT — PAIN DESCRIPTION - LOCATION: LOCATION: GENERALIZED

## 2025-05-29 ASSESSMENT — PAIN SCALES - WONG BAKER
WONGBAKER_NUMERICALRESPONSE: NO HURT

## 2025-05-29 ASSESSMENT — PAIN - FUNCTIONAL ASSESSMENT: PAIN_FUNCTIONAL_ASSESSMENT: PREVENTS OR INTERFERES SOME ACTIVE ACTIVITIES AND ADLS

## 2025-05-29 NOTE — PROGRESS NOTES
[facility] - Occupational Therapy   Phone: [phone]    Occupational Therapy  Facility/Department: [address] ICU      [] Initial Evaluation            [x] Daily Treatment Note         [] Discharge Summary      Patient: Jonelle Contreras   : 1950   MRN: [id number]   Date of Service:  2025    Staff Mobility Recommendation: walker x1 bed <> chair, stedy to bathroom d/t orthostatic vitals      Orthostatic Vitals:   Supine BP: 101/60 HR 67  EOB BP: 72/49 HR 69  Unsafe to attempt standing BP     Pt denied feeling dizzy, but reports lightheadedness and has slower responses with low BP. RN aware.         AM-PAC Score: 16/24  Discharge Recommendations: 5-7  Jonelle Contreras scored a 16/24 on the AM-PAC ADL Inpatient form. Current research shows that an AM-PAC score of 17 or less is typically not associated with a discharge to the patient's home setting. Based on the patient's AM-PAC score and their current ADL deficits, it is recommended that the patient have 5-7 sessions per week of Occupational Therapy at d/c to increase the patient's independence.  At this time, this patient demonstrates complex nursing, medical, and rehabilitative needs, and would benefit from intensive rehabilitation services upon discharge from the Inpatient setting.  This patient demonstrates the ability to participate in and benefit from an intensive therapy program with a coordinated interdisciplinary team approach to foster frequent, structured, and documented communication among disciplines, who will work together to establish, prioritize, and achieve treatment goals. Please see assessment section for further patient specific details.    If patient discharges prior to next session this note will serve as a discharge summary.  Please see below for the latest assessment towards goals.      Admitting Diagnosis:  Hypertensive urgency  Ordering Physician: Candido Bentley MD   Current Admission Summary: \"74-year-old lady with a past

## 2025-05-29 NOTE — PROGRESS NOTES
Patient ID: Jonelle Contreras  Referring/ Physician: Candido Bentley MD      Summary:   Jonelle Contreras is being seen by nephrology for ESRD and HTN.     Reason for admission: hypertensive urgency       Interval Hx:   Seen at bedside.   She is on oxygen today.   She is lethargic.   Unable to work with therapy because of orthostatic hypotension.     /72  On 3 L    Na 136 K 4.2   Bicarb 21  BUN 48  Cr 8.8  Hgb 9.5      Assessment/Plan:   - BP low, hold all meds if SBP < 130.  - Continue PD but with  1.5% exchanges. She appears euvolemic now.   - use PRN laxative to ensure daily bowel movement.     She is now on oxygen and having some orthostatic hypotension. Will have to monitor closely    ESRD  ESRD secondary to hypertension.  She does peritoneal dialysis at home 1 exchange of 2 L 1.5% solutions daily.  Based on her most recent labs in the outpatient setting her adequacy is not at goal so we are going to be increasing her to 4 exchanges, 2 L at 2.5% solutions.  Will use the cycler while inpatient.  EDW 61.5 kg   - daily BM, on lactulose. Has ileus > GI consulted.    - gent to PD exit site.        Resistant hypertension  The BP has been controlled inpatient on her home regimen, actually low at times suggesting that she may not have been taking her medications as prescribed.   Has had an overall negative secondary work up. Has been extremely difficult to manage outpatient as she reports \"bottoming out and passing out\" when her medications are increased.   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   She has had 24 hr ambulatory BP monitoring showing consistently elevated bP 180s -200s systolic.   Presenting with hypertensive urgency, having ongoing headaches blurry vision and fatigue.  CT head showed no stroke.,  Has chronic microvascular changes.  Troponin 66.no ECG changes.   Her home regimen : nifedpine 30 mg ,

## 2025-05-29 NOTE — PROGRESS NOTES
[facility] - Physical Therapy   Phone: [phone]    Physical Therapy  Facility/Department:[address] PROGRESSIVE CARE    [] Initial Evaluation            [x] Daily Treatment Note         [] Discharge Summary      Patient: Jonelle Contreras   : 1950   MRN: [id number]   Date of Service:  2025  Staff Mobility Recommendation: RW bed to chair; stedy to toilet  AM-PAC score: 15/24  Discharge Recommendations: ARU    Jonelle Contreras scored a 15/24 on the AM-PAC short mobility form. Current research shows that an AM-PAC score of 17 or less is typically not associated with a discharge to the patient's home setting. Based on the patient's AM-PAC score and their current functional mobility deficits, it is recommended that the patient have 5-7 sessions per week of Physical Therapy at d/c to increase the patient's independence.  At this time, this patient demonstrates complex nursing, medical, and rehabilitative needs, and would benefit from intensive rehabilitation services upon discharge from the Inpatient setting.  This patient demonstrates the ability to participate in and benefit from an intensive therapy program with a coordinated interdisciplinary team approach to foster frequent, structured, and documented communication among disciplines, who will work together to establish, prioritize, and achieve treatment goals. Please see assessment section for further patient specific details.    If patient discharges prior to next session this note will serve as a discharge summary.  Please see below for the latest assessment towards goals.      Admitting Diagnosis: Hypertensive urgency  Ordering Physician: Dr. Bentley  Current Admission Summary: 73 y/o female admit 2025 with HTN Urgency, Underwent bronchoscopy alveolar lavage on 25 per Dr. Ryder. Transferred to ICU on 25 due to respiratory status.   Past Medical History:  has a past medical history of acute intermittent porphyria, agoraphobic,

## 2025-05-29 NOTE — CARE COORDINATION
COSTA called acute rehab liaison to advise that patient will likely not discharge until Friday. MD changed date to tomorrow. COSTA left her a message today.    Respectfully submitted,    Radha WOLFF, CAMPBELLS  [facility]   [phone]    Electronically signed by NEW Golden, SAKINA on 5/29/2025 at 12:34 PM

## 2025-05-29 NOTE — PROGRESS NOTES
Patient alert and oriented x 4. Patient oliguric dt/ PD. Patient tolerating diet with no c/o nausea at this time. Pt does c/o neck pain, PRN Zanaflex given per order, see MAR. Initial assessment completed, see flowsheet. Patient's bed is locked and in lowest position, side rails up x 2 with an active bed alarm. Non slip socks applied.

## 2025-05-29 NOTE — CARE COORDINATION
Case Management Discharge Note          Date / Time of Note: 5/29/2025 8:59 AM                  Patient Name: Jonelle Contreras   YOB: 1950  Diagnosis: Hyperkalemia [E87.5]  Hypertensive urgency [I16.0]  ESRD on peritoneal dialysis (HCC) [N18.6, Z99.2]  Hypertension, unspecified type [I10]   Date / Time: 5/12/2025 11:35 AM    Financial:  Payor: LIL[place] KULKAR[place] MEDICARE / Plan: [place] MEDICARE / Product Type: *No Product type* /      Pharmacy:    [id number] - [place], [place] - [address] - P [phone] - F [phone]  [address] [facility] [address]  Phone: [phone] Fax: [phone]    [facility] - [place] - [facility] - P [phone] - F [phone]  [facility]  [place] [address]  Phone: [phone] Fax: [phone]      Assistance purchasing medications?: Potential Assistance Purchasing Medications: No  Assistance provided by Case Management: None at this time    DISCHARGE Disposition: [facility] Acute Rehab    Transportation:  Transportation PLAN for discharge:  [facility] Patient Transport Service    Time of Transport: TBD by medical staff. Nurses on floor and rehab will coordinate.     Transport form completed: Not Indicated    IMM Completed:   Yes, Case management has presented and reviewed IMM letter #2.   IMM Letter given to Patient/Family/Significant other/Guardian/POA/by:: reviewed with patient and daughter at bedside and left a copy. SLR   IMM Letter date given:: 05/27/25  IMM Letter time given:: 0954.   Patient and/or family/POA verbalized understanding of their medicare rights and appeal process if needed. Patient and/or family/POA has signed, initialed and placed the date and time on IMM letter #2 on the the appropriate lines. Copy of letter offered and they are aware that the original copy of IMM letter #2 is available prior to discharge from the paper chart on the unit.  Electronic

## 2025-05-29 NOTE — PROGRESS NOTES
Internal Medicine Hospital Progress Note    Patient:  Jonelle Contreras 74 y.o. female MRN: [id number]     Date of Service: 5/29/2025    Allergy: Iodine, Iodinated contrast media, Wellbutrin [bupropion], Adhesive tape, Sertraline, and Sulfa antibiotics  CC: F/u:   Brief Course   Jonelle Contreras was admitted on 5/12/2025 for Hypertensive urgency. Jonelle Contreras has been admitted for 17 days    24 hr interval hx:  Resting comfortably  Eating more  Orthostatic again with pt  Objective     Physical Exam:  Vitals: /74   Pulse 66   Temp 98.2 °F (36.8 °C) (Oral)   Resp 17   Ht 1.651 m (5' 5\")   Wt 63.1 kg (139 lb 1.8 oz)   SpO2 95%   BMI 23.15 kg/m²     General: Alert and oriented X3. No acute distress  Eyes: PEERL. No scleral icterus noted. Normal appearing conjunctiva bilaterally  Ears: Normal TM and external canal bilaterally.  Oral cavity/Throat: No pharyngeal erythema. No oral lesions.  Cardiovascular: Heart is regular rate and rhythm. No murmurs noted. Radial pulses 2+. Posterior tibial pulses 2+. No lower extremity edema noted  Pulmonary: Lungs clear to auscultation bilaterally  Skin: Dry, warm. No obvious skin lesions or rashes noted.  Neuro: PEERL. EOM intact. No facial droop. Normal sensation in bilateral upper and lower extremities. Gait is norm    Pertinent/ New Labs and Imaging Studies   Labs reviewed. Pertinent labs noted in assessment and plan      Assessment and Plan   Jonelle Contreras was admitted to hospital for Hypertensive urgency    Hypertensive urgency  - Nephrology consulted  - Blood pressure has been controlled and she has been orthostatic  - Holding bp meds for bp less than 130 systolic    Sore throat, difficulty swallowing  Esophagitis.      ESRD on PD  - Nephrology consulted, managing PD orders    Hyperlipidemia  - Continue home statin    Restless leg syndrom  - Continue ropinirole    Anxiety  - Continue home alprazolam    COPD  - Continue albuterol and stiolto    Overactive bladder  -

## 2025-05-29 NOTE — PROGRESS NOTES
Department of Physical Medicine & Rehabilitation  Dr. Drummond Progress Note  5/29/2025  9:48 AM    Patient Name:   Jonelle Contreras  YOB: 1950    Diagnosis: Hypertensive urgency        Subjective: Rehab consult follow-up.  74-year-old female who came to hospital with hyperkalemia and hypertension, found to be in a hypertensive emergency.  Her blood pressure was brought down and the patient is following with nephrology and cardiology.  Patient has ESRD and is on PD.  Patient also came in with a cough and underwent a bronc on 5/23/2025 for bilateral mucous plugs.  Patient was treated for pneumonia and started on IV antibiotics for 7 days.  Patient is requiring min assistance for sit to stand/stand to sit/bed transfers.  She continues to be limited in functional mobility and ADLs.  She is interested in going to the acute rehab unit for functional mobility and retraining before discharge home.  Patient lives at home alone in a multilevel house.   A peer to peer appeal was done this morning, and she was approved for rehab unit treatment.  We will plan to admit her to our rehabilitation unit later today.      /70   Pulse 78   Temp 97.5 °F (36.4 °C) (Oral)   Resp 16   Ht 1.651 m (5' 5\")   Wt 63.3 kg (139 lb 8.8 oz)   SpO2 95%   BMI 23.22 kg/m²     Last 24 hour lab  Recent Results (from the past 24 hours)   CBC with Auto Differential    Collection Time: 05/29/25  5:48 AM   Result Value Ref Range    WBC 10.8 4.0 - 11.0 K/uL    RBC 3.26 (L) 4.00 - 5.20 M/uL    Hemoglobin 9.5 (L) 12.0 - 16.0 g/dL    Hematocrit 27.8 (L) 36.0 - 48.0 %    MCV 85.3 80.0 - 100.0 fL    MCH 29.0 26.0 - 34.0 pg    MCHC 34.0 31.0 - 36.0 g/dL    RDW 16.1 (H) 12.4 - 15.4 %    Platelets 265 135 - 450 K/uL    MPV 8.7 5.0 - 10.5 fL    Neutrophils % 61.4 %    Lymphocytes % 21.1 %    Monocytes % 12.7 %    Eosinophils % 4.0 %    Basophils % 0.8 %    Neutrophils Absolute 6.6 1.7 - 7.7 K/uL    Lymphocytes Absolute 2.3 1.0 - 5.1 K/uL

## 2025-05-29 NOTE — PROGRESS NOTES
Received voicemail from patient's insurance. They are offering a peer to peer for admission to ARU. It needs to be completed today by 12 pm CST. The number is a direct line to call and complete the peer to peer. You need three identifiers to complete. Name, CHRISSY 1950, member ID [id number]. The number to call is [phone]. They most recent clinicals have uploaded to the portal as well per their request. Secure message sent to MD. Will await outcome.

## 2025-05-29 NOTE — PLAN OF CARE
Problem: Discharge Planning  Goal: Discharge to home or other facility with appropriate resources  5/28/2025 2112 by Gillian Posey RN  Outcome: Progressing  Flowsheets (Taken 5/28/2025 2112)  Discharge to home or other facility with appropriate resources: Identify barriers to discharge with patient and caregiver     Problem: ABCDS Injury Assessment  Goal: Absence of physical injury  5/28/2025 2112 by Gillian Posey RN  Outcome: Progressing  Flowsheets (Taken 5/28/2025 2112)  Absence of Physical Injury: Implement safety measures based on patient assessment     Problem: Safety - Adult  Goal: Free from fall injury  5/28/2025 2112 by Gillian Posey RN  Outcome: Progressing  Flowsheets (Taken 5/28/2025 2112)  Free From Fall Injury:   Instruct family/caregiver on patient safety   Based on caregiver fall risk screen, instruct family/caregiver to ask for assistance with transferring infant if caregiver noted to have fall risk factors     Problem: Pain  Goal: Verbalizes/displays adequate comfort level or baseline comfort level  5/28/2025 2112 by Gillian Posey RN  Outcome: Progressing  Flowsheets (Taken 5/28/2025 2112)  Verbalizes/displays adequate comfort level or baseline comfort level:   Encourage patient to monitor pain and request assistance   Assess pain using appropriate pain scale   Administer analgesics based on type and severity of pain and evaluate response   Implement non-pharmacological measures as appropriate and evaluate response     Problem: Metabolic/Fluid and Electrolytes - Adult  Goal: Electrolytes maintained within normal limits  5/28/2025 2112 by Gillian Posey RN  Outcome: Progressing

## 2025-05-29 NOTE — PLAN OF CARE
Problem: Discharge Planning  Goal: Discharge to home or other facility with appropriate resources  5/28/2025 2112 by Gillian Posey, RN  Outcome: Progressing  Flowsheets (Taken 5/28/2025 2112)  Discharge to home or other facility with appropriate resources: Identify barriers to discharge with patient and caregiver      normal (ped)...

## 2025-05-30 ENCOUNTER — HOSPITAL ENCOUNTER (INPATIENT)
Age: 75
LOS: 21 days | Discharge: NURSING, REHAB OR LONG-TERM CARE | DRG: 947 | End: 2025-06-20
Attending: PHYSICAL MEDICINE & REHABILITATION | Admitting: PHYSICAL MEDICINE & REHABILITATION
Payer: MEDICARE

## 2025-05-30 VITALS
DIASTOLIC BLOOD PRESSURE: 86 MMHG | HEART RATE: 86 BPM | BODY MASS INDEX: 23.25 KG/M2 | HEIGHT: 65 IN | TEMPERATURE: 97.7 F | WEIGHT: 139.55 LBS | OXYGEN SATURATION: 98 % | RESPIRATION RATE: 18 BRPM | SYSTOLIC BLOOD PRESSURE: 144 MMHG

## 2025-05-30 DIAGNOSIS — F41.9 ANXIETY: ICD-10-CM

## 2025-05-30 DIAGNOSIS — G25.81 RESTLESS LEG SYNDROME: ICD-10-CM

## 2025-05-30 DIAGNOSIS — I30.1 ACUTE INFECTIVE PERICARDITIS, UNSPECIFIED INFECTIOUS ETIOLOGY: Primary | ICD-10-CM

## 2025-05-30 DIAGNOSIS — D89.89 AUTOIMMUNE DISORDER: ICD-10-CM

## 2025-05-30 DIAGNOSIS — R53.81 DEBILITY: ICD-10-CM

## 2025-05-30 LAB
ANION GAP SERPL CALCULATED.3IONS-SCNC: 19 MMOL/L (ref 3–16)
BASOPHILS # BLD: 0.1 K/UL (ref 0–0.2)
BASOPHILS NFR BLD: 1.1 %
BUN SERPL-MCNC: 51 MG/DL (ref 7–20)
CALCIUM SERPL-MCNC: 9.1 MG/DL (ref 8.3–10.6)
CHLORIDE SERPL-SCNC: 95 MMOL/L (ref 99–110)
CO2 SERPL-SCNC: 21 MMOL/L (ref 21–32)
CREAT SERPL-MCNC: 9.2 MG/DL (ref 0.6–1.2)
DEPRECATED RDW RBC AUTO: 16.4 % (ref 12.4–15.4)
EOSINOPHIL # BLD: 0.5 K/UL (ref 0–0.6)
EOSINOPHIL NFR BLD: 4.9 %
GFR SERPLBLD CREATININE-BSD FMLA CKD-EPI: 4 ML/MIN/{1.73_M2}
GLUCOSE SERPL-MCNC: 91 MG/DL (ref 70–99)
HCT VFR BLD AUTO: 31.1 % (ref 36–48)
HGB BLD-MCNC: 10.2 G/DL (ref 12–16)
LYMPHOCYTES # BLD: 1.8 K/UL (ref 1–5.1)
LYMPHOCYTES NFR BLD: 15.8 %
MCH RBC QN AUTO: 28.2 PG (ref 26–34)
MCHC RBC AUTO-ENTMCNC: 32.8 G/DL (ref 31–36)
MCV RBC AUTO: 86 FL (ref 80–100)
MONOCYTES # BLD: 1.4 K/UL (ref 0–1.3)
MONOCYTES NFR BLD: 12.9 %
NEUTROPHILS # BLD: 7.3 K/UL (ref 1.7–7.7)
NEUTROPHILS NFR BLD: 65.3 %
PLATELET # BLD AUTO: 293 K/UL (ref 135–450)
PMV BLD AUTO: 9.2 FL (ref 5–10.5)
POTASSIUM SERPL-SCNC: 3.9 MMOL/L (ref 3.5–5.1)
RBC # BLD AUTO: 3.61 M/UL (ref 4–5.2)
SODIUM SERPL-SCNC: 135 MMOL/L (ref 136–145)
WBC # BLD AUTO: 11.2 K/UL (ref 4–11)

## 2025-05-30 PROCEDURE — 94669 MECHANICAL CHEST WALL OSCILL: CPT

## 2025-05-30 PROCEDURE — 6360000002 HC RX W HCPCS: Performed by: INTERNAL MEDICINE

## 2025-05-30 PROCEDURE — 97116 GAIT TRAINING THERAPY: CPT

## 2025-05-30 PROCEDURE — 6370000000 HC RX 637 (ALT 250 FOR IP): Performed by: PHYSICAL MEDICINE & REHABILITATION

## 2025-05-30 PROCEDURE — 6370000000 HC RX 637 (ALT 250 FOR IP): Performed by: INTERNAL MEDICINE

## 2025-05-30 PROCEDURE — 6360000002 HC RX W HCPCS: Performed by: PHYSICAL MEDICINE & REHABILITATION

## 2025-05-30 PROCEDURE — 94761 N-INVAS EAR/PLS OXIMETRY MLT: CPT

## 2025-05-30 PROCEDURE — 2580000003 HC RX 258: Performed by: INTERNAL MEDICINE

## 2025-05-30 PROCEDURE — A4722 DIALYS SOL FLD VOL > 1999CC: HCPCS | Performed by: PHYSICAL MEDICINE & REHABILITATION

## 2025-05-30 PROCEDURE — 2500000003 HC RX 250 WO HCPCS: Performed by: PHYSICAL MEDICINE & REHABILITATION

## 2025-05-30 PROCEDURE — 2500000003 HC RX 250 WO HCPCS: Performed by: INTERNAL MEDICINE

## 2025-05-30 PROCEDURE — 1280000000 HC REHAB R&B

## 2025-05-30 PROCEDURE — 2700000000 HC OXYGEN THERAPY PER DAY

## 2025-05-30 PROCEDURE — 97110 THERAPEUTIC EXERCISES: CPT

## 2025-05-30 PROCEDURE — 36415 COLL VENOUS BLD VENIPUNCTURE: CPT

## 2025-05-30 PROCEDURE — 6370000000 HC RX 637 (ALT 250 FOR IP): Performed by: STUDENT IN AN ORGANIZED HEALTH CARE EDUCATION/TRAINING PROGRAM

## 2025-05-30 PROCEDURE — 80048 BASIC METABOLIC PNL TOTAL CA: CPT

## 2025-05-30 PROCEDURE — 85025 COMPLETE CBC W/AUTO DIFF WBC: CPT

## 2025-05-30 PROCEDURE — 94640 AIRWAY INHALATION TREATMENT: CPT

## 2025-05-30 PROCEDURE — 90945 DIALYSIS ONE EVALUATION: CPT

## 2025-05-30 PROCEDURE — 2580000003 HC RX 258: Performed by: PHYSICAL MEDICINE & REHABILITATION

## 2025-05-30 RX ORDER — IPRATROPIUM BROMIDE AND ALBUTEROL SULFATE 2.5; .5 MG/3ML; MG/3ML
1 SOLUTION RESPIRATORY (INHALATION)
Status: DISCONTINUED | OUTPATIENT
Start: 2025-05-30 | End: 2025-06-14

## 2025-05-30 RX ORDER — FLUCONAZOLE 100 MG/1
200 TABLET ORAL DAILY
Status: DISCONTINUED | OUTPATIENT
Start: 2025-05-31 | End: 2025-06-20 | Stop reason: HOSPADM

## 2025-05-30 RX ORDER — HEPARIN SODIUM 5000 [USP'U]/ML
5000 INJECTION, SOLUTION INTRAVENOUS; SUBCUTANEOUS EVERY 8 HOURS SCHEDULED
Status: DISCONTINUED | OUTPATIENT
Start: 2025-05-30 | End: 2025-06-20 | Stop reason: HOSPADM

## 2025-05-30 RX ORDER — ACETAMINOPHEN 650 MG/1
650 SUPPOSITORY RECTAL EVERY 6 HOURS PRN
Status: DISCONTINUED | OUTPATIENT
Start: 2025-05-30 | End: 2025-06-10

## 2025-05-30 RX ORDER — PANTOPRAZOLE SODIUM 40 MG/1
40 TABLET, DELAYED RELEASE ORAL 2 TIMES DAILY
Status: DISCONTINUED | OUTPATIENT
Start: 2025-05-30 | End: 2025-06-20 | Stop reason: HOSPADM

## 2025-05-30 RX ORDER — SODIUM CHLORIDE 0.9 % (FLUSH) 0.9 %
5-40 SYRINGE (ML) INJECTION EVERY 12 HOURS SCHEDULED
Status: DISCONTINUED | OUTPATIENT
Start: 2025-05-30 | End: 2025-06-13

## 2025-05-30 RX ORDER — SEVELAMER CARBONATE 800 MG/1
800 TABLET, FILM COATED ORAL
Status: DISCONTINUED | OUTPATIENT
Start: 2025-05-30 | End: 2025-06-01

## 2025-05-30 RX ORDER — ROSUVASTATIN CALCIUM 10 MG/1
10 TABLET, COATED ORAL NIGHTLY
Status: DISCONTINUED | OUTPATIENT
Start: 2025-05-30 | End: 2025-06-20 | Stop reason: HOSPADM

## 2025-05-30 RX ORDER — TRAMADOL HYDROCHLORIDE 50 MG/1
50 TABLET ORAL EVERY 12 HOURS PRN
Status: DISCONTINUED | OUTPATIENT
Start: 2025-05-30 | End: 2025-06-20 | Stop reason: HOSPADM

## 2025-05-30 RX ORDER — LACTULOSE 10 G/15ML
30 SOLUTION ORAL EVERY 4 HOURS PRN
Status: DISCONTINUED | OUTPATIENT
Start: 2025-05-30 | End: 2025-06-20 | Stop reason: HOSPADM

## 2025-05-30 RX ORDER — SODIUM CHLORIDE 0.9 % (FLUSH) 0.9 %
5-40 SYRINGE (ML) INJECTION PRN
Status: DISCONTINUED | OUTPATIENT
Start: 2025-05-30 | End: 2025-06-20 | Stop reason: HOSPADM

## 2025-05-30 RX ORDER — SODIUM CHLORIDE, SODIUM LACTATE, CALCIUM CHLORIDE, MAGNESIUM CHLORIDE AND DEXTROSE 1.5; 538; 448; 18.3; 5.08 G/100ML; MG/100ML; MG/100ML; MG/100ML; MG/100ML
2000 INJECTION, SOLUTION INTRAPERITONEAL NIGHTLY
Status: DISCONTINUED | OUTPATIENT
Start: 2025-05-30 | End: 2025-06-13

## 2025-05-30 RX ORDER — ACETAMINOPHEN 325 MG/1
650 TABLET ORAL EVERY 6 HOURS PRN
Status: DISCONTINUED | OUTPATIENT
Start: 2025-05-30 | End: 2025-06-10

## 2025-05-30 RX ORDER — CARVEDILOL 12.5 MG/1
12.5 TABLET ORAL 2 TIMES DAILY WITH MEALS
Status: DISCONTINUED | OUTPATIENT
Start: 2025-05-30 | End: 2025-06-04

## 2025-05-30 RX ORDER — NIFEDIPINE 30 MG/1
30 TABLET, EXTENDED RELEASE ORAL DAILY
Status: DISCONTINUED | OUTPATIENT
Start: 2025-05-31 | End: 2025-06-02

## 2025-05-30 RX ORDER — ALBUTEROL SULFATE 90 UG/1
2 INHALANT RESPIRATORY (INHALATION) EVERY 6 HOURS PRN
Status: DISCONTINUED | OUTPATIENT
Start: 2025-05-30 | End: 2025-06-20 | Stop reason: HOSPADM

## 2025-05-30 RX ORDER — SODIUM CHLORIDE, SODIUM LACTATE, CALCIUM CHLORIDE, MAGNESIUM CHLORIDE AND DEXTROSE 1.5; 538; 448; 18.3; 5.08 G/100ML; MG/100ML; MG/100ML; MG/100ML; MG/100ML
6000 INJECTION, SOLUTION INTRAPERITONEAL NIGHTLY
Status: DISCONTINUED | OUTPATIENT
Start: 2025-05-30 | End: 2025-06-13

## 2025-05-30 RX ORDER — LOSARTAN POTASSIUM 100 MG/1
100 TABLET ORAL NIGHTLY
Status: DISCONTINUED | OUTPATIENT
Start: 2025-05-30 | End: 2025-06-20 | Stop reason: HOSPADM

## 2025-05-30 RX ORDER — GENTAMICIN SULFATE 1 MG/G
CREAM TOPICAL DAILY PRN
Status: DISCONTINUED | OUTPATIENT
Start: 2025-05-30 | End: 2025-06-20 | Stop reason: HOSPADM

## 2025-05-30 RX ORDER — POLYETHYLENE GLYCOL 3350 17 G/17G
17 POWDER, FOR SOLUTION ORAL 2 TIMES DAILY
Status: DISCONTINUED | OUTPATIENT
Start: 2025-05-30 | End: 2025-06-19

## 2025-05-30 RX ORDER — SENNA AND DOCUSATE SODIUM 50; 8.6 MG/1; MG/1
2 TABLET, FILM COATED ORAL DAILY
Status: DISCONTINUED | OUTPATIENT
Start: 2025-05-31 | End: 2025-06-20 | Stop reason: HOSPADM

## 2025-05-30 RX ORDER — CLOPIDOGREL BISULFATE 75 MG/1
75 TABLET ORAL DAILY
Status: DISCONTINUED | OUTPATIENT
Start: 2025-05-31 | End: 2025-06-20 | Stop reason: HOSPADM

## 2025-05-30 RX ORDER — ALPRAZOLAM 0.5 MG
1.5 TABLET ORAL EVERY 12 HOURS PRN
Status: DISCONTINUED | OUTPATIENT
Start: 2025-05-30 | End: 2025-06-09

## 2025-05-30 RX ORDER — SPIRONOLACTONE 25 MG/1
50 TABLET ORAL DAILY
Status: DISCONTINUED | OUTPATIENT
Start: 2025-05-31 | End: 2025-06-04

## 2025-05-30 RX ORDER — SODIUM CHLORIDE FOR INHALATION 3 %
4 VIAL, NEBULIZER (ML) INHALATION
Status: DISCONTINUED | OUTPATIENT
Start: 2025-05-30 | End: 2025-06-20 | Stop reason: HOSPADM

## 2025-05-30 RX ORDER — TROSPIUM CHLORIDE 20 MG/1
20 TABLET, FILM COATED ORAL NIGHTLY
Status: DISCONTINUED | OUTPATIENT
Start: 2025-05-30 | End: 2025-06-20 | Stop reason: HOSPADM

## 2025-05-30 RX ORDER — GUAIFENESIN 600 MG/1
1200 TABLET, EXTENDED RELEASE ORAL 2 TIMES DAILY
Status: DISCONTINUED | OUTPATIENT
Start: 2025-05-30 | End: 2025-06-20 | Stop reason: HOSPADM

## 2025-05-30 RX ORDER — ROPINIROLE 1 MG/1
1 TABLET, FILM COATED ORAL NIGHTLY
Status: DISCONTINUED | OUTPATIENT
Start: 2025-05-30 | End: 2025-06-20 | Stop reason: HOSPADM

## 2025-05-30 RX ORDER — ONDANSETRON 4 MG/1
4 TABLET, ORALLY DISINTEGRATING ORAL EVERY 8 HOURS PRN
Status: DISCONTINUED | OUTPATIENT
Start: 2025-05-30 | End: 2025-06-20 | Stop reason: HOSPADM

## 2025-05-30 RX ORDER — ACETAMINOPHEN 325 MG/1
650 TABLET ORAL EVERY 4 HOURS PRN
Status: DISCONTINUED | OUTPATIENT
Start: 2025-05-30 | End: 2025-06-20 | Stop reason: HOSPADM

## 2025-05-30 RX ORDER — ASPIRIN 81 MG/1
81 TABLET, CHEWABLE ORAL DAILY
Status: DISCONTINUED | OUTPATIENT
Start: 2025-05-31 | End: 2025-06-20 | Stop reason: HOSPADM

## 2025-05-30 RX ORDER — METOCLOPRAMIDE 10 MG/1
5 TABLET ORAL
Status: DISCONTINUED | OUTPATIENT
Start: 2025-05-30 | End: 2025-06-20 | Stop reason: HOSPADM

## 2025-05-30 RX ORDER — TORSEMIDE 100 MG/1
100 TABLET ORAL DAILY
Status: DISCONTINUED | OUTPATIENT
Start: 2025-05-31 | End: 2025-06-04

## 2025-05-30 RX ORDER — ONDANSETRON 2 MG/ML
4 INJECTION INTRAMUSCULAR; INTRAVENOUS EVERY 6 HOURS PRN
Status: DISCONTINUED | OUTPATIENT
Start: 2025-05-30 | End: 2025-06-20 | Stop reason: HOSPADM

## 2025-05-30 RX ADMIN — SEVELAMER CARBONATE 800 MG: 800 TABLET, FILM COATED ORAL at 12:10

## 2025-05-30 RX ADMIN — TORSEMIDE 100 MG: 100 TABLET ORAL at 08:48

## 2025-05-30 RX ADMIN — PANTOPRAZOLE SODIUM 40 MG: 40 TABLET, DELAYED RELEASE ORAL at 08:48

## 2025-05-30 RX ADMIN — HEPARIN SODIUM 5000 UNITS: 5000 INJECTION INTRAVENOUS; SUBCUTANEOUS at 23:04

## 2025-05-30 RX ADMIN — SODIUM CHLORIDE, PRESERVATIVE FREE 10 ML: 5 INJECTION INTRAVENOUS at 23:12

## 2025-05-30 RX ADMIN — METOCLOPRAMIDE 5 MG: 10 TABLET ORAL at 05:38

## 2025-05-30 RX ADMIN — GUAIFENESIN 1200 MG: 600 TABLET, EXTENDED RELEASE ORAL at 08:48

## 2025-05-30 RX ADMIN — TROSPIUM CHLORIDE 20 MG: 20 TABLET, FILM COATED ORAL at 19:51

## 2025-05-30 RX ADMIN — TIOTROPIUM BROMIDE AND OLODATEROL 2 PUFF: 3.124; 2.736 SPRAY, METERED RESPIRATORY (INHALATION) at 09:06

## 2025-05-30 RX ADMIN — PANTOPRAZOLE SODIUM 40 MG: 40 TABLET, DELAYED RELEASE ORAL at 19:51

## 2025-05-30 RX ADMIN — IPRATROPIUM BROMIDE AND ALBUTEROL SULFATE 1 DOSE: .5; 2.5 SOLUTION RESPIRATORY (INHALATION) at 20:20

## 2025-05-30 RX ADMIN — TRAMADOL HYDROCHLORIDE 50 MG: 50 TABLET, COATED ORAL at 23:09

## 2025-05-30 RX ADMIN — SPIRONOLACTONE 50 MG: 25 TABLET ORAL at 08:48

## 2025-05-30 RX ADMIN — Medication 4 ML: at 09:05

## 2025-05-30 RX ADMIN — SEVELAMER CARBONATE 800 MG: 800 TABLET, FILM COATED ORAL at 18:01

## 2025-05-30 RX ADMIN — SODIUM CHLORIDE, SODIUM LACTATE, CALCIUM CHLORIDE, MAGNESIUM CHLORIDE AND DEXTROSE 2000 ML: 1.5; 538; 448; 18.3; 5.08 INJECTION, SOLUTION INTRAPERITONEAL at 23:09

## 2025-05-30 RX ADMIN — SODIUM CHLORIDE, PRESERVATIVE FREE 10 ML: 5 INJECTION INTRAVENOUS at 08:50

## 2025-05-30 RX ADMIN — CARVEDILOL 12.5 MG: 12.5 TABLET, FILM COATED ORAL at 18:01

## 2025-05-30 RX ADMIN — IPRATROPIUM BROMIDE AND ALBUTEROL SULFATE 1 DOSE: .5; 2.5 SOLUTION RESPIRATORY (INHALATION) at 09:05

## 2025-05-30 RX ADMIN — HEPARIN SODIUM 5000 UNITS: 5000 INJECTION INTRAVENOUS; SUBCUTANEOUS at 14:52

## 2025-05-30 RX ADMIN — FLUCONAZOLE 200 MG: 100 TABLET ORAL at 08:48

## 2025-05-30 RX ADMIN — CARVEDILOL 12.5 MG: 12.5 TABLET, FILM COATED ORAL at 08:49

## 2025-05-30 RX ADMIN — METOCLOPRAMIDE 5 MG: 10 TABLET ORAL at 18:01

## 2025-05-30 RX ADMIN — HEPARIN SODIUM 5000 UNITS: 5000 INJECTION INTRAVENOUS; SUBCUTANEOUS at 05:38

## 2025-05-30 RX ADMIN — GUAIFENESIN 1200 MG: 600 TABLET, EXTENDED RELEASE ORAL at 19:51

## 2025-05-30 RX ADMIN — ROPINIROLE HYDROCHLORIDE 1 MG: 1 TABLET, FILM COATED ORAL at 21:00

## 2025-05-30 RX ADMIN — TIZANIDINE 4 MG: 4 TABLET ORAL at 19:47

## 2025-05-30 RX ADMIN — SODIUM CHLORIDE, SODIUM LACTATE, CALCIUM CHLORIDE, MAGNESIUM CHLORIDE AND DEXTROSE 6000 ML: 1.5; 538; 448; 18.3; 5.08 INJECTION, SOLUTION INTRAPERITONEAL at 23:09

## 2025-05-30 RX ADMIN — ALPRAZOLAM 1.5 MG: 0.5 TABLET ORAL at 19:50

## 2025-05-30 RX ADMIN — CLOPIDOGREL BISULFATE 75 MG: 75 TABLET, FILM COATED ORAL at 08:48

## 2025-05-30 RX ADMIN — CEFTRIAXONE SODIUM 2000 MG: 2 INJECTION, POWDER, FOR SOLUTION INTRAMUSCULAR; INTRAVENOUS at 09:43

## 2025-05-30 RX ADMIN — NIFEDIPINE 30 MG: 30 TABLET, FILM COATED, EXTENDED RELEASE ORAL at 08:48

## 2025-05-30 RX ADMIN — Medication 4 ML: at 20:20

## 2025-05-30 RX ADMIN — ASPIRIN 81 MG: 81 TABLET, CHEWABLE ORAL at 08:48

## 2025-05-30 RX ADMIN — METOCLOPRAMIDE 5 MG: 10 TABLET ORAL at 09:39

## 2025-05-30 RX ADMIN — SENNOSIDES AND DOCUSATE SODIUM 2 TABLET: 50; 8.6 TABLET ORAL at 08:48

## 2025-05-30 RX ADMIN — SEVELAMER CARBONATE 800 MG: 800 TABLET, FILM COATED ORAL at 08:48

## 2025-05-30 RX ADMIN — SODIUM CHLORIDE, PRESERVATIVE FREE 10 ML: 5 INJECTION INTRAVENOUS at 23:11

## 2025-05-30 RX ADMIN — LOSARTAN POTASSIUM 100 MG: 100 TABLET, FILM COATED ORAL at 19:46

## 2025-05-30 RX ADMIN — ROSUVASTATIN CALCIUM 10 MG: 10 TABLET, FILM COATED ORAL at 19:51

## 2025-05-30 RX ADMIN — POLYETHYLENE GLYCOL 3350 17 G: 17 POWDER, FOR SOLUTION ORAL at 19:53

## 2025-05-30 ASSESSMENT — PAIN SCALES - WONG BAKER
WONGBAKER_NUMERICALRESPONSE: NO HURT
WONGBAKER_NUMERICALRESPONSE: NO HURT

## 2025-05-30 ASSESSMENT — PAIN DESCRIPTION - ORIENTATION: ORIENTATION: LOWER;MID

## 2025-05-30 ASSESSMENT — PAIN SCALES - GENERAL
PAINLEVEL_OUTOF10: 0
PAINLEVEL_OUTOF10: 5

## 2025-05-30 ASSESSMENT — PAIN - FUNCTIONAL ASSESSMENT: PAIN_FUNCTIONAL_ASSESSMENT: PREVENTS OR INTERFERES WITH MANY ACTIVE NOT PASSIVE ACTIVITIES

## 2025-05-30 ASSESSMENT — PAIN DESCRIPTION - LOCATION: LOCATION: NECK;ABDOMEN

## 2025-05-30 ASSESSMENT — PAIN DESCRIPTION - DESCRIPTORS: DESCRIPTORS: ACHING;SPASM

## 2025-05-30 NOTE — PROGRESS NOTES
Department of Physical Medicine & Rehabilitation  Dr. Drummond Progress Note  5/30/2025  11:17 AM    Patient Name:   Jonelle Contreras  YOB: 1950    Diagnosis: Hypertensive urgency        Subjective: Rehab consult follow-up.  74-year-old female who came to hospital with hyperkalemia and hypertension, found to be in a hypertensive emergency.  Her blood pressure was brought down and the patient is following with nephrology and cardiology.  Patient has ESRD and is on PD.  Patient also came in with a cough and underwent a bronc on 5/23/2025 for bilateral mucous plugs.  Patient was treated for pneumonia and started on IV antibiotics for 7 days.  Patient is requiring min assistance for sit to stand/stand to sit/bed transfers.  She continues to be limited in functional mobility and ADLs.  She is interested in going to the acute rehab unit for functional mobility and retraining before discharge home.  Patient kept on the acute floor for 1 more day yesterday for medical stability, and she will now come to the rehabilitation unit today for intensive therapies to improve her overall strength, endurance, and independence in self-care before discharge to home, where she lives alone.      BP (!) 144/86   Pulse 86   Temp 97.7 °F (36.5 °C) (Oral)   Resp 18   Ht 1.651 m (5' 5\")   Wt 63.3 kg (139 lb 8.8 oz)   SpO2 98%   BMI 23.22 kg/m²     Last 24 hour lab  Recent Results (from the past 24 hours)   CBC with Auto Differential    Collection Time: 05/30/25  3:40 AM   Result Value Ref Range    WBC 11.2 (H) 4.0 - 11.0 K/uL    RBC 3.61 (L) 4.00 - 5.20 M/uL    Hemoglobin 10.2 (L) 12.0 - 16.0 g/dL    Hematocrit 31.1 (L) 36.0 - 48.0 %    MCV 86.0 80.0 - 100.0 fL    MCH 28.2 26.0 - 34.0 pg    MCHC 32.8 31.0 - 36.0 g/dL    RDW 16.4 (H) 12.4 - 15.4 %    Platelets 293 135 - 450 K/uL    MPV 9.2 5.0 - 10.5 fL    Neutrophils % 65.3 %    Lymphocytes % 15.8 %    Monocytes % 12.9 %    Eosinophils % 4.9 %    Basophils % 1.1 %

## 2025-05-30 NOTE — PROGRESS NOTES
[facility] - Physical Therapy   Phone: [phone]    Physical Therapy  Facility/Department:[address] PROGRESSIVE CARE    [] Initial Evaluation            [x] Daily Treatment Note         [] Discharge Summary      Patient: Jonelle Contreras   : 1950   MRN: [id number]   Date of Service:  2025  Staff Mobility Recommendation: RW bed to chair with gait belt; stedy to toilet  AM-PAC score: 17/24  Discharge Recommendations: ARU    Jonelle Contreras scored a 17/ on the AM-PAC short mobility form. Current research shows that an AM-PAC score of 17 or less is typically not associated with a discharge to the patient's home setting. Based on the patient's AM-PAC score and their current functional mobility deficits, it is recommended that the patient have 5-7 sessions per week of Physical Therapy at d/c to increase the patient's independence.  At this time, this patient demonstrates complex nursing, medical, and rehabilitative needs, and would benefit from intensive rehabilitation services upon discharge from the Inpatient setting.  This patient demonstrates the ability to participate in and benefit from an intensive therapy program with a coordinated interdisciplinary team approach to foster frequent, structured, and documented communication among disciplines, who will work together to establish, prioritize, and achieve treatment goals. Please see assessment section for further patient specific details.    If patient discharges prior to next session this note will serve as a discharge summary.  Please see below for the latest assessment towards goals.      Admitting Diagnosis: Hypertensive urgency  Ordering Physician: Dr. Bentley  Current Admission Summary: 73 y/o female admit 2025 with HTN Urgency, Underwent bronchoscopy alveolar lavage on 25 per Dr. Ryder. Transferred to ICU on 25 due to respiratory status.   Past Medical History:  has a past medical history of acute intermittent porphyria,  ongoing at this time unless indicated above.    Therapy Session Time      Individual Group Co-treatment   Time In 1248       Time Out 1318       Minutes 30           Timed Code Treatment Minutes: 30 Minutes  Total Treatment Minutes: 30 Minutes    Minutes per charge:  Therapeutic exercise: 15 minutes  Gait training: 15 minutes    Electronically signed by Mikaela Abdi, PT [id number] on 5/30/2025 at 1:25 PM

## 2025-05-30 NOTE — PROGRESS NOTES
Perfect Serve to Dr. Trini Pagan:  just wanted to catch you before you left today, pt is now in room  3268, needs are PD, just calling your consult, thank you and have a nice weekend.   Confirmed the PD order was in, consult completed. Electronically signed by Rina Foy RN on 5/30/2025 at 7:45 PM

## 2025-05-30 NOTE — CARE COORDINATION
SW checked with bedside nurse regarding the plan for continued care. She is slated to discharge to acute rehab this afternoon.     Respectfully submitted,    Radha WOLFF, SHAVONNE  [facility]   [phone]    Electronically signed by NEW Golden, LSW on 5/30/2025 at 8:35 AM

## 2025-05-30 NOTE — PROGRESS NOTES
Internal Medicine Hospital Progress Note    Patient:  Jonelle Contreras 74 y.o. female MRN: [id number]     Date of Service: 5/30/2025    Allergy: Iodine, Iodinated contrast media, Wellbutrin [bupropion], Adhesive tape, Sertraline, and Sulfa antibiotics  CC: F/u:   Brief Course   Jonelle Contreras was admitted on 5/12/2025 for Hypertensive urgency. Jonelle Contreras has been admitted for 18 days    24 hr interval hx:    Objective     Physical Exam:  Vitals: BP (!) 156/80   Pulse 83   Temp 97.7 °F (36.5 °C) (Oral)   Resp 17   Ht 1.651 m (5' 5\")   Wt 63.3 kg (139 lb 8.8 oz)   SpO2 95%   BMI 23.22 kg/m²     General: Alert and oriented X3. No acute distress  Eyes: PEERL. No scleral icterus noted. Normal appearing conjunctiva bilaterally  Ears: Normal TM and external canal bilaterally.  Oral cavity/Throat: No pharyngeal erythema. No oral lesions.  Cardiovascular: Heart is regular rate and rhythm. No murmurs noted. Radial pulses 2+. Posterior tibial pulses 2+. No lower extremity edema noted  Pulmonary: Lungs clear to auscultation bilaterally  Skin: Dry, warm. No obvious skin lesions or rashes noted.  Neuro: PEERL. EOM intact. No facial droop. Normal sensation in bilateral upper and lower extremities. Gait is norm    Pertinent/ New Labs and Imaging Studies   Labs reviewed. Pertinent labs noted in assessment and plan      Assessment and Plan   Jonelle Contreras was admitted to hospital for Hypertensive urgency    Hypertensive urgency  - Nephrology consulted  - Blood pressure has been controlled and she has been orthostatic  - Holding bp meds for bp less than 130 systolic    Sore throat, difficulty swallowing  Esophagitis.    Hypoxia  Weaned off oxygen after bronchoscopy, but back on 3L  - Wean oxygen as tolerated.   - RT eval and breathing treatments PRN      ESRD on PD  - Nephrology consulted, managing PD orders    Hyperlipidemia  - Continue home statin    Restless leg syndrom  - Continue ropinirole    Anxiety  - Continue home

## 2025-05-30 NOTE — PROGRESS NOTES
A complete drug regimen review was completed for this patient.     [x]  No clinically significant medication issue was identified    []   Yes, a clinically significant medication issue was identified     []  Adverse Drug Event:      []  Allergy:      []  Side Effect:      []  Ineffective Therapy:      []  Drug Interaction:     []  Duplicated Therapy:     []  Untreated Indication:      []  Non-adherence:     []  Other:        Electronically signed by Rina Foy RN on 5/30/25 at 7:43 PM EDT

## 2025-05-30 NOTE — PROGRESS NOTES
Patient ID: Jonelle Contreras  Referring/ Physician: Candido Bentley MD      Summary:   Jonelle Contreras is being seen by nephrology for ESRD and HTN.     Reason for admission: hypertensive urgency       Interval Hx:   Seen at bedside.   She is on oxygen still.   Did 1.5 % dianeal last night and she says he lightheadedness/orthostasis is better.   No edema.     /86  On 3 L  Dry wt is around 63 kilos.   Highest wt was 66 kilos.   Lowest was 59.5 kilos and had hypotension.     Na 135 k 3.9  Bicarb 21  BUN51 cr 9.2         Assessment/Plan:   - BP acceptable,  hold all meds if SBP < 130.  - Continue PD with  1.5% exchanges. She appears euvolemic now.   - use PRN laxative to ensure daily bowel movement.     Ok with discharge to rehab    ESRD  ESRD secondary to hypertension.  She does peritoneal dialysis at home 1 exchange of 2 L 1.5% solutions daily.  Based on her most recent labs in the outpatient setting her adequacy is not at goal so we are going to be increasing her to 4 exchanges, 2 L at 2.5% solutions.  Will use the cycler while inpatient.  EDW 61.5 kg   - daily BM, on lactulose. Has ileus > GI consulted.    - gent to PD exit site.        Resistant hypertension  The BP has been controlled inpatient on her home regimen, actually low at times suggesting that she may not have been taking her medications as prescribed.   Has had an overall negative secondary work up. Has been extremely difficult to manage outpatient as she reports \"bottoming out and passing out\" when her medications are increased.   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   She has had 24 hr ambulatory BP monitoring showing consistently elevated bP 180s -200s systolic.   Presenting with hypertensive urgency, having ongoing headaches blurry vision and fatigue.  CT head showed no stroke.,  Has chronic microvascular changes.  Troponin 66.no ECG

## 2025-05-30 NOTE — PLAN OF CARE
Problem: Discharge Planning  Goal: Discharge to home or other facility with appropriate resources  Outcome: Progressing     Problem: ABCDS Injury Assessment  Goal: Absence of physical injury  Outcome: Progressing     Problem: Safety - Adult  Goal: Free from fall injury  Outcome: Progressing     Problem: Pain  Goal: Verbalizes/displays adequate comfort level or baseline comfort level  Outcome: Progressing     Problem: Genitourinary - Adult  Goal: Absence of urinary retention  Outcome: Progressing     Problem: Metabolic/Fluid and Electrolytes - Adult  Goal: Electrolytes maintained within normal limits  Outcome: Progressing     Problem: Nutrition Deficit:  Goal: Optimize nutritional status  Outcome: Progressing     Problem: Skin/Tissue Integrity  Goal: Skin integrity remains intact  Description: 1.  Monitor for areas of redness and/or skin breakdown2.  Assess vascular access sites hourly3.  Every 4-6 hours minimum:  Change oxygen saturation probe site4.  Every 4-6 hours:  If on nasal continuous positive airway pressure, respiratory therapy assess nares and determine need for appliance change or resting period  Outcome: Progressing     Problem: Neurosensory - Adult  Goal: Achieves stable or improved neurological status  Outcome: Progressing     Problem: Respiratory - Adult  Goal: Achieves optimal ventilation and oxygenation  Outcome: Progressing     Problem: Cardiovascular - Adult  Goal: Maintains optimal cardiac output and hemodynamic stability  Outcome: Progressing     Problem: Skin/Tissue Integrity - Adult  Goal: Skin integrity remains intact  Description: 1.  Monitor for areas of redness and/or skin breakdown2.  Assess vascular access sites hourly3.  Every 4-6 hours minimum:  Change oxygen saturation probe site4.  Every 4-6 hours:  If on nasal continuous positive airway pressure, respiratory therapy assess nares and determine need for appliance change or resting period  Outcome: Progressing     Problem:

## 2025-05-30 NOTE — PROGRESS NOTES
Perfect Serve in to  Infection Control, previously called prior to admission to Rehab at 1600 just prior to admission to Rehab.    pt just admitted to Rehab on 3N 3268 and symptoms first charted in notes by MD Bentley on acute 5N on 5/16/2025. not tested until 5/23/2025, when can pt be placed out of droplet precautions per manager Lupe Hester inquiring. Pt has not symptoms as of now. thanks so much.   Awaiting response. Electronically signed by Rina Foy RN on 5/30/2025 at 4:27 PM    At 1627: Responded immediately:  What is she in isolation for?    At 1629:Response from this RN:   Haemophilus    At 1630: Yes, any RN can remove the isolation, Infection Prevention will move the infection header when able. Thank you!     Cristiana Ghislaine also notified via Perfect Serve at 1631 and is aware as Read at 1632.     Cristiana Que will remove as soon as she gets logged in per perfect serve response. Electronically signed by Rina Foy RN on 5/30/2025 at 4:47 PM

## 2025-05-30 NOTE — PROGRESS NOTES
Patient admitted to room 3268 per Rina GOLDBERG. Transferred to Rehab Unit via bed. Patient was oriented to the Call Light, Phone, TV, Thermostat, Bed Controls, Bathroom and Emergency Cord.  Patient verbalized and demonstrated understanding of all.  Patient was also given an over view of Unit Routines for Acute Rehab, including what to wear for therapy. The patient's role in goal setting was reviewed along with an explanation of the Interdisciplinary Team meeting, the 's role in coordinating services and the Discharge Planning/Continuum of Care process. Patient Rights and Responsibilities were reviewed. Meal times were explained, including how to order food.  The white board (used for communication) was pointed out emphasizing  the 3 hours/day Therapy Schedule (posted most evenings), the number (and process) for reporting grievances, and the Doctor's, Nurse's, and PCA's names. It was recommended that any family that will be care givers or any care givers the patient has, take part in therapy. All safety in place, daughter at bedside. Electronically signed by Rina Foy RN on 5/30/2025 at 6:21 PM

## 2025-05-30 NOTE — PROGRESS NOTES
Physical Therapy  PT attempt  Jonelle Contreras    Attempted PT treatment this AM.  Pt currently on PD.  Will attempt again as schedule permits.    Electronically signed by Mikaela Abdi, PT [id number] on 5/30/2025 at 9:34 AM

## 2025-05-30 NOTE — PLAN OF CARE
[facility] PATIENT TREATMENT PLAN  [facility]   [address] [place]  [address]  [phone]    Jonelle Contreras    : 1950  [id number]  MRN: [id number]   PHYSICIAN:  Bon Drummond MD  Primary Problem    Patient Active Problem List   Diagnosis    Anxiety    Panic disorder    Palpitations    IBS (irritable bowel syndrome)    Depression    Colitis    PAD (peripheral artery disease)    Atherosclerosis of native coronary artery of native heart without angina pectoris    Uncontrolled hypertension    Arthritis    Asthma    Atrial fibrillation (HCC)    Gastroesophageal reflux disease    Seasonal allergic rhinitis    Urinary incontinence    Stage 4 chronic kidney disease (HCC)    Left thyroid nodule    Tobacco abuse    ST elevation myocardial infarction involving right coronary artery (HCC)    Inferior MI (HCC)    Chronic kidney disease    Dyslipidemia    Encounter for tobacco use cessation counseling    History of intermittent claudication    Leg pain    Hyperlipidemia LDL goal <70    post menopausal    Restless leg syndrome    Hypertensive urgency    Chest pain    Blurred vision, bilateral    Hypertensive emergency    Chronic obstructive pulmonary disease, unspecified COPD type (HCC)    End stage renal disease (HCC)    Hypotension    Hyperkalemia    Mucus plugging of bronchi    Shortness of breath    Acute hypoxic respiratory failure (HCC)    Gastroparesis    Candida esophagitis (HCC)    Pneumonia due to Haemophilus influenzae    ESRD on peritoneal dialysis (HCC)    Debility       Rehabilitation Diagnosis:     Debility [R53.81]       ADMIT DATE:2025    Patient Goals: \"be able to do everything I did at home before\"     Admitting Impairments: Debility, H Flu pneumonia- bronchoscopy, finish ceftriaxone tx, Mucinex     Hypertensive urgency-  Nephrology consulted, Coreg, Cozaar, Aldactone, Demadex, nifedipine     GERD, esophagitis-Reglan, Protonix      ESRD on PD-  supervision  Short Term Goal 2: Transfers with supervision  Short Term Goal 3: ambulated 150' with wheeled walker with supervision  Short Term Goal 4: Ascend/descend 12 steps with bilateral rails with supervision  Short Term Goal 5: Ascend/descend curb step with wheeled walker with supervision            Long Term Goals  Time Frame for Long Term Goals : Upon discharge - approx 10-14 days from 5/31/2025  Long Term Goal 1: bed mobility with modified independence  Long Term Goal 2: transfers with modified independence  Long Term Goal 3: Ambulate 150' with wheeled walker with modified independence  Long Term Goal 4: Ascend/descend 12 steps with bilateral rails with modified independence  Long Term Goal 5: Ascend/descend curb step with wheeled walker with modified independence  These goals were reviewed with this patient at the time of assessment and Jonelle Contreras is in agreement.     Plan of Care: Pt to be seen 90 mins per day for 5 day/week 2 weeks.                   Current Treatment Recommendations: Strengthening, ROM, Balance training, Functional mobility training, Transfer training, Endurance training, Gait training, Stair training, Safety education & training, Equipment evaluation, education, & procurement, Therapeutic activities      OCCUPATIONAL THERAPY:  Goals:             Short Term Goals  Time Frame for Short Term Goals: 1 weeks  Short Term Goal 1: Pt will complete bathing SBA using AE PRN  Short Term Goal 2: Pt will complete dressing with setup SBA using AE PRN (with exception of TEDs)  Short Term Goal 3: Pt will complete toileting SBA  Short Term Goal 4: Pt will maintain stance throughout fxl task for atleast 5-8 minutes SBA to address strength/activity tolerance for ADL/IADLs  Short Term Goal 5: Pt will complete fxl mobility/txs using LRAD SBA  Additional Goals?: Yes  Short Term Goal 6: Pt will perform BUE HEP IND to address strength/activity tolerance for fxl performance :  Long Term Goals  Time Frame  minutes within a 7 day period).    Treatments may include therapeutic exercises, gait training, neuromuscular re-ed, transfer training, community reintegration, bed mobility, w/c mobility and training, self care, home mgmt, cognitive training, energy conservation,dysphagia tx, speech/language/communication therapy, group therapy, and patient/family education. In addition, dietician/nutritionist may monitor calorie count as well as intake and collaboratively work with SLP on dietary upgrades.  Neuropsychology/Psychology may evaluate and provide necessary support.    Medical issues being managed closely and that require 24 hour availability of a physician:   [] Swallowing Precautions  [] Bowel/Bladder Fx  [] Weight bearing precautions   [] Wound Care    [x] Pain Mgmt   [x] Infection Protection   [x] DVT Prophylaxis   [x] Fall Precautions  [x] Fluid/Electrolyte/Nutrition Balance   [] Voice Protection   [] Respiratory  [] Other:    Medical Prognosis: [] Good  [x] Fair    [] Guarded   Total expected IRF days 15 days  Anticipated discharge destination:    [] Home Independently   [x] Home Modified Independent  [] Home with supervision    []SNF     [] Other                                           Physician anticipated functional outcomes:   To return patient to home setting at their prior level of function.         IPOC brief synthesis: 74-year-old female who came to hospital with hyperkalemia and hypertension, found to be in a hypertensive emergency. Her blood pressure was brought down and the patient is following with nephrology and cardiology. Patient has ESRD and is on PD. Patient also came in with a cough and underwent a bronc on 5/23/2025 for bilateral mucous plugs. Patient was treated for pneumonia and started on IV antibiotics for 7 days. Patient is requiring min assistance for sit to stand/stand to sit/bed transfers. She continues to be limited in functional mobility and ADLs. She is interested in going to the

## 2025-05-30 NOTE — PROGRESS NOTES
Initial Drain Vol:  1  ml  Avg Dwell Time:  117 min  Avg Drain Time:  44  min  Total Therapy Vol: 8001 ml  Total UF:  - 36 ml  Day UF:  ] 0 ml  Night UF:  - 36 ml  Total Therapy Time: 11 hr 30 min    UF deviation: -597

## 2025-05-30 NOTE — PROGRESS NOTES
Nephrology called regarding orders for PD for tonight. The MAR says dialysate is 1.5% while the in the orders it says it is for 2.5%. This RN called and left a message seeking clarification. Awaiting a call back.    Electronically signed by Jay Bowen RN on 5/29/2025 at 9:33 PM     On-call  Called back and said to go with 1.5%.    Electronically signed by Jay Bowen RN on 5/29/2025 at 9:35 PM

## 2025-05-31 LAB
ANION GAP SERPL CALCULATED.3IONS-SCNC: 19 MMOL/L (ref 3–16)
BASOPHILS # BLD: 0.1 K/UL (ref 0–0.2)
BASOPHILS NFR BLD: 1 %
BUN SERPL-MCNC: 49 MG/DL (ref 7–20)
CALCIUM SERPL-MCNC: 9.2 MG/DL (ref 8.3–10.6)
CHLORIDE SERPL-SCNC: 94 MMOL/L (ref 99–110)
CO2 SERPL-SCNC: 23 MMOL/L (ref 21–32)
CREAT SERPL-MCNC: 9.2 MG/DL (ref 0.6–1.2)
DEPRECATED RDW RBC AUTO: 16 % (ref 12.4–15.4)
EOSINOPHIL # BLD: 0.4 K/UL (ref 0–0.6)
EOSINOPHIL NFR BLD: 3.9 %
GFR SERPLBLD CREATININE-BSD FMLA CKD-EPI: 4 ML/MIN/{1.73_M2}
GLUCOSE SERPL-MCNC: 94 MG/DL (ref 70–99)
HCT VFR BLD AUTO: 29.6 % (ref 36–48)
HGB BLD-MCNC: 9.8 G/DL (ref 12–16)
LYMPHOCYTES # BLD: 1.6 K/UL (ref 1–5.1)
LYMPHOCYTES NFR BLD: 16.4 %
MAGNESIUM SERPL-MCNC: 1.73 MG/DL (ref 1.8–2.4)
MCH RBC QN AUTO: 28.7 PG (ref 26–34)
MCHC RBC AUTO-ENTMCNC: 33.1 G/DL (ref 31–36)
MCV RBC AUTO: 86.8 FL (ref 80–100)
MONOCYTES # BLD: 1.1 K/UL (ref 0–1.3)
MONOCYTES NFR BLD: 11.8 %
NEUTROPHILS # BLD: 6.4 K/UL (ref 1.7–7.7)
NEUTROPHILS NFR BLD: 66.9 %
PLATELET # BLD AUTO: 287 K/UL (ref 135–450)
PMV BLD AUTO: 9.4 FL (ref 5–10.5)
POTASSIUM SERPL-SCNC: 4.1 MMOL/L (ref 3.5–5.1)
RBC # BLD AUTO: 3.4 M/UL (ref 4–5.2)
SODIUM SERPL-SCNC: 136 MMOL/L (ref 136–145)
WBC # BLD AUTO: 9.5 K/UL (ref 4–11)

## 2025-05-31 PROCEDURE — 97166 OT EVAL MOD COMPLEX 45 MIN: CPT

## 2025-05-31 PROCEDURE — 6360000002 HC RX W HCPCS: Performed by: PHYSICAL MEDICINE & REHABILITATION

## 2025-05-31 PROCEDURE — 97530 THERAPEUTIC ACTIVITIES: CPT

## 2025-05-31 PROCEDURE — 1280000000 HC REHAB R&B

## 2025-05-31 PROCEDURE — 97116 GAIT TRAINING THERAPY: CPT

## 2025-05-31 PROCEDURE — 94761 N-INVAS EAR/PLS OXIMETRY MLT: CPT

## 2025-05-31 PROCEDURE — 6370000000 HC RX 637 (ALT 250 FOR IP): Performed by: STUDENT IN AN ORGANIZED HEALTH CARE EDUCATION/TRAINING PROGRAM

## 2025-05-31 PROCEDURE — 3E1M39Z IRRIGATION OF PERITONEAL CAVITY USING DIALYSATE, PERCUTANEOUS APPROACH: ICD-10-PCS | Performed by: INTERNAL MEDICINE

## 2025-05-31 PROCEDURE — 83735 ASSAY OF MAGNESIUM: CPT

## 2025-05-31 PROCEDURE — 2500000003 HC RX 250 WO HCPCS: Performed by: PHYSICAL MEDICINE & REHABILITATION

## 2025-05-31 PROCEDURE — 94640 AIRWAY INHALATION TREATMENT: CPT

## 2025-05-31 PROCEDURE — 97535 SELF CARE MNGMENT TRAINING: CPT

## 2025-05-31 PROCEDURE — 6370000000 HC RX 637 (ALT 250 FOR IP): Performed by: PHYSICAL MEDICINE & REHABILITATION

## 2025-05-31 PROCEDURE — 85025 COMPLETE CBC W/AUTO DIFF WBC: CPT

## 2025-05-31 PROCEDURE — 2700000000 HC OXYGEN THERAPY PER DAY

## 2025-05-31 PROCEDURE — 97162 PT EVAL MOD COMPLEX 30 MIN: CPT

## 2025-05-31 PROCEDURE — 36415 COLL VENOUS BLD VENIPUNCTURE: CPT

## 2025-05-31 PROCEDURE — 94669 MECHANICAL CHEST WALL OSCILL: CPT

## 2025-05-31 PROCEDURE — A4722 DIALYS SOL FLD VOL > 1999CC: HCPCS | Performed by: PHYSICAL MEDICINE & REHABILITATION

## 2025-05-31 PROCEDURE — 80048 BASIC METABOLIC PNL TOTAL CA: CPT

## 2025-05-31 PROCEDURE — 2580000003 HC RX 258: Performed by: PHYSICAL MEDICINE & REHABILITATION

## 2025-05-31 RX ORDER — LACTULOSE 10 G/15ML
20 SOLUTION ORAL ONCE
Status: COMPLETED | OUTPATIENT
Start: 2025-05-31 | End: 2025-05-31

## 2025-05-31 RX ADMIN — SEVELAMER CARBONATE 800 MG: 800 TABLET, FILM COATED ORAL at 16:49

## 2025-05-31 RX ADMIN — FLUCONAZOLE 200 MG: 100 TABLET ORAL at 09:17

## 2025-05-31 RX ADMIN — SENNOSIDES, DOCUSATE SODIUM 2 TABLET: 50; 8.6 TABLET, FILM COATED ORAL at 09:18

## 2025-05-31 RX ADMIN — HEPARIN SODIUM 5000 UNITS: 5000 INJECTION INTRAVENOUS; SUBCUTANEOUS at 14:47

## 2025-05-31 RX ADMIN — SODIUM CHLORIDE, SODIUM LACTATE, CALCIUM CHLORIDE, MAGNESIUM CHLORIDE AND DEXTROSE 2000 ML: 1.5; 538; 448; 18.3; 5.08 INJECTION, SOLUTION INTRAPERITONEAL at 20:52

## 2025-05-31 RX ADMIN — ROSUVASTATIN CALCIUM 10 MG: 10 TABLET, FILM COATED ORAL at 20:51

## 2025-05-31 RX ADMIN — POLYETHYLENE GLYCOL 3350 17 G: 17 POWDER, FOR SOLUTION ORAL at 20:48

## 2025-05-31 RX ADMIN — PANTOPRAZOLE SODIUM 40 MG: 40 TABLET, DELAYED RELEASE ORAL at 09:17

## 2025-05-31 RX ADMIN — CARVEDILOL 12.5 MG: 12.5 TABLET, FILM COATED ORAL at 16:50

## 2025-05-31 RX ADMIN — METOCLOPRAMIDE 5 MG: 10 TABLET ORAL at 06:01

## 2025-05-31 RX ADMIN — CLOPIDOGREL BISULFATE 75 MG: 75 TABLET, FILM COATED ORAL at 09:17

## 2025-05-31 RX ADMIN — SODIUM CHLORIDE, SODIUM LACTATE, CALCIUM CHLORIDE, MAGNESIUM CHLORIDE AND DEXTROSE 6000 ML: 1.5; 538; 448; 18.3; 5.08 INJECTION, SOLUTION INTRAPERITONEAL at 20:52

## 2025-05-31 RX ADMIN — PANTOPRAZOLE SODIUM 40 MG: 40 TABLET, DELAYED RELEASE ORAL at 20:51

## 2025-05-31 RX ADMIN — SODIUM CHLORIDE, PRESERVATIVE FREE 10 ML: 5 INJECTION INTRAVENOUS at 09:26

## 2025-05-31 RX ADMIN — LOSARTAN POTASSIUM 100 MG: 100 TABLET, FILM COATED ORAL at 20:51

## 2025-05-31 RX ADMIN — NIFEDIPINE 30 MG: 30 TABLET, FILM COATED, EXTENDED RELEASE ORAL at 09:17

## 2025-05-31 RX ADMIN — SEVELAMER CARBONATE 800 MG: 800 TABLET, FILM COATED ORAL at 12:05

## 2025-05-31 RX ADMIN — TORSEMIDE 100 MG: 100 TABLET ORAL at 09:17

## 2025-05-31 RX ADMIN — POLYETHYLENE GLYCOL 3350 17 G: 17 POWDER, FOR SOLUTION ORAL at 09:23

## 2025-05-31 RX ADMIN — TRAMADOL HYDROCHLORIDE 50 MG: 50 TABLET, COATED ORAL at 16:50

## 2025-05-31 RX ADMIN — Medication 4 ML: at 20:00

## 2025-05-31 RX ADMIN — GUAIFENESIN 1200 MG: 600 TABLET, EXTENDED RELEASE ORAL at 20:51

## 2025-05-31 RX ADMIN — METOCLOPRAMIDE 5 MG: 10 TABLET ORAL at 16:50

## 2025-05-31 RX ADMIN — ALPRAZOLAM 1.5 MG: 0.5 TABLET ORAL at 20:51

## 2025-05-31 RX ADMIN — ASPIRIN 81 MG 81 MG: 81 TABLET ORAL at 09:17

## 2025-05-31 RX ADMIN — METOCLOPRAMIDE 5 MG: 10 TABLET ORAL at 12:05

## 2025-05-31 RX ADMIN — HEPARIN SODIUM 5000 UNITS: 5000 INJECTION INTRAVENOUS; SUBCUTANEOUS at 20:49

## 2025-05-31 RX ADMIN — ROPINIROLE HYDROCHLORIDE 1 MG: 1 TABLET, FILM COATED ORAL at 20:51

## 2025-05-31 RX ADMIN — GUAIFENESIN 1200 MG: 600 TABLET, EXTENDED RELEASE ORAL at 09:17

## 2025-05-31 RX ADMIN — LACTULOSE 20 G: 10 SOLUTION ORAL at 12:05

## 2025-05-31 RX ADMIN — TIZANIDINE 4 MG: 4 TABLET ORAL at 20:51

## 2025-05-31 RX ADMIN — TIOTROPIUM BROMIDE AND OLODATEROL 2 PUFF: 3.124; 2.736 SPRAY, METERED RESPIRATORY (INHALATION) at 08:26

## 2025-05-31 RX ADMIN — IPRATROPIUM BROMIDE AND ALBUTEROL SULFATE 1 DOSE: .5; 2.5 SOLUTION RESPIRATORY (INHALATION) at 20:00

## 2025-05-31 RX ADMIN — IPRATROPIUM BROMIDE AND ALBUTEROL SULFATE 1 DOSE: .5; 2.5 SOLUTION RESPIRATORY (INHALATION) at 08:26

## 2025-05-31 RX ADMIN — TROSPIUM CHLORIDE 20 MG: 20 TABLET, FILM COATED ORAL at 20:51

## 2025-05-31 RX ADMIN — CARVEDILOL 12.5 MG: 12.5 TABLET, FILM COATED ORAL at 09:18

## 2025-05-31 RX ADMIN — SEVELAMER CARBONATE 800 MG: 800 TABLET, FILM COATED ORAL at 09:17

## 2025-05-31 RX ADMIN — SODIUM CHLORIDE, PRESERVATIVE FREE 10 ML: 5 INJECTION INTRAVENOUS at 20:42

## 2025-05-31 RX ADMIN — HEPARIN SODIUM 5000 UNITS: 5000 INJECTION INTRAVENOUS; SUBCUTANEOUS at 06:02

## 2025-05-31 RX ADMIN — Medication 4 ML: at 08:26

## 2025-05-31 RX ADMIN — SPIRONOLACTONE 50 MG: 25 TABLET ORAL at 09:17

## 2025-05-31 ASSESSMENT — PAIN DESCRIPTION - ONSET
ONSET: ON-GOING
ONSET: PROGRESSIVE

## 2025-05-31 ASSESSMENT — PAIN SCALES - GENERAL
PAINLEVEL_OUTOF10: 6
PAINLEVEL_OUTOF10: 0
PAINLEVEL_OUTOF10: 0
PAINLEVEL_OUTOF10: 6
PAINLEVEL_OUTOF10: 0

## 2025-05-31 ASSESSMENT — PAIN DESCRIPTION - FREQUENCY
FREQUENCY: INTERMITTENT
FREQUENCY: INTERMITTENT

## 2025-05-31 ASSESSMENT — PAIN - FUNCTIONAL ASSESSMENT: PAIN_FUNCTIONAL_ASSESSMENT: PREVENTS OR INTERFERES SOME ACTIVE ACTIVITIES AND ADLS

## 2025-05-31 ASSESSMENT — PAIN DESCRIPTION - ORIENTATION
ORIENTATION: RIGHT
ORIENTATION: LOWER;MID

## 2025-05-31 ASSESSMENT — PAIN DESCRIPTION - LOCATION
LOCATION: LEG
LOCATION: NECK;ABDOMEN;BACK

## 2025-05-31 ASSESSMENT — PAIN DESCRIPTION - PAIN TYPE
TYPE: ACUTE PAIN
TYPE: ACUTE PAIN

## 2025-05-31 ASSESSMENT — PAIN DESCRIPTION - DESCRIPTORS
DESCRIPTORS: DISCOMFORT
DESCRIPTORS: ACHING;SPASM

## 2025-05-31 NOTE — FLOWSHEET NOTE
Patient her admission skin assessment done by two RNs.    Discussed risks, benefits, and alternatives with patient.    Patient reason for declining treatment: She stated it is \"not necessary.\"    The following provider was notified of patient's intent to refuse: Refusal for picture documentation of bilateral heels and coccyx, perirectal area.     Feedback from provider: awaiting feedback from Dr Drummond    Alternative interventions used in the meantime: Hakeem scale orders in.

## 2025-05-31 NOTE — PROGRESS NOTES
Internal Medicine Hospital Progress Note    Patient:  Jonelle Contreras 74 y.o. female MRN: [id number]     Date of Service: 5/31/2025    Allergy: Iodine, Iodinated contrast media, Wellbutrin [bupropion], Adhesive tape, Sertraline, and Sulfa antibiotics  CC: F/u:   Brief Course   Jonelle Contreras was admitted on 5/30/2025 for Debility. Jonelle Contreras has been admitted for 1 days    24 hr interval hx:  Transferred to rehab unit yesterday    Objective     Physical Exam:  Vitals: /85   Pulse 84   Temp 97.7 °F (36.5 °C) (Oral)   Resp 17   Ht 1.651 m (5' 5\")   Wt 59.2 kg (130 lb 8.2 oz)   SpO2 99%   BMI 21.72 kg/m²     General: Alert and oriented X3. No acute distress  Eyes: PEERL. No scleral icterus noted. Normal appearing conjunctiva bilaterally  Ears: Normal TM and external canal bilaterally.  Oral cavity/Throat: No pharyngeal erythema. No oral lesions.  Cardiovascular: Heart is regular rate and rhythm. No murmurs noted. Radial pulses 2+. Posterior tibial pulses 2+. No lower extremity edema noted  Pulmonary: Lungs clear to auscultation bilaterally  Skin: Dry, warm. No obvious skin lesions or rashes noted.  Neuro: PEERL. EOM intact. No facial droop. Normal sensation in bilateral upper and lower extremities. Gait is norm    Pertinent/ New Labs and Imaging Studies   Labs reviewed. Pertinent labs noted in assessment and plan      Assessment and Plan   Jonelle Contreras was admitted to hospital for Debility    Hypertensive urgency  - Nephrology consulted  - Blood pressure has been controlled and she has been orthostatic  - Holding bp meds for bp less than 130 systolic    Sore throat, difficulty swallowing  Esophagitis.  - Continue fluconazole for funal esophagitis.  - Continue pantoprazole    Constipation  - Resume lactulose.      ESRD on PD  - Nephrology consulted, managing PD orders    Hyperlipidemia  - Continue home statin    Restless leg syndrom  - Continue ropinirole    Anxiety  - Continue home

## 2025-05-31 NOTE — PLAN OF CARE
Problem: Discharge Planning  Goal: Discharge to home or other facility with appropriate resources  5/31/2025 0121 by Candice Kowalski RN  Outcome: Progressing  5/31/2025 0121 by Candice Kowalski RN  Outcome: Progressing     Problem: Safety - Adult  Goal: Free from fall injury  5/31/2025 0121 by Candice Kowalski RN  Outcome: Progressing  5/31/2025 0121 by Candice Kowalski RN  Outcome: Progressing     Problem: Skin/Tissue Integrity  Goal: Skin integrity remains intact  Description: 1.  Monitor for areas of redness and/or skin breakdown2.  Assess vascular access sites hourly3.  Every 4-6 hours minimum:  Change oxygen saturation probe site4.  Every 4-6 hours:  If on nasal continuous positive airway pressure, respiratory therapy assess nares and determine need for appliance change or resting period  5/31/2025 0121 by Candice Kowalski RN  Outcome: Progressing  5/31/2025 0121 by Candice Kowalski RN  Outcome: Progressing     Problem: Pain  Goal: Verbalizes/displays adequate comfort level or baseline comfort level  Outcome: Progressing

## 2025-05-31 NOTE — PROGRESS NOTES
Patient admitted to rehab with debility d/t acute hypoxic respiratory failure with mucus plugging.  A/Ox4. Transfers with walker or stedy for long distances. Mobility restrictions: weakness. On regular, low phosphorus diet, tolerating well. Medications taken whole with thins. On aspirin, plavix, heparin for DVT prophylaxis.  Skin: scattered bruising. Oxygen: 2L NC. LDA: PIV RFA, PD cath. Has been continent of bowel and continent of bladder. LBM 5/29/25. Chair/bed alarms in use and call light in reach. Will monitor for safety. Electronically signed by SKYLER MARKS RN on 5/31/2025 at 1:41 PM

## 2025-05-31 NOTE — PROGRESS NOTES
Patient ID: Jonelle Contreras  Referring/ Physician: Bon Drummond MD      Summary:   Jonelle Contreras is being seen by nephrology for ESRD and HTN.     Reason for admission: hypertensive urgency       Interval Hx:   Seen at bedside.   She is on oxygen still.   She worked with therapy and did not get lightheaded just fatigued.   On 1.5 % dianeal   No edema.     /85  On 2 L  Dry wt is around 63 kilos.currently 59.2   Lowest was 59.5 kilos and had hypotension.     K 4.1   Bicarb 23  Ca 9.2   Hgb 9.8        Assessment/Plan:   - BP acceptable,  hold all meds if SBP < 130.  - Continue PD with  1.5% exchanges. She appears euvolemic now.   - use PRN laxative to ensure daily bowel movement.   - check phos and PTH. Check albumin  - retacrit 10 k units weekly starting Monday         ESRD  ESRD secondary to hypertension.  She does peritoneal dialysis at home 1 exchange of 2 L 1.5% solutions daily.  Based on her most recent labs in the outpatient setting her adequacy is not at goal so we are going to be increasing her to 4 exchanges, 2 L at 2.5% solutions.  Will use the cycler while inpatient.  EDW 61.5 kg   - daily BM, on lactulose. Has ileus > GI consulted.    - gent to PD exit site.        Resistant hypertension  The BP has been controlled inpatient on her home regimen, actually low at times suggesting that she may not have been taking her medications as prescribed.   Has had an overall negative secondary work up. Has been extremely difficult to manage outpatient as she reports \"bottoming out and passing out\" when her medications are increased.   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   She has had 24 hr ambulatory BP monitoring showing consistently elevated bP 180s -200s systolic.   Presenting with hypertensive urgency, having ongoing headaches blurry vision and fatigue.  CT head showed no stroke.,  Has chronic  osteopenia frax score under on calcium and D    Prediabetes     Pulmonary nodules     yearly ct lung followed by Dr Fuentes    Restless leg syndrome     STEMI involving oth coronary artery of inferior wall (HCC) 12/04/2022    JAYME RCA    Urinary incontinence          Surgical Hx: reviewed and updated as appropriate   has a past surgical history that includes Hysterectomy; Cholecystectomy; Coronary angioplasty with stent (10/2013 [facility]); Colonoscopy (07/06/2017); Colonoscopy (N/A, 11/16/2022); Upper gastrointestinal endoscopy (N/A, 11/16/2022); Cardiac surgery; Hysterectomy, vaginal; Upper gastrointestinal endoscopy (11/16/2022); Upper gastrointestinal endoscopy (N/A, 5/22/2025); and bronchoscopy (N/A, 5/23/2025).     Social Hx: reviewed and updated as appropriate  Social History     Tobacco Use    Smoking status: Some Days     Current packs/day: 1.00     Average packs/day: 1 pack/day for 57.4 years (57.4 ttl pk-yrs)     Types: Cigarettes     Start date: 1/1/1968     Passive exposure: Past (on patch)    Smokeless tobacco: Never    Tobacco comments:     I have quite on and off.i have used Nicorett product.the patch was working until i had a reaction.   Substance Use Topics    Alcohol use: No        Family hx: reviewed and updated as appropriate  family history includes Cancer in her brother, brother, sister, and another family member; Coronary Art Dis in her father; Heart Attack in her father; High Blood Pressure in her mother; Hypertension in her father; Kidney Disease in her mother; No Known Problems in her maternal grandfather, maternal grandmother, paternal grandfather, and paternal grandmother; Other in her father; Prostate Cancer in her brother.    Medications:   sodium chloride flush, 5-40 mL, 2 times per day  heparin (porcine), 5,000 Units, 3 times per day  aspirin, 81 mg, Daily  carvedilol, 12.5 mg, BID WC  clopidogrel, 75 mg, Daily  losartan, 100 mg, Nightly  rOPINIRole, 1 mg, Nightly  rosuvastatin, 10

## 2025-05-31 NOTE — PLAN OF CARE
Problem: Discharge Planning  Goal: Discharge to home or other facility with appropriate resources  5/31/2025 1300 by Luisana Terrazas RN  Outcome: Progressing  Flowsheets (Taken 5/31/2025 0915)  Discharge to home or other facility with appropriate resources:   Identify barriers to discharge with patient and caregiver   Arrange for needed discharge resources and transportation as appropriate   Identify discharge learning needs (meds, wound care, etc)   Refer to discharge planning if patient needs post-hospital services based on physician order or complex needs related to functional status, cognitive ability or social support system     Problem: Safety - Adult  Goal: Free from fall injury  5/31/2025 1300 by Luisana Terraazs RN  Outcome: Progressing  Flowsheets (Taken 5/31/2025 0817)  Free From Fall Injury: Instruct family/caregiver on patient safety     Problem: Skin/Tissue Integrity  Goal: Skin integrity remains intact  Description: 1.  Monitor for areas of redness and/or skin breakdown2.  Assess vascular access sites hourly3.  Every 4-6 hours minimum:  Change oxygen saturation probe site4.  Every 4-6 hours:  If on nasal continuous positive airway pressure, respiratory therapy assess nares and determine need for appliance change or resting period  5/31/2025 1300 by Luisana Terrazas RN  Outcome: Progressing  Flowsheets  Taken 5/31/2025 0915  Skin Integrity Remains Intact: Monitor for areas of redness and/or skin breakdown      Problem: Pain  Goal: Verbalizes/displays adequate comfort level or baseline comfort level  5/31/2025 1300 by Luisana Terrazas RN  Outcome: Progressing  Flowsheets (Taken 5/31/2025 0915)  Verbalizes/displays adequate comfort level or baseline comfort level:   Encourage patient to monitor pain and request assistance   Assess pain using appropriate pain scale   Administer analgesics based on type and severity of pain and evaluate response   Implement non-pharmacological measures as appropriate  and evaluate response     Problem: Nutrition Deficit:  Goal: Optimize nutritional status  5/31/2025 1300 by Luisana Terrazas RN  Outcome: Progressing

## 2025-05-31 NOTE — CONSULTS
Comprehensive Nutrition Assessment    Type and Reason for Visit:  Consult    Nutrition Recommendations/Plan:   Continue current diet.  Monitor need for Nepro.  Do not recommend any Ensures d/t phos restriction.      Malnutrition Assessment:  Malnutrition Status:  At risk for malnutrition (05/31/25 0933)    Context:  Acute Illness       Nutrition Assessment:    RD consult for IP rehab. Pt. admitted for debility. Currently receiving a low phos diet. Diet acceptance appears okay (51-75%). ESRD on PD at home noted. Weight fluctuations may be anticipated/unavoidable d/t nature of ESRD and need for dialysis. EDW is 61.5kg. Scheduled bowel regimen noted. GI consulted for ileus. Pt. declined need for supplements at this time but is open to it if appetite does not improve. Will continue to monitor nutritional adequacy and diet acceptance.    Nutrition Related Findings:    Mg 1.73. LBM 5/29 Wound Type: None       Current Nutrition Intake & Therapies:    Average Meal Intake: 51-75%  Average Supplements Intake: Refusing to take  ADULT DIET; Regular; Low Phosphorus (Less than 1000 mg)    Anthropometric Measures:  Height: 165.1 cm (5' 5\")  Ideal Body Weight (IBW): 125 lbs (57 kg)       Current Body Weight: 62.2 kg (137 lb 2 oz), 114.3 % IBW.    Current BMI (kg/m2): 23.5           Weight Adjustment For: No Adjustment                 BMI Categories: Normal Weight (BMI 22.0 to 24.9) age over 65    Estimated Daily Nutrient Needs:  Energy Requirements Based On: Kcal/kg  Weight Used for Energy Requirements: Current  Energy (kcal/day): 0186-8368 kcal (25-30 kcal/kg)  Weight Used for Protein Requirements: Current  Protein (g/day): 75-87 g (1.2-1.4g/kg)  Method Used for Fluid Requirements: 1 ml/kcal  Fluid (ml/day): Or per provider    Nutrition Diagnosis:   Increased nutrient needs related to increase demand for energy/nutrients as evidenced by dialysis, rehab for strength and conditioning    Nutrition Interventions:   Food and/or  Nutrient Delivery: Continue Current Diet  Nutrition Education/Counseling:  (Monitor need)  Coordination of Nutrition Care: Continue to monitor while inpatient       Goals:  Goals: PO intake 75% or greater     Previous Goal Met: New Goal    Nutrition Monitoring and Evaluation:   Behavioral-Environmental Outcomes: None Identified  Food/Nutrient Intake Outcomes: Food and Nutrient Intake, Vitamin/Mineral Intake  Physical Signs/Symptoms Outcomes: Biochemical Data, GI Status, Meal Time Behavior, Nutrition Focused Physical Findings    Discharge Planning:    Too soon to determine     Ely Mello RD  Contact: [phone]

## 2025-05-31 NOTE — PROGRESS NOTES
PD completed and disconnected.   Volume: 1987  Total UF -24 mL  Time: 10 hr 5 min.  Average dwell: 94 min.  Average drain: 41 min.  Initial drain volume: 1 mL

## 2025-05-31 NOTE — PROGRESS NOTES
Physical Therapy  Facility/Department: [address] REHAB  Rehabilitation Physical Therapy Initial Assessment    NAME: Jonelle Contreras  : 1950 (74 y.o.)  MRN: [id number]  CODE STATUS: Full Code    Date of Service: 25      Past Medical History:   Diagnosis Date    acute intermittent porphyria     sees Alfredo, mom had it too    agoraphobic     ongoing, prefers to be at home    Allergic rhinitis     Anxiety     Arthritis     Asthma     CAD (coronary artery disease) 2022    acute inf [place] RCA stent successfully placed *[place] other arteries clear    Chronic obstructive pulmonary disease, unspecified COPD type ([facility]) 2025    ckd 4     from HTN,  sees Trini Caroway    collagenous colitis     Mega    COPD (chronic obstructive pulmonary disease) ([facility])     Depression 2013    eversince      Disease of blood and blood forming organ     GERD (gastroesophageal reflux disease)     Heart disease     Hyperlipidemia     Hypertension 2016    Left thyroid nodule     9mm, gets rechecked yearly on ct lung    Migraines     gets 4x per month    Neuropathy     Osteoporosis     PAD (peripheral artery disease) 2022    bilat ptca fem art Ranjith    Peripheral vascular disease     post menopausal 2020    osteopenia frax score under on calcium and D    Prediabetes     Pulmonary nodules     yearly ct lung followed by Dr Fuentes    Restless leg syndrome     STEMI involving oth coronary artery of inferior wall ([facility]) 2022    JAYME RCA    Urinary incontinence      Past Surgical History:   Procedure Laterality Date    BRONCHOSCOPY N/A 2025    BRONCHOSCOPY ALVEOLAR LAVAGE performed by Sven Ryder MD at [facility] ENDOSCOPY    CARDIAC SURGERY      CHOLECYSTECTOMY      COLONOSCOPY  2017    DR. AYOUB    COLONOSCOPY N/A 2022    COLONOSCOPY WITH BIOPSY performed by Aston Ayoub MD at [facility] ENDOSCOPY    CORONARY ANGIOPLASTY WITH STENT PLACEMENT  10/2013 [place]     HYSTERECTOMY (CERVIX STATUS UNKNOWN)      JAMEL only for ? age 31    HYSTERECTOMY, VAGINAL      UPPER GASTROINTESTINAL ENDOSCOPY N/A 11/16/2022    EGD BIOPSY performed by Aston Ayoub MD at [facility] ENDOSCOPY    UPPER GASTROINTESTINAL ENDOSCOPY  11/16/2022    UPPER GASTROINTESTINAL ENDOSCOPY N/A 5/22/2025    ESOPHAGOGASTRODUODENOSCOPY performed by Parker Rdz MD at [facility] ENDOSCOPY       Chart Reviewed: Yes  Additional Pertinent Hx: per Dr. Drummond's note, \"74-year-old female who came to hospital with hyperkalemia and hypertension, found to be in a hypertensive emergency.  Her blood pressure was brought down and the patient is following with nephrology and cardiology.  Patient has ESRD and is on PD.  Patient also came in with a cough and underwent a bronc on 5/23/2025 for bilateral mucous plugs.  Patient was treated for pneumonia and started on IV antibiotics for 7 days. \"  Family/Caregiver Present: No  Referring Practitioner: Dr. Drummond  Referral Date : 05/30/25  Diagnosis: debility following hypertensive emergency    Restrictions:  Restrictions/Precautions: Fall Risk  Position Activity Restriction  Other Position/Activity Restrictions: PD nightly. dwayne TEDs daily. 2L O2 (not O2 dependent)     SUBJECTIVE  Subjective: Patient asleep in recliner with her lunch in front of her.  Patient reports she doesn't eat a lot of solid foods and drinks a lot of fluids at home. PT encouraged patient to at least try some chicken broth, but patient declined.  No complaints of pain. she states she just feels very tired and hasn't been sleeping great        Prior Level of Function:  Social/Functional History  Lives With: Alone  Type of Home: House  Home Layout: Work area in basement, Two level, Laundry in basement (ranch with finished basement)  Home Access: Stairs to enter with rails  Entrance Stairs - Number of Steps: 4 CONSTANCE front door with L handrail.  Entrance Stairs - Rails: Left  Bathroom Shower/Tub: Tub/Shower unit  Bathroom Toilet:

## 2025-05-31 NOTE — FLOWSHEET NOTE
Patient was admitted to ARU on 5/30 after d/c'd from [facility] for PT/OT d/t dx of acute respiratory failure with mucus plugging. PNA and influenza has resolved.     Pt has extensive medical hx including HTN, hyperkalemia, depression, anxiety, asthma, COPD and ESRD. Stated she has been tired of going to HD and started using PD at home. But this has not been done d/t the machine was \"broken.\"      Patient is A/O x4. Transfers using stedy x2 d/t weakness. Uses a wheelchair. She only uses walker intermittently while at home where she lives alone. She was  in 2013. Has three adult children.    [employer] currently running. See orders. PD catheter on her RLQ. Dressing change due in the morning.    Regular diet with low Phosphorus (less than 1000mg)    Takes her pills whole with thin liquids. She is on ASA, Plavix and Heparin injection for DVT prophylaxis.     She is continent of bladder (still urinates) and had a loose and large  BM on 5/29 accdg to PCU.    Skin: scattered abrasions, heels and coccyx area unremarkable.    She is currently on O2 at 2LPM and she is not dependent at home.    PIV on RFA flushed for patency.  She is followed by Nephro and Cardio.    Bed alarm on, Call light within reach.

## 2025-05-31 NOTE — PROGRESS NOTES
Occupational Therapy  Facility/Department: [address] [facility]  Rehabilitation Occupational Therapy Evaluation       Date: 25  Patient Name: Jonelle Contreras       Room: H4B-1037/3258-01  MRN: [id number]  Account: [id number]   : 1950  (74 y.o.) Gender: female     Referring Practitioner: Bon Drummond MD  Diagnosis: debility  Additional Pertinent Hx: Pt is a 75 yo F admitted to ARU with debility after being hospitalized for hyperkalemia and hypertension, found to be in a hypertensive emergency. Pt has ESRD and is on PD. Bronc performed on 2025 for bilateral mucous plugs. Treated for pneumonia.    Restrictions  Restrictions/Precautions: Fall Risk  Other Position/Activity Restrictions: PD nightly. dwayne TEDs daily.    Subjective  Subjective: Pt met b/s for OT eval/tx session, seated on BSC with RN present, agreeable to therapy. Denied pain but reporting fatigue as session progressed.            Objective  Vision - Basic Assessment  Prior Vision: Wears glasses only for reading  Cognition  Overall Cognitive Status: WFL  Orientation  Overall Orientation Status: Within Normal Limits  Orientation Level: Oriented X4   Perception  Overall Perceptual Status: WFL  Sensation  Overall Sensation Status: WNL (denied N/T)     ROM  LUE AROM (degrees)  LUE AROM : WFL  LUE General AROM: achieves full AROM  RUE AROM (degrees)  RUE AROM : WFL  RUE General AROM: achieves full AROM  LUE Strength  Gross LUE Strength: Exceptions to WFL  L Shoulder Flex: 4/5  L Elbow Flex: 4+/5  L Elbow Ext: 4+/5  L Hand General:  (WFL)  RUE Strength  Gross RUE Strength: Exceptions to WFL  R Shoulder Flex: 4/5  R Elbow Flex: 4+/5  R Elbow Ext: 4+/5  R Hand General:  (WFL)    Fine Motor Skills  Coordination  Movements Are Fluid And Coordinated: Yes            Functional Mobility  Balance  Sitting Balance: Stand by assistance  Standing Balance: Contact guard assistance  Standing Balance  Time: ~60 seconds  Activity: LB bathing/dressing in  complete  Putting On/Taking Off Footwear Skilled Clinical Factors: Pt doffed R footie seated without difficulty, min A to doff L footie. Dep to don TEDs and footies dwayne d/t fatigue. Of note, pt gymshoes at bedside too small to don at this time.  Toileting: Contact guard assistance  Toileting Skilled Clinical Factors: Pt urinated and performed fantasma care seated on BSC, managed brief in stance CGA, no LOB.  Functional Mobility: Contact guard assistance  Functional Mobility Skilled Clinical Factors: Pt amb with RW short household distances in room (~10', ~6', ~20) CGA, slow pace, flexed posture at walker, no LOB.    Goals  Patient Goals   Patient goals : \"to get back to the way I was before\"  Short Term Goals  Time Frame for Short Term Goals: 1 weeks  Short Term Goal 1: Pt will complete bathing SBA using AE PRN  Short Term Goal 2: Pt will complete dressing with setup SBA using AE PRN (with exception of TEDs)  Short Term Goal 3: Pt will complete toileting SBA  Short Term Goal 4: Pt will maintain stance throughout fxl task for atleast 5-8 minutes SBA to address strength/activity tolerance for ADL/IADLs  Short Term Goal 5: Pt will complete fxl mobility/txs using LRAD SBA  Additional Goals?: Yes  Short Term Goal 6: Pt will perform BUE HEP IND to address strength/activity tolerance for fxl performance  Long Term Goals  Time Frame for Long Term Goals : 2 weeks  Long Term Goal 1: Pt will complete bathing mod I using AE PRN  Long Term Goal 2: Pt will complete dressing mod I using AE PRN  Long Term Goal 3: Pt will complete toileting mod I  Long Term Goal 4: Pt will complete fxl mobility/txs using LRAD mod I  Long Term Goal 5: Pt will complete IADL task (laundry, meal prep, cleaning, etc) mod I    Assessment  Performance deficits / Impairments: Decreased functional mobility ;Decreased endurance;Decreased ADL status;Decreased balance;Decreased strength;Decreased high-level IADLs;Decreased posture  Assessment: Pt is a 75 yo F

## 2025-05-31 NOTE — H&P
Patient: Jonelle Contreras  [id number]  Date: 5/31/2025      Chief Complaint: Debility    History of Present Illness/Hospital Course:  Jonelle Contreras is a 74-year-old female who came to hospital with hyperkalemia and hypertension, found to be in a hypertensive emergency. Her blood pressure was brought down and the patient is following with nephrology and cardiology. Patient has ESRD and is on PD. Patient also came in with a cough and underwent a bronc on 5/23/2025 for bilateral mucous plugs. Patient was treated for pneumonia and started on IV antibiotics for 7 days. Patient is requiring min assistance for sit to stand/stand to sit/bed transfers. She continues to be limited in functional mobility and ADLs. She is interested in going to the acute rehab unit for functional mobility and retraining before discharge home. Patient lives at home alone in a multilevel house.     Prior Level of Function:  Independent with transfers, gait, self-care activities    Current Level of Function:  Min assist needed for transfers and gait for 15 feet with rolling walker.    Functional Deficits:  Weakness, decreased balance, decreased endurance     has a past medical history of acute intermittent porphyria, agoraphobic, Allergic rhinitis, Anxiety, Arthritis, Asthma, CAD (coronary artery disease), Chronic obstructive pulmonary disease, unspecified COPD type ([facility]), ckd 4, collagenous colitis, COPD (chronic obstructive pulmonary disease) ([facility]), Depression, Disease of blood and blood forming organ, GERD (gastroesophageal reflux disease), Heart disease, Hyperlipidemia, Hypertension, Left thyroid nodule, Migraines, Neuropathy, Osteoporosis, PAD (peripheral artery disease), Peripheral vascular disease, post menopausal, Prediabetes, Pulmonary nodules, Restless leg syndrome, STEMI involving oth coronary artery of inferior wall ([facility]), and Urinary incontinence.     has a past surgical history that includes Hysterectomy; Cholecystectomy; Coronary  warm, perfused.  RRR  Resp: No respiratory distress. No increased WOB, clear to apexes  Abd: Soft, nontender nondistended  Ext: No edema.   Neuro:   - Alert, oriented to person/place/date.   - CN II-XII grossly intact.  - 5/5 strength in bilateral upper and lower extremities.  - Balance/gait deferred.   Psych: Calm. Affect congruent with stated mood.     Lab Results   Component Value Date    WBC 9.5 05/31/2025    HGB 9.8 (L) 05/31/2025    HCT 29.6 (L) 05/31/2025    MCV 86.8 05/31/2025     05/31/2025     Lab Results   Component Value Date    INR 1.11 11/24/2020    INR 0.98 02/09/2015    INR 0.97 10/19/2013    PROTIME 12.9 11/24/2020    PROTIME 10.6 02/09/2015    PROTIME 10.9 10/19/2013     Lab Results   Component Value Date    CREATININE 9.2 (HH) 05/31/2025    BUN 49 (H) 05/31/2025     05/31/2025    K 4.1 05/31/2025    CL 94 (L) 05/31/2025    CO2 23 05/31/2025     Lab Results   Component Value Date    ALT 14 05/12/2025    AST 22 05/12/2025    ALKPHOS 78 05/12/2025    BILITOT <0.2 05/12/2025       The above laboratory data have been reviewed.     The above imaging data have been reviewed.     The above medical testing have been reviewed.     Body mass index is 23.54 kg/m².    Barriers to Discharge (AKA Impairments): Weakness, decreased balance    Disposition: Home    Prognosis: Fair    Rehabilitation goals: To return patient to home setting at their prior level of function.     This patient's IRF admission is reasonable and necessary to optimize their medical status, maximize their functional improvement, and ensure a maximally safe discharge.    POST ADMISSION PHYSICIAN EVALUATION  The patient has agreed to being admitted to our comprehensive inpatient  rehabilitation facility consisting of at least 180 minutes of therapy a day,  5 out of 7 days a week.    The patient/family has a good understanding of our discharge process. The  patient has potential to make improvement and is in need of at least two

## 2025-05-31 NOTE — FLOWSHEET NOTE
4 Eyes Skin Assessment     NAME:  Jonelle Contreras  YOB: 1950  MEDICAL RECORD NUMBER:  [id number]    The patient is being assessed for  Admission    I agree that at least one RN has performed a thorough Head to Toe Skin Assessment on the patient. ALL assessment sites listed below have been assessed.      Areas assessed by both nurses: Candice Kowalski RN and Sapphire Simpson RN     Head, Face, Ears, Shoulders, Back, Chest, Arms, Elbows, Hands, Sacrum. Buttock, Coccyx, Ischium, Legs. Feet and Heels, and Under Medical Devices         Does the Patient have a Wound? No noted wound(s)       Hakeem Prevention initiated by RN: Yes  Wound Care Orders initiated by RN: N/A    Pressure Injury (Stage 3,4, Unstageable, DTI, NWPT, and Complex wounds) if present, place Wound referral order by RN under : No    New Ostomies, if present place, Ostomy referral order under : No     Nurse 1 eSignature: Electronically signed by Candice Kowalski RN on 5/30/25 at 10:30 PM EDT    **SHARE this note so that the co-signing nurse can place an eSignature**    Nurse 2 eSignature: Electronically signed by Sapphire Simpson RN on 5/30/25 at 11:18 PM EDT

## 2025-05-31 NOTE — PROGRESS NOTES
Perfect serve to nephrology to clarify PD orders. Order in the MAR is for 1.5% solution but [facility] orders has 2.5% listed. MD states it is supposed to be 1.5%. [facility] order adjusted. Kaye Ruiz RN

## 2025-06-01 LAB
ALBUMIN SERPL-MCNC: 2.6 G/DL (ref 3.4–5)
ANION GAP SERPL CALCULATED.3IONS-SCNC: 19 MMOL/L (ref 3–16)
BASOPHILS # BLD: 0.1 K/UL (ref 0–0.2)
BASOPHILS NFR BLD: 1.1 %
BUN SERPL-MCNC: 50 MG/DL (ref 7–20)
CALCIUM SERPL-MCNC: 9.3 MG/DL (ref 8.3–10.6)
CHLORIDE SERPL-SCNC: 96 MMOL/L (ref 99–110)
CO2 SERPL-SCNC: 22 MMOL/L (ref 21–32)
CREAT SERPL-MCNC: 9 MG/DL (ref 0.6–1.2)
DEPRECATED RDW RBC AUTO: 16.2 % (ref 12.4–15.4)
EOSINOPHIL # BLD: 0.4 K/UL (ref 0–0.6)
EOSINOPHIL NFR BLD: 4.7 %
GFR SERPLBLD CREATININE-BSD FMLA CKD-EPI: 4 ML/MIN/{1.73_M2}
GLUCOSE SERPL-MCNC: 91 MG/DL (ref 70–99)
HCT VFR BLD AUTO: 28.3 % (ref 36–48)
HGB BLD-MCNC: 9.3 G/DL (ref 12–16)
LYMPHOCYTES # BLD: 1.7 K/UL (ref 1–5.1)
LYMPHOCYTES NFR BLD: 23 %
MCH RBC QN AUTO: 28.7 PG (ref 26–34)
MCHC RBC AUTO-ENTMCNC: 33 G/DL (ref 31–36)
MCV RBC AUTO: 87.2 FL (ref 80–100)
MONOCYTES # BLD: 0.8 K/UL (ref 0–1.3)
MONOCYTES NFR BLD: 10.9 %
NEUTROPHILS # BLD: 4.6 K/UL (ref 1.7–7.7)
NEUTROPHILS NFR BLD: 60.3 %
PHOSPHATE SERPL-MCNC: 7 MG/DL (ref 2.5–4.9)
PLATELET # BLD AUTO: 311 K/UL (ref 135–450)
PMV BLD AUTO: 9.3 FL (ref 5–10.5)
POTASSIUM SERPL-SCNC: 4 MMOL/L (ref 3.5–5.1)
PTH-INTACT SERPL-MCNC: 123 PG/ML (ref 14–72)
RBC # BLD AUTO: 3.24 M/UL (ref 4–5.2)
SODIUM SERPL-SCNC: 137 MMOL/L (ref 136–145)
WBC # BLD AUTO: 7.6 K/UL (ref 4–11)

## 2025-06-01 PROCEDURE — 94761 N-INVAS EAR/PLS OXIMETRY MLT: CPT

## 2025-06-01 PROCEDURE — 6370000000 HC RX 637 (ALT 250 FOR IP): Performed by: PHYSICAL MEDICINE & REHABILITATION

## 2025-06-01 PROCEDURE — 2580000003 HC RX 258: Performed by: PHYSICAL MEDICINE & REHABILITATION

## 2025-06-01 PROCEDURE — 1280000000 HC REHAB R&B

## 2025-06-01 PROCEDURE — 2500000003 HC RX 250 WO HCPCS: Performed by: PHYSICAL MEDICINE & REHABILITATION

## 2025-06-01 PROCEDURE — 85025 COMPLETE CBC W/AUTO DIFF WBC: CPT

## 2025-06-01 PROCEDURE — A4722 DIALYS SOL FLD VOL > 1999CC: HCPCS | Performed by: PHYSICAL MEDICINE & REHABILITATION

## 2025-06-01 PROCEDURE — 36415 COLL VENOUS BLD VENIPUNCTURE: CPT

## 2025-06-01 PROCEDURE — 80069 RENAL FUNCTION PANEL: CPT

## 2025-06-01 PROCEDURE — 2700000000 HC OXYGEN THERAPY PER DAY

## 2025-06-01 PROCEDURE — 6360000002 HC RX W HCPCS: Performed by: PHYSICAL MEDICINE & REHABILITATION

## 2025-06-01 PROCEDURE — 94669 MECHANICAL CHEST WALL OSCILL: CPT

## 2025-06-01 PROCEDURE — 83970 ASSAY OF PARATHORMONE: CPT

## 2025-06-01 PROCEDURE — 94640 AIRWAY INHALATION TREATMENT: CPT

## 2025-06-01 PROCEDURE — 6370000000 HC RX 637 (ALT 250 FOR IP): Performed by: INTERNAL MEDICINE

## 2025-06-01 RX ORDER — SEVELAMER CARBONATE 800 MG/1
1600 TABLET, FILM COATED ORAL
Status: DISCONTINUED | OUTPATIENT
Start: 2025-06-01 | End: 2025-06-20 | Stop reason: HOSPADM

## 2025-06-01 RX ADMIN — HEPARIN SODIUM 5000 UNITS: 5000 INJECTION INTRAVENOUS; SUBCUTANEOUS at 21:04

## 2025-06-01 RX ADMIN — METOCLOPRAMIDE 5 MG: 10 TABLET ORAL at 12:17

## 2025-06-01 RX ADMIN — HEPARIN SODIUM 5000 UNITS: 5000 INJECTION INTRAVENOUS; SUBCUTANEOUS at 13:41

## 2025-06-01 RX ADMIN — IPRATROPIUM BROMIDE AND ALBUTEROL SULFATE 1 DOSE: .5; 2.5 SOLUTION RESPIRATORY (INHALATION) at 07:30

## 2025-06-01 RX ADMIN — SODIUM CHLORIDE, SODIUM LACTATE, CALCIUM CHLORIDE, MAGNESIUM CHLORIDE AND DEXTROSE 2000 ML: 1.5; 538; 448; 18.3; 5.08 INJECTION, SOLUTION INTRAPERITONEAL at 23:00

## 2025-06-01 RX ADMIN — TRAMADOL HYDROCHLORIDE 50 MG: 50 TABLET, COATED ORAL at 05:33

## 2025-06-01 RX ADMIN — SPIRONOLACTONE 50 MG: 25 TABLET ORAL at 09:11

## 2025-06-01 RX ADMIN — FLUCONAZOLE 200 MG: 100 TABLET ORAL at 09:11

## 2025-06-01 RX ADMIN — POLYETHYLENE GLYCOL 3350 17 G: 17 POWDER, FOR SOLUTION ORAL at 21:05

## 2025-06-01 RX ADMIN — LOSARTAN POTASSIUM 100 MG: 100 TABLET, FILM COATED ORAL at 21:04

## 2025-06-01 RX ADMIN — METOCLOPRAMIDE 5 MG: 10 TABLET ORAL at 16:51

## 2025-06-01 RX ADMIN — SEVELAMER CARBONATE 800 MG: 800 TABLET, FILM COATED ORAL at 09:11

## 2025-06-01 RX ADMIN — PANTOPRAZOLE SODIUM 40 MG: 40 TABLET, DELAYED RELEASE ORAL at 21:04

## 2025-06-01 RX ADMIN — ASPIRIN 81 MG 81 MG: 81 TABLET ORAL at 09:10

## 2025-06-01 RX ADMIN — TRAMADOL HYDROCHLORIDE 50 MG: 50 TABLET, COATED ORAL at 17:55

## 2025-06-01 RX ADMIN — SODIUM CHLORIDE, SODIUM LACTATE, CALCIUM CHLORIDE, MAGNESIUM CHLORIDE AND DEXTROSE 6000 ML: 1.5; 538; 448; 18.3; 5.08 INJECTION, SOLUTION INTRAPERITONEAL at 23:00

## 2025-06-01 RX ADMIN — SENNOSIDES, DOCUSATE SODIUM 2 TABLET: 50; 8.6 TABLET, FILM COATED ORAL at 09:10

## 2025-06-01 RX ADMIN — TROSPIUM CHLORIDE 20 MG: 20 TABLET, FILM COATED ORAL at 21:04

## 2025-06-01 RX ADMIN — GUAIFENESIN 1200 MG: 600 TABLET, EXTENDED RELEASE ORAL at 21:04

## 2025-06-01 RX ADMIN — SEVELAMER CARBONATE 1600 MG: 800 TABLET, FILM COATED ORAL at 16:48

## 2025-06-01 RX ADMIN — TIZANIDINE 4 MG: 4 TABLET ORAL at 16:51

## 2025-06-01 RX ADMIN — NIFEDIPINE 30 MG: 30 TABLET, FILM COATED, EXTENDED RELEASE ORAL at 09:10

## 2025-06-01 RX ADMIN — CARVEDILOL 12.5 MG: 12.5 TABLET, FILM COATED ORAL at 09:11

## 2025-06-01 RX ADMIN — GUAIFENESIN 1200 MG: 600 TABLET, EXTENDED RELEASE ORAL at 09:11

## 2025-06-01 RX ADMIN — SODIUM CHLORIDE, PRESERVATIVE FREE 10 ML: 5 INJECTION INTRAVENOUS at 09:05

## 2025-06-01 RX ADMIN — SODIUM CHLORIDE, PRESERVATIVE FREE 10 ML: 5 INJECTION INTRAVENOUS at 21:05

## 2025-06-01 RX ADMIN — ROSUVASTATIN CALCIUM 10 MG: 10 TABLET, FILM COATED ORAL at 21:04

## 2025-06-01 RX ADMIN — TIOTROPIUM BROMIDE AND OLODATEROL 2 PUFF: 3.124; 2.736 SPRAY, METERED RESPIRATORY (INHALATION) at 07:30

## 2025-06-01 RX ADMIN — ALPRAZOLAM 1.5 MG: 0.5 TABLET ORAL at 21:04

## 2025-06-01 RX ADMIN — ROPINIROLE HYDROCHLORIDE 1 MG: 1 TABLET, FILM COATED ORAL at 21:04

## 2025-06-01 RX ADMIN — Medication 4 ML: at 07:30

## 2025-06-01 RX ADMIN — POLYETHYLENE GLYCOL 3350 17 G: 17 POWDER, FOR SOLUTION ORAL at 09:12

## 2025-06-01 RX ADMIN — PANTOPRAZOLE SODIUM 40 MG: 40 TABLET, DELAYED RELEASE ORAL at 09:11

## 2025-06-01 RX ADMIN — CARVEDILOL 12.5 MG: 12.5 TABLET, FILM COATED ORAL at 16:51

## 2025-06-01 RX ADMIN — SEVELAMER CARBONATE 1600 MG: 800 TABLET, FILM COATED ORAL at 12:19

## 2025-06-01 RX ADMIN — TORSEMIDE 100 MG: 100 TABLET ORAL at 09:11

## 2025-06-01 RX ADMIN — METOCLOPRAMIDE 5 MG: 10 TABLET ORAL at 05:27

## 2025-06-01 RX ADMIN — HEPARIN SODIUM 5000 UNITS: 5000 INJECTION INTRAVENOUS; SUBCUTANEOUS at 05:27

## 2025-06-01 RX ADMIN — CLOPIDOGREL BISULFATE 75 MG: 75 TABLET, FILM COATED ORAL at 09:11

## 2025-06-01 ASSESSMENT — PAIN DESCRIPTION - ONSET
ONSET: ON-GOING

## 2025-06-01 ASSESSMENT — PAIN DESCRIPTION - ORIENTATION
ORIENTATION: RIGHT;LEFT
ORIENTATION: RIGHT;LEFT

## 2025-06-01 ASSESSMENT — PAIN DESCRIPTION - LOCATION
LOCATION: GENERALIZED

## 2025-06-01 ASSESSMENT — PAIN SCALES - GENERAL
PAINLEVEL_OUTOF10: 9
PAINLEVEL_OUTOF10: 9
PAINLEVEL_OUTOF10: 0
PAINLEVEL_OUTOF10: 0
PAINLEVEL_OUTOF10: 9
PAINLEVEL_OUTOF10: 4
PAINLEVEL_OUTOF10: 0

## 2025-06-01 ASSESSMENT — PAIN DESCRIPTION - FREQUENCY
FREQUENCY: INTERMITTENT

## 2025-06-01 ASSESSMENT — PAIN DESCRIPTION - PAIN TYPE
TYPE: ACUTE PAIN

## 2025-06-01 ASSESSMENT — PAIN DESCRIPTION - DESCRIPTORS
DESCRIPTORS: DISCOMFORT;ACHING

## 2025-06-01 NOTE — PROGRESS NOTES
PD summary:    Initial Drain Volume: 3 mL   Average Dwell Time: 110 min.  Average Drain Time: 31 min.  Total Therapy Volume: 7999 mL  Total UF: 103 mL  Day UF: 3 mL  Night UF: 100 mL  Total Therapy Time: 10 hr 33 min.

## 2025-06-01 NOTE — PROGRESS NOTES
Resting quietly in bed, respirations even/easy. Patient admitted with debility s/p respiratory failure. Is alert and oriented with some fatigue. Ambulates with a walker x1, preferred to stand pivot to the commode last evening. Lungs diminished with some crackles at the bases. On 2L O2, not home dependent. HR regular. Abdomen non tender with active bowel sounds. Has been continent of urine. Receives PD but patient states she does void around 2 times per day. PD running overnight so far without any alarms. C/o RLE pain and medicated per PRN orders, medication effective. Encouraged to call for all needs, call light remains in reach at all times. Bed alarm active. Kaye Ruiz RN

## 2025-06-01 NOTE — PLAN OF CARE
Problem: Discharge Planning  Goal: Discharge to home or other facility with appropriate resources  6/1/2025 1300 by Luisana Terrazas RN  Outcome: Progressing  Flowsheets (Taken 6/1/2025 0844)  Discharge to home or other facility with appropriate resources:   Identify barriers to discharge with patient and caregiver   Arrange for needed discharge resources and transportation as appropriate   Identify discharge learning needs (meds, wound care, etc)   Refer to discharge planning if patient needs post-hospital services based on physician order or complex needs related to functional status, cognitive ability or social support system     Problem: Safety - Adult  Goal: Free from fall injury  6/1/2025 1300 by Luisana Terrazas RN  Outcome: Progressing  Flowsheets (Taken 6/1/2025 1059)  Free From Fall Injury: Instruct family/caregiver on patient safety     Problem: Skin/Tissue Integrity  Goal: Skin integrity remains intact  Description: 1.  Monitor for areas of redness and/or skin breakdown2.  Assess vascular access sites hourly3.  Every 4-6 hours minimum:  Change oxygen saturation probe site4.  Every 4-6 hours:  If on nasal continuous positive airway pressure, respiratory therapy assess nares and determine need for appliance change or resting period  6/1/2025 1300 by Luisana Terrazas, RN  Outcome: Progressing  Flowsheets  Taken 6/1/2025 1059  Skin Integrity Remains Intact: Monitor for areas of redness and/or skin breakdown      Problem: Pain  Goal: Verbalizes/displays adequate comfort level or baseline comfort level  6/1/2025 1300 by Luisana Terrazas, RN  Outcome: Progressing  Flowsheets (Taken 6/1/2025 0844)  Verbalizes/displays adequate comfort level or baseline comfort level:   Encourage patient to monitor pain and request assistance   Assess pain using appropriate pain scale   Administer analgesics based on type and severity of pain and evaluate response   Implement non-pharmacological measures as appropriate and  evaluate response     Problem: Nutrition Deficit:  Goal: Optimize nutritional status  6/1/2025 1300 by Luisana Terrazas RN  Outcome: Progressing

## 2025-06-01 NOTE — PROGRESS NOTES
Patient admitted to rehab with debility d/t acute hypoxic respiratory failure with mucus plugging.  A/Ox4. Transfers with walker. Mobility restrictions: generalized weakness. On regular, low phosphorus diet, tolerating well. Medications taken whole with thins. On aspirin, plavix, heparin for DVT prophylaxis.  Skin: scattered bruising. Oxygen: 2L NC. LDA: PIV RFA, PD cath RLQ. Has been continent of bowel and continent of bladder. LBM 5/29/25. Chair/bed alarms in use and call light in reach. Will monitor for safety. Electronically signed by SKYLER MARKS RN on 6/1/2025 at 1:09 PM

## 2025-06-01 NOTE — PLAN OF CARE
Problem: Safety - Adult  Goal: Free from fall injury  Outcome: Progressing  Flowsheets (Taken 6/1/2025 0412)  Free From Fall Injury:   Instruct family/caregiver on patient safety   Based on caregiver fall risk screen, instruct family/caregiver to ask for assistance with transferring infant if caregiver noted to have fall risk factors  Note: Pt educated on falls precautions and safety. Call light and personal belongings within reach at all times. Non skid socks in use when up. Hourly rounding and alarms active.      Problem: Skin/Tissue Integrity  Goal: Skin integrity remains intact  Description: 1.  Monitor for areas of redness and/or skin breakdown2.  Assess vascular access sites hourly3.  Every 4-6 hours minimum:  Change oxygen saturation probe site4.  Every 4-6 hours:  If on nasal continuous positive airway pressure, respiratory therapy assess nares and determine need for appliance change or resting period  Outcome: Progressing  Flowsheets (Taken 6/1/2025 0412)  Skin Integrity Remains Intact: Monitor for areas of redness and/or skin breakdown  Note: Able to change positions in bed without assist, no evidence of skin breakdown noted. Waffle cushion in place to chair.      Problem: Pain  Goal: Verbalizes/displays adequate comfort level or baseline comfort level  Outcome: Progressing  Flowsheets  Taken 6/1/2025 0412 by Kaye Ruiz RN  Verbalizes/displays adequate comfort level or baseline comfort level:   Encourage patient to monitor pain and request assistance   Administer analgesics based on type and severity of pain and evaluate response   Consider cultural and social influences on pain and pain management  Taken 5/31/2025 1650 by Luisana Terrazas, RN  Verbalizes/displays adequate comfort level or baseline comfort level:   Encourage patient to monitor pain and request assistance   Assess pain using appropriate pain scale   Administer analgesics based on type and severity of pain and evaluate response

## 2025-06-01 NOTE — PROGRESS NOTES
Patient ID: Jonelle Contreras  Referring/ Physician: Bon Drummond MD      Summary:   Jonelle Contreras is being seen by nephrology for ESRD and HTN.     Reason for admission: hypertensive urgency       Interval Hx:   Seen at bedside.   No issues reported with PD.   Total UF: 103 mL    /77  On 2 L  Dry wt now ~ 59 kilos    K 4.   Bicarb 22  Ca 9.3  Phos 7   Hgb 9.8        Assessment/Plan:   - BP acceptable,  hold all meds if SBP < 130.  - Continue PD with  1.5% exchanges. She appears euvolemic now.   - use PRN laxative to ensure daily bowel movement.   - increase renvela to 1600 mg TID with meals.   - retacrit 10 k units weekly starting Monday         ESRD  ESRD secondary to hypertension.  She does peritoneal dialysis at home 1 exchange of 2 L 1.5% solutions daily.  Based on her most recent labs in the outpatient setting her adequacy is not at goal so we are going to be increasing her to 4 exchanges, 2 L at 2.5% solutions.  Will use the cycler while inpatient.  EDW 61.5 kg   - daily BM, on lactulose. Has ileus > GI consulted.    - gent to PD exit site.        Resistant hypertension  The BP has been controlled inpatient on her home regimen, actually low at times suggesting that she may not have been taking her medications as prescribed.   Has had an overall negative secondary work up. Has been extremely difficult to manage outpatient as she reports \"bottoming out and passing out\" when her medications are increased.   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   She has had 24 hr ambulatory BP monitoring showing consistently elevated bP 180s -200s systolic.   Presenting with hypertensive urgency, having ongoing headaches blurry vision and fatigue.  CT head showed no stroke.,  Has chronic microvascular changes.  Troponin 66.no ECG changes.   Her home regimen : nifedpine 30 mg , losartan 100 mg aldactone 50 mg , coreg  Daily  torsemide, 100 mg, Daily  polyethylene glycol, 17 g, BID  sennosides-docusate sodium, 2 tablet, Daily  fluconazole, 200 mg, Daily  ipratropium 0.5 mg-albuterol 2.5 mg, 1 Dose, BID RT  NIFEdipine, 30 mg, Daily  metoclopramide, 5 mg, TID AC  sodium chloride (Inhalant), 4 mL, BID RT  guaiFENesin, 1,200 mg, BID  sevelamer, 800 mg, TID WC  pantoprazole, 40 mg, BID  dianeal lo-neal 1.5%, 6,000 mL, Nightly   And  dianeal lo-neal 1.5%, 2,000 mL, Nightly  trospium, 20 mg, Nightly       Iodine, Iodinated contrast media, Wellbutrin [bupropion], Adhesive tape, Sertraline, and Sulfa antibiotics    Allergies:   Allergies   Allergen Reactions    Iodine Itching and Nausea And Vomiting    Iodinated Contrast Media     Wellbutrin [Bupropion]      Dry mouth    Adhesive Tape Rash    Sertraline Other (See Comments)     Severe dry mouth    Sulfa Antibiotics Itching and Nausea Only         Physical Exam/Objective:   Vitals:    06/01/25 0844   BP: 134/77   Pulse: 80   Resp: 18   Temp: 97.7 °F (36.5 °C)   SpO2: 100%       Intake/Output Summary (Last 24 hours) at 6/1/2025 1209  Last data filed at 6/1/2025 0847  Gross per 24 hour   Intake 308 ml   Output 200 ml   Net 108 ml         General appearance: in no acute distress.   Respiratory: Respiratory effort normal, bilateral equal chest rise. No wheeze, no crackles   Cardiovascular: Ausculation shows RRR and no edema   Skin: no rashes,  no jaundice   Neuro:  Follows commands, moves all extremities spontaneously       Data:   CBC:   Recent Labs     05/30/25  0340 05/31/25  0526 06/01/25  0523   WBC 11.2* 9.5 7.6   HGB 10.2* 9.8* 9.3*   HCT 31.1* 29.6* 28.3*    287 311       BMP:    Recent Labs     05/30/25  0340 05/31/25  0526 06/01/25  0523   * 136 137   K 3.9 4.1 4.0   CL 95* 94* 96*   CO2 21 23 22   BUN 51* 49* 50*   CREATININE 9.2* 9.2* 9.0*   GLUCOSE 91 94 91   MG  --  1.73*  --    PHOS  --   --  7.0*     Lab Results   Component Value Date/Time    COLORU Yellow 05/25/2025  09:35 PM    NITRU Negative 05/25/2025 09:35 PM    GLUCOSEU 100 05/25/2025 09:35 PM    KETUA Negative 05/25/2025 09:35 PM    UROBILINOGEN 0.2 05/25/2025 09:35 PM    BILIRUBINUR Negative 05/25/2025 09:35 PM

## 2025-06-02 LAB
ALBUMIN SERPL-MCNC: 2.8 G/DL (ref 3.4–5)
ANION GAP SERPL CALCULATED.3IONS-SCNC: 19 MMOL/L (ref 3–16)
BUN SERPL-MCNC: 46 MG/DL (ref 7–20)
CALCIUM SERPL-MCNC: 9.5 MG/DL (ref 8.3–10.6)
CHLORIDE SERPL-SCNC: 93 MMOL/L (ref 99–110)
CO2 SERPL-SCNC: 23 MMOL/L (ref 21–32)
CREAT SERPL-MCNC: 9.4 MG/DL (ref 0.6–1.2)
FUNGUS SPEC CULT: NORMAL
GFR SERPLBLD CREATININE-BSD FMLA CKD-EPI: 4 ML/MIN/{1.73_M2}
GLUCOSE SERPL-MCNC: 119 MG/DL (ref 70–99)
LOEFFLER MB STN SPEC: NORMAL
PHOSPHATE SERPL-MCNC: 6.6 MG/DL (ref 2.5–4.9)
POTASSIUM SERPL-SCNC: 4 MMOL/L (ref 3.5–5.1)
SODIUM SERPL-SCNC: 135 MMOL/L (ref 136–145)

## 2025-06-02 PROCEDURE — 97110 THERAPEUTIC EXERCISES: CPT

## 2025-06-02 PROCEDURE — 97535 SELF CARE MNGMENT TRAINING: CPT

## 2025-06-02 PROCEDURE — 97530 THERAPEUTIC ACTIVITIES: CPT

## 2025-06-02 PROCEDURE — 6360000002 HC RX W HCPCS: Performed by: INTERNAL MEDICINE

## 2025-06-02 PROCEDURE — 6370000000 HC RX 637 (ALT 250 FOR IP): Performed by: PHYSICAL MEDICINE & REHABILITATION

## 2025-06-02 PROCEDURE — 80069 RENAL FUNCTION PANEL: CPT

## 2025-06-02 PROCEDURE — 94669 MECHANICAL CHEST WALL OSCILL: CPT

## 2025-06-02 PROCEDURE — 6370000000 HC RX 637 (ALT 250 FOR IP): Performed by: INTERNAL MEDICINE

## 2025-06-02 PROCEDURE — 2500000003 HC RX 250 WO HCPCS: Performed by: PHYSICAL MEDICINE & REHABILITATION

## 2025-06-02 PROCEDURE — A4722 DIALYS SOL FLD VOL > 1999CC: HCPCS | Performed by: PHYSICAL MEDICINE & REHABILITATION

## 2025-06-02 PROCEDURE — 2580000003 HC RX 258: Performed by: PHYSICAL MEDICINE & REHABILITATION

## 2025-06-02 PROCEDURE — 36415 COLL VENOUS BLD VENIPUNCTURE: CPT

## 2025-06-02 PROCEDURE — 97116 GAIT TRAINING THERAPY: CPT

## 2025-06-02 PROCEDURE — 2700000000 HC OXYGEN THERAPY PER DAY

## 2025-06-02 PROCEDURE — 1280000000 HC REHAB R&B

## 2025-06-02 PROCEDURE — 6360000002 HC RX W HCPCS: Performed by: PHYSICAL MEDICINE & REHABILITATION

## 2025-06-02 PROCEDURE — 94761 N-INVAS EAR/PLS OXIMETRY MLT: CPT

## 2025-06-02 PROCEDURE — 94640 AIRWAY INHALATION TREATMENT: CPT

## 2025-06-02 RX ORDER — LACTULOSE 10 G/15ML
20 SOLUTION ORAL 3 TIMES DAILY
Status: DISCONTINUED | OUTPATIENT
Start: 2025-06-02 | End: 2025-06-06

## 2025-06-02 RX ADMIN — TIZANIDINE 4 MG: 4 TABLET ORAL at 04:21

## 2025-06-02 RX ADMIN — SODIUM CHLORIDE, PRESERVATIVE FREE 10 ML: 5 INJECTION INTRAVENOUS at 20:15

## 2025-06-02 RX ADMIN — TRAMADOL HYDROCHLORIDE 50 MG: 50 TABLET, COATED ORAL at 20:18

## 2025-06-02 RX ADMIN — SODIUM CHLORIDE, PRESERVATIVE FREE 10 ML: 5 INJECTION INTRAVENOUS at 09:07

## 2025-06-02 RX ADMIN — ASPIRIN 81 MG 81 MG: 81 TABLET ORAL at 09:06

## 2025-06-02 RX ADMIN — LACTULOSE 20 G: 10 SOLUTION ORAL at 14:30

## 2025-06-02 RX ADMIN — METOCLOPRAMIDE 5 MG: 10 TABLET ORAL at 05:43

## 2025-06-02 RX ADMIN — METOCLOPRAMIDE 5 MG: 10 TABLET ORAL at 11:35

## 2025-06-02 RX ADMIN — TROSPIUM CHLORIDE 20 MG: 20 TABLET, FILM COATED ORAL at 20:18

## 2025-06-02 RX ADMIN — HEPARIN SODIUM 5000 UNITS: 5000 INJECTION INTRAVENOUS; SUBCUTANEOUS at 05:43

## 2025-06-02 RX ADMIN — PANTOPRAZOLE SODIUM 40 MG: 40 TABLET, DELAYED RELEASE ORAL at 20:18

## 2025-06-02 RX ADMIN — ALPRAZOLAM 1.5 MG: 0.5 TABLET ORAL at 20:18

## 2025-06-02 RX ADMIN — FLUCONAZOLE 200 MG: 100 TABLET ORAL at 09:05

## 2025-06-02 RX ADMIN — HEPARIN SODIUM 5000 UNITS: 5000 INJECTION INTRAVENOUS; SUBCUTANEOUS at 14:30

## 2025-06-02 RX ADMIN — GUAIFENESIN 1200 MG: 600 TABLET, EXTENDED RELEASE ORAL at 20:18

## 2025-06-02 RX ADMIN — POLYETHYLENE GLYCOL 3350 17 G: 17 POWDER, FOR SOLUTION ORAL at 09:05

## 2025-06-02 RX ADMIN — ROPINIROLE HYDROCHLORIDE 1 MG: 1 TABLET, FILM COATED ORAL at 20:18

## 2025-06-02 RX ADMIN — Medication 4 ML: at 07:35

## 2025-06-02 RX ADMIN — TRAMADOL HYDROCHLORIDE 50 MG: 50 TABLET, COATED ORAL at 05:48

## 2025-06-02 RX ADMIN — SODIUM CHLORIDE, SODIUM LACTATE, CALCIUM CHLORIDE, MAGNESIUM CHLORIDE AND DEXTROSE 6000 ML: 1.5; 538; 448; 18.3; 5.08 INJECTION, SOLUTION INTRAPERITONEAL at 21:14

## 2025-06-02 RX ADMIN — ROSUVASTATIN CALCIUM 10 MG: 10 TABLET, FILM COATED ORAL at 20:18

## 2025-06-02 RX ADMIN — CLOPIDOGREL BISULFATE 75 MG: 75 TABLET, FILM COATED ORAL at 09:06

## 2025-06-02 RX ADMIN — EPOETIN ALFA-EPBX 10000 UNITS: 10000 INJECTION, SOLUTION INTRAVENOUS; SUBCUTANEOUS at 12:41

## 2025-06-02 RX ADMIN — SODIUM CHLORIDE, SODIUM LACTATE, CALCIUM CHLORIDE, MAGNESIUM CHLORIDE AND DEXTROSE 2000 ML: 1.5; 538; 448; 18.3; 5.08 INJECTION, SOLUTION INTRAPERITONEAL at 21:14

## 2025-06-02 RX ADMIN — IPRATROPIUM BROMIDE AND ALBUTEROL SULFATE 1 DOSE: .5; 2.5 SOLUTION RESPIRATORY (INHALATION) at 07:35

## 2025-06-02 RX ADMIN — SEVELAMER CARBONATE 1600 MG: 800 TABLET, FILM COATED ORAL at 18:30

## 2025-06-02 RX ADMIN — SEVELAMER CARBONATE 1600 MG: 800 TABLET, FILM COATED ORAL at 12:41

## 2025-06-02 RX ADMIN — SENNOSIDES, DOCUSATE SODIUM 2 TABLET: 50; 8.6 TABLET, FILM COATED ORAL at 09:06

## 2025-06-02 RX ADMIN — GUAIFENESIN 1200 MG: 600 TABLET, EXTENDED RELEASE ORAL at 09:05

## 2025-06-02 RX ADMIN — METOCLOPRAMIDE 5 MG: 10 TABLET ORAL at 16:24

## 2025-06-02 RX ADMIN — SEVELAMER CARBONATE 1600 MG: 800 TABLET, FILM COATED ORAL at 09:05

## 2025-06-02 RX ADMIN — HEPARIN SODIUM 5000 UNITS: 5000 INJECTION INTRAVENOUS; SUBCUTANEOUS at 20:16

## 2025-06-02 RX ADMIN — ACETAMINOPHEN 650 MG: 325 TABLET ORAL at 04:13

## 2025-06-02 RX ADMIN — TIOTROPIUM BROMIDE AND OLODATEROL 2 PUFF: 3.124; 2.736 SPRAY, METERED RESPIRATORY (INHALATION) at 07:35

## 2025-06-02 RX ADMIN — PANTOPRAZOLE SODIUM 40 MG: 40 TABLET, DELAYED RELEASE ORAL at 09:05

## 2025-06-02 ASSESSMENT — PAIN DESCRIPTION - ONSET: ONSET: ON-GOING

## 2025-06-02 ASSESSMENT — PAIN DESCRIPTION - FREQUENCY: FREQUENCY: INTERMITTENT

## 2025-06-02 ASSESSMENT — PAIN SCALES - GENERAL
PAINLEVEL_OUTOF10: 5
PAINLEVEL_OUTOF10: 0
PAINLEVEL_OUTOF10: 5
PAINLEVEL_OUTOF10: 5

## 2025-06-02 ASSESSMENT — PAIN DESCRIPTION - LOCATION
LOCATION: GENERALIZED
LOCATION: GENERALIZED

## 2025-06-02 ASSESSMENT — PAIN DESCRIPTION - DESCRIPTORS
DESCRIPTORS: ACHING
DESCRIPTORS: DISCOMFORT

## 2025-06-02 ASSESSMENT — PAIN - FUNCTIONAL ASSESSMENT: PAIN_FUNCTIONAL_ASSESSMENT: ACTIVITIES ARE NOT PREVENTED

## 2025-06-02 ASSESSMENT — PAIN DESCRIPTION - ORIENTATION: ORIENTATION: ANTERIOR;POSTERIOR

## 2025-06-02 ASSESSMENT — PAIN DESCRIPTION - PAIN TYPE: TYPE: ACUTE PAIN

## 2025-06-02 ASSESSMENT — PAIN SCALES - WONG BAKER: WONGBAKER_NUMERICALRESPONSE: HURTS A LITTLE BIT

## 2025-06-02 NOTE — PLAN OF CARE
Problem: Discharge Planning  Goal: Discharge to home or other facility with appropriate resources  6/2/2025 1022 by Beckie Romero RN  Outcome: Progressing  Flowsheets (Taken 6/2/2025 1022)  Discharge to home or other facility with appropriate resources: Identify barriers to discharge with patient and caregiver     Problem: Safety - Adult  Goal: Free from fall injury  6/2/2025 1022 by Beckie Romero RN  Outcome: Progressing  Flowsheets (Taken 6/2/2025 1022)  Free From Fall Injury: Instruct family/caregiver on patient safety     Problem: Skin/Tissue Integrity  Goal: Skin integrity remains intact  Description: 1.  Monitor for areas of redness and/or skin breakdown2.  Assess vascular access sites hourly3.  Every 4-6 hours minimum:  Change oxygen saturation probe site4.  Every 4-6 hours:  If on nasal continuous positive airway pressure, respiratory therapy assess nares and determine need for appliance change or resting period  6/2/2025 1022 by Beckie Romero RN  Outcome: Progressing  Flowsheets (Taken 6/2/2025 1022)  Skin Integrity Remains Intact:   Monitor for areas of redness and/or skin breakdown   Turn and reposition as indicated     Problem: Pain  Goal: Verbalizes/displays adequate comfort level or baseline comfort level  6/2/2025 1022 by Beckie Romero RN  Outcome: Progressing  Flowsheets (Taken 6/2/2025 1022)  Verbalizes/displays adequate comfort level or baseline comfort level:   Encourage patient to monitor pain and request assistance   Assess pain using appropriate pain scale   Administer analgesics based on type and severity of pain and evaluate response   Implement non-pharmacological measures as appropriate and evaluate response   Consider cultural and social influences on pain and pain management   Notify Licensed Independent Practitioner if interventions unsuccessful or patient reports new pain     Problem: Nutrition Deficit:  Goal: Optimize nutritional status  6/2/2025 1022 by Nick  ASHLYN Butts  Outcome: Progressing  Flowsheets (Taken 6/2/2025 1022)  Nutrient intake appropriate for improving, restoring, or maintaining nutritional needs: Monitor oral intake, labs, and treatment plans

## 2025-06-02 NOTE — PLAN OF CARE
Problem: Discharge Planning  Goal: Discharge to home or other facility with appropriate resources  6/2/2025 0029 by Dominick Montoya RN  Outcome: Progressing  Flowsheets (Taken 6/1/2025 1930)  Discharge to home or other facility with appropriate resources: Identify barriers to discharge with patient and caregiver  6/1/2025 1300 by Luisana Terrazas RN  Outcome: Progressing  Flowsheets (Taken 6/1/2025 0844)  Discharge to home or other facility with appropriate resources:   Identify barriers to discharge with patient and caregiver   Arrange for needed discharge resources and transportation as appropriate   Identify discharge learning needs (meds, wound care, etc)   Refer to discharge planning if patient needs post-hospital services based on physician order or complex needs related to functional status, cognitive ability or social support system     Problem: Safety - Adult  Goal: Free from fall injury  6/2/2025 0029 by Dominick Montoya RN  Outcome: Progressing  Flowsheets (Taken 6/2/2025 0028)  Free From Fall Injury: Instruct family/caregiver on patient safety  6/1/2025 1300 by Luisana Terrazas RN  Outcome: Progressing  Flowsheets (Taken 6/1/2025 1059)  Free From Fall Injury: Instruct family/caregiver on patient safety     Problem: Skin/Tissue Integrity  Goal: Skin integrity remains intact  Description: 1.  Monitor for areas of redness and/or skin breakdown2.  Assess vascular access sites hourly3.  Every 4-6 hours minimum:  Change oxygen saturation probe site4.  Every 4-6 hours:  If on nasal continuous positive airway pressure, respiratory therapy assess nares and determine need for appliance change or resting period  6/2/2025 0029 by Dominick Montoya RN  Outcome: Progressing  Flowsheets  Taken 6/2/2025 0028  Skin Integrity Remains Intact: Monitor for areas of redness and/or skin breakdown  Taken 6/1/2025 1930  Skin Integrity Remains Intact: Monitor for areas of redness and/or skin breakdown  6/1/2025 1300 by

## 2025-06-02 NOTE — PROGRESS NOTES
Jonelle Contreras  6/2/2025  [id number]    Chief Complaint: Debility    Subjective    Patient seen this AM. Patient did well over the weekend, with no problems noted.  Repeat labs this morning show her renal failure, but otherwise are stable.  Nephrology is following the patient for her blood pressure, which is acceptable at this time with current treatment.  Patient continues on peritoneal dialysis.  Patient will be discussed in rehab conference tomorrow with the entire rehab team.        Objective    Patient Vitals for the past 24 hrs:   BP Temp Temp src Pulse Resp SpO2 Weight   06/02/25 0618 -- -- -- -- 16 -- --   06/01/25 1930 123/73 98.2 °F (36.8 °C) Oral 76 16 99 % --   06/01/25 1929 -- -- -- 84 16 99 % --   06/01/25 1755 -- -- -- -- 18 -- --   06/01/25 1645 (!) 148/78 -- -- 83 -- -- --   06/01/25 0854 -- -- -- -- -- -- 59.3 kg (130 lb 11.7 oz)   06/01/25 0844 134/77 97.7 °F (36.5 °C) Oral 80 18 100 % --   06/01/25 0730 -- -- -- 73 16 97 % --     Patient Vitals for the past 96 hrs (Last 3 readings):   Weight   06/01/25 0854 59.3 kg (130 lb 11.7 oz)   05/31/25 0926 59.2 kg (130 lb 8.2 oz)   05/30/25 1947 62.2 kg (137 lb 2 oz)      Gen: No distress.  HEENT: Normocephalic, atraumatic.   CV: Regular rate and rhythm . Extremities warm, well perfused.   Resp: No respiratory distress. CTAB   Abd: Soft, nontender   Ext: No edema.   Neuro: Alert, oriented, appropriately interactive.     Laboratory data:   Na/K/Cl/CO2:  135/4.0/93/23 (06/02 0541)   BUN/Cr/glu/ALT/AST/amyl/lip:  46/9.4/--/--/--/--/-- (06/02 0541)    WBC/Hgb/Hct/Plts:  7.6/9.3/28.3/311 (06/01 0523)     Therapy progress:       PT    Supine to Sit: Supervision or touching assistance  Sit to Supine: Supervision or touching assistance   Sit to Stand: Supervision or touching assistance  Chair/Bed to Chair Transfer: Supervision or touching assistance  Car Transfer: Partial/moderate assistance  Ambulation 10 ft: Supervision or touching assistance  Ambulation 50 ft:

## 2025-06-02 NOTE — PROGRESS NOTES
Physical Therapy  Facility/Department: [address] [facility]HAB  Rehabilitation Physical Therapy Treatment Note    NAME: Jonelle Contreras  : 1950 (74 y.o.)  MRN: [id number]  CODE STATUS: Full Code    Date of Service: 25       Restrictions:  Restrictions/Precautions: Fall Risk  Position Activity Restriction  Other Position/Activity Restrictions: PD nightly. dwayne TEDs daily. 2L O2 (not O2 dependent)     Pertinent medical information:  Additional Pertinent Hx: per Dr. Drummond's note, \"74-year-old female who came to hospital with hyperkalemia and hypertension, found to be in a hypertensive emergency.  Her blood pressure was brought down and the patient is following with nephrology and cardiology.  Patient has ESRD and is on PD.  Patient also came in with a cough and underwent a bronc on 2025 for bilateral mucous plugs.  Patient was treated for pneumonia and started on IV antibiotics for 7 days. \"    SUBJECTIVE  Subjective: Patient with low BP during OT session and was returned to bed in room.  Patient was supine in bed but woke to name. patient reporting she is feeling exhausted and PD kept her up last night secondary to issues with the machine.  Pain: none reported       Social/Functional History  Lives With: Alone  Type of Home: House  Home Layout: Work area in basement, Two level, Laundry in basement (ranch with finished basement)  Home Access: Stairs to enter with rails  Entrance Stairs - Number of Steps: 4 CONSTANCE front door with L handrail.  Entrance Stairs - Rails: Left  Bathroom Shower/Tub: Tub/Shower unit  Bathroom Toilet: Handicap height  Bathroom Equipment: Tub transfer bench (pt reports she turns the TTB longways so it fits entirely in tub shower unit.)  Bathroom Accessibility: Accessible  Home Equipment: Walker - Rolling (Walker (3 wheel).)  Has the patient had two or more falls in the past year or any fall with injury in the past year?: No (pt denies fall at home within .)  Receives Help From:  reported.  Patient went outside on therapy terrace in wheelchair and felt the warmth of the day felt good.    Patient completed 10 reps bilateral LE hter ex while seated in wheelchair: heel/toe raises, LAQ, hip add sets, hip abduction with medium resistance band, hamstring curls with medium resistance band, and marching.    Sit>stand with min assist.   Ambulated 10' with wheeled walker with short shuffling step with CGA, but then the walker began to move forward while stepping stopped.  Patient stating her feet would no longer move. She required wheelchair be brought to her.  PT retrieved BP cuff and orthostatic BPs taken:  Orthostatic Vitals:      6/2/2025     2:00 PM   Orthostatic Vitals   Orthostatic B/P and Pulse? Yes   Blood Pressure Lying 129/66   Pulse Lying 72 PER MINUTE   Blood Pressure Sitting 116/81   Pulse Sitting 73 PER MINUTE   Blood Pressure Standing 76/57   Pulse Standing 74 PER MINUTE     Patient returned to [facility] therapy gym and BP reassessed in sitting and found to be 127/73 and HR was 73.  PT encouraged fluids.    Patient tolerated therapy poorly due to orthostatic hypotension.  RN, Iveth is aware. Patient returned to room with transportation and Iveth aware.             Therapy Time   Individual Concurrent Group Co-treatment   Time In 1045         Time Out 1100         Minutes 15              Variance: 30    Second Session Therapy Time     Individual Co-treatment   Time In 1315     Time Out 1400     Minutes 45           ESTEE Taylor CNS [id number], 06/02/25 at 11:19 AM

## 2025-06-02 NOTE — PROGRESS NOTES
Patient admitted to rehab with debility d/t acute hypoxic respiratory failure with mucous plugging.  A/Ox4. Transfers with one assist with gait belt. Mobility restrictions: weakness. On regular, low phos diet, tolerating fair with encouragement. Medications taken whole with fluids. On aspirin, plavix and heparin for DVT prophylaxis.  Skin: scattered bruising.  PD catheter RLQ.  Oxygen: 2 LPM per nasal canula. LDA: PIV RFA. Has been continent of bowel and  of bladder. LBM 5/29. Dr Pagan aware of orthostatic B/P with therapy.  Also her blood pressures today.  Note new orders.  Chair/bed alarms in use and call light in reach. Will monitor for safety.

## 2025-06-02 NOTE — PROGRESS NOTES
Patient admitted to rehab with debility d/t acute hypoxic respiratory failure with mucus plugging.  A/Ox4. Transfers with walker and stand pivot to bedside commode. Mobility restrictions: generalized weakness. On regular, low phosphorus diet, tolerating well. Medications taken whole with thins. On aspirin, plavix, heparin for DVT prophylaxis.  Skin: scattered bruising. Oxygen: 2L NC. LDA: PIV RFA, PD cath RLQ, PD statrted at 2300. Has been continent of bowel and continent of bladder. LBM 5/29/25. Chair/bed alarms in use and call light in reach. Will monitor for safety

## 2025-06-02 NOTE — PROGRESS NOTES
Patient ID: Jonelle Contreras  Referring/ Physician: Bon Drummond MD      Summary:   Jonelle Contreras is being seen by nephrology for ESRD and HTN.     Reason for admission: hypertensive urgency       Interval Hx:   Seen at bedside.   Had hypotension during PT.   No issues reported with PD    /70  On 2 L  Dry wt now ~ 59 kilos, at dry wt           Assessment/Plan:   - BP acceptable,  hold all meds if SBP < 140. Will have to let SBP be higher to accommodate for orthostasis  - Continue PD with  1.5% exchanges. She appears euvolemic now.   - not having BM > add lactulose 30 ml TID.   - renvela to 1600 mg TID with meals.   - retacrit 10 k units weekly         ESRD  ESRD secondary to hypertension.  She does peritoneal dialysis at home 1 exchange of 2 L 1.5% solutions daily.  Based on her most recent labs in the outpatient setting her adequacy is not at goal so we are going to be increasing her to 4 exchanges, 2 L at 2.5% solutions.  Will use the cycler while inpatient.  EDW 61.5 kg   - daily BM, on lactulose. Has ileus > GI consulted.    - gent to PD exit site.        Resistant hypertension  The BP has been controlled inpatient on her home regimen, actually low at times suggesting that she may not have been taking her medications as prescribed.   Has had an overall negative secondary work up. Has been extremely difficult to manage outpatient as she reports \"bottoming out and passing out\" when her medications are increased.   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   She has had 24 hr ambulatory BP monitoring showing consistently elevated bP 180s -200s systolic.   Presenting with hypertensive urgency, having ongoing headaches blurry vision and fatigue.  CT head showed no stroke.,  Has chronic microvascular changes.  Troponin 66.no ECG changes.   Her home regimen : nifedpine 30 mg , losartan 100 mg aldactone 50 mg ,  Apparently this has been going on for months but never felt it was serious enough to call 911. She has no chest pain or SOB. Has long standing HTN that has been resistant to multidrug regimen and reports extreme lightheadedness and passing out when her medications are increased and tends to adjust her medications on her own outpatient. She recently quit smoking but has been doing so for decades. She has anxiety and chronic pain issues. Also has a history of acute intermittent porphyria. Has periipheral vascular disease as well. She was sent to ER from dialysis clinic for hypertensive urgency.     No abdominal pain. No fevers no chills.   No NVD.   Has been having regular bowel movements.         Review of Systems:   As above.     PMH/SH/FH:    Medical Hx: reviewed and updated as appropriate  Past Medical History:   Diagnosis Date    acute intermittent porphyria     sees Alfredo, mom had it too    agoraphobic     ongoing, prefers to be at home    Allergic rhinitis     Anxiety     Arthritis     Asthma     CAD (coronary artery disease) 2022    acute inf [place] RCA stent successfully placed *[place] other arteries clear    Chronic obstructive pulmonary disease, unspecified COPD type (HCC) 2025    ckd 4     from HTN,  sees Trini Bombayjordyn    collagenous colitis     Ayoub    COPD (chronic obstructive pulmonary disease) (HCC)     Depression 2013    eversince      Disease of blood and blood forming organ     GERD (gastroesophageal reflux disease)     Heart disease     Hyperlipidemia     Hypertension 2016    Left thyroid nodule     9mm, gets rechecked yearly on ct lung    Migraines     gets 4x per month    Neuropathy     Osteoporosis     PAD (peripheral artery disease) 2022    bilat ptca fem art Ranjith    Peripheral vascular disease     post menopausal 2020    osteopenia frax score under on calcium and D    Prediabetes     Pulmonary nodules     yearly ct lung followed by Dr Fuentes

## 2025-06-02 NOTE — CARE COORDINATION
SOCIAL WORK ASSESSMENT      GOAL:   Return home      HOME SITUATION:home alone        Pcp: Candido Bentley MD      Pharmacy: [facility]        PRIOR LEVEL OF FUNCTIONING:       PERSONAL CARE:    Independent                                                                        DRIVES: drives to all needed appts                                                                      FINANCES: Independent                                                                     MEALS:  Independent                                                                                              GROCERY SHOPS: Independent      DME CURRENTLY AT HOME: Walker- no home O2      CURRENT HOME CARE/SERVICES: none      PREFERRED HOME CARE: List provided      TEAM CONFERENCE DAY: Thursday            LSW informed patient of preferred  time on date of discharge which is between 10 - 12 noon.      LSW informed patient of recommendation for PCP visit within 7 days post discharge.      TRANSPORTATION:   Children to transport at discharge  Patient has not missed a medical appt due to transportation- she drives.

## 2025-06-02 NOTE — PROGRESS NOTES
Occupational Therapy  Facility/Department: [address] REHAB  Rehabilitation Occupational Therapy Daily AM and PM Treatment Note    Date: 25  Patient Name: Jonelle Contreras       Room: T2F-6096/3258-01  MRN: [id number]  Account: [id number]   : 1950  (74 y.o.) Gender: female                    Past Medical History:  has a past medical history of acute intermittent porphyria, agoraphobic, Allergic rhinitis, Anxiety, Arthritis, Asthma, CAD (coronary artery disease), Chronic obstructive pulmonary disease, unspecified COPD type ([facility]), ckd 4, collagenous colitis, COPD (chronic obstructive pulmonary disease) ([facility]), Depression, Disease of blood and blood forming organ, GERD (gastroesophageal reflux disease), Heart disease, Hyperlipidemia, Hypertension, Left thyroid nodule, Migraines, Neuropathy, Osteoporosis, PAD (peripheral artery disease), Peripheral vascular disease, post menopausal, Prediabetes, Pulmonary nodules, Restless leg syndrome, STEMI involving oth coronary artery of inferior wall ([facility]), and Urinary incontinence.  Past Surgical History:   has a past surgical history that includes Hysterectomy; Cholecystectomy; Coronary angioplasty with stent (10/2013 [place]); Colonoscopy (2017); Colonoscopy (N/A, 2022); Upper gastrointestinal endoscopy (N/A, 2022); Cardiac surgery; Hysterectomy, vaginal; Upper gastrointestinal endoscopy (2022); Upper gastrointestinal endoscopy (N/A, 2025); and bronchoscopy (N/A, 2025).    Restrictions  Restrictions/Precautions: Fall Risk  Other Position/Activity Restrictions: PD nightly. dwyane TEDs daily. 2L O2 (not O2 dependent)    Subjective  Subjective: Pt met in dept, reports in no pain, agreeable to OT treat.                Objective               ADL             Sit to Stand  Assistance Level: Contact guard assist  Skilled Clinical Factors: WC > table  Stand to Sit  Assistance Level: Moderate assistance  Skilled Clinical Factors: pt requires  cont w/ treat per POC.     Safety Device - Type of devices:  []  All fall risk precautions in place [x] Bed alarm in place  [x] Call light within reach [] Chair alarm in place [] Positioning belt [x] Gait belt [x] Patient at risk for falls [x] Left in bed [] Left in chair [] Telesitter in use [] Sitter present [x] Nurse notified []  None      Assessment  Assessment  Assessment: Pt limited by BP and dizziness this session. She engaged in standing task x3 trials, BP drops with each stand, spO2 remains > 91% on 2 L O2 however after ~1 min pt becomes in a \"daze\", does not initiate or listen to therapist. OT assists to safely have pt sit into chair, reports of dizzzines after. BP dropped as low as 84/56.  Pt was returned to room, stand pivot from WC > bed with CGA, CGA for bed mobility. RN Iveth and PT Heaven notified of pt's current status. Pt functions below baseline and will cont to benefit from skilled OT on ARU to address deficits.  Activity Tolerance: Patient tolerated treatment well;Patient limited by fatigue  Discharge Recommendations: Continue to assess pending progress;Home with assist PRN;Patient would benefit from continued therapy after discharge;Home with Home health OT  OT Equipment Recommendations  Equipment Needed: Yes  Other: Pt has: RW, 3WW, TTB. May benefit from walker basket, sock aid, reacher...cont to assess.  Safety Devices  Safety Devices in place: Yes  Type of devices: Patient at risk for falls;Left in bed;Nurse notified;Call light within reach;Bed alarm in place;Gait belt    Patient Education  Education  Education Given To: Patient  Education Provided: Role of Therapy;Safety;Transfer Training  Education Method: Demonstration;Verbal  Education Outcome: Verbalized understanding;Continued education needed    Plan  Occupational Therapy Plan  Times Per Week: 5-7x  Times Per Day: Twice a day  Days Per Week: 5 Days  Hours Per Day: 1.5 hours  Therapy Duration: 2 Weeks  Current Treatment Recommendations:

## 2025-06-03 ENCOUNTER — TELEPHONE (OUTPATIENT)
Dept: INTERNAL MEDICINE CLINIC | Age: 75
End: 2025-06-03

## 2025-06-03 PROCEDURE — 97110 THERAPEUTIC EXERCISES: CPT

## 2025-06-03 PROCEDURE — 2500000003 HC RX 250 WO HCPCS: Performed by: PHYSICAL MEDICINE & REHABILITATION

## 2025-06-03 PROCEDURE — 6370000000 HC RX 637 (ALT 250 FOR IP): Performed by: INTERNAL MEDICINE

## 2025-06-03 PROCEDURE — 2700000000 HC OXYGEN THERAPY PER DAY

## 2025-06-03 PROCEDURE — 97116 GAIT TRAINING THERAPY: CPT

## 2025-06-03 PROCEDURE — 97535 SELF CARE MNGMENT TRAINING: CPT

## 2025-06-03 PROCEDURE — A4722 DIALYS SOL FLD VOL > 1999CC: HCPCS | Performed by: PHYSICAL MEDICINE & REHABILITATION

## 2025-06-03 PROCEDURE — 1280000000 HC REHAB R&B

## 2025-06-03 PROCEDURE — 97530 THERAPEUTIC ACTIVITIES: CPT

## 2025-06-03 PROCEDURE — 6370000000 HC RX 637 (ALT 250 FOR IP): Performed by: PHYSICAL MEDICINE & REHABILITATION

## 2025-06-03 PROCEDURE — 6360000002 HC RX W HCPCS: Performed by: PHYSICAL MEDICINE & REHABILITATION

## 2025-06-03 PROCEDURE — 94761 N-INVAS EAR/PLS OXIMETRY MLT: CPT

## 2025-06-03 RX ADMIN — SEVELAMER CARBONATE 1600 MG: 800 TABLET, FILM COATED ORAL at 08:21

## 2025-06-03 RX ADMIN — GUAIFENESIN 1200 MG: 600 TABLET, EXTENDED RELEASE ORAL at 08:21

## 2025-06-03 RX ADMIN — ONDANSETRON 4 MG: 4 TABLET, ORALLY DISINTEGRATING ORAL at 17:02

## 2025-06-03 RX ADMIN — CLOPIDOGREL BISULFATE 75 MG: 75 TABLET, FILM COATED ORAL at 08:21

## 2025-06-03 RX ADMIN — ALPRAZOLAM 1.5 MG: 0.5 TABLET ORAL at 20:07

## 2025-06-03 RX ADMIN — ROSUVASTATIN CALCIUM 10 MG: 10 TABLET, FILM COATED ORAL at 20:06

## 2025-06-03 RX ADMIN — POLYETHYLENE GLYCOL 3350 17 G: 17 POWDER, FOR SOLUTION ORAL at 20:04

## 2025-06-03 RX ADMIN — HEPARIN SODIUM 5000 UNITS: 5000 INJECTION INTRAVENOUS; SUBCUTANEOUS at 06:14

## 2025-06-03 RX ADMIN — HEPARIN SODIUM 5000 UNITS: 5000 INJECTION INTRAVENOUS; SUBCUTANEOUS at 20:05

## 2025-06-03 RX ADMIN — FLUCONAZOLE 200 MG: 100 TABLET ORAL at 08:22

## 2025-06-03 RX ADMIN — TIZANIDINE 4 MG: 4 TABLET ORAL at 21:16

## 2025-06-03 RX ADMIN — CARVEDILOL 12.5 MG: 12.5 TABLET, FILM COATED ORAL at 18:20

## 2025-06-03 RX ADMIN — SODIUM CHLORIDE, PRESERVATIVE FREE 10 ML: 5 INJECTION INTRAVENOUS at 19:59

## 2025-06-03 RX ADMIN — TIZANIDINE 4 MG: 4 TABLET ORAL at 01:00

## 2025-06-03 RX ADMIN — SENNOSIDES, DOCUSATE SODIUM 2 TABLET: 50; 8.6 TABLET, FILM COATED ORAL at 08:21

## 2025-06-03 RX ADMIN — SEVELAMER CARBONATE 1600 MG: 800 TABLET, FILM COATED ORAL at 12:03

## 2025-06-03 RX ADMIN — HEPARIN SODIUM 5000 UNITS: 5000 INJECTION INTRAVENOUS; SUBCUTANEOUS at 13:48

## 2025-06-03 RX ADMIN — Medication 1 SPRAY: at 21:23

## 2025-06-03 RX ADMIN — POLYETHYLENE GLYCOL 3350 17 G: 17 POWDER, FOR SOLUTION ORAL at 08:21

## 2025-06-03 RX ADMIN — ASPIRIN 81 MG 81 MG: 81 TABLET ORAL at 08:21

## 2025-06-03 RX ADMIN — SODIUM CHLORIDE, SODIUM LACTATE, CALCIUM CHLORIDE, MAGNESIUM CHLORIDE AND DEXTROSE 2000 ML: 1.5; 538; 448; 18.3; 5.08 INJECTION, SOLUTION INTRAPERITONEAL at 21:16

## 2025-06-03 RX ADMIN — METOCLOPRAMIDE 5 MG: 10 TABLET ORAL at 11:11

## 2025-06-03 RX ADMIN — METOCLOPRAMIDE 5 MG: 10 TABLET ORAL at 06:14

## 2025-06-03 RX ADMIN — PANTOPRAZOLE SODIUM 40 MG: 40 TABLET, DELAYED RELEASE ORAL at 20:07

## 2025-06-03 RX ADMIN — METOCLOPRAMIDE 5 MG: 10 TABLET ORAL at 16:07

## 2025-06-03 RX ADMIN — ROPINIROLE HYDROCHLORIDE 1 MG: 1 TABLET, FILM COATED ORAL at 20:04

## 2025-06-03 RX ADMIN — PANTOPRAZOLE SODIUM 40 MG: 40 TABLET, DELAYED RELEASE ORAL at 08:21

## 2025-06-03 RX ADMIN — LOSARTAN POTASSIUM 100 MG: 100 TABLET, FILM COATED ORAL at 20:07

## 2025-06-03 RX ADMIN — GUAIFENESIN 1200 MG: 600 TABLET, EXTENDED RELEASE ORAL at 20:06

## 2025-06-03 RX ADMIN — TROSPIUM CHLORIDE 20 MG: 20 TABLET, FILM COATED ORAL at 20:07

## 2025-06-03 RX ADMIN — SODIUM CHLORIDE, PRESERVATIVE FREE 10 ML: 5 INJECTION INTRAVENOUS at 08:26

## 2025-06-03 RX ADMIN — SODIUM CHLORIDE, SODIUM LACTATE, CALCIUM CHLORIDE, MAGNESIUM CHLORIDE AND DEXTROSE 6000 ML: 1.5; 538; 448; 18.3; 5.08 INJECTION, SOLUTION INTRAPERITONEAL at 21:16

## 2025-06-03 ASSESSMENT — PAIN SCALES - GENERAL
PAINLEVEL_OUTOF10: 0
PAINLEVEL_OUTOF10: 0

## 2025-06-03 NOTE — TELEPHONE ENCOUNTER
Care Transitions Initial Follow Up Call    Outreach made within 2 business days of discharge: Yes    Patient: Jonelle Contreras Patient : 1950   MRN: [id number]  Reason for Admission: Hypertensive Urgency   Discharge Date: 25       Spoke with: Patient    Discharge department/facility: [facility] Interactive Patient Contact:  Was patient able to fill all prescriptions: Yes  Was patient instructed to bring all medications to the follow-up visit: Yes  Is patient taking all medications as directed in the discharge summary? Yes  Does patient understand their discharge instructions: Yes  Does patient have questions or concerns that need addressed prior to 7-14 day follow up office visit: no    Additional needs identified to be addressed with provider  No needs identified             Scheduled appointment with PCP within 7-14 days    Follow Up  Future Appointments   Date Time Provider Department Center   2025  1:00 PM Candido Bentley MD [facility] DEP       Charissa Pena MA

## 2025-06-03 NOTE — PROGRESS NOTES
Jonelle Contreras  6/3/2025  [id number]    Chief Complaint: Debility    Subjective    Patient seen this AM. Patient is working in therapies to improve her leg strength and endurance.  Nephrology is following the patient for her blood pressure and peritoneal dialysis needs.  Patient still feels weak overall, and has poor endurance.  She realizes she needs to improve her strength and endurance before discharge to home safely.  She also does not have much appetite, and needs to eat more protein.        Objective    Patient Vitals for the past 24 hrs:   BP Temp Temp src Pulse Resp SpO2   06/02/25 2048 -- -- -- -- 16 --   06/02/25 1958 135/82 98.4 °F (36.9 °C) Oral 75 18 96 %   06/02/25 1831 137/76 -- -- 77 -- --   06/02/25 1035 113/70 -- -- 63 18 --   06/02/25 0904 102/64 -- -- -- -- --   06/02/25 0900 102/64 97.9 °F (36.6 °C) Oral 64 16 99 %     Patient Vitals for the past 96 hrs (Last 3 readings):   Weight   06/01/25 0854 59.3 kg (130 lb 11.7 oz)   05/31/25 0926 59.2 kg (130 lb 8.2 oz)   05/30/25 1947 62.2 kg (137 lb 2 oz)      Gen: No distress.  HEENT: Normocephalic, atraumatic.   CV: Regular rate and rhythm . Extremities warm, well perfused.   Resp: No respiratory distress. CTAB   Abd: Soft, nontender   Ext: No edema.   Neuro: Alert, oriented, appropriately interactive.     Laboratory data:   Na/K/Cl/CO2:  135/4.0/93/23 (06/02 0541)   BUN/Cr/glu/ALT/AST/amyl/lip:  46/9.4/--/--/--/--/-- (06/02 0541)    WBC/Hgb/Hct/Plts:  7.6/9.3/28.3/311 (06/01 0523)     Therapy progress:       PT    Supine to Sit: Supervision or touching assistance  Sit to Supine: Supervision or touching assistance   Sit to Stand: Supervision or touching assistance  Chair/Bed to Chair Transfer: Supervision or touching assistance  Car Transfer: Partial/moderate assistance  Ambulation 10 ft: Supervision or touching assistance  Ambulation 50 ft: Supervision or touching assistance  Ambulation 150 ft:    Stairs - 1 Step: Supervision or touching  assistance  Stairs - 4 Step: Supervision or touching assistance  Stairs - 12 Step:      OT    Eating:    Oral Hygiene: Supervision or touching assistance  Bathing: Partial/moderate assistance  Upper Body Dressing: Supervision or touching assistance  Lower Body Dressing: Partial/moderate assistance  Toilet Transfer: Supervision or touching assistance  Toilet Hygiene: Supervision or touching assistance           Body mass index is 21.76 kg/m².         Rehabilitation Diagnosis:   IGC: Debility        Assessment and Plan:     Debility, H Flu pneumonia- bronchoscopy, finish ceftriaxone tx, Mucinex     Hypertensive urgency-  Nephrology consulted, Coreg, Cozaar, Aldactone, Demadex, nifedipine     GERD, esophagitis-Reglan, Protonix      ESRD on PD- Nephrology consulted, managing PD orders, Renvela     Hyperlipidemia-Crestor     Restless leg syndrome- Continue ropinirole     CAD- ASA, Plavix     Anxiety- Continue alprazolam     Asthma, COPD- Continue albuterol, stiolto, DuoNeb     Overactive bladder- Continue home trospium     Thrush-Diflucan     CODE: Full Code  Diet: ADULT DIET; Regular; Low Phosphorus (Less than 1000 mg)  Bowels: Per ARU protocol  Bladder: Bladder scans per ARU protocol   Pain: Tramadol  DVT PPx: Heparin  Dispo: TBD  Services: TBD  DME: TBD  Follow-ups: Nephrology, PCP        Bon Drummond MD 6/3/2025, 7:49 AM    * This document was created using dictation software.  While all precautions were taken to ensure accuracy, errors may have occurred.  Please disregard any typographical errors.

## 2025-06-03 NOTE — PLAN OF CARE
Problem: Discharge Planning  Goal: Discharge to home or other facility with appropriate resources  6/3/2025 0859 by Beckie Romero RN  Outcome: Progressing  Flowsheets (Taken 6/3/2025 0859)  Discharge to home or other facility with appropriate resources: Identify barriers to discharge with patient and caregiver     Problem: Safety - Adult  Goal: Free from fall injury  6/3/2025 0859 by Beckie Romero RN  Outcome: Progressing  Flowsheets (Taken 6/3/2025 0859)  Free From Fall Injury: Instruct family/caregiver on patient safety     Problem: Skin/Tissue Integrity  Goal: Skin integrity remains intact  Description: 1.  Monitor for areas of redness and/or skin breakdown2.  Assess vascular access sites hourly3.  Every 4-6 hours minimum:  Change oxygen saturation probe site4.  Every 4-6 hours:  If on nasal continuous positive airway pressure, respiratory therapy assess nares and determine need for appliance change or resting period  6/3/2025 0859 by Beckie Romero RN  Outcome: Progressing  Flowsheets (Taken 6/3/2025 0859)  Skin Integrity Remains Intact:   Monitor for areas of redness and/or skin breakdown   Turn and reposition as indicated     Problem: Pain  Goal: Verbalizes/displays adequate comfort level or baseline comfort level  6/3/2025 0859 by Beckie Romero RN  Outcome: Progressing  Flowsheets (Taken 6/3/2025 0859)  Verbalizes/displays adequate comfort level or baseline comfort level:   Encourage patient to monitor pain and request assistance   Assess pain using appropriate pain scale   Administer analgesics based on type and severity of pain and evaluate response   Implement non-pharmacological measures as appropriate and evaluate response     Problem: Nutrition Deficit:  Goal: Optimize nutritional status  6/3/2025 0859 by Beckie Romero RN  Outcome: Progressing  Flowsheets (Taken 6/3/2025 0859)  Nutrient intake appropriate for improving, restoring, or maintaining nutritional needs: Monitor oral  intake, labs, and treatment plans

## 2025-06-03 NOTE — PROGRESS NOTES
Resting quietly in bed, respirations even/easy. Reports sleeping poorly the last few night.  Patient admitted with debility s/p respiratory failure. Is alert and oriented with some fatigue. Ambulates with a walker x1, preferred to stand pivot to the commode last evening, tolerated well. Lungs diminished with some crackles at the bases. On 2L O2, not home dependent. HR regular. Abdomen non tender with active bowel sounds. Has been continent of urine and both continent and incontinent of bowel.  Patient receives PD nightly, running so far without any alarms. Dressing covering PD catheter changed, exit site WNL. C/o RLE pain and medicated per PRN orders, medication effective. Encouraged to call for all needs, call light remains in reach at all times. Bed alarm active. Kaye Ruiz RN

## 2025-06-03 NOTE — PROGRESS NOTES
Patient up to  BSC, had medium loose brown BM.  Assisted back to bed, she had emesis of undigested nepro and chocolate ice cream.  Zofran given to patient, states she has upset stomach.  Will continue to monitor her.  HOB elevated for comfort.

## 2025-06-03 NOTE — PROGRESS NOTES
Patient ID: Jonelle Contreras  Referring/ Physician: Bon Drummond MD      Summary:   Jonelle Contreras is being seen by nephrology for ESRD and HTN.     Reason for admission: hypertensive urgency       Interval Hx:   Seen at bedside.   She is resting comfortably.   No issues reported about PD.   Has had poor sleep last night per notes.   No alarms with PD.   No hypotension reported during therapy.     /83  HR 66  On 1 L  No wt today     Labs reviewed.     Had 6 bowel movement yesterdy and 1 today.       Assessment/Plan:   - BP acceptable,  hold all meds if SBP < 140. Will have to let SBP be higher to accommodate for orthostasis  - Continue PD with  1.5% exchanges. She appears euvolemic now.   - not having BM > add lactulose 30 ml TID.   - renvela 1600 mg TID with meals.   - retacrit 10 k units weekly   - on lactulose , having BM now         ESRD  ESRD secondary to hypertension.  She does peritoneal dialysis at home 1 exchange of 2 L 1.5% solutions daily.  Based on her most recent labs in the outpatient setting her adequacy is not at goal so we are going to be increasing her to 4 exchanges, 2 L at 2.5% solutions.  Will use the cycler while inpatient.  EDW 61.5 kg   - daily BM, on lactulose. Has ileus > GI consulted.    - gent to PD exit site.        Resistant hypertension  The BP has been controlled inpatient on her home regimen, actually low at times suggesting that she may not have been taking her medications as prescribed.   Has had an overall negative secondary work up. Has been extremely difficult to manage outpatient as she reports \"bottoming out and passing out\" when her medications are increased.   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   She has had 24 hr ambulatory BP monitoring showing consistently elevated bP 180s -200s systolic.   Presenting with hypertensive urgency, having ongoing headaches  05/25/2025 09:35 PM    NITRU Negative 05/25/2025 09:35 PM    GLUCOSEU 100 05/25/2025 09:35 PM    KETUA Negative 05/25/2025 09:35 PM    UROBILINOGEN 0.2 05/25/2025 09:35 PM    BILIRUBINUR Negative 05/25/2025 09:35 PM

## 2025-06-03 NOTE — PROGRESS NOTES
Occupational Therapy  Facility/Department: [address] [facility]  Rehabilitation Occupational Therapy Daily AM and PM Treatment Note    Date: 6/3/25  Patient Name: Jonelle Contreras       Room: I1F-9872/3258-01  MRN: [id number]  Account: [id number]   : 1950  (74 y.o.) Gender: female           Additional Pertinent Hx: Pt is a 73 yo F admitted to ARU with debility after being hospitalized for hyperkalemia and hypertension, found to be in a hypertensive emergency. Pt has ESRD and is on PD. Bronc performed on 2025 for bilateral mucous plugs. Treated for pneumonia.        Past Medical History:  has a past medical history of acute intermittent porphyria, agoraphobic, Allergic rhinitis, Anxiety, Arthritis, Asthma, CAD (coronary artery disease), Chronic obstructive pulmonary disease, unspecified COPD type ([facility]), ckd 4, collagenous colitis, COPD (chronic obstructive pulmonary disease) ([facility]), Depression, Disease of blood and blood forming organ, GERD (gastroesophageal reflux disease), Heart disease, Hyperlipidemia, Hypertension, Left thyroid nodule, Migraines, Neuropathy, Osteoporosis, PAD (peripheral artery disease), Peripheral vascular disease, post menopausal, Prediabetes, Pulmonary nodules, Restless leg syndrome, STEMI involving oth coronary artery of inferior wall ([facility]), and Urinary incontinence.  Past Surgical History:   has a past surgical history that includes Hysterectomy; Cholecystectomy; Coronary angioplasty with stent (10/2013 [facility]); Colonoscopy (2017); Colonoscopy (N/A, 2022); Upper gastrointestinal endoscopy (N/A, 2022); Cardiac surgery; Hysterectomy, vaginal; Upper gastrointestinal endoscopy (2022); Upper gastrointestinal endoscopy (N/A, 2025); and bronchoscopy (N/A, 2025).    Restrictions  Restrictions/Precautions: Fall Risk  Other Position/Activity Restrictions: PD nightly. dwayne TEDs daily. 2L O2 (not O2 dependent), O2 removed for ADL session, SOB but spO2 remained  was limited by fatigue and endurance. Cont w/ treat per POC.     Safety Device - Type of devices: Care transitioned to PT [facility]  []  All fall risk precautions in place [] Bed alarm in place  [] Call light within reach [] Chair alarm in place [] Positioning belt [x] Gait belt [] Patient at risk for falls [] Left in bed [x] Left in chair [] Telesitter in use [] Sitter present [] Nurse notified []  None      Assessment  Assessment  Assessment: Pt tolerated ADL session fairly well, requires increased time and slow pace for all tasks d/t fatigue and endurance. Would benefit from 75 or 90 min ADL session. Pt on RA - spO2 remained > 91% throughout entire session is an improvement. Pt completed UB bathing/dressing SBA, LB bathing w/ LH sponge Min A, LB dressing Max A, footwear and toileting DEP. Pt completes ADL transfers with grab bars CGA, cues for safe hand placement. Pt with somehwat flat affect, occasionally requires increased time to answer questions. Pt very limited by fatigue/endurance, will cont to benefit from skilled OT on ARU prior to returning home. COnt w/ treat per POC.  Activity Tolerance: Patient limited by fatigue;Patient tolerated treatment well;Patient limited by endurance  Discharge Recommendations: Continue to assess pending progress;Home with assist PRN;Patient would benefit from continued therapy after discharge;Home with Home health OT  OT Equipment Recommendations  Equipment Needed: Yes  Other: Pt has: RW, 3WW, TTB. May benefit from walker basket, sock aid, reacher...cont to assess.  Safety Devices  Safety Devices in place: Yes  Type of devices: Patient at risk for falls;Left in bed;Nurse notified;Call light within reach;Bed alarm in place;Gait belt    Patient Education  Education  Education Given To: Patient  Education Provided: Role of Therapy;Safety;Transfer Training;ADL Function;Energy Conservation;Plan of Care;Fall Prevention Strategies  Education Provided Comments: LH sponge and reacher for

## 2025-06-03 NOTE — PLAN OF CARE
Problem: Skin/Tissue Integrity  Goal: Skin integrity remains intact  Description: 1.  Monitor for areas of redness and/or skin breakdown2.  Assess vascular access sites hourly3.  Every 4-6 hours minimum:  Change oxygen saturation probe site4.  Every 4-6 hours:  If on nasal continuous positive airway pressure, respiratory therapy assess nares and determine need for appliance change or resting period  Outcome: Progressing  Flowsheets (Taken 6/3/2025 0254)  Skin Integrity Remains Intact:   Monitor for areas of redness and/or skin breakdown   Check visual cues for pain  Note: Able to change positions in bed without assist, no evidence of skin breakdown noted. Waffle cushion in place to chair.      Problem: Pain  Goal: Verbalizes/displays adequate comfort level or baseline comfort level  Outcome: Progressing  Flowsheets (Taken 6/3/2025 0254)  Verbalizes/displays adequate comfort level or baseline comfort level:   Encourage patient to monitor pain and request assistance   Administer analgesics based on type and severity of pain and evaluate response   Consider cultural and social influences on pain and pain management  Note: Able to rate pain using a 1-10 scale, medicated per prn orders, see MAR. Able to verbalize a reduction in pain and/or able to fall asleep and remain asleep without any s/s of pain

## 2025-06-03 NOTE — PROGRESS NOTES
Physical Therapy  Facility/Department: [address] [facility]  Rehabilitation Physical Therapy Treatment Note    NAME: Jonelle Contreras  : 1950 (74 y.o.)  MRN: [id number]  CODE STATUS: Full Code    Date of Service: 6/3/25       Restrictions:  Restrictions/Precautions: Fall Risk  Position Activity Restriction  Other Position/Activity Restrictions: PD nightly. dwayne TEDs daily. 2L O2 (not O2 dependent)     Pertinent medical information:  Additional Pertinent Hx: per Dr. Drummond's note, \"74-year-old female who came to hospital with hyperkalemia and hypertension, found to be in a hypertensive emergency.  Her blood pressure was brought down and the patient is following with nephrology and cardiology.  Patient has ESRD and is on PD.  Patient also came in with a cough and underwent a bronc on 2025 for bilateral mucous plugs.  Patient was treated for pneumonia and started on IV antibiotics for 7 days. \"    SUBJECTIVE  Subjective: PT started bedside as patient was not off PD yet. RN, Iveth, removing upon arrival.  She reports Patient's BP did not meet parameters for BP meds, so those were held.  Pain: Patient reports right hip has been sore, achy, and sharp pains present at times since she \"got her shots.\" RN aware.       Social/Functional History  Lives With: Alone  Type of Home: House  Home Layout: Work area in basement, Two level, Laundry in basement (ranch with finished basement)  Home Access: Stairs to enter with rails  Entrance Stairs - Number of Steps: 4 CONSTANCE front door with L handrail.  Entrance Stairs - Rails: Left  Bathroom Shower/Tub: Tub/Shower unit  Bathroom Toilet: Handicap height  Bathroom Equipment: Tub transfer bench (pt reports she turns the TTB longways so it fits entirely in tub shower unit.)  Bathroom Accessibility: Accessible  Home Equipment: Walker - Rolling (Walker (3 wheel).)  Has the patient had two or more falls in the past year or any fall with injury in the past year?: No (pt denies fall at home  within 2025.)  Receives Help From: Neighbor  Prior Level of Assist for ADLs: Independent  Prior Level of Assist for Homemaking: Independent (Family/neighbor assists PRN although pt reporting she is typically IND cleaning, cooking, laundry, yard work, finances and med management.)  Homemaking Responsibilities: Yes  Meal Prep Responsibility: Primary  Laundry Responsibility: Primary  Cleaning Responsibility: Primary  Bill Paying/Finance Responsibility: Primary  Shopping Responsibility: Primary  Health Care Management: Primary  Prior Level of Assist for Ambulation: Independent household ambulator, with or without device, Independent community ambulator, with or without device (recently Walker PRN (if fatigued, etc))  Prior Level of Assist for Transfers: Independent  Active : Yes  Mode of Transportation: Car  Occupation: Retired  Type of Occupation: Manager for [employer]  Leisure & Hobbies: Enjoys taking walks, gardening, being outside.    OBJECTIVE       Functional Mobility  Supine to Sit  Assistance Level: Minimal assistance  Skilled Clinical Factors: min assist to bring legs over the edge of the bed with HOB elevated  Scooting  Assistance Level: Stand by assist  Skilled Clinical Factors: increased time to scoot out to get feet to the floor    Balance  Sitting Balance: Supervision  Standing Balance: Contact guard assistance (with bilateral UE support on walker)    Activity: upon upright, BP was 128/77 and after ambulating to bathroom BP was 111/73. no dizziness reported    Patient requesting use of bathroom. She was mod assist to pull depends down (no pants on yet).  Patient able to maintain balance on commode with BSC over it.  She stood from commode with CGA. mod assist to pull depends up.  Able to complete fantasma care in sitting after urinating. Patient had slight bloody nose while on commode and had removed O2 NC.  SpO2 was 95% on RA.  PT kept O2 off and after short ambulation SpO2 was  96%.    Transfers  Surface: From bed;Bedside commode;Wheelchair  Additional Factors: Verbal cues;Increased time to complete  Sit to Stand  Assistance Level: Contact guard assist  Skilled Clinical Factors: increased time and cueing for hand placement  Stand to Sit  Assistance Level: Contact guard assist  Bed To/From Chair  Technique: Stand step  Assistance Level: Contact guard assist  Skilled Clinical Factors: with wheeled walker      Environmental Mobility  Ambulation  Surface: Level surface  Device: Rolling walker  Distance: 10' x2  Activity: Within Room  Activity Comments: short step length bilaterally with a step to pattern due to right hip pain.  Patient needed cueing to increase step length and was able to increase slightly.  Slow pace and slightly shaking during gait  Additional Factors: Verbal cues  Assistance Level: Contact guard assist  Gait Deviations: Slow cirilo;Decreased step length bilateral  Skilled Clinical Factors: /74 with HR 69 after activity.             PT Exercises  Exercise Treatment: while seated at EOB, completed 15 reps bilateral APs and LAQ      ASSESSMENT/PROGRESS TOWARDS GOALS       Assessment  Assessment: Ms. Jonelle Contreras is feeling better this AM though still tired. BP was able to maintain itself and was 122/74 at end of session.  Patient also had dmall bloody nose and O2 was removed by patient. SpO2 was 95% and PT kept O2 off.  SpO2 was 96% on RA at end of session.  Patient was min assist for bed mobility and CGA for transfers and ambulation with wheeled walker in room.  Patient tolerated therapy better this AM, but still very limited by her endurance.  Patient is below baseline and will benefit from continued skilled therapy for strengthening, gait training, endurance training, and balance training to allow for safe return home.  Activity Tolerance: Patient limited by fatigue;Patient tolerated treatment well  Discharge Recommendations: Continue to assess pending

## 2025-06-03 NOTE — PROGRESS NOTES
Patient admitted to rehab with debility d/t acute hypoxic respiratory failure with mucous plugging.  A/Ox4, smiling more and more talkative today. Transfers with one assist with gait belt. Mobility restrictions: weakness. On regular, low phos diet, tolerating fair with encouragement. Doing better with her meals with set up.  Medications taken whole with fluids. On aspirin, plavix and heparin for DVT prophylaxis.  Skin: scattered bruising.  PD catheter RLQ.  Oxygen: 1 LPM per nasal canula. LDA: PIV RFA. Has been continent of bowel and  of bladder. LBM 6/3.   Chair/bed alarms in use and call light in reach. Will monitor for safety.

## 2025-06-04 LAB
ALBUMIN SERPL-MCNC: 2.6 G/DL (ref 3.4–5)
ANION GAP SERPL CALCULATED.3IONS-SCNC: 19 MMOL/L (ref 3–16)
BUN SERPL-MCNC: 43 MG/DL (ref 7–20)
CALCIUM SERPL-MCNC: 9.2 MG/DL (ref 8.3–10.6)
CHLORIDE SERPL-SCNC: 92 MMOL/L (ref 99–110)
CO2 SERPL-SCNC: 23 MMOL/L (ref 21–32)
CREAT SERPL-MCNC: 9.1 MG/DL (ref 0.6–1.2)
GFR SERPLBLD CREATININE-BSD FMLA CKD-EPI: 4 ML/MIN/{1.73_M2}
GLUCOSE SERPL-MCNC: 104 MG/DL (ref 70–99)
PHOSPHATE SERPL-MCNC: 5.9 MG/DL (ref 2.5–4.9)
POTASSIUM SERPL-SCNC: 3.9 MMOL/L (ref 3.5–5.1)
SODIUM SERPL-SCNC: 134 MMOL/L (ref 136–145)

## 2025-06-04 PROCEDURE — 97110 THERAPEUTIC EXERCISES: CPT

## 2025-06-04 PROCEDURE — 1280000000 HC REHAB R&B

## 2025-06-04 PROCEDURE — 80069 RENAL FUNCTION PANEL: CPT

## 2025-06-04 PROCEDURE — A4722 DIALYS SOL FLD VOL > 1999CC: HCPCS | Performed by: PHYSICAL MEDICINE & REHABILITATION

## 2025-06-04 PROCEDURE — 36415 COLL VENOUS BLD VENIPUNCTURE: CPT

## 2025-06-04 PROCEDURE — 94640 AIRWAY INHALATION TREATMENT: CPT

## 2025-06-04 PROCEDURE — 97116 GAIT TRAINING THERAPY: CPT

## 2025-06-04 PROCEDURE — 6370000000 HC RX 637 (ALT 250 FOR IP): Performed by: INTERNAL MEDICINE

## 2025-06-04 PROCEDURE — 97535 SELF CARE MNGMENT TRAINING: CPT

## 2025-06-04 PROCEDURE — 2500000003 HC RX 250 WO HCPCS: Performed by: PHYSICAL MEDICINE & REHABILITATION

## 2025-06-04 PROCEDURE — 2580000003 HC RX 258: Performed by: PHYSICAL MEDICINE & REHABILITATION

## 2025-06-04 PROCEDURE — 97530 THERAPEUTIC ACTIVITIES: CPT

## 2025-06-04 PROCEDURE — 6370000000 HC RX 637 (ALT 250 FOR IP): Performed by: PHYSICAL MEDICINE & REHABILITATION

## 2025-06-04 PROCEDURE — 6360000002 HC RX W HCPCS: Performed by: PHYSICAL MEDICINE & REHABILITATION

## 2025-06-04 RX ORDER — CARVEDILOL 6.25 MG/1
6.25 TABLET ORAL 2 TIMES DAILY WITH MEALS
Status: DISCONTINUED | OUTPATIENT
Start: 2025-06-04 | End: 2025-06-17

## 2025-06-04 RX ORDER — DIPHENHYDRAMINE HCL 25 MG
25 TABLET ORAL NIGHTLY PRN
Status: DISCONTINUED | OUTPATIENT
Start: 2025-06-04 | End: 2025-06-20 | Stop reason: HOSPADM

## 2025-06-04 RX ADMIN — ALPRAZOLAM 1.5 MG: 0.5 TABLET ORAL at 20:57

## 2025-06-04 RX ADMIN — SODIUM CHLORIDE, PRESERVATIVE FREE 10 ML: 5 INJECTION INTRAVENOUS at 09:19

## 2025-06-04 RX ADMIN — Medication 1 SPRAY: at 20:46

## 2025-06-04 RX ADMIN — HEPARIN SODIUM 5000 UNITS: 5000 INJECTION INTRAVENOUS; SUBCUTANEOUS at 05:07

## 2025-06-04 RX ADMIN — ACETAMINOPHEN 650 MG: 325 TABLET ORAL at 22:16

## 2025-06-04 RX ADMIN — Medication 4 ML: at 19:40

## 2025-06-04 RX ADMIN — TROSPIUM CHLORIDE 20 MG: 20 TABLET, FILM COATED ORAL at 20:57

## 2025-06-04 RX ADMIN — TRAMADOL HYDROCHLORIDE 50 MG: 50 TABLET, COATED ORAL at 03:26

## 2025-06-04 RX ADMIN — SEVELAMER CARBONATE 1600 MG: 800 TABLET, FILM COATED ORAL at 08:02

## 2025-06-04 RX ADMIN — SODIUM CHLORIDE, PRESERVATIVE FREE 10 ML: 5 INJECTION INTRAVENOUS at 20:44

## 2025-06-04 RX ADMIN — SODIUM CHLORIDE, SODIUM LACTATE, CALCIUM CHLORIDE, MAGNESIUM CHLORIDE AND DEXTROSE 6000 ML: 1.5; 538; 448; 18.3; 5.08 INJECTION, SOLUTION INTRAPERITONEAL at 20:54

## 2025-06-04 RX ADMIN — IPRATROPIUM BROMIDE AND ALBUTEROL SULFATE 1 DOSE: .5; 2.5 SOLUTION RESPIRATORY (INHALATION) at 19:40

## 2025-06-04 RX ADMIN — SODIUM CHLORIDE, SODIUM LACTATE, CALCIUM CHLORIDE, MAGNESIUM CHLORIDE AND DEXTROSE 2000 ML: 1.5; 538; 448; 18.3; 5.08 INJECTION, SOLUTION INTRAPERITONEAL at 20:54

## 2025-06-04 RX ADMIN — HEPARIN SODIUM 5000 UNITS: 5000 INJECTION INTRAVENOUS; SUBCUTANEOUS at 13:21

## 2025-06-04 RX ADMIN — METOCLOPRAMIDE 5 MG: 10 TABLET ORAL at 05:07

## 2025-06-04 RX ADMIN — HEPARIN SODIUM 5000 UNITS: 5000 INJECTION INTRAVENOUS; SUBCUTANEOUS at 20:55

## 2025-06-04 RX ADMIN — ROSUVASTATIN CALCIUM 10 MG: 10 TABLET, FILM COATED ORAL at 20:57

## 2025-06-04 RX ADMIN — SEVELAMER CARBONATE 1600 MG: 800 TABLET, FILM COATED ORAL at 16:09

## 2025-06-04 RX ADMIN — PANTOPRAZOLE SODIUM 40 MG: 40 TABLET, DELAYED RELEASE ORAL at 07:59

## 2025-06-04 RX ADMIN — METOCLOPRAMIDE 5 MG: 10 TABLET ORAL at 15:41

## 2025-06-04 RX ADMIN — GUAIFENESIN 1200 MG: 600 TABLET, EXTENDED RELEASE ORAL at 20:57

## 2025-06-04 RX ADMIN — ROPINIROLE HYDROCHLORIDE 1 MG: 1 TABLET, FILM COATED ORAL at 20:57

## 2025-06-04 RX ADMIN — CLOPIDOGREL BISULFATE 75 MG: 75 TABLET, FILM COATED ORAL at 08:02

## 2025-06-04 RX ADMIN — CARVEDILOL 6.25 MG: 6.25 TABLET, FILM COATED ORAL at 16:10

## 2025-06-04 RX ADMIN — POLYETHYLENE GLYCOL 3350 17 G: 17 POWDER, FOR SOLUTION ORAL at 20:44

## 2025-06-04 RX ADMIN — SEVELAMER CARBONATE 1600 MG: 800 TABLET, FILM COATED ORAL at 11:28

## 2025-06-04 RX ADMIN — ASPIRIN 81 MG 81 MG: 81 TABLET ORAL at 09:19

## 2025-06-04 RX ADMIN — TIZANIDINE 4 MG: 4 TABLET ORAL at 20:56

## 2025-06-04 RX ADMIN — Medication 1 SPRAY: at 05:08

## 2025-06-04 RX ADMIN — SODIUM CHLORIDE, PRESERVATIVE FREE 10 ML: 5 INJECTION INTRAVENOUS at 09:20

## 2025-06-04 RX ADMIN — TIZANIDINE 4 MG: 4 TABLET ORAL at 11:15

## 2025-06-04 RX ADMIN — PANTOPRAZOLE SODIUM 40 MG: 40 TABLET, DELAYED RELEASE ORAL at 20:57

## 2025-06-04 RX ADMIN — TRAMADOL HYDROCHLORIDE 50 MG: 50 TABLET, COATED ORAL at 14:16

## 2025-06-04 RX ADMIN — TIZANIDINE 4 MG: 4 TABLET ORAL at 05:07

## 2025-06-04 RX ADMIN — DIPHENHYDRAMINE HCL 25 MG: 25 TABLET ORAL at 22:16

## 2025-06-04 RX ADMIN — METOCLOPRAMIDE 5 MG: 10 TABLET ORAL at 10:57

## 2025-06-04 RX ADMIN — FLUCONAZOLE 200 MG: 100 TABLET ORAL at 09:25

## 2025-06-04 RX ADMIN — LOSARTAN POTASSIUM 100 MG: 100 TABLET, FILM COATED ORAL at 20:57

## 2025-06-04 RX ADMIN — GUAIFENESIN 1200 MG: 600 TABLET, EXTENDED RELEASE ORAL at 08:02

## 2025-06-04 ASSESSMENT — PAIN DESCRIPTION - LOCATION
LOCATION: NECK
LOCATION: GENERALIZED
LOCATION: LEG
LOCATION: GENERALIZED
LOCATION: HIP;BACK
LOCATION: BACK;NECK

## 2025-06-04 ASSESSMENT — PAIN SCALES - GENERAL
PAINLEVEL_OUTOF10: 3
PAINLEVEL_OUTOF10: 8
PAINLEVEL_OUTOF10: 0
PAINLEVEL_OUTOF10: 9
PAINLEVEL_OUTOF10: 10
PAINLEVEL_OUTOF10: 6
PAINLEVEL_OUTOF10: 10
PAINLEVEL_OUTOF10: 0
PAINLEVEL_OUTOF10: 9
PAINLEVEL_OUTOF10: 6
PAINLEVEL_OUTOF10: 8
PAINLEVEL_OUTOF10: 0

## 2025-06-04 ASSESSMENT — PAIN DESCRIPTION - PAIN TYPE
TYPE: CHRONIC PAIN
TYPE: CHRONIC PAIN
TYPE: ACUTE PAIN

## 2025-06-04 ASSESSMENT — PAIN DESCRIPTION - DESCRIPTORS
DESCRIPTORS: ACHING
DESCRIPTORS: DISCOMFORT
DESCRIPTORS: ACHING
DESCRIPTORS: SPASM
DESCRIPTORS: DISCOMFORT
DESCRIPTORS: DISCOMFORT

## 2025-06-04 ASSESSMENT — PAIN DESCRIPTION - FREQUENCY
FREQUENCY: INTERMITTENT

## 2025-06-04 ASSESSMENT — PAIN DESCRIPTION - ORIENTATION
ORIENTATION: LEFT
ORIENTATION: POSTERIOR
ORIENTATION: POSTERIOR
ORIENTATION: RIGHT;LEFT

## 2025-06-04 ASSESSMENT — PAIN DESCRIPTION - ONSET
ONSET: ON-GOING

## 2025-06-04 NOTE — PROGRESS NOTES
Patient admitted to rehab with debility  A/Ox4, slow to answer Transfers with walker very very slow. On low phosphorus diet, tolerating very poor for breakfast, did eat lunch. Medications taken whole. On Heparin for DVT prophylaxis.  Skin: monitor. Oxygen: monitor on 1 l. LDA: saline lock right forearm. PD no void my time. Chair/bed alarms in use and call light in reach. Will monitor for safety. Last bm yesterday, declined laxatives.

## 2025-06-04 NOTE — PROGRESS NOTES
Did agree to sit on side of bed to eat, very, very slow. Continues to c/o pain, very flat, declines any further medications, had UF deviation neg 1330 ml.

## 2025-06-04 NOTE — PROGRESS NOTES
PD continues, did not sleep well during the night per night shift, did not wake up this far for early therapyl

## 2025-06-04 NOTE — PLAN OF CARE
Problem: Pain  Goal: Verbalizes/displays adequate comfort level or baseline comfort level  6/4/2025 1144 by Esperanza Cintron RN  Outcome: Not Progressing  Flowsheets  Taken 6/4/2025 1144 by Esperanza Cintron RN  Verbalizes/displays adequate comfort level or baseline comfort level:   Encourage patient to monitor pain and request assistance   Assess pain using appropriate pain scale   Administer analgesics based on type and severity of pain and evaluate response   Implement non-pharmacological measures as appropriate and evaluate response  Taken 6/4/2025 0740 by Esperanza Cintron RN  Verbalizes/displays adequate comfort level or baseline comfort level:   Encourage patient to monitor pain and request assistance   Assess pain using appropriate pain scale   Administer analgesics based on type and severity of pain and evaluate response   Implement non-pharmacological measures as appropriate and evaluate response  Taken 6/4/2025 0206 by Kaye Ruiz RN  Verbalizes/displays adequate comfort level or baseline comfort level:   Encourage patient to monitor pain and request assistance   Administer analgesics based on type and severity of pain and evaluate response   Consider cultural and social influences on pain and pain management  Note: MD aware of pain c/o  6/4/2025 0206 by Kaye Ruiz RN  Outcome: Progressing  Flowsheets (Taken 6/4/2025 0206)  Verbalizes/displays adequate comfort level or baseline comfort level:   Encourage patient to monitor pain and request assistance   Administer analgesics based on type and severity of pain and evaluate response   Consider cultural and social influences on pain and pain management  Note: Able to rate pain using a 1-10 scale, medicated per prn orders, see MAR. Able to verbalize a reduction in pain and/or able to fall asleep and remain asleep without any s/s of pain      Problem: Nutrition Deficit:  Goal: Optimize nutritional status  6/4/2025 1144 by

## 2025-06-04 NOTE — PROGRESS NOTES
Physical Therapy  Facility/Department: [address] [facility]  Rehabilitation Physical Therapy Treatment Note    NAME: Jonelle Contreras  : 1950 (74 y.o.)  MRN: [id number]  CODE STATUS: Full Code    Date of Service: 25       Restrictions:  Restrictions/Precautions: Fall Risk  Position Activity Restriction  Other Position/Activity Restrictions: PD nightly. dwayne TEDs daily. 1L O2 (not O2 dependent), O2 removed for session and remained > 93% with EOB activity     Pertinent medical information:  Additional Pertinent Hx: per Dr. Drummond's note, \"74-year-old female who came to hospital with hyperkalemia and hypertension, found to be in a hypertensive emergency.  Her blood pressure was brought down and the patient is following with nephrology and cardiology.  Patient has ESRD and is on PD.  Patient also came in with a cough and underwent a bronc on 2025 for bilateral mucous plugs.  Patient was treated for pneumonia and started on IV antibiotics for 7 days. \"    SUBJECTIVE  Subjective: PT started bedside as RN, Edith, reporting patient is feeling bad and having difficulty getting started this AM.  Patient states she is having pain all over, but right hip is the worse. She describes it as a \"bruise\" like pain  Pain: patient reporting pain at a 10/10 at start of session \"all over\" but worse in right hip.  Pain improved to 8/10 with mobility.    Social/Functional History  Lives With: Alone  Type of Home: House  Home Layout: Work area in basement, Two level, Laundry in basement (ranch with finished basement)  Home Access: Stairs to enter with rails  Entrance Stairs - Number of Steps: 4 CONSTANCE front door with L handrail.  Entrance Stairs - Rails: Left  Bathroom Shower/Tub: Tub/Shower unit  Bathroom Toilet: Handicap height  Bathroom Equipment: Tub transfer bench (pt reports she turns the TTB longways so it fits entirely in tub shower unit.)  Bathroom Accessibility: Accessible  Home Equipment: Walker - Rolling (Walker (3  wheel).)  Has the patient had two or more falls in the past year or any fall with injury in the past year?: No (pt denies fall at home within 2025.)  Receives Help From: Neighbor  Prior Level of Assist for ADLs: Independent  Prior Level of Assist for Homemaking: Independent (Family/neighbor assists PRN although pt reporting she is typically IND cleaning, cooking, laundry, yard work, finances and med management.)  Homemaking Responsibilities: Yes  Meal Prep Responsibility: Primary  Laundry Responsibility: Primary  Cleaning Responsibility: Primary  Bill Paying/Finance Responsibility: Primary  Shopping Responsibility: Primary  Health Care Management: Primary  Prior Level of Assist for Ambulation: Independent household ambulator, with or without device, Independent community ambulator, with or without device (recently Walker PRN (if fatigued, etc))  Prior Level of Assist for Transfers: Independent  Active : Yes  Mode of Transportation: Car  Occupation: Retired  Type of Occupation: Manager for [employer]  Leisure & Hobbies: Enjoys taking walks, gardening, being outside.      OBJECTIVE       Functional Mobility  Bed Mobility  Overall Assistance Level: Supervision  Additional Factors: Increased time to complete;Verbal cues;Head of bed flat  Sit to Supine  Assistance Level: Minimal assistance  Skilled Clinical Factors: min assist for bilateral LE into the bed  Scooting  Assistance Level: Minimal assistance    Balance  Sitting Balance: Supervision  Standing Balance: Contact guard assistance    Transfers  Surface: From bed;Wheelchair  Additional Factors: Verbal cues;Increased time to complete  Sit to Stand  Assistance Level: Contact guard assist  Skilled Clinical Factors: increased time and cueing for hand placement  Stand to Sit  Assistance Level: Contact guard assist  Skilled Clinical Factors: verbal cues for safety  Bed To/From Chair  Technique: Stand step  Assistance Level: Contact guard assist  Skilled  Clinical Factors: with wheeled walker      Environmental Mobility  Ambulation  Surface: Level surface  Device: Rolling walker  Distance: 30' with one turns, 10' and then needed to sit due to dizziness  Activity: Within Room  Activity Comments: short step length bilaterally with a step to pattern due to right hip pain.  Patient needed cueing to increase step length and was able to increase slightly.  Slow pace complaints of dizziness and needing to sit.  Additional Factors: Verbal cues  Assistance Level: Contact guard assist  Gait Deviations: Slow cirilo;Decreased step length bilateral             PT Exercises  Exercise Treatment: while seated in wheelchair, patient completed bilateral LE ther ex: 20 APs, LAQ x10 reps, hip add sets x12, marching x10, hip abduction with light resistance band, hamstring curls with light resistance band x15 reps. Patient needed frequent rest breaks      ASSESSMENT/PROGRESS TOWARDS GOALS       Assessment  Assessment: Ms. Jonelle Contreras is not feeling well this AM and did not eat breakfast. She reports the nepro made her vomit yesterday, to discuss with dietician today.  Patient did not have as much dizziness this AM, but did set in with last attempt at ambulation.  Initially she could ambulate with wheeled walker with CGA, but as dizziness set in, patient's feet stopped moving but she kept pushing the walker forward. Needed min assist to sit back into the wheelchair that was behind her.  Patient was able to transfer with CGA but needs intermittent cueing for safe hand placement.  She continues to demonstrate poor endurance and requires frequent rest break.  Scheduling rest breaks between therapy sessions as much as possible. Patient is below baseline and will benefit from continued skilled therapy for strengthening, gait training, endurance training, and balance training to allow for safe return home.  Activity Tolerance: Patient limited by fatigue;Patient tolerated treatment  Training  Education Provided Comments: importance of nutrition  Education Method: Verbal  Barriers to Learning: None  Education Outcome: Verbalized understanding;Demonstrated understanding;Continued education needed      PM Session  Patient supine in bed as RN thought patient's schedule was later than actual schedule.  Patient reports she is very tired but no complaints of dizziness or nausea.   Supine>sit with min assist.   Needed encouragement to do mobility herself to scoot her hip forward at the edge of the bed.   Stand pivot bed>wheelchair>NuStep without a device with CGA and verbal cueing for hand placement and technique.  Patient required increased time to complete.   PT assisted with set up on NuStep and patient completed 10 minutes with resistance at 1, seat at 6, bilateral UE at 7, and average SPM at 13.  NuStep>wheelchair with CGA  Patient completed 15 reps bilateral LE ther ex while seated in wheelchair: hamstring curls with light resistance band, hip abduction with light resistance band, APs, marching with therapist's hand for target, hip add sets, and LAQ.   Patient very fatigued this PM, but able to complete full session with rest breaks    Safety Device - Type of devices:  [x]  All fall risk precautions in place [] Bed alarm in place  [] Call light within reach [] Chair alarm in place [] Positioning belt [x] Gait belt [] Patient at risk for falls [] Left in bed [x] Left in chair (in wheelchair to return to room with transportation) [] Telesitter in use [] Sitter present [] Nurse notified []  None        Therapy Time   Individual Concurrent Group Co-treatment   Time In 0815         Time Out 0900         Minutes 45              Second Session Therapy Time     Individual Co-treatment   Time In 1315     Time Out 1400     Minutes 45              ESTEE Taylor CNS [id number], 06/04/25 at 1:38 PM

## 2025-06-04 NOTE — PROGRESS NOTES
Occupational Therapy  Facility/Department: [address] [facility]  Rehabilitation Occupational Therapy Daily AM and PM Treatment Note    Date: 25  Patient Name: Jonelle Contreras       Room: L3U-8529/3258-01  MRN: [id number]  Account: [id number]   : 1950  (74 y.o.) Gender: female                    Past Medical History:  has a past medical history of acute intermittent porphyria, agoraphobic, Allergic rhinitis, Anxiety, Arthritis, Asthma, CAD (coronary artery disease), Chronic obstructive pulmonary disease, unspecified COPD type ([facility]), ckd 4, collagenous colitis, COPD (chronic obstructive pulmonary disease) ([facility]), Depression, Disease of blood and blood forming organ, GERD (gastroesophageal reflux disease), Heart disease, Hyperlipidemia, Hypertension, Left thyroid nodule, Migraines, Neuropathy, Osteoporosis, PAD (peripheral artery disease), Peripheral vascular disease, post menopausal, Prediabetes, Pulmonary nodules, Restless leg syndrome, STEMI involving oth coronary artery of inferior wall ([facility]), and Urinary incontinence.  Past Surgical History:   has a past surgical history that includes Hysterectomy; Cholecystectomy; Coronary angioplasty with stent (10/2013 [place]); Colonoscopy (2017); Colonoscopy (N/A, 2022); Upper gastrointestinal endoscopy (N/A, 2022); Cardiac surgery; Hysterectomy, vaginal; Upper gastrointestinal endoscopy (2022); Upper gastrointestinal endoscopy (N/A, 2025); and bronchoscopy (N/A, 2025).    Restrictions  Restrictions/Precautions: Fall Risk  Other Position/Activity Restrictions: PD nightly. dwayne TEDs daily. 1L O2 (not O2 dependent), O2 removed for session and remained > 93% with EOB activity    Subjective  Subjective: Pt met in room, supine in bed, reports did not sleep well and in so much pain she cannot get up. Pt wants to participate in therapy but states she is unsure if she can, too worn out.  Restrictions/Precautions: Fall Risk

## 2025-06-04 NOTE — PROGRESS NOTES
Resting quietly in bed, respirations even/easy. Patient admitted with debility s/p respiratory failure. Is AAOx4. Ambulates with a walker x1, has not been out of bed this shift. Reports fatigue. Lungs diminished with some crackles at the bases. On 2L O2, not home dependent. HR regular. Abdomen non tender with active bowel sounds.  Has not voided this shift, usually 2 times a day per patient report. Receives PD nightly, running so far without any alarms. Dressing covering PD catheter changed, exit site WNL. Patient with c/o generalized pain and medicated per PRN orders. Patient was anxious at the start of the shift and medicated with PRN xanax, medication effective. Encouraged to call for all needs, call light remains in reach at all times. Bed alarm active. Kaye Ruiz RN

## 2025-06-04 NOTE — PROGRESS NOTES
Patient ID: Jonelle Contreras  Referring/ Physician: Bon Drummond MD      Summary:   Jonelle Contreras is being seen by nephrology for ESRD and HTN.     Reason for admission: hypertensive urgency       Interval Hx:   Seen at bedside.   Had poor sleep again.   Has overall body aches.   Very weak and frail..   No issue with PD  Having bowel movements.     /65  HR 64  On 2 L  WT is stable at 59.8 kilos.     Labs reviewed.   Na 134   K 3.9   Bun 43  Cr 9.1  Phos 5.9      Assessment/Plan:   - BP acceptable,  hold all meds if SBP < 140. Will have to let SBP be higher to accommodate for orthostasis.  Decrease coreg to 6.25 mg BID  Stop nifedipine and aldactone.     - Continue PD with  1.5% exchanges.   - lactulose 30 ml TID as needed   - renvela 1600 mg TID with meals.   - retacrit 10 k units weekly   - on lactulose , having BM now     Labs MWF         ESRD  ESRD secondary to hypertension.  She does peritoneal dialysis at home 1 exchange of 2 L 1.5% solutions daily.  Based on her most recent labs in the outpatient setting her adequacy is not at goal so we are going to be increasing her to 4 exchanges, 2 L at 2.5% solutions.  Will use the cycler while inpatient.  EDW 61.5 kg   - daily BM, on lactulose. Has ileus > GI consulted.    - gent to PD exit site.        Resistant hypertension  The BP has been controlled inpatient on her home regimen, actually low at times suggesting that she may not have been taking her medications as prescribed.   Has had an overall negative secondary work up. Has been extremely difficult to manage outpatient as she reports \"bottoming out and passing out\" when her medications are increased.   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   She has had 24 hr ambulatory BP monitoring showing consistently elevated bP 180s -200s systolic.   Presenting with hypertensive urgency, having ongoing headaches  PM    GLUCOSEU 100 05/25/2025 09:35 PM    KETUA Negative 05/25/2025 09:35 PM    UROBILINOGEN 0.2 05/25/2025 09:35 PM    BILIRUBINUR Negative 05/25/2025 09:35 PM

## 2025-06-04 NOTE — PROGRESS NOTES
Jonelle Contreras  6/4/2025  [id number]    Chief Complaint: Debility    Subjective    Patient seen this AM. Patient seen in physical therapy this morning, and she still noted to be weak and fatigues quickly.  I talked to patient's son this morning who is with the patient, and he states that she is has not been eating very well over the past few weeks.  Repeat labs this morning are stable but still show her creatinine level at 9.1.  Nephrology is working with the patient on her peritoneal dialysis needs.  Will do calorie counts and have dietary see if we can get more protein into the patient.  Patient scoped by GI on 5/22, and patient continues on Protonix and Reglan for her erosive esophagitis.  Patient is also not sleeping well, so we will order her Benadryl to take at night as needed.      Objective    Patient Vitals for the past 24 hrs:   BP Temp Temp src Pulse Resp SpO2 Weight   06/04/25 0745 -- -- -- -- -- -- 59.8 kg (131 lb 13.4 oz)   06/04/25 0740 126/70 98.2 °F (36.8 °C) Oral 77 17 97 % --   06/04/25 0356 -- -- -- -- 17 -- --   06/03/25 2115 (!) 164/84 98.4 °F (36.9 °C) Oral 78 -- -- --   06/03/25 2000 (!) 171/81 99 °F (37.2 °C) Oral 77 19 94 % --   06/03/25 1820 (!) 168/78 -- -- 80 -- -- --   06/03/25 1800 (!) 171/83 -- -- 79 18 96 % --   06/03/25 1650 137/84 98.2 °F (36.8 °C) Oral 78 18 93 % --   06/03/25 1040 -- -- -- -- -- 92 % --   06/03/25 1030 -- -- -- -- -- 94 % --     Patient Vitals for the past 96 hrs (Last 3 readings):   Weight   06/04/25 0745 59.8 kg (131 lb 13.4 oz)   06/01/25 0854 59.3 kg (130 lb 11.7 oz)   05/31/25 0926 59.2 kg (130 lb 8.2 oz)      Gen: No distress.  HEENT: Normocephalic, atraumatic.   CV: Regular rate and rhythm . Extremities warm, well perfused.   Resp: No respiratory distress. CTAB   Abd: Soft, nontender   Ext: No edema.   Neuro: Alert, oriented, appropriately interactive.     Laboratory data:   Na/K/Cl/CO2:  134/3.9/92/23 (06/04 0554)   BUN/Cr/glu/ALT/AST/amyl/lip:

## 2025-06-04 NOTE — PATIENT CARE CONFERENCE
[facility]  Inpatient Rehabilitation  Weekly Team Conference Note      Date: 2025  Patient Name:  Jonelle Contreras    MRN: [id number]  : 1950  Gender: Female  Physician: Dr. Bhanu CLAIRE  Diagnosis: Debility [R53.81]    CASE MANAGEMENT  Assessment: Goal is home; agreeable to home care services.       PHYSICAL THERAPY    Bed Mobility:  Overall Assistance Level: Supervision  Additional Factors: Increased time to complete, Verbal cues, Head of bed flat  Sit>supine:  Assistance Level: Minimal assistance  Skilled Clinical Factors: min assist for bilateral LE into the bed  Supine>sit:  Assistance Level: Minimal assistance  Skilled Clinical Factors: min assist to bring legs over the edge of the bed with HOB elevated    Transfers:  Surface: From bed, Wheelchair  Additional Factors: Verbal cues, Increased time to complete  Sit>stand:  Assistance Level: Contact guard assist  Skilled Clinical Factors: increased time and cueing for hand placement  Stand>sit:  Assistance Level: Contact guard assist  Skilled Clinical Factors: verbal cues for safety  Bed<>chair  Technique: Stand step  Assistance Level: Contact guard assist  Skilled Clinical Factors: with wheeled walker  Stand Pivot:     Lateral transfer:     Car transfer:   Hasn't been able to tolerate since evaluation    Ambulation:  Surface: Level surface  Device: Rolling walker  Distance: 30' with one turns, 10' and then needed to sit due to dizziness  Activity: Within Room  Activity Comments: short step length bilaterally with a step to pattern due to right hip pain.  Patient needed cueing to increase step length and was able to increase slightly.  Slow pace complaints of dizziness and needing to sit.  Additional Factors: Verbal cues  Assistance Level: Contact guard assist  Gait Deviations: Slow cirilo, Decreased step length bilateral    Stairs:   Hasn't tolerated stairs since eval due to hypotension    Curb:    Hasn't tolerated stairs since eval due to  back. RN aware. Pt completed bed mobility w/ SBA, tolerated EOB trunk strengthening and UB dressing with set-up, declined LB dressing. O2 removed during session and spO2 remained > 93%. Pt requires rest breaks and inc time for all tasks, functions far below baseline. Pt will cont to benefit from skilled OT on ARU, cont w/ treat per POC.  Activity Tolerance: Patient limited by fatigue, Patient tolerated treatment well  Discharge Recommendations: Continue to assess pending progress, Home with assist PRN, Patient would benefit from continued therapy after discharge, Home with Home health OT    Pt has: RW, 3WW, TTB. May benefit from walker basket, sock aid, reacher...cont to assess    NUTRITION  Most recent weightWeight - Scale: 59.8 kg (131 lb 13.4 oz) (after PD)  BSA (Calculated - sq m): 1.65 sq meters  BMI (Calculated): 22  Diet Order: ADULT DIET; Regular; Low Phosphorus (Less than 1000 mg)  ADULT ORAL NUTRITION SUPPLEMENT; Dinner; Other Oral Supplement; Expedite Cup  PO Meals Eaten (%): 1 - 25% (only soup, calorie counts)  Malnutrition Status: Moderate malnutrition  Nutrition Education/Counseling:  (Monitor need)     NURSING  Pt is continent of urine, PD at HS, can be incontinent of bowel, fall risk, fatigue, calorie count, maintain skin integrity.  Family Education: Patient Education: medications, PD, smoking cessation, safety and fall prevention, skin care and prevention.     MEDICAL      TEAM SUMMARY AND DISCHARGE PLAN  Estimated Length of Stay:6/14/2025  Destination: home health  Anticipated Services at Discharge:    [x] OT  [x] PT   [] SLP    [x] RN   [] Home Health aide []   Community Resources: _______________________________  Equipment recommendations:  [] Hospital bed [] Tub bench  [] Shower chair [] Hand held shower  [] Raised toilet seat [] Toilet safety frame [] Bedside commode   [] W/C: _____  [x] Rolling Walker [] Standard walker [] Gait belt [] cane: _________  [] Sliding board [] Alternate  seating/furniture [] O2 [] Hip Kit: _______  [] Life Line [] Other: _______  Factors facilitating achievement of predicted outcomes: Family support  Barriers to the achievement of predicted outcomes/Interventions: poor endurance, orhtostatic hypotension, poor motivation, poor po intake, reduced medical insight, lives alone, O2 needs and not O2 dependent at home        Interdisciplinary Individualized Plan of Care Review:    Continue Current Plan of Care: Yes    Modifications:_____________________________    Special Needs in the Upcoming Week :    [] Family/Caregiver Education  [] Home visit  []Therapeutic Pass   [] Consults:_______    [] Other;_______    Patient Rehab Team Goals for the Upcoming Week:  1. Ambulate 50' with wheeled walker with CGA consistently without drop in BP  2. Pt will complete toileting with CGA  3.           Team Members Present at Conference:  Physician:Dr. Bhanu CLAIRE  : SAKINA Glass    Occupational Therapist: Ana Mccartney, OTR/L#[id number]  Physical Therapist: Monserrat English, PT [id number]  Speech Therapist:   ARU Supervisor:Janet Rodrigues RN CRRN  Dietician: Ely Mello RD, LD   Psychologist:  Other:      I attest to leading the interdisciplinary team meeting, required attendees are present as listed above. I approve the established interdisciplinary plan of care as documented within the medical record of Jonelle Goodman    MD: Bon Drummond MD

## 2025-06-04 NOTE — PROGRESS NOTES
Nutrition Note    Nepro discontinued d/t nausea/vomiting. Pt. Is willing to try Expedite cup with dinner tonight (1mg phos per serving). Calorie count started today. Pt. Is currently meeting criteria for acute, moderate malnutrition.     Electronically signed by Ely Mello RD on 6/4/25 at 11:00 AM EDT    Contact: [phone]

## 2025-06-04 NOTE — PLAN OF CARE
Problem: Safety - Adult  Goal: Free from fall injury  Outcome: Progressing  Flowsheets (Taken 6/4/2025 0206)  Free From Fall Injury:   Instruct family/caregiver on patient safety   Based on caregiver fall risk screen, instruct family/caregiver to ask for assistance with transferring infant if caregiver noted to have fall risk factors  Note: Able to rate pain using a 1-10 scale, medicated per prn orders, see MAR. Able to verbalize a reduction in pain and/or able to fall asleep and remain asleep without any s/s of pain      Problem: Skin/Tissue Integrity  Goal: Skin integrity remains intact  Description: 1.  Monitor for areas of redness and/or skin breakdown2.  Assess vascular access sites hourly3.  Every 4-6 hours minimum:  Change oxygen saturation probe site4.  Every 4-6 hours:  If on nasal continuous positive airway pressure, respiratory therapy assess nares and determine need for appliance change or resting period  Outcome: Progressing  Flowsheets (Taken 6/4/2025 0206)  Skin Integrity Remains Intact: Monitor for areas of redness and/or skin breakdown  Note: Able to change positions in bed without assist, no evidence of skin breakdown noted. Waffle cushion in place to chair.      Problem: Pain  Goal: Verbalizes/displays adequate comfort level or baseline comfort level  Outcome: Progressing  Flowsheets (Taken 6/4/2025 0206)  Verbalizes/displays adequate comfort level or baseline comfort level:   Encourage patient to monitor pain and request assistance   Administer analgesics based on type and severity of pain and evaluate response   Consider cultural and social influences on pain and pain management  Note: Able to rate pain using a 1-10 scale, medicated per prn orders, see MAR. Able to verbalize a reduction in pain and/or able to fall asleep and remain asleep without any s/s of pain

## 2025-06-04 NOTE — PROGRESS NOTES
Back to bed for rest, remains very slow with transfer, did want nurse to get second nurse to lift in bed, patient able to assist with cues and encouragement, will keep room dark, somewhat brighter this pm, continues to c/o fatigue.

## 2025-06-05 PROCEDURE — 6370000000 HC RX 637 (ALT 250 FOR IP): Performed by: INTERNAL MEDICINE

## 2025-06-05 PROCEDURE — 6370000000 HC RX 637 (ALT 250 FOR IP): Performed by: PHYSICAL MEDICINE & REHABILITATION

## 2025-06-05 PROCEDURE — 94669 MECHANICAL CHEST WALL OSCILL: CPT

## 2025-06-05 PROCEDURE — 94761 N-INVAS EAR/PLS OXIMETRY MLT: CPT

## 2025-06-05 PROCEDURE — 97530 THERAPEUTIC ACTIVITIES: CPT | Performed by: PHYSICAL THERAPIST

## 2025-06-05 PROCEDURE — 97116 GAIT TRAINING THERAPY: CPT

## 2025-06-05 PROCEDURE — 94640 AIRWAY INHALATION TREATMENT: CPT

## 2025-06-05 PROCEDURE — 97110 THERAPEUTIC EXERCISES: CPT

## 2025-06-05 PROCEDURE — 1280000000 HC REHAB R&B

## 2025-06-05 PROCEDURE — 90945 DIALYSIS ONE EVALUATION: CPT

## 2025-06-05 PROCEDURE — A4722 DIALYS SOL FLD VOL > 1999CC: HCPCS | Performed by: PHYSICAL MEDICINE & REHABILITATION

## 2025-06-05 PROCEDURE — 97535 SELF CARE MNGMENT TRAINING: CPT

## 2025-06-05 PROCEDURE — 6360000002 HC RX W HCPCS: Performed by: PHYSICAL MEDICINE & REHABILITATION

## 2025-06-05 PROCEDURE — 2700000000 HC OXYGEN THERAPY PER DAY

## 2025-06-05 PROCEDURE — 2500000003 HC RX 250 WO HCPCS: Performed by: PHYSICAL MEDICINE & REHABILITATION

## 2025-06-05 PROCEDURE — 97110 THERAPEUTIC EXERCISES: CPT | Performed by: PHYSICAL THERAPIST

## 2025-06-05 PROCEDURE — 2580000003 HC RX 258: Performed by: PHYSICAL MEDICINE & REHABILITATION

## 2025-06-05 PROCEDURE — 97116 GAIT TRAINING THERAPY: CPT | Performed by: PHYSICAL THERAPIST

## 2025-06-05 RX ADMIN — METOCLOPRAMIDE 5 MG: 10 TABLET ORAL at 15:33

## 2025-06-05 RX ADMIN — SODIUM CHLORIDE, PRESERVATIVE FREE 10 ML: 5 INJECTION INTRAVENOUS at 19:47

## 2025-06-05 RX ADMIN — LOSARTAN POTASSIUM 100 MG: 100 TABLET, FILM COATED ORAL at 19:46

## 2025-06-05 RX ADMIN — PANTOPRAZOLE SODIUM 40 MG: 40 TABLET, DELAYED RELEASE ORAL at 08:26

## 2025-06-05 RX ADMIN — METOCLOPRAMIDE 5 MG: 10 TABLET ORAL at 11:33

## 2025-06-05 RX ADMIN — ROSUVASTATIN CALCIUM 10 MG: 10 TABLET, FILM COATED ORAL at 19:46

## 2025-06-05 RX ADMIN — SEVELAMER CARBONATE 1600 MG: 800 TABLET, FILM COATED ORAL at 08:45

## 2025-06-05 RX ADMIN — FLUCONAZOLE 200 MG: 100 TABLET ORAL at 08:48

## 2025-06-05 RX ADMIN — TRAMADOL HYDROCHLORIDE 50 MG: 50 TABLET, COATED ORAL at 19:46

## 2025-06-05 RX ADMIN — IPRATROPIUM BROMIDE AND ALBUTEROL SULFATE 1 DOSE: .5; 2.5 SOLUTION RESPIRATORY (INHALATION) at 09:05

## 2025-06-05 RX ADMIN — CLOPIDOGREL BISULFATE 75 MG: 75 TABLET, FILM COATED ORAL at 08:26

## 2025-06-05 RX ADMIN — GUAIFENESIN 1200 MG: 600 TABLET, EXTENDED RELEASE ORAL at 08:46

## 2025-06-05 RX ADMIN — TIOTROPIUM BROMIDE AND OLODATEROL 2 PUFF: 3.124; 2.736 SPRAY, METERED RESPIRATORY (INHALATION) at 09:05

## 2025-06-05 RX ADMIN — SEVELAMER CARBONATE 1600 MG: 800 TABLET, FILM COATED ORAL at 18:00

## 2025-06-05 RX ADMIN — Medication 4 ML: at 09:05

## 2025-06-05 RX ADMIN — ALPRAZOLAM 1.5 MG: 0.5 TABLET ORAL at 21:33

## 2025-06-05 RX ADMIN — CARVEDILOL 6.25 MG: 6.25 TABLET, FILM COATED ORAL at 11:37

## 2025-06-05 RX ADMIN — SEVELAMER CARBONATE 1600 MG: 800 TABLET, FILM COATED ORAL at 11:52

## 2025-06-05 RX ADMIN — SODIUM CHLORIDE, PRESERVATIVE FREE 10 ML: 5 INJECTION INTRAVENOUS at 08:52

## 2025-06-05 RX ADMIN — HEPARIN SODIUM 5000 UNITS: 5000 INJECTION INTRAVENOUS; SUBCUTANEOUS at 21:52

## 2025-06-05 RX ADMIN — ALPRAZOLAM 1.5 MG: 0.5 TABLET ORAL at 08:32

## 2025-06-05 RX ADMIN — METOCLOPRAMIDE 5 MG: 10 TABLET ORAL at 06:10

## 2025-06-05 RX ADMIN — GUAIFENESIN 1200 MG: 600 TABLET, EXTENDED RELEASE ORAL at 19:47

## 2025-06-05 RX ADMIN — ASPIRIN 81 MG 81 MG: 81 TABLET ORAL at 08:46

## 2025-06-05 RX ADMIN — HEPARIN SODIUM 5000 UNITS: 5000 INJECTION INTRAVENOUS; SUBCUTANEOUS at 06:10

## 2025-06-05 RX ADMIN — HEPARIN SODIUM 5000 UNITS: 5000 INJECTION INTRAVENOUS; SUBCUTANEOUS at 13:33

## 2025-06-05 RX ADMIN — TROSPIUM CHLORIDE 20 MG: 20 TABLET, FILM COATED ORAL at 19:47

## 2025-06-05 RX ADMIN — SODIUM CHLORIDE, SODIUM LACTATE, CALCIUM CHLORIDE, MAGNESIUM CHLORIDE AND DEXTROSE 6000 ML: 1.5; 538; 448; 18.3; 5.08 INJECTION, SOLUTION INTRAPERITONEAL at 19:51

## 2025-06-05 RX ADMIN — PANTOPRAZOLE SODIUM 40 MG: 40 TABLET, DELAYED RELEASE ORAL at 19:46

## 2025-06-05 RX ADMIN — ROPINIROLE HYDROCHLORIDE 1 MG: 1 TABLET, FILM COATED ORAL at 19:46

## 2025-06-05 RX ADMIN — SODIUM CHLORIDE, SODIUM LACTATE, CALCIUM CHLORIDE, MAGNESIUM CHLORIDE AND DEXTROSE 2000 ML: 1.5; 538; 448; 18.3; 5.08 INJECTION, SOLUTION INTRAPERITONEAL at 19:51

## 2025-06-05 ASSESSMENT — PAIN DESCRIPTION - PAIN TYPE
TYPE: CHRONIC PAIN
TYPE: CHRONIC PAIN

## 2025-06-05 ASSESSMENT — PAIN SCALES - GENERAL
PAINLEVEL_OUTOF10: 7
PAINLEVEL_OUTOF10: 2
PAINLEVEL_OUTOF10: 2
PAINLEVEL_OUTOF10: 4

## 2025-06-05 ASSESSMENT — PAIN DESCRIPTION - ONSET: ONSET: ON-GOING

## 2025-06-05 ASSESSMENT — PAIN - FUNCTIONAL ASSESSMENT: PAIN_FUNCTIONAL_ASSESSMENT: ACTIVITIES ARE NOT PREVENTED

## 2025-06-05 ASSESSMENT — PAIN DESCRIPTION - LOCATION
LOCATION: GENERALIZED
LOCATION: NECK

## 2025-06-05 ASSESSMENT — PAIN DESCRIPTION - ORIENTATION: ORIENTATION: OTHER (COMMENT)

## 2025-06-05 ASSESSMENT — PAIN DESCRIPTION - FREQUENCY: FREQUENCY: INTERMITTENT

## 2025-06-05 ASSESSMENT — PAIN DESCRIPTION - DESCRIPTORS: DESCRIPTORS: ACHING

## 2025-06-05 NOTE — PLAN OF CARE
Problem: Nutrition Deficit:  Goal: Optimize nutritional status  6/5/2025 1122 by Esperanza Cintron, RN  Outcome: Not Progressing  Flowsheets (Taken 6/5/2025 1122)  Nutrient intake appropriate for improving, restoring, or maintaining nutritional needs:   Assess nutritional status and recommend course of action   Recommend appropriate diets, oral nutritional supplements, and vitamin/mineral supplements  Note: Intake poor continue to monitor.  6/5/2025 0244 by Kaye Ruiz RN  Outcome: Progressing     Problem: Discharge Planning  Goal: Discharge to home or other facility with appropriate resources  6/5/2025 1122 by Esperanza Cintron RN  Outcome: Progressing  Flowsheets (Taken 6/5/2025 0731)  Discharge to home or other facility with appropriate resources:   Identify barriers to discharge with patient and caregiver   Arrange for needed discharge resources and transportation as appropriate   Identify discharge learning needs (meds, wound care, etc)  6/5/2025 0244 by Kaye Ruiz RN  Outcome: Progressing     Problem: Safety - Adult  Goal: Free from fall injury  6/5/2025 1122 by Esperanza Cintron RN  Outcome: Progressing  Flowsheets (Taken 6/5/2025 0859)  Free From Fall Injury: Instruct family/caregiver on patient safety  6/5/2025 0244 by Kaye Ruiz RN  Outcome: Progressing     Problem: Skin/Tissue Integrity  Goal: Skin integrity remains intact  Description: 1.  Monitor for areas of redness and/or skin breakdown2.  Assess vascular access sites hourly3.  Every 4-6 hours minimum:  Change oxygen saturation probe site4.  Every 4-6 hours:  If on nasal continuous positive airway pressure, respiratory therapy assess nares and determine need for appliance change or resting period  6/5/2025 1122 by Esperanza Cintron, RN  Outcome: Progressing  Flowsheets  Taken 6/5/2025 0859  Skin Integrity Remains Intact: Monitor for areas of redness and/or skin breakdown  Taken 6/5/2025 0731  Skin Integrity

## 2025-06-05 NOTE — PROGRESS NOTES
Physical Therapy  Facility/Department: [address] [facility]  Rehabilitation Physical Therapy Treatment Note    NAME: Jonelle Contreras  : 1950 (74 y.o.)  MRN: [id number]  CODE STATUS: Full Code    Date of Service: 25       Restrictions:  Restrictions/Precautions: Fall Risk  Position Activity Restriction  Other Position/Activity Restrictions: PD nightly. dwayne TEDs daily. 2L O2 (not O2 dependent), weaning-on room air throughout session     SUBJECTIVE  Subjective: Pt reports pain in her waist 10/10 \"soreness\".       OBJECTIVE  Cognition  Overall Cognitive Status: Exceptions  Safety Judgement: Decreased awareness of need for safety  Initiation: Requires cues for some  Cognition Comment: delayed processing and responses, flat affect, monitor for dizziness during standing tasks as pt communicates minimally  Orientation  Overall Orientation Status: Within Normal Limits  Orientation Level: Oriented X4    Functional Mobility  Transfers  Surface: Wheelchair  Additional Factors: Verbal cues;Increased time to complete  Device: Walker (RW)  Sit to Stand  Assistance Level: Minimal assistance;Contact guard assist  Skilled Clinical Factors: increased time and cueing for hand placement; min A progressing to CGA  Stand to Sit  Assistance Level: Contact guard assist  Skilled Clinical Factors: cues for hand placement      Environmental Mobility  Ambulation  Surface: Level surface  Device: Rolling walker  Distance: 69' with 2 turns, 20' x2  Activity: Within Unit  Activity Comments: decreased step length bilaterally, cues required to increase step length and stay closer to base of RW  Additional Factors: Verbal cues  Assistance Level: Contact guard assist  Gait Deviations: Slow cirilo;Decreased step length bilateral  Skilled Clinical Factors: Flexed posture with flexed hips and knees      PT Exercises  Exercise Equipment: Pt was able to tolerate BLE strengthening, conditioning and reciprocal movement on the NuStep with seat at 7,  independence  Long Term Goal 3: Ambulate 150' with wheeled walker with modified independence  Long Term Goal 4: Ascend/descend 12 steps with bilateral rails with modified independence  Long Term Goal 5: Ascend/descend curb step with wheeled walker with modified independence    PLAN OF CARE/SAFETY  Physical Therapy Plan  General Plan:  minutes of therapy at least 5 out of 7 days a week  Days Per Week: 5 Days  Hours Per Day: 1.5 hours  Therapy Duration: 2 Weeks  Current Treatment Recommendations: Strengthening;ROM;Balance training;Functional mobility training;Transfer training;Endurance training;Gait training;Stair training;Safety education & training;Equipment evaluation, education, & procurement;Therapeutic activities;Home exercise program;Patient/Caregiver education & training  Safety Devices  Type of Devices: All fall risk precautions in place;Gait belt;Patient at risk for falls;Left in chair (To room with transport)  Restraints  Restraints Initially in Place: No    EDUCATION  Education  Education Given To: Patient  Education Provided: Plan of Care;Safety;Energy Conservation;Transfer Training;Fall Prevention Strategies;Mobility Training  Education Method: Verbal;Demonstration  Barriers to Learning: None  Education Outcome: Verbalized understanding;Demonstrated understanding;Continued education needed        Therapy Time   Individual Concurrent Group Co-treatment   Time In 1445         Time Out 1530         Minutes 45           Timed Code Treatment Minutes: 45 Minutes       ERICK CARVAJAL PT, 06/05/25 at 3:25 PM     Electronically signed by ERICK CARVAJAL PT on 6/5/25 at 4:00 PM EDT

## 2025-06-05 NOTE — PROGRESS NOTES
Resting quietly in bed, respirations even/easy. Patient admitted with debility s/p respiratory failure. Is AAOx4. Ambulates with a walker x1, used the BSC and was SBA via stand pivot. Reports fatigue. Lungs diminished with some crackles at the bases. Patient weaned to RA last evening. HR regular. Abdomen non tender with active bowel sounds. Poor PO intake.  Has been continent of bowel and bladder. Receives PD nightly, running well overnight. Did get an Excessive drain alarm during initial drain, but no other alarms since. Patient with c/o generalized pain and medicated per PRN orders. Also medicated with PRN benadryl for sleep which has been effective. Encouraged to call for all needs, call light remains in reach at all times. Bed alarm active. Kaye Ruiz RN

## 2025-06-05 NOTE — PROGRESS NOTES
Calorie Count Note    Type and Reason for Visit: Consult    Nutrition Assessment: Calorie count: We have trialed several supplements. Pt. does not like magic cups. Attempted Nepro but had bouts of emesis following intake. She did like the Expedite cup so we increased to TID (3 mg phos total). Ensure is not a good substitute d/t phosphorus content. We did discuss nutritional status today, encouraged intake to avoid supplemental TF. At this time, she is meeting ASPEN criteria for moderate malnutrition. She is taking Reglan with meals and Protonix BID. Will continue calorie count through tomorrow and determine most appropriate recommendations to restore nutritional adequacy.    Current diet and supplement order:    ADULT DIET; Regular; Low Phosphorus (Less than 1000 mg)  ADULT ORAL NUTRITION SUPPLEMENT; Dinner, Breakfast, Lunch; Other Oral Supplement; Expedite Cup    Comparative Standards (Estimated Nutrition Needs):   Estimated Daily Total Kcal: 3715-6143 kcal (25-30 kcal/kg)  Estimated Daily Protein (g): 75-87 g (1.2-1.4g/kg)    Day 1 6/4  Meal Calories Protein Comments   Breakfast 67 2 Snack/supplement intake accounted for in dinner meal.   Lunch 218 8    Dinner 239 24    Snacks/Supplements 45 10     Total 524 38     % Kcal needs met  33% % Protein needs met  49%      Day 2 6/5  Meal Calories Protein Comments   Breakfast 198 6 g     Lunch 88 7    Dinner TBD TBD    Snacks/Supplements TBD TBD    Total       % Kcal needs met % Protein needs met        Intervention & Recommendations:   1. Continue Calorie Count  2. Increased Expedite cup to TID.    Electronically signed by Ely Mello RD on 6/5/25 at 2:33 PM EDT    Contact Number: [phone]

## 2025-06-05 NOTE — PLAN OF CARE
Problem: Skin/Tissue Integrity  Goal: Skin integrity remains intact  Description: 1.  Monitor for areas of redness and/or skin breakdown2.  Assess vascular access sites hourly3.  Every 4-6 hours minimum:  Change oxygen saturation probe site4.  Every 4-6 hours:  If on nasal continuous positive airway pressure, respiratory therapy assess nares and determine need for appliance change or resting period  Outcome: Progressing  Flowsheets (Taken 6/5/2025 0244)  Skin Integrity Remains Intact: Monitor for areas of redness and/or skin breakdown  Note: Able to change positions in bed without assist, no evidence of skin breakdown noted. Waffle cushion in place to chair.      Problem: Pain  Goal: Verbalizes/displays adequate comfort level or baseline comfort level  Outcome: Progressing  Flowsheets (Taken 6/5/2025 0244)  Verbalizes/displays adequate comfort level or baseline comfort level:   Encourage patient to monitor pain and request assistance   Administer analgesics based on type and severity of pain and evaluate response   Consider cultural and social influences on pain and pain management  Note: Pt educated on falls precautions and safety. Call light and personal belongings within reach at all times. Non skid socks in use when up. Hourly rounding and alarms active.

## 2025-06-05 NOTE — PROGRESS NOTES
Jonelle Contreras  6/5/2025  [id number]    Chief Complaint: Debility    Subjective    Patient seen this AM. Patient was discussed in rehabilitation conference today with the entire rehab team which includes PT, OT, speech, nursing, social service, dietary, and rehab unit manager, to review how much progress has been made in rehabilitation unit therapies, and when the patient will be ready for discharge to home safely. We think the patient will be ready for discharge to home on 6/14.  Patient now requires contact-guard assist for transfers and ambulation of 30 feet with a rolling walker before she is limited by dizziness and low blood pressure.  Patient also needs min assist for bathing and mod to max assist for lower body dressing.  Dietary is working with the patient to increase her calorie and protein intake.  Patient lives at home alone was independent and driving prior to admission. Nephrology is working with the patient on her peritoneal dialysis needs.  Dietary has set patient up with calorie counts and is trying to get more protein into the patient.  Patient scoped by GI on 5/22, and patient continues on Protonix and Reglan for her erosive esophagitis.        Objective    Patient Vitals for the past 24 hrs:   BP Temp Temp src Pulse Resp SpO2   06/05/25 0745 -- -- -- -- -- 99 %   06/05/25 0735 -- -- -- -- -- 92 %   06/05/25 0732 (!) 161/82 97.9 °F (36.6 °C) Oral 76 17 (!) 87 %   06/05/25 0600 -- -- -- -- -- 91 %   06/04/25 2043 (!) 167/83 98.8 °F (37.1 °C) Oral 77 18 93 %   06/04/25 1933 -- -- -- -- -- 96 %   06/04/25 1559 (!) 146/82 -- -- 68 -- --   06/04/25 1530 (!) 154/71 -- -- 67 -- --   06/04/25 1416 -- -- -- -- 17 --   06/04/25 0915 125/65 -- -- 64 -- --     Patient Vitals for the past 96 hrs (Last 3 readings):   Weight   06/04/25 0745 59.8 kg (131 lb 13.4 oz)   06/01/25 0854 59.3 kg (130 lb 11.7 oz)      Gen: No distress.  HEENT: Normocephalic, atraumatic.   CV: Regular rate and rhythm . Extremities warm,

## 2025-06-05 NOTE — PROGRESS NOTES
Patient ID: Jonelle Contreras  Referring/ Physician: Bon Drummond MD      Summary:   Jonelle Contreras is being seen by nephrology for ESRD and HTN.     Reason for admission: hypertensive urgency       Interval Hx:   Seen at bedside.   Poor appetite.   Weak and frail appearing.   On calorie counts.   No issues reported with PD    Having persistent orthostatic hypotension.   BP this am 190/94 sitting > 144/76 standing.     Labs reviewed.   Na 134   K 3.9   Bun 43  Cr 9.1  Phos 5.9      Assessment/Plan:   - BP acceptable,  hold all meds if SBP < 140. Will have to let SBP be higher to accommodate for orthostasis.   Continue coreg to 6.25 mg BID  - Continue PD with  1.5% exchanges.   - renvela 1600 mg TID with meals.   - retacrit 10 k units weekly       Labs MWF         ESRD  ESRD secondary to hypertension.  She does peritoneal dialysis at home 1 exchange of 2 L 1.5% solutions daily.  Based on her most recent labs in the outpatient setting her adequacy is not at goal.  Prescription adjusted here.   EDW 61.5 kg > 59 kilos   - daily BM   - gent to PD exit site.        Resistant hypertension  The BP has been controlled inpatient on her home regimen, actually low at times suggesting that she may not have been taking her medications as prescribed.   Has had an overall negative secondary work up. Has been extremely difficult to manage outpatient as she reports \"bottoming out and passing out\" when her medications are increased.   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   She has had 24 hr ambulatory BP monitoring showing consistently elevated bP 180s -200s systolic.   Presenting with hypertensive urgency, having ongoing headaches blurry vision and fatigue.  CT head showed no stroke.,  Has chronic microvascular changes.  Troponin 66.no ECG changes.   Her home regimen : nifedpine 30 mg , losartan 100 mg aldactone 50 mg , coreg

## 2025-06-05 NOTE — PROGRESS NOTES
Physical Therapy  Facility/Department: [address] [facility]  Rehabilitation Physical Therapy Treatment Note    NAME: Jonelle Contreras  : 1950 (74 y.o.)  MRN: [id number]  CODE STATUS: Full Code    Date of Service: 25       Restrictions:  Restrictions/Precautions: Fall Risk  Position Activity Restriction  Other Position/Activity Restrictions: PD nightly. dwayne TEDs daily. 2L O2 (not O2 dependent)     SUBJECTIVE  Subjective: Pt met in w/c in gym, reporting feeling faint when standing for longer than 1-2 minutes. Pt on 2L O2 upon arrival.  Pain: Rates 7/10 pain in \"back muscles\"       OBJECTIVE  Cognition  Overall Cognitive Status: Exceptions  Safety Judgement: Decreased awareness of need for safety  Initiation: Requires cues for some  Cognition Comment: delayed processing and responses, flat affect, monitor for dizziness during standing tasks as pt communicates minimally  Orientation  Overall Orientation Status: Within Normal Limits  Orientation Level: Oriented X4    Functional Mobility  Standing Balance  Activity: orthostatics throughout session: BP seated at start of session 179/90 and SpO2 on 2L 93%, BP seated following exercise 179/95, BP in standing 132/110, BP on second stand 144/88, BP following ambulation 176/97 and SpO2 on 2L 88% recovering >90% with seated rest break  Transfers  Surface: Wheelchair  Additional Factors: Verbal cues;Increased time to complete  Device: Walker (RW)  Sit to Stand  Assistance Level: Minimal assistance;Contact guard assist  Skilled Clinical Factors: increased time and cueing for hand placement; min A progressing to CGA  Stand to Sit  Assistance Level: Contact guard assist  Skilled Clinical Factors: cues for hand placement      Environmental Mobility  Ambulation  Surface: Level surface  Device: Rolling walker  Distance: 75' with one turn  Activity: Within Unit  Activity Comments: decreased step length bilaterally, cues required to increase step length and stay closer to base of

## 2025-06-05 NOTE — PROGRESS NOTES
Occupational Therapy  Facility/Department: [address] [facility]  Rehabilitation Occupational Therapy Daily Treatment Note    Date: 25  Patient Name: Jonelle Contreras       Room: T1I-5994/3258-01  MRN: [id number]  Account: [id number]   : 1950  (74 y.o.) Gender: female           Additional Pertinent Hx: Pt is a 75 yo F admitted to ARU with debility after being hospitalized for hyperkalemia and hypertension, found to be in a hypertensive emergency. Pt has ESRD and is on PD. Bronc performed on 2025 for bilateral mucous plugs. Treated for pneumonia.        Past Medical History:  has a past medical history of acute intermittent porphyria, agoraphobic, Allergic rhinitis, Anxiety, Arthritis, Asthma, CAD (coronary artery disease), Chronic obstructive pulmonary disease, unspecified COPD type ([facility]), ckd 4, collagenous colitis, COPD (chronic obstructive pulmonary disease) ([facility]), Depression, Disease of blood and blood forming organ, GERD (gastroesophageal reflux disease), Heart disease, Hyperlipidemia, Hypertension, Left thyroid nodule, Migraines, Neuropathy, Osteoporosis, PAD (peripheral artery disease), Peripheral vascular disease, post menopausal, Prediabetes, Pulmonary nodules, Restless leg syndrome, STEMI involving oth coronary artery of inferior wall ([facility]), and Urinary incontinence.  Past Surgical History:   has a past surgical history that includes Hysterectomy; Cholecystectomy; Coronary angioplasty with stent (10/2013 [facility]); Colonoscopy (2017); Colonoscopy (N/A, 2022); Upper gastrointestinal endoscopy (N/A, 2022); Cardiac surgery; Hysterectomy, vaginal; Upper gastrointestinal endoscopy (2022); Upper gastrointestinal endoscopy (N/A, 2025); and bronchoscopy (N/A, 2025).    Restrictions  Restrictions/Precautions: Fall Risk  Other Position/Activity Restrictions: PD nightly. dwayne TEDs daily. 2L O2 (not O2 dependent)    Subjective  Subjective: Pt met in room, respiratory  needs to BM. Pt completes PT > BSC set up near shower stall. Pt threads brief via figure 4, assist to thread leggings d/t tightness, manages over hips in stance with Mod A.  Putting On/Taking Off Footwear  Equipment Provided: Reachers  Assistance Level: Maximum assistance;Moderate assistance  Skilled Clinical Factors: On shower seat pt removes x1 footie socks with reacher + extended time, assist to remove second sock d/t time. DEP to for compression socks and to don d/t time restraints.  Toileting  Assistance Level: Moderate assistance  Skilled Clinical Factors: During LB dressing in shower, pt reports need for BM, unable to wait. OT places BSC near shower stall, no clothing to manage down, seated voids large amount of BM, RN Edith notified. Pt in stance with CGA completes posterior hygiene with Min A + CGA for balance, OT ensures she is clean and had additional BM.  Toilet Transfers  Technique: Stand pivot  Equipment: Beside commode  Additional Factors: Verbal cues  Assistance Level: Contact guard assist;Verbal cues  Skilled Clinical Factors: Pt sitting on shower chair, reports need for BM. OT set-up BSC near shower, pt completes SPT shower chair > BSC > WC,  CGA for balance. Cues for hand placement.  Tub/Shower Transfers  Type: Shower  Transfer From: Rolling walker  Transfer To: Shower chair with back  Additional Factors: Verbal cues;Cues for hand placement  Assistance Level: Contact guard assist  Skilled Clinical Factors: RW > shower chair with cues for hand placement, shower chair > BSC SPT CGA, BSC > WC SPT          Functional Mobility  Device: Rolling walker  Assistance Level: Contact guard assist  Skilled Clinical Factors: Pt amb w/ RW  in bathroom, CGA for balance, slow pace but no overt LOB.  Sit to Stand  Assistance Level: Moderate assistance  Skilled Clinical Factors: Mod A from shower chair turned around at sink - increased assist to stand d/t limited options for hand placement. CGA to stand from shower

## 2025-06-05 NOTE — PROGRESS NOTES
Patient admitted to rehab with debility  A/Ox4, can be slow to answer. Transfers with walker, not as slow today. On low phosphorus diet, tolerating poorly, on calorie counts. Dietitian aware and did increase supplements. Medications taken whole. On Heparin for DVT prophylaxis.  Skin: monitor PD site, buttocks. Oxygen: has been on and off today, does not use O2 at home. LDA: saline lock right forearm. Has been continent of bowel and of bladder, only small void earlier with bm. LBM today large soft loose. Chair/bed alarms in use and call light in reach. Will monitor for safety. Family here earlier for a visit, brighter today and more interactive.

## 2025-06-06 LAB
ALBUMIN SERPL-MCNC: 2.4 G/DL (ref 3.4–5)
ANION GAP SERPL CALCULATED.3IONS-SCNC: 18 MMOL/L (ref 3–16)
BUN SERPL-MCNC: 38 MG/DL (ref 7–20)
CALCIUM SERPL-MCNC: 9.1 MG/DL (ref 8.3–10.6)
CHLORIDE SERPL-SCNC: 93 MMOL/L (ref 99–110)
CO2 SERPL-SCNC: 22 MMOL/L (ref 21–32)
CREAT SERPL-MCNC: 8.6 MG/DL (ref 0.6–1.2)
DEPRECATED RDW RBC AUTO: 15.8 % (ref 12.4–15.4)
GFR SERPLBLD CREATININE-BSD FMLA CKD-EPI: 5 ML/MIN/{1.73_M2}
GLUCOSE SERPL-MCNC: 92 MG/DL (ref 70–99)
HCT VFR BLD AUTO: 29.1 % (ref 36–48)
HGB BLD-MCNC: 9.6 G/DL (ref 12–16)
MCH RBC QN AUTO: 28.6 PG (ref 26–34)
MCHC RBC AUTO-ENTMCNC: 33 G/DL (ref 31–36)
MCV RBC AUTO: 86.7 FL (ref 80–100)
PHOSPHATE SERPL-MCNC: 4.9 MG/DL (ref 2.5–4.9)
PLATELET # BLD AUTO: 457 K/UL (ref 135–450)
PMV BLD AUTO: 8 FL (ref 5–10.5)
POTASSIUM SERPL-SCNC: 4 MMOL/L (ref 3.5–5.1)
RBC # BLD AUTO: 3.36 M/UL (ref 4–5.2)
SODIUM SERPL-SCNC: 133 MMOL/L (ref 136–145)
WBC # BLD AUTO: 8.5 K/UL (ref 4–11)

## 2025-06-06 PROCEDURE — 6370000000 HC RX 637 (ALT 250 FOR IP): Performed by: INTERNAL MEDICINE

## 2025-06-06 PROCEDURE — A4722 DIALYS SOL FLD VOL > 1999CC: HCPCS | Performed by: PHYSICAL MEDICINE & REHABILITATION

## 2025-06-06 PROCEDURE — 97110 THERAPEUTIC EXERCISES: CPT

## 2025-06-06 PROCEDURE — 6360000002 HC RX W HCPCS: Performed by: PHYSICAL MEDICINE & REHABILITATION

## 2025-06-06 PROCEDURE — 94760 N-INVAS EAR/PLS OXIMETRY 1: CPT

## 2025-06-06 PROCEDURE — 2580000003 HC RX 258: Performed by: PHYSICAL MEDICINE & REHABILITATION

## 2025-06-06 PROCEDURE — 94640 AIRWAY INHALATION TREATMENT: CPT

## 2025-06-06 PROCEDURE — 97535 SELF CARE MNGMENT TRAINING: CPT

## 2025-06-06 PROCEDURE — 6370000000 HC RX 637 (ALT 250 FOR IP): Performed by: PHYSICAL MEDICINE & REHABILITATION

## 2025-06-06 PROCEDURE — 85027 COMPLETE CBC AUTOMATED: CPT

## 2025-06-06 PROCEDURE — 80069 RENAL FUNCTION PANEL: CPT

## 2025-06-06 PROCEDURE — 97530 THERAPEUTIC ACTIVITIES: CPT

## 2025-06-06 PROCEDURE — 2500000003 HC RX 250 WO HCPCS: Performed by: PHYSICAL MEDICINE & REHABILITATION

## 2025-06-06 PROCEDURE — 97116 GAIT TRAINING THERAPY: CPT

## 2025-06-06 PROCEDURE — 1280000000 HC REHAB R&B

## 2025-06-06 PROCEDURE — 90945 DIALYSIS ONE EVALUATION: CPT

## 2025-06-06 PROCEDURE — 36415 COLL VENOUS BLD VENIPUNCTURE: CPT

## 2025-06-06 RX ORDER — HYDRALAZINE HYDROCHLORIDE 25 MG/1
25 TABLET, FILM COATED ORAL EVERY 6 HOURS PRN
Status: DISCONTINUED | OUTPATIENT
Start: 2025-06-06 | End: 2025-06-20 | Stop reason: HOSPADM

## 2025-06-06 RX ADMIN — TIZANIDINE 4 MG: 4 TABLET ORAL at 21:41

## 2025-06-06 RX ADMIN — HEPARIN SODIUM 5000 UNITS: 5000 INJECTION INTRAVENOUS; SUBCUTANEOUS at 21:37

## 2025-06-06 RX ADMIN — HYDRALAZINE HYDROCHLORIDE 25 MG: 25 TABLET ORAL at 22:59

## 2025-06-06 RX ADMIN — METOCLOPRAMIDE 5 MG: 10 TABLET ORAL at 17:07

## 2025-06-06 RX ADMIN — SODIUM CHLORIDE, PRESERVATIVE FREE 10 ML: 5 INJECTION INTRAVENOUS at 08:00

## 2025-06-06 RX ADMIN — CARVEDILOL 6.25 MG: 6.25 TABLET, FILM COATED ORAL at 07:56

## 2025-06-06 RX ADMIN — TROSPIUM CHLORIDE 20 MG: 20 TABLET, FILM COATED ORAL at 20:19

## 2025-06-06 RX ADMIN — SODIUM CHLORIDE, SODIUM LACTATE, CALCIUM CHLORIDE, MAGNESIUM CHLORIDE AND DEXTROSE 2000 ML: 1.5; 538; 448; 18.3; 5.08 INJECTION, SOLUTION INTRAPERITONEAL at 22:32

## 2025-06-06 RX ADMIN — FLUCONAZOLE 200 MG: 100 TABLET ORAL at 07:57

## 2025-06-06 RX ADMIN — PANTOPRAZOLE SODIUM 40 MG: 40 TABLET, DELAYED RELEASE ORAL at 07:56

## 2025-06-06 RX ADMIN — CLOPIDOGREL BISULFATE 75 MG: 75 TABLET, FILM COATED ORAL at 07:57

## 2025-06-06 RX ADMIN — SEVELAMER CARBONATE 1600 MG: 800 TABLET, FILM COATED ORAL at 11:38

## 2025-06-06 RX ADMIN — SODIUM CHLORIDE, SODIUM LACTATE, CALCIUM CHLORIDE, MAGNESIUM CHLORIDE AND DEXTROSE 6000 ML: 1.5; 538; 448; 18.3; 5.08 INJECTION, SOLUTION INTRAPERITONEAL at 22:32

## 2025-06-06 RX ADMIN — SEVELAMER CARBONATE 1600 MG: 800 TABLET, FILM COATED ORAL at 07:56

## 2025-06-06 RX ADMIN — IPRATROPIUM BROMIDE AND ALBUTEROL SULFATE 1 DOSE: .5; 2.5 SOLUTION RESPIRATORY (INHALATION) at 09:06

## 2025-06-06 RX ADMIN — SENNOSIDES, DOCUSATE SODIUM 2 TABLET: 50; 8.6 TABLET, FILM COATED ORAL at 08:00

## 2025-06-06 RX ADMIN — ASPIRIN 81 MG 81 MG: 81 TABLET ORAL at 07:56

## 2025-06-06 RX ADMIN — TIZANIDINE 4 MG: 4 TABLET ORAL at 00:02

## 2025-06-06 RX ADMIN — HEPARIN SODIUM 5000 UNITS: 5000 INJECTION INTRAVENOUS; SUBCUTANEOUS at 06:26

## 2025-06-06 RX ADMIN — GUAIFENESIN 1200 MG: 600 TABLET, EXTENDED RELEASE ORAL at 07:57

## 2025-06-06 RX ADMIN — SODIUM CHLORIDE, PRESERVATIVE FREE 10 ML: 5 INJECTION INTRAVENOUS at 21:41

## 2025-06-06 RX ADMIN — ROSUVASTATIN CALCIUM 10 MG: 10 TABLET, FILM COATED ORAL at 20:19

## 2025-06-06 RX ADMIN — LOSARTAN POTASSIUM 100 MG: 100 TABLET, FILM COATED ORAL at 19:58

## 2025-06-06 RX ADMIN — POLYETHYLENE GLYCOL 3350 17 G: 17 POWDER, FOR SOLUTION ORAL at 20:30

## 2025-06-06 RX ADMIN — METOCLOPRAMIDE 5 MG: 10 TABLET ORAL at 06:26

## 2025-06-06 RX ADMIN — Medication 4 ML: at 09:07

## 2025-06-06 RX ADMIN — ROPINIROLE HYDROCHLORIDE 1 MG: 1 TABLET, FILM COATED ORAL at 20:19

## 2025-06-06 RX ADMIN — HEPARIN SODIUM 5000 UNITS: 5000 INJECTION INTRAVENOUS; SUBCUTANEOUS at 17:07

## 2025-06-06 RX ADMIN — CARVEDILOL 6.25 MG: 6.25 TABLET, FILM COATED ORAL at 17:07

## 2025-06-06 RX ADMIN — PANTOPRAZOLE SODIUM 40 MG: 40 TABLET, DELAYED RELEASE ORAL at 20:19

## 2025-06-06 RX ADMIN — METOCLOPRAMIDE 5 MG: 10 TABLET ORAL at 11:38

## 2025-06-06 RX ADMIN — TIOTROPIUM BROMIDE AND OLODATEROL 2 PUFF: 3.124; 2.736 SPRAY, METERED RESPIRATORY (INHALATION) at 09:07

## 2025-06-06 RX ADMIN — GUAIFENESIN 1200 MG: 600 TABLET, EXTENDED RELEASE ORAL at 20:19

## 2025-06-06 RX ADMIN — SEVELAMER CARBONATE 1600 MG: 800 TABLET, FILM COATED ORAL at 17:07

## 2025-06-06 RX ADMIN — ALPRAZOLAM 1.5 MG: 0.5 TABLET ORAL at 19:57

## 2025-06-06 ASSESSMENT — PAIN DESCRIPTION - PAIN TYPE: TYPE: CHRONIC PAIN

## 2025-06-06 ASSESSMENT — PAIN DESCRIPTION - FREQUENCY: FREQUENCY: INTERMITTENT

## 2025-06-06 ASSESSMENT — PAIN DESCRIPTION - LOCATION: LOCATION: GENERALIZED

## 2025-06-06 ASSESSMENT — PAIN SCALES - GENERAL
PAINLEVEL_OUTOF10: 3
PAINLEVEL_OUTOF10: 0
PAINLEVEL_OUTOF10: 7

## 2025-06-06 ASSESSMENT — PAIN DESCRIPTION - ONSET: ONSET: ON-GOING

## 2025-06-06 ASSESSMENT — PAIN DESCRIPTION - DESCRIPTORS: DESCRIPTORS: ACHING

## 2025-06-06 ASSESSMENT — PAIN - FUNCTIONAL ASSESSMENT: PAIN_FUNCTIONAL_ASSESSMENT: ACTIVITIES ARE NOT PREVENTED

## 2025-06-06 NOTE — PROGRESS NOTES
Jonelle Contreras  6/6/2025  [id number]    Chief Complaint: Debility    Subjective    Patient seen this AM.  Patient is seen sitting up on the edge of the bed, and is more alert and eating breakfast at this time.  Patient was discussed in rehabilitation conference yesterday with the entire rehab team, and we think the patient will be ready for discharge to home on 6/14.  Patient now requires contact-guard assist for transfers and ambulation of 30 feet with a rolling walker before she is limited by dizziness and low blood pressure.  Dietary is doing calorie counts and try to get more protein into the patient to improve her endurance and strength.  Nephrology is working with the patient on her peritoneal dialysis needs.  Patient continues on Protonix and Reglan for her erosive esophagitis.    Repeat labs this morning show improvement in her electrolytes.  I will check her CBC today.    Objective    Patient Vitals for the past 24 hrs:   BP Temp Temp src Pulse Resp SpO2 Weight   06/06/25 0615 -- -- -- -- -- -- 61 kg (134 lb 7.7 oz)   06/06/25 0009 (!) 174/85 -- -- 89 -- -- --   06/05/25 2016 -- -- -- -- 16 -- --   06/05/25 1946 -- -- -- -- 16 -- --   06/05/25 1941 (!) 193/85 98.6 °F (37 °C) Oral 87 16 90 % 62.7 kg (138 lb 3.7 oz)   06/05/25 1503 -- -- -- 80 -- 98 % --   06/05/25 1435 (!) 144/76 -- -- 83 -- 94 % --   06/05/25 1430 (!) 187/88 -- -- 79 -- -- --   06/05/25 1257 (!) 189/89 -- -- 73 -- -- --   06/05/25 1136 (!) 190/94 -- -- 69 -- 94 % --   06/05/25 1130 -- -- -- -- -- 100 % --   06/05/25 0855 -- -- -- -- -- 91 % --   06/05/25 0835 -- -- -- -- -- -- 60.3 kg (132 lb 15 oz)   06/05/25 0815 -- -- -- -- -- 91 % --   06/05/25 0800 -- -- -- -- -- 98 % --   06/05/25 0745 -- -- -- -- -- 99 % --   06/05/25 0735 -- -- -- -- -- 92 % --   06/05/25 0732 (!) 161/82 97.9 °F (36.6 °C) Oral 76 17 (!) 87 % --     Patient Vitals for the past 96 hrs (Last 3 readings):   Weight   06/06/25 0615 61 kg (134 lb 7.7 oz)   06/05/25 1941

## 2025-06-06 NOTE — PROGRESS NOTES
Patient admitted to rehab with debility d/t acute hypoxic respiratory failure with mucus plugging.  A/Ox4. Transfers with walker x1. Mobility restrictions: generalized weakness. On regular, low phosphorus, supplement diet, tolerating well, poor appetite. Medications taken whole with thins. On aspirin, plavix, heparin for DVT prophylaxis.  Skin: scattered bruising. Oxygen: 2L NC. LDA: PIV RFA, PD cath. Has been continent of bowel and continent of bladder. LBM 6/5/25. Chair/bed alarms in use and call light in reach. Will monitor for safety. Electronically signed by SKYLER MARKS RN on 6/6/2025 at 12:29 PM  .

## 2025-06-06 NOTE — CARE COORDINATION
Team Conference held on Thursday, 6-5-2025.  Team reports she does PD at home daily.  Team reports she is not eating and may need a tube feed.  Team recommends continued stay on rehab with anticipated DC on Saturday, 6-.  She states MD is talking about a tube feed but she is not sure she wants it.  Education given about food feeds the body and would help with her energy too.  She gives permission for me to speak with her son, Thiago.  SAKINA Glass     Case Management   [phone]    6/6/2025  3:26 PM

## 2025-06-06 NOTE — PROGRESS NOTES
Calorie Count Note    Type and Reason for Visit: Reassess    Nutrition Assessment: Today based on intakes I expained to Pt. supplemental TF would be my recommendation to support her nutritional adequacy. Pt. admits it does sound nice because she is struggling with PO. She knows she needs to eat but \"feels bad\" at baseline and reports feeling even worse after a meal. She does not love the idea of a tube down her nose but understands it would be beneficial to her. Was agreeable to TFs at the end of our discussion. Will monitor intake over the weekend. If PO does not improve recommending nocturnal TF.    Current diet and supplement order:    ADULT DIET; Regular; Low Phosphorus (Less than 1000 mg)  ADULT ORAL NUTRITION SUPPLEMENT; Dinner, Breakfast, Lunch; Other Oral Supplement; Expedite Cup    Comparative Standards (Estimated Nutrition Needs):   Estimated Daily Total Kcal: 1965-0403 kcal (25-30 kcal/kg)  Estimated Daily Protein (g): 75-87 g (1.2-1.4g/kg)    Day 1   Meal Calories Protein Comments   Breakfast 67 2    Lunch 218 8    Dinner 239 24    Snacks/Supplements 45 10    Total 524 38     % Kcal needs met  33% % Protein needs met  49%      Day 2   Meal Calories Protein Comments   Breakfast 198 6    Lunch 88 7    Dinner 0 0    Snacks/Supplements 90 20    Total 376 33     % Kcal needs met  24% % Protein needs met  44%      Day 3   Meal Calories Protein Comments   Breakfast 218 7    Lunch 210 11    Dinner NA NA    Snacks/Supplements 90 20    Total 518 28     % Kcal needs met  33% % Protein needs met  37%      Intervention & Recommendations:   1. Discontinue Calorie Count  2. Nocturnal TF if PO does not improve over the weekend.    Electronically signed by Ely Mello RD on 6/6/25 at 2:03 PM EDT    Contact Number: [phone]

## 2025-06-06 NOTE — PROGRESS NOTES
Occupational Therapy  Facility/Department: [address] [facility]  Rehabilitation Occupational Therapy Daily AM and PM Treatment Note    Date: 25  Patient Name: Jonelle Contreras       Room: X0D-5842/3258-01  MRN: [id number]  Account: [id number]   : 1950  (74 y.o.) Gender: female           Additional Pertinent Hx: Pt is a 75 yo F admitted to ARU with debility after being hospitalized for hyperkalemia and hypertension, found to be in a hypertensive emergency. Pt has ESRD and is on PD. Bronc performed on 2025 for bilateral mucous plugs. Treated for pneumonia.        Past Medical History:  has a past medical history of acute intermittent porphyria, agoraphobic, Allergic rhinitis, Anxiety, Arthritis, Asthma, CAD (coronary artery disease), Chronic obstructive pulmonary disease, unspecified COPD type ([facility]), ckd 4, collagenous colitis, COPD (chronic obstructive pulmonary disease) ([facility]), Depression, Disease of blood and blood forming organ, GERD (gastroesophageal reflux disease), Heart disease, Hyperlipidemia, Hypertension, Left thyroid nodule, Migraines, Neuropathy, Osteoporosis, PAD (peripheral artery disease), Peripheral vascular disease, post menopausal, Prediabetes, Pulmonary nodules, Restless leg syndrome, STEMI involving oth coronary artery of inferior wall ([facility]), and Urinary incontinence.  Past Surgical History:   has a past surgical history that includes Hysterectomy; Cholecystectomy; Coronary angioplasty with stent (10/2013 [facility]); Colonoscopy (2017); Colonoscopy (N/A, 2022); Upper gastrointestinal endoscopy (N/A, 2022); Cardiac surgery; Hysterectomy, vaginal; Upper gastrointestinal endoscopy (2022); Upper gastrointestinal endoscopy (N/A, 2025); and bronchoscopy (N/A, 2025).    Restrictions  Restrictions/Precautions: Fall Risk  Other Position/Activity Restrictions: PD nightly. dwayne TEDs daily. 2L O2 (not O2 dependent), weaning-on room air throughout  assess.  Safety Devices  Safety Devices in place: Yes  Type of devices: Left in chair;Patient at risk for falls;Gait belt     06/06/25 1035   Vitals   Orthostatic B/P and Pulse? Yes   Blood Pressure Sitting 164/79   Pulse Sitting 70 PER MINUTE   Blood Pressure Standing 106/77   Pulse Standing 79 PER MINUTE   SpO2 95 %   O2 Device None (Room air)     Patient Education  Education  Education Given To: Patient  Education Provided: Role of Therapy;Safety;Transfer Training;ADL Function;Energy Conservation;Plan of Care;Fall Prevention Strategies  Education Method: Demonstration;Verbal  Barriers to Learning: None  Education Outcome: Verbalized understanding;Continued education needed    Plan  Occupational Therapy Plan  Times Per Week: 5-7x  Times Per Day: Twice a day  Days Per Week: 5 Days  Hours Per Day: 1.5 hours  Therapy Duration: 2 Weeks  Current Treatment Recommendations: Strengthening;Balance training;Functional mobility training;Endurance training;Safety education & training;Patient/Caregiver education & training;Equipment evaluation, education, & procurement;Self-Care / ADL;Home management training    Goals  Patient Goals   Patient goals : \"to get back to the way I was before\"  Short Term Goals  Time Frame for Short Term Goals: 1 weeks  Short Term Goal 1: Pt will complete bathing SBA using AE PRN  Short Term Goal 2: Pt will complete dressing with setup SBA using AE PRN (with exception of TEDs)  Short Term Goal 3: Pt will complete toileting SBA  Short Term Goal 4: Pt will maintain stance throughout fxl task for atleast 5-8 minutes SBA to address strength/activity tolerance for ADL/IADLs  Short Term Goal 5: Pt will complete fxl mobility/txs using LRAD SBA  Additional Goals?: Yes  Short Term Goal 6: Pt will perform BUE HEP IND to address strength/activity tolerance for fxl performance  Long Term Goals  Time Frame for Long Term Goals : 2 weeks  Long Term Goal 1: Pt will complete bathing mod I using AE PRN  Long Term  Goal 2: Pt will complete dressing mod I using AE PRN  Long Term Goal 3: Pt will complete toileting mod I  Long Term Goal 4: Pt will complete fxl mobility/txs using LRAD mod I  Long Term Goal 5: Pt will complete IADL task (laundry, meal prep, cleaning, etc) mod I        Therapy Time   Individual Concurrent Group Co-treatment   Time In 1015         Time Out 1100         Minutes 45         Timed Code Treatment Minutes: 45 Minutes     Therapy Time     Individual Co-treatment   Time In 1315     Time Out 1400     Minutes 45       Lily Etienne, OTR/L

## 2025-06-06 NOTE — PROGRESS NOTES
Comprehensive Nutrition Assessment    Type and Reason for Visit:  Reassess    Nutrition Recommendations/Plan:   Continue current diet.  Expedite cup TID.  Recommending nocturnal TF via NGT.     Malnutrition Assessment:  Malnutrition Status:  Severe malnutrition (06/04/25 0955)    Context:  Acute Illness     Findings of the 6 clinical characteristics of malnutrition:  Energy Intake:  75% or less of estimated energy requirements for 7 or more days  Weight Loss:  Greater than 2% over 1 week (4.5%)     Body Fat Loss:  Mild body fat loss Triceps, Orbital   Muscle Mass Loss:  Mild muscle mass loss Temples (temporalis), Calf (gastrocnemius)  Fluid Accumulation:  No fluid accumulation     Strength:  Not Performed    Nutrition Assessment:    FU - calorie count note to follow. Pt. is still having difficulty with diet. We briefly discussed TF yesterday during calorie assessment. Today based on intakes I expained to Pt. supplemental TF would be my recommendation to support her nutritional adequacy. Pt. admits it does sound nice because she is struggling with PO. She knows she needs to eat but \"feels bad\" at baseline and reports feeling even worse after a meal. She does not love the idea of a tube down her nose but understands it would be beneficial to her. Was agreeable to TFs at the end of our discussion. Will discuss with MD to determine most appropriate course of action. Recommending renal formula ([place]) via NGT nocturnal - rate/flush recommendations to follow if this is the plan. Will continue to monitor nutritional adequacy.    Nutrition Related Findings:    Na 133. LBM 6/5. Wound Type: None       Current Nutrition Intake & Therapies:    Average Meal Intake: 1-25%  Average Supplements Intake: 26-50%  ADULT DIET; Regular; Low Phosphorus (Less than 1000 mg)  ADULT ORAL NUTRITION SUPPLEMENT; Dinner, Breakfast, Lunch; Other Oral Supplement; Expedite Cup    Anthropometric Measures:  Height: 165.1 cm (5' 5\")  Ideal Body

## 2025-06-06 NOTE — PROGRESS NOTES
ADLs: Independent  Prior Level of Assist for Homemaking: Independent (Family/neighbor assists PRN although pt reporting she is typically IND cleaning, cooking, laundry, yard work, finances and med management.)  Homemaking Responsibilities: Yes  Meal Prep Responsibility: Primary  Laundry Responsibility: Primary  Cleaning Responsibility: Primary  Bill Paying/Finance Responsibility: Primary  Shopping Responsibility: Primary  Health Care Management: Primary  Prior Level of Assist for Ambulation: Independent household ambulator, with or without device, Independent community ambulator, with or without device (recently Walker PRN (if fatigued, etc))  Prior Level of Assist for Transfers: Independent  Active : Yes  Mode of Transportation: Car  Occupation: Retired  Type of Occupation: Manager for [employer]  Leisure & Hobbies: Enjoys taking walks, gardening, being outside.      OBJECTIVE  Cognition  Overall Cognitive Status: Exceptions  Safety Judgement: Decreased awareness of need for safety  Initiation: Requires cues for some  Cognition Comment: delayed processing and responses, flat affect, monitor for dizziness during standing tasks as pt communicates minimally  Orientation  Overall Orientation Status: Within Normal Limits  Orientation Level: Oriented X4    Functional Mobility  Bed Mobility  Overall Assistance Level: Supervision  Additional Factors: Increased time to complete;Verbal cues;Head of bed flat (therapy mat with wedge on bed for comfort)  Sit to Supine  Assistance Level: Supervision  Supine to Sit  Assistance Level: Supervision  Scooting  Assistance Level: Supervision    Balance  Sitting Balance: Supervision    Activity: Orthostatic BPs assessed. Patient severely orthostatic, but only reports lightheadedness.  Orthostatic Vitals:      6/6/2025     9:15 AM   Orthostatic Vitals   Orthostatic B/P and Pulse? Yes   Blood Pressure Lying 171/86   Pulse Lying 76 PER MINUTE   Blood Pressure Sitting 145/99  training to allow for safe return home.  Activity Tolerance: Patient limited by fatigue;Patient tolerated treatment well;Patient limited by endurance  Discharge Recommendations: Continue to assess pending progress;Patient would benefit from continued therapy after discharge  PT Equipment Recommendations  Other: has wheeled walker at home    Goals  Patient Goals   Patient Goals : \"be able to do everything I did at home before\"  Short Term Goals  Time Frame for Short Term Goals: one week from 5/31/2025  Short Term Goal 1: bed mobility with supervision - met 6/6/2025  Short Term Goal 2: Transfers with supervision  Short Term Goal 3: ambulated 150' with wheeled walker with supervision  Short Term Goal 4: Ascend/descend 12 steps with bilateral rails with supervision  Short Term Goal 5: Ascend/descend curb step with wheeled walker with supervision  Long Term Goals  Time Frame for Long Term Goals : Upon discharge - approx 10-14 days from 5/31/2025  Long Term Goal 1: bed mobility with modified independence  Long Term Goal 2: transfers with modified independence  Long Term Goal 3: Ambulate 150' with wheeled walker with modified independence  Long Term Goal 4: Ascend/descend 12 steps with bilateral rails with modified independence  Long Term Goal 5: Ascend/descend curb step with wheeled walker with modified independence    PLAN OF CARE/SAFETY  Physical Therapy Plan  General Plan:  minutes of therapy at least 5 out of 7 days a week  Days Per Week: 5 Days  Hours Per Day: 1.5 hours  Therapy Duration: 2 Weeks  Current Treatment Recommendations: Strengthening;ROM;Balance training;Functional mobility training;Transfer training;Endurance training;Gait training;Stair training;Safety education & training;Equipment evaluation, education, & procurement;Therapeutic activities;Home exercise program;Patient/Caregiver education & training  Safety Devices  Type of Devices: All fall risk precautions in place;Gait belt;Left in chair (in

## 2025-06-06 NOTE — PROGRESS NOTES
Patient ID: Jonelle Contreras  Referring/ Physician: Bon Drummond MD      Summary:   Jonelle Contreras is being seen by nephrology for ESRD and HTN.     Reason for admission: hypertensive urgency       Interval Hx:   Seen at bedside.   Refusing lactulose but had BM today.   Poor appetite.   Weak and frail appearing.   On calorie counts.   No issues reported with PD    Having persistent orthostatic hypotension.   /86 laying   /77 standing     Labs reviewed.   Na 134  > 133  K 3.9 > 4  Bun 43 > 38   Cr 9.1 > 8.6   Phos 5.9 > 4.9       Assessment/Plan:   - BP acceptable,  hold all meds if SBP < 140. Will have to let SBP be higher to accommodate for orthostasis.     She has a 30-50 point systolic BP drop with standing     Continue coreg to 6.25 mg BID  - Continue PD with  1.5% exchanges.   - renvela 1600 mg TID with meals. Phos controlled.   - retacrit 10 k units weekly       Labs MWF         ESRD  ESRD secondary to hypertension.  She does peritoneal dialysis at home 1 exchange of 2 L 1.5% solutions daily.  Based on her most recent labs in the outpatient setting her adequacy is not at goal.  Prescription adjusted here.   EDW 61.5 kg > 59 kilos   - daily BM   - gent to PD exit site.        Resistant hypertension  The BP has been controlled inpatient on her home regimen, actually low at times suggesting that she may not have been taking her medications as prescribed.   Has had an overall negative secondary work up. Has been extremely difficult to manage outpatient as she reports \"bottoming out and passing out\" when her medications are increased.   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   She has had 24 hr ambulatory BP monitoring showing consistently elevated bP 180s -200s systolic.   Presenting with hypertensive urgency, having ongoing headaches blurry vision and fatigue.  CT head showed no stroke.,  Has

## 2025-06-06 NOTE — CARE COORDINATION
Call placed to sonGerman to review Conference.  He reports he is not sure what the plan will be, he is having his own medical condition at this time and then is planning vacation with his family next weekend.  Provided update about not eating enough, possible tube feed.  Informed him of progress in therapies.  Informed him of pt's thought that she may be able to go to [facility]'s house in [place], [place].  He reports that could be a possiblity but she does have cats in the home there and he wasn't sure with PD she could be around animals.  He will speak with pt and sister this weekend.  Did inform him of DC date of 6-.  Reviewed recommendation for [facility] to continue her progress.  SAKINA Glass     Case Management   [phone]    6/6/2025  3:46 PM

## 2025-06-06 NOTE — PLAN OF CARE
Problem: Discharge Planning  Goal: Discharge to home or other facility with appropriate resources  6/6/2025 1220 by Luisana Terrazas RN  Outcome: Progressing  Flowsheets (Taken 6/6/2025 0749)  Discharge to home or other facility with appropriate resources:   Identify barriers to discharge with patient and caregiver   Arrange for needed discharge resources and transportation as appropriate   Identify discharge learning needs (meds, wound care, etc)   Refer to discharge planning if patient needs post-hospital services based on physician order or complex needs related to functional status, cognitive ability or social support system     Problem: Safety - Adult  Goal: Free from fall injury  6/6/2025 1220 by Luisana Terrazas RN  Outcome: Progressing  Flowsheets (Taken 6/6/2025 0752)  Free From Fall Injury: Instruct family/caregiver on patient safety     Problem: Skin/Tissue Integrity  Goal: Skin integrity remains intact  Description: 1.  Monitor for areas of redness and/or skin breakdown2.  Assess vascular access sites hourly3.  Every 4-6 hours minimum:  Change oxygen saturation probe site4.  Every 4-6 hours:  If on nasal continuous positive airway pressure, respiratory therapy assess nares and determine need for appliance change or resting period  6/6/2025 1220 by Luisana Terrazas RN  Outcome: Progressing  Flowsheets  Taken 6/6/2025 0752  Skin Integrity Remains Intact: Monitor for areas of redness and/or skin breakdown      Problem: Pain  Goal: Verbalizes/displays adequate comfort level or baseline comfort level  6/6/2025 1220 by Luisana Terrazas RN  Outcome: Progressing  Flowsheets (Taken 6/6/2025 0749)  Verbalizes/displays adequate comfort level or baseline comfort level:   Encourage patient to monitor pain and request assistance   Assess pain using appropriate pain scale   Administer analgesics based on type and severity of pain and evaluate response   Implement non-pharmacological measures as appropriate and

## 2025-06-06 NOTE — PLAN OF CARE
Problem: Safety - Adult  Goal: Free from fall injury  Outcome: Progressing     Problem: Skin/Tissue Integrity  Goal: Skin integrity remains intact  Description: 1.  Monitor for areas of redness and/or skin breakdown2.  Assess vascular access sites hourly3.  Every 4-6 hours minimum:  Change oxygen saturation probe site4.  Every 4-6 hours:  If on nasal continuous positive airway pressure, respiratory therapy assess nares and determine need for appliance change or resting period  Outcome: Progressing  Flowsheets  Taken 6/6/2025 0215  Skin Integrity Remains Intact: Monitor for areas of redness and/or skin breakdown  Taken 6/5/2025 2000  Skin Integrity Remains Intact: Monitor for areas of redness and/or skin breakdown     Problem: Pain  Goal: Verbalizes/displays adequate comfort level or baseline comfort level  Outcome: Progressing     Problem: ABCDS Injury Assessment  Goal: Absence of physical injury  Outcome: Progressing  Flowsheets (Taken 6/6/2025 0215)  Absence of Physical Injury: Implement safety measures based on patient assessment

## 2025-06-07 PROCEDURE — 94640 AIRWAY INHALATION TREATMENT: CPT

## 2025-06-07 PROCEDURE — 6370000000 HC RX 637 (ALT 250 FOR IP): Performed by: INTERNAL MEDICINE

## 2025-06-07 PROCEDURE — 90945 DIALYSIS ONE EVALUATION: CPT

## 2025-06-07 PROCEDURE — 1280000000 HC REHAB R&B

## 2025-06-07 PROCEDURE — 94669 MECHANICAL CHEST WALL OSCILL: CPT

## 2025-06-07 PROCEDURE — 2580000003 HC RX 258: Performed by: PHYSICAL MEDICINE & REHABILITATION

## 2025-06-07 PROCEDURE — 6370000000 HC RX 637 (ALT 250 FOR IP): Performed by: PHYSICAL MEDICINE & REHABILITATION

## 2025-06-07 PROCEDURE — 2500000003 HC RX 250 WO HCPCS: Performed by: PHYSICAL MEDICINE & REHABILITATION

## 2025-06-07 PROCEDURE — A4722 DIALYS SOL FLD VOL > 1999CC: HCPCS | Performed by: PHYSICAL MEDICINE & REHABILITATION

## 2025-06-07 PROCEDURE — 94761 N-INVAS EAR/PLS OXIMETRY MLT: CPT

## 2025-06-07 PROCEDURE — 6360000002 HC RX W HCPCS: Performed by: PHYSICAL MEDICINE & REHABILITATION

## 2025-06-07 RX ORDER — NIFEDIPINE 30 MG/1
30 TABLET, EXTENDED RELEASE ORAL DAILY
Status: DISCONTINUED | OUTPATIENT
Start: 2025-06-08 | End: 2025-06-09

## 2025-06-07 RX ADMIN — SODIUM CHLORIDE, PRESERVATIVE FREE 10 ML: 5 INJECTION INTRAVENOUS at 20:17

## 2025-06-07 RX ADMIN — TRAMADOL HYDROCHLORIDE 50 MG: 50 TABLET, COATED ORAL at 20:14

## 2025-06-07 RX ADMIN — Medication 4 ML: at 09:44

## 2025-06-07 RX ADMIN — ROSUVASTATIN CALCIUM 10 MG: 10 TABLET, FILM COATED ORAL at 20:13

## 2025-06-07 RX ADMIN — FLUCONAZOLE 200 MG: 100 TABLET ORAL at 09:05

## 2025-06-07 RX ADMIN — SODIUM CHLORIDE, PRESERVATIVE FREE 10 ML: 5 INJECTION INTRAVENOUS at 09:08

## 2025-06-07 RX ADMIN — SEVELAMER CARBONATE 1600 MG: 800 TABLET, FILM COATED ORAL at 11:48

## 2025-06-07 RX ADMIN — SEVELAMER CARBONATE 1600 MG: 800 TABLET, FILM COATED ORAL at 16:51

## 2025-06-07 RX ADMIN — HEPARIN SODIUM 5000 UNITS: 5000 INJECTION INTRAVENOUS; SUBCUTANEOUS at 14:29

## 2025-06-07 RX ADMIN — Medication 4 ML: at 19:24

## 2025-06-07 RX ADMIN — ALPRAZOLAM 1.5 MG: 0.5 TABLET ORAL at 11:11

## 2025-06-07 RX ADMIN — HEPARIN SODIUM 5000 UNITS: 5000 INJECTION INTRAVENOUS; SUBCUTANEOUS at 04:54

## 2025-06-07 RX ADMIN — METOCLOPRAMIDE 5 MG: 10 TABLET ORAL at 11:00

## 2025-06-07 RX ADMIN — SODIUM CHLORIDE, SODIUM LACTATE, CALCIUM CHLORIDE, MAGNESIUM CHLORIDE AND DEXTROSE 2000 ML: 1.5; 538; 448; 18.3; 5.08 INJECTION, SOLUTION INTRAPERITONEAL at 21:36

## 2025-06-07 RX ADMIN — ASPIRIN 81 MG 81 MG: 81 TABLET ORAL at 09:06

## 2025-06-07 RX ADMIN — IPRATROPIUM BROMIDE AND ALBUTEROL SULFATE 1 DOSE: .5; 2.5 SOLUTION RESPIRATORY (INHALATION) at 19:24

## 2025-06-07 RX ADMIN — TRAMADOL HYDROCHLORIDE 50 MG: 50 TABLET, COATED ORAL at 04:54

## 2025-06-07 RX ADMIN — GUAIFENESIN 1200 MG: 600 TABLET, EXTENDED RELEASE ORAL at 20:13

## 2025-06-07 RX ADMIN — SEVELAMER CARBONATE 1600 MG: 800 TABLET, FILM COATED ORAL at 08:23

## 2025-06-07 RX ADMIN — HEPARIN SODIUM 5000 UNITS: 5000 INJECTION INTRAVENOUS; SUBCUTANEOUS at 20:15

## 2025-06-07 RX ADMIN — LOSARTAN POTASSIUM 100 MG: 100 TABLET, FILM COATED ORAL at 20:13

## 2025-06-07 RX ADMIN — IPRATROPIUM BROMIDE AND ALBUTEROL SULFATE 1 DOSE: .5; 2.5 SOLUTION RESPIRATORY (INHALATION) at 09:44

## 2025-06-07 RX ADMIN — ROPINIROLE HYDROCHLORIDE 1 MG: 1 TABLET, FILM COATED ORAL at 20:13

## 2025-06-07 RX ADMIN — GUAIFENESIN 1200 MG: 600 TABLET, EXTENDED RELEASE ORAL at 09:05

## 2025-06-07 RX ADMIN — METOCLOPRAMIDE 5 MG: 10 TABLET ORAL at 15:47

## 2025-06-07 RX ADMIN — TROSPIUM CHLORIDE 20 MG: 20 TABLET, FILM COATED ORAL at 20:13

## 2025-06-07 RX ADMIN — CLOPIDOGREL BISULFATE 75 MG: 75 TABLET, FILM COATED ORAL at 09:05

## 2025-06-07 RX ADMIN — PANTOPRAZOLE SODIUM 40 MG: 40 TABLET, DELAYED RELEASE ORAL at 20:14

## 2025-06-07 RX ADMIN — SODIUM CHLORIDE, SODIUM LACTATE, CALCIUM CHLORIDE, MAGNESIUM CHLORIDE AND DEXTROSE 6000 ML: 1.5; 538; 448; 18.3; 5.08 INJECTION, SOLUTION INTRAPERITONEAL at 21:36

## 2025-06-07 RX ADMIN — TIOTROPIUM BROMIDE AND OLODATEROL 2 PUFF: 3.124; 2.736 SPRAY, METERED RESPIRATORY (INHALATION) at 09:44

## 2025-06-07 RX ADMIN — METOCLOPRAMIDE 5 MG: 10 TABLET ORAL at 07:37

## 2025-06-07 RX ADMIN — PANTOPRAZOLE SODIUM 40 MG: 40 TABLET, DELAYED RELEASE ORAL at 07:47

## 2025-06-07 RX ADMIN — CARVEDILOL 6.25 MG: 6.25 TABLET, FILM COATED ORAL at 16:51

## 2025-06-07 RX ADMIN — HYDRALAZINE HYDROCHLORIDE 25 MG: 25 TABLET ORAL at 20:14

## 2025-06-07 RX ADMIN — CARVEDILOL 6.25 MG: 6.25 TABLET, FILM COATED ORAL at 07:47

## 2025-06-07 RX ADMIN — ALPRAZOLAM 1.5 MG: 0.5 TABLET ORAL at 21:41

## 2025-06-07 ASSESSMENT — PAIN SCALES - GENERAL
PAINLEVEL_OUTOF10: 5
PAINLEVEL_OUTOF10: 0
PAINLEVEL_OUTOF10: 7
PAINLEVEL_OUTOF10: 10

## 2025-06-07 ASSESSMENT — PAIN DESCRIPTION - ORIENTATION
ORIENTATION: ANTERIOR
ORIENTATION: RIGHT;LEFT

## 2025-06-07 ASSESSMENT — PAIN DESCRIPTION - LOCATION
LOCATION: HEAD;SHOULDER
LOCATION: HEAD

## 2025-06-07 ASSESSMENT — PAIN DESCRIPTION - DESCRIPTORS
DESCRIPTORS: ACHING
DESCRIPTORS: ACHING;DISCOMFORT;SORE

## 2025-06-07 NOTE — PLAN OF CARE
Problem: Discharge Planning  Goal: Discharge to home or other facility with appropriate resources  6/7/2025 1157 by Esperanza Cintron RN  Flowsheets  Taken 6/7/2025 1157  Discharge to home or other facility with appropriate resources:   Identify barriers to discharge with patient and caregiver   Arrange for needed discharge resources and transportation as appropriate   Identify discharge learning needs (meds, wound care, etc)  Taken 6/7/2025 0733  Discharge to home or other facility with appropriate resources:   Identify barriers to discharge with patient and caregiver   Arrange for needed discharge resources and transportation as appropriate   Identify discharge learning needs (meds, wound care, etc)  6/6/2025 2331 by Esperanza Macario, RN  Outcome: Progressing  Flowsheets (Taken 6/6/2025 2140)  Discharge to home or other facility with appropriate resources: Identify barriers to discharge with patient and caregiver     Problem: Safety - Adult  Goal: Free from fall injury  6/7/2025 1157 by Esperanza Cintron RN  Flowsheets  Taken 6/7/2025 1157  Free From Fall Injury: Instruct family/caregiver on patient safety  Taken 6/7/2025 0800  Free From Fall Injury: Instruct family/caregiver on patient safety  6/6/2025 2331 by Esperanza Macario, RN  Outcome: Progressing     Problem: Skin/Tissue Integrity  Goal: Skin integrity remains intact  Description: 1.  Monitor for areas of redness and/or skin breakdown2.  Assess vascular access sites hourly3.  Every 4-6 hours minimum:  Change oxygen saturation probe site4.  Every 4-6 hours:  If on nasal continuous positive airway pressure, respiratory therapy assess nares and determine need for appliance change or resting period  6/7/2025 1157 by Esperanza Cintron RN  Flowsheets  Taken 6/7/2025 1157  Skin Integrity Remains Intact: Monitor for areas of redness and/or skin breakdown  Taken 6/7/2025 0800  Skin Integrity Remains Intact: Monitor for areas of redness  injury  6/7/2025 1157 by Esperanza Cintron RN  Flowsheets  Taken 6/7/2025 1157  Absence of Physical Injury: Implement safety measures based on patient assessment  Taken 6/7/2025 0800  Absence of Physical Injury: Implement safety measures based on patient assessment  6/6/2025 2331 by Esperanza Macario, RN  Outcome: Progressing  Flowsheets (Taken 6/6/2025 2324)  Absence of Physical Injury: Implement safety measures based on patient assessment

## 2025-06-07 NOTE — PROGRESS NOTES
Jonelle Contreras  6/7/2025  [id number]    Chief Complaint: Debility    Subjective    Patient seen this AM.  Patient is seen sitting up on the edge of the bed, and is more alert when talking with her this morning.  She is getting more calories in each day as her ability to eat continues to improve.  We think the patient will be ready for discharge to home on 6/14.  Patient is having calorie counts done by dietary, and they are assisting her with getting more protein intake each day.  Nephrology is working with the patient on her peritoneal dialysis needs, and monitoring her blood pressure and orthostasis when standing.  Patient continues on Protonix and Reglan for her erosive esophagitis.  Repeat labs yesterday show improvement.  .    Objective    Patient Vitals for the past 24 hrs:   BP Temp Temp src Pulse Resp SpO2 Weight   06/07/25 0953 -- -- -- 71 16 95 % --   06/07/25 0949 -- -- -- 71 16 95 % --   06/07/25 0944 -- -- -- 71 16 95 % --   06/07/25 0915 -- -- -- -- -- -- 60.4 kg (133 lb 2.5 oz)   06/07/25 0735 (!) 185/84 97.3 °F (36.3 °C) Oral 69 17 96 % --   06/07/25 0500 (!) 167/85 -- -- 72 -- 94 % --   06/06/25 2233 (!) 181/91 -- -- -- -- -- --   06/06/25 2223 -- -- -- -- -- -- 60.4 kg (133 lb 2.5 oz)   06/06/25 2211 -- -- -- -- 16 -- --   06/06/25 1958 (!) 197/100 98.1 °F (36.7 °C) Oral 80 16 90 % --     Patient Vitals for the past 96 hrs (Last 3 readings):   Weight   06/07/25 0915 60.4 kg (133 lb 2.5 oz)   06/06/25 2223 60.4 kg (133 lb 2.5 oz)   06/06/25 0615 61 kg (134 lb 7.7 oz)      Gen: No distress.  HEENT: Normocephalic, atraumatic.   CV: Regular rate and rhythm . Extremities warm, well perfused.   Resp: No respiratory distress. CTAB   Abd: Soft, nontender   Ext: No edema.   Neuro: Alert, oriented, appropriately interactive.     Laboratory data:   Na/K/Cl/CO2:  133/4.0/93/22 (06/06 0544)   BUN/Cr/glu/ALT/AST/amyl/lip:  38/8.6/--/--/--/--/-- (06/06 0544)    WBC/Hgb/Hct/Plts:  8.5/9.6/29.1/457 (06/06 0917)

## 2025-06-07 NOTE — PROGRESS NOTES
Patient admitted to rehab with debility, resp failure, PD.  A/Ox4. Can be slow to answer. Transfers with walker and cues. On low phosphorus diet, tolerating poorly, will eat supplement with encouragement, on calorie counts. Medications taken whole. On Heparin for DVT prophylaxis.  Skin: monitor PD site. LDA: saline lock to right arm. Has been continent of bowel and of bladder, does void small amounts. LBM today 2 large soft loose bms. Bed alarms in use and call light in reach. Will monitor for safety. Has declined chair thus far today or adls, sleeping most of day. O2 off at present continue to monitor.

## 2025-06-07 NOTE — PLAN OF CARE
Problem: Safety - Adult  Goal: Free from fall injury  6/6/2025 1220 by Luisana Terrazas RN  Outcome: Progressing  Flowsheets (Taken 6/6/2025 0752)  Free From Fall Injury: Instruct family/caregiver on patient safety     Problem: Skin/Tissue Integrity  Goal: Skin integrity remains intact  Description: 1.  Monitor for areas of redness and/or skin breakdown2.  Assess vascular access sites hourly3.  Every 4-6 hours minimum:  Change oxygen saturation probe site4.  Every 4-6 hours:  If on nasal continuous positive airway pressure, respiratory therapy assess nares and determine need for appliance change or resting period  6/6/2025 2331 by Esperanza Macario, RN  Outcome: Progressing  Flowsheets  Taken 6/6/2025 2324  Skin Integrity Remains Intact: Monitor for areas of redness and/or skin breakdown  Taken 6/6/2025 2140  Skin Integrity Remains Intact: Monitor for areas of redness and/or skin breakdown     Problem: Pain  Goal: Verbalizes/displays adequate comfort level or baseline comfort level  6/6/2025 2331 by Esperanza Macario, RN  Outcome: Progressing     Problem: ABCDS Injury Assessment  Goal: Absence of physical injury  6/6/2025 2331 by Esperanza Macario, RN  Outcome: Progressing  Flowsheets (Taken 6/6/2025 2324)  Absence of Physical Injury: Implement safety measures based on patient assessment

## 2025-06-07 NOTE — PROGRESS NOTES
States Xanax effective, encourage to try to eat lunch, denies any stomach upset or nausea, states not hungry, again reviewed need to adequate nutrition.

## 2025-06-07 NOTE — PROGRESS NOTES
Patient admitted to rehab due to generalized weakness after acute hypoxic respiratory failure.  A/Ox4. Transfers with front-wheeled walker, gait-belt, and assist of one, tolerates fairly well. Mobility restrictions: None. Patient is on low Phosphorus diet, with an appetite that is poor. Medications taken whole with thin liquids without swallowing difficulty. On Heparin injections for DVT prophylaxis.  Skin: Noted with scattered bruising and coccyx is re but blanchable. Oxygen: Pt placed on O2 at 1 L per NC this evening, saturation was running 88 to 90% on RA. LDA: PIV to RFA, flushes easily and dressing is clean, dry, and intact. Has been continent of bowel and bladder. LBM 6/6. Chair/bed alarms and fall precautions in use. Call light and personal belongings are within reach. Will continue to monitor and assist as needed.

## 2025-06-07 NOTE — CARE COORDINATION
SW received message to call daughter in law [phone]. She is Thiago's wife. She stated that she was concerned about the ability to bring mom home when she is ready for discharge.     SW reviewed chart and it appears as if prior to coming to rehab family was considering SNF for rehab with PD. She asked if it would be possible for patient to rehab in [place]. SW explained that facilities would have to be PD certified and her PD team is here in [place]. It is easier to get her placed here. SW reviewed old acceptances and explained that if she was open to us reaching out to them we can send new referrals today.     [facility] can [facility] her at that time.     COSTA advised that they can follow up with COSTA Atkins on Monday.     Respectfully submitted,    Radha WOLFF, SHAVONNE  [facility] [place]   [phone]    Electronically signed by NEW Golden, SHAHIDW on 6/7/2025 at 9:50 AM

## 2025-06-07 NOTE — PROGRESS NOTES
Spiritual Health History and Assessment/Progress Note  [facility]    (P) Spiritual/Emotional Needs,  ,  ,      Name: Jonelle Contreras MRN: [id number]    Age: 74 y.o.     Sex: female   Language: English   Baptist: Episcopalian   Debility     Date: 6/7/2025            Total Time Calculated: (P) 70 min              Spiritual Assessment began in [facility]        Referral/Consult From: (P) Rounding   Encounter Overview/Reason: (P) Spiritual/Emotional Needs  Service Provided For: (P) Patient    Dede, Belief, Meaning:   Patient identifies as spiritual, is connected with a dede tradition or spiritual practice, and has beliefs or practices that help with coping during difficult times  Family/Friends No family/friends present      Importance and Influence:  Patient has spiritual/personal beliefs that influence decisions regarding their health  Family/Friends No family/friends present    Community:  Patient feels well-supported. Support system includes: Children  Family/Friends No family/friends present    Assessment and Plan of Care:     Patient Interventions include: Facilitated expression of thoughts and feelings, Explored spiritual coping/struggle/distress, and Other: prayed with pt  Family/Friends Interventions include: No family/friends present    Patient Plan of Care: Spiritual Care available upon further referral  Family/Friends Plan of Care: Spiritual Care available upon further referral    Electronically signed by Chaplain Mahad on 6/7/2025 at 11:45 AM

## 2025-06-07 NOTE — PROGRESS NOTES
Nurse has been in room over 45 minutes due to slow drain and trying to get patient to eat, patient reports when sow drain at home, will stop PD, up to bedside commode.

## 2025-06-07 NOTE — PROGRESS NOTES
German's wife called concerned about discharge plan, states they will not be able to care for her at home, did call  and she will call.

## 2025-06-07 NOTE — PROGRESS NOTES
Patient ID: Jonelle Contreras  Referring/ Physician: Bon Drummond MD      Summary:   Jonelle Contreras is being seen by nephrology for ESRD and HTN.     Reason for admission: hypertensive urgency       Interval Hx:   The patient was seen and examined in her room  She was sitting in the chair  She was awake, alert and conversant  She has no new complaints  She looked weak  Her most recent set of vital signs showed temperature 69 blood pressure 178/84 pulse of 92% on room air  There is no lab work for today      Assessment/Plan:   The ultrafiltration volume from overnight cyclic peritoneal dialysis was not documented yesterday and today, there was 204 mL was documented  According to the intake and output, the patient had positive balance for nearly 90 L  The patient does not have any pitting edema on lower extremity    Today her body weight was 60.4 kg  According to the admission weight was 62.2    Therefore, the patient's intake and output is not accurate    Apparently, the patient has severe orthostatic hypotension and her blood pressure dropped between 30 to 50 mL he could not on standing    Reviewed her medication list, she is on carvedilol 6.25 twice daily and losartan 100 mg p.o. once daily    Prior to this presentation, the patient was also taking nifedipine 30 mg, spironolactone 50 mg    I will add nifedipine 30 mg back to her blood pressure medication regimen    Will continue to use the same PD prescription which is 1.5% solution      Labs MWF         ESRD  ESRD secondary to hypertension.  She does peritoneal dialysis at home 1 exchange of 2 L 1.5% solutions daily.  Based on her most recent labs in the outpatient setting her adequacy is not at goal.  Prescription adjusted here.   EDW 61.5 kg > 59 kilos   - daily BM   - gent to PD exit site.        Resistant hypertension  The BP has been controlled inpatient on her home regimen, actually low at times suggesting that she may not have     Disease of blood and blood forming organ     GERD (gastroesophageal reflux disease)     Heart disease     Hyperlipidemia     Hypertension 05/03/2016    Left thyroid nodule 2020    9mm, gets rechecked yearly on ct lung    Migraines     gets 4x per month    Neuropathy     Osteoporosis     PAD (peripheral artery disease) 12/2022    bilat ptca fem art Ranjith    Peripheral vascular disease     post menopausal 08/2020    osteopenia frax score under on calcium and D    Prediabetes     Pulmonary nodules     yearly ct lung followed by Dr Fuentes    Restless leg syndrome     STEMI involving oth coronary artery of inferior wall (HCC) 12/04/2022    JAYME RCA    Urinary incontinence          Surgical Hx: reviewed and updated as appropriate   has a past surgical history that includes Hysterectomy; Cholecystectomy; Coronary angioplasty with stent (10/2013 [place]); Colonoscopy (07/06/2017); Colonoscopy (N/A, 11/16/2022); Upper gastrointestinal endoscopy (N/A, 11/16/2022); Cardiac surgery; Hysterectomy, vaginal; Upper gastrointestinal endoscopy (11/16/2022); Upper gastrointestinal endoscopy (N/A, 5/22/2025); and bronchoscopy (N/A, 5/23/2025).     Social Hx: reviewed and updated as appropriate  Social History     Tobacco Use    Smoking status: Some Days     Current packs/day: 1.00     Average packs/day: 1 pack/day for 57.4 years (57.4 ttl pk-yrs)     Types: Cigarettes     Start date: 1/1/1968     Passive exposure: Past (on patch)    Smokeless tobacco: Never    Tobacco comments:     I have quite on and off.i have used [place] product.the patch was working until i had a reaction.   Substance Use Topics    Alcohol use: No        Family hx: reviewed and updated as appropriate  family history includes Cancer in her brother, brother, sister, and another family member; Coronary Art Dis in her father; Heart Attack in her father; High Blood Pressure in her mother; Hypertension in her father; Kidney Disease in her mother; No Known

## 2025-06-08 ENCOUNTER — APPOINTMENT (OUTPATIENT)
Dept: GENERAL RADIOLOGY | Age: 75
DRG: 947 | End: 2025-06-08
Attending: PHYSICAL MEDICINE & REHABILITATION
Payer: MEDICARE

## 2025-06-08 VITALS
OXYGEN SATURATION: 97 % | RESPIRATION RATE: 18 BRPM | HEART RATE: 88 BPM | BODY MASS INDEX: 22.22 KG/M2 | SYSTOLIC BLOOD PRESSURE: 197 MMHG | HEIGHT: 65 IN | DIASTOLIC BLOOD PRESSURE: 90 MMHG | WEIGHT: 133.38 LBS | TEMPERATURE: 98.1 F

## 2025-06-08 PROCEDURE — 6370000000 HC RX 637 (ALT 250 FOR IP): Performed by: PHYSICAL MEDICINE & REHABILITATION

## 2025-06-08 PROCEDURE — A4722 DIALYS SOL FLD VOL > 1999CC: HCPCS | Performed by: PHYSICAL MEDICINE & REHABILITATION

## 2025-06-08 PROCEDURE — 94640 AIRWAY INHALATION TREATMENT: CPT

## 2025-06-08 PROCEDURE — 94761 N-INVAS EAR/PLS OXIMETRY MLT: CPT

## 2025-06-08 PROCEDURE — 6370000000 HC RX 637 (ALT 250 FOR IP): Performed by: INTERNAL MEDICINE

## 2025-06-08 PROCEDURE — 74018 RADEX ABDOMEN 1 VIEW: CPT

## 2025-06-08 PROCEDURE — 2500000003 HC RX 250 WO HCPCS: Performed by: PHYSICAL MEDICINE & REHABILITATION

## 2025-06-08 PROCEDURE — 90945 DIALYSIS ONE EVALUATION: CPT

## 2025-06-08 PROCEDURE — 1280000000 HC REHAB R&B

## 2025-06-08 PROCEDURE — 2700000000 HC OXYGEN THERAPY PER DAY

## 2025-06-08 PROCEDURE — 6360000002 HC RX W HCPCS: Performed by: PHYSICAL MEDICINE & REHABILITATION

## 2025-06-08 RX ORDER — HYDRALAZINE HYDROCHLORIDE 25 MG/1
25 TABLET, FILM COATED ORAL ONCE
Status: COMPLETED | OUTPATIENT
Start: 2025-06-08 | End: 2025-06-08

## 2025-06-08 RX ADMIN — CLOPIDOGREL BISULFATE 75 MG: 75 TABLET, FILM COATED ORAL at 07:53

## 2025-06-08 RX ADMIN — DIPHENHYDRAMINE HCL 25 MG: 25 TABLET ORAL at 00:21

## 2025-06-08 RX ADMIN — HEPARIN SODIUM 5000 UNITS: 5000 INJECTION INTRAVENOUS; SUBCUTANEOUS at 04:50

## 2025-06-08 RX ADMIN — ALPRAZOLAM 1.5 MG: 0.5 TABLET ORAL at 18:11

## 2025-06-08 RX ADMIN — FLUCONAZOLE 200 MG: 100 TABLET ORAL at 07:53

## 2025-06-08 RX ADMIN — METOCLOPRAMIDE 5 MG: 10 TABLET ORAL at 11:11

## 2025-06-08 RX ADMIN — TROSPIUM CHLORIDE 20 MG: 20 TABLET, FILM COATED ORAL at 20:58

## 2025-06-08 RX ADMIN — HYDRALAZINE HYDROCHLORIDE 25 MG: 25 TABLET ORAL at 02:46

## 2025-06-08 RX ADMIN — PANTOPRAZOLE SODIUM 40 MG: 40 TABLET, DELAYED RELEASE ORAL at 20:59

## 2025-06-08 RX ADMIN — HEPARIN SODIUM 5000 UNITS: 5000 INJECTION INTRAVENOUS; SUBCUTANEOUS at 12:56

## 2025-06-08 RX ADMIN — LOSARTAN POTASSIUM 100 MG: 100 TABLET, FILM COATED ORAL at 20:59

## 2025-06-08 RX ADMIN — Medication 1 SPRAY: at 04:45

## 2025-06-08 RX ADMIN — PANTOPRAZOLE SODIUM 40 MG: 40 TABLET, DELAYED RELEASE ORAL at 07:41

## 2025-06-08 RX ADMIN — ASPIRIN 81 MG 81 MG: 81 TABLET ORAL at 07:53

## 2025-06-08 RX ADMIN — ALBUTEROL SULFATE 2 PUFF: 90 AEROSOL, METERED RESPIRATORY (INHALATION) at 23:03

## 2025-06-08 RX ADMIN — TIZANIDINE 4 MG: 4 TABLET ORAL at 04:50

## 2025-06-08 RX ADMIN — SODIUM CHLORIDE, PRESERVATIVE FREE 10 ML: 5 INJECTION INTRAVENOUS at 20:59

## 2025-06-08 RX ADMIN — GUAIFENESIN 1200 MG: 600 TABLET, EXTENDED RELEASE ORAL at 07:53

## 2025-06-08 RX ADMIN — CARVEDILOL 6.25 MG: 6.25 TABLET, FILM COATED ORAL at 07:41

## 2025-06-08 RX ADMIN — SODIUM CHLORIDE, SODIUM LACTATE, CALCIUM CHLORIDE, MAGNESIUM CHLORIDE AND DEXTROSE 6000 ML: 1.5; 538; 448; 18.3; 5.08 INJECTION, SOLUTION INTRAPERITONEAL at 22:00

## 2025-06-08 RX ADMIN — Medication 1 SPRAY: at 16:57

## 2025-06-08 RX ADMIN — METOCLOPRAMIDE 5 MG: 10 TABLET ORAL at 16:13

## 2025-06-08 RX ADMIN — HYDRALAZINE HYDROCHLORIDE 25 MG: 25 TABLET ORAL at 22:18

## 2025-06-08 RX ADMIN — ROSUVASTATIN CALCIUM 10 MG: 10 TABLET, FILM COATED ORAL at 20:58

## 2025-06-08 RX ADMIN — SEVELAMER CARBONATE 1600 MG: 800 TABLET, FILM COATED ORAL at 11:55

## 2025-06-08 RX ADMIN — SODIUM CHLORIDE, SODIUM LACTATE, CALCIUM CHLORIDE, MAGNESIUM CHLORIDE AND DEXTROSE 2000 ML: 1.5; 538; 448; 18.3; 5.08 INJECTION, SOLUTION INTRAPERITONEAL at 22:00

## 2025-06-08 RX ADMIN — SODIUM CHLORIDE, PRESERVATIVE FREE 10 ML: 5 INJECTION INTRAVENOUS at 07:56

## 2025-06-08 RX ADMIN — METOCLOPRAMIDE 5 MG: 10 TABLET ORAL at 06:28

## 2025-06-08 RX ADMIN — NIFEDIPINE 30 MG: 30 TABLET, FILM COATED, EXTENDED RELEASE ORAL at 07:41

## 2025-06-08 RX ADMIN — HYDRALAZINE HYDROCHLORIDE 25 MG: 25 TABLET ORAL at 18:20

## 2025-06-08 RX ADMIN — Medication 1 SPRAY: at 21:00

## 2025-06-08 RX ADMIN — GENTAMICIN SULFATE: 1 CREAM TOPICAL at 15:46

## 2025-06-08 RX ADMIN — TRAMADOL HYDROCHLORIDE 50 MG: 50 TABLET, COATED ORAL at 20:58

## 2025-06-08 RX ADMIN — SEVELAMER CARBONATE 1600 MG: 800 TABLET, FILM COATED ORAL at 16:57

## 2025-06-08 RX ADMIN — ACETAMINOPHEN 650 MG: 325 TABLET ORAL at 22:18

## 2025-06-08 RX ADMIN — ROPINIROLE HYDROCHLORIDE 1 MG: 1 TABLET, FILM COATED ORAL at 20:58

## 2025-06-08 RX ADMIN — ACETAMINOPHEN 650 MG: 325 TABLET ORAL at 02:45

## 2025-06-08 RX ADMIN — HEPARIN SODIUM 5000 UNITS: 5000 INJECTION INTRAVENOUS; SUBCUTANEOUS at 20:59

## 2025-06-08 RX ADMIN — SEVELAMER CARBONATE 1600 MG: 800 TABLET, FILM COATED ORAL at 07:41

## 2025-06-08 RX ADMIN — CARVEDILOL 6.25 MG: 6.25 TABLET, FILM COATED ORAL at 16:57

## 2025-06-08 ASSESSMENT — PAIN SCALES - GENERAL
PAINLEVEL_OUTOF10: 0
PAINLEVEL_OUTOF10: 6
PAINLEVEL_OUTOF10: 0
PAINLEVEL_OUTOF10: 6
PAINLEVEL_OUTOF10: 3

## 2025-06-08 ASSESSMENT — PAIN DESCRIPTION - LOCATION
LOCATION: NECK
LOCATION: BACK;ANKLE
LOCATION: HEAD
LOCATION: GROIN

## 2025-06-08 ASSESSMENT — PAIN DESCRIPTION - DESCRIPTORS
DESCRIPTORS: ACHING;DISCOMFORT;SORE
DESCRIPTORS: ACHING
DESCRIPTORS: ACHING;SORE;DISCOMFORT
DESCRIPTORS: ACHING;DISCOMFORT;SORE

## 2025-06-08 ASSESSMENT — PAIN DESCRIPTION - ORIENTATION: ORIENTATION: RIGHT;LEFT;LOWER

## 2025-06-08 ASSESSMENT — PAIN SCALES - WONG BAKER: WONGBAKER_NUMERICALRESPONSE: NO HURT

## 2025-06-08 NOTE — PROGRESS NOTES
Nurse has been in room most of am trying to get PD to drain, patient becoming upset about alarm, again states at home will just stop PD.

## 2025-06-08 NOTE — PROGRESS NOTES
Department of Physical Medicine & Rehabilitation  Progress Note    Patient Identification:  Jonelle Contreras  [id number]  : 1950  Admit date: 2025    Chief Complaint: Debility    Subjective:   No acute events overnight.   Patient seen this am sitting up in room. Reports difficulty with sleep since admission. States she was on Xanax 2 mg at home. Also with ongoing poor appetite. She attributes this partially to hiccups.   Labs reviewed.     ROS: No f/c, n/v, cp     Objective:  Patient Vitals for the past 24 hrs:   BP Temp Temp src Pulse Resp SpO2 Weight   25 0413 (!) 180/86 -- -- 85 16 -- --   25 0047 (!) 184/93 -- -- 83 -- -- --   25 2304 -- -- -- 88 18 97 % --   25 2203 (!) 197/90 -- -- 86 -- -- --   25 2050 (!) 198/93 98.1 °F (36.7 °C) Oral 88 18 95 % 60.5 kg (133 lb 6.1 oz)   25 1817 (!) 196/96 -- -- -- -- -- --   25 1816 (!) 194/91 -- -- 81 -- -- --   25 1645 (!) 177/90 -- -- 82 -- 92 % --   25 1230 -- -- -- -- -- 92 % 60.2 kg (132 lb 11.5 oz)   25 1200 -- -- -- -- -- 93 % --   25 0955 125/78 -- -- -- -- -- --   25 0949 (!) 146/83 -- -- -- -- -- --   25 0939 136/77 -- -- 64 -- -- --   25 0845 -- -- -- -- -- 92 % --   25 0842 130/70 -- -- 68 -- (!) 87 % --   25 0836 (!) 72/52 -- -- 82 -- -- --   25 0830 92/65 97.8 °F (36.6 °C) Oral 68 -- 100 % --     Const: Alert. No distress, pleasant.   HEENT: Normocephalic, atraumatic. Normal sclera/conjunctiva. MMM.   CV: Regular rate and rhythm.   Resp: No respiratory distress. Lungs CTAB.   Abd: Soft, nontender, nondistended, NABS+   Ext: No edema.   Neuro: Alert, oriented, appropriately interactive.   Psych: Cooperative, appropriate mood and affect    Laboratory data: Available via EMR.   Last 24 hour lab  Recent Results (from the past 24 hours)   CBC with Auto Differential    Collection Time: 25  6:11 AM   Result Value Ref Range    WBC 7.0 4.0 - 11.0 K/uL     RBC 3.15 (L) 4.00 - 5.20 M/uL    Hemoglobin 9.1 (L) 12.0 - 16.0 g/dL    Hematocrit 26.9 (L) 36.0 - 48.0 %    MCV 85.6 80.0 - 100.0 fL    MCH 28.9 26.0 - 34.0 pg    MCHC 33.7 31.0 - 36.0 g/dL    RDW 15.6 (H) 12.4 - 15.4 %    Platelets 462 (H) 135 - 450 K/uL    MPV 8.1 5.0 - 10.5 fL    Neutrophils % 64.8 %    Lymphocytes % 17.6 %    Monocytes % 10.4 %    Eosinophils % 6.1 %    Basophils % 1.1 %    Neutrophils Absolute 4.5 1.7 - 7.7 K/uL    Lymphocytes Absolute 1.2 1.0 - 5.1 K/uL    Monocytes Absolute 0.7 0.0 - 1.3 K/uL    Eosinophils Absolute 0.4 0.0 - 0.6 K/uL    Basophils Absolute 0.1 0.0 - 0.2 K/uL   Renal Function Panel    Collection Time: 06/09/25  6:11 AM   Result Value Ref Range    Sodium 134 (L) 136 - 145 mmol/L    Potassium 3.7 3.5 - 5.1 mmol/L    Chloride 94 (L) 99 - 110 mmol/L    CO2 25 21 - 32 mmol/L    Anion Gap 15 3 - 16    Glucose 96 70 - 99 mg/dL    BUN 54 (H) 7 - 20 mg/dL    Creatinine 8.0 (HH) 0.6 - 1.2 mg/dL    Est, Glom Filt Rate 5 (A) >60    Calcium 9.0 8.3 - 10.6 mg/dL    Phosphorus 4.3 2.5 - 4.9 mg/dL    Albumin 2.6 (L) 3.4 - 5.0 g/dL       Therapy progress:  Physical therapy:  Bed Mobility:  Overall Assistance Level: Supervision  Additional Factors: Increased time to complete, Verbal cues, Head of bed flat (therapy mat with wedge on bed for comfort)  Sit>supine:  Assistance Level: Supervision  Skilled Clinical Factors: min assist for bilateral LE into the bed  Supine>sit:  Assistance Level: Supervision  Skilled Clinical Factors: min assist to bring legs over the edge of the bed with HOB elevated  Transfers:  Surface: Wheelchair, To mat, From mat  Additional Factors: Verbal cues, Hand placement cues  Device: Walker  Sit>stand:  Assistance Level: Contact guard assist  Skilled Clinical Factors: increased time and cueing for hand placement; min A progressing to CGA  Stand>sit:  Assistance Level: Contact guard assist  Skilled Clinical Factors: cues for hand placement  Bed<>chair  Technique: Stand  arb, statin    HLD  -continue rosuvastatin    COPD/asthma  -continue Stiolto, Duonebs    GERD  -continue pantoprazole    RLS  -continue ropinirole    Anxiety  -continue prn alprazolam    Bladder: H/o overactive bladder, high risk retention and incontinence  -Monitor PVRs, straight cath prn >400  -continue trospium    Bowel  -High risk constipation  -continue PRN bowel regimen    Pain control  -continue tramadol and tizanidine prn    DVT ppx  -continue heparin    Rehab Progress: Making progress. Overall SBA-CGA for mobility and ADLs. Barriers include: weakness, endurance, balance, nutrition.   Anticipated Dispo: home (possibly to daughter's home initially) vs SNF  Services:  PT, OT, RN  DME: ANTONIETA  ELOS: 6/14      Suzy Rucker MD 6/9/2025, 8:23 AM

## 2025-06-08 NOTE — PLAN OF CARE
Problem: Discharge Planning  Goal: Discharge to home or other facility with appropriate resources  6/8/2025 0947 by Esperanza Cintron RN  Outcome: Not Progressing  Flowsheets (Taken 6/8/2025 0738)  Discharge to home or other facility with appropriate resources:   Identify barriers to discharge with patient and caregiver   Arrange for needed discharge resources and transportation as appropriate   Identify discharge learning needs (meds, wound care, etc)   Refer to discharge planning if patient needs post-hospital services based on physician order or complex needs related to functional status, cognitive ability or social support system  6/8/2025 0214 by Lila Wagner RN  Outcome: Progressing     Problem: Nutrition Deficit:  Goal: Optimize nutritional status  6/8/2025 0947 by Esperanza Cintron RN  Outcome: Not Progressing  6/8/2025 0214 by Lila Wagner RN  Outcome: Progressing     Problem: Safety - Adult  Goal: Free from fall injury  6/8/2025 0947 by Esperanza Cintron RN  Outcome: Progressing  Flowsheets (Taken 6/8/2025 0900)  Free From Fall Injury: Instruct family/caregiver on patient safety  6/8/2025 0214 by Lila Wagner RN  Outcome: Progressing  Note: Fall precautions in place. Alarms in place. Uses call light appropriately. No falls.     Problem: Skin/Tissue Integrity  Goal: Skin integrity remains intact  Description: 1.  Monitor for areas of redness and/or skin breakdown2.  Assess vascular access sites hourly3.  Every 4-6 hours minimum:  Change oxygen saturation probe site4.  Every 4-6 hours:  If on nasal continuous positive airway pressure, respiratory therapy assess nares and determine need for appliance change or resting period  6/8/2025 0947 by Esperanza Cintron RN  Outcome: Progressing  Flowsheets  Taken 6/8/2025 0900  Skin Integrity Remains Intact: Monitor for areas of redness and/or skin breakdown  Taken 6/8/2025 0738  Skin Integrity Remains Intact: Monitor

## 2025-06-08 NOTE — PROGRESS NOTES
Patient refused all her respiratory treatments today. Attempted 3 times. Patient said she wanted to skip all the respiratory treatments for today, including the 2000 hrs tx.

## 2025-06-08 NOTE — PROGRESS NOTES
Patient ID: Jonelle Contreras  Referring/ Physician: Bon Drummond MD      Summary:   Jonelle Contreras is being seen by nephrology for ESRD and HTN.     Reason for admission: hypertensive urgency       Interval Hx:   The patient was seen and examined with her nurses at her bedside  The patient was resting in her bed  According to the patient's nurse, she did not sleep well last night because the peritoneal dialysis machine kept alarming because of poor drainage issue.  Apparently, this is not a new issue  She has no other complaints  Her blood pressure went up very high, at 190/88, she got a dose of hydralazine  Her blood pressure is down to 130/117    She has no symptoms  Lab work from this morning showed sodium 133 potassium 4 bicarb 22 BUN 38 creatinine was 8.6 glucose 92 calcium 9.1 phosphorus 4.9 albumin 2.4    WBC 8.5 hemoglobin 9.6 and the platelet  457.        Plan for today  Continue cyclic peritoneal dialysis using 1.5% solution  Will get a KUB to locate the PD catheter    Advised patient to ambulate which may help the PD drainage  Asked patient nurse to get a standing blood pressure    Reviewed her medication list, as of now, for blood pressure control she is getting:  Carvedilol 6.25 p.o. twice daily  Losartan 100 mg once daily  Nifedipine 30 mg p.o. once daily    If her orthostatic blood pressure drop is less severe, then we will need to adjust her blood pressure medication more.  According to her note from Dr. Pagan, her systolic blood pressure dropped between 30 to 50 mmHg on standing which is a major risk for fall.    According to patient nurse, she had 2 easy BMs yesterday, therefore she is not constipated.  Constipation is another common cause of poor drainage for PD      Assessment:  ESRD  ESRD secondary to hypertension.  She does peritoneal dialysis at home 1 exchange of 2 L 1.5% solutions daily.  Based on her most recent labs in the outpatient setting her adequacy is

## 2025-06-08 NOTE — PROGRESS NOTES
PD finally completed, patient reports exhausted, request to sleep. Lost dwell time 132 minutes, expected UF deviation neg 570 ml, see PD flow sheet for treatment summary.

## 2025-06-08 NOTE — PLAN OF CARE
Problem: Discharge Planning  Goal: Discharge to home or other facility with appropriate resources  Outcome: Progressing     Problem: Safety - Adult  Goal: Free from fall injury  Outcome: Progressing  Note: Fall precautions in place. Alarms in place. Uses call light appropriately. No falls.     Problem: Skin/Tissue Integrity  Goal: Skin integrity remains intact  Description: 1.  Monitor for areas of redness and/or skin breakdown2.  Assess vascular access sites hourly3.  Every 4-6 hours minimum:  Change oxygen saturation probe site4.  Every 4-6 hours:  If on nasal continuous positive airway pressure, respiratory therapy assess nares and determine need for appliance change or resting period  Outcome: Progressing     Problem: Pain  Goal: Verbalizes/displays adequate comfort level or baseline comfort level  Outcome: Progressing  Note: Pt uses the pain scale appropriately. Effective relief obtained. Collaborate with MD as needed for effective pain control.      Problem: Nutrition Deficit:  Goal: Optimize nutritional status  Outcome: Progressing     Problem: ABCDS Injury Assessment  Goal: Absence of physical injury  Outcome: Progressing

## 2025-06-08 NOTE — PROGRESS NOTES
Pt requesting to use the BSC. Pt up from lying to sitting and sit to stand with CGA and GB and stand pivot to BSC. Pt voided 100 cc rola urine. Pt back to bed with CGA. Heels floated. Pt is currently on PD cycler. Dressing to PD catheter site CDI. Pt admitted for resp distress and h/o HTN and PNA.  Lungs decreased. No sob or cough. On O2 @ 2L/NC. Belly round and soft with active BS. LBM yesterday. No edema noted. Call light in reach. Bed alarm on.

## 2025-06-08 NOTE — PROGRESS NOTES
PD continues to drain slow, on side of bed taking breakfast. Declines to stand to try to have PD drain.

## 2025-06-08 NOTE — PROGRESS NOTES
Nurse still in room up to bedside commode, spoke with supervisor to call 5th floor for advice for PD, suggest will need Heparin in bags for treatment, here for x ray aware still on PD.

## 2025-06-08 NOTE — CARE COORDINATION
[facility]. COSTA advised that she was a PD patient and her home unit was [facility]. Katheryn asked if there was a chance she might need HD because they are certified for that. COSTA reviewed nephrology note and no changes are in the works.     She stated that they are not certified for PD and can't accept her. COSTA updated epic to reflect this.     Respectfully submitted,    Radha WOLFF, SHAVONNE  [facility]   [phone]    Electronically signed by NEW Golden, SAKINA on 6/8/2025 at 10:51 AM

## 2025-06-08 NOTE — PROGRESS NOTES
This nurse has been in room all am trying to drain PD, patient declines to sit on side of bed, states too tired, readjusted in bed multiple times thus far, Dr. Stone was here this am aware of issue with PD, also noted where to find info for PD treatment.

## 2025-06-08 NOTE — PROGRESS NOTES
Called for bathroom, states has not slept. Up to chair, friend visiting. Patient admitted to rehab with debility, post resp failure and PD. A/Ox4. Transfers with walker. On regular, low phosphorus diet, tolerating better for lunch, will take supplement. Medications taken whole. On heparin for DVT prophylaxis.  Skin: monitor buttocks, pd site. Oxygen: has been on and off, off at this time, cues for IS, cough and deep breathing and Acapella. LDA: saline lock right forearm. Has been continent of bowel and of bladder. LBM today. Chair/bed alarms in use and call light in reach. Will monitor for safety. Brighter today and more interactive with nurse.

## 2025-06-09 PROBLEM — E43 SEVERE MALNUTRITION: Status: ACTIVE | Noted: 2025-06-09

## 2025-06-09 LAB
ALBUMIN SERPL-MCNC: 2.6 G/DL (ref 3.4–5)
ANION GAP SERPL CALCULATED.3IONS-SCNC: 15 MMOL/L (ref 3–16)
BASOPHILS # BLD: 0.1 K/UL (ref 0–0.2)
BASOPHILS NFR BLD: 1.1 %
BUN SERPL-MCNC: 54 MG/DL (ref 7–20)
CALCIUM SERPL-MCNC: 9 MG/DL (ref 8.3–10.6)
CHLORIDE SERPL-SCNC: 94 MMOL/L (ref 99–110)
CO2 SERPL-SCNC: 25 MMOL/L (ref 21–32)
CREAT SERPL-MCNC: 8 MG/DL (ref 0.6–1.2)
DEPRECATED RDW RBC AUTO: 15.6 % (ref 12.4–15.4)
EOSINOPHIL # BLD: 0.4 K/UL (ref 0–0.6)
EOSINOPHIL NFR BLD: 6.1 %
FUNGUS SPEC CULT: NORMAL
GFR SERPLBLD CREATININE-BSD FMLA CKD-EPI: 5 ML/MIN/{1.73_M2}
GLUCOSE SERPL-MCNC: 96 MG/DL (ref 70–99)
HCT VFR BLD AUTO: 26.9 % (ref 36–48)
HGB BLD-MCNC: 9.1 G/DL (ref 12–16)
LOEFFLER MB STN SPEC: NORMAL
LYMPHOCYTES # BLD: 1.2 K/UL (ref 1–5.1)
LYMPHOCYTES NFR BLD: 17.6 %
MCH RBC QN AUTO: 28.9 PG (ref 26–34)
MCHC RBC AUTO-ENTMCNC: 33.7 G/DL (ref 31–36)
MCV RBC AUTO: 85.6 FL (ref 80–100)
MONOCYTES # BLD: 0.7 K/UL (ref 0–1.3)
MONOCYTES NFR BLD: 10.4 %
NEUTROPHILS # BLD: 4.5 K/UL (ref 1.7–7.7)
NEUTROPHILS NFR BLD: 64.8 %
PHOSPHATE SERPL-MCNC: 4.3 MG/DL (ref 2.5–4.9)
PLATELET # BLD AUTO: 462 K/UL (ref 135–450)
PMV BLD AUTO: 8.1 FL (ref 5–10.5)
POTASSIUM SERPL-SCNC: 3.7 MMOL/L (ref 3.5–5.1)
RBC # BLD AUTO: 3.15 M/UL (ref 4–5.2)
SODIUM SERPL-SCNC: 134 MMOL/L (ref 136–145)
WBC # BLD AUTO: 7 K/UL (ref 4–11)

## 2025-06-09 PROCEDURE — 90945 DIALYSIS ONE EVALUATION: CPT

## 2025-06-09 PROCEDURE — 80069 RENAL FUNCTION PANEL: CPT

## 2025-06-09 PROCEDURE — 85025 COMPLETE CBC W/AUTO DIFF WBC: CPT

## 2025-06-09 PROCEDURE — 6370000000 HC RX 637 (ALT 250 FOR IP): Performed by: INTERNAL MEDICINE

## 2025-06-09 PROCEDURE — 6360000002 HC RX W HCPCS: Performed by: INTERNAL MEDICINE

## 2025-06-09 PROCEDURE — 1280000000 HC REHAB R&B

## 2025-06-09 PROCEDURE — 97530 THERAPEUTIC ACTIVITIES: CPT

## 2025-06-09 PROCEDURE — 97116 GAIT TRAINING THERAPY: CPT

## 2025-06-09 PROCEDURE — 6360000002 HC RX W HCPCS: Performed by: PHYSICAL MEDICINE & REHABILITATION

## 2025-06-09 PROCEDURE — 2500000003 HC RX 250 WO HCPCS: Performed by: PHYSICAL MEDICINE & REHABILITATION

## 2025-06-09 PROCEDURE — 97110 THERAPEUTIC EXERCISES: CPT

## 2025-06-09 PROCEDURE — 94760 N-INVAS EAR/PLS OXIMETRY 1: CPT

## 2025-06-09 PROCEDURE — 97535 SELF CARE MNGMENT TRAINING: CPT

## 2025-06-09 PROCEDURE — A4722 DIALYS SOL FLD VOL > 1999CC: HCPCS | Performed by: PHYSICAL MEDICINE & REHABILITATION

## 2025-06-09 PROCEDURE — 6370000000 HC RX 637 (ALT 250 FOR IP): Performed by: PHYSICAL MEDICINE & REHABILITATION

## 2025-06-09 PROCEDURE — 36415 COLL VENOUS BLD VENIPUNCTURE: CPT

## 2025-06-09 RX ORDER — NIFEDIPINE 30 MG/1
30 TABLET, EXTENDED RELEASE ORAL DAILY
Status: DISCONTINUED | OUTPATIENT
Start: 2025-06-09 | End: 2025-06-14

## 2025-06-09 RX ORDER — HYDRALAZINE HYDROCHLORIDE 25 MG/1
25 TABLET, FILM COATED ORAL EVERY 8 HOURS SCHEDULED
Status: DISCONTINUED | OUTPATIENT
Start: 2025-06-09 | End: 2025-06-20

## 2025-06-09 RX ORDER — ALPRAZOLAM 0.5 MG
2 TABLET ORAL EVERY 12 HOURS PRN
Status: DISCONTINUED | OUTPATIENT
Start: 2025-06-09 | End: 2025-06-17

## 2025-06-09 RX ADMIN — METOCLOPRAMIDE 5 MG: 10 TABLET ORAL at 11:47

## 2025-06-09 RX ADMIN — HYDRALAZINE HYDROCHLORIDE 25 MG: 25 TABLET ORAL at 04:17

## 2025-06-09 RX ADMIN — ROPINIROLE HYDROCHLORIDE 1 MG: 1 TABLET, FILM COATED ORAL at 20:18

## 2025-06-09 RX ADMIN — HEPARIN SODIUM 5000 UNITS: 5000 INJECTION INTRAVENOUS; SUBCUTANEOUS at 20:16

## 2025-06-09 RX ADMIN — HEPARIN SODIUM 5000 UNITS: 5000 INJECTION INTRAVENOUS; SUBCUTANEOUS at 06:19

## 2025-06-09 RX ADMIN — SEVELAMER CARBONATE 1600 MG: 800 TABLET, FILM COATED ORAL at 17:34

## 2025-06-09 RX ADMIN — SODIUM CHLORIDE, SODIUM LACTATE, CALCIUM CHLORIDE, MAGNESIUM CHLORIDE AND DEXTROSE 2000 ML: 1.5; 538; 448; 18.3; 5.08 INJECTION, SOLUTION INTRAPERITONEAL at 20:54

## 2025-06-09 RX ADMIN — CLOPIDOGREL BISULFATE 75 MG: 75 TABLET, FILM COATED ORAL at 10:40

## 2025-06-09 RX ADMIN — ROSUVASTATIN CALCIUM 10 MG: 10 TABLET, FILM COATED ORAL at 20:19

## 2025-06-09 RX ADMIN — CARVEDILOL 6.25 MG: 6.25 TABLET, FILM COATED ORAL at 10:40

## 2025-06-09 RX ADMIN — PANTOPRAZOLE SODIUM 40 MG: 40 TABLET, DELAYED RELEASE ORAL at 20:19

## 2025-06-09 RX ADMIN — CARVEDILOL 6.25 MG: 6.25 TABLET, FILM COATED ORAL at 17:34

## 2025-06-09 RX ADMIN — NIFEDIPINE 30 MG: 30 TABLET, FILM COATED, EXTENDED RELEASE ORAL at 17:34

## 2025-06-09 RX ADMIN — ALPRAZOLAM 1.5 MG: 0.5 TABLET ORAL at 06:55

## 2025-06-09 RX ADMIN — SODIUM CHLORIDE, PRESERVATIVE FREE 10 ML: 5 INJECTION INTRAVENOUS at 10:44

## 2025-06-09 RX ADMIN — SODIUM CHLORIDE, PRESERVATIVE FREE 10 ML: 5 INJECTION INTRAVENOUS at 20:18

## 2025-06-09 RX ADMIN — SODIUM CHLORIDE, SODIUM LACTATE, CALCIUM CHLORIDE, MAGNESIUM CHLORIDE AND DEXTROSE 6000 ML: 1.5; 538; 448; 18.3; 5.08 INJECTION, SOLUTION INTRAPERITONEAL at 20:54

## 2025-06-09 RX ADMIN — METOCLOPRAMIDE 5 MG: 10 TABLET ORAL at 06:20

## 2025-06-09 RX ADMIN — DIPHENHYDRAMINE HCL 25 MG: 25 TABLET ORAL at 22:05

## 2025-06-09 RX ADMIN — PANTOPRAZOLE SODIUM 40 MG: 40 TABLET, DELAYED RELEASE ORAL at 10:40

## 2025-06-09 RX ADMIN — SEVELAMER CARBONATE 1600 MG: 800 TABLET, FILM COATED ORAL at 10:39

## 2025-06-09 RX ADMIN — SEVELAMER CARBONATE 1600 MG: 800 TABLET, FILM COATED ORAL at 11:48

## 2025-06-09 RX ADMIN — EPOETIN ALFA-EPBX 10000 UNITS: 10000 INJECTION, SOLUTION INTRAVENOUS; SUBCUTANEOUS at 13:41

## 2025-06-09 RX ADMIN — FLUCONAZOLE 200 MG: 100 TABLET ORAL at 10:40

## 2025-06-09 RX ADMIN — METOCLOPRAMIDE 5 MG: 10 TABLET ORAL at 16:03

## 2025-06-09 RX ADMIN — TROSPIUM CHLORIDE 20 MG: 20 TABLET, FILM COATED ORAL at 20:18

## 2025-06-09 RX ADMIN — ALPRAZOLAM 2 MG: 0.5 TABLET ORAL at 20:18

## 2025-06-09 RX ADMIN — DIPHENHYDRAMINE HCL 25 MG: 25 TABLET ORAL at 00:51

## 2025-06-09 RX ADMIN — ASPIRIN 81 MG 81 MG: 81 TABLET ORAL at 10:40

## 2025-06-09 RX ADMIN — HEPARIN SODIUM 5000 UNITS: 5000 INJECTION INTRAVENOUS; SUBCUTANEOUS at 13:41

## 2025-06-09 RX ADMIN — LOSARTAN POTASSIUM 100 MG: 100 TABLET, FILM COATED ORAL at 20:19

## 2025-06-09 ASSESSMENT — PAIN SCALES - GENERAL: PAINLEVEL_OUTOF10: 0

## 2025-06-09 NOTE — PROGRESS NOTES
Pt awake and requesting to use the BSC. Pt up from lying to sitting and sit to stand with CGA and GB and stand pivot transfer from bed to BSC. Pt voided 100 cc rola urine. Dependent with toileting. Pt back to bed. Repositioned for comfort. Heels floated. Pt requesting Benadryl and medicated per prn orders. Recheck B/P 184/93. Call light in reach. Bed alarm on.

## 2025-06-09 NOTE — PROGRESS NOTES
Occupational Therapy  Facility/Department: [address] REHAB  Rehabilitation Occupational Therapy Daily Treatment Note    Date: 25  Patient Name: Jonelle Contreras       Room: L1K-0293/3258-01  MRN: [id number]  Account: [id number]   : 1950  (74 y.o.) Gender: female           Additional Pertinent Hx: Pt is a 75 yo F admitted to ARU with debility after being hospitalized for hyperkalemia and hypertension, found to be in a hypertensive emergency. Pt has ESRD and is on PD. Bronc performed on 2025 for bilateral mucous plugs. Treated for pneumonia.        Past Medical History:  has a past medical history of acute intermittent porphyria, agoraphobic, Allergic rhinitis, Anxiety, Arthritis, Asthma, CAD (coronary artery disease), Chronic obstructive pulmonary disease, unspecified COPD type ([facility]), ckd 4, collagenous colitis, COPD (chronic obstructive pulmonary disease) ([facility]), Depression, Disease of blood and blood forming organ, GERD (gastroesophageal reflux disease), Heart disease, Hyperlipidemia, Hypertension, Left thyroid nodule, Migraines, Neuropathy, Osteoporosis, PAD (peripheral artery disease), Peripheral vascular disease, post menopausal, Prediabetes, Pulmonary nodules, Restless leg syndrome, STEMI involving oth coronary artery of inferior wall ([facility]), and Urinary incontinence.  Past Surgical History:   has a past surgical history that includes Hysterectomy; Cholecystectomy; Coronary angioplasty with stent (10/2013 [place]); Colonoscopy (2017); Colonoscopy (N/A, 2022); Upper gastrointestinal endoscopy (N/A, 2022); Cardiac surgery; Hysterectomy, vaginal; Upper gastrointestinal endoscopy (2022); Upper gastrointestinal endoscopy (N/A, 2025); and bronchoscopy (N/A, 2025).    Restrictions  Restrictions/Precautions: Fall Risk  Other Position/Activity Restrictions: PD nightly. dwayne TEDs daily. 2L O2 (not O2 dependent), weaning-on room air throughout  Level: Stand by assist  Skilled Clinical Factors: Pt amb w/ RW  bed >< bathroom, SBA for balance, slow pace but no overt LOB.  Sit to Supine  Assistance Level: Stand by assist  Skilled Clinical Factors: inc time and effort, HOB flat  Supine to Sit  Assistance Level: Stand by assist  Skilled Clinical Factors: HOB elevated, use of bed rail  Sit to Stand  Assistance Level: Stand by assist  Skilled Clinical Factors: from bed > RW, cues for safe hand placement   OT Exercises  A/AROM Exercises: with 1 lb weight completes x12 chest press on RUE, unable to complete additional d/t limited time.     Assessment  Assessment  Assessment: Pt tolerated ADL session well, spO2 remained > 90% on RA. ADL was limited as pt was still attached to PD and continued to drain very slow and pause throughout, RN Iveth present in frequently to assist. Sitting EOB pt completes UB bathing and dressing with set-up, LB bathing and dressing with set-up and SBA for balance in stance. Pt required Mod A for footwear, declined attempting compression socks. Pt completes sit > stands to RW with SBA, amb w/ RW >< bathroom w/ SBA/CGA. Pt requires increased time and slow pace for all tasks d/t activity tolerance and decreased strength. Pt functions below baseline and will benefit from cont skilled OT on ARU prior to returning home.  Activity Tolerance: Patient limited by fatigue;Patient tolerated treatment well;Patient limited by endurance  Discharge Recommendations: Continue to assess pending progress;Patient would benefit from continued therapy after discharge;Home with Home health OT;24 hour supervision or assist  OT Equipment Recommendations  Equipment Needed: Yes  Other: Pt has: RW, 3WW, TTB. May benefit from walker basket, sock aid, reacher...cont to assess.       Patient Education  Education  Education Given To: Patient  Education Provided: Role of Therapy;Safety;Transfer Training;ADL Function;Energy Conservation;Plan of Care;Fall Prevention

## 2025-06-09 NOTE — PROGRESS NOTES
Patient ID: Jonelle Contreras  Referring/ Physician: Suzy Rucker MD      Summary:   Jnoelle Contreras is being seen by nephrology for ESRD on peritoneal dialysis. .     Reason for admission: rehab for generalized weakness after hospital admission for pneumonia and hypertensive urgency.       Interval Hx:   Has been having ongoing issues with poor sleep.   Her supine BP readings have been high, 180-190 systolic.   AM BP reading was 126/86.   She had an extra dose of hydralazine 25 mg last night.   Has been off and on 2 L which is stable.   On 6/8 had alarms and slow drain with PD , CXR showed the catheter was in proper position. UF negative 29 cc so essentially even balance.   Has been having daily BM. Last on 6/8.     Saw in therapy.   She is still very weak.   No significant orthostatic symptoms today.   No issues with cycler last night.         Assessment/Plan:   - continue PD ,with 1.5% solutions. No fluid overload.   - solute clearance acceptable. Acidosis controlled. Potassium controlled.   - Hgb below goal  > increase procrit to TIW.   - check iron stores.   - BP medications adjusted and discussed with RN.   - I called her son to discuss any concerns or questions. Left VM. No answer.   - there are no plans to change modalities. If there is absolutely no SNF willing to take a PD patient then we can discuss it as an options but that would not be ideal.       ESRD  Renal failure due to hypertensive kidney disease.   Prior to admission she was doing manual exchanges at home.   This admission she was started on a cycler.   Now she is getting 3 exchanges. No LBF.    -daily BM   -daily gent to PD exit site     SHPT  , due to renal origin.   Ca and phos wnl  On renvela 1600 mg TID with meals.     Labile hypertension  Long standing issue with difficulty in controlling her BP due to severe orthostatic hypotension.   Currently aiming for SBP < 180 when supine and > 140 prior to therapy

## 2025-06-09 NOTE — PROGRESS NOTES
Occupational Therapy  Facility/Department: [address] REHAB  Rehabilitation Occupational Therapy Daily AM and PM Treatment Note    Date: 25  Patient Name: Jonelle Contreras       Room: T2H-7014/3258-01  MRN: [id number]  Account: [id number]   : 1950  (74 y.o.) Gender: female           Additional Pertinent Hx: Pt is a 73 yo F admitted to ARU with debility after being hospitalized for hyperkalemia and hypertension, found to be in a hypertensive emergency. Pt has ESRD and is on PD. Bronc performed on 2025 for bilateral mucous plugs. Treated for pneumonia.        Past Medical History:  has a past medical history of acute intermittent porphyria, agoraphobic, Allergic rhinitis, Anxiety, Arthritis, Asthma, CAD (coronary artery disease), Chronic obstructive pulmonary disease, unspecified COPD type ([facility]), ckd 4, collagenous colitis, COPD (chronic obstructive pulmonary disease) ([facility]), Depression, Disease of blood and blood forming organ, GERD (gastroesophageal reflux disease), Heart disease, Hyperlipidemia, Hypertension, Left thyroid nodule, Migraines, Neuropathy, Osteoporosis, PAD (peripheral artery disease), Peripheral vascular disease, post menopausal, Prediabetes, Pulmonary nodules, Restless leg syndrome, STEMI involving oth coronary artery of inferior wall ([facility]), and Urinary incontinence.  Past Surgical History:   has a past surgical history that includes Hysterectomy; Cholecystectomy; Coronary angioplasty with stent (10/2013 [place]); Colonoscopy (2017); Colonoscopy (N/A, 2022); Upper gastrointestinal endoscopy (N/A, 2022); Cardiac surgery; Hysterectomy, vaginal; Upper gastrointestinal endoscopy (2022); Upper gastrointestinal endoscopy (N/A, 2025); and bronchoscopy (N/A, 2025).    Restrictions  Restrictions/Precautions: Fall Risk  Other Position/Activity Restrictions: PD nightly. dwayne TEDs daily. 2L O2 (not O2 dependent), weaning-on room air throughout

## 2025-06-09 NOTE — PROGRESS NOTES
Pt awake and AAO sitting up in bed. B/P 197/90 in L arm despite scheduled losartan and Hydralazine given at 1820. Placed call to Dr. Drummond regarding B/P and new order for extra one time dose of Hydralazine 25 mg po. Will medicate per orders. Lungs decreased. Pt c/o some sob and placed on O2 @ 1L/NC. RT called for inhaler. No cough. Belly round and soft with active BS. LBM today. PD catheter in place and dressing to site CDI. Pt to start on PD. No edema noted. HL to R FA site WDL.  Pt admitted for resp failure and PNA. Pt repositioned for comfort. Call light in reach. Bed alarm on.

## 2025-06-09 NOTE — CARE COORDINATION
Met with pt and dgtr, Marguerite in room to review.    Family is very interested in having her do SNF in [place] closer to them however, pt wants to stay in [place].    Education given regarding PD VS HD and pt is currently on PD at home but does not use the cycler.  She reports the cycler was sent back to [facility].  Informed her that [facility] do PD in their facility.  She is interested in [facility] as first choice and then [facility].    Dgtr asked if she could switch to HD to go to a nursing home in [place] and then switch back to PD when she returns home.  Informed them this would need to be addressed with Dr Pagan however pt is stating she wants to stay in [place].    Did educate pt/dgtr about HD is three times a week and she would need to be transported to/from HD if she is unable to drive herself.  Additionally, she would need a port placed.  IF she goes to [place], which is not the preferred preference of the patient, she would have to switch to a nephrologists to oversee her PD needs and work with a dialysis clinic in [place] and THEN switch back when discharged as I informed them it is a short stay in SNF.     Waiting to hear from [facility] and [facility].  Her third option would be [facility].    SAKINA Glass     Case Management   [phone]    6/9/2025  4:48 PM

## 2025-06-09 NOTE — PROGRESS NOTES
Patient admitted to rehab with debility d/t acute hypoxic respiratory failure with mucous plugging.  A/Ox4. Transfers with one assist with gait belt and front wheeled walker for ambulation.  Had nose bleed this morning, had to pack left nare and finally stopped.   Dr Rucker was made aware.  Mobility restrictions: weakness. On regular, low phos diet, tolerating fair with much encouragement.   Medications taken whole with fluids. On aspirin, plavix and heparin for DVT prophylaxis.  Skin: scattered bruising.  PD catheter RLQ.  Oxygen: room air. LDA: PIV RFA. Has been continent of bowel and  of bladder. LBM 6/9.   Chair/bed alarms in use and call light in reach. Will monitor for safety.

## 2025-06-09 NOTE — PROGRESS NOTES
Physical Therapy  Facility/Department: [address] REHAB  Rehabilitation Physical Therapy Treatment Note    NAME: Jonelle Contreras  : 1950 (74 y.o.)  MRN: [id number]  CODE STATUS: Full Code    Date of Service: 25       Restrictions:  Restrictions/Precautions: Fall Risk  Position Activity Restriction  Other Position/Activity Restrictions: PD nightly. dwayne TEDs daily. 2L O2 (not O2 dependent), weaning-on room air throughout session     Pertinent medical information:  Additional Pertinent Hx: per Dr. Drummond's note, \"74-year-old female who came to hospital with hyperkalemia and hypertension, found to be in a hypertensive emergency.  Her blood pressure was brought down and the patient is following with nephrology and cardiology.  Patient has ESRD and is on PD.  Patient also came in with a cough and underwent a bronc on 2025 for bilateral mucous plugs.  Patient was treated for pneumonia and started on IV antibiotics for 7 days. \"    SUBJECTIVE  Subjective: PT notified that Patient is still hooked up to PD and PT needs to start bedside. They were in process of removing PD and sign to not enter was on door.  Upon completion, Patient was sidelying in bed with covers over her and shivering. RN, Iveth, reports no fevers at this time. PT retrieved warm blanket for patient.  Pain: patient reports ache in waist, wrists, and arms    Social/Functional History  Lives With: Alone  Type of Home: House  Home Layout: Work area in basement, Two level, Laundry in basement (ranch with finished basement)  Home Access: Stairs to enter with rails  Entrance Stairs - Number of Steps: 4 CONSTANCE front door with L handrail.  Entrance Stairs - Rails: Left  Bathroom Shower/Tub: Tub/Shower unit  Bathroom Toilet: Handicap height  Bathroom Equipment: Tub transfer bench (pt reports she turns the TTB longways so it fits entirely in tub shower unit.)  Bathroom Accessibility: Accessible  Home Equipment: Walker - Rolling (Walker (3 wheel).)  Has the               Heaven Cox, [facility] CNS [id number], 06/09/25 at 11:38 AM

## 2025-06-09 NOTE — PLAN OF CARE
Problem: Discharge Planning  Goal: Discharge to home or other facility with appropriate resources  Outcome: Progressing  Flowsheets  Taken 6/9/2025 1118  Discharge to home or other facility with appropriate resources: Identify barriers to discharge with patient and caregiver  Taken 6/9/2025 0810  Discharge to home or other facility with appropriate resources: Identify barriers to discharge with patient and caregiver     Problem: Safety - Adult  Goal: Free from fall injury  Outcome: Progressing  Flowsheets (Taken 6/9/2025 1118)  Free From Fall Injury: Instruct family/caregiver on patient safety     Problem: Skin/Tissue Integrity  Goal: Skin integrity remains intact  Description: 1.  Monitor for areas of redness and/or skin breakdown2.  Assess vascular access sites hourly3.  Every 4-6 hours minimum:  Change oxygen saturation probe site4.  Every 4-6 hours:  If on nasal continuous positive airway pressure, respiratory therapy assess nares and determine need for appliance change or resting period  Outcome: Progressing  Flowsheets  Taken 6/9/2025 1118  Skin Integrity Remains Intact:   Monitor for areas of redness and/or skin breakdown   Turn and reposition as indicated   Assess vascular access sites hourly  Taken 6/9/2025 0810  Skin Integrity Remains Intact:   Turn and reposition as indicated   Monitor for areas of redness and/or skin breakdown     Problem: Pain  Goal: Verbalizes/displays adequate comfort level or baseline comfort level  Outcome: Progressing  Flowsheets (Taken 6/9/2025 1118)  Verbalizes/displays adequate comfort level or baseline comfort level:   Encourage patient to monitor pain and request assistance   Assess pain using appropriate pain scale   Administer analgesics based on type and severity of pain and evaluate response   Implement non-pharmacological measures as appropriate and evaluate response   Consider cultural and social influences on pain and pain management   Notify Licensed Independent  Practitioner if interventions unsuccessful or patient reports new pain     Problem: Nutrition Deficit:  Goal: Optimize nutritional status  Outcome: Progressing  Flowsheets (Taken 6/9/2025 1118)  Nutrient intake appropriate for improving, restoring, or maintaining nutritional needs:   Assess nutritional status and recommend course of action   Monitor oral intake, labs, and treatment plans     Problem: ABCDS Injury Assessment  Goal: Absence of physical injury  Outcome: Progressing  Flowsheets (Taken 6/9/2025 1118)  Absence of Physical Injury: Implement safety measures based on patient assessment

## 2025-06-09 NOTE — CARE COORDINATION
Call placed to sonThiago to review referrals sent to SNF agencies.  [facility] declined.  [facility] declined.  [facility] is reviewing.  [facility] is pending.  Call /message left asking for call back.    Son reports he and his sister live in [place] and would prefer she go to a SNF in [place].    Pulled an PD list for SNF agencies but there isn't one listed for [place].  Informed him I can call a rep for [place] agencies and get back to him.    Informed them that [facility] is entertaining at this time.    Discussion held regarding SNF and that she will eventually be ready to be discharged from SNF and what is the plan.  He states she is the only one in the family that lives in [place] and they are not sure what the plan would be.  He does not know the plan.    Education given that pt will need to have a PD that would be following her in each of the states, will need to think about what the plan would be for discharge whether she stays somewhere in [place] or she goes back to [place].    They would like a referral to [facility].    Referrals initiated.  Rep [facility] is looking also to see if they accept PD in [place] for any  of their agencies.    SAKINA Glass     Case Management   [phone]    6/9/2025  12:43 PM

## 2025-06-09 NOTE — PROGRESS NOTES
Comprehensive Nutrition Assessment    Type and Reason for Visit:  Reassess    Nutrition Recommendations/Plan:   Continue current diet.  Expedite cups TID.     Malnutrition Assessment:  Malnutrition Status:  Severe malnutrition (06/09/25 1051)    Context:  Acute Illness     Findings of the 6 clinical characteristics of malnutrition:  Energy Intake:  50% or less of estimated energy requirements for 5 or more days  Weight Loss:  5% over 1 month     Body Fat Loss:  Mild body fat loss Triceps, Orbital   Muscle Mass Loss:  Mild muscle mass loss Temples (temporalis), Calf (gastrocnemius)  Fluid Accumulation:  Mild (trace)     Strength:  Not Performed    Nutrition Assessment:    FU - caloric intake over the weekend was roughly the same. It appears she did well with lunch yesterday but overall intakes ranging 1-25%. She is going to take the day to think about a supplemental TF through the rest of the week, this may help to stabilize nutritional adequacy throughout admission but would not be a longterm fix. This writer and Pt. both agree she will likely improve once she is in her own environment, eating foods she likes with more variety and on her schedule. No new recommendations at this time. Will continue to monitor nutritional adequacy and diet acceptance while inpatient.    Nutrition Related Findings:    Na 134. LBM 6/9. Wound Type: None       Current Nutrition Intake & Therapies:    Average Meal Intake: 0%, 1-25%  Average Supplements Intake: %  ADULT DIET; Regular; Low Phosphorus (Less than 1000 mg)  ADULT ORAL NUTRITION SUPPLEMENT; Dinner, Breakfast, Lunch; Other Oral Supplement; Expedite Cup    Anthropometric Measures:  Height: 165.1 cm (5' 5\")  Ideal Body Weight (IBW): 125 lbs (57 kg)       Current Body Weight: 60.5 kg (133 lb 6.1 oz), 114.3 % IBW.    Current BMI (kg/m2): 22.2  Usual Body Weight: 63.9 kg (140 lb 14 oz)     % Weight Change (Calculated): -5.3  Weight Adjustment For: No Adjustment

## 2025-06-10 LAB
FERRITIN SERPL IA-MCNC: 158 NG/ML (ref 15–150)
IRON SATN MFR SERPL: 13 % (ref 15–50)
IRON SERPL-MCNC: 25 UG/DL (ref 37–145)
TIBC SERPL-MCNC: 189 UG/DL (ref 260–445)

## 2025-06-10 PROCEDURE — 6370000000 HC RX 637 (ALT 250 FOR IP): Performed by: PHYSICAL MEDICINE & REHABILITATION

## 2025-06-10 PROCEDURE — 2500000003 HC RX 250 WO HCPCS: Performed by: PHYSICAL MEDICINE & REHABILITATION

## 2025-06-10 PROCEDURE — 97530 THERAPEUTIC ACTIVITIES: CPT

## 2025-06-10 PROCEDURE — 6370000000 HC RX 637 (ALT 250 FOR IP): Performed by: INTERNAL MEDICINE

## 2025-06-10 PROCEDURE — 82728 ASSAY OF FERRITIN: CPT

## 2025-06-10 PROCEDURE — 97116 GAIT TRAINING THERAPY: CPT

## 2025-06-10 PROCEDURE — 36415 COLL VENOUS BLD VENIPUNCTURE: CPT

## 2025-06-10 PROCEDURE — A4722 DIALYS SOL FLD VOL > 1999CC: HCPCS | Performed by: PHYSICAL MEDICINE & REHABILITATION

## 2025-06-10 PROCEDURE — 6360000002 HC RX W HCPCS: Performed by: PHYSICAL MEDICINE & REHABILITATION

## 2025-06-10 PROCEDURE — 97535 SELF CARE MNGMENT TRAINING: CPT

## 2025-06-10 PROCEDURE — 1280000000 HC REHAB R&B

## 2025-06-10 PROCEDURE — 83540 ASSAY OF IRON: CPT

## 2025-06-10 PROCEDURE — 6360000002 HC RX W HCPCS: Performed by: INTERNAL MEDICINE

## 2025-06-10 PROCEDURE — 83550 IRON BINDING TEST: CPT

## 2025-06-10 PROCEDURE — 97110 THERAPEUTIC EXERCISES: CPT

## 2025-06-10 RX ORDER — CARVEDILOL 6.25 MG/1
6.25 TABLET ORAL ONCE
Status: COMPLETED | OUTPATIENT
Start: 2025-06-10 | End: 2025-06-10

## 2025-06-10 RX ADMIN — PANTOPRAZOLE SODIUM 40 MG: 40 TABLET, DELAYED RELEASE ORAL at 08:59

## 2025-06-10 RX ADMIN — METOCLOPRAMIDE 5 MG: 10 TABLET ORAL at 05:56

## 2025-06-10 RX ADMIN — METOCLOPRAMIDE 5 MG: 10 TABLET ORAL at 16:25

## 2025-06-10 RX ADMIN — CLOPIDOGREL BISULFATE 75 MG: 75 TABLET, FILM COATED ORAL at 08:59

## 2025-06-10 RX ADMIN — LOSARTAN POTASSIUM 100 MG: 100 TABLET, FILM COATED ORAL at 20:41

## 2025-06-10 RX ADMIN — POLYETHYLENE GLYCOL 3350 17 G: 17 POWDER, FOR SOLUTION ORAL at 20:41

## 2025-06-10 RX ADMIN — IRON SUCROSE 200 MG: 20 INJECTION, SOLUTION INTRAVENOUS at 15:37

## 2025-06-10 RX ADMIN — CARVEDILOL 6.25 MG: 6.25 TABLET, FILM COATED ORAL at 18:19

## 2025-06-10 RX ADMIN — SODIUM CHLORIDE, PRESERVATIVE FREE 10 ML: 5 INJECTION INTRAVENOUS at 20:33

## 2025-06-10 RX ADMIN — FLUCONAZOLE 200 MG: 100 TABLET ORAL at 09:00

## 2025-06-10 RX ADMIN — SODIUM CHLORIDE, SODIUM LACTATE, CALCIUM CHLORIDE, MAGNESIUM CHLORIDE AND DEXTROSE 6000 ML: 1.5; 538; 448; 18.3; 5.08 INJECTION, SOLUTION INTRAPERITONEAL at 21:19

## 2025-06-10 RX ADMIN — ALPRAZOLAM 2 MG: 0.5 TABLET ORAL at 20:41

## 2025-06-10 RX ADMIN — SODIUM CHLORIDE, PRESERVATIVE FREE 10 ML: 5 INJECTION INTRAVENOUS at 09:00

## 2025-06-10 RX ADMIN — PANTOPRAZOLE SODIUM 40 MG: 40 TABLET, DELAYED RELEASE ORAL at 20:42

## 2025-06-10 RX ADMIN — ROSUVASTATIN CALCIUM 10 MG: 10 TABLET, FILM COATED ORAL at 20:43

## 2025-06-10 RX ADMIN — HEPARIN SODIUM 5000 UNITS: 5000 INJECTION INTRAVENOUS; SUBCUTANEOUS at 14:15

## 2025-06-10 RX ADMIN — HEPARIN SODIUM 5000 UNITS: 5000 INJECTION INTRAVENOUS; SUBCUTANEOUS at 20:40

## 2025-06-10 RX ADMIN — SEVELAMER CARBONATE 1600 MG: 800 TABLET, FILM COATED ORAL at 13:25

## 2025-06-10 RX ADMIN — ROPINIROLE HYDROCHLORIDE 1 MG: 1 TABLET, FILM COATED ORAL at 20:41

## 2025-06-10 RX ADMIN — METOCLOPRAMIDE 5 MG: 10 TABLET ORAL at 11:02

## 2025-06-10 RX ADMIN — SEVELAMER CARBONATE 1600 MG: 800 TABLET, FILM COATED ORAL at 08:59

## 2025-06-10 RX ADMIN — SEVELAMER CARBONATE 1600 MG: 800 TABLET, FILM COATED ORAL at 18:19

## 2025-06-10 RX ADMIN — ASPIRIN 81 MG 81 MG: 81 TABLET ORAL at 09:00

## 2025-06-10 RX ADMIN — CARVEDILOL 6.25 MG: 6.25 TABLET, FILM COATED ORAL at 14:00

## 2025-06-10 RX ADMIN — SODIUM CHLORIDE, SODIUM LACTATE, CALCIUM CHLORIDE, MAGNESIUM CHLORIDE AND DEXTROSE 2000 ML: 1.5; 538; 448; 18.3; 5.08 INJECTION, SOLUTION INTRAPERITONEAL at 21:19

## 2025-06-10 RX ADMIN — POLYETHYLENE GLYCOL 3350 17 G: 17 POWDER, FOR SOLUTION ORAL at 09:03

## 2025-06-10 RX ADMIN — TRAMADOL HYDROCHLORIDE 50 MG: 50 TABLET, COATED ORAL at 00:03

## 2025-06-10 RX ADMIN — HEPARIN SODIUM 5000 UNITS: 5000 INJECTION INTRAVENOUS; SUBCUTANEOUS at 05:58

## 2025-06-10 RX ADMIN — TROSPIUM CHLORIDE 20 MG: 20 TABLET, FILM COATED ORAL at 20:41

## 2025-06-10 RX ADMIN — NIFEDIPINE 30 MG: 30 TABLET, FILM COATED, EXTENDED RELEASE ORAL at 18:19

## 2025-06-10 RX ADMIN — ALPRAZOLAM 2 MG: 0.5 TABLET ORAL at 09:05

## 2025-06-10 ASSESSMENT — PAIN SCALES - GENERAL
PAINLEVEL_OUTOF10: 0
PAINLEVEL_OUTOF10: 0
PAINLEVEL_OUTOF10: 7

## 2025-06-10 ASSESSMENT — PAIN DESCRIPTION - PAIN TYPE: TYPE: CHRONIC PAIN

## 2025-06-10 ASSESSMENT — PAIN DESCRIPTION - DESCRIPTORS: DESCRIPTORS: SORE

## 2025-06-10 ASSESSMENT — PAIN DESCRIPTION - ORIENTATION: ORIENTATION: MID

## 2025-06-10 ASSESSMENT — PAIN DESCRIPTION - FREQUENCY: FREQUENCY: INTERMITTENT

## 2025-06-10 ASSESSMENT — PAIN DESCRIPTION - ONSET: ONSET: ON-GOING

## 2025-06-10 ASSESSMENT — PAIN DESCRIPTION - LOCATION: LOCATION: ABDOMEN

## 2025-06-10 NOTE — PROGRESS NOTES
Patient ID: Jonelle Contreras  Referring/ Physician: Suzy Rucker MD      Summary:   Jonelle Contreras is being seen by nephrology for ESRD on peritoneal dialysis. .     Reason for admission: rehab for generalized weakness after hospital admission for pneumonia and hypertensive urgency.       Interval Hx:     Patient was seen and examined at bedside.  She is awake and alert.  Had a better day in therapy and her appetite is better.  Now she is being considered for discharging to home instead of SNF.  No issues with peritoneal dialysis.    As today, her home dialysis nurses were contacted about shipping her peritoneal dialysis supplies to [facility] so that she could do cycler there.  The supplies will be delivered until Tuesday.  If she goes home we can do manual exchanges versus cycler.    Blood pressure 150/86  Blood pressure yesterday was 133/63 128/77    Sodium 134 potassium 3.7 bicarb 25 BUN 54 creatinine 8 calcium phosphorus within normal limits hemoglobin 9.1        Assessment/Plan:   - continue PD ,with 1.5% solutions. No fluid overload.   - solute clearance acceptable. Acidosis controlled. Potassium controlled.   -  procrit to TIW.   - TSAT low ferritin 158, will load with IV iron 200 mg x 3 doses.  -Had a conversation with her son, social work, Dr. Rucker, RN about her disposition.    If she discharges home, will be able to do peritoneal dialysis at home with manual exchanges until her cycle gets there.  If she discharges to a SNF that can do PD we have already had the supplies shipped to there but will not arrive until Tuesday.    ESRD  Renal failure due to hypertensive kidney disease.   Prior to admission she was doing manual exchanges at home.   This admission she was started on a cycler.   Now she is getting 3 exchanges. No LBF.    -daily BM   -daily gent to PD exit site     SHPT  , due to renal origin.   Ca and phos wnl  On renvela 1600 mg TID with meals.

## 2025-06-10 NOTE — PROGRESS NOTES
Resting quietly in bed, respirations even/easy. Patient admitted with debility s/p respiratory failure. Is AAOx4 but is slow to respond at times. Ambulates with a walker x1, used the BSC and was SBA via stand pivot. Did require moderate assist from laying to sitting. Reports fatigue. Lungs clear but diminished. HR regular. Abdomen non tender with active bowel sounds. Poor PO intake.  Has been continent of  bladder. Receives PD nightly, running well overnight. On exchange 3/4 and no alarms this shift. Patient with c/o generalized pain and medicated per PRN orders. Also medicated with PRN benadryl for sleep. Encouraged to call for all needs, call light remains in reach at all times. Bed alarm active. Kaye Ruiz RN

## 2025-06-10 NOTE — PLAN OF CARE
Problem: Safety - Adult  Goal: Free from fall injury  Outcome: Progressing  Flowsheets (Taken 6/10/2025 0200)  Free From Fall Injury:   Instruct family/caregiver on patient safety   Based on caregiver fall risk screen, instruct family/caregiver to ask for assistance with transferring infant if caregiver noted to have fall risk factors  Note: Pt educated on falls precautions and safety. Call light and personal belongings within reach at all times. Non skid socks in use when up. Hourly rounding and alarms active.      Problem: Skin/Tissue Integrity  Goal: Skin integrity remains intact  Description: 1.  Monitor for areas of redness and/or skin breakdown2.  Assess vascular access sites hourly3.  Every 4-6 hours minimum:  Change oxygen saturation probe site4.  Every 4-6 hours:  If on nasal continuous positive airway pressure, respiratory therapy assess nares and determine need for appliance change or resting period  Outcome: Progressing  Flowsheets (Taken 6/10/2025 0200)  Skin Integrity Remains Intact: Monitor for areas of redness and/or skin breakdown  Note: Able to change positions in bed without assist, no evidence of skin breakdown noted. Waffle cushion in place to chair.      Problem: Pain  Goal: Verbalizes/displays adequate comfort level or baseline comfort level  Outcome: Progressing  Flowsheets (Taken 6/10/2025 0200)  Verbalizes/displays adequate comfort level or baseline comfort level:   Administer analgesics based on type and severity of pain and evaluate response   Consider cultural and social influences on pain and pain management   Encourage patient to monitor pain and request assistance  Note: Able to rate pain using a 1-10 scale, medicated per prn orders, see MAR. Able to verbalize a reduction in pain and/or able to fall asleep and remain asleep without any s/s of pain

## 2025-06-10 NOTE — PLAN OF CARE
Problem: Discharge Planning  Goal: Discharge to home or other facility with appropriate resources  6/10/2025 1107 by Beckie Romero RN  Outcome: Progressing  Flowsheets (Taken 6/10/2025 1107)  Discharge to home or other facility with appropriate resources: Identify barriers to discharge with patient and caregiver     Problem: Safety - Adult  Goal: Free from fall injury  6/10/2025 1107 by Beckie Romero RN  Outcome: Progressing  Flowsheets (Taken 6/10/2025 1107)  Free From Fall Injury: Instruct family/caregiver on patient safety     Problem: Skin/Tissue Integrity  Goal: Skin integrity remains intact  Description: 1.  Monitor for areas of redness and/or skin breakdown2.  Assess vascular access sites hourly3.  Every 4-6 hours minimum:  Change oxygen saturation probe site4.  Every 4-6 hours:  If on nasal continuous positive airway pressure, respiratory therapy assess nares and determine need for appliance change or resting period  6/10/2025 1107 by Beckie Romero RN  Outcome: Progressing  Flowsheets (Taken 6/10/2025 1107)  Skin Integrity Remains Intact:   Monitor for areas of redness and/or skin breakdown   Turn and reposition as indicated     Problem: Pain  Goal: Verbalizes/displays adequate comfort level or baseline comfort level  6/10/2025 1107 by Beckie Romero RN  Outcome: Progressing  Flowsheets (Taken 6/10/2025 1107)  Verbalizes/displays adequate comfort level or baseline comfort level:   Assess pain using appropriate pain scale   Encourage patient to monitor pain and request assistance   Administer analgesics based on type and severity of pain and evaluate response   Implement non-pharmacological measures as appropriate and evaluate response   Notify Licensed Independent Practitioner if interventions unsuccessful or patient reports new pain   Consider cultural and social influences on pain and pain management     Problem: Nutrition Deficit:  Goal: Optimize nutritional status  6/10/2025 1107 by

## 2025-06-10 NOTE — PROGRESS NOTES
Physical Therapy  Facility/Department: [address] [facility]  Rehabilitation Physical Therapy Treatment Note    NAME: Jonelle Contreras  : 1950 (74 y.o.)  MRN: [id number]  CODE STATUS: Full Code    Date of Service: 6/10/25       Restrictions:  Restrictions/Precautions: Fall Risk  Position Activity Restriction  Other Position/Activity Restrictions: PD nightly. dwayne TEDs daily.     Pertinent medical information:  Additional Pertinent Hx: per Dr. Drummond's note, \"74-year-old female who came to hospital with hyperkalemia and hypertension, found to be in a hypertensive emergency.  Her blood pressure was brought down and the patient is following with nephrology and cardiology.  Patient has ESRD and is on PD.  Patient also came in with a cough and underwent a bronc on 2025 for bilateral mucous plugs.  Patient was treated for pneumonia and started on IV antibiotics for 7 days. \"    SUBJECTIVE  Subjective: Patient seated in wheelchair for start of session.  She has no complaints at this time and states she slept well  Pain: no complaints of pain at this time    Social/Functional History  Lives With: Alone  Type of Home: House  Home Layout: Work area in basement, Two level, Laundry in basement (ranch with finished basement)  Home Access: Stairs to enter with rails  Entrance Stairs - Number of Steps: 4 CONSTANCE front door with L handrail.  Entrance Stairs - Rails: Left  Bathroom Shower/Tub: Tub/Shower unit  Bathroom Toilet: Handicap height  Bathroom Equipment: Tub transfer bench (pt reports she turns the TTB longways so it fits entirely in tub shower unit.)  Bathroom Accessibility: Accessible  Home Equipment: Walker - Rolling (Walker (3 wheel).)  Has the patient had two or more falls in the past year or any fall with injury in the past year?: No (pt denies fall at home within .)  Receives Help From: Neighbor  Prior Level of Assist for ADLs: Independent  Prior Level of Assist for Homemaking: Independent (Family/neighbor assists

## 2025-06-10 NOTE — CARE COORDINATION
Reviewed pt in Rounds today.  Therapy reports she did very well, 85'+125\" with wh walker and then 60'with SPC all SBA.  In order to go to SNF, a precert will need to be completed.  This may be difficult when pt is doing so well.    Spoke with Dr Pagan.  She reports she will need to use the cycler at home and one can be delivered on Tuesday.  However, she could go home sooner than Tuesday if she is able to lift the bags needed.    Updated PT, Heaven, to ask if they could practice lifting 4 lb items that would be comparable to the bags she will need to lift at home several times of the day.    If she goes a nursing home, [facility], however, they must have training from [employer] along with the cycler to manage her needs.   They had called [facility] and was told they could not get the cycler until Tuesday.  Plus, she will need a prior auth to go SNF.    SAKINA Glass     Case Management   [phone]    6/10/2025  1:47 PM

## 2025-06-10 NOTE — PROGRESS NOTES
Patient admitted to rehab with debility d/t acute hypoxic respiratory failure with mucous plugging.  A/Ox4, smiling more and more talkative today. Transfers with one assist with gait belt. Mobility restrictions: weakness. On regular, low phos diet, tolerating fair with encouragement. Doing better with her meals with set up.  Medications taken whole with fluids. On aspirin, plavix and heparin for DVT prophylaxis.  Skin: scattered bruising.  PD catheter RLQ dressing change done.  Oxygen: room air.  LDA: PIV RFA. Has been continent bladder. LBM 6/9.   Chair/bed alarms in use and call light in reach. Will monitor for safety.

## 2025-06-10 NOTE — PROGRESS NOTES
Addended by: SACHA LOZA on: 7/26/2021 12:39 PM     Modules accepted: Orders     Department of Physical Medicine & Rehabilitation  Progress Note    Patient Identification:  Jonelle Contreras  [id number]  : 1950  Admit date: 2025    Chief Complaint: Debility    Subjective:   No acute events overnight.   Patient seen this am sitting up in gym. Sleep improved overnight with increase in alprazolam and lack of alarms from PD. She reports appetite slightly improved today. We discussed feeding tube as this topic was raised by dietitian. Do not feel this would be a long term solution and she does not want to pursue at this time. Provided education on the importance of improving her nutritional status for recovery and long term outcomes.   Labs reviewed.     ROS: No f/c, n/v, cp     Objective:  Patient Vitals for the past 24 hrs:   BP Temp Temp src Pulse Resp SpO2   06/10/25 0845 131/73 98.2 °F (36.8 °C) Oral 84 16 91 %   06/10/25 0550 128/77 -- -- 83 -- --   25 2236 133/63 -- -- 76 -- --   25 2007 (!) 170/90 98.2 °F (36.8 °C) Oral 83 17 91 %   25 1734 (!) 178/81 -- -- 88 18 --     Const: Alert. No distress, pleasant.   HEENT: Normocephalic, atraumatic. Normal sclera/conjunctiva. MMM.   CV: Regular rate and rhythm.   Resp: No respiratory distress. Lungs CTAB.   Abd: Soft, nontender, nondistended, NABS+   Ext: No edema.   Neuro: Alert, oriented, appropriately interactive.   Psych: Cooperative, appropriate mood and affect    Laboratory data: Available via EMR.   Last 24 hour lab  Recent Results (from the past 24 hours)   Iron and TIBC    Collection Time: 06/10/25  6:00 AM   Result Value Ref Range    Iron 25 (L) 37 - 145 ug/dL    TIBC 189 (L) 260 - 445 ug/dL    Iron % Saturation 13 (L) 15 - 50 %   Ferritin    Collection Time: 06/10/25  6:00 AM   Result Value Ref Range    Ferritin 158.0 (H) 15.0 - 150.0 ng/mL       Therapy progress:  Physical therapy:  Bed Mobility:  Overall Assistance Level: Supervision  Additional Factors: Increased time to complete, Verbal cues, Head of bed

## 2025-06-10 NOTE — PROGRESS NOTES
Occupational Therapy  Facility/Department: [address] [facility]  Rehabilitation Occupational Therapy Daily AM and PM Treatment Note    Date: 6/10/25  Patient Name: Jonlele Contreras       Room: H2T-3434/3258-01  MRN: [id number]  Account: [id number]   : 1950  (74 y.o.) Gender: female           Additional Pertinent Hx: Pt is a 73 yo F admitted to ARU with debility after being hospitalized for hyperkalemia and hypertension, found to be in a hypertensive emergency. Pt has ESRD and is on PD. Bronc performed on 2025 for bilateral mucous plugs. Treated for pneumonia.        Past Medical History:  has a past medical history of acute intermittent porphyria, agoraphobic, Allergic rhinitis, Anxiety, Arthritis, Asthma, CAD (coronary artery disease), Chronic obstructive pulmonary disease, unspecified COPD type ([facility]), ckd 4, collagenous colitis, COPD (chronic obstructive pulmonary disease) ([facility]), Depression, Disease of blood and blood forming organ, GERD (gastroesophageal reflux disease), Heart disease, Hyperlipidemia, Hypertension, Left thyroid nodule, Migraines, Neuropathy, Osteoporosis, PAD (peripheral artery disease), Peripheral vascular disease, post menopausal, Prediabetes, Pulmonary nodules, Restless leg syndrome, STEMI involving oth coronary artery of inferior wall ([facility]), and Urinary incontinence.  Past Surgical History:   has a past surgical history that includes Hysterectomy; Cholecystectomy; Coronary angioplasty with stent (10/2013 [place]); Colonoscopy (2017); Colonoscopy (N/A, 2022); Upper gastrointestinal endoscopy (N/A, 2022); Cardiac surgery; Hysterectomy, vaginal; Upper gastrointestinal endoscopy (2022); Upper gastrointestinal endoscopy (N/A, 2025); and bronchoscopy (N/A, 2025).    Restrictions  Restrictions/Precautions: Fall Risk  Other Position/Activity Restrictions: PD nightly. dwayne TEDs daily.    Subjective  Subjective: Pt met in dept, reports slept better and PD

## 2025-06-11 LAB
BASOPHILS # BLD: 0.1 K/UL (ref 0–0.2)
BASOPHILS NFR BLD: 1.2 %
DEPRECATED RDW RBC AUTO: 15.7 % (ref 12.4–15.4)
EOSINOPHIL # BLD: 0.4 K/UL (ref 0–0.6)
EOSINOPHIL NFR BLD: 6.1 %
HCT VFR BLD AUTO: 27.6 % (ref 36–48)
HGB BLD-MCNC: 9 G/DL (ref 12–16)
LYMPHOCYTES # BLD: 1.3 K/UL (ref 1–5.1)
LYMPHOCYTES NFR BLD: 22.4 %
MCH RBC QN AUTO: 27.9 PG (ref 26–34)
MCHC RBC AUTO-ENTMCNC: 32.5 G/DL (ref 31–36)
MCV RBC AUTO: 86 FL (ref 80–100)
MONOCYTES # BLD: 0.7 K/UL (ref 0–1.3)
MONOCYTES NFR BLD: 11.7 %
NEUTROPHILS # BLD: 3.4 K/UL (ref 1.7–7.7)
NEUTROPHILS NFR BLD: 58.6 %
PHOSPHATE SERPL-MCNC: 4 MG/DL (ref 2.5–4.9)
PLATELET # BLD AUTO: 513 K/UL (ref 135–450)
PMV BLD AUTO: 7.7 FL (ref 5–10.5)
RBC # BLD AUTO: 3.21 M/UL (ref 4–5.2)
WBC # BLD AUTO: 5.7 K/UL (ref 4–11)

## 2025-06-11 PROCEDURE — 97535 SELF CARE MNGMENT TRAINING: CPT

## 2025-06-11 PROCEDURE — 6370000000 HC RX 637 (ALT 250 FOR IP): Performed by: PHYSICAL MEDICINE & REHABILITATION

## 2025-06-11 PROCEDURE — 1280000000 HC REHAB R&B

## 2025-06-11 PROCEDURE — 97110 THERAPEUTIC EXERCISES: CPT

## 2025-06-11 PROCEDURE — 94640 AIRWAY INHALATION TREATMENT: CPT

## 2025-06-11 PROCEDURE — A4722 DIALYS SOL FLD VOL > 1999CC: HCPCS | Performed by: PHYSICAL MEDICINE & REHABILITATION

## 2025-06-11 PROCEDURE — 84100 ASSAY OF PHOSPHORUS: CPT

## 2025-06-11 PROCEDURE — 90945 DIALYSIS ONE EVALUATION: CPT

## 2025-06-11 PROCEDURE — 6360000002 HC RX W HCPCS: Performed by: PHYSICAL MEDICINE & REHABILITATION

## 2025-06-11 PROCEDURE — 6370000000 HC RX 637 (ALT 250 FOR IP): Performed by: INTERNAL MEDICINE

## 2025-06-11 PROCEDURE — 6360000002 HC RX W HCPCS: Performed by: INTERNAL MEDICINE

## 2025-06-11 PROCEDURE — 85025 COMPLETE CBC W/AUTO DIFF WBC: CPT

## 2025-06-11 PROCEDURE — 2500000003 HC RX 250 WO HCPCS: Performed by: PHYSICAL MEDICINE & REHABILITATION

## 2025-06-11 PROCEDURE — 97530 THERAPEUTIC ACTIVITIES: CPT

## 2025-06-11 PROCEDURE — 94760 N-INVAS EAR/PLS OXIMETRY 1: CPT

## 2025-06-11 PROCEDURE — 97116 GAIT TRAINING THERAPY: CPT

## 2025-06-11 PROCEDURE — 36415 COLL VENOUS BLD VENIPUNCTURE: CPT

## 2025-06-11 PROCEDURE — 94669 MECHANICAL CHEST WALL OSCILL: CPT

## 2025-06-11 RX ADMIN — SEVELAMER CARBONATE 1600 MG: 800 TABLET, FILM COATED ORAL at 09:35

## 2025-06-11 RX ADMIN — ROPINIROLE HYDROCHLORIDE 1 MG: 1 TABLET, FILM COATED ORAL at 20:42

## 2025-06-11 RX ADMIN — SEVELAMER CARBONATE 1600 MG: 800 TABLET, FILM COATED ORAL at 17:28

## 2025-06-11 RX ADMIN — METOCLOPRAMIDE 5 MG: 10 TABLET ORAL at 15:41

## 2025-06-11 RX ADMIN — POLYETHYLENE GLYCOL 3350 17 G: 17 POWDER, FOR SOLUTION ORAL at 09:39

## 2025-06-11 RX ADMIN — SODIUM CHLORIDE, SODIUM LACTATE, CALCIUM CHLORIDE, MAGNESIUM CHLORIDE AND DEXTROSE 6000 ML: 1.5; 538; 448; 18.3; 5.08 INJECTION, SOLUTION INTRAPERITONEAL at 21:30

## 2025-06-11 RX ADMIN — ASPIRIN 81 MG 81 MG: 81 TABLET ORAL at 09:36

## 2025-06-11 RX ADMIN — METOCLOPRAMIDE 5 MG: 10 TABLET ORAL at 09:36

## 2025-06-11 RX ADMIN — SODIUM CHLORIDE, PRESERVATIVE FREE 10 ML: 5 INJECTION INTRAVENOUS at 09:40

## 2025-06-11 RX ADMIN — SENNOSIDES, DOCUSATE SODIUM 2 TABLET: 50; 8.6 TABLET, FILM COATED ORAL at 09:35

## 2025-06-11 RX ADMIN — FLUCONAZOLE 200 MG: 100 TABLET ORAL at 09:36

## 2025-06-11 RX ADMIN — HEPARIN SODIUM 5000 UNITS: 5000 INJECTION INTRAVENOUS; SUBCUTANEOUS at 06:26

## 2025-06-11 RX ADMIN — NIFEDIPINE 30 MG: 30 TABLET, FILM COATED, EXTENDED RELEASE ORAL at 17:28

## 2025-06-11 RX ADMIN — HEPARIN SODIUM 5000 UNITS: 5000 INJECTION INTRAVENOUS; SUBCUTANEOUS at 14:19

## 2025-06-11 RX ADMIN — LOSARTAN POTASSIUM 100 MG: 100 TABLET, FILM COATED ORAL at 20:42

## 2025-06-11 RX ADMIN — TRAMADOL HYDROCHLORIDE 50 MG: 50 TABLET, COATED ORAL at 02:27

## 2025-06-11 RX ADMIN — ALPRAZOLAM 2 MG: 0.5 TABLET ORAL at 20:40

## 2025-06-11 RX ADMIN — EPOETIN ALFA-EPBX 10000 UNITS: 10000 INJECTION, SOLUTION INTRAVENOUS; SUBCUTANEOUS at 18:54

## 2025-06-11 RX ADMIN — CLOPIDOGREL BISULFATE 75 MG: 75 TABLET, FILM COATED ORAL at 09:36

## 2025-06-11 RX ADMIN — POLYETHYLENE GLYCOL 3350 17 G: 17 POWDER, FOR SOLUTION ORAL at 20:40

## 2025-06-11 RX ADMIN — TIOTROPIUM BROMIDE AND OLODATEROL 2 PUFF: 3.124; 2.736 SPRAY, METERED RESPIRATORY (INHALATION) at 08:21

## 2025-06-11 RX ADMIN — CARVEDILOL 6.25 MG: 6.25 TABLET, FILM COATED ORAL at 17:28

## 2025-06-11 RX ADMIN — SODIUM CHLORIDE, SODIUM LACTATE, CALCIUM CHLORIDE, MAGNESIUM CHLORIDE AND DEXTROSE 2000 ML: 1.5; 538; 448; 18.3; 5.08 INJECTION, SOLUTION INTRAPERITONEAL at 21:30

## 2025-06-11 RX ADMIN — PANTOPRAZOLE SODIUM 40 MG: 40 TABLET, DELAYED RELEASE ORAL at 09:36

## 2025-06-11 RX ADMIN — PANTOPRAZOLE SODIUM 40 MG: 40 TABLET, DELAYED RELEASE ORAL at 20:40

## 2025-06-11 RX ADMIN — Medication 1 SPRAY: at 06:26

## 2025-06-11 RX ADMIN — TIZANIDINE 4 MG: 4 TABLET ORAL at 20:41

## 2025-06-11 RX ADMIN — HEPARIN SODIUM 5000 UNITS: 5000 INJECTION INTRAVENOUS; SUBCUTANEOUS at 21:30

## 2025-06-11 RX ADMIN — ROSUVASTATIN CALCIUM 10 MG: 10 TABLET, FILM COATED ORAL at 21:01

## 2025-06-11 RX ADMIN — ALPRAZOLAM 2 MG: 0.5 TABLET ORAL at 09:42

## 2025-06-11 RX ADMIN — SEVELAMER CARBONATE 1600 MG: 800 TABLET, FILM COATED ORAL at 12:08

## 2025-06-11 RX ADMIN — METOCLOPRAMIDE 5 MG: 10 TABLET ORAL at 06:26

## 2025-06-11 RX ADMIN — DIPHENHYDRAMINE HCL 25 MG: 25 TABLET ORAL at 00:09

## 2025-06-11 RX ADMIN — TROSPIUM CHLORIDE 20 MG: 20 TABLET, FILM COATED ORAL at 20:40

## 2025-06-11 ASSESSMENT — PAIN DESCRIPTION - FREQUENCY: FREQUENCY: INTERMITTENT

## 2025-06-11 ASSESSMENT — PAIN SCALES - GENERAL
PAINLEVEL_OUTOF10: 6
PAINLEVEL_OUTOF10: 0
PAINLEVEL_OUTOF10: 7

## 2025-06-11 ASSESSMENT — PAIN - FUNCTIONAL ASSESSMENT: PAIN_FUNCTIONAL_ASSESSMENT: PREVENTS OR INTERFERES WITH MANY ACTIVE NOT PASSIVE ACTIVITIES

## 2025-06-11 ASSESSMENT — PAIN DESCRIPTION - ORIENTATION
ORIENTATION: RIGHT;LOWER;MID
ORIENTATION: MID

## 2025-06-11 ASSESSMENT — PAIN SCALES - WONG BAKER
WONGBAKER_NUMERICALRESPONSE: NO HURT
WONGBAKER_NUMERICALRESPONSE: HURTS A LITTLE BIT

## 2025-06-11 ASSESSMENT — PAIN DESCRIPTION - LOCATION
LOCATION: ABDOMEN;BACK
LOCATION: ABDOMEN

## 2025-06-11 ASSESSMENT — PAIN DESCRIPTION - ONSET: ONSET: ON-GOING

## 2025-06-11 ASSESSMENT — PAIN DESCRIPTION - DESCRIPTORS
DESCRIPTORS: ACHING;SPASM
DESCRIPTORS: SORE

## 2025-06-11 ASSESSMENT — PAIN DESCRIPTION - PAIN TYPE: TYPE: CHRONIC PAIN

## 2025-06-11 NOTE — PROGRESS NOTES
Initial Drain Volume: 1 mL  Average Dwell Time: 96 minutes  Average Drain Time: 57 minutes  Total Therapy Volume: 7998 mL  Total UF: 9 mL  Day UF: 0 mL  Night UF: 9 mL  Total Therapy Time: 10 hr. 52 min.

## 2025-06-11 NOTE — PROGRESS NOTES
Occupational Therapy  Facility/Department: [address] REH[facility]  Rehabilitation Occupational Therapy Daily Treatment Note    Date: 25  Patient Name: Jonelle Contreras       Room: X2K-0032/3258-01  MRN: [id number]  Account: [id number]   : 1950  (74 y.o.) Gender: female           Additional Pertinent Hx: Pt is a 73 yo F admitted to ARU with debility after being hospitalized for hyperkalemia and hypertension, found to be in a hypertensive emergency. Pt has ESRD and is on PD. Bronc performed on 2025 for bilateral mucous plugs. Treated for pneumonia.        Past Medical History:  has a past medical history of acute intermittent porphyria, agoraphobic, Allergic rhinitis, Anxiety, Arthritis, Asthma, CAD (coronary artery disease), Chronic obstructive pulmonary disease, unspecified COPD type ([facility]), ckd 4, collagenous colitis, COPD (chronic obstructive pulmonary disease) ([facility]), Depression, Disease of blood and blood forming organ, GERD (gastroesophageal reflux disease), Heart disease, Hyperlipidemia, Hypertension, Left thyroid nodule, Migraines, Neuropathy, Osteoporosis, PAD (peripheral artery disease), Peripheral vascular disease, post menopausal, Prediabetes, Pulmonary nodules, Restless leg syndrome, STEMI involving oth coronary artery of inferior wall ([facility]), and Urinary incontinence.  Past Surgical History:   has a past surgical history that includes Hysterectomy; Cholecystectomy; Coronary angioplasty with stent (10/2013 [place]); Colonoscopy (2017); Colonoscopy (N/A, 2022); Upper gastrointestinal endoscopy (N/A, 2022); Cardiac surgery; Hysterectomy, vaginal; Upper gastrointestinal endoscopy (2022); Upper gastrointestinal endoscopy (N/A, 2025); and bronchoscopy (N/A, 2025).    Restrictions  Restrictions/Precautions: Fall Risk  Other Position/Activity Restrictions: PD nightly. dwayne TEDs daily.    Subjective  Subjective: Pt met in room, slept well and in no pain. Agreeable to  OT ADL. Pt connected to PD machine, cycle is complete. RN Luisana notified and present to disconnect.  Restrictions/Precautions: Fall Risk             Objective               ADL  Grooming/Oral Hygiene  Assistance Level: Modified independent  Skilled Clinical Factors: Pt in WC at sink applies deodorant and brushes teeth without cues.  Upper Extremity Bathing  Assistance Level: Increased time to complete;Set-up  Skilled Clinical Factors: OT covered IV site in R forearm and PD port in stomach to remain dry. Assist to manage HHSH. Pt prepares wash cloth herself. Pt shampoos hair with effort, Min A for thoroughness.  Lower Extremity Bathing  Equipment Provided: Long-handled sponge  Assistance Level: Increased time to complete;Verbal cues;Set-up;Stand by assist  Skilled Clinical Factors: Pt sitting on shower chair washes thighs with wash cloth, use of LH sponge for feet. In stance washed fantasma area and buttocks, able to reach all areas of buttocks and demo'd safe hand placement without cues. Pt requires assist to set-up water and intermittent assist to manage HHSH. Slow pace d/t fatigue.  Upper Extremity Dressing  Assistance Level: Set-up  Skilled Clinical Factors: Sitting on shower chair pt removes sweatshirt and dons clean sweatshirt.  Lower Extremity Dressing  Assistance Level: Stand by assist;Increased time to complete;Verbal cues  Skilled Clinical Factors: Pt in stance  to manage brief and pants down, seated unthreads w/ effort/increased time. Pt threads brief and pants with increased time and effort, in stance manages over hips, effortful. Cues for hand support on grab bar.  Putting On/Taking Off Footwear  Equipment Provided: Reachers  Assistance Level: Maximum assistance;Moderate assistance  Skilled Clinical Factors: Pt removes footie socks sitting on toilet.  Toileting  Assistance Level: Moderate assistance  Skilled Clinical Factors: Pt managed clothing before, seated voids urine and completes hygiene. Pt with no

## 2025-06-11 NOTE — PATIENT CARE CONFERENCE
[facility]  Inpatient Rehabilitation  Weekly Team Conference Note      Date: 2025  Patient Name:  Jonelle Contreras    MRN: [id number]  : 1950  Gender: Female  Physician: Dr. Chaim CLAIRE  Diagnosis: Debility [R53.81]    CASE MANAGEMENT  Assessment: Goal is home, alone, PD to be done at home, alone.  Agreeable to home care services and COA for , emergency response button and possible med transportation.       PHYSICAL THERAPY    Bed Mobility:  Overall Assistance Level: Supervision  Additional Factors: Increased time to complete, Head of bed flat, Without handrails (regular bed in ADL apartment)  Sit>supine:  Assistance Level: Supervision  Skilled Clinical Factors: increased time to bring bilateal LE into the bed  Supine>sit:  Assistance Level: Supervision  Skilled Clinical Factors: increased time to lift trunk from the bed    Transfers:  Surface: Wheelchair, From bed, To bed  Additional Factors: Verbal cues, Hand placement cues  Device: Walker (wheeled)  Sit>stand:  Assistance Level: Supervision  Skilled Clinical Factors: increased time and cueing for hand placement; min A progressing to CGA  Stand>sit:  Assistance Level: Supervision  Skilled Clinical Factors: cues for hand placement  Bed<>chair  Technique: Stand step  Assistance Level: Supervision  Skilled Clinical Factors: with wheeled walker  Car transfer:  Assistance Level: Stand by assist  Skilled Clinical Factors: verbal cues for safe hand placement    Ambulation:  Surface: Level surface  Device: Rolling walker  Distance: 90', 60' around therapy gym and ADL apartment  Activity: Within Unit  Activity Comments: decreased step length bilaterally, mildly Flexed posture with flexed hips and knees but improving. She tends to keep shoulders elevated and tensed, but improved with cueing. No complaints of SOB  Additional Factors: Increased time to complete  Assistance Level: Stand by assist, Supervision  Gait Deviations: Slow

## 2025-06-11 NOTE — PROGRESS NOTES
Comprehensive Nutrition Assessment    Type and Reason for Visit:  Reassess    Nutrition Recommendations/Plan:   Continue current diet with Expedite TID.     Malnutrition Assessment:  Malnutrition Status:  Severe malnutrition (06/11/25 0846)    Context:  Acute Illness     Findings of the 6 clinical characteristics of malnutrition:  Energy Intake:  50% or less of estimated energy requirements for 5 or more days  Weight Loss:  5% over 1 month     Body Fat Loss:  Mild body fat loss Triceps, Orbital   Muscle Mass Loss:  Mild muscle mass loss Temples (temporalis), Calf (gastrocnemius)  Fluid Accumulation:  Mild (trace) Extremities   Strength:  Not Performed    Nutrition Assessment:    FU - Pt. continues on a low phos diet. Diet acceptance is very poor. Pt. has declined supplemental TF at this time. Expedite cup offered TID. 5.3% total body weight loss this admission. Pt. remains on PD for ESRD. No new recommendations at this time. Discharge scheduled for 14th.    Nutrition Related Findings:    Na 134, Cr 8.0 - lowest Cr. has been this admission. LBM 6/9 Wound Type: None       Current Nutrition Intake & Therapies:    Average Meal Intake: 0%, 1-25%  Average Supplements Intake: %  ADULT DIET; Regular; Low Phosphorus (Less than 1000 mg)  ADULT ORAL NUTRITION SUPPLEMENT; Dinner, Breakfast, Lunch; Other Oral Supplement; Expedite Cup    Anthropometric Measures:  Height: 165.1 cm (5' 5\")  Ideal Body Weight (IBW): 125 lbs (57 kg)       Current Body Weight: 60.5 kg (133 lb 6.1 oz), 114.3 % IBW.    Current BMI (kg/m2): 22.2  Usual Body Weight: 63.9 kg (140 lb 14 oz)     % Weight Change (Calculated): -5.3  Weight Adjustment For: No Adjustment                 BMI Categories: Normal Weight (BMI 22.0 to 24.9) age over 65    Estimated Daily Nutrient Needs:  Energy Requirements Based On: Kcal/kg  Weight Used for Energy Requirements: Current  Energy (kcal/day): 7397-3531 kcal (28-32 kcal/kg)  Weight Used for Protein Requirements:  Current  Protein (g/day):  g (1.5-1.8 g/kg)  Method Used for Fluid Requirements: 1 ml/kcal  Fluid (ml/day): Or per provider    Nutrition Diagnosis:   Increased nutrient needs related to increase demand for energy/nutrients as evidenced by dialysis, rehab for strength and conditioning    Nutrition Interventions:   Food and/or Nutrient Delivery: Continue Current Diet, Continue Oral Nutrition Supplement  Nutrition Education/Counseling:  (Monitor need)  Coordination of Nutrition Care: Continue to monitor while inpatient       Goals:  Goals: PO intake 75% or greater  Type of Goal: Continue current goal  Previous Goal Met: No Progress toward Goal(s)    Nutrition Monitoring and Evaluation:   Behavioral-Environmental Outcomes: None Identified  Food/Nutrient Intake Outcomes: Food and Nutrient Intake, Supplement Intake  Physical Signs/Symptoms Outcomes: Biochemical Data, GI Status, Meal Time Behavior, Nutrition Focused Physical Findings    Discharge Planning:    Continue current diet, Coordination of community care     Ely Mello RD  Contact: [phone]

## 2025-06-11 NOTE — PROGRESS NOTES
Patient ID: Jonelle Contreras  Referring/ Physician: Suzy Rucker MD      Summary:   Jonelle Contreras is being seen by nephrology for ESRD on peritoneal dialysis. .     Reason for admission: rehab for generalized weakness after hospital admission for pneumonia and hypertensive urgency.       Interval Hx:     Patient was seen and examined in therapy   She is awake and alert.  Having a good day with therapy and appetite.   No issues with cycler.     Blood pressure 136/86      Assessment/Plan:   - continue PD ,with 1.5% solutions. No fluid overload.   - solute clearance acceptable.  -  procrit to TIW.   - TSAT low ferritin 158, will load with IV iron 200 mg x 3 doses.      If she discharges home, will be able to do peritoneal dialysis at home with manual exchanges until her cycle gets there.  If she discharges to a SNF that can do PD we have already had the supplies shipped to there but will not arrive until Tuesday.    ESRD  Renal failure due to hypertensive kidney disease.   Prior to admission she was doing manual exchanges at home.   This admission she was started on a cycler.   Now she is getting 3 exchanges. No LBF.    -daily BM   -daily gent to PD exit site     SHPT  , due to renal origin.   Ca and phos wnl  On renvela 1600 mg TID with meals.     Labile hypertension  Long standing issue with difficulty in controlling her BP due to severe orthostatic hypotension.   Currently aiming for SBP < 180 when supine and > 140 prior to therapy to accommodate large changes in BP during ambulating.    - coreg 6.25 mg BID   - nifedipine 30 mg with dinner , hold < 140    - losartan 100 mg at night, hold < 140       H/o porphyria  Managed by Dr Fuentes.   No acute issues.   Counseled to avoid smoking once she is discharged.   Check iron stores.     Anemia   Hgb goal > 10   On procrit 10 k units weekly.   Check iron stores.         [facility] would like to thank you for the

## 2025-06-11 NOTE — PROGRESS NOTES
When taking HS vital pt's O2 sat noted to be 82-84% on RA while sleeping in bed. When patient awoken O2 would rise to 90 but as soon as she drifted back to sleep O2 was back down in the lower 80s. Educated patient on need for possible O2 while sleeping and patient very reluctant to wear it, stated \"I don't care, I don't want to wear it it don't like it\". Education provided again and patient eventually agreeable to wear O2. Placed on 1.5L to get sat > 90% consistently. Will continue to assess for need. Kaye Ruiz RN

## 2025-06-11 NOTE — PROGRESS NOTES
Physical Therapy  Facility/Department: [address] REHAB  Rehabilitation Physical Therapy Treatment Note    NAME: Jonelle Contreras  : 1950 (74 y.o.)  MRN: [id number]  CODE STATUS: Full Code    Date of Service: 25       Restrictions:  Restrictions/Precautions: Fall Risk  Position Activity Restriction  Other Position/Activity Restrictions: PD nightly. dwayne TEDs daily.     Pertinent medical information:  Additional Pertinent Hx: per Dr. Drummond's note, \"74-year-old female who came to hospital with hyperkalemia and hypertension, found to be in a hypertensive emergency.  Her blood pressure was brought down and the patient is following with nephrology and cardiology.  Patient has ESRD and is on PD.  Patient also came in with a cough and underwent a bronc on 2025 for bilateral mucous plugs.  Patient was treated for pneumonia and started on IV antibiotics for 7 days. \"    SUBJECTIVE  Subjective: Patient arrived in wheelchair and is in good spirits and reports she slept okay last night  Pain: no complaints of pain at this time    Social/Functional History  Lives With: Alone  Type of Home: House  Home Layout: Work area in basement, Two level, Laundry in basement (ranch with finished basement)  Home Access: Stairs to enter with rails  Entrance Stairs - Number of Steps: 4 CONSTANCE front door with L handrail.  Entrance Stairs - Rails: Left  Bathroom Shower/Tub: Tub/Shower unit  Bathroom Toilet: Handicap height  Bathroom Equipment: Tub transfer bench (pt reports she turns the TTB longways so it fits entirely in tub shower unit.)  Bathroom Accessibility: Accessible  Home Equipment: Walker - Rolling (Walker (3 wheel).)  Has the patient had two or more falls in the past year or any fall with injury in the past year?: No (pt denies fall at home within .)  Receives Help From: Neighbor  Prior Level of Assist for ADLs: Independent  Prior Level of Assist for Homemaking: Independent (Family/neighbor assists PRN although pt  reporting she is typically IND cleaning, cooking, laundry, yard work, finances and med management.)  Homemaking Responsibilities: Yes  Meal Prep Responsibility: Primary  Laundry Responsibility: Primary  Cleaning Responsibility: Primary  Bill Paying/Finance Responsibility: Primary  Shopping Responsibility: Primary  Health Care Management: Primary  Prior Level of Assist for Ambulation: Independent household ambulator, with or without device, Independent community ambulator, with or without device (recently Walker PRN (if fatigued, etc))  Prior Level of Assist for Transfers: Independent  Active : Yes  Mode of Transportation: Car  Occupation: Retired  Type of Occupation: Manager for [employer]  Leisure & Hobbies: Enjoys taking walks, gardening, being outside.      OBJECTIVE  Cognition  Overall Cognitive Status: Exceptions  Safety Judgement: Decreased awareness of need for safety  Initiation: Requires cues for some  Cognition Comment: delayed processing and responses but improving, flat affect, monitor for dizziness during standing tasks as pt communicates minimally  Orientation  Overall Orientation Status: Within Normal Limits  Orientation Level: Oriented X4    Functional Mobility  Bed Mobility  Overall Assistance Level: Supervision  Additional Factors: Increased time to complete;Head of bed flat;Without handrails (regular bed in ADL apartment)  Roll Left  Assistance Level: Supervision  Roll Right  Assistance Level: Supervision  Sit to Supine  Assistance Level: Supervision  Skilled Clinical Factors: increased time to bring bilateral LE into the bed  Supine to Sit  Assistance Level: Supervision  Skilled Clinical Factors: increased time to lift trunk from the bed  Scooting  Assistance Level: Supervision    Balance  Sitting Balance: Modified independent   Standing Balance: Stand by assistance (with UE support on walker)    Transfers  Surface: Wheelchair;From bed;To bed  Additional Factors: Verbal cues;Hand

## 2025-06-11 NOTE — PROGRESS NOTES
Department of Physical Medicine & Rehabilitation  Progress Note    Patient Identification:  Jonelle Contreras  [id number]  : 1950  Admit date: 2025    Chief Complaint: Debility    Subjective:   Seen in her room this morning.  No new complaints overnight.  Patient is making progress in therapy daily.  She is happy with her care so far in the rehab unit.  Slept well last night.    ROS: No f/c, n/v, cp     Objective:  Patient Vitals for the past 24 hrs:   BP Temp Temp src Pulse Resp SpO2 Height Weight   25 0820 136/86 97.3 °F (36.3 °C) Oral 77 16 93 % 1.651 m (5' 5\") 60.2 kg (132 lb 11.5 oz)   257 -- -- -- -- 16 -- -- --   25 -- -- -- -- -- 93 % -- --   25 -- -- -- -- -- 95 % -- --   06/10/25 2033 -- -- -- -- -- 90 % -- --   06/10/25 2030 -- -- -- -- -- (!) 88 % -- --   06/10/25 2029 (!) 163/82 98.6 °F (37 °C) Oral 80 17 (!) 82 % -- --   06/10/25 1819 (!) 165/84 -- -- 88 -- -- -- --   06/10/25 1400 (!) 150/86 -- -- 92 -- -- -- --     Const: Alert. No distress, pleasant.   HEENT: Normocephalic, atraumatic. Normal sclera/conjunctiva. MMM.   CV: Regular rate and rhythm.   Resp: No respiratory distress. Lungs CTAB.   Abd: Soft, nontender, nondistended, NABS+   Ext: No edema.   Neuro: Alert, oriented, appropriately interactive.   Psych: Cooperative, appropriate mood and affect    Laboratory data: Available via EMR.   Last 24 hour lab  Recent Results (from the past 24 hours)   CBC with Auto Differential    Collection Time: 25  5:28 AM   Result Value Ref Range    WBC 5.7 4.0 - 11.0 K/uL    RBC 3.21 (L) 4.00 - 5.20 M/uL    Hemoglobin 9.0 (L) 12.0 - 16.0 g/dL    Hematocrit 27.6 (L) 36.0 - 48.0 %    MCV 86.0 80.0 - 100.0 fL    MCH 27.9 26.0 - 34.0 pg    MCHC 32.5 31.0 - 36.0 g/dL    RDW 15.7 (H) 12.4 - 15.4 %    Platelets 513 (H) 135 - 450 K/uL    MPV 7.7 5.0 - 10.5 fL    Neutrophils % 58.6 %    Lymphocytes % 22.4 %    Monocytes % 11.7 %    Eosinophils % 6.1 %    Basophils %  Patient ambulated 60', initiated with single point cane but advanced to no device. patient uses very short steps bilaterally and is very tense and gaurded with the walker. She could not follow cueing to increase step length and relax her shoulder. Once cane was removed, she improved bilateral step length but was slighty unsteady.  She wsa CGA for balance  Stairs:  Stair Height: 6''  Device: Bilateral handrails  Number of Stairs: 4  Additional Factors: Reciprocal going up, Reciprocal going down  Assistance Level: Stand by assist  Skilled Clinical Factors: Patient was able to complete with steady balance with bilateral rails. she reports her steps at home do not have a rail down the steps, but there is a railing at the top of the steps, around the porch.  Patient reports she is able to get hands to the railing at the top for support.  Curb:  Curb Height: 6''  Device: Rolling walker  Number of Curbs: 1  Additional Factors: Verbal cues  Assistance Level: Contact guard assist  Skilled Clinical Factors: Patient with poor safety and did not recall previously doing activity. She required cueing for walker management and safe sequencing. Able to complete with CGA  Wheelchair:       Occupational therapy:   Feeding     Grooming/Oral Hygiene  Assistance Level: Modified independent  Skilled Clinical Factors: Pt in WC at sink applies deodorant and brushes teeth without cues.  UE Bathing  Assistance Level: Increased time to complete, Supervision  Skilled Clinical Factors: OT covered IV site in R forearm and PD port in stomach to remain dry. Assist to manage HHSH. Pt prepares wash cloth herself. Pt shampoos hair with effort, Min A for thoroughness.  LE Bathing  Equipment Provided: Long-handled sponge  Assistance Level: Increased time to complete, Verbal cues, Set-up, Stand by assist  Skilled Clinical Factors: Pt sitting on shower chair washes thighs with wash cloth, use of LH sponge for feet. In stance washed fantasma area and  function    CAD  -continue ASA, Plavix, bb, arb, statin    HLD  -continue rosuvastatin    COPD/asthma  -continue Stiolto, Duonebs    GERD  -continue pantoprazole    RLS  -continue ropinirole    Anxiety  -continue prn alprazolam (dose increased to home dose of 2 mg and patient reports improvement)    Bladder: H/o overactive bladder, high risk retention and incontinence  -Monitor PVRs, straight cath prn >400  -continue trospium    Bowel  -High risk constipation  -continue PRN bowel regimen    Pain control  -continue tramadol and tizanidine prn    DVT ppx  -continue heparin    Rehab Progress: Making gradual progress. Overall SBA with occasional CGA for mobility. Supervision-modA for ADLs. Barriers include: weakness, endurance, balance, nutrition.   Anticipated Dispo: home (possibly to daughter's home initially) vs SNF -- D/w SW. Some SNFs willing to accept with PD but would need training and PD company is unable to provide training until next week. I do anticipate will be difficult to get insurance approval for SNF based on current functional levels.   Services: [facility] PT, OT, RN  DME: RAKESH ANDREW: 6/14      Bon Drummond D.O. M.P.H  PM&R  6/11/2025  10:25 AM

## 2025-06-11 NOTE — PLAN OF CARE
Problem: Discharge Planning  Goal: Discharge to home or other facility with appropriate resources  6/11/2025 1035 by Luisana Terrazas RN  Outcome: Progressing  Flowsheets (Taken 6/11/2025 0820)  Discharge to home or other facility with appropriate resources:   Identify barriers to discharge with patient and caregiver   Arrange for needed discharge resources and transportation as appropriate   Identify discharge learning needs (meds, wound care, etc)   Refer to discharge planning if patient needs post-hospital services based on physician order or complex needs related to functional status, cognitive ability or social support system     Problem: Safety - Adult  Goal: Free from fall injury  6/11/2025 1035 by Luisana Terrazas RN  Outcome: Progressing  Flowsheets (Taken 6/11/2025 0925)  Free From Fall Injury: Instruct family/caregiver on patient safety     Problem: Skin/Tissue Integrity  Goal: Skin integrity remains intact  Description: 1.  Monitor for areas of redness and/or skin breakdown2.  Assess vascular access sites hourly3.  Every 4-6 hours minimum:  Change oxygen saturation probe site4.  Every 4-6 hours:  If on nasal continuous positive airway pressure, respiratory therapy assess nares and determine need for appliance change or resting period  6/11/2025 1035 by Luisana Terrazas RN  Outcome: Progressing  Flowsheets  Taken 6/11/2025 0925  Skin Integrity Remains Intact: Monitor for areas of redness and/or skin breakdown      Problem: Pain  Goal: Verbalizes/displays adequate comfort level or baseline comfort level  6/11/2025 1035 by Luisana Terrazas RN  Outcome: Progressing  Flowsheets (Taken 6/11/2025 0820)  Verbalizes/displays adequate comfort level or baseline comfort level:   Encourage patient to monitor pain and request assistance   Assess pain using appropriate pain scale   Administer analgesics based on type and severity of pain and evaluate response   Implement non-pharmacological measures as appropriate

## 2025-06-11 NOTE — PROGRESS NOTES
Pt refused all HHN tx this morning but did take MDI Stiolto. Pt stated she \"felt fine\" and didn't feel like she needed the HHN.

## 2025-06-11 NOTE — PROGRESS NOTES
Patient admitted to rehab with debility d/t acute hypoxic respiratory failure with mucus plugging.  A/Ox4. Transfers with walker x1. Mobility restrictions: generalized weakness. On regular, low phosphorus, supplement diet, tolerating well, poor appetite. Medications taken whole with thins. On aspirin, plavix, heparin for DVT prophylaxis.  Skin: scattered bruising. Oxygen: RA. LDA: PIV RFA, PD cath. Has been continent of bowel and continent of bladder. LBM 6/9/25. Chair/bed alarms in use and call light in reach. Will monitor for safety. Electronically signed by SKYLER MARKS RN on 6/11/2025 at 11:35 AM

## 2025-06-11 NOTE — PROGRESS NOTES
Resting quietly in bed, respirations even/easy. Patient admitted with debility s/p respiratory failure. Is AAOx4 but forgetful with delayed responses at times, worse overnight. Ambulates with a walker x1, used the BSC and was SBA via stand pivot. Did require extra time to get from laying to sitting and required assistance to reposition in bed. Reports fatigue. Lungs clear but diminished. Congested cough at times, patient refused HS Mucinex. HR regular. Abdomen non tender with active bowel sounds. Poor PO intake.  Has been continent of  bladder. Receives PD nightly. Patient with slow drain alarms on every draining cycle so far. LBM for patient was on 6/9. Refused PRN lactulose. Patient with c/o generalized pain and medicated per PRN orders. Also medicated with PRN benadryl for sleep. Encouraged to call for all needs, call light remains in reach at all times. Bed alarm active. Kaye Ruiz RN

## 2025-06-11 NOTE — PLAN OF CARE
Problem: Safety - Adult  Goal: Free from fall injury  6/10/2025 2337 by Kaye Ruiz, RN  Outcome: Progressing  Flowsheets (Taken 6/10/2025 2337)  Free From Fall Injury:   Instruct family/caregiver on patient safety   Based on caregiver fall risk screen, instruct family/caregiver to ask for assistance with transferring infant if caregiver noted to have fall risk factors  Note: Pt educated on falls precautions and safety. Call light and personal belongings within reach at all times. Non skid socks in use when up. Hourly rounding and alarms active.      Problem: Pain  Goal: Verbalizes/displays adequate comfort level or baseline comfort level  6/10/2025 2337 by Kyae Ruiz, RN  Outcome: Progressing  Flowsheets (Taken 6/10/2025 2337)  Verbalizes/displays adequate comfort level or baseline comfort level:   Encourage patient to monitor pain and request assistance   Administer analgesics based on type and severity of pain and evaluate response   Consider cultural and social influences on pain and pain management  Note: Able to rate pain using a 1-10 scale, medicated per prn orders, see MAR. Able to verbalize a reduction in pain and/or able to fall asleep and remain asleep without any s/s of pain

## 2025-06-11 NOTE — CARE COORDINATION
Met with patient today to discuss DC planning.  Informed her we will have another conference for her tomorrow.   She reports her plan is to return home, alone , and is practicing lifting weights so she can do PD at home.    Suggestion made and showed her pictures of possibly buying a cart that she could roll the bags from her dining room to her bedroom where she takes the PD.    Suggestion made that she have the collection bag on the bottom of the cart (closest to the floor) so she can wheel the cart into the bathroom and empty into the toilet.   Suggestion made to alleviate the heavy bag, empty some of the liquid into a bucket that is easier to manage to empty into the toilet.     SAKINA Glass     Case Management   [phone]    6/11/2025  3:40 PM

## 2025-06-12 LAB
ALBUMIN SERPL-MCNC: 2.4 G/DL (ref 3.4–5)
ANION GAP SERPL CALCULATED.3IONS-SCNC: 16 MMOL/L (ref 3–16)
BUN SERPL-MCNC: 63 MG/DL (ref 7–20)
CALCIUM SERPL-MCNC: 9.4 MG/DL (ref 8.3–10.6)
CHLORIDE SERPL-SCNC: 96 MMOL/L (ref 99–110)
CO2 SERPL-SCNC: 23 MMOL/L (ref 21–32)
CREAT SERPL-MCNC: 8.1 MG/DL (ref 0.6–1.2)
GFR SERPLBLD CREATININE-BSD FMLA CKD-EPI: 5 ML/MIN/{1.73_M2}
GLUCOSE SERPL-MCNC: 90 MG/DL (ref 70–99)
PHOSPHATE SERPL-MCNC: 4.3 MG/DL (ref 2.5–4.9)
POTASSIUM SERPL-SCNC: 4.4 MMOL/L (ref 3.5–5.1)
SODIUM SERPL-SCNC: 135 MMOL/L (ref 136–145)

## 2025-06-12 PROCEDURE — 6370000000 HC RX 637 (ALT 250 FOR IP): Performed by: PHYSICAL MEDICINE & REHABILITATION

## 2025-06-12 PROCEDURE — 94640 AIRWAY INHALATION TREATMENT: CPT

## 2025-06-12 PROCEDURE — 97110 THERAPEUTIC EXERCISES: CPT

## 2025-06-12 PROCEDURE — 6360000002 HC RX W HCPCS: Performed by: PHYSICAL MEDICINE & REHABILITATION

## 2025-06-12 PROCEDURE — 97116 GAIT TRAINING THERAPY: CPT

## 2025-06-12 PROCEDURE — 9990000010 HC NO CHARGE VISIT

## 2025-06-12 PROCEDURE — 6370000000 HC RX 637 (ALT 250 FOR IP): Performed by: INTERNAL MEDICINE

## 2025-06-12 PROCEDURE — 36415 COLL VENOUS BLD VENIPUNCTURE: CPT

## 2025-06-12 PROCEDURE — 2700000000 HC OXYGEN THERAPY PER DAY

## 2025-06-12 PROCEDURE — 97535 SELF CARE MNGMENT TRAINING: CPT

## 2025-06-12 PROCEDURE — 90945 DIALYSIS ONE EVALUATION: CPT

## 2025-06-12 PROCEDURE — 80069 RENAL FUNCTION PANEL: CPT

## 2025-06-12 PROCEDURE — 1280000000 HC REHAB R&B

## 2025-06-12 PROCEDURE — 97530 THERAPEUTIC ACTIVITIES: CPT

## 2025-06-12 PROCEDURE — 94760 N-INVAS EAR/PLS OXIMETRY 1: CPT

## 2025-06-12 PROCEDURE — 94669 MECHANICAL CHEST WALL OSCILL: CPT

## 2025-06-12 PROCEDURE — A4722 DIALYS SOL FLD VOL > 1999CC: HCPCS | Performed by: PHYSICAL MEDICINE & REHABILITATION

## 2025-06-12 PROCEDURE — 2580000003 HC RX 258: Performed by: PHYSICAL MEDICINE & REHABILITATION

## 2025-06-12 RX ORDER — POLYETHYLENE GLYCOL 3350 17 G
2 POWDER IN PACKET (EA) ORAL EVERY 6 HOURS PRN
Status: DISCONTINUED | OUTPATIENT
Start: 2025-06-12 | End: 2025-06-20 | Stop reason: HOSPADM

## 2025-06-12 RX ADMIN — LACTULOSE 30 G: 10 SOLUTION ORAL at 10:04

## 2025-06-12 RX ADMIN — METOCLOPRAMIDE 5 MG: 10 TABLET ORAL at 08:59

## 2025-06-12 RX ADMIN — TIOTROPIUM BROMIDE AND OLODATEROL 2 PUFF: 3.124; 2.736 SPRAY, METERED RESPIRATORY (INHALATION) at 09:14

## 2025-06-12 RX ADMIN — PANTOPRAZOLE SODIUM 40 MG: 40 TABLET, DELAYED RELEASE ORAL at 20:28

## 2025-06-12 RX ADMIN — Medication 4 ML: at 09:04

## 2025-06-12 RX ADMIN — HEPARIN SODIUM 5000 UNITS: 5000 INJECTION INTRAVENOUS; SUBCUTANEOUS at 20:28

## 2025-06-12 RX ADMIN — SODIUM CHLORIDE, SODIUM LACTATE, CALCIUM CHLORIDE, MAGNESIUM CHLORIDE AND DEXTROSE 6000 ML: 1.5; 538; 448; 18.3; 5.08 INJECTION, SOLUTION INTRAPERITONEAL at 21:27

## 2025-06-12 RX ADMIN — NIFEDIPINE 30 MG: 30 TABLET, FILM COATED, EXTENDED RELEASE ORAL at 17:53

## 2025-06-12 RX ADMIN — SEVELAMER CARBONATE 1600 MG: 800 TABLET, FILM COATED ORAL at 13:51

## 2025-06-12 RX ADMIN — CARVEDILOL 6.25 MG: 6.25 TABLET, FILM COATED ORAL at 09:00

## 2025-06-12 RX ADMIN — FLUCONAZOLE 200 MG: 100 TABLET ORAL at 08:58

## 2025-06-12 RX ADMIN — ASPIRIN 81 MG 81 MG: 81 TABLET ORAL at 08:59

## 2025-06-12 RX ADMIN — ROPINIROLE HYDROCHLORIDE 1 MG: 1 TABLET, FILM COATED ORAL at 20:28

## 2025-06-12 RX ADMIN — HEPARIN SODIUM 5000 UNITS: 5000 INJECTION INTRAVENOUS; SUBCUTANEOUS at 13:51

## 2025-06-12 RX ADMIN — CLOPIDOGREL BISULFATE 75 MG: 75 TABLET, FILM COATED ORAL at 08:59

## 2025-06-12 RX ADMIN — POLYETHYLENE GLYCOL 3350 17 G: 17 POWDER, FOR SOLUTION ORAL at 08:59

## 2025-06-12 RX ADMIN — ALPRAZOLAM 2 MG: 0.5 TABLET ORAL at 08:58

## 2025-06-12 RX ADMIN — SEVELAMER CARBONATE 1600 MG: 800 TABLET, FILM COATED ORAL at 17:53

## 2025-06-12 RX ADMIN — ROSUVASTATIN CALCIUM 10 MG: 10 TABLET, FILM COATED ORAL at 20:28

## 2025-06-12 RX ADMIN — HEPARIN SODIUM 5000 UNITS: 5000 INJECTION INTRAVENOUS; SUBCUTANEOUS at 06:17

## 2025-06-12 RX ADMIN — SODIUM CHLORIDE, SODIUM LACTATE, CALCIUM CHLORIDE, MAGNESIUM CHLORIDE AND DEXTROSE 2000 ML: 1.5; 538; 448; 18.3; 5.08 INJECTION, SOLUTION INTRAPERITONEAL at 21:27

## 2025-06-12 RX ADMIN — CARVEDILOL 6.25 MG: 6.25 TABLET, FILM COATED ORAL at 17:53

## 2025-06-12 RX ADMIN — TRAMADOL HYDROCHLORIDE 50 MG: 50 TABLET, COATED ORAL at 04:23

## 2025-06-12 RX ADMIN — TROSPIUM CHLORIDE 20 MG: 20 TABLET, FILM COATED ORAL at 20:28

## 2025-06-12 RX ADMIN — SEVELAMER CARBONATE 1600 MG: 800 TABLET, FILM COATED ORAL at 08:59

## 2025-06-12 RX ADMIN — METOCLOPRAMIDE 5 MG: 10 TABLET ORAL at 13:51

## 2025-06-12 RX ADMIN — IPRATROPIUM BROMIDE AND ALBUTEROL SULFATE 1 DOSE: .5; 2.5 SOLUTION RESPIRATORY (INHALATION) at 08:54

## 2025-06-12 RX ADMIN — SENNOSIDES, DOCUSATE SODIUM 2 TABLET: 50; 8.6 TABLET, FILM COATED ORAL at 08:59

## 2025-06-12 RX ADMIN — METOCLOPRAMIDE 5 MG: 10 TABLET ORAL at 17:53

## 2025-06-12 RX ADMIN — PANTOPRAZOLE SODIUM 40 MG: 40 TABLET, DELAYED RELEASE ORAL at 08:59

## 2025-06-12 RX ADMIN — Medication 4 ML: at 21:15

## 2025-06-12 RX ADMIN — IPRATROPIUM BROMIDE AND ALBUTEROL SULFATE 1 DOSE: .5; 2.5 SOLUTION RESPIRATORY (INHALATION) at 21:15

## 2025-06-12 RX ADMIN — ALPRAZOLAM 2 MG: 0.5 TABLET ORAL at 20:28

## 2025-06-12 ASSESSMENT — PAIN DESCRIPTION - DESCRIPTORS: DESCRIPTORS: ACHING

## 2025-06-12 ASSESSMENT — PAIN SCALES - GENERAL
PAINLEVEL_OUTOF10: 0
PAINLEVEL_OUTOF10: 7

## 2025-06-12 ASSESSMENT — PAIN SCALES - WONG BAKER: WONGBAKER_NUMERICALRESPONSE: NO HURT

## 2025-06-12 ASSESSMENT — PAIN DESCRIPTION - LOCATION: LOCATION: ABDOMEN;BACK;NECK

## 2025-06-12 ASSESSMENT — PAIN - FUNCTIONAL ASSESSMENT: PAIN_FUNCTIONAL_ASSESSMENT: PREVENTS OR INTERFERES WITH MANY ACTIVE NOT PASSIVE ACTIVITIES

## 2025-06-12 ASSESSMENT — PAIN DESCRIPTION - ORIENTATION: ORIENTATION: LOWER;MID

## 2025-06-12 NOTE — PROGRESS NOTES
Occupational Therapy  OCCUPATIONAL THERAPY  Progress Note   Second Session    Patient Name: Jonelle Contreras  Medical Record Number: [id number]    Treatment Diagnosis: impaired functional mobility with poor endurance    General  Chart Reviewed: Yes, Orders, Progress Notes  Patient assessed for rehabilitation services?: Yes  Additional Pertinent Hx: Pt is a 73 yo F admitted to ARU with debility after being hospitalized for hyperkalemia and hypertension, found to be in a hypertensive emergency. Pt has ESRD and is on PD. Bronc performed on 5/23/2025 for bilateral mucous plugs. Treated for pneumonia.  Family / Caregiver Present: No  Referring Practitioner: Bon Drummond MD  Diagnosis: debility     Restrictions/Precautions  Restrictions/Precautions: Fall Risk  Activity Level: Up with Assist        Position Activity Restriction  Other Position/Activity Restrictions: PD nightly. dwayne TEDs daily.    Subjective: PT states that pt is fatigued with low endurance during session, pt states she is \"just worn out'      Objective:     Pt shown alternative way to empty dialysis waste bag while at home. Sit<>stand wc<>toilet CGA-min A. Able to push rollator with 13 lbs in push box on seat. Increased time and vcs to ambulate short distance wc<>toilet with TSF, vcs for hand/body placement. Once seated, able to verbalize steps of emptying waste bag into tub, individually lifted and placed each 3 lb weight onto TTB, vcs for body mechanics, increased time to complete.     Pt soiled brief with BM, taken back to her room. CGA-min A sit<>stand, SPT wc<>toilet with Gbs, Max A for LB dressing, footwear, and toileting hygiene. pt able to manage clothing down with increased time and min A for managing soiled brief down. Max A for toileting hygiene, but pt attempted to manage front fantasma area in sitting/standing.Therapist doffed brief,pants, compression socks, and nonslip socks. Pt able to don R nonslip sock while seated with figure 4 method.

## 2025-06-12 NOTE — PROGRESS NOTES
Occupational Therapy  Facility/Department: [address] REHAB  Rehabilitation Occupational Therapy Daily Treatment Note    Date: 25  Patient Name: Jonelle Contreras       Room: D7H-6104/3258-01  MRN: [id number]  Account: [id number]   : 1950  (74 y.o.) Gender: female          Past Medical History:  has a past medical history of acute intermittent porphyria, agoraphobic, Allergic rhinitis, Anxiety, Arthritis, Asthma, CAD (coronary artery disease), Chronic obstructive pulmonary disease, unspecified COPD type ([facility]), ckd 4, collagenous colitis, COPD (chronic obstructive pulmonary disease) ([facility]), Depression, Disease of blood and blood forming organ, GERD (gastroesophageal reflux disease), Heart disease, Hyperlipidemia, Hypertension, Left thyroid nodule, Migraines, Neuropathy, Osteoporosis, PAD (peripheral artery disease), Peripheral vascular disease, post menopausal, Prediabetes, Pulmonary nodules, Restless leg syndrome, STEMI involving oth coronary artery of inferior wall ([facility]), and Urinary incontinence.  Past Surgical History:   has a past surgical history that includes Hysterectomy; Cholecystectomy; Coronary angioplasty with stent (10/2013 [place]); Colonoscopy (2017); Colonoscopy (N/A, 2022); Upper gastrointestinal endoscopy (N/A, 2022); Cardiac surgery; Hysterectomy, vaginal; Upper gastrointestinal endoscopy (2022); Upper gastrointestinal endoscopy (N/A, 2025); and bronchoscopy (N/A, 2025).    Restrictions  Restrictions/Precautions: Fall Risk  Other Position/Activity Restrictions: PD nightly. dwayne TEDs daily.    Subjective  Subjective: Pt met BS, lying in bed and still on dialysis. Per RN, ok to see BS, complete bedside activities, ex's. RN in room initially and pt c/o shaking, \"anxious\".  Pt also c/o L arm pain (pointing to tricep/deltoid), 7/10 on pain scale, states \"feels like someone gave me a shot\".  Restrictions/Precautions: Fall Risk      Objective    education & training;Equipment evaluation, education, & procurement;Self-Care / ADL;Home management training    Goals  Patient Goals   Patient goals : \"to get back to the way I was before\"  Short Term Goals  Time Frame for Short Term Goals: 1 weeks  Short Term Goal 1: Pt will complete bathing SBA using AE PRN - MET  Short Term Goal 2: Pt will complete dressing with setup SBA using AE PRN (with exception of TEDs) - MET  Short Term Goal 3: Pt will complete toileting SBA - MET  Short Term Goal 4: Pt will maintain stance throughout fxl task for at least 5-8 minutes SBA to address strength/activity tolerance for ADL/IADLs MET  Short Term Goal 5: Pt will complete fxl mobility/txs using LRAD SBA  MET  Short Term Goal 6: Pt will perform BUE HEP IND to address strength/activity tolerance for fxl performance - MET  Long Term Goals  Time Frame for Long Term Goals : 2 weeks  Long Term Goal 1: Pt will complete bathing mod I using AE PRN  Long Term Goal 2: Pt will complete dressing mod I using AE PRN  Long Term Goal 3: Pt will complete toileting mod I  Long Term Goal 4: Pt will complete fxl mobility/txs using LRAD mod I  Long Term Goal 5: Pt will complete IADL task (laundry, meal prep, cleaning, etc) mod I  Long Term Goal 6: Address home DME to assist with safe DC if returning home        Therapy Time   Individual Concurrent Group Co-treatment   Time In 1045         Time Out 1140         Minutes 55               Pavithra Espinoza GONZALEZ/L,[id number]    OTR was consulted with this patients treatment/intervention plan.

## 2025-06-12 NOTE — PROGRESS NOTES
Department of Physical Medicine & Rehabilitation  Progress Note    Patient Identification:  Jonelle Contreras  [id number]  : 1950  Admit date: 2025    Chief Complaint: Debility    Subjective:   No acute events overnight.   Patient seen this am sitting up in room. Reports making gradual progress with therapies. She tells me her plan is now to return home alone at discharge. She would consider remaining in ARU for longer period but does not want to consider SNF. She reports family unable to provide assist. I informed her of my concerns about her safety performing ADLs, higher level mobility, and PD at home without assist. She feels capable of managing PD at home and believes she will be safe.   Labs reviewed.     ROS: No f/c, n/v, cp     Objective:  Patient Vitals for the past 24 hrs:   BP Temp Temp src Pulse Resp SpO2 Height   25 0854 -- -- -- -- -- 93 % --   25 0852 126/75 97.7 °F (36.5 °C) Oral 77 16 95 % 1.651 m (5' 5\")   25 0453 -- -- -- -- 16 -- --   25 0423 -- -- -- -- 16 -- --   25 2243 -- -- -- -- -- 92 % --   25 2035 (!) 158/80 98.8 °F (37.1 °C) Oral 86 16 -- --   25 2030 -- -- -- -- -- 92 % --   25 1727 (!) 145/67 -- -- 85 -- -- --     Const: Alert. No distress, pleasant.   HEENT: Normocephalic, atraumatic. Normal sclera/conjunctiva. MMM.   CV: Regular rate and rhythm.   Resp: No respiratory distress. Lungs CTAB.   Abd: Soft, nontender, nondistended, NABS+   Ext: No edema.   Neuro: Alert, oriented, appropriately interactive.   Psych: Cooperative, appropriate mood and affect    Laboratory data: Available via EMR.   Last 24 hour lab  No results found for this or any previous visit (from the past 24 hours).      Therapy progress:  Physical therapy:  Bed Mobility:  Overall Assistance Level: Supervision  Additional Factors: Increased time to complete, Head of bed flat, Without handrails (regular bed in ADL apartment)  Sit>supine:  Assistance Level:  6/14 pending home visit, may need to extend       Suzy Rucker MD 6/12/2025, 10:39 AM

## 2025-06-12 NOTE — PLAN OF CARE
Problem: Discharge Planning  Goal: Discharge to home or other facility with appropriate resources  6/12/2025 1112 by Luisana Terrazas RN  Outcome: Progressing  Flowsheets (Taken 6/12/2025 0852)  Discharge to home or other facility with appropriate resources:   Identify barriers to discharge with patient and caregiver   Arrange for needed discharge resources and transportation as appropriate   Identify discharge learning needs (meds, wound care, etc)   Refer to discharge planning if patient needs post-hospital services based on physician order or complex needs related to functional status, cognitive ability or social support system     Problem: Safety - Adult  Goal: Free from fall injury  6/12/2025 1112 by Luisana Terrazas RN  Outcome: Progressing  Flowsheets (Taken 6/12/2025 0818)  Free From Fall Injury: Instruct family/caregiver on patient safety     Problem: Skin/Tissue Integrity  Goal: Skin integrity remains intact  Description: 1.  Monitor for areas of redness and/or skin breakdown2.  Assess vascular access sites hourly3.  Every 4-6 hours minimum:  Change oxygen saturation probe site4.  Every 4-6 hours:  If on nasal continuous positive airway pressure, respiratory therapy assess nares and determine need for appliance change or resting period  6/12/2025 1112 by Luisana Terrazas RN  Outcome: Progressing  Flowsheets  Taken 6/12/2025 0852  Skin Integrity Remains Intact: Monitor for areas of redness and/or skin breakdown      Problem: Pain  Goal: Verbalizes/displays adequate comfort level or baseline comfort level  6/12/2025 1112 by Luisana Terrazas RN  Outcome: Progressing  Flowsheets (Taken 6/12/2025 0852)  Verbalizes/displays adequate comfort level or baseline comfort level:   Encourage patient to monitor pain and request assistance   Assess pain using appropriate pain scale   Administer analgesics based on type and severity of pain and evaluate response   Implement non-pharmacological measures as appropriate  and evaluate response     Problem: Nutrition Deficit:  Goal: Optimize nutritional status  6/12/2025 1112 by Luisana Terrazas, RN  Outcome: Progressing     Problem: ABCDS Injury Assessment  Goal: Absence of physical injury  6/12/2025 1112 by Luisana Terrazas, RN  Outcome: Progressing  Flowsheets (Taken 6/12/2025 0818)  Absence of Physical Injury: Implement safety measures based on patient assessment

## 2025-06-12 NOTE — PLAN OF CARE
Problem: Discharge Planning  Goal: Discharge to home or other facility with appropriate resources  Outcome: Progressing     Problem: Safety - Adult  Goal: Free from fall injury  Outcome: Progressing     Problem: Skin/Tissue Integrity  Goal: Skin integrity remains intact  Description: 1.  Monitor for areas of redness and/or skin breakdown2.  Assess vascular access sites hourly3.  Every 4-6 hours minimum:  Change oxygen saturation probe site4.  Every 4-6 hours:  If on nasal continuous positive airway pressure, respiratory therapy assess nares and determine need for appliance change or resting period  Outcome: Progressing     Problem: Pain  Goal: Verbalizes/displays adequate comfort level or baseline comfort level  Outcome: Progressing     Problem: Nutrition Deficit:  Goal: Optimize nutritional status  Outcome: Progressing     Problem: ABCDS Injury Assessment  Goal: Absence of physical injury  Outcome: Progressing

## 2025-06-12 NOTE — PROGRESS NOTES
Physical Therapy  PHYSICAL THERAPY  Progress Note   Second Session    Patient Name: Jonelle Contreras  Medical Record Number: [id number]    Treatment Diagnosis: impaired functional mobility with poor endurance      Chart Reviewed: Yes   Restrictions/Precautions: Fall Risk Other Position/Activity Restrictions: PD nightly. dwayne TEDs daily.   Additional Pertinent Hx: per Dr. Drummond's note, \"74-year-old female who came to hospital with hyperkalemia and hypertension, found to be in a hypertensive emergency.  Her blood pressure was brought down and the patient is following with nephrology and cardiology.  Patient has ESRD and is on PD.  Patient also came in with a cough and underwent a bronc on 5/23/2025 for bilateral mucous plugs.  Patient was treated for pneumonia and started on IV antibiotics for 7 days. \"        Subjective: Pt reports she could not eat much lunch but did eat her magic cup jello.  Pt has been having diarrhea today from laxatives.  Pt's PD had problems this morning and took a long time to complete, pt says it did not complete properly.  Pt is now very tired.    Objective: Sitting B LE AP, TKE, hip flex x 15 reps.  Sit to stand min A with difficulty.  Amb 90' with rollator and min A due to pt pushing rollator too far ahead, pt stating \"it's getting away from me\", pt requested to sit on cali bench and had significant LOB when turning requiring mod A to recover, and another significant LOB when backing up to bench requiring mod to max A to recover.  Pt requested to use toilet and feeling like she was going to have diarrhea; sit to stand mod A form cali bench and min to mod A to control rollator and walk 75' to bathroom.  Pt with small soft BM, linda to wife her front SBA but needed assist to wipe back.  Sit to stand from toilet with TSF min A, amb 5' to w/c with rollator with min A to control rollator.  Vitals taken: BP = 127/71, HR = 92, O2 = 92%.  Switched rollator for standard wh walker, standing balloon

## 2025-06-12 NOTE — PROGRESS NOTES
Physical Therapy  Facility/Department: [address] [facility]  Rehabilitation Physical Therapy     NAME: Jonelle Contreras  : 1950 (74 y.o.)  MRN: [id number]  CODE STATUS: Full Code    Pt unable to be seen by PT at this time d/t receiving PD in room. Per RN, PD required to run continuously and pt unable to participate in therapy until completed. Will attempt to see pt later in day for second session. 45 minute variance, no charge.    Electronically signed by Jessica Berumen PT on 2025 at 10:42 AM

## 2025-06-12 NOTE — PROGRESS NOTES
Patient admitted to rehab with debility d/t acute hypoxic respiratory failure with mucus plugging.  A/Ox4. Transfers with walker or bedside commode. Mobility restrictions: generalized weakness. On regular, low phosphorus with supplement diet, tolerating well. Medications taken whole with thins. On aspirin, plavix, heparin for DVT prophylaxis.  Skin: scattered bruising. Oxygen: RA. LDA: PD cath. Has been continent of bowel and continent of bladder. LBM 6/12/25. Chair/bed alarms in use and call light in reach. Will monitor for safety. Electronically signed by SKYLER MARKS RN on 6/12/2025 at 3:40 PM

## 2025-06-12 NOTE — FLOWSHEET NOTE
Patient admitted to ARU with debility d/t acute hypoxic respiratory failure with mucus plugging.  Hx of ESRD and going to HD prior to started using PD but was unable to comply with this for a few days prior to ER admission.    A/Ox4. Transfers with walker x1 with gaitbelt. Stands and pivots to BSC at hs while on PD.     Mobility restrictions: generalized weakness. On regular, low phosphorus, supplement diet, tolerating well, poor appetite. Medications taken whole with thin liquids. On ASA, Plavix, and Heparin injection for DVT prophylaxis.  Skin: scattered bruisings. Oxygen: RA, currently on 1LPM of O2 to keep pulse ox above 90%. (92% at this time.) Refused RT tx at hs. Refused Mucinex tablet routine at hs. Requested for her Zanaflex and Xanax 2mg (4 tabs) prn at hs. Effective.    Lost PIV access earlier on dayshift. Missed her dosage of Venofer. Dr. Galvez, on-call for Dr. Santo, Pt's nephrologist was notified and stated this is not an issue tonight. Followup with Hansa's new PO order for Iron.     Pt refused to have a nurse come in to use US guided PIV placement as she just wants to sleep. Call for PIV placement after breakfast.     PD cath accessed by 2100 for PD cycle. Has been continent of bowel and continent of bladder. LBM 6/9/25. Glycolax routine mixed with apple juice given at hs. Chair/bed alarms in use and call light within reach.

## 2025-06-12 NOTE — PROGRESS NOTES
Patient ID: Jonelle Contreras  Referring/ Physician: Suzy Rucker MD      Summary:   Jonelle Contreras is being seen by nephrology for ESRD on peritoneal dialysis. .     Reason for admission: rehab for generalized weakness after hospital admission for pneumonia and hypertensive urgency.       Interval Hx:     Seen at bedside.   Still on cycler, having low flows. No fibrin in the line.   Will do STAT drain now.   Care conference planned today to discuss disposition.   She has had improved appetite.     /75        Assessment/Plan:   - continue PD ,with 1.5% solutions. No fluid overload.   - solute clearance acceptable. Slow drain > STAT drain now.   -  procrit TIW.   - will stop IV iron. PO iron not going to be well absorbed in ESRD patient so will not order PO.   - labs ordered.\      If she discharges home, will be able to do peritoneal dialysis at home with manual exchanges until her cycle gets there. Awaiting updates on Dispo      ESRD  Renal failure due to hypertensive kidney disease.   Prior to admission she was doing manual exchanges at home.   This admission she was started on a cycler.   Now she is getting 3 exchanges. No LBF.    -daily BM   -daily gent to PD exit site     SHPT  , due to renal origin.   Ca and phos wnl  On renvela 1600 mg TID with meals.     Labile hypertension  Long standing issue with difficulty in controlling her BP due to severe orthostatic hypotension.   Currently aiming for SBP < 180 when supine and > 140 prior to therapy to accommodate large changes in BP during ambulating.    - coreg 6.25 mg BID   - nifedipine 30 mg with dinner , hold < 140    - losartan 100 mg at night, hold < 140       H/o porphyria  Managed by Dr Fuentes.   No acute issues.   Counseled to avoid smoking once she is discharged.      Anemia   Hgb goal > 10   On procrit 10 k units weekly.   Iron stores were low, got 200 mg IV x 2 IV iron.         [facility] would like  (Inhalant), 4 mL, BID RT  guaiFENesin, 1,200 mg, BID  pantoprazole, 40 mg, BID  dianeal lo-enal 1.5%, 6,000 mL, Nightly   And  dianeal lo-neal 1.5%, 2,000 mL, Nightly  trospium, 20 mg, Nightly       Iodine, Iodinated contrast media, Wellbutrin [bupropion], Adhesive tape, Sertraline, and Sulfa antibiotics    Allergies:   Allergies   Allergen Reactions    Iodine Itching and Nausea And Vomiting    Iodinated Contrast Media     Wellbutrin [Bupropion]      Dry mouth    Adhesive Tape Rash    Sertraline Other (See Comments)     Severe dry mouth    Sulfa Antibiotics Itching and Nausea Only         Physical Exam/Objective:   Vitals:    06/12/25 0854   BP:    Pulse:    Resp:    Temp:    SpO2: 93%       Intake/Output Summary (Last 24 hours) at 6/12/2025 1208  Last data filed at 6/12/2025 0935  Gross per 24 hour   Intake 600 ml   Output 325 ml   Net 275 ml         General appearance: in no acute distress. Appears frail   Respiratory: Respiratory effort normal, bilateral equal chest rise.   Cardiovascular: no pitting edema   Abdomen: abdomen is soft, non distended  Skin: no rashes,  no jaundice   Neuro:  Follows commands, moves all extremities spontaneously       Data:   CBC:   Recent Labs     06/11/25 0528   WBC 5.7   HGB 9.0*   HCT 27.6*   *     BMP:    Recent Labs     06/11/25 0528   PHOS 4.0     Lab Results   Component Value Date/Time    COLORU Yellow 05/25/2025 09:35 PM    NITRU Negative 05/25/2025 09:35 PM    GLUCOSEU 100 05/25/2025 09:35 PM    KETUA Negative 05/25/2025 09:35 PM    UROBILINOGEN 0.2 05/25/2025 09:35 PM    BILIRUBINUR Negative 05/25/2025 09:35 PM

## 2025-06-12 NOTE — PROGRESS NOTES
Initial Drain Volume: 2 mL   Average Dwell Time: 78 minutes  Average Drain Time: 61 minutes  Total Therapy Volume: 8000 mL   Total UF: 122 mL  Day UF: -21 mL  Night UF: 143 mL  Total Therapy Time: 14 hr. 37 min.

## 2025-06-13 ENCOUNTER — APPOINTMENT (OUTPATIENT)
Dept: GENERAL RADIOLOGY | Age: 75
DRG: 947 | End: 2025-06-13
Attending: PHYSICAL MEDICINE & REHABILITATION
Payer: MEDICARE

## 2025-06-13 LAB
ANION GAP SERPL CALCULATED.3IONS-SCNC: 12 MMOL/L (ref 3–16)
BUN SERPL-MCNC: 59 MG/DL (ref 7–20)
CALCIUM SERPL-MCNC: 9.2 MG/DL (ref 8.3–10.6)
CHLORIDE SERPL-SCNC: 94 MMOL/L (ref 99–110)
CO2 SERPL-SCNC: 27 MMOL/L (ref 21–32)
CREAT SERPL-MCNC: 7.7 MG/DL (ref 0.6–1.2)
DEPRECATED RDW RBC AUTO: 15.6 % (ref 12.4–15.4)
GFR SERPLBLD CREATININE-BSD FMLA CKD-EPI: 5 ML/MIN/{1.73_M2}
GLUCOSE SERPL-MCNC: 119 MG/DL (ref 70–99)
HCT VFR BLD AUTO: 29.2 % (ref 36–48)
HGB BLD-MCNC: 9.4 G/DL (ref 12–16)
MCH RBC QN AUTO: 28.2 PG (ref 26–34)
MCHC RBC AUTO-ENTMCNC: 32.3 G/DL (ref 31–36)
MCV RBC AUTO: 87.5 FL (ref 80–100)
PLATELET # BLD AUTO: 587 K/UL (ref 135–450)
PMV BLD AUTO: 8 FL (ref 5–10.5)
POTASSIUM SERPL-SCNC: 3.1 MMOL/L (ref 3.5–5.1)
RBC # BLD AUTO: 3.34 M/UL (ref 4–5.2)
SODIUM SERPL-SCNC: 133 MMOL/L (ref 136–145)
WBC # BLD AUTO: 8.5 K/UL (ref 4–11)

## 2025-06-13 PROCEDURE — 85027 COMPLETE CBC AUTOMATED: CPT

## 2025-06-13 PROCEDURE — A4722 DIALYS SOL FLD VOL > 1999CC: HCPCS | Performed by: INTERNAL MEDICINE

## 2025-06-13 PROCEDURE — 6360000002 HC RX W HCPCS: Performed by: INTERNAL MEDICINE

## 2025-06-13 PROCEDURE — 97530 THERAPEUTIC ACTIVITIES: CPT

## 2025-06-13 PROCEDURE — 71045 X-RAY EXAM CHEST 1 VIEW: CPT

## 2025-06-13 PROCEDURE — 6370000000 HC RX 637 (ALT 250 FOR IP): Performed by: PHYSICAL MEDICINE & REHABILITATION

## 2025-06-13 PROCEDURE — 97129 THER IVNTJ 1ST 15 MIN: CPT

## 2025-06-13 PROCEDURE — 97535 SELF CARE MNGMENT TRAINING: CPT

## 2025-06-13 PROCEDURE — 97110 THERAPEUTIC EXERCISES: CPT

## 2025-06-13 PROCEDURE — 80048 BASIC METABOLIC PNL TOTAL CA: CPT

## 2025-06-13 PROCEDURE — 36415 COLL VENOUS BLD VENIPUNCTURE: CPT

## 2025-06-13 PROCEDURE — 6370000000 HC RX 637 (ALT 250 FOR IP): Performed by: INTERNAL MEDICINE

## 2025-06-13 PROCEDURE — 1280000000 HC REHAB R&B

## 2025-06-13 PROCEDURE — 2580000003 HC RX 258: Performed by: INTERNAL MEDICINE

## 2025-06-13 PROCEDURE — 6360000002 HC RX W HCPCS: Performed by: PHYSICAL MEDICINE & REHABILITATION

## 2025-06-13 PROCEDURE — 97116 GAIT TRAINING THERAPY: CPT

## 2025-06-13 RX ORDER — POTASSIUM CHLORIDE 1500 MG/1
20 TABLET, EXTENDED RELEASE ORAL DAILY
Status: DISCONTINUED | OUTPATIENT
Start: 2025-06-13 | End: 2025-06-20 | Stop reason: HOSPADM

## 2025-06-13 RX ORDER — SODIUM CHLORIDE, SODIUM LACTATE, CALCIUM CHLORIDE, MAGNESIUM CHLORIDE AND DEXTROSE 2.5; 538; 448; 18.3; 5.08 G/100ML; MG/100ML; MG/100ML; MG/100ML; MG/100ML
4000 INJECTION, SOLUTION INTRAPERITONEAL
Status: DISCONTINUED | OUTPATIENT
Start: 2025-06-13 | End: 2025-06-14

## 2025-06-13 RX ORDER — SODIUM CHLORIDE, SODIUM LACTATE, CALCIUM CHLORIDE, MAGNESIUM CHLORIDE AND DEXTROSE 1.5; 538; 448; 18.3; 5.08 G/100ML; MG/100ML; MG/100ML; MG/100ML; MG/100ML
4000 INJECTION, SOLUTION INTRAPERITONEAL
Status: DISCONTINUED | OUTPATIENT
Start: 2025-06-13 | End: 2025-06-14

## 2025-06-13 RX ADMIN — CLOPIDOGREL BISULFATE 75 MG: 75 TABLET, FILM COATED ORAL at 09:06

## 2025-06-13 RX ADMIN — POLYETHYLENE GLYCOL 3350 17 G: 17 POWDER, FOR SOLUTION ORAL at 10:16

## 2025-06-13 RX ADMIN — TROSPIUM CHLORIDE 20 MG: 20 TABLET, FILM COATED ORAL at 21:20

## 2025-06-13 RX ADMIN — FLUCONAZOLE 200 MG: 100 TABLET ORAL at 09:06

## 2025-06-13 RX ADMIN — ROSUVASTATIN CALCIUM 10 MG: 10 TABLET, FILM COATED ORAL at 21:19

## 2025-06-13 RX ADMIN — ASPIRIN 81 MG 81 MG: 81 TABLET ORAL at 09:06

## 2025-06-13 RX ADMIN — SENNOSIDES, DOCUSATE SODIUM 2 TABLET: 50; 8.6 TABLET, FILM COATED ORAL at 09:05

## 2025-06-13 RX ADMIN — SEVELAMER CARBONATE 1600 MG: 800 TABLET, FILM COATED ORAL at 09:06

## 2025-06-13 RX ADMIN — CARVEDILOL 6.25 MG: 6.25 TABLET, FILM COATED ORAL at 09:06

## 2025-06-13 RX ADMIN — NIFEDIPINE 30 MG: 30 TABLET, FILM COATED, EXTENDED RELEASE ORAL at 16:52

## 2025-06-13 RX ADMIN — ALPRAZOLAM 2 MG: 0.5 TABLET ORAL at 21:35

## 2025-06-13 RX ADMIN — HEPARIN SODIUM 5000 UNITS: 5000 INJECTION INTRAVENOUS; SUBCUTANEOUS at 14:35

## 2025-06-13 RX ADMIN — PANTOPRAZOLE SODIUM 40 MG: 40 TABLET, DELAYED RELEASE ORAL at 09:06

## 2025-06-13 RX ADMIN — EPOETIN ALFA-EPBX 10000 UNITS: 10000 INJECTION, SOLUTION INTRAVENOUS; SUBCUTANEOUS at 18:14

## 2025-06-13 RX ADMIN — SEVELAMER CARBONATE 1600 MG: 800 TABLET, FILM COATED ORAL at 16:52

## 2025-06-13 RX ADMIN — POTASSIUM CHLORIDE 20 MEQ: 1500 TABLET, EXTENDED RELEASE ORAL at 15:32

## 2025-06-13 RX ADMIN — HEPARIN SODIUM 5000 UNITS: 5000 INJECTION INTRAVENOUS; SUBCUTANEOUS at 05:38

## 2025-06-13 RX ADMIN — SODIUM CHLORIDE, SODIUM LACTATE, CALCIUM CHLORIDE, MAGNESIUM CHLORIDE AND DEXTROSE 4000 ML: 1.5; 538; 448; 18.3; 5.08 INJECTION, SOLUTION INTRAPERITONEAL at 21:43

## 2025-06-13 RX ADMIN — PANTOPRAZOLE SODIUM 40 MG: 40 TABLET, DELAYED RELEASE ORAL at 21:20

## 2025-06-13 RX ADMIN — CARVEDILOL 6.25 MG: 6.25 TABLET, FILM COATED ORAL at 16:52

## 2025-06-13 RX ADMIN — HEPARIN SODIUM 5000 UNITS: 5000 INJECTION INTRAVENOUS; SUBCUTANEOUS at 21:20

## 2025-06-13 RX ADMIN — METOCLOPRAMIDE 5 MG: 10 TABLET ORAL at 15:32

## 2025-06-13 RX ADMIN — LOSARTAN POTASSIUM 100 MG: 100 TABLET, FILM COATED ORAL at 21:19

## 2025-06-13 RX ADMIN — METOCLOPRAMIDE 5 MG: 10 TABLET ORAL at 09:05

## 2025-06-13 RX ADMIN — SODIUM CHLORIDE, SODIUM LACTATE, CALCIUM CHLORIDE, MAGNESIUM CHLORIDE AND DEXTROSE 4000 ML: 2.5; 538; 448; 18.3; 5.08 INJECTION, SOLUTION INTRAPERITONEAL at 21:43

## 2025-06-13 RX ADMIN — SEVELAMER CARBONATE 1600 MG: 800 TABLET, FILM COATED ORAL at 11:57

## 2025-06-13 RX ADMIN — METOCLOPRAMIDE 5 MG: 10 TABLET ORAL at 05:38

## 2025-06-13 RX ADMIN — ALPRAZOLAM 2 MG: 0.5 TABLET ORAL at 10:16

## 2025-06-13 RX ADMIN — ROPINIROLE HYDROCHLORIDE 1 MG: 1 TABLET, FILM COATED ORAL at 21:20

## 2025-06-13 ASSESSMENT — PAIN SCALES - GENERAL
PAINLEVEL_OUTOF10: 0
PAINLEVEL_OUTOF10: 0

## 2025-06-13 NOTE — PLAN OF CARE
Problem: Safety - Adult  Goal: Free from fall injury  6/13/2025 1106 by Kaye Ruiz, RN  Outcome: Progressing  Flowsheets (Taken 6/13/2025 1106)  Free From Fall Injury:   Instruct family/caregiver on patient safety   Based on caregiver fall risk screen, instruct family/caregiver to ask for assistance with transferring infant if caregiver noted to have fall risk factors  Note: Pt educated on falls precautions and safety. Call light and personal belongings within reach at all times. Non skid socks in use when up. Hourly rounding and alarms active.      Problem: Skin/Tissue Integrity  Goal: Skin integrity remains intact  Description: 1.  Monitor for areas of redness and/or skin breakdown2.  Assess vascular access sites hourly3.  Every 4-6 hours minimum:  Change oxygen saturation probe site4.  Every 4-6 hours:  If on nasal continuous positive airway pressure, respiratory therapy assess nares and determine need for appliance change or resting period  6/13/2025 1106 by Kaye Ruiz, RN  Outcome: Progressing  Flowsheets (Taken 6/13/2025 1106)  Skin Integrity Remains Intact: Monitor for areas of redness and/or skin breakdown  Note: Able to change positions in bed without assist, no evidence of skin breakdown noted. Waffle cushion in place to chair.      Problem: Pain  Goal: Verbalizes/displays adequate comfort level or baseline comfort level  6/13/2025 1106 by Kaye Ruiz, RN  Outcome: Progressing  Flowsheets (Taken 6/13/2025 1106)  Verbalizes/displays adequate comfort level or baseline comfort level:   Administer analgesics based on type and severity of pain and evaluate response   Consider cultural and social influences on pain and pain management   Encourage patient to monitor pain and request assistance  Note: Pt educated on falls precautions and safety. Call light and personal belongings within reach at all times. Non skid socks in use when up. Hourly rounding and alarms active.

## 2025-06-13 NOTE — PROGRESS NOTES
Physical Therapy  Facility/Department: [address] [facility]  Rehabilitation Physical Therapy Treatment Note    NAME: Jonelle Contreras  : 1950 (74 y.o.)  MRN: [id number]  CODE STATUS: Full Code    Date of Service: 25       Restrictions:  Restrictions/Precautions: Fall Risk  Position Activity Restriction  Other Position/Activity Restrictions: PD nightly. dwayne TEDs daily.     Pertinent medical information:  Additional Pertinent Hx: per Dr. Drummond's note, \"74-year-old female who came to hospital with hyperkalemia and hypertension, found to be in a hypertensive emergency.  Her blood pressure was brought down and the patient is following with nephrology and cardiology.  Patient has ESRD and is on PD.  Patient also came in with a cough and underwent a bronc on 2025 for bilateral mucous plugs.  Patient was treated for pneumonia and started on IV antibiotics for 7 days. \"    SUBJECTIVE  Subjective: PT started bedside after being informed patient was not ready for therapy yet. Upon arrival, patient was supine in bed and still hooked up to her PD.  She is also back on O2. Patient reports she feels slightly better today than yesterday. She states her swelling in bilateral LE is down from yesterday, but still present.  Pain: no complaints of pain at this time    Social/Functional History  Lives With: Alone  Type of Home: House  Home Layout: Work area in basement, Two level, Laundry in basement (ranch with finished basement)  Home Access: Stairs to enter with rails  Entrance Stairs - Number of Steps: 4 CONSTANCE front door with L handrail.  Entrance Stairs - Rails: Left  Bathroom Shower/Tub: Tub/Shower unit  Bathroom Toilet: Handicap height  Bathroom Equipment: Tub transfer bench (pt reports she turns the TTB longways so it fits entirely in tub shower unit.)  Bathroom Accessibility: Accessible  Home Equipment: Walker - Rolling (Walker (3 wheel).)  Has the patient had two or more falls in the past year or any fall with injury

## 2025-06-13 NOTE — PROGRESS NOTES
Occupational Therapy  Facility/Department: [address] REH[facility]  Rehabilitation Occupational Therapy Daily Treatment Note    Date: 25  Patient Name: Jonelle Contreras       Room: E6S-4971/3258-01  MRN: [id number]  Account: [id number]   : 1950  (74 y.o.) Gender: female                    Past Medical History:  has a past medical history of acute intermittent porphyria, agoraphobic, Allergic rhinitis, Anxiety, Arthritis, Asthma, CAD (coronary artery disease), Chronic obstructive pulmonary disease, unspecified COPD type ([facility]), ckd 4, collagenous colitis, COPD (chronic obstructive pulmonary disease) ([facility]), Depression, Disease of blood and blood forming organ, GERD (gastroesophageal reflux disease), Heart disease, Hyperlipidemia, Hypertension, Left thyroid nodule, Migraines, Neuropathy, Osteoporosis, PAD (peripheral artery disease), Peripheral vascular disease, post menopausal, Prediabetes, Pulmonary nodules, Restless leg syndrome, STEMI involving oth coronary artery of inferior wall ([facility]), and Urinary incontinence.  Past Surgical History:   has a past surgical history that includes Hysterectomy; Cholecystectomy; Coronary angioplasty with stent (10/2013 [employer]); Colonoscopy (2017); Colonoscopy (N/A, 2022); Upper gastrointestinal endoscopy (N/A, 2022); Cardiac surgery; Hysterectomy, vaginal; Upper gastrointestinal endoscopy (2022); Upper gastrointestinal endoscopy (N/A, 2025); and bronchoscopy (N/A, 2025).    Restrictions  Restrictions/Precautions: Fall Risk  Other Position/Activity Restrictions: PD nightly. dwayne TEDs daily.    Subjective  Subjective: Pt met seated in w/c in room, agreeable to OT tx. Reports increased fatigue this date  Restrictions/Precautions: Fall Risk             Objective     Cognition  Overall Cognitive Status: Exceptions  Initiation: Requires cues for some  Sequencing: Requires cues for some  Cognition Comment: Slowed processing/responses, flat affect

## 2025-06-13 NOTE — PROGRESS NOTES
Occupational Therapy  OCCUPATIONAL THERAPY  Progress Note   Second Session    Patient Name: Jonelle Contreras  Medical Record Number: [id number]    Treatment Diagnosis: impaired functional mobility with poor endurance    General  Chart Reviewed: Yes, Orders, Progress Notes  Patient assessed for rehabilitation services?: Yes  Additional Pertinent Hx: Pt is a 75 yo F admitted to ARU with debility after being hospitalized for hyperkalemia and hypertension, found to be in a hypertensive emergency. Pt has ESRD and is on PD. Bronc performed on 5/23/2025 for bilateral mucous plugs. Treated for pneumonia.  Family / Caregiver Present: No  Referring Practitioner: Bon Drummond MD  Diagnosis: debility     Restrictions/Precautions  Restrictions/Precautions: Fall Risk  Activity Level: Up with Assist        Position Activity Restriction  Other Position/Activity Restrictions: PD nightly. dwyane TEDs daily.    Subjective: pt met in therapy room on 1L O2, fatigued, visual tremors, agreeable to OT tx     Objective: ACLS results: 4.2 (differentiating features)- close supervision to remove dangerous objects outside of the visual field and to solve any problems arising from minor changes in the environment.     Given yellow theraband and yellow theraputty with printed exercises. Completed 5 putty squeezes with B Ues. Briefly discussed theraband HEP.     Assessment: pt visibly fatigued, hand tremors throughout session, increased time to complete assessment even with demonstrations, poor problem solving noted throughout. Highly recommend Supervision upon return to home d/t decreased insight & medical concerns for managing PD.    Safety Device - Type of devices:  []  All fall risk precautions in place [] Bed alarm in place  [] Call light within reach [] Chair alarm in place [] Positioning belt [] Gait belt [] Patient at risk for falls [] Left in bed [] Left in chair [] Telesitter in use [] Sitter present [] Nurse notified []

## 2025-06-13 NOTE — PROGRESS NOTES
0840: Perfect serve to Dr. Pagan regarding slow flow alert on final drain. Patient walked, toileted, and repositioned multiple times over 40 minutes. No Fibrin seen in line. Unable to get estimated peritoneal volume under 540 Order to do STAT drain- no STAT drain option seen on machine- ok to stop treatment and do manual drain.      Initial Drain Volume: 0 mL   Average Dwell Time: 91 minutes  Average Drain Time: 33  minutes  Total Therapy Volume: 8000 mL   Total UF: 27 mL  Day UF: 0 mL  Night UF: 27 mL  Expected UF Deviation: -534  Total Therapy Time: 14 hr. 37 min.     0935- Patient hooked to manual drain bag @ 910, repositioned and walked multiple times, No output into bag. Patient disconnected and new iodine cap applied. Taken down to therapy at this time

## 2025-06-13 NOTE — PROGRESS NOTES
Comprehensive Nutrition Assessment    Type and Reason for Visit:  Reassess    Nutrition Recommendations/Plan:   Continue current diet with Expedite cups TID.     Malnutrition Assessment:  Malnutrition Status:  Severe malnutrition (06/11/25 0846)    Context:  Acute Illness     Findings of the 6 clinical characteristics of malnutrition:  Energy Intake:  50% or less of estimated energy requirements for 5 or more days  Weight Loss:  5% over 1 month     Body Fat Loss:  Mild body fat loss Triceps, Orbital   Muscle Mass Loss:  Mild muscle mass loss Temples (temporalis), Calf (gastrocnemius)  Fluid Accumulation:  Mild (trace) Extremities   Strength:  Not Performed    Nutrition Assessment:    FU- Pt. continues on a low phos diet. Diet acceptance is poor, (1-50%). Expedite cups offered TID, supplement acceptance is adequate (%). Pt. declined TF, RD and MD discussed with Pt. Relatively weight stable since last assessment. Pt. is set to discharge home tomorrow. Will monitor diet acceptance and nutritional adequacy while inpatient.    Nutrition Related Findings:    Na 133, K 3.1, . LBM 6/12. Wound Type: None       Current Nutrition Intake & Therapies:    Average Meal Intake: 0%, 1-25%  Average Supplements Intake: 51-75%, %  ADULT DIET; Regular; Low Phosphorus (Less than 1000 mg)  ADULT ORAL NUTRITION SUPPLEMENT; Dinner, Breakfast, Lunch; Other Oral Supplement; Expedite Cup    Anthropometric Measures:  Height: 165.1 cm (5' 5\")  Ideal Body Weight (IBW): 125 lbs (57 kg)       Current Body Weight: 60.1 kg (132 lb 7.9 oz), 114.3 % IBW.    Current BMI (kg/m2): 22  Usual Body Weight: 63.9 kg (140 lb 14 oz)     % Weight Change (Calculated): -5.3  Weight Adjustment For: No Adjustment                 BMI Categories: Normal Weight (BMI 22.0 to 24.9) age over 65    Estimated Daily Nutrient Needs:  Energy Requirements Based On: Kcal/kg  Weight Used for Energy Requirements: Current  Energy (kcal/day): 6079-9626 kcal  (28-32 kcal/kg)  Weight Used for Protein Requirements: Current  Protein (g/day):  g (1.5-1.8 g/kg)  Method Used for Fluid Requirements: 1 ml/kcal  Fluid (ml/day): Or per provider    Nutrition Diagnosis:   Increased nutrient needs related to increase demand for energy/nutrients as evidenced by dialysis, rehab for strength and conditioning  Severe malnutrition related to decreased appetite as evidenced by criteria as identified in malnutrition assessment    Nutrition Interventions:   Food and/or Nutrient Delivery: Continue Current Diet, Continue Oral Nutrition Supplement  Nutrition Education/Counseling:  (Monitor need)  Coordination of Nutrition Care: Continue to monitor while inpatient       Goals:  Goals: PO intake 75% or greater  Type of Goal: Continue current goal  Previous Goal Met: No Progress toward Goal(s)    Nutrition Monitoring and Evaluation:   Behavioral-Environmental Outcomes: None Identified  Food/Nutrient Intake Outcomes: Food and Nutrient Intake, Supplement Intake, Vitamin/Mineral Intake  Physical Signs/Symptoms Outcomes: Biochemical Data, GI Status, Meal Time Behavior, Nutrition Focused Physical Findings    Discharge Planning:    Continue current diet     Ely Mello RD  Contact: [phone]

## 2025-06-13 NOTE — PLAN OF CARE
Problem: Safety - Adult  Goal: Free from fall injury  Outcome: Progressing     Problem: Skin/Tissue Integrity  Goal: Skin integrity remains intact  Description: 1.  Monitor for areas of redness and/or skin breakdown2.  Assess vascular access sites hourly3.  Every 4-6 hours minimum:  Change oxygen saturation probe site4.  Every 4-6 hours:  If on nasal continuous positive airway pressure, respiratory therapy assess nares and determine need for appliance change or resting period  Outcome: Progressing  Flowsheets (Taken 6/13/2025 0104)  Skin Integrity Remains Intact: Monitor for areas of redness and/or skin breakdown     Problem: Pain  Goal: Verbalizes/displays adequate comfort level or baseline comfort level  Outcome: Progressing     Problem: Nutrition Deficit:  Goal: Optimize nutritional status  Outcome: Progressing slowly (appetite remains poor)     Problem: ABCDS Injury Assessment  Goal: Absence of physical injury  Outcome: Progressing  Flowsheets (Taken 6/13/2025 0104)  Absence of Physical Injury: Implement safety measures based on patient assessment

## 2025-06-13 NOTE — PROGRESS NOTES
Patient ID: Jonelle Contreras  Referring/ Physician: Suzy Rucker MD      Summary:   Jonelle Contreras is being seen by nephrology for ESRD on peritoneal dialysis. .     Reason for admission: rehab for generalized weakness after hospital admission for pneumonia and hypertensive urgency.       Interval Hx:     Seen in therapy.   Seems more lethargic today.   Slow drain on the cycler.     /64  On 1 L  Uo 375 cc    She is not + 19 L , some issue with the recoding of the IO s.         Assessment/Plan:   - has some edema now, do 1/2 2.5 and 1/2 1.5 % dianeal   - KCL 20 meq daily added.   - BP acceptable , no change           ESRD  Renal failure due to hypertensive kidney disease.   Prior to admission she was doing manual exchanges at home.   This admission she was started on a cycler.   Now she is getting 3 exchanges. No LBF.    -daily BM   -daily gent to PD exit site     SHPT  , due to renal origin.   Ca and phos wnl  On renvela 1600 mg TID with meals.     Labile hypertension  Long standing issue with difficulty in controlling her BP due to severe orthostatic hypotension.   Currently aiming for SBP < 180 when supine and > 140 prior to therapy to accommodate large changes in BP during ambulating.    - coreg 6.25 mg BID   - nifedipine 30 mg with dinner , hold < 140    - losartan 100 mg at night, hold < 140       H/o porphyria  Managed by Dr Fuentes.   No acute issues.   Counseled to avoid smoking once she is discharged.      Anemia   Hgb goal > 10   On procrit 10 k units weekly.   Iron stores were low, got 200 mg IV x 2 IV iron.         [facility] would like to thank you for the opportunity to serve this patient. Please call with any questions or concerns.    Trini Pagan MD  [facility]  [address] [place] [address]  Fax: [phone]  Office: [phone]    HPI  Jonelle Contreras is a 74 y.o. female  with  has a past medical history of acute

## 2025-06-13 NOTE — PROGRESS NOTES
Pt awake and AAO lying in bed. Very tired from the day. Did get up from lying to sitting and sit to stand with CGA and GB to get pre-PD weight on standing scale. Declined to use the BR. Pt admitted with resp failure. Lungs decreased. No sob or cough. On O2 @ 2L/NC. Belly round and soft with active BS. LBM today. PD cath site CDI. Trace edema to lower legs. PD set up and started per policy. Pt tolerating well.

## 2025-06-13 NOTE — PROGRESS NOTES
Department of Physical Medicine & Rehabilitation  Progress Note    Patient Identification:  Jonelle Contreras  [id number]  : 1950  Admit date: 2025    Chief Complaint: Debility    Subjective:   No acute events overnight.   Slow flow alert on final drain this morning per RN. Nephrology was contacted and recommended manual drain. Still no output.   Patient also back on O2.   Patient seen this am sitting up in gym with PT. Overall she feels less fatigued today compared to yesterday. Denies sob or cough. She has mild edema in her feet but feels this is still improved overall.   We discussed ongoing issues with PD and intermittent O2 need as well as increased assist required with PT/OT yesterday. I expressed my concerns about her safety discharging home over the weekend. She is disappointed but in agreement with staying to address medical issues.   Labs reviewed.     ROS: No f/c, n/v, cp     Objective:  Patient Vitals for the past 24 hrs:   BP Temp Temp src Pulse Resp SpO2 Weight   25 0835 (!) 141/64 99.1 °F (37.3 °C) Oral 88 18 93 % --   25 0830 -- -- -- 89 -- (!) 88 % --   255 -- -- -- 80 16 93 % --   25 -- -- -- -- -- -- 60.1 kg (132 lb 7.9 oz)   25 1911 137/81 98.4 °F (36.9 °C) Oral 86 16 90 % --   25 1750 131/75 -- -- 85 -- 91 % --   25 1428 127/71 -- -- 93 -- 96 % --   25 1347 120/77 98.4 °F (36.9 °C) Oral 88 17 97 % 58.2 kg (128 lb 4.9 oz)     Const: Alert. No distress, pleasant.   HEENT: Normocephalic, atraumatic. Normal sclera/conjunctiva. MMM.   CV: Regular rate and rhythm.   Resp: No respiratory distress. Lungs CTAB.   Abd: Soft, nontender, nondistended, NABS+   Ext: No edema.   Neuro: Alert, oriented, appropriately interactive.   Psych: Cooperative, appropriate mood and affect    Laboratory data: Available via EMR.   Last 24 hour lab  Recent Results (from the past 24 hours)   Renal Function Panel    Collection Time: 25  1:05 PM   Result  Supervision  Skilled Clinical Factors: cues for hand placement  Bed<>chair  Technique: Stand step  Assistance Level: Supervision  Skilled Clinical Factors: with wheeled walker  Stand Pivot:     Lateral transfer:     Car transfer:  Assistance Level: Stand by assist  Skilled Clinical Factors: verbal cues for safe hand placement  Ambulation:  Surface: Level surface  Device: Rolling walker  Distance: 90', 60' around therapy gym and ADL apartment  Activity: Within Unit  Activity Comments: decreased step length bilaterally, mildly Flexed posture with flexed hips and knees but improving. She tends to keep shoulders elevated and tensed, but improved with cueing. No complaints of SOB  Additional Factors: Increased time to complete  Assistance Level: Stand by assist, Supervision  Gait Deviations: Slow cirilo, Decreased step length bilateral  Skilled Clinical Factors: Patient ambulated 60', initiated with single point cane but advanced to no device. patient uses very short steps bilaterally and is very tense and gaurded with the walker. She could not follow cueing to increase step length and relax her shoulder. Once cane was removed, she improved bilateral step length but was slighty unsteady.  She wsa CGA for balance  Stairs:  Stair Height: 6''  Device: Bilateral handrails  Number of Stairs: 4  Additional Factors: Reciprocal going up, Reciprocal going down  Assistance Level: Stand by assist  Skilled Clinical Factors: Patient was able to complete with steady balance with bilateral rails. she reports her steps at home do not have a rail down the steps, but there is a railing at the top of the steps, around the porch.  Patient reports she is able to get hands to the railing at the top for support.  Curb:  Curb Height: 6''  Device: Rolling walker  Number of Curbs: 1  Additional Factors: Verbal cues  Assistance Level: Contact guard assist  Skilled Clinical Factors: Patient with poor safety and did not recall previously doing  progress but required more assist 6/12. Limited more by fatigue, possibly related to issues with PD. Now also with O2 and has not been trained on managing line. Doing better with PT today but still not as well as earlier this week. Overall CGA for mobility, Emily-maxA for ADLs. Barriers include: weakness, endurance, balance, nutrition, insight/problem solving, lack of assist at home. Concern for patient's ability to perform manual PD at home. Unable to do home visit.   Anticipated Dispo: home alone  Services:  PT, OT, RN  DME: RAKESH, medical alert button  ELOS: Will cancel dc this weekend due to ongoing medical issues and reassess Monday      Suzy Rucker MD 6/13/2025, 9:00 AM

## 2025-06-14 ENCOUNTER — APPOINTMENT (OUTPATIENT)
Dept: GENERAL RADIOLOGY | Age: 75
DRG: 947 | End: 2025-06-14
Attending: PHYSICAL MEDICINE & REHABILITATION
Payer: MEDICARE

## 2025-06-14 LAB
ANION GAP SERPL CALCULATED.3IONS-SCNC: 14 MMOL/L (ref 3–16)
BUN SERPL-MCNC: 63 MG/DL (ref 7–20)
CALCIUM SERPL-MCNC: 9.5 MG/DL (ref 8.3–10.6)
CHLORIDE SERPL-SCNC: 98 MMOL/L (ref 99–110)
CO2 SERPL-SCNC: 26 MMOL/L (ref 21–32)
CREAT SERPL-MCNC: 7.9 MG/DL (ref 0.6–1.2)
GFR SERPLBLD CREATININE-BSD FMLA CKD-EPI: 5 ML/MIN/{1.73_M2}
GLUCOSE SERPL-MCNC: 155 MG/DL (ref 70–99)
POTASSIUM SERPL-SCNC: 4.2 MMOL/L (ref 3.5–5.1)
SODIUM SERPL-SCNC: 138 MMOL/L (ref 136–145)

## 2025-06-14 PROCEDURE — 6370000000 HC RX 637 (ALT 250 FOR IP): Performed by: INTERNAL MEDICINE

## 2025-06-14 PROCEDURE — 73610 X-RAY EXAM OF ANKLE: CPT

## 2025-06-14 PROCEDURE — 2500000003 HC RX 250 WO HCPCS: Performed by: PHYSICAL MEDICINE & REHABILITATION

## 2025-06-14 PROCEDURE — 2700000000 HC OXYGEN THERAPY PER DAY

## 2025-06-14 PROCEDURE — 80048 BASIC METABOLIC PNL TOTAL CA: CPT

## 2025-06-14 PROCEDURE — 94761 N-INVAS EAR/PLS OXIMETRY MLT: CPT

## 2025-06-14 PROCEDURE — 2580000003 HC RX 258: Performed by: INTERNAL MEDICINE

## 2025-06-14 PROCEDURE — 6360000002 HC RX W HCPCS: Performed by: PHYSICAL MEDICINE & REHABILITATION

## 2025-06-14 PROCEDURE — 1280000000 HC REHAB R&B

## 2025-06-14 PROCEDURE — 6370000000 HC RX 637 (ALT 250 FOR IP): Performed by: PHYSICAL MEDICINE & REHABILITATION

## 2025-06-14 PROCEDURE — 36415 COLL VENOUS BLD VENIPUNCTURE: CPT

## 2025-06-14 PROCEDURE — 90945 DIALYSIS ONE EVALUATION: CPT

## 2025-06-14 RX ORDER — IPRATROPIUM BROMIDE AND ALBUTEROL SULFATE 2.5; .5 MG/3ML; MG/3ML
1 SOLUTION RESPIRATORY (INHALATION) 2 TIMES DAILY PRN
Status: DISCONTINUED | OUTPATIENT
Start: 2025-06-14 | End: 2025-06-20 | Stop reason: HOSPADM

## 2025-06-14 RX ORDER — FUROSEMIDE 40 MG/1
80 TABLET ORAL 2 TIMES DAILY
Status: DISCONTINUED | OUTPATIENT
Start: 2025-06-14 | End: 2025-06-20 | Stop reason: HOSPADM

## 2025-06-14 RX ORDER — SODIUM CHLORIDE, SODIUM LACTATE, CALCIUM CHLORIDE, MAGNESIUM CHLORIDE AND DEXTROSE 2.5; 538; 448; 18.3; 5.08 G/100ML; MG/100ML; MG/100ML; MG/100ML; MG/100ML
4000 INJECTION, SOLUTION INTRAPERITONEAL NIGHTLY
Status: DISCONTINUED | OUTPATIENT
Start: 2025-06-14 | End: 2025-06-20 | Stop reason: HOSPADM

## 2025-06-14 RX ORDER — SODIUM CHLORIDE, SODIUM LACTATE, CALCIUM CHLORIDE, MAGNESIUM CHLORIDE AND DEXTROSE 2.5; 538; 448; 18.3; 5.08 G/100ML; MG/100ML; MG/100ML; MG/100ML; MG/100ML
6000 INJECTION, SOLUTION INTRAPERITONEAL NIGHTLY
Status: DISCONTINUED | OUTPATIENT
Start: 2025-06-14 | End: 2025-06-20 | Stop reason: HOSPADM

## 2025-06-14 RX ADMIN — SODIUM CHLORIDE, SODIUM LACTATE, CALCIUM CHLORIDE, MAGNESIUM CHLORIDE AND DEXTROSE 6000 ML: 2.5; 538; 448; 18.3; 5.08 INJECTION, SOLUTION INTRAPERITONEAL at 21:51

## 2025-06-14 RX ADMIN — METOCLOPRAMIDE 5 MG: 10 TABLET ORAL at 10:06

## 2025-06-14 RX ADMIN — ALPRAZOLAM 2 MG: 0.5 TABLET ORAL at 20:53

## 2025-06-14 RX ADMIN — PANTOPRAZOLE SODIUM 40 MG: 40 TABLET, DELAYED RELEASE ORAL at 20:52

## 2025-06-14 RX ADMIN — POTASSIUM CHLORIDE 20 MEQ: 1500 TABLET, EXTENDED RELEASE ORAL at 10:06

## 2025-06-14 RX ADMIN — HEPARIN SODIUM 5000 UNITS: 5000 INJECTION INTRAVENOUS; SUBCUTANEOUS at 14:12

## 2025-06-14 RX ADMIN — LOSARTAN POTASSIUM 100 MG: 100 TABLET, FILM COATED ORAL at 20:53

## 2025-06-14 RX ADMIN — POLYETHYLENE GLYCOL 3350 17 G: 17 POWDER, FOR SOLUTION ORAL at 10:05

## 2025-06-14 RX ADMIN — CLOPIDOGREL BISULFATE 75 MG: 75 TABLET, FILM COATED ORAL at 10:07

## 2025-06-14 RX ADMIN — METOCLOPRAMIDE 5 MG: 10 TABLET ORAL at 15:11

## 2025-06-14 RX ADMIN — ROSUVASTATIN CALCIUM 10 MG: 10 TABLET, FILM COATED ORAL at 20:52

## 2025-06-14 RX ADMIN — TROSPIUM CHLORIDE 20 MG: 20 TABLET, FILM COATED ORAL at 20:52

## 2025-06-14 RX ADMIN — ASPIRIN 81 MG 81 MG: 81 TABLET ORAL at 10:06

## 2025-06-14 RX ADMIN — SODIUM CHLORIDE, SODIUM LACTATE, CALCIUM CHLORIDE, MAGNESIUM CHLORIDE AND DEXTROSE 4000 ML: 2.5; 538; 448; 18.3; 5.08 INJECTION, SOLUTION INTRAPERITONEAL at 21:51

## 2025-06-14 RX ADMIN — TRAMADOL HYDROCHLORIDE 50 MG: 50 TABLET, COATED ORAL at 23:32

## 2025-06-14 RX ADMIN — TRAMADOL HYDROCHLORIDE 50 MG: 50 TABLET, COATED ORAL at 00:49

## 2025-06-14 RX ADMIN — HEPARIN SODIUM 5000 UNITS: 5000 INJECTION INTRAVENOUS; SUBCUTANEOUS at 06:00

## 2025-06-14 RX ADMIN — SENNOSIDES, DOCUSATE SODIUM 2 TABLET: 50; 8.6 TABLET, FILM COATED ORAL at 10:05

## 2025-06-14 RX ADMIN — HEPARIN SODIUM 5000 UNITS: 5000 INJECTION INTRAVENOUS; SUBCUTANEOUS at 20:53

## 2025-06-14 RX ADMIN — SEVELAMER CARBONATE 1600 MG: 800 TABLET, FILM COATED ORAL at 12:35

## 2025-06-14 RX ADMIN — FUROSEMIDE 80 MG: 40 TABLET ORAL at 12:35

## 2025-06-14 RX ADMIN — PANTOPRAZOLE SODIUM 40 MG: 40 TABLET, DELAYED RELEASE ORAL at 10:06

## 2025-06-14 RX ADMIN — GUAIFENESIN 1200 MG: 600 TABLET, EXTENDED RELEASE ORAL at 20:52

## 2025-06-14 RX ADMIN — FUROSEMIDE 80 MG: 40 TABLET ORAL at 18:47

## 2025-06-14 RX ADMIN — ROPINIROLE HYDROCHLORIDE 1 MG: 1 TABLET, FILM COATED ORAL at 20:52

## 2025-06-14 RX ADMIN — LACTULOSE 30 G: 10 SOLUTION ORAL at 17:15

## 2025-06-14 RX ADMIN — SEVELAMER CARBONATE 1600 MG: 800 TABLET, FILM COATED ORAL at 18:47

## 2025-06-14 RX ADMIN — SODIUM CHLORIDE, PRESERVATIVE FREE 10 ML: 5 INJECTION INTRAVENOUS at 20:54

## 2025-06-14 RX ADMIN — TIZANIDINE 4 MG: 4 TABLET ORAL at 00:49

## 2025-06-14 RX ADMIN — FLUCONAZOLE 200 MG: 100 TABLET ORAL at 10:05

## 2025-06-14 RX ADMIN — ALPRAZOLAM 2 MG: 0.5 TABLET ORAL at 10:52

## 2025-06-14 ASSESSMENT — PAIN DESCRIPTION - PAIN TYPE: TYPE: CHRONIC PAIN

## 2025-06-14 ASSESSMENT — PAIN DESCRIPTION - ONSET: ONSET: ON-GOING

## 2025-06-14 ASSESSMENT — PAIN SCALES - GENERAL
PAINLEVEL_OUTOF10: 7
PAINLEVEL_OUTOF10: 7

## 2025-06-14 ASSESSMENT — PAIN DESCRIPTION - LOCATION: LOCATION: NECK;ABDOMEN

## 2025-06-14 ASSESSMENT — PAIN DESCRIPTION - FREQUENCY: FREQUENCY: INTERMITTENT

## 2025-06-14 ASSESSMENT — PAIN DESCRIPTION - DESCRIPTORS: DESCRIPTORS: ACHING;SPASM

## 2025-06-14 ASSESSMENT — PAIN DESCRIPTION - ORIENTATION: ORIENTATION: LOWER;MID

## 2025-06-14 ASSESSMENT — PAIN - FUNCTIONAL ASSESSMENT: PAIN_FUNCTIONAL_ASSESSMENT: ACTIVITIES ARE NOT PREVENTED

## 2025-06-14 ASSESSMENT — PAIN SCALES - WONG BAKER: WONGBAKER_NUMERICALRESPONSE: NO HURT

## 2025-06-14 NOTE — PROGRESS NOTES
Patient ID: Jonelle Contreras  Referring/ Physician: Suzy Rucker MD      Summary:   Jonelle Contreras is being seen by nephrology for ESRD on peritoneal dialysis. .     Reason for admission:   rehab for generalized weakness after hospital admission for pneumonia and hypertensive urgency.     Medical Problems   ESRD  Pericarditis   CAD  HTN  acute intermittent porphyria  Agoraphobic   Asthma,   COPD   collagenous colitis    Anxiety/Depression,  Left thyroid nodule,  Migraines   Neuropathy   Osteoporosis   PAD    Prediabetes,  Pulmonary nodules   Restless leg syndrome  Urinary incontinence.    Plan:   Pt has pericarditis  Check echo  Intensify dialysis   5 exchanges with 2.5%  Remove more fluid  Start on torsemide  If above fails  will need to switch to HD   Will stop procardia  because can trigger Acute porphyria   Although no recent flare up  Not on treatment       Interval Hx:   Alert  Says going backword  Very weak  Poor appetite   Drinking fluid   Still make urine    Seems more lethargic today.   Slow drain on the cycler.   /87  Pericardial rub   No fluid overload   On 1 L  Uo 200 cc  UF on   Dialysis 27  Net + 19 L   some issue with the recoding of the IO s.   K 4.2  Creat 7.9    ESRD  Renal failure due to hypertensive kidney disease.   Prior to admission she was doing manual exchanges at home.    started on a cycler.   3 exchanges  . No LBF.   daily gent to PD exit site     SHPT    Ca and phos wnl  On renvela 1600 mg TID with meals.     Labile hypertension  Long standing issue with difficulty in controlling her BP due to severe orthostatic hypotension.   Currently aiming for SBP < 180 when supine and > 140 prior to therapy to accommodate large changes in BP during ambulating.    - coreg 6.25 mg BID   - nifedipine 30 mg with dinner , hold < 140    - losartan 100 mg at night, hold < 140       H/o porphyria diagnosed age 32 due to abdominal pain and says was out   No  05/25/2025 09:35 PM

## 2025-06-14 NOTE — PLAN OF CARE
Problem: Discharge Planning  Goal: Discharge to home or other facility with appropriate resources  6/14/2025 1034 by Beckie Romero RN  Outcome: Progressing  Flowsheets (Taken 6/14/2025 1034)  Discharge to home or other facility with appropriate resources: Identify barriers to discharge with patient and caregiver     Problem: Safety - Adult  Goal: Free from fall injury  6/14/2025 1034 by Becike Romero RN  Outcome: Progressing  Flowsheets (Taken 6/14/2025 1034)  Free From Fall Injury: Instruct family/caregiver on patient safety     Problem: Skin/Tissue Integrity  Goal: Skin integrity remains intact  Description: 1.  Monitor for areas of redness and/or skin breakdown2.  Assess vascular access sites hourly3.  Every 4-6 hours minimum:  Change oxygen saturation probe site4.  Every 4-6 hours:  If on nasal continuous positive airway pressure, respiratory therapy assess nares and determine need for appliance change or resting period  6/14/2025 1034 by Beckie Romero RN  Outcome: Progressing  Flowsheets (Taken 6/14/2025 1034)  Skin Integrity Remains Intact:   Monitor for areas of redness and/or skin breakdown   Turn and reposition as indicated     Problem: Pain  Goal: Verbalizes/displays adequate comfort level or baseline comfort level  6/14/2025 1034 by Beckie Romero RN  Outcome: Progressing  Flowsheets (Taken 6/14/2025 1034)  Verbalizes/displays adequate comfort level or baseline comfort level:   Encourage patient to monitor pain and request assistance   Administer analgesics based on type and severity of pain and evaluate response   Consider cultural and social influences on pain and pain management   Assess pain using appropriate pain scale   Implement non-pharmacological measures as appropriate and evaluate response   Notify Licensed Independent Practitioner if interventions unsuccessful or patient reports new pain     Problem: Nutrition Deficit:  Goal: Optimize nutritional status  6/14/2025 1034 by  Beckie Romero, RN  Outcome: Progressing  Flowsheets (Taken 6/14/2025 1034)  Nutrient intake appropriate for improving, restoring, or maintaining nutritional needs: Monitor oral intake, labs, and treatment plans     Problem: ABCDS Injury Assessment  Goal: Absence of physical injury  6/14/2025 1034 by Beckie Romero, RN  Outcome: Progressing  Flowsheets (Taken 6/14/2025 1034)  Absence of Physical Injury: Implement safety measures based on patient assessment

## 2025-06-14 NOTE — PROGRESS NOTES
Patient complaining of right ankle pain.  Right ankle tender to touch.  Has bilateral peripheral edema.  PPP.  Both feet and ankles pink.  Was able to walk to bedside chair, needed w/c to return to bed.  She denies twisting her ankle.  Dr Macario made aware.  Note new order for x-ray of ankle.

## 2025-06-14 NOTE — PROGRESS NOTES
Patient admitted to rehab with debility d/t acute hypoxic respiratory failure with mucous plugging.  A/Ox4.  Transfers with one assist with gait belt. Mobility restrictions: weakness. On regular, low phos diet, tolerating fair with encouragement.  Refused breakfast.  Medications taken whole with fluids. On aspirin, plavix and heparin for DVT prophylaxis.  Skin: scattered bruising.  PD catheter RLQ dressing change done.  Oxygen: r1 LPM per nasal canula. Has been continent bladder. LBM 6/12.   Chair/bed alarms in use and call light in reach. Will monitor for safety.

## 2025-06-14 NOTE — PROGRESS NOTES
Perfect Served Dr Moody in relation to Lasix parameters.  B/P was 99/64.  Said to go ahead and give the lasix.

## 2025-06-14 NOTE — PROGRESS NOTES
Patient admitted to rehab with acute hypoxic respiratory failure w/mucus plugging.  A/Ox4 slightly delayed at times. Transfers with walker and gait belt x 1-2 assist. Mobility restrictions: generalized weakness. On Regular with low P-  diet, tolerating. Medications taken whole with thin liquids. On Heparin SQ TID for DVT prophylaxis.  Skin: redness to coccyx, bruising to abdomen and scattered. Oxygen: NC 1L. LDA: PD cath . Has been continent of bowel and bladder. LBM 6/12/2025. Chair/bed alarms in use and call light in reach. Will monitor for safety. Electronically signed by Rina Foy RN on 6/14/2025 at 4:47 AM

## 2025-06-15 VITALS
BODY MASS INDEX: 21.6 KG/M2 | OXYGEN SATURATION: 91 % | SYSTOLIC BLOOD PRESSURE: 151 MMHG | HEIGHT: 65 IN | WEIGHT: 129.63 LBS | TEMPERATURE: 98.4 F | HEART RATE: 89 BPM | RESPIRATION RATE: 20 BRPM | DIASTOLIC BLOOD PRESSURE: 76 MMHG

## 2025-06-15 LAB
ANION GAP SERPL CALCULATED.3IONS-SCNC: 13 MMOL/L (ref 3–16)
BUN SERPL-MCNC: 60 MG/DL (ref 7–20)
CALCIUM SERPL-MCNC: 9.6 MG/DL (ref 8.3–10.6)
CHLORIDE SERPL-SCNC: 98 MMOL/L (ref 99–110)
CO2 SERPL-SCNC: 25 MMOL/L (ref 21–32)
CREAT SERPL-MCNC: 8.2 MG/DL (ref 0.6–1.2)
GFR SERPLBLD CREATININE-BSD FMLA CKD-EPI: 5 ML/MIN/{1.73_M2}
GLUCOSE SERPL-MCNC: 103 MG/DL (ref 70–99)
POTASSIUM SERPL-SCNC: 3.7 MMOL/L (ref 3.5–5.1)
SODIUM SERPL-SCNC: 136 MMOL/L (ref 136–145)

## 2025-06-15 PROCEDURE — 2500000003 HC RX 250 WO HCPCS: Performed by: PHYSICAL MEDICINE & REHABILITATION

## 2025-06-15 PROCEDURE — 6370000000 HC RX 637 (ALT 250 FOR IP): Performed by: INTERNAL MEDICINE

## 2025-06-15 PROCEDURE — 94669 MECHANICAL CHEST WALL OSCILL: CPT

## 2025-06-15 PROCEDURE — 90945 DIALYSIS ONE EVALUATION: CPT

## 2025-06-15 PROCEDURE — 2580000003 HC RX 258: Performed by: INTERNAL MEDICINE

## 2025-06-15 PROCEDURE — 94761 N-INVAS EAR/PLS OXIMETRY MLT: CPT

## 2025-06-15 PROCEDURE — 2580000003 HC RX 258: Performed by: PHYSICAL MEDICINE & REHABILITATION

## 2025-06-15 PROCEDURE — 6360000002 HC RX W HCPCS: Performed by: PHYSICAL MEDICINE & REHABILITATION

## 2025-06-15 PROCEDURE — 94640 AIRWAY INHALATION TREATMENT: CPT

## 2025-06-15 PROCEDURE — 80048 BASIC METABOLIC PNL TOTAL CA: CPT

## 2025-06-15 PROCEDURE — 6370000000 HC RX 637 (ALT 250 FOR IP): Performed by: PHYSICAL MEDICINE & REHABILITATION

## 2025-06-15 PROCEDURE — 36415 COLL VENOUS BLD VENIPUNCTURE: CPT

## 2025-06-15 PROCEDURE — 1280000000 HC REHAB R&B

## 2025-06-15 PROCEDURE — 2700000000 HC OXYGEN THERAPY PER DAY

## 2025-06-15 RX ADMIN — Medication 4 ML: at 08:45

## 2025-06-15 RX ADMIN — SODIUM CHLORIDE, PRESERVATIVE FREE 10 ML: 5 INJECTION INTRAVENOUS at 21:48

## 2025-06-15 RX ADMIN — LOSARTAN POTASSIUM 100 MG: 100 TABLET, FILM COATED ORAL at 21:47

## 2025-06-15 RX ADMIN — ALPRAZOLAM 2 MG: 0.5 TABLET ORAL at 21:47

## 2025-06-15 RX ADMIN — PANTOPRAZOLE SODIUM 40 MG: 40 TABLET, DELAYED RELEASE ORAL at 09:41

## 2025-06-15 RX ADMIN — ALPRAZOLAM 2 MG: 0.5 TABLET ORAL at 09:59

## 2025-06-15 RX ADMIN — METOCLOPRAMIDE 5 MG: 10 TABLET ORAL at 11:12

## 2025-06-15 RX ADMIN — POTASSIUM CHLORIDE 20 MEQ: 1500 TABLET, EXTENDED RELEASE ORAL at 09:42

## 2025-06-15 RX ADMIN — METOCLOPRAMIDE 5 MG: 10 TABLET ORAL at 06:00

## 2025-06-15 RX ADMIN — HEPARIN SODIUM 5000 UNITS: 5000 INJECTION INTRAVENOUS; SUBCUTANEOUS at 21:47

## 2025-06-15 RX ADMIN — CARVEDILOL 6.25 MG: 6.25 TABLET, FILM COATED ORAL at 09:41

## 2025-06-15 RX ADMIN — FUROSEMIDE 80 MG: 40 TABLET ORAL at 18:23

## 2025-06-15 RX ADMIN — TIOTROPIUM BROMIDE AND OLODATEROL 2 PUFF: 3.124; 2.736 SPRAY, METERED RESPIRATORY (INHALATION) at 08:39

## 2025-06-15 RX ADMIN — ALBUTEROL SULFATE 2 PUFF: 90 AEROSOL, METERED RESPIRATORY (INHALATION) at 20:03

## 2025-06-15 RX ADMIN — FUROSEMIDE 80 MG: 40 TABLET ORAL at 09:41

## 2025-06-15 RX ADMIN — CLOPIDOGREL BISULFATE 75 MG: 75 TABLET, FILM COATED ORAL at 09:41

## 2025-06-15 RX ADMIN — SODIUM CHLORIDE, SODIUM LACTATE, CALCIUM CHLORIDE, MAGNESIUM CHLORIDE AND DEXTROSE 6000 ML: 2.5; 538; 448; 18.3; 5.08 INJECTION, SOLUTION INTRAPERITONEAL at 21:46

## 2025-06-15 RX ADMIN — SEVELAMER CARBONATE 1600 MG: 800 TABLET, FILM COATED ORAL at 18:23

## 2025-06-15 RX ADMIN — PANTOPRAZOLE SODIUM 40 MG: 40 TABLET, DELAYED RELEASE ORAL at 21:47

## 2025-06-15 RX ADMIN — ROPINIROLE HYDROCHLORIDE 1 MG: 1 TABLET, FILM COATED ORAL at 21:47

## 2025-06-15 RX ADMIN — METOCLOPRAMIDE 5 MG: 10 TABLET ORAL at 15:18

## 2025-06-15 RX ADMIN — SODIUM CHLORIDE, SODIUM LACTATE, CALCIUM CHLORIDE, MAGNESIUM CHLORIDE AND DEXTROSE 4000 ML: 2.5; 538; 448; 18.3; 5.08 INJECTION, SOLUTION INTRAPERITONEAL at 21:46

## 2025-06-15 RX ADMIN — HEPARIN SODIUM 5000 UNITS: 5000 INJECTION INTRAVENOUS; SUBCUTANEOUS at 05:59

## 2025-06-15 RX ADMIN — SEVELAMER CARBONATE 1600 MG: 800 TABLET, FILM COATED ORAL at 09:41

## 2025-06-15 RX ADMIN — FLUCONAZOLE 200 MG: 100 TABLET ORAL at 09:40

## 2025-06-15 RX ADMIN — TROSPIUM CHLORIDE 20 MG: 20 TABLET, FILM COATED ORAL at 21:47

## 2025-06-15 RX ADMIN — ASPIRIN 81 MG 81 MG: 81 TABLET ORAL at 09:41

## 2025-06-15 RX ADMIN — SENNOSIDES, DOCUSATE SODIUM 2 TABLET: 50; 8.6 TABLET, FILM COATED ORAL at 09:40

## 2025-06-15 RX ADMIN — HEPARIN SODIUM 5000 UNITS: 5000 INJECTION INTRAVENOUS; SUBCUTANEOUS at 15:18

## 2025-06-15 RX ADMIN — GUAIFENESIN 1200 MG: 600 TABLET, EXTENDED RELEASE ORAL at 09:41

## 2025-06-15 RX ADMIN — SEVELAMER CARBONATE 1600 MG: 800 TABLET, FILM COATED ORAL at 13:10

## 2025-06-15 RX ADMIN — ROSUVASTATIN CALCIUM 10 MG: 10 TABLET, FILM COATED ORAL at 21:47

## 2025-06-15 RX ADMIN — GUAIFENESIN 1200 MG: 600 TABLET, EXTENDED RELEASE ORAL at 21:47

## 2025-06-15 ASSESSMENT — PAIN SCALES - GENERAL: PAINLEVEL_OUTOF10: 3

## 2025-06-15 NOTE — PLAN OF CARE
Problem: Discharge Planning  Goal: Discharge to home or other facility with appropriate resources  Outcome: Progressing  Flowsheets (Taken 6/15/2025 1133)  Discharge to home or other facility with appropriate resources: Identify barriers to discharge with patient and caregiver     Problem: Safety - Adult  Goal: Free from fall injury  6/15/2025 1133 by Beckie Romero RN  Outcome: Progressing  Flowsheets (Taken 6/15/2025 1133)  Free From Fall Injury: Instruct family/caregiver on patient safety     Problem: Skin/Tissue Integrity  Goal: Skin integrity remains intact  Description: 1.  Monitor for areas of redness and/or skin breakdown2.  Assess vascular access sites hourly3.  Every 4-6 hours minimum:  Change oxygen saturation probe site4.  Every 4-6 hours:  If on nasal continuous positive airway pressure, respiratory therapy assess nares and determine need for appliance change or resting period  6/15/2025 1133 by Beckie Romero, RN  Outcome: Progressing  Flowsheets (Taken 6/15/2025 1133)  Skin Integrity Remains Intact:   Monitor for areas of redness and/or skin breakdown   Turn and reposition as indicated     Problem: Pain  Goal: Verbalizes/displays adequate comfort level or baseline comfort level  6/15/2025 1133 by Beckie Romero RN  Outcome: Progressing  Flowsheets (Taken 6/15/2025 1133)  Verbalizes/displays adequate comfort level or baseline comfort level:   Encourage patient to monitor pain and request assistance   Administer analgesics based on type and severity of pain and evaluate response   Consider cultural and social influences on pain and pain management   Notify Licensed Independent Practitioner if interventions unsuccessful or patient reports new pain   Implement non-pharmacological measures as appropriate and evaluate response   Assess pain using appropriate pain scale     Problem: Nutrition Deficit:  Goal: Optimize nutritional status  6/15/2025 1133 by Beckie Romero, ASHLYN  Outcome:

## 2025-06-15 NOTE — CARE COORDINATION
Chart Reviewed.  Met with patient who is barely speaking, very weak.  She is okay with making a referral to [facility] again for SNF.  REferral initiated.  Will need PD cycler.  [facility] sent [facility] cycler to [facility] that is supposed to arrive on Tuesday.   Will need the cycler there before she can be moved.    Will also need a precert with [employer].    SAKINA Glass     Case Management   [phone]    6/15/2025  4:33 PM

## 2025-06-15 NOTE — PLAN OF CARE
Problem: Safety - Adult  Goal: Free from fall injury  Outcome: Progressing     Problem: Skin/Tissue Integrity  Goal: Skin integrity remains intact  Description: 1.  Monitor for areas of redness and/or skin breakdown2.  Assess vascular access sites hourly3.  Every 4-6 hours minimum:  Change oxygen saturation probe site4.  Every 4-6 hours:  If on nasal continuous positive airway pressure, respiratory therapy assess nares and determine need for appliance change or resting period  Outcome: Progressing  Flowsheets  Taken 6/15/2025 0211  Skin Integrity Remains Intact: Monitor for areas of redness and/or skin breakdown  Taken 6/14/2025 2050  Skin Integrity Remains Intact: Monitor for areas of redness and/or skin breakdown     Problem: Pain  Goal: Verbalizes/displays adequate comfort level or baseline comfort level  Outcome: Progressing     Problem: ABCDS Injury Assessment  Goal: Absence of physical injury  Outcome: Progressing  Flowsheets (Taken 6/15/2025 0211)  Absence of Physical Injury: Implement safety measures based on patient assessment     Problem: Nutrition Deficit:  Goal: Optimize nutritional status  Outcome: Not Progressing

## 2025-06-15 NOTE — PROGRESS NOTES
Patient ID: Jonelle Contreras  Referring/ Physician: Suzy Rucker MD      Summary:   Jonelle Contreras is being seen by nephrology for ESRD on peritoneal dialysis. .     Reason for admission:   rehab for generalized weakness after hospital admission for pneumonia and hypertensive urgency.     Medical Problems   ESRD  Pericarditis   CAD  HTN  acute intermittent porphyria  Agoraphobic   Asthma,   COPD   collagenous colitis    Anxiety/Depression,  Left thyroid nodule,  Migraines   Neuropathy   Osteoporosis   PAD    Prediabetes,  Pulmonary nodules   Restless leg syndrome  Urinary incontinence.    Plan:   Pt has pericarditis   Echo pending  Intensify dialysis   5 exchanges with 2.5%  Remove more fluid  Start on torsemide  If above fails  will need to switch to HD   Will stop procardia  because can trigger Acute porphyria   Although no recent flare up  Not on treatment   Change to regular diet   K replacement       Interval Hx:   Very weak  Poor appetite   Still make urine     /83  Pericardial rub   No fluid overload   On 1 L  Uo 200 cc  UF on --801  Dialysis 27  Net + 19 L   some issue with the recoding of the IO s.   K 4.2--3.7  Creat 7.9--8.2  BUN 60    ESRD  Renal failure due to hypertensive kidney disease.   Prior to admission she was doing manual exchanges at home.    started on a cycler.   3 exchanges  . No LBF.   daily gent to PD exit site     SHPT    Ca and phos wnl  On renvela 1600 mg TID with meals.     Labile hypertension  Long standing issue with difficulty in controlling her BP due to severe orthostatic hypotension.   Currently aiming for SBP < 180 when supine and > 140 prior to therapy to accommodate large changes in BP during ambulating.    - coreg 6.25 mg BID   - nifedipine 30 mg with dinner , hold < 140    - losartan 100 mg at night, hold < 140       H/o porphyria diagnosed age 32 due to abdominal pain and says was out   No flare up   Managed by Dr Fuentes.

## 2025-06-15 NOTE — PROGRESS NOTES
Patient admitted to rehab due to generalized weakness after acute hypoxic respiratory failure.  A/Ox4. Transfers with front-wheeled walker, gait-belt, and assist of one, pt has not been out of bed this evening. Mobility restrictions: None. Patient is on low Phosphorus diet, with an appetite that is poor, took maybe 2 bites of soup for dinner. Medications taken whole with thin liquids without swallowing difficulty. On Heparin injections for DVT prophylaxis.  Skin: Noted with scattered bruising and coccyx is red but blanchable(Zinc applied multiple times). Patient incontinent of several loose stools for about the first 2 hours of shift (had Chronulac earlier). Oxygen: Pt on O2 at 1 L per NC this evening, saturation was running 90% on 1 L. LDA: PIV to RFA, flushes easily and dressing is clean, dry, and intact. Has been incontinent of bowel and bladder. LBM 6/15. Patient medicated with prn xanax at bedtime and prn Ultram at 2332 for generalized pain. Multiple alarms (low flow) while on PD this evening, repositioned multiple times, working well at this time. Chair/bed alarms and fall precautions in use. Call light and personal belongings are within reach. Will continue to monitor and assist as needed.

## 2025-06-15 NOTE — PROGRESS NOTES
Patient admitted to rehab with debility d/t acute hypoxic respiratory failure with mucous plugging.  A/Ox4.  Transfers with one assist with gait belt and stedy today. Mobility restrictions: weakness. On regular diet, tolerating fair with encouragement.  Medications taken whole with fluids. On aspirin, plavix and heparin for DVT prophylaxis.  Skin: scattered bruising, buttocks red and blanchable, zinc paste applied.  PD catheter RLQ.  Oxygen: r1 LPM per nasal canula. Has been continent bladder. LBM 6/14.   Chair/bed alarms in use and call light in reach. Will monitor for safety.

## 2025-06-16 ENCOUNTER — APPOINTMENT (OUTPATIENT)
Age: 75
DRG: 947 | End: 2025-06-16
Attending: INTERNAL MEDICINE
Payer: MEDICARE

## 2025-06-16 LAB
ANION GAP SERPL CALCULATED.3IONS-SCNC: 11 MMOL/L (ref 3–16)
BACTERIA URNS QL MICRO: NORMAL /HPF
BILIRUB UR QL STRIP.AUTO: NEGATIVE
BUN SERPL-MCNC: 56 MG/DL (ref 7–20)
CALCIUM SERPL-MCNC: 9.7 MG/DL (ref 8.3–10.6)
CHLORIDE SERPL-SCNC: 99 MMOL/L (ref 99–110)
CLARITY UR: CLEAR
CO2 SERPL-SCNC: 28 MMOL/L (ref 21–32)
COLOR UR: YELLOW
CREAT SERPL-MCNC: 8.4 MG/DL (ref 0.6–1.2)
DEPRECATED RDW RBC AUTO: 16.1 % (ref 12.4–15.4)
ECHO AO ROOT DIAM: 3.1 CM
ECHO AO ROOT INDEX: 1.89 CM/M2
ECHO BSA: 1.66 M2
ECHO LA DIAMETER INDEX: 1.71 CM/M2
ECHO LA DIAMETER: 2.8 CM
ECHO LA TO AORTIC ROOT RATIO: 0.9
ECHO LV EF PHYSICIAN: 60 %
ECHO LV FRACTIONAL SHORTENING: 29 % (ref 28–44)
ECHO LV INTERNAL DIMENSION DIASTOLE INDEX: 2.56 CM/M2
ECHO LV INTERNAL DIMENSION DIASTOLIC: 4.2 CM (ref 3.9–5.3)
ECHO LV INTERNAL DIMENSION SYSTOLIC INDEX: 1.83 CM/M2
ECHO LV INTERNAL DIMENSION SYSTOLIC: 3 CM
ECHO LV IVSD: 1.2 CM (ref 0.6–0.9)
ECHO LV MASS 2D: 137.2 G (ref 67–162)
ECHO LV MASS INDEX 2D: 83.7 G/M2 (ref 43–95)
ECHO LV POSTERIOR WALL DIASTOLIC: 0.8 CM (ref 0.6–0.9)
ECHO LV RELATIVE WALL THICKNESS RATIO: 0.38
ECHO RV INTERNAL DIMENSION: 2.9 CM
EPI CELLS #/AREA URNS AUTO: 0 /HPF (ref 0–5)
FUNGUS SPEC CULT: NORMAL
GFR SERPLBLD CREATININE-BSD FMLA CKD-EPI: 5 ML/MIN/{1.73_M2}
GLUCOSE SERPL-MCNC: 127 MG/DL (ref 70–99)
GLUCOSE UR STRIP.AUTO-MCNC: NEGATIVE MG/DL
HCT VFR BLD AUTO: 30.4 % (ref 36–48)
HGB BLD-MCNC: 10 G/DL (ref 12–16)
HGB UR QL STRIP.AUTO: NEGATIVE
HYALINE CASTS #/AREA URNS AUTO: 0 /LPF (ref 0–8)
KETONES UR STRIP.AUTO-MCNC: NEGATIVE MG/DL
LEUKOCYTE ESTERASE UR QL STRIP.AUTO: NEGATIVE
LOEFFLER MB STN SPEC: NORMAL
MCH RBC QN AUTO: 29.1 PG (ref 26–34)
MCHC RBC AUTO-ENTMCNC: 32.9 G/DL (ref 31–36)
MCV RBC AUTO: 88.3 FL (ref 80–100)
NITRITE UR QL STRIP.AUTO: NEGATIVE
PH UR STRIP.AUTO: 6.5 [PH] (ref 5–8)
PLATELET # BLD AUTO: 627 K/UL (ref 135–450)
PMV BLD AUTO: 7.9 FL (ref 5–10.5)
POTASSIUM SERPL-SCNC: 3.9 MMOL/L (ref 3.5–5.1)
PROT UR STRIP.AUTO-MCNC: 100 MG/DL
RBC # BLD AUTO: 3.44 M/UL (ref 4–5.2)
RBC CLUMPS #/AREA URNS AUTO: 2 /HPF (ref 0–4)
SODIUM SERPL-SCNC: 138 MMOL/L (ref 136–145)
SP GR UR STRIP.AUTO: 1.01 (ref 1–1.03)
UA COMPLETE W REFLEX CULTURE PNL UR: ABNORMAL
UA DIPSTICK W REFLEX MICRO PNL UR: YES
URN SPEC COLLECT METH UR: ABNORMAL
UROBILINOGEN UR STRIP-ACNC: 0.2 E.U./DL
WBC # BLD AUTO: 7.2 K/UL (ref 4–11)
WBC #/AREA URNS AUTO: 1 /HPF (ref 0–5)

## 2025-06-16 PROCEDURE — 97530 THERAPEUTIC ACTIVITIES: CPT

## 2025-06-16 PROCEDURE — 6360000002 HC RX W HCPCS: Performed by: INTERNAL MEDICINE

## 2025-06-16 PROCEDURE — 97110 THERAPEUTIC EXERCISES: CPT

## 2025-06-16 PROCEDURE — 2580000003 HC RX 258: Performed by: INTERNAL MEDICINE

## 2025-06-16 PROCEDURE — 6360000002 HC RX W HCPCS: Performed by: PHYSICAL MEDICINE & REHABILITATION

## 2025-06-16 PROCEDURE — 6370000000 HC RX 637 (ALT 250 FOR IP): Performed by: INTERNAL MEDICINE

## 2025-06-16 PROCEDURE — 51798 US URINE CAPACITY MEASURE: CPT

## 2025-06-16 PROCEDURE — 36415 COLL VENOUS BLD VENIPUNCTURE: CPT

## 2025-06-16 PROCEDURE — 80048 BASIC METABOLIC PNL TOTAL CA: CPT

## 2025-06-16 PROCEDURE — 93308 TTE F-UP OR LMTD: CPT | Performed by: INTERNAL MEDICINE

## 2025-06-16 PROCEDURE — 6370000000 HC RX 637 (ALT 250 FOR IP): Performed by: PHYSICAL MEDICINE & REHABILITATION

## 2025-06-16 PROCEDURE — 1280000000 HC REHAB R&B

## 2025-06-16 PROCEDURE — 94761 N-INVAS EAR/PLS OXIMETRY MLT: CPT

## 2025-06-16 PROCEDURE — 2700000000 HC OXYGEN THERAPY PER DAY

## 2025-06-16 PROCEDURE — 93308 TTE F-UP OR LMTD: CPT

## 2025-06-16 PROCEDURE — 94640 AIRWAY INHALATION TREATMENT: CPT

## 2025-06-16 PROCEDURE — 81001 URINALYSIS AUTO W/SCOPE: CPT

## 2025-06-16 PROCEDURE — 2580000003 HC RX 258: Performed by: PHYSICAL MEDICINE & REHABILITATION

## 2025-06-16 PROCEDURE — 93325 DOPPLER ECHO COLOR FLOW MAPG: CPT | Performed by: INTERNAL MEDICINE

## 2025-06-16 PROCEDURE — 85027 COMPLETE CBC AUTOMATED: CPT

## 2025-06-16 RX ADMIN — SODIUM CHLORIDE, SODIUM LACTATE, CALCIUM CHLORIDE, MAGNESIUM CHLORIDE AND DEXTROSE 4000 ML: 2.5; 538; 448; 18.3; 5.08 INJECTION, SOLUTION INTRAPERITONEAL at 23:25

## 2025-06-16 RX ADMIN — SODIUM CHLORIDE, SODIUM LACTATE, CALCIUM CHLORIDE, MAGNESIUM CHLORIDE AND DEXTROSE 6000 ML: 2.5; 538; 448; 18.3; 5.08 INJECTION, SOLUTION INTRAPERITONEAL at 23:25

## 2025-06-16 RX ADMIN — GUAIFENESIN 1200 MG: 600 TABLET, EXTENDED RELEASE ORAL at 09:13

## 2025-06-16 RX ADMIN — SEVELAMER CARBONATE 1600 MG: 800 TABLET, FILM COATED ORAL at 16:17

## 2025-06-16 RX ADMIN — SEVELAMER CARBONATE 1600 MG: 800 TABLET, FILM COATED ORAL at 09:13

## 2025-06-16 RX ADMIN — ROSUVASTATIN CALCIUM 10 MG: 10 TABLET, FILM COATED ORAL at 19:56

## 2025-06-16 RX ADMIN — HEPARIN SODIUM 5000 UNITS: 5000 INJECTION INTRAVENOUS; SUBCUTANEOUS at 22:27

## 2025-06-16 RX ADMIN — FUROSEMIDE 80 MG: 40 TABLET ORAL at 16:17

## 2025-06-16 RX ADMIN — HEPARIN SODIUM 5000 UNITS: 5000 INJECTION INTRAVENOUS; SUBCUTANEOUS at 13:56

## 2025-06-16 RX ADMIN — CLOPIDOGREL BISULFATE 75 MG: 75 TABLET, FILM COATED ORAL at 09:12

## 2025-06-16 RX ADMIN — TROSPIUM CHLORIDE 20 MG: 20 TABLET, FILM COATED ORAL at 19:55

## 2025-06-16 RX ADMIN — FLUCONAZOLE 200 MG: 100 TABLET ORAL at 09:12

## 2025-06-16 RX ADMIN — EPOETIN ALFA-EPBX 10000 UNITS: 10000 INJECTION, SOLUTION INTRAVENOUS; SUBCUTANEOUS at 18:05

## 2025-06-16 RX ADMIN — TRAMADOL HYDROCHLORIDE 50 MG: 50 TABLET, COATED ORAL at 22:57

## 2025-06-16 RX ADMIN — ASPIRIN 81 MG 81 MG: 81 TABLET ORAL at 09:13

## 2025-06-16 RX ADMIN — POTASSIUM CHLORIDE 20 MEQ: 1500 TABLET, EXTENDED RELEASE ORAL at 09:12

## 2025-06-16 RX ADMIN — PANTOPRAZOLE SODIUM 40 MG: 40 TABLET, DELAYED RELEASE ORAL at 19:55

## 2025-06-16 RX ADMIN — METOCLOPRAMIDE 5 MG: 10 TABLET ORAL at 06:14

## 2025-06-16 RX ADMIN — HEPARIN SODIUM 5000 UNITS: 5000 INJECTION INTRAVENOUS; SUBCUTANEOUS at 06:14

## 2025-06-16 RX ADMIN — Medication 4 ML: at 07:47

## 2025-06-16 RX ADMIN — METOCLOPRAMIDE 5 MG: 10 TABLET ORAL at 09:12

## 2025-06-16 RX ADMIN — METOCLOPRAMIDE 5 MG: 10 TABLET ORAL at 16:17

## 2025-06-16 RX ADMIN — ALPRAZOLAM 2 MG: 0.5 TABLET ORAL at 19:56

## 2025-06-16 RX ADMIN — PANTOPRAZOLE SODIUM 40 MG: 40 TABLET, DELAYED RELEASE ORAL at 09:16

## 2025-06-16 RX ADMIN — TRAMADOL HYDROCHLORIDE 50 MG: 50 TABLET, COATED ORAL at 09:19

## 2025-06-16 RX ADMIN — SEVELAMER CARBONATE 1600 MG: 800 TABLET, FILM COATED ORAL at 11:43

## 2025-06-16 RX ADMIN — ALBUTEROL SULFATE 2 PUFF: 90 AEROSOL, METERED RESPIRATORY (INHALATION) at 19:35

## 2025-06-16 RX ADMIN — LOSARTAN POTASSIUM 100 MG: 100 TABLET, FILM COATED ORAL at 19:55

## 2025-06-16 RX ADMIN — FUROSEMIDE 80 MG: 40 TABLET ORAL at 09:12

## 2025-06-16 RX ADMIN — CARVEDILOL 6.25 MG: 6.25 TABLET, FILM COATED ORAL at 09:13

## 2025-06-16 RX ADMIN — ROPINIROLE HYDROCHLORIDE 1 MG: 1 TABLET, FILM COATED ORAL at 19:55

## 2025-06-16 RX ADMIN — TIOTROPIUM BROMIDE AND OLODATEROL 2 PUFF: 3.124; 2.736 SPRAY, METERED RESPIRATORY (INHALATION) at 07:45

## 2025-06-16 RX ADMIN — CARVEDILOL 6.25 MG: 6.25 TABLET, FILM COATED ORAL at 16:17

## 2025-06-16 ASSESSMENT — PAIN SCALES - WONG BAKER: WONGBAKER_NUMERICALRESPONSE: NO HURT

## 2025-06-16 ASSESSMENT — PAIN SCALES - GENERAL
PAINLEVEL_OUTOF10: 0
PAINLEVEL_OUTOF10: 6
PAINLEVEL_OUTOF10: 7

## 2025-06-16 ASSESSMENT — PAIN DESCRIPTION - LOCATION
LOCATION: GENERALIZED
LOCATION: GENERALIZED

## 2025-06-16 ASSESSMENT — PAIN DESCRIPTION - DESCRIPTORS
DESCRIPTORS: ACHING;DISCOMFORT
DESCRIPTORS: ACHING;DISCOMFORT

## 2025-06-16 NOTE — PROGRESS NOTES
Occupational Therapy  Pt met BS, with RN present. Pt in bed, recently finished dialysis. RN requesting standing weight to be taken. Pt required Mod A for bed mob, supine>sit to EOB, including scooting to edge. Pt sat at EOB with SBA. Pt stood from bed onto standing scale, pulling onto bars of scale, with CG/Min A x1. Pt stood briefly for weight to be taken, with CGA. Pt CGA stand>sit back onto the bed. Pt required Lori for sit>supine and was dependent x2 for repositioning up in bed (RN assisting). RN requesting pt to remain in bed to medical status.   See last note for status if pt discharged prior to the next session.  Therapy Time     Individual Co-treatment   Time In 1015     Time Out 1025     Minutes 10     Variance 35 min.   Pavithra GONZALEZ/L,[id number]    OTR was consulted with this patients treatment/intervention plan.

## 2025-06-16 NOTE — PROGRESS NOTES
Department of Physical Medicine & Rehabilitation  Progress Note    Patient Identification:  Jonelle Contreras  [id number]  : 1950  Admit date: 2025    Chief Complaint: Debility    Subjective:   No acute events overnight.   Patient seen this am sitting up in room. Reports fatigue, generalized weakness and aches. No fevers/chills or other localizing symptoms.   Patient seen again this afternoon. Still feeling fatigued. Daughter updated at the bedside per request.   Labs reviewed.     ROS: No f/c, n/v, cp     Objective:  Patient Vitals for the past 24 hrs:   BP Temp Temp src Pulse Resp SpO2 Weight   25 1015 -- -- -- -- -- -- 58.7 kg (129 lb 6.6 oz)   25 0949 -- -- -- -- 18 -- --   25 0919 -- -- -- -- 18 -- --   25 0912 (!) 153/94 98.2 °F (36.8 °C) Oral 91 18 100 % --   25 0747 -- -- -- 86 18 93 % --   06/15/25 2140 -- -- -- -- -- -- 59.9 kg (132 lb 0.9 oz)   06/15/25 2138 (!) 151/76 98.4 °F (36.9 °C) Oral 89 20 91 % --   06/15/25 2003 -- -- -- 82 18 92 % --   06/15/25 1823 126/61 -- -- 80 18 96 % --     Const: Alert. No distress, pleasant.   HEENT: Normocephalic, atraumatic. Normal sclera/conjunctiva. MMM.   CV: Regular rate and rhythm.   Resp: No respiratory distress. Lungs CTAB.   Abd: Soft, nontender, nondistended, NABS+   Ext: No edema.   Neuro: Alert, oriented, appropriately interactive.   Psych: Cooperative, appropriate mood and affect    Laboratory data: Available via EMR.   Last 24 hour lab  Recent Results (from the past 24 hours)   Basic Metabolic Panel    Collection Time: 25  5:52 AM   Result Value Ref Range    Sodium 138 136 - 145 mmol/L    Potassium 3.9 3.5 - 5.1 mmol/L    Chloride 99 99 - 110 mmol/L    CO2 28 21 - 32 mmol/L    Anion Gap 11 3 - 16    Glucose 127 (H) 70 - 99 mg/dL    BUN 56 (H) 7 - 20 mg/dL    Creatinine 8.4 (HH) 0.6 - 1.2 mg/dL    Est, Glom Filt Rate 5 (A) >60    Calcium 9.7 8.3 - 10.6 mg/dL   CBC    Collection Time: 25  5:52 AM   Result  sock aid.  Toileting  Assistance Level: Stand by assist  Skilled Clinical Factors: pt declines participation this date    Speech therapy:    ADULT ORAL NUTRITION SUPPLEMENT; Dinner, Breakfast, Lunch; Other Oral Supplement; Expedite Cup  ADULT DIET; Regular        Body mass index is 21.53 kg/m².    Assessment and Plan:    Debility  -PT/OT    Pneumonia  -s/p bronchoscopy for mucus plugging  -s/p antibiotics  -monitor respiratory response to therapies     Hypoxia  -O2 requirement down to 1L (though spO2 100% on last check so likely able to wean to room air)  -CXR (6/13): small left pleural effusion and atelectasis  -encourage IS  -Baseline COPD may be contributing.     Candida/erosive esophagitis  -continue fluconazole, metoclopramide, pantoprazole    Hypertension --> orthostatic hypotension  -Uncontrolled with hypertensive urgency on admission  -continue carvedilol, losartan with hold parameters  -lasix added per Nephrology  -Holding hydralazine and nifedipine  --monitor trend, overall much improved.     ESRD on PD  -Nephrology following, appreciate input  -Avoid nephrotoxins, renally dose meds  -Monitor renal function   ---concern for patient ability to manage manual PD at home alone. Unable to train on cycler until this week per company.  No kink in PD cath based on Xray.     Pericardial rub  -First noted by Per Dr. Moody over weekend  -TTE pending.     CAD  -continue ASA, Plavix, bb, arb, statin    HLD  -continue rosuvastatin    COPD/asthma  -continue Stiolto, Duonebs    GERD  -continue pantoprazole    RLS  -continue ropinirole    Anxiety  -continue prn alprazolam (dose increased to home dose of 2 mg and patient reports improvement)  -Affect more flat 6/16    Malnutrition  -Minimal po intake during hospital stay  -Dietitian following  -Patient does not want feeding tube  -encouraging po intake, does better with food from outside per family    Bladder: H/o overactive bladder, high risk retention and  tripped and fell down, c/o b/l knee pain, denied LOC or headache, denied taking anticoagulants,

## 2025-06-16 NOTE — PROGRESS NOTES
Physical Therapy  Facility/Department: [address] REHAB  Rehabilitation Physical Therapy Treatment Note    NAME: Jonelle Contreras  : 1950 (74 y.o.)  MRN: [id number]  CODE STATUS: Full Code    Date of Service: 25       Restrictions:  Restrictions/Precautions: Fall Risk  Position Activity Restriction  Other Position/Activity Restrictions: PD nightly. dwayne TEDs daily.     PT scheduled for  at 0915. PT notified by Eboni GOLDBERG that PT would not be complete until 0945, and she will not be ready for therapy until 1000.  PT will try back later for therapy.      Therapy Time   Individual Concurrent Group Co-treatment   Time In           Time Out           Minutes           45 minute variance due to procedure          ESTEE Taylor CNS [id number], 25 at 9:27 AM

## 2025-06-16 NOTE — PROGRESS NOTES
Patient admitted to rehab with acute hypoxic respiratory failure w/ mucus plugging.  A/Ox 4. Transfers with walker. Mobility restrictions: none. On regular diet, has poor appetite. Medications taken whole in thins. On Plavix for DVT prophylaxis.  Skin: PD cath; stage 1 to coccyx; scattered bruising. Oxygen: none. LDA: PIV right wrist. Has been continent of bowel and continent of bladder with periods of incontinence. LBM 6/15. Chair/bed alarms in use and call light in reach.

## 2025-06-16 NOTE — PLAN OF CARE
Problem: Safety - Adult  Goal: Free from fall injury  6/16/2025 0002 by Esperanza Macario RN  Outcome: Progressing     Problem: Skin/Tissue Integrity  Goal: Skin integrity remains intact  Description: 1.  Monitor for areas of redness and/or skin breakdown2.  Assess vascular access sites hourly3.  Every 4-6 hours minimum:  Change oxygen saturation probe site4.  Every 4-6 hours:  If on nasal continuous positive airway pressure, respiratory therapy assess nares and determine need for appliance change or resting period  6/16/2025 0002 by Esperanza Macario RN  Outcome: Progressing  Flowsheets  Taken 6/15/2025 2357  Skin Integrity Remains Intact: Monitor for areas of redness and/or skin breakdown  Taken 6/15/2025 2140  Skin Integrity Remains Intact: Monitor for areas of redness and/or skin breakdown     Problem: Pain  Goal: Verbalizes/displays adequate comfort level or baseline comfort level  6/16/2025 0002 by Esperanza Macario RN  Outcome: Progressing     Problem: Nutrition Deficit:  Goal: Optimize nutritional status  6/16/2025 0002 by Esperanza Macario RN  Outcome: Progressing     Problem: ABCDS Injury Assessment  Goal: Absence of physical injury  6/16/2025 0002 by Esperanza Macario RN  Outcome: Progressing  Flowsheets (Taken 6/15/2025 2357)  Absence of Physical Injury: Implement safety measures based on patient assessment

## 2025-06-16 NOTE — PROGRESS NOTES
Patient admitted to rehab due to generalized weakness after acute hypoxic respiratory failure.  A/Ox4. Transfers with front-wheeled walker, gait-belt, and assist of one, pt has not been out of bed this evening. Mobility restrictions: None. Patient is on low Phosphorus diet, with an appetite that is poor to fair. Ate well for breakfast, and a half of a big boy with onion rings for lunch. Refused dinner or snack. Medications taken whole with thin liquids without swallowing difficulty. On Heparin injections for DVT prophylaxis.  Skin: Noted with scattered bruising and coccyx is red but blanchable(Zinc applied multiple times).  Oxygen: Pt on O2 at 1 L per NC this evening, saturation was running 92% on 1 L. LDA: PIV to RFA, flushes easily and dressing is clean, dry, and intact. Has been incontinent of bladder, with only 150 ml out overnight of tea colored urine. LBM 6/15. Patient medicated with prn xanax at bedtime.  No alarms while on PD overnight. Chair/bed alarms and fall precautions in use. Call light and personal belongings are within reach. Will continue to monitor and assist as needed.

## 2025-06-16 NOTE — PROGRESS NOTES
Occupational Therapy  Facility/Department: [address] REHAB  Rehabilitation Occupational Therapy Daily Treatment Note    Date: 25  Patient Name: Jonelle Contreras       Room: Y3C-9527/3258-01  MRN: [id number]  Account: [id number]   : 1950  (74 y.o.) Gender: female          Past Medical History:  has a past medical history of acute intermittent porphyria, agoraphobic, Allergic rhinitis, Anxiety, Arthritis, Asthma, CAD (coronary artery disease), Chronic obstructive pulmonary disease, unspecified COPD type ([facility]), ckd 4, collagenous colitis, COPD (chronic obstructive pulmonary disease) ([facility]), Depression, Disease of blood and blood forming organ, GERD (gastroesophageal reflux disease), Heart disease, Hyperlipidemia, Hypertension, Left thyroid nodule, Migraines, Neuropathy, Osteoporosis, PAD (peripheral artery disease), Peripheral vascular disease, post menopausal, Prediabetes, Pulmonary nodules, Restless leg syndrome, STEMI involving oth coronary artery of inferior wall ([facility]), and Urinary incontinence.  Past Surgical History:   has a past surgical history that includes Hysterectomy; Cholecystectomy; Coronary angioplasty with stent (10/2013 [place]); Colonoscopy (2017); Colonoscopy (N/A, 2022); Upper gastrointestinal endoscopy (N/A, 2022); Cardiac surgery; Hysterectomy, vaginal; Upper gastrointestinal endoscopy (2022); Upper gastrointestinal endoscopy (N/A, 2025); and bronchoscopy (N/A, 2025).    Restrictions  Restrictions/Precautions: Fall Risk  Other Position/Activity Restrictions: PD nightly. dwayne TEDs daily.    Subjective  Subjective: Pt seen BS per RN request, due to med status/increasing weakness. Pt agreeable to OT for hand strengthening, BUE AROM ex's. No c/o pain.  Restrictions/Precautions: Fall Risk      Objective     Cognition  Cognition Comment: Slowed processing/responses, flat affect  Orientation  Overall Orientation Status: Within Normal Limits          Roll

## 2025-06-16 NOTE — PROGRESS NOTES
Physical Therapy  Facility/Department: [address] REHAB  Rehabilitation Physical Therapy Treatment Note    NAME: Jonelle Contreras  : 1950 (74 y.o.)  MRN: [id number]  CODE STATUS: Full Code    Date of Service: 25       Restrictions:  Restrictions/Precautions: Fall Risk  Position Activity Restriction  Other Position/Activity Restrictions: PD nightly. dwayne TEDs daily. on O2 at 1L     Pertinent medical information:  Additional Pertinent Hx: per Dr. Drummond's note, \"74-year-old female who came to hospital with hyperkalemia and hypertension, found to be in a hypertensive emergency.  Her blood pressure was brought down and the patient is following with nephrology and cardiology.  Patient has ESRD and is on PD.  Patient also came in with a cough and underwent a bronc on 2025 for bilateral mucous plugs.  Patient was treated for pneumonia and started on IV antibiotics for 7 days. \"    SUBJECTIVE  Subjective: RNVanessa reporting patient is not feeling well. She does not feel patient can tolerate OOB, but she stated therapy could try. Patient in bed and reporting she feels very weak.  PT recommended getting up to try and see how she feels. She was agreeable to this  Pain: no complaints of pain at this time    Social/Functional History  Lives With: Alone  Type of Home: House  Home Layout: Work area in basement, Two level, Laundry in basement (ranch with finished basement)  Home Access: Stairs to enter with rails  Entrance Stairs - Number of Steps: 4 CONSTANCE front door with L handrail.  Entrance Stairs - Rails: Left  Bathroom Shower/Tub: Tub/Shower unit  Bathroom Toilet: Handicap height  Bathroom Equipment: Tub transfer bench (pt reports she turns the TTB longways so it fits entirely in tub shower unit.)  Bathroom Accessibility: Accessible  Home Equipment: Walker - Rolling (Walker (3 wheel).)  Has the patient had two or more falls in the past year or any fall with injury in the past year?: No (pt denies fall at home

## 2025-06-16 NOTE — PROGRESS NOTES
Patient ID: Jonelle Contreras  Referring/ Physician: Suzy Rucker MD      Summary:   Jonelle Contreras is being seen by nephrology for ESRD on peritoneal dialysis. .     Reason for admission: rehab for generalized weakness after hospital admission for pneumonia and hypertensive urgency.       Interval Hx:     Seen on peritoneal dialysis.   She said that she can not stand because she feels so weak.   No dizziness. No chest pain.   Appetite is still low.   No abdominal pain.   An echo was ordered over the weekend because a pericardial friction rub was heard on exam.   Her PD prescription was increased.   Her nifedipine was stopped over the weekend.     /94  On 1 L   HR 91   WT 60.1 >59.9 > 58.7  Uo 150 cc    K 3.9   BUN 56  Ca 9.7  Hgb 10        Assessment/Plan:   - continue 2.5 % 10 L 5 exchanges   - BP acceptable.   - f/u echo   - on retacrit 10 k units TIW.   - cont lasix 80 mg BID for now.           ESRD  Renal failure due to hypertensive kidney disease.   Prior to admission she was doing manual exchanges at home.   This admission she was started on a cycler.   Now she is getting 3 exchanges. No LBF.    -daily BM   -daily gent to PD exit site     SHPT  , due to renal origin.   Ca and phos wnl  On renvela 1600 mg TID with meals.     Labile hypertension  Long standing issue with difficulty in controlling her BP due to severe orthostatic hypotension.   Currently aiming for SBP < 180 when supine and > 140 prior to therapy to accommodate large changes in BP during ambulating.    - coreg 6.25 mg BID   - nifedipine 30 mg with dinner , hold < 140    - losartan 100 mg at night, hold < 140       H/o porphyria  Managed by Dr Fuentes.   No acute issues.   Counseled to avoid smoking once she is discharged.      Anemia   Hgb goal > 10   On procrit 10 k units weekly.   Iron stores were low, got 200 mg IV x 2 IV iron.         [facility] would like to thank you for the

## 2025-06-16 NOTE — PLAN OF CARE
Problem: Discharge Planning  Goal: Discharge to home or other facility with appropriate resources  6/16/2025 1835 by Eboni Pompa RN  Outcome: Progressing  Flowsheets (Taken 6/16/2025 1058)  Discharge to home or other facility with appropriate resources:   Identify barriers to discharge with patient and caregiver   Identify discharge learning needs (meds, wound care, etc)   Refer to discharge planning if patient needs post-hospital services based on physician order or complex needs related to functional status, cognitive ability or social support system   Arrange for needed discharge resources and transportation as appropriate   Arrange for interpreters to assist at discharge as needed  6/16/2025 1058 by Eboni Pompa RN  Outcome: Progressing  Flowsheets  Taken 6/16/2025 1058 by Eboni Pompa RN  Discharge to home or other facility with appropriate resources:   Identify barriers to discharge with patient and caregiver   Identify discharge learning needs (meds, wound care, etc)   Refer to discharge planning if patient needs post-hospital services based on physician order or complex needs related to functional status, cognitive ability or social support system   Arrange for needed discharge resources and transportation as appropriate   Arrange for interpreters to assist at discharge as needed  Taken 6/15/2025 2140 by Esperanza Macario RN  Discharge to home or other facility with appropriate resources: Identify barriers to discharge with patient and caregiver     Problem: Skin/Tissue Integrity  Goal: Skin integrity remains intact  Description: 1.  Monitor for areas of redness and/or skin breakdown2.  Assess vascular access sites hourly3.  Every 4-6 hours minimum:  Change oxygen saturation probe site4.  Every 4-6 hours:  If on nasal continuous positive airway pressure, respiratory therapy assess nares and determine need for appliance change or resting period  6/16/2025 1835 by Eboni Pompa, ASHLYN  Outcome:  Progressing  Flowsheets (Taken 6/16/2025 1058)  Skin Integrity Remains Intact:   Monitor for areas of redness and/or skin breakdown   Every 4-6 hours:  If on nasal continuous positive airway pressure, assess nares and determine need for appliance change or resting period   Turn and reposition as indicated   Check visual cues for pain   Assess need for specialty bed   Positioning devices   Pressure redistribution bed/mattress (bed type)   Every 4-6 hours minimum:  Change oxygen saturation probe site   Assess vascular access sites hourly  6/16/2025 1058 by Eboni Pompa, RN  Flowsheets (Taken 6/16/2025 1058)  Skin Integrity Remains Intact:   Monitor for areas of redness and/or skin breakdown   Every 4-6 hours:  If on nasal continuous positive airway pressure, assess nares and determine need for appliance change or resting period   Turn and reposition as indicated   Check visual cues for pain   Assess need for specialty bed   Positioning devices   Pressure redistribution bed/mattress (bed type)   Every 4-6 hours minimum:  Change oxygen saturation probe site   Assess vascular access sites hourly

## 2025-06-17 LAB
ALBUMIN SERPL-MCNC: 2.8 G/DL (ref 3.4–5)
ALP SERPL-CCNC: 72 U/L (ref 40–129)
ALT SERPL-CCNC: 9 U/L (ref 10–40)
AMMONIA PLAS-SCNC: 16 UMOL/L (ref 11–51)
ANION GAP SERPL CALCULATED.3IONS-SCNC: 13 MMOL/L (ref 3–16)
AST SERPL-CCNC: 27 U/L (ref 15–37)
BILIRUB DIRECT SERPL-MCNC: <0.1 MG/DL (ref 0–0.3)
BILIRUB INDIRECT SERPL-MCNC: ABNORMAL MG/DL (ref 0–1)
BILIRUB SERPL-MCNC: <0.2 MG/DL (ref 0–1)
BUN SERPL-MCNC: 55 MG/DL (ref 7–20)
CALCIUM SERPL-MCNC: 9.9 MG/DL (ref 8.3–10.6)
CHLORIDE SERPL-SCNC: 100 MMOL/L (ref 99–110)
CO2 SERPL-SCNC: 28 MMOL/L (ref 21–32)
CREAT SERPL-MCNC: 8.3 MG/DL (ref 0.6–1.2)
EKG ATRIAL RATE: 82 BPM
EKG DIAGNOSIS: NORMAL
EKG P AXIS: 39 DEGREES
EKG P-R INTERVAL: 126 MS
EKG Q-T INTERVAL: 394 MS
EKG QRS DURATION: 84 MS
EKG QTC CALCULATION (BAZETT): 460 MS
EKG R AXIS: 2 DEGREES
EKG T AXIS: 47 DEGREES
EKG VENTRICULAR RATE: 82 BPM
ERYTHROCYTE [SEDIMENTATION RATE] IN BLOOD BY WESTERGREN METHOD: 105 MM/HR (ref 0–30)
GFR SERPLBLD CREATININE-BSD FMLA CKD-EPI: 5 ML/MIN/{1.73_M2}
GLUCOSE SERPL-MCNC: 132 MG/DL (ref 70–99)
POTASSIUM SERPL-SCNC: 4.2 MMOL/L (ref 3.5–5.1)
PROT SERPL-MCNC: 6.6 G/DL (ref 6.4–8.2)
SODIUM SERPL-SCNC: 141 MMOL/L (ref 136–145)
TROPONIN, HIGH SENSITIVITY: 207 NG/L (ref 0–14)

## 2025-06-17 PROCEDURE — 90945 DIALYSIS ONE EVALUATION: CPT

## 2025-06-17 PROCEDURE — 6370000000 HC RX 637 (ALT 250 FOR IP): Performed by: INTERNAL MEDICINE

## 2025-06-17 PROCEDURE — 2580000003 HC RX 258: Performed by: INTERNAL MEDICINE

## 2025-06-17 PROCEDURE — 97116 GAIT TRAINING THERAPY: CPT

## 2025-06-17 PROCEDURE — 6370000000 HC RX 637 (ALT 250 FOR IP): Performed by: PHYSICAL MEDICINE & REHABILITATION

## 2025-06-17 PROCEDURE — 82140 ASSAY OF AMMONIA: CPT

## 2025-06-17 PROCEDURE — 51798 US URINE CAPACITY MEASURE: CPT

## 2025-06-17 PROCEDURE — 99223 1ST HOSP IP/OBS HIGH 75: CPT | Performed by: INTERNAL MEDICINE

## 2025-06-17 PROCEDURE — 2500000003 HC RX 250 WO HCPCS: Performed by: PHYSICAL MEDICINE & REHABILITATION

## 2025-06-17 PROCEDURE — 97530 THERAPEUTIC ACTIVITIES: CPT

## 2025-06-17 PROCEDURE — 94761 N-INVAS EAR/PLS OXIMETRY MLT: CPT

## 2025-06-17 PROCEDURE — 86140 C-REACTIVE PROTEIN: CPT

## 2025-06-17 PROCEDURE — 93010 ELECTROCARDIOGRAM REPORT: CPT | Performed by: INTERNAL MEDICINE

## 2025-06-17 PROCEDURE — 80076 HEPATIC FUNCTION PANEL: CPT

## 2025-06-17 PROCEDURE — 1280000000 HC REHAB R&B

## 2025-06-17 PROCEDURE — 97535 SELF CARE MNGMENT TRAINING: CPT

## 2025-06-17 PROCEDURE — 94640 AIRWAY INHALATION TREATMENT: CPT

## 2025-06-17 PROCEDURE — 36415 COLL VENOUS BLD VENIPUNCTURE: CPT

## 2025-06-17 PROCEDURE — 6360000002 HC RX W HCPCS: Performed by: PHYSICAL MEDICINE & REHABILITATION

## 2025-06-17 PROCEDURE — 84484 ASSAY OF TROPONIN QUANT: CPT

## 2025-06-17 PROCEDURE — 97110 THERAPEUTIC EXERCISES: CPT

## 2025-06-17 PROCEDURE — 93005 ELECTROCARDIOGRAM TRACING: CPT | Performed by: PHYSICAL MEDICINE & REHABILITATION

## 2025-06-17 PROCEDURE — 80048 BASIC METABOLIC PNL TOTAL CA: CPT

## 2025-06-17 PROCEDURE — 2700000000 HC OXYGEN THERAPY PER DAY

## 2025-06-17 PROCEDURE — 85652 RBC SED RATE AUTOMATED: CPT

## 2025-06-17 RX ORDER — ALPRAZOLAM 0.5 MG
1.5 TABLET ORAL EVERY 12 HOURS PRN
Status: DISCONTINUED | OUTPATIENT
Start: 2025-06-17 | End: 2025-06-20 | Stop reason: HOSPADM

## 2025-06-17 RX ORDER — CARVEDILOL 6.25 MG/1
6.25 TABLET ORAL 2 TIMES DAILY WITH MEALS
Status: DISCONTINUED | OUTPATIENT
Start: 2025-06-17 | End: 2025-06-17

## 2025-06-17 RX ORDER — METOPROLOL SUCCINATE 50 MG/1
50 TABLET, EXTENDED RELEASE ORAL DAILY
Status: DISCONTINUED | OUTPATIENT
Start: 2025-06-17 | End: 2025-06-20 | Stop reason: HOSPADM

## 2025-06-17 RX ORDER — COLCHICINE 0.6 MG/1
0.3 TABLET ORAL
Status: DISCONTINUED | OUTPATIENT
Start: 2025-06-17 | End: 2025-06-19

## 2025-06-17 RX ORDER — COLCHICINE 0.6 MG/1
0.3 TABLET ORAL
Status: DISCONTINUED | OUTPATIENT
Start: 2025-06-17 | End: 2025-06-17

## 2025-06-17 RX ORDER — COLCHICINE 0.6 MG/1
0.6 TABLET ORAL DAILY
Status: DISCONTINUED | OUTPATIENT
Start: 2025-06-17 | End: 2025-06-17

## 2025-06-17 RX ADMIN — HEPARIN SODIUM 5000 UNITS: 5000 INJECTION INTRAVENOUS; SUBCUTANEOUS at 16:11

## 2025-06-17 RX ADMIN — SODIUM CHLORIDE, SODIUM LACTATE, CALCIUM CHLORIDE, MAGNESIUM CHLORIDE AND DEXTROSE 4000 ML: 2.5; 538; 448; 18.3; 5.08 INJECTION, SOLUTION INTRAPERITONEAL at 21:10

## 2025-06-17 RX ADMIN — ONDANSETRON 4 MG: 2 INJECTION INTRAMUSCULAR; INTRAVENOUS at 16:43

## 2025-06-17 RX ADMIN — LOSARTAN POTASSIUM 100 MG: 100 TABLET, FILM COATED ORAL at 21:11

## 2025-06-17 RX ADMIN — CARVEDILOL 6.25 MG: 6.25 TABLET, FILM COATED ORAL at 16:11

## 2025-06-17 RX ADMIN — TROSPIUM CHLORIDE 20 MG: 20 TABLET, FILM COATED ORAL at 21:11

## 2025-06-17 RX ADMIN — POTASSIUM CHLORIDE 20 MEQ: 1500 TABLET, EXTENDED RELEASE ORAL at 09:35

## 2025-06-17 RX ADMIN — METOCLOPRAMIDE 5 MG: 10 TABLET ORAL at 05:31

## 2025-06-17 RX ADMIN — SENNOSIDES, DOCUSATE SODIUM 2 TABLET: 50; 8.6 TABLET, FILM COATED ORAL at 09:34

## 2025-06-17 RX ADMIN — ALPRAZOLAM 1.5 MG: 0.5 TABLET ORAL at 16:27

## 2025-06-17 RX ADMIN — METOCLOPRAMIDE 5 MG: 10 TABLET ORAL at 16:11

## 2025-06-17 RX ADMIN — FUROSEMIDE 80 MG: 40 TABLET ORAL at 09:34

## 2025-06-17 RX ADMIN — ALBUTEROL SULFATE 2 PUFF: 90 AEROSOL, METERED RESPIRATORY (INHALATION) at 20:04

## 2025-06-17 RX ADMIN — HEPARIN SODIUM 5000 UNITS: 5000 INJECTION INTRAVENOUS; SUBCUTANEOUS at 05:31

## 2025-06-17 RX ADMIN — PANTOPRAZOLE SODIUM 40 MG: 40 TABLET, DELAYED RELEASE ORAL at 21:11

## 2025-06-17 RX ADMIN — SEVELAMER CARBONATE 1600 MG: 800 TABLET, FILM COATED ORAL at 12:10

## 2025-06-17 RX ADMIN — COLCHICINE 0.3 MG: 0.6 TABLET, FILM COATED ORAL at 17:46

## 2025-06-17 RX ADMIN — ASPIRIN 81 MG 81 MG: 81 TABLET ORAL at 09:35

## 2025-06-17 RX ADMIN — FLUCONAZOLE 200 MG: 100 TABLET ORAL at 09:34

## 2025-06-17 RX ADMIN — SEVELAMER CARBONATE 1600 MG: 800 TABLET, FILM COATED ORAL at 09:35

## 2025-06-17 RX ADMIN — GUAIFENESIN 1200 MG: 600 TABLET, EXTENDED RELEASE ORAL at 09:35

## 2025-06-17 RX ADMIN — ROPINIROLE HYDROCHLORIDE 1 MG: 1 TABLET, FILM COATED ORAL at 21:11

## 2025-06-17 RX ADMIN — FUROSEMIDE 80 MG: 40 TABLET ORAL at 17:47

## 2025-06-17 RX ADMIN — TRAMADOL HYDROCHLORIDE 50 MG: 50 TABLET, COATED ORAL at 09:35

## 2025-06-17 RX ADMIN — SODIUM CHLORIDE, SODIUM LACTATE, CALCIUM CHLORIDE, MAGNESIUM CHLORIDE AND DEXTROSE 6000 ML: 2.5; 538; 448; 18.3; 5.08 INJECTION, SOLUTION INTRAPERITONEAL at 21:10

## 2025-06-17 RX ADMIN — CLOPIDOGREL BISULFATE 75 MG: 75 TABLET, FILM COATED ORAL at 09:34

## 2025-06-17 RX ADMIN — METOCLOPRAMIDE 5 MG: 10 TABLET ORAL at 09:34

## 2025-06-17 RX ADMIN — ALBUTEROL SULFATE 2 PUFF: 90 AEROSOL, METERED RESPIRATORY (INHALATION) at 07:15

## 2025-06-17 RX ADMIN — POLYETHYLENE GLYCOL 3350 17 G: 17 POWDER, FOR SOLUTION ORAL at 09:33

## 2025-06-17 RX ADMIN — SODIUM CHLORIDE, PRESERVATIVE FREE 10 ML: 5 INJECTION INTRAVENOUS at 21:10

## 2025-06-17 RX ADMIN — ROSUVASTATIN CALCIUM 10 MG: 10 TABLET, FILM COATED ORAL at 21:11

## 2025-06-17 RX ADMIN — HEPARIN SODIUM 5000 UNITS: 5000 INJECTION INTRAVENOUS; SUBCUTANEOUS at 21:11

## 2025-06-17 RX ADMIN — ANORECTAL OINTMENT: 15.7; .44; 24; 20.6 OINTMENT TOPICAL at 10:41

## 2025-06-17 RX ADMIN — SEVELAMER CARBONATE 1600 MG: 800 TABLET, FILM COATED ORAL at 16:11

## 2025-06-17 RX ADMIN — CARVEDILOL 6.25 MG: 6.25 TABLET, FILM COATED ORAL at 09:36

## 2025-06-17 RX ADMIN — PANTOPRAZOLE SODIUM 40 MG: 40 TABLET, DELAYED RELEASE ORAL at 09:35

## 2025-06-17 ASSESSMENT — PAIN DESCRIPTION - DESCRIPTORS
DESCRIPTORS: ACHING;DISCOMFORT
DESCRIPTORS: ACHING;DISCOMFORT

## 2025-06-17 ASSESSMENT — PAIN SCALES - GENERAL
PAINLEVEL_OUTOF10: 0
PAINLEVEL_OUTOF10: 7
PAINLEVEL_OUTOF10: 5
PAINLEVEL_OUTOF10: 0
PAINLEVEL_OUTOF10: 0

## 2025-06-17 ASSESSMENT — PAIN DESCRIPTION - LOCATION
LOCATION: GENERALIZED
LOCATION: GENERALIZED

## 2025-06-17 NOTE — CARE COORDINATION
Spoke with Alba of [facility] who states they can accept her.  As of this moment, Cycler has not been received from [facility] as had been the plan.  Call placed to [facility] to inquire if the cycler was even shipped.  They will call back.    SAKINA Glass     Case Management   [phone]    6/17/2025  11:56 AM

## 2025-06-17 NOTE — PROGRESS NOTES
Occupational Therapy  Facility/Department: [address] REHAB  Rehabilitation Occupational Therapy Daily Treatment Notes-am and pm    Date: 25  Patient Name: Jonelle Contreras       Room: Q6M-5981/3258-01  MRN: [id number]  Account: [id number]   : 1950  (74 y.o.) Gender: female            Past Medical History:  has a past medical history of acute intermittent porphyria, agoraphobic, Allergic rhinitis, Anxiety, Arthritis, Asthma, CAD (coronary artery disease), Chronic obstructive pulmonary disease, unspecified COPD type ([facility]), ckd 4, collagenous colitis, COPD (chronic obstructive pulmonary disease) ([facility]), Depression, Disease of blood and blood forming organ, GERD (gastroesophageal reflux disease), Heart disease, Hyperlipidemia, Hypertension, Left thyroid nodule, Migraines, Neuropathy, Osteoporosis, PAD (peripheral artery disease), Peripheral vascular disease, post menopausal, Prediabetes, Pulmonary nodules, Restless leg syndrome, STEMI involving oth coronary artery of inferior wall ([facility]), and Urinary incontinence.  Past Surgical History:   has a past surgical history that includes Hysterectomy; Cholecystectomy; Coronary angioplasty with stent (10/2013 [place]); Colonoscopy (2017); Colonoscopy (N/A, 2022); Upper gastrointestinal endoscopy (N/A, 2022); Cardiac surgery; Hysterectomy, vaginal; Upper gastrointestinal endoscopy (2022); Upper gastrointestinal endoscopy (N/A, 2025); and bronchoscopy (N/A, 2025).    Restrictions  Restrictions/Precautions: Fall Risk  Other Position/Activity Restrictions: PD nightly. dwayne TEDs daily.    Subjective  Subjective: Pt met BS, on dialysis, sitting in w/c, finished with PT session and requesting to use BSC. Pt denied pain; c/o short of breath\". SP02 WNL on room air.  Restrictions/Precautions: Fall Risk        Objective         ADL  Grooming/Oral Hygiene  Assistance Level: Set-up  Skilled Clinical Factors: Seated to wash face, hands  Upper

## 2025-06-17 NOTE — PROGRESS NOTES
This is a 74 y.o.  female admitted on 5/30/2025 with Debility [R53.81].  A&O X 4. Reported generalized pain pain of 7/10. Pain medication given as ordered. See MAR.  Active bowel sounds. Last BM 6/15/2025. Patient is continent of bowel and bladder. She is on heparin for DVT prophylaxis.  She is on a regular diet. Medications taken whole with thins. Skin: PD cath, cycles running at this time.Transfers with walker x 1. HS medication given. Tolerated well. Call light and bedside table within reach. Patient instructed to call  if she needs anything.

## 2025-06-17 NOTE — PLAN OF CARE
Problem: Discharge Planning  Goal: Discharge to home or other facility with appropriate resources  Outcome: Progressing  Flowsheets (Taken 6/16/2025 1058)  Discharge to home or other facility with appropriate resources:   Identify barriers to discharge with patient and caregiver   Identify discharge learning needs (meds, wound care, etc)   Refer to discharge planning if patient needs post-hospital services based on physician order or complex needs related to functional status, cognitive ability or social support system   Arrange for needed discharge resources and transportation as appropriate   Arrange for interpreters to assist at discharge as needed     Problem: Skin/Tissue Integrity  Goal: Skin integrity remains intact  Description: 1.  Monitor for areas of redness and/or skin breakdown2.  Assess vascular access sites hourly3.  Every 4-6 hours minimum:  Change oxygen saturation probe site4.  Every 4-6 hours:  If on nasal continuous positive airway pressure, respiratory therapy assess nares and determine need for appliance change or resting period  6/17/2025 1006 by Eboni Pompa, RN  Outcome: Progressing  Flowsheets (Taken 6/16/2025 1058)  Skin Integrity Remains Intact:   Monitor for areas of redness and/or skin breakdown   Every 4-6 hours:  If on nasal continuous positive airway pressure, assess nares and determine need for appliance change or resting period   Turn and reposition as indicated   Check visual cues for pain   Assess need for specialty bed   Positioning devices   Pressure redistribution bed/mattress (bed type)   Every 4-6 hours minimum:  Change oxygen saturation probe site   Assess vascular access sites hourly  6/17/2025 0126 by Fina Cruz, RN  Outcome: Progressing

## 2025-06-17 NOTE — CONSULTS
Referring Physician: * No referring provider recorded for this case *  Reason for Consultation: Pericarditis  Chief Complaint: I am feeling weak and cannot move      Subjective:   History of Present Illness:  Jonelle Contreras is a 74 y.o. patient who with prior history of end-stage renal disease on peritoneal dialysis, hypertension, hyperlipidemia, COPD, depression, presented to the hospital with complaints of weakness and she has been in inpatient rehab unit for almost 3 to 4 weeks.  She apparently was progressing well up until about a week ago when she started to have generalized weakness and unable to tolerate physical therapy.  She underwent echocardiogram which showed small pericardial effusion and she was also found to have a pericardial rub leading to this cardiac consultation she says that she is having joint pains both in upper and lower extremities.  I have been asked to provide consultation regarding further management and testing.    Review of Systems:   All 12 point review of symptoms completed. Pertinent positives identified in the HPI, all other review of symptoms negative as below.    Past Medical History:   has a past medical history of acute intermittent porphyria, agoraphobic, Allergic rhinitis, Anxiety, Arthritis, Asthma, CAD (coronary artery disease), Chronic obstructive pulmonary disease, unspecified COPD type ([facility]), ckd 4, collagenous colitis, COPD (chronic obstructive pulmonary disease) ([facility]), Depression, Disease of blood and blood forming organ, GERD (gastroesophageal reflux disease), Heart disease, Hyperlipidemia, Hypertension, Left thyroid nodule, Migraines, Neuropathy, Osteoporosis, PAD (peripheral artery disease), Peripheral vascular disease, post menopausal, Prediabetes, Pulmonary nodules, Restless leg syndrome, STEMI involving oth coronary artery of inferior wall ([facility]), and Urinary incontinence.    Surgical History:   has a past surgical history that includes Hysterectomy;

## 2025-06-17 NOTE — PROGRESS NOTES
Department of Physical Medicine & Rehabilitation  Progress Note    Patient Identification:  Jonelle Contreras  [id number]  : 1950  Admit date: 2025    Chief Complaint: Debility    Subjective:   No acute events overnight.   Patient seen this am sitting up in room. Still with significant fatigue and generalized weakness. She does not think worse with taking tramadol. She denies chest pain or shortness of breath. Weaned back to room air this morning and maintaining O2 sat 94% currently.   Labs reviewed.     ROS: No f/c, n/v, cp     Objective:  Patient Vitals for the past 24 hrs:   BP Temp Temp src Pulse Resp SpO2 Weight   25 0936 -- -- -- 83 -- -- --   25 0934 (!) 158/103 98.2 °F (36.8 °C) Oral 83 18 98 % --   25 0715 -- -- -- -- 18 98 % --   25 0537 -- -- -- -- -- -- 59.9 kg (132 lb 0.9 oz)   25 2327 -- -- -- -- 18 -- --   25 1935 -- -- -- -- -- 92 % --   25 1922 (!) 150/92 98.1 °F (36.7 °C) Oral 88 18 93 % --   25 1617 (!) 177/87 -- -- -- -- -- --     Const: Alert. No distress, pleasant.   HEENT: Normocephalic, atraumatic. Normal sclera/conjunctiva. MMM.   CV: Regular rate and rhythm.   Resp: No respiratory distress. Lungs CTAB.   Abd: Soft, nontender, nondistended, NABS+   Ext: No edema.   Neuro: Alert, oriented, appropriately interactive.   Psych: Cooperative, appropriate mood and affect    Laboratory data: Available via EMR.   Last 24 hour lab  Recent Results (from the past 24 hours)   Echo (TTE) limited (PRN contrast/bubble/strain/3D)    Collection Time: 25  2:57 PM   Result Value Ref Range    Aortic Root 3.1 cm    IVSd 1.2 (A) 0.6 - 0.9 cm    LVIDd 4.2 3.9 - 5.3 cm    LVIDs 3.0 cm    LVPWd 0.8 0.6 - 0.9 cm    LA Diameter 2.8 cm    RVIDd 2.9 cm    Body Surface Area 1.66 m2    Fractional Shortening 2D 29 28 - 44 %    LVIDd Index 2.56 cm/m2    LVIDs Index 1.83 cm/m2    LV RWT Ratio 0.38     LV Mass 2D 137.2 67 - 162 g    LV Mass 2D Index 83.7 43 - 95

## 2025-06-17 NOTE — CARE COORDINATION
Melquiades Adams PRE-CERT REQUEST SUBMITTED VIA [employer] PORTAL    REQUESTED FOR FACILITY:  [facility]    ANTICIPATED ADMIT DATE TO SNF:  06/18/2025      Melquiades #:  [id number]       Electronically signed by Elif Galindo on 6/17/2025 at 3:12 PM   Case Management Assistant  Phone:  [phone] - Fax:  [phone]

## 2025-06-17 NOTE — PROGRESS NOTES
Comprehensive Nutrition Assessment    Type and Reason for Visit:  Reassess    Nutrition Recommendations/Plan:   Continue regular diet.  Expedite cup TID.  Magic cup with dinner, chocolate.     Malnutrition Assessment:  Malnutrition Status:  Severe malnutrition (06/11/25 0846)    Context:  Acute Illness     Findings of the 6 clinical characteristics of malnutrition:  Energy Intake:  50% or less of estimated energy requirements for 5 or more days  Weight Loss:  5% over 1 month     Body Fat Loss:  Mild body fat loss Triceps, Orbital   Muscle Mass Loss:  Mild muscle mass loss Temples (temporalis), Calf (gastrocnemius)  Fluid Accumulation:  Mild (trace) Extremities   Strength:  Not Performed    Nutrition Assessment:    FU - Diet liberalized to regular on 6/15. Currently diet acceptance remains poor. Expedite cups offered TID. Supplement acceptance is good. She does not remember trying magic cups and is agreeable to try chocolate with dinner tonight. Reports she had chipotle and Frisches, that her daughter brought in. Encouraged her to eat whatever sounds good now that she is on a regular diet. Offered to refridgerate meals and reheat as needed to support PO intake.    Nutrition Related Findings:    . LBM 6/15. Wound Type: Stage I       Current Nutrition Intake & Therapies:    Average Meal Intake: 0%, 1-25%, 26-50%  Average Supplements Intake: 51-75%, %  ADULT ORAL NUTRITION SUPPLEMENT; Dinner, Breakfast, Lunch; Other Oral Supplement; Expedite Cup  ADULT DIET; Regular    Anthropometric Measures:  Height: 165.1 cm (5' 5\")  Ideal Body Weight (IBW): 125 lbs (57 kg)       Current Body Weight: 59.9 kg (132 lb 0.9 oz), 114.3 % IBW.    Current BMI (kg/m2): 22  Usual Body Weight: 63.9 kg (140 lb 14 oz)     % Weight Change (Calculated): -5.3  Weight Adjustment For: No Adjustment                 BMI Categories: Normal Weight (BMI 22.0 to 24.9) age over 65    Estimated Daily Nutrient Needs:  Energy Requirements Based

## 2025-06-17 NOTE — PROGRESS NOTES
Patient ID: Jonelle Contreras  Referring/ Physician: Suzy Rucker MD      Summary:   Jonelle Contreras is being seen by nephrology for ESRD on peritoneal dialysis. .     Reason for admission: rehab for generalized weakness after hospital admission for pneumonia and hypertensive urgency.       Interval Hx:     Seen at bedside.   She remains extremely weak, struggling to even brush her hair.   Complains of fatigue and generalized weakness.   Now dispo plans are for SNF.     /103  On 1 L  Had urine retention 1100 cc per RN.   Wt 56.7 kilos. Appears euvolemic.   Weight is coming down  No issues reported with PD     Na 141 k 4.2   Cr 8.3   BUN 55  CA 9.9    ALBUMIN 2.8   Hgb 10   UA was bland     Echo showed Pericardium: There is pericardial thickening. Small (<1 cm) circumferential pericardial effusion present. No indication of cardiac tamponade.       Assessment/Plan:   - continue 2.5 % 10 L 5 exchanges   - BP acceptable.   - on retacrit 10 k units TIW.   - cont lasix 80 mg BID for now.     Updated dialysis unit that will need to get cycler and supplies to [facility] likely, precert in process.           ESRD  Renal failure due to hypertensive kidney disease.   Prior to admission she was doing manual exchanges at home.   This admission she was started on a cycler.   Now she is getting 3 exchanges. No LBF.    -daily BM   -daily gent to PD exit site     SHPT  , due to renal origin.   Ca and phos wnl  On renvela 1600 mg TID with meals.     Labile hypertension  Long standing issue with difficulty in controlling her BP due to severe orthostatic hypotension.   Currently aiming for SBP < 180 when supine and > 140 prior to therapy to accommodate large changes in BP during ambulating.    - coreg 6.25 mg BID   - nifedipine 30 mg with dinner , hold < 140    - losartan 100 mg at night, hold < 140       H/o porphyria  Managed by Dr Fuentes.   No acute issues.   Counseled to avoid smoking

## 2025-06-17 NOTE — PROGRESS NOTES
Physical Therapy  Facility/Department: [address] REHAB  Rehabilitation Physical Therapy Treatment Note    NAME: Jonelle Contreras  : 1950 (74 y.o.)  MRN: [id number]  CODE STATUS: Full Code    Date of Service: 25       Restrictions:  Restrictions/Precautions: Fall Risk  Position Activity Restriction  Other Position/Activity Restrictions: PD nightly. dwayne TEDs daily. on O2 at 1L     Pertinent medical information:  Additional Pertinent Hx: per Dr. Drummond's note, \"74-year-old female who came to hospital with hyperkalemia and hypertension, found to be in a hypertensive emergency.  Her blood pressure was brought down and the patient is following with nephrology and cardiology.  Patient has ESRD and is on PD.  Patient also came in with a cough and underwent a bronc on 2025 for bilateral mucous plugs.  Patient was treated for pneumonia and started on IV antibiotics for 7 days. \"    SUBJECTIVE  Subjective: Patient still on PD due to late start. RNVanessa cleared PT to go bedside and work in room while PD running. Patient awake in bed and reporting she continues to feel very weak. Patient is concerned she has ALS and wants to be worked up.  She states she discussed with Dr. Rucker.  No complaints of pain.  Patient still on 1L O2 upon arrival  Pain: no complaints of pain at this time    Social/Functional History  Lives With: Alone  Type of Home: House  Home Layout: Work area in basement, Two level, Laundry in basement (ranch with finished basement)  Home Access: Stairs to enter with rails  Entrance Stairs - Number of Steps: 4 CONSTANCE front door with L handrail.  Entrance Stairs - Rails: Left  Bathroom Shower/Tub: Tub/Shower unit  Bathroom Toilet: Handicap height  Bathroom Equipment: Tub transfer bench (pt reports she turns the TTB longways so it fits entirely in tub shower unit.)  Bathroom Accessibility: Accessible  Home Equipment: Walker - Rolling (Walker (3 wheel).)  Has the patient had two or more falls in the

## 2025-06-17 NOTE — PLAN OF CARE
Problem: Safety - Adult  Goal: Free from fall injury  Outcome: Progressing     Problem: Skin/Tissue Integrity  Goal: Skin integrity remains intact  Description: 1.  Monitor for areas of redness and/or skin breakdown2.  Assess vascular access sites hourly3.  Every 4-6 hours minimum:  Change oxygen saturation probe site4.  Every 4-6 hours:  If on nasal continuous positive airway pressure, respiratory therapy assess nares and determine need for appliance change or resting period  6/17/2025 0126 by Fina Cruz RN  Outcome: Progressing     Problem: Pain  Goal: Verbalizes/displays adequate comfort level or baseline comfort level  Outcome: Progressing

## 2025-06-18 LAB
ANA SER QL IA: POSITIVE
ANION GAP SERPL CALCULATED.3IONS-SCNC: 14 MMOL/L (ref 3–16)
BUN SERPL-MCNC: 52 MG/DL (ref 7–20)
CALCIUM SERPL-MCNC: 10 MG/DL (ref 8.3–10.6)
CCP IGG SERPL-ACNC: <0.5 U/ML (ref 0–2.9)
CHLORIDE SERPL-SCNC: 97 MMOL/L (ref 99–110)
CK SERPL-CCNC: 26 U/L (ref 26–192)
CO2 SERPL-SCNC: 27 MMOL/L (ref 21–32)
CREAT SERPL-MCNC: 7.8 MG/DL (ref 0.6–1.2)
CRP SERPL-MCNC: 106 MG/L (ref 0–5.1)
DEPRECATED RDW RBC AUTO: 16.4 % (ref 12.4–15.4)
DSDNA AB SER-ACNC: 10 IU/ML (ref 0–9)
GFR SERPLBLD CREATININE-BSD FMLA CKD-EPI: 5 ML/MIN/{1.73_M2}
GLUCOSE SERPL-MCNC: 117 MG/DL (ref 70–99)
HCT VFR BLD AUTO: 30.4 % (ref 36–48)
HGB BLD-MCNC: 9.7 G/DL (ref 12–16)
MCH RBC QN AUTO: 28.6 PG (ref 26–34)
MCHC RBC AUTO-ENTMCNC: 31.8 G/DL (ref 31–36)
MCV RBC AUTO: 90 FL (ref 80–100)
PLATELET # BLD AUTO: 682 K/UL (ref 135–450)
PMV BLD AUTO: 7.8 FL (ref 5–10.5)
POTASSIUM SERPL-SCNC: 4.1 MMOL/L (ref 3.5–5.1)
RBC # BLD AUTO: 3.37 M/UL (ref 4–5.2)
RHEUMATOID FACT SER IA-ACNC: <10 IU/ML
SODIUM SERPL-SCNC: 138 MMOL/L (ref 136–145)
WBC # BLD AUTO: 7.2 K/UL (ref 4–11)

## 2025-06-18 PROCEDURE — 86431 RHEUMATOID FACTOR QUANT: CPT

## 2025-06-18 PROCEDURE — 97110 THERAPEUTIC EXERCISES: CPT

## 2025-06-18 PROCEDURE — 51701 INSERT BLADDER CATHETER: CPT

## 2025-06-18 PROCEDURE — 85027 COMPLETE CBC AUTOMATED: CPT

## 2025-06-18 PROCEDURE — 86147 CARDIOLIPIN ANTIBODY EA IG: CPT

## 2025-06-18 PROCEDURE — 6360000002 HC RX W HCPCS: Performed by: PHYSICAL MEDICINE & REHABILITATION

## 2025-06-18 PROCEDURE — 94640 AIRWAY INHALATION TREATMENT: CPT

## 2025-06-18 PROCEDURE — 97116 GAIT TRAINING THERAPY: CPT

## 2025-06-18 PROCEDURE — 82550 ASSAY OF CK (CPK): CPT

## 2025-06-18 PROCEDURE — 97530 THERAPEUTIC ACTIVITIES: CPT

## 2025-06-18 PROCEDURE — 6360000002 HC RX W HCPCS: Performed by: INTERNAL MEDICINE

## 2025-06-18 PROCEDURE — 6370000000 HC RX 637 (ALT 250 FOR IP): Performed by: PHYSICAL MEDICINE & REHABILITATION

## 2025-06-18 PROCEDURE — 36415 COLL VENOUS BLD VENIPUNCTURE: CPT

## 2025-06-18 PROCEDURE — 86200 CCP ANTIBODY: CPT

## 2025-06-18 PROCEDURE — 51798 US URINE CAPACITY MEASURE: CPT

## 2025-06-18 PROCEDURE — 6370000000 HC RX 637 (ALT 250 FOR IP): Performed by: INTERNAL MEDICINE

## 2025-06-18 PROCEDURE — 97535 SELF CARE MNGMENT TRAINING: CPT

## 2025-06-18 PROCEDURE — 1280000000 HC REHAB R&B

## 2025-06-18 PROCEDURE — 86039 ANTINUCLEAR ANTIBODIES (ANA): CPT

## 2025-06-18 PROCEDURE — 2580000003 HC RX 258: Performed by: INTERNAL MEDICINE

## 2025-06-18 PROCEDURE — 94761 N-INVAS EAR/PLS OXIMETRY MLT: CPT

## 2025-06-18 PROCEDURE — 2500000003 HC RX 250 WO HCPCS: Performed by: PHYSICAL MEDICINE & REHABILITATION

## 2025-06-18 PROCEDURE — 83516 IMMUNOASSAY NONANTIBODY: CPT

## 2025-06-18 PROCEDURE — 86225 DNA ANTIBODY NATIVE: CPT

## 2025-06-18 PROCEDURE — 86038 ANTINUCLEAR ANTIBODIES: CPT

## 2025-06-18 PROCEDURE — 99233 SBSQ HOSP IP/OBS HIGH 50: CPT | Performed by: INTERNAL MEDICINE

## 2025-06-18 PROCEDURE — 2700000000 HC OXYGEN THERAPY PER DAY

## 2025-06-18 PROCEDURE — 80048 BASIC METABOLIC PNL TOTAL CA: CPT

## 2025-06-18 RX ADMIN — HEPARIN SODIUM 5000 UNITS: 5000 INJECTION INTRAVENOUS; SUBCUTANEOUS at 05:48

## 2025-06-18 RX ADMIN — ALBUTEROL SULFATE 2 PUFF: 90 AEROSOL, METERED RESPIRATORY (INHALATION) at 20:05

## 2025-06-18 RX ADMIN — CLOPIDOGREL BISULFATE 75 MG: 75 TABLET, FILM COATED ORAL at 09:21

## 2025-06-18 RX ADMIN — HEPARIN SODIUM 5000 UNITS: 5000 INJECTION INTRAVENOUS; SUBCUTANEOUS at 20:16

## 2025-06-18 RX ADMIN — SODIUM CHLORIDE, PRESERVATIVE FREE 10 ML: 5 INJECTION INTRAVENOUS at 20:17

## 2025-06-18 RX ADMIN — TRAMADOL HYDROCHLORIDE 50 MG: 50 TABLET, COATED ORAL at 15:03

## 2025-06-18 RX ADMIN — TIZANIDINE 4 MG: 4 TABLET ORAL at 22:43

## 2025-06-18 RX ADMIN — TRAMADOL HYDROCHLORIDE 50 MG: 50 TABLET, COATED ORAL at 01:34

## 2025-06-18 RX ADMIN — SODIUM CHLORIDE, SODIUM LACTATE, CALCIUM CHLORIDE, MAGNESIUM CHLORIDE AND DEXTROSE 6000 ML: 2.5; 538; 448; 18.3; 5.08 INJECTION, SOLUTION INTRAPERITONEAL at 20:18

## 2025-06-18 RX ADMIN — FLUCONAZOLE 200 MG: 100 TABLET ORAL at 09:21

## 2025-06-18 RX ADMIN — POTASSIUM CHLORIDE 20 MEQ: 1500 TABLET, EXTENDED RELEASE ORAL at 09:21

## 2025-06-18 RX ADMIN — SEVELAMER CARBONATE 1600 MG: 800 TABLET, FILM COATED ORAL at 09:22

## 2025-06-18 RX ADMIN — METOPROLOL SUCCINATE 50 MG: 50 TABLET, EXTENDED RELEASE ORAL at 09:21

## 2025-06-18 RX ADMIN — ALPRAZOLAM 1.5 MG: 0.5 TABLET ORAL at 20:18

## 2025-06-18 RX ADMIN — SENNOSIDES, DOCUSATE SODIUM 2 TABLET: 50; 8.6 TABLET, FILM COATED ORAL at 09:22

## 2025-06-18 RX ADMIN — TROSPIUM CHLORIDE 20 MG: 20 TABLET, FILM COATED ORAL at 20:18

## 2025-06-18 RX ADMIN — HEPARIN SODIUM 5000 UNITS: 5000 INJECTION INTRAVENOUS; SUBCUTANEOUS at 14:58

## 2025-06-18 RX ADMIN — ASPIRIN 81 MG 81 MG: 81 TABLET ORAL at 09:22

## 2025-06-18 RX ADMIN — PANTOPRAZOLE SODIUM 40 MG: 40 TABLET, DELAYED RELEASE ORAL at 09:21

## 2025-06-18 RX ADMIN — SODIUM CHLORIDE, SODIUM LACTATE, CALCIUM CHLORIDE, MAGNESIUM CHLORIDE AND DEXTROSE 4000 ML: 2.5; 538; 448; 18.3; 5.08 INJECTION, SOLUTION INTRAPERITONEAL at 20:18

## 2025-06-18 RX ADMIN — METOCLOPRAMIDE 5 MG: 10 TABLET ORAL at 12:38

## 2025-06-18 RX ADMIN — FUROSEMIDE 80 MG: 40 TABLET ORAL at 16:25

## 2025-06-18 RX ADMIN — FUROSEMIDE 80 MG: 40 TABLET ORAL at 09:21

## 2025-06-18 RX ADMIN — METOCLOPRAMIDE 5 MG: 10 TABLET ORAL at 05:48

## 2025-06-18 RX ADMIN — LOSARTAN POTASSIUM 100 MG: 100 TABLET, FILM COATED ORAL at 20:18

## 2025-06-18 RX ADMIN — SEVELAMER CARBONATE 1600 MG: 800 TABLET, FILM COATED ORAL at 16:25

## 2025-06-18 RX ADMIN — SEVELAMER CARBONATE 1600 MG: 800 TABLET, FILM COATED ORAL at 12:38

## 2025-06-18 RX ADMIN — ONDANSETRON 4 MG: 2 INJECTION INTRAMUSCULAR; INTRAVENOUS at 20:31

## 2025-06-18 RX ADMIN — ROPINIROLE HYDROCHLORIDE 1 MG: 1 TABLET, FILM COATED ORAL at 20:18

## 2025-06-18 RX ADMIN — PANTOPRAZOLE SODIUM 40 MG: 40 TABLET, DELAYED RELEASE ORAL at 20:18

## 2025-06-18 RX ADMIN — EPOETIN ALFA-EPBX 10000 UNITS: 10000 INJECTION, SOLUTION INTRAVENOUS; SUBCUTANEOUS at 16:25

## 2025-06-18 RX ADMIN — METOCLOPRAMIDE 5 MG: 10 TABLET ORAL at 16:25

## 2025-06-18 RX ADMIN — POLYETHYLENE GLYCOL 3350 17 G: 17 POWDER, FOR SOLUTION ORAL at 20:17

## 2025-06-18 RX ADMIN — ROSUVASTATIN CALCIUM 10 MG: 10 TABLET, FILM COATED ORAL at 20:18

## 2025-06-18 RX ADMIN — GUAIFENESIN 1200 MG: 600 TABLET, EXTENDED RELEASE ORAL at 09:21

## 2025-06-18 ASSESSMENT — PAIN DESCRIPTION - ONSET
ONSET: PROGRESSIVE

## 2025-06-18 ASSESSMENT — PAIN DESCRIPTION - FREQUENCY
FREQUENCY: INTERMITTENT

## 2025-06-18 ASSESSMENT — PAIN SCALES - GENERAL
PAINLEVEL_OUTOF10: 8
PAINLEVEL_OUTOF10: 0
PAINLEVEL_OUTOF10: 7
PAINLEVEL_OUTOF10: 7

## 2025-06-18 ASSESSMENT — PAIN DESCRIPTION - DESCRIPTORS
DESCRIPTORS: ACHING

## 2025-06-18 ASSESSMENT — PAIN DESCRIPTION - PAIN TYPE
TYPE: ACUTE PAIN

## 2025-06-18 ASSESSMENT — PAIN DESCRIPTION - ORIENTATION
ORIENTATION: OTHER (COMMENT)

## 2025-06-18 ASSESSMENT — PAIN DESCRIPTION - LOCATION
LOCATION: GENERALIZED

## 2025-06-18 ASSESSMENT — PAIN - FUNCTIONAL ASSESSMENT
PAIN_FUNCTIONAL_ASSESSMENT: PREVENTS OR INTERFERES SOME ACTIVE ACTIVITIES AND ADLS

## 2025-06-18 NOTE — PATIENT CARE CONFERENCE
[facility]  Inpatient Rehabilitation  Weekly Team Conference Note      Date: 2025  Patient Name:  Jonelle Contreras    MRN: [id number]  : 1950  Gender: Female  Physician: Dr. Chaim CLAIRE  Diagnosis: Debility [R53.81]    CASE MANAGEMENT  Assessment: [facility]  [facility] is sending cyler and supplies and orienting staff at [place] prior to release.      PHYSICAL THERAPY    Bed Mobility:  Overall Assistance Level: Modified Independent  Additional Factors: Increased time to complete, Head of bed flat, Without handrails (regular bed in ADL apartment)  Sit>supine:  Assistance Level: Modified independent  Skilled Clinical Factors: increased time up able to bring bilateral LE up into the bed  Supine>sit:  Assistance Level: Modified independent  Skilled Clinical Factors: increased time to complete    Transfers:  Surface: Wheelchair, To bed, From bed  Additional Factors: Increased time to complete  Device: Walker (wheeled)  Sit>stand:  Assistance Level: Stand by assist  Skilled Clinical Factors: increased time and cueing for hand placement and for anterior weight shift  Stand>sit:  Assistance Level: Stand by assist  Skilled Clinical Factors: patient with posterior lean and would not try to correct despite cueing from PT.  Bed<>chair  Technique: Stand step  Assistance Level: Stand by assist  Skilled Clinical Factors: with wheeled walker  Car transfer:  Assistance Level: Stand by assist  Skilled Clinical Factors: completed mock car transfer with supervision, increased time, and verbal cues for problem solving and motor planning as she could not figure out sequence on her own.    Ambulation:  Surface: Level surface  Device: Rolling walker  Distance: 60' with two turns  Activity: Within Unit  Activity Comments: decreased step length bilaterally, Flexed posture with flexed hips and knees but improving. She tends to keep shoulders elevated

## 2025-06-18 NOTE — CARE COORDINATION
Chart Reviewed.    GOAL:    SNF at [facility].  Precert has been approved through 6-.    Call placed to Kitty at [facility]  [phone] who states they need to have a contract signed from [employer] and then they can deliver the supplies and DME.     If they get it back today, they can have supplies shipped tomorrow.     Call placed to [facility] who will check for the email sent to  for signature and return to [facility].    ASKINA Glass     Case Management   [phone]    6/18/2025  10:00 AM

## 2025-06-18 NOTE — PROGRESS NOTES
Patient admitted to rehab due to generalized weakness after acute hypoxic respiratory failure.  A/Ox4. Transfers with front-wheeled walker, gait-belt, and assist of one, pt has not been out of bed this evening. Mobility restrictions: None. Patient is on low Phosphorus diet, with an appetite that is poor to fair. Medications taken whole with thin liquids without swallowing difficulty. On Heparin injections for DVT prophylaxis.  Skin: Noted with scattered bruising and coccyx is red but blanchable(Calmoseptine cream applied multiple times).  Oxygen: Pt on O2 at 1 L per NC this evening, saturation was running 92% on 1 L. LDA: PIV to RFA, flushes easily and dressing is clean, dry, and intact. LBM 6/16. Patient denied pain or discomfort, encouraged to inform staff of any changes in condition. Chair/bed alarms and fall precautions in use. Call light and personal belongings are within reach. Will continue to monitor and assist as needed.

## 2025-06-18 NOTE — PROGRESS NOTES
Occupational Therapy  OCCUPATIONAL THERAPY  Progress Note   Second Session    Patient Name: Jonelle Contreras  Medical Record Number: [id number]    Treatment Diagnosis: impaired functional mobility with poor endurance    General  Chart Reviewed: Yes, Orders, Progress Notes  Patient assessed for rehabilitation services?: Yes  Additional Pertinent Hx: Pt is a 75 yo F admitted to ARU with debility after being hospitalized for hyperkalemia and hypertension, found to be in a hypertensive emergency. Pt has ESRD and is on PD. Bronc performed on 5/23/2025 for bilateral mucous plugs. Treated for pneumonia.  Family / Caregiver Present: No  Referring Practitioner: Bon Drummond MD  Diagnosis: debility     Restrictions/Precautions  Restrictions/Precautions: Fall Risk  Activity Level: Up with Assist        Position Activity Restriction  Other Position/Activity Restrictions: PD nightly. dwayne TEDs daily.    Subjective: pt met in therapy room, agreeable to OT tx     Objective:     Car transfer: sit<>stand wc<>car CGA/min A, increased time to complete, vcs for hand placement once at car, able to bring B legs in/out of car.       Standing activity: pt stood at table with clothing pin activity for 4min with no LOB, alternated between B hands to place pins, seated after 4min to complete activity. Able to place 30+ clothes pins of varying difficulty.     Pt completed bed mob INDLY with increased time to complete. Educated on bed rails that can be added to bed at home. Sit>stand wc<>bed CGA/min A, increased assist needed stand>sit bed>wc with no control for sit and poor walker mgmt, states she was more fatigued when asked.     Assessment: pt tolerated tx fair, benefits from frequent breaks in between tasks d/t poor endurance. When asked how she feels about going to SNF, states she is upset she is unable to go home but feels continued therapy would benefit her. Pt left in therapy room with all needs met.     Safety Device - Type of

## 2025-06-18 NOTE — PROGRESS NOTES
Patient ID: Jonelle Contreras  Referring/ Physician: Suzy Rucker MD      Summary:   Jonelle Contreras is being seen by nephrology for ESRD on peritoneal dialysis. .     Reason for admission: rehab for generalized weakness after hospital admission for pneumonia and hypertensive urgency.       Interval Hx:     Seen in therapy.   Feeling better today.   Still weak but PT better today.   No chest pain or SOB .   Was seen by cardiology for pericarditis > started on colchicine. ESR very high JUAN LUIS positive DSDNA +     /80  On 1 L NC  No UO recorded.   Wt 56.2  kilos. Appears euvolemic.   No issues reported with PD      K 4.1   Bun 52 cr 7.8  Ca 10     Platelet 682   JUAN LUIS + DSDNA 10   RF and CCP negative     Echo showed Pericardium: There is pericardial thickening. Small (<1 cm) circumferential pericardial effusion present. No indication of cardiac tamponade.       Assessment/Plan:   - continue 2.5 % 10 L 5 exchanges   - BP acceptable. Coreg was changed to metoprolol by cardiology   - on retacrit 10 k units TIW.   - cont lasix 80 mg BID for now.   - bladder scan q shift , had urine retention.     Updated dialysis unit that will need to get cycler and supplies to [facility]          ESRD  Renal failure due to hypertensive kidney disease. Has never had a biopsy because of advanced CKD at time of presentation.   Prior to admission she was doing manual exchanges at home.   This admission she was started on a cycler.   Now she is getting 5 exchanges 2.5 % of 2 L    -daily BM   -daily gent to PD exit site     SHPT  , due to renal origin.   Ca and phos wnl  On renvela 1600 mg TID with meals.     Labile hypertension  Long standing issue with difficulty in controlling her BP due to severe orthostatic hypotension.   Currently aiming for SBP < 180 when supine and > 140 prior to therapy to accommodate large changes in BP during ambulating.    - coreg 6.25 mg BID > change to metop  Tuesday Friday  furosemide, 80 mg, BID  dianeal lo-neal 2.5%, 6,000 mL, Nightly   And  dianeal lo-neal 2.5%, 4,000 mL, Nightly  potassium chloride, 20 mEq, Daily  epoetin lizzy-epbx, 10,000 Units, Once per day on Monday Wednesday Friday  [Held by provider] hydrALAZINE, 25 mg, 3 times per day  sevelamer, 1,600 mg, TID WC  heparin (porcine), 5,000 Units, 3 times per day  aspirin, 81 mg, Daily  clopidogrel, 75 mg, Daily  losartan, 100 mg, Nightly  rOPINIRole, 1 mg, Nightly  rosuvastatin, 10 mg, Nightly  tiotropium-olodaterol, 2 puff, Daily  polyethylene glycol, 17 g, BID  sennosides-docusate sodium, 2 tablet, Daily  fluconazole, 200 mg, Daily  metoclopramide, 5 mg, TID AC  sodium chloride (Inhalant), 4 mL, BID RT  guaiFENesin, 1,200 mg, BID  pantoprazole, 40 mg, BID  trospium, 20 mg, Nightly       Iodine, Iodinated contrast media, Wellbutrin [bupropion], Adhesive tape, Sertraline, and Sulfa antibiotics    Allergies:   Allergies   Allergen Reactions    Iodine Itching and Nausea And Vomiting    Iodinated Contrast Media     Wellbutrin [Bupropion]      Dry mouth    Adhesive Tape Rash    Sertraline Other (See Comments)     Severe dry mouth    Sulfa Antibiotics Itching and Nausea Only         Physical Exam/Objective:   Vitals:    06/18/25 0917   BP: (!) 153/80   Pulse: 81   Resp: 16   Temp: 98.4 °F (36.9 °C)   SpO2: 93%       Intake/Output Summary (Last 24 hours) at 6/18/2025 1404  Last data filed at 6/18/2025 0919  Gross per 24 hour   Intake 420 ml   Output --   Net 420 ml         General appearance: in no acute distress. Appears frail   Respiratory: Respiratory effort normal, bilateral equal chest rise.   Cardiovascular: trace  pitting edema , friction rub left sternal border.   Abdomen: abdomen is soft, non distended  Skin: no rashes,  no jaundice   Neuro:  Follows commands, moves all extremities spontaneously       Data:   CBC:   Recent Labs     06/16/25  0552 06/18/25  0600   WBC 7.2 7.2   HGB 10.0* 9.7*   HCT 30.4* 30.4*

## 2025-06-18 NOTE — CARE COORDINATION
Spoke with [employer] Rep about Contract needing to be signed.  She reports they will have it tomorrow.    SAKINA Glass     Case Management   [phone]    6/18/2025  4:38 PM

## 2025-06-18 NOTE — PROGRESS NOTES
Occupational Therapy  Facility/Department: [address] REHAB  Rehabilitation Occupational Therapy Daily Treatment Note    Date: 25  Patient Name: Jonelle Contreras       Room: B9K-3629/3258-01  MRN: [id number]  Account: [id number]   : 1950  (74 y.o.) Gender: female                    Past Medical History:  has a past medical history of acute intermittent porphyria, agoraphobic, Allergic rhinitis, Anxiety, Arthritis, Asthma, CAD (coronary artery disease), Chronic obstructive pulmonary disease, unspecified COPD type ([facility]), ckd 4, collagenous colitis, COPD (chronic obstructive pulmonary disease) ([facility]), Depression, Disease of blood and blood forming organ, GERD (gastroesophageal reflux disease), Heart disease, Hyperlipidemia, Hypertension, Left thyroid nodule, Migraines, Neuropathy, Osteoporosis, PAD (peripheral artery disease), Peripheral vascular disease, post menopausal, Prediabetes, Pulmonary nodules, Restless leg syndrome, STEMI involving oth coronary artery of inferior wall ([facility]), and Urinary incontinence.  Past Surgical History:   has a past surgical history that includes Hysterectomy; Cholecystectomy; Coronary angioplasty with stent (10/2013 [place]); Colonoscopy (2017); Colonoscopy (N/A, 2022); Upper gastrointestinal endoscopy (N/A, 2022); Cardiac surgery; Hysterectomy, vaginal; Upper gastrointestinal endoscopy (2022); Upper gastrointestinal endoscopy (N/A, 2025); and bronchoscopy (N/A, 2025).    Restrictions  Restrictions/Precautions: Fall Risk  Other Position/Activity Restrictions: PD nightly. dwayne TEDs daily.    Subjective  Subjective: pt met in therapy room, states she slept better and is agreeable to OT tx  Restrictions/Precautions: Fall Risk             Objective     Cognition  Overall Cognitive Status: Exceptions  Safety Judgement: Decreased awareness of need for safety  Initiation: Requires cues for some  Sequencing: Requires cues for  some  Orientation  Overall Orientation Status: Within Normal Limits  Orientation Level: Oriented X4 (BIMs given again as patient was very fatigued yesterday when giving it.  Today patient scored a 15/15)         ADL       Instrumental ADL's  Instrumental ADLs: Yes  Health Management  Health Management Level of Assistance: Stand by assistance  Health Management: pt completed med mgmt, unable to recall the amount of pills taken at home. states she owns pill oragnizer but does not use it. given 5 pills to place into med organizer. unable to identify 2/2 mistakes, able to open 4/5 pill bottles, states she always has trouble opening them at home. educated on premed organizers available at certain pharmacies, shown medication dispensers and educated on benefits. educated on importance of using med organizer to decrease shance of med mismgmt. states she was surprised by how poorly she completed the task. it is not recommended that pt completes med mgmt INDLY at this time.     Functional Mobility  Device: Standard walker  Assistance Level: Stand by assist  Skilled Clinical Factors: short distance wc<>car, increased time to complete, therapist managed O2 line througout  Sit to Stand  Assistance Level: Contact guard assist  Skilled Clinical Factors: wc<>car, good hand placement recall  Stand to Sit  Assistance Level: Contact guard assist  Skilled Clinical Factors: wc<>car, vcs for hand placement  Car Transfer  Assistance Level: Modified independent  Skilled Clinical Factors: safety concerns d/t hand placement         Assessment  Assessment  Assessment: pt tolerated tx fair, fatigued with generalized pain reported. completed car tx with good fx mobility, vcs for hand placements. seated at table to complete med mgmt. unable to correct 2/2 mistakes, did not drop any pills, had hard time opening med bottles. educated on methods to complete med mgmt safely, pre packaged med options through pharmacies, and automated medication

## 2025-06-18 NOTE — PROGRESS NOTES
bilateral  Skilled Clinical Factors: Patient ambulated 60', initiated with single point cane but advanced to no device. patient uses very short steps bilaterally and is very tense and gaurded with the walker. She could not follow cueing to increase step length and relax her shoulder. Once cane was removed, she improved bilateral step length but was slighty unsteady.  She wsa CGA for balance  Stairs:  Stair Height: 6''  Device: Bilateral handrails  Number of Stairs: 4  Additional Factors: Reciprocal going up, Non-reciprocal going down  Assistance Level: Contact guard assist, Stand by assist  Skilled Clinical Factors: Patient was able to complete with steady balance with bilateral rails. she reports her steps at home do not have a rail down the steps, but there is a railing at the top of the steps, around the porch.  Patient reports she is able to get hands to the railing at the top for support.. Patient needed cueing to keep hands in front of her when descending but was steady with cueing.  Curb:  Curb Height: 6''  Device: Rolling walker  Number of Curbs: 1  Additional Factors: Verbal cues  Assistance Level: Contact guard assist  Skilled Clinical Factors: Patient with poor safety and did not recall previously doing activity. She required cueing for walker management and safe sequencing. Able to complete with CGA  Wheelchair:       Occupational therapy:   Feeding     Grooming/Oral Hygiene  Assistance Level: Set-up  Skilled Clinical Factors: Seated to wash face, hands  UE Bathing  Assistance Level: Set-up, Increased time to complete  Skilled Clinical Factors: Pt sat on BSC to complete sponge bath.  LE Bathing  Equipment Provided: Long-handled sponge  Assistance Level: Moderate assistance, Verbal cues, Increased time to complete  Skilled Clinical Factors: Pt sitting from BSC to complete, washing thighs with wash clothe and initially used LHS for LE's to feet, fair quality. Pt able to cross LE's when cued to attempt, to  hypertensive urgency on admission  -continue carvedilol, furosemide, losartan with hold parameters  -Holding hydralazine and nifedipine  --monitor trend, overall much improved.     ESRD on PD  -Nephrology following, appreciate input  -Avoid nephrotoxins, renally dose meds  -Monitor renal function   ---concern for patient ability to manage manual PD at home alone. Unable to train on cycler until this week per company.  No kink in PD cath based on Xray.     CAD  -continue ASA, Plavix, bb, arb, statin    HLD  -continue rosuvastatin    COPD/asthma  -continue Stiolto, Duonebs    GERD  -continue pantoprazole    RLS  -continue ropinirole    Anxiety  -continue prn alprazolam (dose increased to home dose of 2 mg and patient reports improvement -- decrease back to 1.5 mg given worsened fatigue)  -Affect remains flat.     Malnutrition  -Minimal po intake during hospital stay  -Dietitian following  -Patient does not want feeding tube  -encouraging po intake, does better with food from outside per family    Bladder: H/o overactive bladder, high risk retention and incontinence  -Monitor PVRs, straight cath prn >400  -continue trospium    Bowel  -High risk constipation  -continue PRN bowel regimen    Pain control  -continue tramadol and tizanidine prn    DVT ppx  -continue heparin    Rehab Progress: Had been making gradual progress but has been more limited in therapies due to fatigue since end of last week. Now overall Shamir for mobility, Emily-maxA for ADLs. Barriers include: weakness, endurance, balance, nutrition, insight/problem solving, lack of assist at home, PD.  Anticipated Dispo: now anticipating will need SNF  ELOS: TBD pending medical readiness and placement.         Bon Drummond D.O. M.P.H  PM&R  6/18/2025  10:51 AM

## 2025-06-18 NOTE — DISCHARGE INSTR - COC
Continuity of Care Form    Patient Name: Jonelle Contreras   :  1950  MRN:  [id number]    Admit date:  2025  Discharge date:  25    Code Status Order: Full Code   Advance Directives:     Admitting Physician:  Bon Drummond MD  PCP: Candido Bentley MD    Discharging Nurse: ASHLYN Lyn  Discharging Hospital Unit/Room#: H7W-8714/3258-01  Discharging Unit Phone Number: [phone]    Emergency Contact:   Extended Emergency Contact Information  Primary Emergency Contact: Smart,Thiago           [facility]  Home Phone: [phone]  Mobile Phone: [phone]  Relation: Child  Secondary Emergency Contact: Smart,[facility]  Home Phone: [phone]  Mobile Phone: [phone]  Relation: Child    Past Surgical History:  Past Surgical History:   Procedure Laterality Date    BRONCHOSCOPY N/A 2025    BRONCHOSCOPY ALVEOLAR LAVAGE performed by Sven Ryder MD at [facility] ENDOSCOPY    CARDIAC SURGERY      CHOLECYSTECTOMY      COLONOSCOPY  2017    DR. AYOUB    COLONOSCOPY N/A 2022    COLONOSCOPY WITH BIOPSY performed by Aston Ayoub MD at [facility] ENDOSCOPY    CORONARY ANGIOPLASTY WITH STENT PLACEMENT  10/2013 [place]    HYSTERECTOMY (CERVIX STATUS UNKNOWN)      JAMEL only for ? age 31    HYSTERECTOMY, VAGINAL      UPPER GASTROINTESTINAL ENDOSCOPY N/A 2022    EGD BIOPSY performed by Aston Ayoub MD at [facility] ENDOSCOPY    UPPER GASTROINTESTINAL ENDOSCOPY  2022    UPPER GASTROINTESTINAL ENDOSCOPY N/A 2025    ESOPHAGOGASTRODUODENOSCOPY performed by Parker Rdz MD at [facility] ENDOSCOPY       Immunization History:   Immunization History   Administered Date(s) Administered    COVID-19, PFIZER PURPLE top, DILUTE for use, (age 12 y+), 30mcg/0.3mL 2021, 2021, 2021, 10/13/2023    COVID-19, PFIZER, , (age 12y+), IM, 30mcg/0.3mL 10/13/2023    Influenza 2013    Influenza Trivalent 2014,

## 2025-06-18 NOTE — PROGRESS NOTES
Initial Drain Volume: 3 mL  Average Dwell Time: 120 min.  Average Drain Time: 16 min.  Total Therapy Volume: 86071 mL  Total UF: 897 mL  Day UF: -20 mL  Night UF: 917 mL  Total Therapy Time: 12 hr 5 min.

## 2025-06-18 NOTE — PROGRESS NOTES
Patient admitted to rehab with debility d/t acute hypoxic respiratory failure with mucus plugging.  A/Ox4. Transfers with walker. Mobility restrictions: generalized weakness. On regular diet, tolerating well. Medications taken whole with thins. On aspirin, plavix, heparin for DVT prophylaxis.  Skin: stage 1 to buttocks. Oxygen: 1L NC. LDA: PD cath, PIV right arm. Has been continent of bowel and continent of bladder. LBM 6/17/25. Chair/bed alarms in use and call light in reach. Will monitor for safety. Electronically signed by SKYLER MARKS RN on 6/18/2025 at 3:31 PM

## 2025-06-18 NOTE — PLAN OF CARE
Problem: Safety - Adult  Goal: Free from fall injury  Outcome: Progressing     Problem: Skin/Tissue Integrity  Goal: Skin integrity remains intact  Description: 1.  Monitor for areas of redness and/or skin breakdown2.  Assess vascular access sites hourly3.  Every 4-6 hours minimum:  Change oxygen saturation probe site4.  Every 4-6 hours:  If on nasal continuous positive airway pressure, respiratory therapy assess nares and determine need for appliance change or resting period  6/17/2025 2250 by Esperanza Macario RN  Outcome: Progressing  Flowsheets  Taken 6/17/2025 2243  Skin Integrity Remains Intact: Monitor for areas of redness and/or skin breakdown  Taken 6/17/2025 2120  Skin Integrity Remains Intact: Monitor for areas of redness and/or skin breakdown     Problem: Pain  Goal: Verbalizes/displays adequate comfort level or baseline comfort level  Outcome: Progressing     Problem: Nutrition Deficit:  Goal: Optimize nutritional status  Outcome: Progressing     Problem: ABCDS Injury Assessment  Goal: Absence of physical injury  Outcome: Progressing  Flowsheets (Taken 6/17/2025 2243)  Absence of Physical Injury: Implement safety measures based on patient assessment

## 2025-06-18 NOTE — PROGRESS NOTES
[facility]   Daily Progress Note      Admit Date:  5/30/2025    CC: \"  Jonelle Contreras is a 74 y.o. patient who with prior history of end-stage renal disease on peritoneal dialysis, hypertension, hyperlipidemia, COPD, depression, presented to the hospital with complaints of weakness and she has been in inpatient rehab unit for almost 3 to 4 weeks.  She apparently was progressing well up until about a week ago when she started to have generalized weakness and unable to tolerate physical therapy.  She underwent echocardiogram which showed small pericardial effusion and she was also found to have a pericardial rub leading to this cardiac consultation she says that she is having joint pains both in upper and lower extremities.  I have been asked to provide consultation regarding further management and testing.    Interval history 6/18/2025  Blood pressure symptoms are improved from yesterday  Denies any further chest pain  Pt with no acute overnight events. Denies chest pain, palpitations, and dyspnea.    Objective:   BP (!) 153/80   Pulse 81   Temp 98.4 °F (36.9 °C) (Oral)   Resp 16   Ht 1.651 m (5' 5\")   Wt 56.2 kg (123 lb 14.4 oz)   SpO2 93%   BMI 20.62 kg/m²     Intake/Output Summary (Last 24 hours) at 6/18/2025 1042  Last data filed at 6/18/2025 0919  Gross per 24 hour   Intake 620 ml   Output 1143 ml   Net -523 ml      Wt Readings from Last 3 Encounters:   06/18/25 56.2 kg (123 lb 14.4 oz)   05/30/25 63.3 kg (139 lb 8.8 oz)   05/09/25 61.6 kg (135 lb 12.8 oz)     Telemetry: Currently not on telemetry    Visit Vitals  BP (!) 153/80   Pulse 81   Temp 98.4 °F (36.9 °C) (Oral)   Resp 16   Ht 1.651 m (5' 5\")   Wt 56.2 kg (123 lb 14.4 oz)   SpO2 93%   BMI 20.62 kg/m²       Physical Exam:  General:  NAD, Awake, alert and oriented X4  Skin:  Warm and dry  Neck:  Supple, no JVP appreciated, no bruit  Chest:  Clear to auscultation, no wheezes/rhonchi/rales  Cardiovascular:  Regular rate. S1S2 soft

## 2025-06-18 NOTE — PROGRESS NOTES
Physical Therapy  Facility/Department: [address] REHAB  Rehabilitation Physical Therapy Treatment Note/Discharge Summary    NAME: Jonelle Contreras  : 1950 (74 y.o.)  MRN: [id number]  CODE STATUS: Full Code    Date of Service: 25       Restrictions:  Restrictions/Precautions: Fall Risk  Position Activity Restriction  Other Position/Activity Restrictions: PD nightly. dwayne TEDs daily.     Pertinent medical information:  Additional Pertinent Hx: per Dr. Drummond's note, \"74-year-old female who came to hospital with hyperkalemia and hypertension, found to be in a hypertensive emergency.  Her blood pressure was brought down and the patient is following with nephrology and cardiology.  Patient has ESRD and is on PD.  Patient also came in with a cough and underwent a bronc on 2025 for bilateral mucous plugs.  Patient was treated for pneumonia and started on IV antibiotics for 7 days. \"    SUBJECTIVE  Subjective: Patient arrived in wheelchair and is in good spirits.  She is back on O2 at this time and SpO2 was 95%.  Patient reports she is feeling a little better, but still pretty weak.  Pain: no complaints of pain at this time but then states, \"nothing but the ache in all my muscles\"    Social/Functional History  Lives With: Alone  Type of Home: House  Home Layout: Work area in basement, Two level, Laundry in basement (ranch with finished basement)  Home Access: Stairs to enter with rails  Entrance Stairs - Number of Steps: 4 CONSTANCE front door with L handrail.  Entrance Stairs - Rails: Left  Bathroom Shower/Tub: Tub/Shower unit  Bathroom Toilet: Handicap height  Bathroom Equipment: Tub transfer bench (pt reports she turns the TTB longways so it fits entirely in tub shower unit.)  Bathroom Accessibility: Accessible  Home Equipment: Walker - Rolling (Walker (3 wheel).)  Has the patient had two or more falls in the past year or any fall with injury in the past year?: No  Receives Help From: Neighbor  Prior Level of

## 2025-06-18 NOTE — PLAN OF CARE
Problem: Discharge Planning  Goal: Discharge to home or other facility with appropriate resources  Outcome: Progressing  Flowsheets (Taken 6/18/2025 0917)  Discharge to home or other facility with appropriate resources:   Identify barriers to discharge with patient and caregiver   Arrange for needed discharge resources and transportation as appropriate   Identify discharge learning needs (meds, wound care, etc)   Refer to discharge planning if patient needs post-hospital services based on physician order or complex needs related to functional status, cognitive ability or social support system     Problem: Safety - Adult  Goal: Free from fall injury  Outcome: Progressing  Flowsheets (Taken 6/18/2025 0919)  Free From Fall Injury: Instruct family/caregiver on patient safety     Problem: Skin/Tissue Integrity  Goal: Skin integrity remains intact  Description: 1.  Monitor for areas of redness and/or skin breakdown2.  Assess vascular access sites hourly3.  Every 4-6 hours minimum:  Change oxygen saturation probe site4.  Every 4-6 hours:  If on nasal continuous positive airway pressure, respiratory therapy assess nares and determine need for appliance change or resting period  Outcome: Progressing  Flowsheets  Taken 6/18/2025 0919  Skin Integrity Remains Intact: Monitor for areas of redness and/or skin breakdown      Problem: Pain  Goal: Verbalizes/displays adequate comfort level or baseline comfort level  Outcome: Progressing  Flowsheets (Taken 6/18/2025 0917)  Verbalizes/displays adequate comfort level or baseline comfort level:   Encourage patient to monitor pain and request assistance   Assess pain using appropriate pain scale   Administer analgesics based on type and severity of pain and evaluate response   Implement non-pharmacological measures as appropriate and evaluate response     Problem: Nutrition Deficit:  Goal: Optimize nutritional status  Outcome: Progressing     Problem: ABCDS Injury Assessment  Goal:

## 2025-06-18 NOTE — CARE COORDINATION
FERNANDO AUTHORIZATION INFORMATION      LOCATION: [facility]     EFFECTIVE DATE:  06/17/2025-06/23/2025    NEXT REVIEW DATE: 06/23/2025     AUTH#:  [id number]       Electronically signed by Elif Galindo on 6/18/2025 at 9:53 AM   Case Management Assistant  Phone:  [phone] - Fax:  [phone]

## 2025-06-19 LAB
ANION GAP SERPL CALCULATED.3IONS-SCNC: 14 MMOL/L (ref 3–16)
BUN SERPL-MCNC: 60 MG/DL (ref 7–20)
CALCIUM SERPL-MCNC: 10 MG/DL (ref 8.3–10.6)
CARDIOLIPIN IGG SER IA-ACNC: <10 GPL
CARDIOLIPIN IGM SER IA-ACNC: <10 MPL
CHLORIDE SERPL-SCNC: 97 MMOL/L (ref 99–110)
CO2 SERPL-SCNC: 27 MMOL/L (ref 21–32)
CREAT SERPL-MCNC: 7.7 MG/DL (ref 0.6–1.2)
GFR SERPLBLD CREATININE-BSD FMLA CKD-EPI: 5 ML/MIN/{1.73_M2}
GLUCOSE SERPL-MCNC: 123 MG/DL (ref 70–99)
POTASSIUM SERPL-SCNC: 4 MMOL/L (ref 3.5–5.1)
SODIUM SERPL-SCNC: 138 MMOL/L (ref 136–145)

## 2025-06-19 PROCEDURE — 36415 COLL VENOUS BLD VENIPUNCTURE: CPT

## 2025-06-19 PROCEDURE — 94761 N-INVAS EAR/PLS OXIMETRY MLT: CPT

## 2025-06-19 PROCEDURE — 97535 SELF CARE MNGMENT TRAINING: CPT

## 2025-06-19 PROCEDURE — 6360000002 HC RX W HCPCS: Performed by: PHYSICAL MEDICINE & REHABILITATION

## 2025-06-19 PROCEDURE — 6370000000 HC RX 637 (ALT 250 FOR IP): Performed by: PHYSICAL MEDICINE & REHABILITATION

## 2025-06-19 PROCEDURE — 97530 THERAPEUTIC ACTIVITIES: CPT

## 2025-06-19 PROCEDURE — 99233 SBSQ HOSP IP/OBS HIGH 50: CPT | Performed by: INTERNAL MEDICINE

## 2025-06-19 PROCEDURE — 97110 THERAPEUTIC EXERCISES: CPT

## 2025-06-19 PROCEDURE — 6370000000 HC RX 637 (ALT 250 FOR IP): Performed by: INTERNAL MEDICINE

## 2025-06-19 PROCEDURE — 2500000003 HC RX 250 WO HCPCS: Performed by: PHYSICAL MEDICINE & REHABILITATION

## 2025-06-19 PROCEDURE — 97116 GAIT TRAINING THERAPY: CPT

## 2025-06-19 PROCEDURE — 2580000003 HC RX 258: Performed by: INTERNAL MEDICINE

## 2025-06-19 PROCEDURE — 2700000000 HC OXYGEN THERAPY PER DAY

## 2025-06-19 PROCEDURE — 1280000000 HC REHAB R&B

## 2025-06-19 PROCEDURE — 80048 BASIC METABOLIC PNL TOTAL CA: CPT

## 2025-06-19 PROCEDURE — 51798 US URINE CAPACITY MEASURE: CPT

## 2025-06-19 RX ORDER — PREDNISONE 20 MG/1
20 TABLET ORAL DAILY
Status: DISCONTINUED | OUTPATIENT
Start: 2025-06-19 | End: 2025-06-19

## 2025-06-19 RX ORDER — HYDROXYCHLOROQUINE SULFATE 200 MG/1
200 TABLET, FILM COATED ORAL DAILY
Status: CANCELLED | OUTPATIENT
Start: 2025-06-19

## 2025-06-19 RX ORDER — PREDNISONE 20 MG/1
40 TABLET ORAL DAILY
Status: DISCONTINUED | OUTPATIENT
Start: 2025-06-20 | End: 2025-06-20 | Stop reason: HOSPADM

## 2025-06-19 RX ORDER — POTASSIUM CHLORIDE 1500 MG/1
20 TABLET, EXTENDED RELEASE ORAL DAILY
DISCHARGE
Start: 2025-06-20

## 2025-06-19 RX ORDER — TRAMADOL HYDROCHLORIDE 50 MG/1
50 TABLET ORAL EVERY 12 HOURS PRN
Qty: 6 TABLET | Refills: 0 | Status: SHIPPED | OUTPATIENT
Start: 2025-06-19 | End: 2025-06-22

## 2025-06-19 RX ORDER — HYDROXYCHLOROQUINE SULFATE 200 MG/1
200 TABLET, FILM COATED ORAL DAILY
DISCHARGE
Start: 2025-06-19 | End: 2025-06-19 | Stop reason: HOSPADM

## 2025-06-19 RX ORDER — PREDNISONE 20 MG/1
20 TABLET ORAL DAILY
DISCHARGE
Start: 2025-06-20 | End: 2025-06-19 | Stop reason: HOSPADM

## 2025-06-19 RX ORDER — COLCHICINE 0.6 MG/1
0.3 TABLET ORAL
DISCHARGE
Start: 2025-06-20 | End: 2025-06-20 | Stop reason: HOSPADM

## 2025-06-19 RX ORDER — FUROSEMIDE 80 MG/1
80 TABLET ORAL 2 TIMES DAILY
DISCHARGE
Start: 2025-06-19

## 2025-06-19 RX ORDER — BISACODYL 5 MG/1
5 TABLET, DELAYED RELEASE ORAL DAILY
Status: DISCONTINUED | OUTPATIENT
Start: 2025-06-19 | End: 2025-06-20 | Stop reason: HOSPADM

## 2025-06-19 RX ORDER — PREDNISONE 20 MG/1
TABLET ORAL
DISCHARGE
Start: 2025-06-20 | End: 2025-08-09

## 2025-06-19 RX ORDER — SEVELAMER CARBONATE 800 MG/1
1600 TABLET, FILM COATED ORAL
DISCHARGE
Start: 2025-06-19

## 2025-06-19 RX ORDER — HYDROXYCHLOROQUINE SULFATE 200 MG/1
200 TABLET, FILM COATED ORAL DAILY
Status: DISCONTINUED | OUTPATIENT
Start: 2025-06-19 | End: 2025-06-19

## 2025-06-19 RX ORDER — BISACODYL 5 MG/1
5 TABLET, DELAYED RELEASE ORAL DAILY
DISCHARGE
Start: 2025-06-20

## 2025-06-19 RX ORDER — PREDNISONE 20 MG/1
20 TABLET ORAL DAILY
Status: CANCELLED | OUTPATIENT
Start: 2025-06-19

## 2025-06-19 RX ORDER — ALPRAZOLAM 1 MG/1
1.5 TABLET ORAL EVERY 12 HOURS PRN
Qty: 9 TABLET | Refills: 0 | Status: SHIPPED | OUTPATIENT
Start: 2025-06-19 | End: 2025-06-22

## 2025-06-19 RX ORDER — METOPROLOL SUCCINATE 50 MG/1
50 TABLET, EXTENDED RELEASE ORAL DAILY
DISCHARGE
Start: 2025-06-20

## 2025-06-19 RX ORDER — METHYLPREDNISOLONE SODIUM SUCCINATE 125 MG/2ML
80 INJECTION INTRAMUSCULAR; INTRAVENOUS ONCE
Status: COMPLETED | OUTPATIENT
Start: 2025-06-19 | End: 2025-06-19

## 2025-06-19 RX ORDER — SENNA AND DOCUSATE SODIUM 50; 8.6 MG/1; MG/1
2 TABLET, FILM COATED ORAL DAILY
DISCHARGE
Start: 2025-06-20

## 2025-06-19 RX ADMIN — HEPARIN SODIUM 5000 UNITS: 5000 INJECTION INTRAVENOUS; SUBCUTANEOUS at 13:49

## 2025-06-19 RX ADMIN — PANTOPRAZOLE SODIUM 40 MG: 40 TABLET, DELAYED RELEASE ORAL at 19:41

## 2025-06-19 RX ADMIN — ROPINIROLE HYDROCHLORIDE 1 MG: 1 TABLET, FILM COATED ORAL at 20:45

## 2025-06-19 RX ADMIN — SEVELAMER CARBONATE 1600 MG: 800 TABLET, FILM COATED ORAL at 13:48

## 2025-06-19 RX ADMIN — TRAMADOL HYDROCHLORIDE 50 MG: 50 TABLET, COATED ORAL at 23:33

## 2025-06-19 RX ADMIN — PANTOPRAZOLE SODIUM 40 MG: 40 TABLET, DELAYED RELEASE ORAL at 10:28

## 2025-06-19 RX ADMIN — ASPIRIN 81 MG 81 MG: 81 TABLET ORAL at 10:28

## 2025-06-19 RX ADMIN — FUROSEMIDE 80 MG: 40 TABLET ORAL at 18:26

## 2025-06-19 RX ADMIN — METOCLOPRAMIDE 5 MG: 10 TABLET ORAL at 18:26

## 2025-06-19 RX ADMIN — SODIUM CHLORIDE, SODIUM LACTATE, CALCIUM CHLORIDE, MAGNESIUM CHLORIDE AND DEXTROSE 4000 ML: 2.5; 538; 448; 18.3; 5.08 INJECTION, SOLUTION INTRAPERITONEAL at 20:43

## 2025-06-19 RX ADMIN — ANORECTAL OINTMENT: 15.7; .44; 24; 20.6 OINTMENT TOPICAL at 00:20

## 2025-06-19 RX ADMIN — METHYLPREDNISOLONE SODIUM SUCCINATE 80 MG: 125 INJECTION, POWDER, LYOPHILIZED, FOR SOLUTION INTRAMUSCULAR; INTRAVENOUS at 13:50

## 2025-06-19 RX ADMIN — SODIUM CHLORIDE, PRESERVATIVE FREE 10 ML: 5 INJECTION INTRAVENOUS at 19:41

## 2025-06-19 RX ADMIN — METOCLOPRAMIDE 5 MG: 10 TABLET ORAL at 13:48

## 2025-06-19 RX ADMIN — SENNOSIDES, DOCUSATE SODIUM 2 TABLET: 50; 8.6 TABLET, FILM COATED ORAL at 10:27

## 2025-06-19 RX ADMIN — ROSUVASTATIN CALCIUM 10 MG: 10 TABLET, FILM COATED ORAL at 19:41

## 2025-06-19 RX ADMIN — FLUCONAZOLE 200 MG: 100 TABLET ORAL at 10:28

## 2025-06-19 RX ADMIN — HEPARIN SODIUM 5000 UNITS: 5000 INJECTION INTRAVENOUS; SUBCUTANEOUS at 21:01

## 2025-06-19 RX ADMIN — SEVELAMER CARBONATE 1600 MG: 800 TABLET, FILM COATED ORAL at 18:26

## 2025-06-19 RX ADMIN — POTASSIUM CHLORIDE 20 MEQ: 1500 TABLET, EXTENDED RELEASE ORAL at 10:28

## 2025-06-19 RX ADMIN — METOCLOPRAMIDE 5 MG: 10 TABLET ORAL at 06:39

## 2025-06-19 RX ADMIN — CLOPIDOGREL BISULFATE 75 MG: 75 TABLET, FILM COATED ORAL at 10:28

## 2025-06-19 RX ADMIN — BISACODYL 5 MG: 5 TABLET, COATED ORAL at 10:28

## 2025-06-19 RX ADMIN — ALPRAZOLAM 1.5 MG: 0.5 TABLET ORAL at 20:45

## 2025-06-19 RX ADMIN — TIZANIDINE 4 MG: 4 TABLET ORAL at 18:26

## 2025-06-19 RX ADMIN — FUROSEMIDE 80 MG: 40 TABLET ORAL at 10:27

## 2025-06-19 RX ADMIN — ONDANSETRON 4 MG: 2 INJECTION INTRAMUSCULAR; INTRAVENOUS at 12:41

## 2025-06-19 RX ADMIN — SODIUM CHLORIDE, SODIUM LACTATE, CALCIUM CHLORIDE, MAGNESIUM CHLORIDE AND DEXTROSE 6000 ML: 2.5; 538; 448; 18.3; 5.08 INJECTION, SOLUTION INTRAPERITONEAL at 20:43

## 2025-06-19 RX ADMIN — SEVELAMER CARBONATE 1600 MG: 800 TABLET, FILM COATED ORAL at 10:27

## 2025-06-19 RX ADMIN — PREDNISONE 20 MG: 20 TABLET ORAL at 10:28

## 2025-06-19 RX ADMIN — GENTAMICIN SULFATE: 1 CREAM TOPICAL at 20:46

## 2025-06-19 RX ADMIN — ANORECTAL OINTMENT: 15.7; .44; 24; 20.6 OINTMENT TOPICAL at 19:41

## 2025-06-19 RX ADMIN — ACETAMINOPHEN 650 MG: 325 TABLET ORAL at 00:22

## 2025-06-19 RX ADMIN — HEPARIN SODIUM 5000 UNITS: 5000 INJECTION INTRAVENOUS; SUBCUTANEOUS at 06:39

## 2025-06-19 RX ADMIN — METOPROLOL SUCCINATE 50 MG: 50 TABLET, EXTENDED RELEASE ORAL at 10:28

## 2025-06-19 RX ADMIN — TROSPIUM CHLORIDE 20 MG: 20 TABLET, FILM COATED ORAL at 19:41

## 2025-06-19 ASSESSMENT — PAIN SCALES - GENERAL
PAINLEVEL_OUTOF10: 4
PAINLEVEL_OUTOF10: 0
PAINLEVEL_OUTOF10: 3
PAINLEVEL_OUTOF10: 0

## 2025-06-19 ASSESSMENT — PAIN DESCRIPTION - PAIN TYPE
TYPE: ACUTE PAIN
TYPE: ACUTE PAIN

## 2025-06-19 ASSESSMENT — PAIN DESCRIPTION - FREQUENCY
FREQUENCY: INTERMITTENT
FREQUENCY: INTERMITTENT

## 2025-06-19 ASSESSMENT — PAIN DESCRIPTION - DESCRIPTORS
DESCRIPTORS: DISCOMFORT
DESCRIPTORS: DISCOMFORT

## 2025-06-19 ASSESSMENT — PAIN DESCRIPTION - LOCATION
LOCATION: GENERALIZED
LOCATION: GENERALIZED

## 2025-06-19 ASSESSMENT — PAIN DESCRIPTION - ONSET
ONSET: ON-GOING
ONSET: ON-GOING

## 2025-06-19 NOTE — PROGRESS NOTES
[facility]   Daily Progress Note      Admit Date:  5/30/2025    CC: \"  Jonelle Contreras is a 74 y.o. patient who with prior history of end-stage renal disease on peritoneal dialysis, hypertension, hyperlipidemia, COPD, depression, presented to the hospital with complaints of weakness and she has been in inpatient rehab unit for almost 3 to 4 weeks.  She apparently was progressing well up until about a week ago when she started to have generalized weakness and unable to tolerate physical therapy.  She underwent echocardiogram which showed small pericardial effusion and she was also found to have a pericardial rub leading to this cardiac consultation she says that she is having joint pains both in upper and lower extremities.  I have been asked to provide consultation regarding further management and testing.    Interval history 6/18/2025  Blood pressure symptoms are improved from yesterday  Denies any further chest pain  Pt with no acute overnight events. Denies chest pain, palpitations, and dyspnea.    Interval history 6/19/2025  Overall feels better  Her autoimmune workup is suggestive of SLE.  - Patient is now started on steroids      Objective:   /87   Pulse 69   Temp 97.7 °F (36.5 °C) (Oral)   Resp 16   Ht 1.651 m (5' 5\")   Wt 55.4 kg (122 lb 2.2 oz)   SpO2 97%   BMI 20.32 kg/m²     Intake/Output Summary (Last 24 hours) at 6/19/2025 1505  Last data filed at 6/19/2025 1215  Gross per 24 hour   Intake 248 ml   Output 400 ml   Net -152 ml      Wt Readings from Last 3 Encounters:   06/19/25 55.4 kg (122 lb 2.2 oz)   05/30/25 63.3 kg (139 lb 8.8 oz)   05/09/25 61.6 kg (135 lb 12.8 oz)     Telemetry: Currently not on telemetry    Visit Vitals  /87   Pulse 69   Temp 97.7 °F (36.5 °C) (Oral)   Resp 16   Ht 1.651 m (5' 5\")   Wt 55.4 kg (122 lb 2.2 oz)   SpO2 97%   BMI 20.32 kg/m²       Physical Exam:  General:  NAD, Awake, alert and oriented X4  Skin:  Warm and dry  Neck:  Supple, no JVP

## 2025-06-19 NOTE — CARE COORDINATION
Call placed to [facility] [place] and to Alba at [facility].  [facility] states the contract has been signed and sent over to [facility].  [facility] is reporting that due to today being a Federal Holiday, it may not be signed today as the individuals that would be signing it are off work.  They do have the cycler and supplies needed and will deliver to via car to [facility] but will not do so until contract is signed by higher up at [facility].  They have spoken with [facility] and they have the staff already trained so they will not need another training.  They do not know if they are working or not.    Call placed to [facility] Corporate Office [phone] ext [phone] however left voice message asking if contact had been received. Asked for returned call.    SAKINA Glass     Case Management   [phone]    6/19/2025  10:04 AM

## 2025-06-19 NOTE — PLAN OF CARE
Problem: Safety - Adult  Goal: Free from fall injury  6/19/2025 0151 by Kaye Ruiz, RN  Outcome: Progressing  Flowsheets (Taken 6/19/2025 0151)  Free From Fall Injury:   Instruct family/caregiver on patient safety   Based on caregiver fall risk screen, instruct family/caregiver to ask for assistance with transferring infant if caregiver noted to have fall risk factors  Note: Pt educated on falls precautions and safety. Call light and personal belongings within reach at all times. Non skid socks in use when up. Hourly rounding and alarms active.      Problem: Pain  Goal: Verbalizes/displays adequate comfort level or baseline comfort level  6/19/2025 0151 by Kaye Ruiz, RN  Outcome: Progressing  Flowsheets (Taken 6/19/2025 0151)  Verbalizes/displays adequate comfort level or baseline comfort level:   Encourage patient to monitor pain and request assistance   Administer analgesics based on type and severity of pain and evaluate response   Consider cultural and social influences on pain and pain management  Note: Able to rate pain using a 1-10 scale, medicated per prn orders, see MAR. Able to verbalize a reduction in pain and/or able to fall asleep and remain asleep without any s/s of pain

## 2025-06-19 NOTE — CARE COORDINATION
Team Conference held today.  Team reviewed goal is SNF.  Dr Rucker to initiate steroids today.  DC when bed/PD is ready.    Met with pt today to review DC for tomorrow to [facility].  She agrees with this plan.  She will update her daughter as she took clothes home to be washed and was to bring them back.  Informed her  time for tomorrow is 12 noon.  IMM letter presented.    SAKINA Glass     Case Management   [phone]    6/19/2025  3:24 PM

## 2025-06-19 NOTE — PROGRESS NOTES
Initial Drain Volume: 41 mL  Average Dwell Time: 111 min.  Average Drain Time: 24 min.  Total Therapy Volume: 21014 mL  Total UF: 569 mL  Day UF: 40 mL  Night UF: 529 mL  Total Therapy Time: 11 hr 59 min.

## 2025-06-19 NOTE — CARE COORDINATION
Team Conference held today.  GOAL:    [facility] for SNF, Prec[facility] approved thru Monday 6-23/25.   [facility] and [facility] are [facility] today due to Holiday.   [facility] will transfer Cycler and supplies via car delivery to SNF when contract is signed.  DC Friday to SNF.    SAKINA Glass     Case Management   [phone]    6/19/2025  11:37 AM

## 2025-06-19 NOTE — CARE COORDINATION
Call rec'd from [facility] who states the contract has been signed and they are car delivering the cycyler and supplies TODAY.    Call placed to Dr Rucker to inform that she could go today however she states DC will be tomorrow.    Updated Bedside RN.  Updated [facility] at [facility]  Updated Debra, Rehab Manager.    Rearranged transport time for tomorrow  to  at 12 noon.    SAKINA Glass     Case Management   [phone]    6/19/2025  1:35 PM

## 2025-06-19 NOTE — PROGRESS NOTES
Patient returning from the bathroom at change of shift, has not voided today per day RN. Patient bladder scanned for > 462. Straight cath procedure explained to patient and patient agreeable. Patient's cath'd for 400mls rola urine using sterile technique, patient tolerated well. Silvina care provided and patient positioned for comfort in bed. Call light left within reach Kaye Ruiz RN

## 2025-06-19 NOTE — PROGRESS NOTES
until 10-20mg based on clinical stability, then continue dose until Rheum follow-up). She did not feel hydroxychloroquine necessary. May need Cellcept in future.    Possible pericarditis  -Pericardial rub first noted ~6/14.   -TTE (6/16) with pericardial thickening, small (<1cm) pericardial effusion, NO tamponade  -Patient denies chest pain, does have some mild shortness of breath  -ESR, CRP, trop elevated  -Cardiology consulted, appreciate input  -continue colchicine    Hypoxia  -Improving, still intermittently on 1L  -CXR (6/13): small left pleural effusion and atelectasis  -encourage IS  -Baseline COPD may be contributing.     Candida/erosive esophagitis  -continue fluconazole, metoclopramide, pantoprazole    Hypertension --> orthostatic hypotension  -Uncontrolled with hypertensive urgency on admission  -continue carvedilol, furosemide, losartan with hold parameters  -Holding hydralazine and nifedipine  --monitor trend, overall much improved.     ESRD on PD  -Nephrology following, appreciate input  -Avoid nephrotoxins, renally dose meds  -Monitor renal function   ---concern for patient ability to manage manual PD at home alone. Unable to train on cycler until this week per company.  No kink in PD cath based on Xray.     CAD  -continue ASA, Plavix, bb, arb, statin    HLD  -continue rosuvastatin    COPD/asthma  -continue Stiolto Duonebs    GERD  -continue pantoprazole    RLS  -continue ropinirole    Anxiety  -continue prn alprazolam (decreased to 1.5 mg given worsened fatigue)  -Affect remains flat.     Malnutrition  -Minimal po intake during hospital stay  -Dietitian following  -Patient does not want feeding tube  -encouraging po intake, does better with food from outside per family    Bladder: H/o overactive bladder, high risk retention and incontinence  -Monitor PVRs, straight cath prn >400  -continue trospium    Bowel  -High risk constipation  -continue PRN bowel regimen    Pain control  -continue tramadol and  readiness and availability of PD cycler being delivered to facility.    Suzy Rucker MD 6/19/2025, 9:43 AM

## 2025-06-19 NOTE — PROGRESS NOTES
Patient admitted to rehab with acute hypoxic respiratory failure.  A/Ox4. Transfers with walker. Mobility restrictions: weakness . On regular diet, tolerating well, poor appetite. Medications taken whole with thins. On aspirin,plavix,heparin for DVT prophylaxis.  Skin: stage 2 buttocks. Oxygen: RA. LDA: IV, PD Cath. Has been continent of bowel and continent of bladder. LBM 6/19/25. Chair/bed alarms in use and call light in reach. Will monitor for safety. Electronically signed by Karen Morales on 6/19/2025 at 4:25 PM

## 2025-06-19 NOTE — PROGRESS NOTES
Physical Therapy  Facility/Department: [address] REHAB  Rehabilitation Physical Therapy Treatment Note and D/C Summary    NAME: Jonelle Contreras  : 1950 (74 y.o.)  MRN: [id number]  CODE STATUS: Full Code    Date of Service: 25       Restrictions:  Restrictions/Precautions: Fall Risk  Position Activity Restriction  Other Position/Activity Restrictions: PD nightly. dwayne TEDs daily.     SUBJECTIVE  Subjective: Pt met in w/c in gym, reporting no complaints this morning.  Pain: Denies pain.       OBJECTIVE  Cognition  Overall Cognitive Status: Exceptions  Safety Judgement: Decreased awareness of need for safety  Cognition Comment: Slowed processing/responses  Orientation  Overall Orientation Status: Within Normal Limits    Functional Mobility  Bed Mobility  Overall Assistance Level: Modified Independent  Additional Factors: Increased time to complete;Head of bed flat;Without handrails  Bridging  Assistance Level: Modified independent  Roll Left  Assistance Level: Modified independent  Roll Right  Assistance Level: Modified independent  Sit to Supine  Assistance Level: Modified independent  Supine to Sit  Assistance Level: Modified independent  Scooting  Assistance Level: Modified independent  Balance  Sitting Balance: Modified independent   Transfers  Surface: To bed;From bed;Wheelchair  Additional Factors: Increased time to complete  Device: Walker (RW)  Sit to Stand  Assistance Level: Stand by assist  Stand to Sit  Assistance Level: Stand by assist  Bed To/From Chair  Technique: Stand step  Assistance Level: Stand by assist  Car Transfer  Assistance Level: Stand by assist  Skilled Clinical Factors: pt completed correct sequence without cues      Environmental Mobility  Ambulation  Surface: Level surface;Uneven surface  Device: Rolling walker  Distance: 192' over level surfaces, 90' over uneven surfaces  Activity: Within Unit  Additional Factors: Increased time to complete  Assistance Level: Contact guard

## 2025-06-19 NOTE — CARE COORDINATION
SOCIAL WORK DISCHARGE SUMMARY        DATE OF DISCHARGE:   Friday, 6-      LOCATION:   [facility] /SNF       Discharging to Facility/ Agency   Name: [facility]  Address:  [address], [place] [address]   Phone:  [phone]  Fax:  [phone]        Active with [facility]  [phone] #2       TIME:    [facility]   [phone]     12 noon         IMM:   6-    Referrals made:  SNF.    SAKINA Glass     Case Management   [phone]    6/19/2025  1:37 PM

## 2025-06-19 NOTE — PROGRESS NOTES
Occupational Therapy  Facility/Department: [address] REHAB  Rehabilitation Occupational Therapy Daily Treatment Note  Discharge Summary    Date: 25  Patient Name: Jonelle Contreras       Room: D5B-1937/3258-01  MRN: [id number]  Account: [id number]   : 1950  (74 y.o.) Gender: female      Past Medical History:  has a past medical history of acute intermittent porphyria, agoraphobic, Allergic rhinitis, Anxiety, Arthritis, Asthma, CAD (coronary artery disease), Chronic obstructive pulmonary disease, unspecified COPD type ([facility]), ckd 4, collagenous colitis, COPD (chronic obstructive pulmonary disease) ([facility]), Depression, Disease of blood and blood forming organ, GERD (gastroesophageal reflux disease), Heart disease, Hyperlipidemia, Hypertension, Left thyroid nodule, Migraines, Neuropathy, Osteoporosis, PAD (peripheral artery disease), Peripheral vascular disease, post menopausal, Prediabetes, Pulmonary nodules, Restless leg syndrome, STEMI involving oth coronary artery of inferior wall ([facility]), and Urinary incontinence.  Past Surgical History:   has a past surgical history that includes Hysterectomy; Cholecystectomy; Coronary angioplasty with stent (10/2013 [place]); Colonoscopy (2017); Colonoscopy (N/A, 2022); Upper gastrointestinal endoscopy (N/A, 2022); Cardiac surgery; Hysterectomy, vaginal; Upper gastrointestinal endoscopy (2022); Upper gastrointestinal endoscopy (N/A, 2025); and bronchoscopy (N/A, 2025).    Restrictions  Restrictions/Precautions: Fall Risk  Other Position/Activity Restrictions: PD nightly. dwayne TEDs daily.    Subjective  Subjective: Pt met Bs, in bed. Pt agreeable to OT for ADL's. OK to take a shower, per RN. Pt's vitals taken after PD finished and was WNL.Pt on 1L 02  and ok to remove, per RN. Pt's 02 sats 97% on room air.  Restrictions/Precautions: Fall Risk             Objective     Cognition  Overall Cognitive Status: Exceptions  Safety Judgement:

## 2025-06-19 NOTE — PLAN OF CARE
Problem: Discharge Planning  Goal: Discharge to home or other facility with appropriate resources  Outcome: Progressing  Flowsheets (Taken 6/19/2025 0918)  Discharge to home or other facility with appropriate resources:   Identify barriers to discharge with patient and caregiver   Arrange for needed discharge resources and transportation as appropriate   Identify discharge learning needs (meds, wound care, etc)   Refer to discharge planning if patient needs post-hospital services based on physician order or complex needs related to functional status, cognitive ability or social support system     Problem: Safety - Adult  Goal: Free from fall injury  Outcome: Progressing  Flowsheets (Taken 6/19/2025 0946)  Free From Fall Injury: Instruct family/caregiver on patient safety     Problem: Skin/Tissue Integrity  Goal: Skin integrity remains intact  Description: 1.  Monitor for areas of redness and/or skin breakdown2.  Assess vascular access sites hourly3.  Every 4-6 hours minimum:  Change oxygen saturation probe site4.  Every 4-6 hours:  If on nasal continuous positive airway pressure, respiratory therapy assess nares and determine need for appliance change or resting period  Outcome: Progressing  Flowsheets (Taken 6/19/2025 0946)  Skin Integrity Remains Intact: Monitor for areas of redness and/or skin breakdown     Problem: Pain  Goal: Verbalizes/displays adequate comfort level or baseline comfort level  Outcome: Progressing  Flowsheets (Taken 6/19/2025 0918 by Luisana Terrazas RN)  Verbalizes/displays adequate comfort level or baseline comfort level:   Encourage patient to monitor pain and request assistance   Assess pain using appropriate pain scale   Administer analgesics based on type and severity of pain and evaluate response   Implement non-pharmacological measures as appropriate and evaluate response     Problem: Nutrition Deficit:  Goal: Optimize nutritional status  Outcome: Progressing     Problem: ABCDS

## 2025-06-19 NOTE — PROGRESS NOTES
Patient ID: Jonelle Contreras  Referring/ Physician: Suzy Rucker MD      Summary:   Jonelle Contreras is being seen by nephrology for ESRD on peritoneal dialysis. .     Reason for admission: rehab for generalized weakness after hospital admission for pneumonia and hypertensive urgency.       Interval Hx:     Seen in therapy.   Feeling better today.   Still has weakness and fatigue.   Has some SOB, has hoarse voice.     /87  On 1 L NC  No UO recorded.   Wt 56.2  kilos > 55.4   No issues reported with PD   Total UF 1286 cc     K 4.1   Bun 52 cr 7.8  Ca 10     Platelet 682   JUAN LUIS + DSDNA 10   RF and CCP negative     Echo showed Pericardium: There is pericardial thickening. Small (<1 cm) circumferential pericardial effusion present. No indication of cardiac tamponade.       Assessment/Plan:   - continue 2.5 % 10 L 5 exchanges , wt is coming down, still some edema and no hypotension.   - BP acceptable.  - on retacrit 10 k units TIW.   - cont lasix 80 mg BID for now.   - bladder scan q shift , had urine retention.   - started on prednisone for SLE, needs rheumatology for further management. Can start plaquenil. Will have to follow up outpatient since rheum not available in house.     Updated dialysis unit that will need to get cycler and supplies to [facility]          ESRD  Renal failure due to hypertensive kidney disease. Has never had a biopsy because of advanced CKD at time of presentation.   Prior to admission she was doing manual exchanges at home.   This admission she was started on a cycler.   Now she is getting 5 exchanges 2.5 % of 2 L    -daily BM   -daily gent to PD exit site     SHPT  , due to renal origin.   Ca and phos wnl  On renvela 1600 mg TID with meals.     Labile hypertension  Long standing issue with difficulty in controlling her BP due to severe orthostatic hypotension.   Currently aiming for SBP < 180 when supine and > 140 prior to therapy to

## 2025-06-19 NOTE — PROGRESS NOTES
Resting quietly in bed, respirations even/easy. Patient admitted with debility s/p respiratory failure. Is AAOx4 but forgetful at times. Ambulates with a walker x1, has not been OOB overnight. Lungs clear but diminished. On 1L O2, not home dependent. HR regular. Abdomen non tender with active bowel sounds. Poor PO intake. Patient with emesis x1 after medication administration.C/o nausea after and medicated with PRN zofran, medication effective. Receives PD nightly. Patient with slow drain alarm x3 during 3rd cycle requiring repositioning. LBM for patient was on 6/17. Only drank 1/2 of scheduled miralax. Refused PRN lactulose. Patient with c/o generalized pain and medicated per PRN orders. Medication effective. Encouraged to call for all needs, call light remains in reach at all times. Bed alarm active. Kaye Ruiz RN

## 2025-06-20 VITALS
DIASTOLIC BLOOD PRESSURE: 80 MMHG | BODY MASS INDEX: 20.31 KG/M2 | OXYGEN SATURATION: 96 % | HEIGHT: 65 IN | HEART RATE: 80 BPM | WEIGHT: 121.91 LBS | SYSTOLIC BLOOD PRESSURE: 113 MMHG | TEMPERATURE: 98.1 F | RESPIRATION RATE: 16 BRPM

## 2025-06-20 LAB
ANION GAP SERPL CALCULATED.3IONS-SCNC: 15 MMOL/L (ref 3–16)
BUN SERPL-MCNC: 69 MG/DL (ref 7–20)
CALCIUM SERPL-MCNC: 9.9 MG/DL (ref 8.3–10.6)
CHLORIDE SERPL-SCNC: 94 MMOL/L (ref 99–110)
CO2 SERPL-SCNC: 25 MMOL/L (ref 21–32)
CREAT SERPL-MCNC: 7.5 MG/DL (ref 0.6–1.2)
GFR SERPLBLD CREATININE-BSD FMLA CKD-EPI: 5 ML/MIN/{1.73_M2}
GLUCOSE SERPL-MCNC: 145 MG/DL (ref 70–99)
MYELOPEROXIDASE AB SER-ACNC: 0 AU/ML (ref 0–19)
PHOSPHATE SERPL-MCNC: 2.7 MG/DL (ref 2.5–4.9)
POTASSIUM SERPL-SCNC: 4.2 MMOL/L (ref 3.5–5.1)
PROTEINASE3 AB SER-ACNC: 0 AU/ML (ref 0–19)
SODIUM SERPL-SCNC: 134 MMOL/L (ref 136–145)

## 2025-06-20 PROCEDURE — 6370000000 HC RX 637 (ALT 250 FOR IP): Performed by: INTERNAL MEDICINE

## 2025-06-20 PROCEDURE — 6360000002 HC RX W HCPCS: Performed by: PHYSICAL MEDICINE & REHABILITATION

## 2025-06-20 PROCEDURE — 2700000000 HC OXYGEN THERAPY PER DAY

## 2025-06-20 PROCEDURE — 80048 BASIC METABOLIC PNL TOTAL CA: CPT

## 2025-06-20 PROCEDURE — 51798 US URINE CAPACITY MEASURE: CPT

## 2025-06-20 PROCEDURE — 6370000000 HC RX 637 (ALT 250 FOR IP): Performed by: PHYSICAL MEDICINE & REHABILITATION

## 2025-06-20 PROCEDURE — 94761 N-INVAS EAR/PLS OXIMETRY MLT: CPT

## 2025-06-20 PROCEDURE — 84100 ASSAY OF PHOSPHORUS: CPT

## 2025-06-20 PROCEDURE — 36415 COLL VENOUS BLD VENIPUNCTURE: CPT

## 2025-06-20 RX ORDER — ROPINIROLE 1 MG/1
1 TABLET, FILM COATED ORAL NIGHTLY
DISCHARGE
Start: 2025-06-20

## 2025-06-20 RX ADMIN — METOPROLOL SUCCINATE 50 MG: 50 TABLET, EXTENDED RELEASE ORAL at 09:06

## 2025-06-20 RX ADMIN — ANORECTAL OINTMENT: 15.7; .44; 24; 20.6 OINTMENT TOPICAL at 02:32

## 2025-06-20 RX ADMIN — TRAMADOL HYDROCHLORIDE 50 MG: 50 TABLET, COATED ORAL at 11:24

## 2025-06-20 RX ADMIN — TIZANIDINE 4 MG: 4 TABLET ORAL at 02:32

## 2025-06-20 RX ADMIN — SENNOSIDES, DOCUSATE SODIUM 2 TABLET: 50; 8.6 TABLET, FILM COATED ORAL at 09:07

## 2025-06-20 RX ADMIN — POTASSIUM CHLORIDE 20 MEQ: 1500 TABLET, EXTENDED RELEASE ORAL at 09:06

## 2025-06-20 RX ADMIN — ASPIRIN 81 MG 81 MG: 81 TABLET ORAL at 09:06

## 2025-06-20 RX ADMIN — CLOPIDOGREL BISULFATE 75 MG: 75 TABLET, FILM COATED ORAL at 09:05

## 2025-06-20 RX ADMIN — PREDNISONE 40 MG: 20 TABLET ORAL at 09:05

## 2025-06-20 RX ADMIN — HEPARIN SODIUM 5000 UNITS: 5000 INJECTION INTRAVENOUS; SUBCUTANEOUS at 05:25

## 2025-06-20 RX ADMIN — FLUCONAZOLE 200 MG: 100 TABLET ORAL at 09:05

## 2025-06-20 RX ADMIN — METOCLOPRAMIDE 5 MG: 10 TABLET ORAL at 11:24

## 2025-06-20 RX ADMIN — PANTOPRAZOLE SODIUM 40 MG: 40 TABLET, DELAYED RELEASE ORAL at 09:05

## 2025-06-20 RX ADMIN — BISACODYL 5 MG: 5 TABLET, COATED ORAL at 09:05

## 2025-06-20 RX ADMIN — SEVELAMER CARBONATE 1600 MG: 800 TABLET, FILM COATED ORAL at 09:06

## 2025-06-20 RX ADMIN — FUROSEMIDE 80 MG: 40 TABLET ORAL at 09:07

## 2025-06-20 RX ADMIN — METOCLOPRAMIDE 5 MG: 10 TABLET ORAL at 05:25

## 2025-06-20 ASSESSMENT — PAIN - FUNCTIONAL ASSESSMENT: PAIN_FUNCTIONAL_ASSESSMENT: ACTIVITIES ARE NOT PREVENTED

## 2025-06-20 ASSESSMENT — PAIN SCALES - GENERAL
PAINLEVEL_OUTOF10: 4
PAINLEVEL_OUTOF10: 0
PAINLEVEL_OUTOF10: 0

## 2025-06-20 ASSESSMENT — PAIN DESCRIPTION - DESCRIPTORS: DESCRIPTORS: ACHING;DISCOMFORT

## 2025-06-20 ASSESSMENT — PAIN DESCRIPTION - LOCATION: LOCATION: GENERALIZED

## 2025-06-20 NOTE — PROGRESS NOTES
Provision of Current Reconciled Medication List to Subsequent Provider at Discharge  []No, current reconciled medication list not provided to the subsequent provider.  [x]Yes, current reconciled medication list provided to the subsequent provider. (**Select route of transmission below**)   [] Via Electronic Health Record   []Via Health Information Exchange Organization  [] Verbal (e.g. in person, telephone, video conferencing)  [x]Paper-based (e.g. fax, copies, printouts)   []Other Methods (e.g. texting, email, CDs)    Provision of Current Reconciled Medication List to Patient at Discharge  []No, current reconciled medication list not provided to the patient, family and/or caregiver.   [x]Yes, current reconciled medication list provided to the patient, family and/or caregiver.  (**Select route of transmission below**)   [] Via Electronic Health Record (e.g., electronic access to patient portal)   [] Via Health Information Exchange Organization  [] Verbal (e.g. in person, telephone, video conferencing)  [x]Paper-based (e.g. fax, copies, printouts)   []Other Methods (e.g. texting, email, CDs)    High Risk Drug Classes:  Use and Indication    Is taking: Check if the pt is taking any medications by pharmacological classification, not how it is used, in the following classes  Indication noted: If column 1 is checked, check if there is an indication noted for all meds in the drug class Is taking  (check all that apply) Indication noted (check all that apply)   Antipsychotic [] []   Anticoagulant [x] [x]   Antibiotic [] []   Opioid [] []   Antiplatelet [x] [x]   Hypoglycemic (including insulin) [] []   None of the above []     Special Treatments, Procedures, and Programs    Check all of the following treatments, procedures, and programs that apply at discharge. At Discharge (check all that apply)   Cancer Treatments   A1. Chemotherapy []           A2. IV []           A3. Oral []           A10. Other []   B1. Radiation []

## 2025-06-20 NOTE — PROGRESS NOTES
Resting quietly in bed, respirations even/easy. Patient admitted with debility s/p respiratory failure. Is AAOx4 but forgetful at times. Ambulates with a walker x1, has not been OOB overnight. Lungs clear but diminished. On RA to start this shift but placed on 2L per RT as patients O2 sat was low 80s while sleeping. HR regular. Abdomen non tender with active bowel sounds. Poor PO intake but patient did eat HS snack of half cup of applesauce. Receives PD nightly. Patient with slow drain alarm x3 that resolved with repositioning. Patient with c/o generalized pain and medicated per PRN orders. Medication effective. Discharge planned for today, no questions voiced by patient. Encouraged to call for all needs, call light remains in reach at all times. Bed alarm active. Kaye Ruiz RN

## 2025-06-20 NOTE — PROGRESS NOTES
Patient discharged to [facility]; previous report called to nurse. Transferred to [facility] without difficulty. Personal items packed and taken with her.

## 2025-06-20 NOTE — PLAN OF CARE
Problem: Discharge Planning  Goal: Discharge to home or other facility with appropriate resources  6/20/2025 1040 by Eboni Pompa, RN  Outcome: Adequate for Discharge  6/20/2025 0443 by Kaye Ruiz, RN  Outcome: Progressing

## 2025-06-20 NOTE — CARE COORDINATION
Case Management Discharge Note          Date / Time of Note: 6/20/2025 10:37 AM                  Patient Name: Jonelle Contreras   YOB: 1950  Diagnosis: Debility [R53.81]   Date / Time: 5/30/2025  3:58 PM    Financial:  Payor: [place] [employer] MEDICARE / Plan: [place] MEDICARE / Product Type: *No Product type* /      Pharmacy:    [facility] [id number] - [place], [place] - [address] - P [phone] - F [phone]  [address] [facility] [address]  Phone: [phone] Fax: [phone]    [facility] - [place] - [facility] - P [phone] - F [phone]  [facility]  [place] [address]  Phone: [phone] Fax: [phone]      DISCHARGE Disposition: Skilled Nursing Facility (SNF)    Nursing Facility:   Discharging to Facility/ Agency   Name: [facility]  Address:  [address] [place] [address]   Phone:  [phone]  Fax:  [phone]    LOC at discharge: Skilled Nursing  MARGARET Completed: Yes             NURSING REPORT NUMBER: [phone]               NURSING FAX NUMBER: [phone]    Notification completed in HENS/PAS?:  Yes : CM has completed HENS online through secure website for SNF admission at [facility].   Document ID #: [id number]      Dialysis:  Dialysis patient: Yes    Dialysis Center:  [facility]  [phone] #2      Transportation:  Transportation PLAN for discharge: EMS transportation   Mode of Transport: Ambulance stretcher - BLS  Reason for medical transport: Other: Generalized weakness, fall risk  Name of Transport Company: [employer]  Phone: [phone]  Time of Transport: Noon    Transport form completed: Yes    IMM Completed:   Yes, Case management has presented and reviewed IMM letter #2.   IMM Letter given to Patient/Family/Significant other/Guardian/POA/by:: presented to pateint who verbalized understanding by tessy

## 2025-06-20 NOTE — DISCHARGE SUMMARY
Physical Medicine & Rehabilitation  Discharge Summary     Patient Identification:  Jonelle Contreras  : 1950  Admit date: 2025  Discharge date:  2025  Attending provider: Suzy Rucker MD        Primary care provider: Candido Bentley MD     Discharge Diagnoses:   Patient Active Problem List   Diagnosis    Anxiety    Panic disorder    Palpitations    IBS (irritable bowel syndrome)    Depression    Colitis    PAD (peripheral artery disease)    Atherosclerosis of native coronary artery of native heart without angina pectoris    Uncontrolled hypertension    Arthritis    Asthma    Atrial fibrillation (HCC)    Gastroesophageal reflux disease    Seasonal allergic rhinitis    Urinary incontinence    Stage 4 chronic kidney disease (HCC)    Left thyroid nodule    Tobacco abuse    ST elevation myocardial infarction involving right coronary artery (HCC)    Inferior MI (HCC)    Chronic kidney disease    Dyslipidemia    Encounter for tobacco use cessation counseling    History of intermittent claudication    Leg pain    Hyperlipidemia LDL goal <70    post menopausal    Restless leg syndrome    Hypertensive urgency    Chest pain    Blurred vision, bilateral    Hypertensive emergency    Chronic obstructive pulmonary disease, unspecified COPD type (HCC)    End stage renal disease (HCC)    Hypotension    Hyperkalemia    Mucus plugging of bronchi    Shortness of breath    Acute hypoxic respiratory failure (HCC)    Gastroparesis    Candida esophagitis (HCC)    Pneumonia due to Haemophilus influenzae    ESRD on peritoneal dialysis (HCC)    Debility    Severe malnutrition       History of Present Illness/Acute Hospital Course:  Jonelle Contreras is a 74-year-old female who came to hospital with hyperkalemia and hypertension, found to be in a hypertensive emergency. Patient has ESRD and is on PD. Patient also came in with a cough and underwent a bronc on 2025 for bilateral mucous plugs. Patient was treated for  XL     spironolactone 50 MG tablet  Commonly known as: ALDACTONE               Where to Get Your Medications        You can get these medications from any pharmacy    Bring a paper prescription for each of these medications  ALPRAZolam 1 MG tablet  traMADol 50 MG tablet       Information about where to get these medications is not yet available    Ask your nurse or doctor about these medications  bisacodyl 5 MG EC tablet  furosemide 80 MG tablet  metoprolol succinate 50 MG extended release tablet  potassium chloride 20 MEQ extended release tablet  predniSONE 20 MG tablet  sennosides-docusate sodium 8.6-50 MG tablet  sevelamer 800 MG tablet           I spent over 35 minutes on this discharge encounter between counseling, coordination of care, and medication reconciliation.    Discharge order placed in advance to facilitate patient’s discharge needs.      Suzy Rucker MD

## 2025-06-20 NOTE — PROGRESS NOTES
Patient admitted to rehab with acute hypoxic respiratory failure w/ mucus plugging.  A/Ox4. Transfers with walker. Mobility restrictions: none. On regular diet, has poor appetite. Medications taken whole in thins. On ASA; Plavix; Heparin for DVT prophylaxis.  Skin: stage 1 on buttocks; scattered bruising. Oxygen: 2 LPM/NC. LDA: none. Has been continent of bowel and continent of bladder. LBM 6/18. Chair/bed alarms in use and call light in reach.

## 2025-06-20 NOTE — PLAN OF CARE
Problem: Skin/Tissue Integrity  Goal: Skin integrity remains intact  Description: 1.  Monitor for areas of redness and/or skin breakdown2.  Assess vascular access sites hourly3.  Every 4-6 hours minimum:  Change oxygen saturation probe site4.  Every 4-6 hours:  If on nasal continuous positive airway pressure, respiratory therapy assess nares and determine need for appliance change or resting period  6/20/2025 0443 by Kaye Ruiz, RN  Outcome: Progressing  Flowsheets (Taken 6/20/2025 0443)  Skin Integrity Remains Intact: Monitor for areas of redness and/or skin breakdown  Note: Stage 2 to coccyx. Turned and repositioned specialty mattress in place      Problem: Pain  Goal: Verbalizes/displays adequate comfort level or baseline comfort level  6/20/2025 0443 by Kaye Ruiz, RN  Outcome: Progressing  Flowsheets (Taken 6/20/2025 0443)  Verbalizes/displays adequate comfort level or baseline comfort level:   Administer analgesics based on type and severity of pain and evaluate response   Consider cultural and social influences on pain and pain management   Encourage patient to monitor pain and request assistance  Note: Able to rate pain using a 1-10 scale, medicated per prn orders, see MAR. Able to verbalize a reduction in pain and/or able to fall asleep and remain asleep without any s/s of pain

## 2025-06-21 LAB
ANTINUCLEAR AB INTERPRETIVE COMMENT: NORMAL
CYTOPLASMIC AB PATTERN SER IF-IMP: ABNORMAL
CYTOPLASMIC PATTERN TITER: ABNORMAL
NUCLEAR IGG SER QL IF: NORMAL

## 2025-06-23 ENCOUNTER — TELEPHONE (OUTPATIENT)
Dept: INTERNAL MEDICINE CLINIC | Age: 75
End: 2025-06-23

## 2025-06-23 LAB
FUNGUS SPEC CULT: NORMAL
LOEFFLER MB STN SPEC: NORMAL

## 2025-06-23 NOTE — TELEPHONE ENCOUNTER
Try calling pt regarding a hospital f/u appointment, unable to leave VM due to not having it set up

## 2025-07-03 ENCOUNTER — OFFICE VISIT (OUTPATIENT)
Dept: INTERNAL MEDICINE CLINIC | Age: 75
End: 2025-07-03

## 2025-07-03 VITALS
SYSTOLIC BLOOD PRESSURE: 138 MMHG | OXYGEN SATURATION: 97 % | DIASTOLIC BLOOD PRESSURE: 70 MMHG | BODY MASS INDEX: 21.97 KG/M2 | WEIGHT: 132 LBS | HEART RATE: 80 BPM

## 2025-07-03 DIAGNOSIS — Z91.81 AT HIGH RISK FOR FALLS: ICD-10-CM

## 2025-07-03 DIAGNOSIS — N18.4 CKD (CHRONIC KIDNEY DISEASE), STAGE IV (HCC): Primary | ICD-10-CM

## 2025-07-03 DIAGNOSIS — Z09 HOSPITAL DISCHARGE FOLLOW-UP: ICD-10-CM

## 2025-07-03 LAB
ANION GAP SERPL CALCULATED.3IONS-SCNC: 13 MMOL/L (ref 3–16)
BUN SERPL-MCNC: 50 MG/DL (ref 7–20)
CALCIUM SERPL-MCNC: 7.9 MG/DL (ref 8.3–10.6)
CHLORIDE SERPL-SCNC: 98 MMOL/L (ref 99–110)
CO2 SERPL-SCNC: 26 MMOL/L (ref 21–32)
CREAT SERPL-MCNC: 5.6 MG/DL (ref 0.6–1.2)
GFR SERPLBLD CREATININE-BSD FMLA CKD-EPI: 8 ML/MIN/{1.73_M2}
GLUCOSE SERPL-MCNC: 236 MG/DL (ref 70–99)
NT-PROBNP SERPL-MCNC: ABNORMAL PG/ML (ref 0–124)
POTASSIUM SERPL-SCNC: 3.7 MMOL/L (ref 3.5–5.1)
SODIUM SERPL-SCNC: 137 MMOL/L (ref 136–145)

## 2025-07-03 NOTE — PROGRESS NOTES
PUFFS BY MOUTH EVERY 6 HOURS AS NEEDED FOR WHEEZING 8.5 g 5    aspirin 81 MG chewable tablet Take 1 tablet by mouth daily 30 tablet 3        Medications patient taking as of now reconciled against medications ordered at time of hospital discharge: Yes    A comprehensive review of systems was negative except for: Constitutional: positive for fatigue  Hematologic/lymphatic: positive for BLE edema  Musculoskeletal: positive for muscle weakness- generalized     Objective:       Vitals:    07/03/25 1503   BP: 138/70   Pulse: 80   SpO2: 97%   Weight: 59.9 kg (132 lb)      Body mass index is 21.97 kg/m².       General Appearance: alert and oriented to person, place and time, well-developed and well-nourished, in no acute distress  Neck: neck supple and non tender without mass, no thyromegaly or thyroid nodules, no cervical lymphadenopathy   Pulmonary/Chest: clear to auscultation bilaterally- no wheezes, rales or rhonchi, normal air movement, no respiratory distress  Cardiovascular: normal rate, normal S1 and S2, intact distal pulses, and no JVD  Musculoskeletal: normal range of motion, no joint swelling, deformity or tenderness  Neurologic: speech normal, gait abnormal- use walker to ambulate, muscle weakness present- generalized , and no new findings    Plan of care:    - Recommended nephro shakes at home for nutritional support.   - Has follow up scheduled with nephrology on 7/23 with Dr. Jasmine.   - Receiving PD through [facility] [place] [facility] nurse.   - Continue weaning prednisone. Encouraged to call for patient follow up with Dr. Hilliard- Rheumatology.   - Will repeat CMP and BNP with peripheral edema on exam. +1 pitting. No JVD or respiratory distress. Compliant with Lasix 80 mg. Provided with ace wraps to apply to both legs at home for compression as she cannot apply the compression stockings herself. Will adjust lasix based on BNP. Encouraged to elevated legs and avoid excessive sodium intake.     Home health care-

## 2025-07-05 ENCOUNTER — TELEPHONE (OUTPATIENT)
Dept: FAMILY MEDICINE CLINIC | Age: 75
End: 2025-07-05

## 2025-07-05 NOTE — TELEPHONE ENCOUNTER
Received a call regarding her creatinine of 5.4patient has a diagnosis of chronic kidney disease has a nephrologist as well

## 2025-07-05 NOTE — TELEPHONE ENCOUNTER
Received a call from the lab critical creatinine of 5.4 however the patient has a diagnosis of chronic kidney disease as a nephrologist

## 2025-07-06 ENCOUNTER — RESULTS FOLLOW-UP (OUTPATIENT)
Dept: INTERNAL MEDICINE CLINIC | Age: 75
End: 2025-07-06

## 2025-07-06 DIAGNOSIS — I25.10 CORONARY ARTERY DISEASE INVOLVING NATIVE CORONARY ARTERY OF NATIVE HEART WITHOUT ANGINA PECTORIS: ICD-10-CM

## 2025-07-10 RX ORDER — PREDNISONE 20 MG/1
TABLET ORAL
Qty: 65 TABLET | Refills: 0 | OUTPATIENT
Start: 2025-07-10 | End: 2025-08-29

## 2025-07-10 RX ORDER — CLOPIDOGREL BISULFATE 75 MG/1
75 TABLET ORAL DAILY
Qty: 90 TABLET | Refills: 3 | OUTPATIENT
Start: 2025-07-10

## 2025-07-22 ENCOUNTER — TELEPHONE (OUTPATIENT)
Dept: INTERNAL MEDICINE CLINIC | Age: 75
End: 2025-07-22

## 2025-07-22 DIAGNOSIS — M32.11 SYSTEMIC LUPUS ERYTHEMATOSUS WITH ENDOCARDITIS, UNSPECIFIED SLE TYPE (HCC): Primary | ICD-10-CM

## 2025-07-22 NOTE — TELEPHONE ENCOUNTER
Pt called, can you send a new referral for rheumatology to [facility], Dr. William Ac    He is located closer to her home    Please advise    Thank you    Fax: [phone]

## 2025-08-01 ENCOUNTER — APPOINTMENT (OUTPATIENT)
Dept: GENERAL RADIOLOGY | Age: 75
DRG: 871 | End: 2025-08-01
Payer: MEDICARE

## 2025-08-01 ENCOUNTER — APPOINTMENT (OUTPATIENT)
Dept: CT IMAGING | Age: 75
DRG: 871 | End: 2025-08-01
Payer: MEDICARE

## 2025-08-01 ENCOUNTER — HOSPITAL ENCOUNTER (INPATIENT)
Age: 75
LOS: 5 days | Discharge: HOME HEALTH | DRG: 871 | End: 2025-08-06
Attending: STUDENT IN AN ORGANIZED HEALTH CARE EDUCATION/TRAINING PROGRAM | Admitting: INTERNAL MEDICINE
Payer: MEDICARE

## 2025-08-01 PROBLEM — A41.9 SEPSIS (HCC): Status: ACTIVE | Noted: 2025-08-01

## 2025-08-01 LAB
ABO/RH: NORMAL
ALBUMIN SERPL-MCNC: 3.4 G/DL (ref 3.4–5)
ALBUMIN/GLOB SERPL: 1 {RATIO} (ref 1.1–2.2)
ALP SERPL-CCNC: 89 U/L (ref 40–129)
ALT SERPL-CCNC: 20 U/L (ref 10–40)
ANION GAP SERPL CALCULATED.3IONS-SCNC: 19 MMOL/L (ref 3–16)
ANTIBODY SCREEN: NORMAL
APPEARANCE FLUID: NORMAL
AST SERPL-CCNC: 33 U/L (ref 15–37)
BASE EXCESS BLDV CALC-SCNC: 2 MMOL/L
BASOPHILS # BLD: 0 K/UL (ref 0–0.2)
BASOPHILS NFR BLD: 0.3 %
BDY FLUID QUALITY: NORMAL
BILIRUB DIRECT SERPL-MCNC: <0.1 MG/DL (ref 0–0.3)
BILIRUB INDIRECT SERPL-MCNC: 0.2 MG/DL (ref 0–1)
BILIRUB SERPL-MCNC: 0.3 MG/DL (ref 0–1)
BUN SERPL-MCNC: 45 MG/DL (ref 7–20)
C DIFF TOX A+B STL QL IA: NORMAL
CALCIUM SERPL-MCNC: 9.9 MG/DL (ref 8.3–10.6)
CELL COUNT FLUID TYPE: NORMAL
CHLORIDE SERPL-SCNC: 96 MMOL/L (ref 99–110)
CK SERPL-CCNC: 110 U/L (ref 26–192)
CO2 BLDV-SCNC: 31 MMOL/L
CO2 SERPL-SCNC: 24 MMOL/L (ref 21–32)
COHGB MFR BLDV: 1 %
COLOR FLUID: COLORLESS
CREAT SERPL-MCNC: 7.8 MG/DL (ref 0.6–1.2)
DEPRECATED RDW RBC AUTO: 15.5 % (ref 12.4–15.4)
EKG ATRIAL RATE: 82 BPM
EKG ATRIAL RATE: 88 BPM
EKG DIAGNOSIS: NORMAL
EKG DIAGNOSIS: NORMAL
EKG P AXIS: 14 DEGREES
EKG P AXIS: 8 DEGREES
EKG P-R INTERVAL: 116 MS
EKG P-R INTERVAL: 118 MS
EKG Q-T INTERVAL: 382 MS
EKG Q-T INTERVAL: 388 MS
EKG QRS DURATION: 72 MS
EKG QRS DURATION: 76 MS
EKG QTC CALCULATION (BAZETT): 453 MS
EKG QTC CALCULATION (BAZETT): 462 MS
EKG R AXIS: -4 DEGREES
EKG R AXIS: 0 DEGREES
EKG T AXIS: 159 DEGREES
EKG T AXIS: 189 DEGREES
EKG VENTRICULAR RATE: 82 BPM
EKG VENTRICULAR RATE: 88 BPM
EOSINOPHIL # BLD: 0.1 K/UL (ref 0–0.6)
EOSINOPHIL NFR BLD: 1.5 %
EOSINOPHIL NFR FLD MANUAL: 1 %
GFR SERPLBLD CREATININE-BSD FMLA CKD-EPI: 5 ML/MIN/{1.73_M2}
GLUCOSE SERPL-MCNC: 162 MG/DL (ref 70–99)
HBV SURFACE AG SERPL QL IA: NORMAL
HCO3 BLDV-SCNC: 29 MMOL/L (ref 23–29)
HCT VFR BLD AUTO: 34.6 % (ref 36–48)
HGB BLD-MCNC: 11.5 G/DL (ref 12–16)
INR PPP: 1.18 (ref 0.86–1.14)
LACTATE BLDV-SCNC: 1.3 MMOL/L (ref 0.4–1.9)
LACTATE BLDV-SCNC: 1.7 MMOL/L (ref 0.4–1.9)
LACTATE BLDV-SCNC: 1.8 MMOL/L (ref 0.4–2)
LACTATE BLDV-SCNC: 4.2 MMOL/L (ref 0.4–1.9)
LACTATE BLDV-SCNC: 4.3 MMOL/L (ref 0.4–1.9)
LACTOFERRIN STL QL IA: ABNORMAL
LIPASE SERPL-CCNC: 39 U/L (ref 13–60)
LYMPHOCYTES # BLD: 1.2 K/UL (ref 1–5.1)
LYMPHOCYTES NFR BLD: 12.2 %
LYMPHOCYTES NFR FLD: 5 %
MACROPHAGES # FLD: 18 %
MAGNESIUM SERPL-MCNC: 1.9 MG/DL (ref 1.8–2.4)
MCH RBC QN AUTO: 29.2 PG (ref 26–34)
MCHC RBC AUTO-ENTMCNC: 33.1 G/DL (ref 31–36)
MCV RBC AUTO: 88.1 FL (ref 80–100)
METHGB MFR BLDV: 1.1 %
MONOCYTES # BLD: 0.6 K/UL (ref 0–1.3)
MONOCYTES NFR BLD: 6.4 %
NEUTROPHIL, FLUID: 76 %
NEUTROPHILS # BLD: 7.7 K/UL (ref 1.7–7.7)
NEUTROPHILS NFR BLD: 79.6 %
NUC CELL # FLD: 441 /CUMM
O2 THERAPY: ABNORMAL
PCO2 BLDV: 56.8 MMHG (ref 40–50)
PH BLDV: 7.32 [PH] (ref 7.35–7.45)
PLATELET # BLD AUTO: 395 K/UL (ref 135–450)
PMV BLD AUTO: 8.1 FL (ref 5–10.5)
PO2 BLDV: <30 MMHG
POTASSIUM SERPL-SCNC: 5.4 MMOL/L (ref 3.5–5.1)
PROT SERPL-MCNC: 6.9 G/DL (ref 6.4–8.2)
PROTHROMBIN TIME: 15.2 SEC (ref 12.1–14.9)
RBC # BLD AUTO: 3.93 M/UL (ref 4–5.2)
RBC FLUID: <2000 /CUMM
SAO2 % BLDV: 21 %
SODIUM SERPL-SCNC: 139 MMOL/L (ref 136–145)
TOTAL CELLS COUNTED FLD: 100
TROPONIN, HIGH SENSITIVITY: 157 NG/L (ref 0–14)
TROPONIN, HIGH SENSITIVITY: 264 NG/L (ref 0–14)
WBC # BLD AUTO: 9.7 K/UL (ref 4–11)

## 2025-08-01 PROCEDURE — 87449 NOS EACH ORGANISM AG IA: CPT

## 2025-08-01 PROCEDURE — 83735 ASSAY OF MAGNESIUM: CPT

## 2025-08-01 PROCEDURE — 74176 CT ABD & PELVIS W/O CONTRAST: CPT

## 2025-08-01 PROCEDURE — 6360000002 HC RX W HCPCS: Performed by: STUDENT IN AN ORGANIZED HEALTH CARE EDUCATION/TRAINING PROGRAM

## 2025-08-01 PROCEDURE — 2580000003 HC RX 258: Performed by: STUDENT IN AN ORGANIZED HEALTH CARE EDUCATION/TRAINING PROGRAM

## 2025-08-01 PROCEDURE — 85610 PROTHROMBIN TIME: CPT

## 2025-08-01 PROCEDURE — 84484 ASSAY OF TROPONIN QUANT: CPT

## 2025-08-01 PROCEDURE — 82803 BLOOD GASES ANY COMBINATION: CPT

## 2025-08-01 PROCEDURE — 87340 HEPATITIS B SURFACE AG IA: CPT

## 2025-08-01 PROCEDURE — 87077 CULTURE AEROBIC IDENTIFY: CPT

## 2025-08-01 PROCEDURE — 85025 COMPLETE CBC W/AUTO DIFF WBC: CPT

## 2025-08-01 PROCEDURE — 93005 ELECTROCARDIOGRAM TRACING: CPT | Performed by: STUDENT IN AN ORGANIZED HEALTH CARE EDUCATION/TRAINING PROGRAM

## 2025-08-01 PROCEDURE — 96365 THER/PROPH/DIAG IV INF INIT: CPT

## 2025-08-01 PROCEDURE — 87506 IADNA-DNA/RNA PROBE TQ 6-11: CPT

## 2025-08-01 PROCEDURE — 83605 ASSAY OF LACTIC ACID: CPT

## 2025-08-01 PROCEDURE — 86850 RBC ANTIBODY SCREEN: CPT

## 2025-08-01 PROCEDURE — 87186 SC STD MICRODIL/AGAR DIL: CPT

## 2025-08-01 PROCEDURE — 87324 CLOSTRIDIUM AG IA: CPT

## 2025-08-01 PROCEDURE — 99285 EMERGENCY DEPT VISIT HI MDM: CPT

## 2025-08-01 PROCEDURE — 36415 COLL VENOUS BLD VENIPUNCTURE: CPT

## 2025-08-01 PROCEDURE — 6360000002 HC RX W HCPCS: Performed by: INTERNAL MEDICINE

## 2025-08-01 PROCEDURE — 80053 COMPREHEN METABOLIC PANEL: CPT

## 2025-08-01 PROCEDURE — 96367 TX/PROPH/DG ADDL SEQ IV INF: CPT

## 2025-08-01 PROCEDURE — 83690 ASSAY OF LIPASE: CPT

## 2025-08-01 PROCEDURE — 83630 LACTOFERRIN FECAL (QUAL): CPT

## 2025-08-01 PROCEDURE — 94761 N-INVAS EAR/PLS OXIMETRY MLT: CPT

## 2025-08-01 PROCEDURE — 89051 BODY FLUID CELL COUNT: CPT

## 2025-08-01 PROCEDURE — 82550 ASSAY OF CK (CPK): CPT

## 2025-08-01 PROCEDURE — 99223 1ST HOSP IP/OBS HIGH 75: CPT | Performed by: INTERNAL MEDICINE

## 2025-08-01 PROCEDURE — 71045 X-RAY EXAM CHEST 1 VIEW: CPT

## 2025-08-01 PROCEDURE — 87205 SMEAR GRAM STAIN: CPT

## 2025-08-01 PROCEDURE — 99233 SBSQ HOSP IP/OBS HIGH 50: CPT | Performed by: INTERNAL MEDICINE

## 2025-08-01 PROCEDURE — 2000000000 HC ICU R&B

## 2025-08-01 PROCEDURE — 6370000000 HC RX 637 (ALT 250 FOR IP): Performed by: NURSE PRACTITIONER

## 2025-08-01 PROCEDURE — 87070 CULTURE OTHR SPECIMN AEROBIC: CPT

## 2025-08-01 PROCEDURE — 93010 ELECTROCARDIOGRAM REPORT: CPT | Performed by: INTERNAL MEDICINE

## 2025-08-01 PROCEDURE — A4722 DIALYS SOL FLD VOL > 1999CC: HCPCS | Performed by: INTERNAL MEDICINE

## 2025-08-01 PROCEDURE — 87040 BLOOD CULTURE FOR BACTERIA: CPT

## 2025-08-01 PROCEDURE — 86900 BLOOD TYPING SEROLOGIC ABO: CPT

## 2025-08-01 PROCEDURE — 72170 X-RAY EXAM OF PELVIS: CPT

## 2025-08-01 PROCEDURE — 86901 BLOOD TYPING SEROLOGIC RH(D): CPT

## 2025-08-01 PROCEDURE — 70450 CT HEAD/BRAIN W/O DYE: CPT

## 2025-08-01 RX ORDER — 0.9 % SODIUM CHLORIDE 0.9 %
30 INTRAVENOUS SOLUTION INTRAVENOUS ONCE
Status: COMPLETED | OUTPATIENT
Start: 2025-08-01 | End: 2025-08-01

## 2025-08-01 RX ORDER — 0.9 % SODIUM CHLORIDE 0.9 %
500 INTRAVENOUS SOLUTION INTRAVENOUS ONCE
Status: DISCONTINUED | OUTPATIENT
Start: 2025-08-01 | End: 2025-08-01

## 2025-08-01 RX ORDER — ALBUTEROL SULFATE 90 UG/1
2 INHALANT RESPIRATORY (INHALATION) EVERY 6 HOURS PRN
Status: DISCONTINUED | OUTPATIENT
Start: 2025-08-01 | End: 2025-08-06 | Stop reason: HOSPADM

## 2025-08-01 RX ORDER — SODIUM CHLORIDE 9 MG/ML
INJECTION, SOLUTION INTRAVENOUS CONTINUOUS
Status: DISCONTINUED | OUTPATIENT
Start: 2025-08-01 | End: 2025-08-01

## 2025-08-01 RX ORDER — SODIUM CHLORIDE 0.9 % (FLUSH) 0.9 %
5-40 SYRINGE (ML) INJECTION EVERY 12 HOURS SCHEDULED
Status: DISCONTINUED | OUTPATIENT
Start: 2025-08-01 | End: 2025-08-06 | Stop reason: HOSPADM

## 2025-08-01 RX ORDER — SODIUM CHLORIDE 9 MG/ML
INJECTION, SOLUTION INTRAVENOUS PRN
Status: DISCONTINUED | OUTPATIENT
Start: 2025-08-01 | End: 2025-08-06 | Stop reason: HOSPADM

## 2025-08-01 RX ORDER — SODIUM CHLORIDE, SODIUM LACTATE, CALCIUM CHLORIDE, MAGNESIUM CHLORIDE AND DEXTROSE 1.5; 538; 448; 18.3; 5.08 G/100ML; MG/100ML; MG/100ML; MG/100ML; MG/100ML
6000 INJECTION, SOLUTION INTRAPERITONEAL NIGHTLY
Status: DISCONTINUED | OUTPATIENT
Start: 2025-08-01 | End: 2025-08-03

## 2025-08-01 RX ORDER — ACETAMINOPHEN 325 MG/1
650 TABLET ORAL EVERY 6 HOURS PRN
Status: DISCONTINUED | OUTPATIENT
Start: 2025-08-01 | End: 2025-08-06 | Stop reason: HOSPADM

## 2025-08-01 RX ORDER — LANOLIN ALCOHOL/MO/W.PET/CERES
400 CREAM (GRAM) TOPICAL NIGHTLY
COMMUNITY

## 2025-08-01 RX ORDER — SENNA AND DOCUSATE SODIUM 50; 8.6 MG/1; MG/1
2 TABLET, FILM COATED ORAL DAILY
Status: DISCONTINUED | OUTPATIENT
Start: 2025-08-01 | End: 2025-08-06 | Stop reason: HOSPADM

## 2025-08-01 RX ORDER — SODIUM CHLORIDE 9 MG/ML
INJECTION, SOLUTION INTRAVENOUS CONTINUOUS
Status: DISCONTINUED | OUTPATIENT
Start: 2025-08-01 | End: 2025-08-01 | Stop reason: DRUGHIGH

## 2025-08-01 RX ORDER — PANTOPRAZOLE SODIUM 40 MG/1
40 TABLET, DELAYED RELEASE ORAL
Status: DISCONTINUED | OUTPATIENT
Start: 2025-08-02 | End: 2025-08-06 | Stop reason: HOSPADM

## 2025-08-01 RX ORDER — FUROSEMIDE 40 MG/1
100 TABLET ORAL 2 TIMES DAILY
COMMUNITY
End: 2025-08-01

## 2025-08-01 RX ORDER — TORSEMIDE 100 MG/1
100 TABLET ORAL DAILY
COMMUNITY

## 2025-08-01 RX ORDER — OXYBUTYNIN CHLORIDE 5 MG/1
5 TABLET, EXTENDED RELEASE ORAL EVERY EVENING
COMMUNITY

## 2025-08-01 RX ORDER — ALPRAZOLAM 0.5 MG
2 TABLET ORAL NIGHTLY
Status: DISCONTINUED | OUTPATIENT
Start: 2025-08-01 | End: 2025-08-01

## 2025-08-01 RX ORDER — METRONIDAZOLE 500 MG/100ML
500 INJECTION, SOLUTION INTRAVENOUS EVERY 8 HOURS
Status: DISCONTINUED | OUTPATIENT
Start: 2025-08-01 | End: 2025-08-04

## 2025-08-01 RX ORDER — ACETAMINOPHEN 650 MG/1
650 SUPPOSITORY RECTAL EVERY 6 HOURS PRN
Status: DISCONTINUED | OUTPATIENT
Start: 2025-08-01 | End: 2025-08-06 | Stop reason: HOSPADM

## 2025-08-01 RX ORDER — SODIUM CHLORIDE 0.9 % (FLUSH) 0.9 %
5-40 SYRINGE (ML) INJECTION PRN
Status: DISCONTINUED | OUTPATIENT
Start: 2025-08-01 | End: 2025-08-06 | Stop reason: HOSPADM

## 2025-08-01 RX ORDER — ALPRAZOLAM 2 MG/1
2 TABLET ORAL NIGHTLY
COMMUNITY

## 2025-08-01 RX ORDER — GENTAMICIN SULFATE 1 MG/G
OINTMENT TOPICAL DAILY PRN
Status: DISCONTINUED | OUTPATIENT
Start: 2025-08-01 | End: 2025-08-06 | Stop reason: HOSPADM

## 2025-08-01 RX ORDER — ALPRAZOLAM 0.5 MG
2 TABLET ORAL NIGHTLY PRN
Status: DISCONTINUED | OUTPATIENT
Start: 2025-08-01 | End: 2025-08-06 | Stop reason: HOSPADM

## 2025-08-01 RX ORDER — HEPARIN SODIUM 5000 [USP'U]/ML
5000 INJECTION, SOLUTION INTRAVENOUS; SUBCUTANEOUS EVERY 8 HOURS SCHEDULED
Status: DISCONTINUED | OUTPATIENT
Start: 2025-08-01 | End: 2025-08-06 | Stop reason: HOSPADM

## 2025-08-01 RX ORDER — GENTAMICIN SULFATE 1 MG/G
CREAM TOPICAL DAILY PRN
Status: DISCONTINUED | OUTPATIENT
Start: 2025-08-01 | End: 2025-08-01 | Stop reason: RX

## 2025-08-01 RX ADMIN — METRONIDAZOLE 500 MG: 500 INJECTION, SOLUTION INTRAVENOUS at 20:52

## 2025-08-01 RX ADMIN — HEPARIN SODIUM 5000 UNITS: 5000 INJECTION INTRAVENOUS; SUBCUTANEOUS at 21:12

## 2025-08-01 RX ADMIN — HEPARIN SODIUM 5000 UNITS: 5000 INJECTION INTRAVENOUS; SUBCUTANEOUS at 14:33

## 2025-08-01 RX ADMIN — ALPRAZOLAM 2 MG: 0.5 TABLET ORAL at 21:12

## 2025-08-01 RX ADMIN — CEFEPIME 2000 MG: 2 INJECTION, POWDER, FOR SOLUTION INTRAVENOUS at 08:38

## 2025-08-01 RX ADMIN — SODIUM CHLORIDE 1746 ML: 0.9 INJECTION, SOLUTION INTRAVENOUS at 08:20

## 2025-08-01 RX ADMIN — SODIUM CHLORIDE 500 ML: 0.9 INJECTION, SOLUTION INTRAVENOUS at 08:21

## 2025-08-01 RX ADMIN — SODIUM CHLORIDE, SODIUM LACTATE, CALCIUM CHLORIDE, MAGNESIUM CHLORIDE AND DEXTROSE 6000 ML: 1.5; 538; 448; 18.3; 5.08 INJECTION, SOLUTION INTRAPERITONEAL at 20:50

## 2025-08-01 RX ADMIN — SODIUM CHLORIDE: 0.9 INJECTION, SOLUTION INTRAVENOUS at 09:27

## 2025-08-01 RX ADMIN — METRONIDAZOLE 500 MG: 500 INJECTION, SOLUTION INTRAVENOUS at 12:46

## 2025-08-01 RX ADMIN — VANCOMYCIN HYDROCHLORIDE 1000 MG: 1 INJECTION, POWDER, LYOPHILIZED, FOR SOLUTION INTRAVENOUS at 09:11

## 2025-08-01 ASSESSMENT — LIFESTYLE VARIABLES: HOW OFTEN DO YOU HAVE A DRINK CONTAINING ALCOHOL: NEVER

## 2025-08-01 ASSESSMENT — PAIN SCALES - GENERAL
PAINLEVEL_OUTOF10: 0
PAINLEVEL_OUTOF10: 0

## 2025-08-02 PROBLEM — R19.7 DIARRHEA: Status: ACTIVE | Noted: 2025-08-02

## 2025-08-02 PROBLEM — K65.9 PERITONITIS (HCC): Status: ACTIVE | Noted: 2025-08-02

## 2025-08-02 LAB
ANION GAP SERPL CALCULATED.3IONS-SCNC: 12 MMOL/L (ref 3–16)
BASOPHILS # BLD: 0 K/UL (ref 0–0.2)
BASOPHILS NFR BLD: 0.6 %
BUN SERPL-MCNC: 36 MG/DL (ref 7–20)
CALCIUM SERPL-MCNC: 8.5 MG/DL (ref 8.3–10.6)
CHLORIDE SERPL-SCNC: 103 MMOL/L (ref 99–110)
CO2 SERPL-SCNC: 25 MMOL/L (ref 21–32)
CREAT SERPL-MCNC: 6.9 MG/DL (ref 0.6–1.2)
DEPRECATED RDW RBC AUTO: 15.2 % (ref 12.4–15.4)
EOSINOPHIL # BLD: 0.3 K/UL (ref 0–0.6)
EOSINOPHIL NFR BLD: 4.3 %
GFR SERPLBLD CREATININE-BSD FMLA CKD-EPI: 6 ML/MIN/{1.73_M2}
GLUCOSE SERPL-MCNC: 89 MG/DL (ref 70–99)
HCT VFR BLD AUTO: 27.9 % (ref 36–48)
HGB BLD-MCNC: 9.2 G/DL (ref 12–16)
LYMPHOCYTES # BLD: 1.6 K/UL (ref 1–5.1)
LYMPHOCYTES NFR BLD: 23.3 %
MCH RBC QN AUTO: 28.8 PG (ref 26–34)
MCHC RBC AUTO-ENTMCNC: 33.1 G/DL (ref 31–36)
MCV RBC AUTO: 87.2 FL (ref 80–100)
MONOCYTES # BLD: 0.7 K/UL (ref 0–1.3)
MONOCYTES NFR BLD: 11.1 %
NEUTROPHILS # BLD: 4.1 K/UL (ref 1.7–7.7)
NEUTROPHILS NFR BLD: 60.7 %
PLATELET # BLD AUTO: 331 K/UL (ref 135–450)
PMV BLD AUTO: 8.5 FL (ref 5–10.5)
POTASSIUM SERPL-SCNC: 4.3 MMOL/L (ref 3.5–5.1)
PROCALCITONIN SERPL IA-MCNC: 2.25 NG/ML (ref 0–0.15)
RBC # BLD AUTO: 3.2 M/UL (ref 4–5.2)
SODIUM SERPL-SCNC: 140 MMOL/L (ref 136–145)
VANCOMYCIN SERPL-MCNC: 15.6 UG/ML
WBC # BLD AUTO: 6.8 K/UL (ref 4–11)

## 2025-08-02 PROCEDURE — 80048 BASIC METABOLIC PNL TOTAL CA: CPT

## 2025-08-02 PROCEDURE — 80202 ASSAY OF VANCOMYCIN: CPT

## 2025-08-02 PROCEDURE — 84145 PROCALCITONIN (PCT): CPT

## 2025-08-02 PROCEDURE — 85025 COMPLETE CBC W/AUTO DIFF WBC: CPT

## 2025-08-02 PROCEDURE — 2580000003 HC RX 258: Performed by: INTERNAL MEDICINE

## 2025-08-02 PROCEDURE — 6360000002 HC RX W HCPCS: Performed by: INTERNAL MEDICINE

## 2025-08-02 PROCEDURE — 94761 N-INVAS EAR/PLS OXIMETRY MLT: CPT

## 2025-08-02 PROCEDURE — 6370000000 HC RX 637 (ALT 250 FOR IP): Performed by: INTERNAL MEDICINE

## 2025-08-02 PROCEDURE — 94640 AIRWAY INHALATION TREATMENT: CPT

## 2025-08-02 PROCEDURE — A4722 DIALYS SOL FLD VOL > 1999CC: HCPCS | Performed by: INTERNAL MEDICINE

## 2025-08-02 PROCEDURE — 36415 COLL VENOUS BLD VENIPUNCTURE: CPT

## 2025-08-02 PROCEDURE — 2500000003 HC RX 250 WO HCPCS: Performed by: INTERNAL MEDICINE

## 2025-08-02 PROCEDURE — 99223 1ST HOSP IP/OBS HIGH 75: CPT | Performed by: INTERNAL MEDICINE

## 2025-08-02 PROCEDURE — 2060000000 HC ICU INTERMEDIATE R&B

## 2025-08-02 PROCEDURE — 6370000000 HC RX 637 (ALT 250 FOR IP): Performed by: NURSE PRACTITIONER

## 2025-08-02 RX ORDER — METOCLOPRAMIDE 10 MG/1
5 TABLET ORAL 2 TIMES DAILY WITH MEALS
Status: DISCONTINUED | OUTPATIENT
Start: 2025-08-02 | End: 2025-08-06 | Stop reason: HOSPADM

## 2025-08-02 RX ORDER — OXYBUTYNIN CHLORIDE 5 MG/1
5 TABLET, EXTENDED RELEASE ORAL EVERY EVENING
Status: DISCONTINUED | OUTPATIENT
Start: 2025-08-02 | End: 2025-08-06 | Stop reason: HOSPADM

## 2025-08-02 RX ORDER — SEVELAMER CARBONATE 800 MG/1
1600 TABLET, FILM COATED ORAL
Status: DISCONTINUED | OUTPATIENT
Start: 2025-08-02 | End: 2025-08-06 | Stop reason: HOSPADM

## 2025-08-02 RX ORDER — CLOPIDOGREL BISULFATE 75 MG/1
75 TABLET ORAL DAILY
Status: DISCONTINUED | OUTPATIENT
Start: 2025-08-02 | End: 2025-08-06 | Stop reason: HOSPADM

## 2025-08-02 RX ORDER — ROSUVASTATIN CALCIUM 10 MG/1
10 TABLET, COATED ORAL NIGHTLY
Status: DISCONTINUED | OUTPATIENT
Start: 2025-08-02 | End: 2025-08-06 | Stop reason: HOSPADM

## 2025-08-02 RX ORDER — ROSUVASTATIN CALCIUM 40 MG/1
40 TABLET, COATED ORAL NIGHTLY
Status: DISCONTINUED | OUTPATIENT
Start: 2025-08-02 | End: 2025-08-02

## 2025-08-02 RX ORDER — ROPINIROLE 1 MG/1
1 TABLET, FILM COATED ORAL NIGHTLY
Status: DISCONTINUED | OUTPATIENT
Start: 2025-08-02 | End: 2025-08-06 | Stop reason: HOSPADM

## 2025-08-02 RX ORDER — LANOLIN ALCOHOL/MO/W.PET/CERES
400 CREAM (GRAM) TOPICAL NIGHTLY
Status: DISCONTINUED | OUTPATIENT
Start: 2025-08-02 | End: 2025-08-06 | Stop reason: HOSPADM

## 2025-08-02 RX ORDER — ASPIRIN 81 MG/1
81 TABLET, CHEWABLE ORAL DAILY
Status: DISCONTINUED | OUTPATIENT
Start: 2025-08-02 | End: 2025-08-06 | Stop reason: HOSPADM

## 2025-08-02 RX ORDER — LOPERAMIDE HYDROCHLORIDE 2 MG/1
2 CAPSULE ORAL 4 TIMES DAILY PRN
Status: DISCONTINUED | OUTPATIENT
Start: 2025-08-02 | End: 2025-08-06 | Stop reason: HOSPADM

## 2025-08-02 RX ADMIN — METRONIDAZOLE 500 MG: 500 INJECTION, SOLUTION INTRAVENOUS at 19:33

## 2025-08-02 RX ADMIN — ROPINIROLE HYDROCHLORIDE 1 MG: 1 TABLET, FILM COATED ORAL at 19:34

## 2025-08-02 RX ADMIN — METRONIDAZOLE 500 MG: 500 INJECTION, SOLUTION INTRAVENOUS at 05:34

## 2025-08-02 RX ADMIN — PANTOPRAZOLE SODIUM 40 MG: 40 TABLET, DELAYED RELEASE ORAL at 05:37

## 2025-08-02 RX ADMIN — CLOPIDOGREL BISULFATE 75 MG: 75 TABLET, FILM COATED ORAL at 08:55

## 2025-08-02 RX ADMIN — GENTAMICIN SULFATE: 1 OINTMENT TOPICAL at 06:15

## 2025-08-02 RX ADMIN — LOPERAMIDE HYDROCHLORIDE 2 MG: 2 CAPSULE ORAL at 17:16

## 2025-08-02 RX ADMIN — SODIUM CHLORIDE, SODIUM LACTATE, CALCIUM CHLORIDE, MAGNESIUM CHLORIDE AND DEXTROSE 6000 ML: 1.5; 538; 448; 18.3; 5.08 INJECTION, SOLUTION INTRAPERITONEAL at 19:38

## 2025-08-02 RX ADMIN — METRONIDAZOLE 500 MG: 500 INJECTION, SOLUTION INTRAVENOUS at 13:26

## 2025-08-02 RX ADMIN — SEVELAMER CARBONATE 1600 MG: 800 TABLET, FILM COATED ORAL at 08:55

## 2025-08-02 RX ADMIN — HEPARIN SODIUM 5000 UNITS: 5000 INJECTION INTRAVENOUS; SUBCUTANEOUS at 14:36

## 2025-08-02 RX ADMIN — ASPIRIN 81 MG: 81 TABLET, CHEWABLE ORAL at 08:55

## 2025-08-02 RX ADMIN — LOPERAMIDE HYDROCHLORIDE 2 MG: 2 CAPSULE ORAL at 14:36

## 2025-08-02 RX ADMIN — TIOTROPIUM BROMIDE AND OLODATEROL 2 PUFF: 3.124; 2.736 SPRAY, METERED RESPIRATORY (INHALATION) at 07:38

## 2025-08-02 RX ADMIN — SODIUM CHLORIDE, PRESERVATIVE FREE 10 ML: 5 INJECTION INTRAVENOUS at 08:56

## 2025-08-02 RX ADMIN — CEFEPIME 1000 MG: 1 INJECTION, POWDER, FOR SOLUTION INTRAMUSCULAR; INTRAVENOUS at 09:02

## 2025-08-02 RX ADMIN — HEPARIN SODIUM 5000 UNITS: 5000 INJECTION INTRAVENOUS; SUBCUTANEOUS at 05:32

## 2025-08-02 RX ADMIN — HEPARIN SODIUM 5000 UNITS: 5000 INJECTION INTRAVENOUS; SUBCUTANEOUS at 21:45

## 2025-08-02 RX ADMIN — SEVELAMER CARBONATE 1600 MG: 800 TABLET, FILM COATED ORAL at 17:16

## 2025-08-02 RX ADMIN — METOCLOPRAMIDE 5 MG: 10 TABLET ORAL at 17:16

## 2025-08-02 RX ADMIN — METOCLOPRAMIDE 5 MG: 10 TABLET ORAL at 08:55

## 2025-08-02 RX ADMIN — SEVELAMER CARBONATE 1600 MG: 800 TABLET, FILM COATED ORAL at 11:13

## 2025-08-02 RX ADMIN — VANCOMYCIN HYDROCHLORIDE 750 MG: 750 INJECTION, POWDER, LYOPHILIZED, FOR SOLUTION INTRAVENOUS at 14:40

## 2025-08-02 RX ADMIN — Medication 400 MG: at 19:34

## 2025-08-02 RX ADMIN — OXYBUTYNIN CHLORIDE 5 MG: 5 TABLET, EXTENDED RELEASE ORAL at 17:16

## 2025-08-02 RX ADMIN — ALPRAZOLAM 2 MG: 0.5 TABLET ORAL at 19:34

## 2025-08-02 RX ADMIN — ROSUVASTATIN CALCIUM 10 MG: 10 TABLET, FILM COATED ORAL at 19:34

## 2025-08-02 RX ADMIN — TIZANIDINE 4 MG: 4 TABLET ORAL at 21:46

## 2025-08-02 ASSESSMENT — PAIN SCALES - GENERAL
PAINLEVEL_OUTOF10: 0

## 2025-08-03 LAB
ANION GAP SERPL CALCULATED.3IONS-SCNC: 11 MMOL/L (ref 3–16)
APPEARANCE FLUID: CLEAR
BASOPHILS # BLD: 0.1 K/UL (ref 0–0.2)
BASOPHILS NFR BLD: 0.9 %
BDY FLUID QUALITY: NORMAL
BUN SERPL-MCNC: 34 MG/DL (ref 7–20)
CALCIUM SERPL-MCNC: 8.5 MG/DL (ref 8.3–10.6)
CELL COUNT FLUID TYPE: NORMAL
CHLORIDE SERPL-SCNC: 101 MMOL/L (ref 99–110)
CO2 SERPL-SCNC: 25 MMOL/L (ref 21–32)
COLOR FLUID: COLORLESS
CREAT SERPL-MCNC: 6.9 MG/DL (ref 0.6–1.2)
DEPRECATED RDW RBC AUTO: 15.1 % (ref 12.4–15.4)
DIFF PNL FLD: NORMAL
EOSINOPHIL # BLD: 0.3 K/UL (ref 0–0.6)
EOSINOPHIL NFR BLD: 4.5 %
GFR SERPLBLD CREATININE-BSD FMLA CKD-EPI: 6 ML/MIN/{1.73_M2}
GI PATHOGENS PNL STL NAA+PROBE: NORMAL
GLUCOSE SERPL-MCNC: 93 MG/DL (ref 70–99)
HCT VFR BLD AUTO: 27 % (ref 36–48)
HGB BLD-MCNC: 8.7 G/DL (ref 12–16)
LYMPHOCYTES # BLD: 2 K/UL (ref 1–5.1)
LYMPHOCYTES NFR BLD: 26 %
MCH RBC QN AUTO: 28.6 PG (ref 26–34)
MCHC RBC AUTO-ENTMCNC: 32.4 G/DL (ref 31–36)
MCV RBC AUTO: 88.2 FL (ref 80–100)
MONOCYTES # BLD: 0.7 K/UL (ref 0–1.3)
MONOCYTES NFR BLD: 8.7 %
NEUTROPHILS # BLD: 4.6 K/UL (ref 1.7–7.7)
NEUTROPHILS NFR BLD: 59.9 %
NUC CELL # FLD: 8 /CUMM
PLATELET # BLD AUTO: 331 K/UL (ref 135–450)
PMV BLD AUTO: 8 FL (ref 5–10.5)
POTASSIUM SERPL-SCNC: 4.1 MMOL/L (ref 3.5–5.1)
RBC # BLD AUTO: 3.06 M/UL (ref 4–5.2)
RBC FLUID: <2000 /CUMM
SODIUM SERPL-SCNC: 137 MMOL/L (ref 136–145)
WBC # BLD AUTO: 7.7 K/UL (ref 4–11)

## 2025-08-03 PROCEDURE — 2580000003 HC RX 258: Performed by: INTERNAL MEDICINE

## 2025-08-03 PROCEDURE — 36415 COLL VENOUS BLD VENIPUNCTURE: CPT

## 2025-08-03 PROCEDURE — 85025 COMPLETE CBC W/AUTO DIFF WBC: CPT

## 2025-08-03 PROCEDURE — 6370000000 HC RX 637 (ALT 250 FOR IP): Performed by: INTERNAL MEDICINE

## 2025-08-03 PROCEDURE — 89050 BODY FLUID CELL COUNT: CPT

## 2025-08-03 PROCEDURE — 2060000000 HC ICU INTERMEDIATE R&B

## 2025-08-03 PROCEDURE — 80048 BASIC METABOLIC PNL TOTAL CA: CPT

## 2025-08-03 PROCEDURE — 94761 N-INVAS EAR/PLS OXIMETRY MLT: CPT

## 2025-08-03 PROCEDURE — 97116 GAIT TRAINING THERAPY: CPT

## 2025-08-03 PROCEDURE — 6360000002 HC RX W HCPCS: Performed by: INTERNAL MEDICINE

## 2025-08-03 PROCEDURE — 6370000000 HC RX 637 (ALT 250 FOR IP): Performed by: NURSE PRACTITIONER

## 2025-08-03 PROCEDURE — 97161 PT EVAL LOW COMPLEX 20 MIN: CPT

## 2025-08-03 PROCEDURE — 2500000003 HC RX 250 WO HCPCS: Performed by: INTERNAL MEDICINE

## 2025-08-03 PROCEDURE — 97530 THERAPEUTIC ACTIVITIES: CPT

## 2025-08-03 PROCEDURE — 94640 AIRWAY INHALATION TREATMENT: CPT

## 2025-08-03 RX ORDER — SODIUM CHLORIDE, SODIUM LACTATE, CALCIUM CHLORIDE, MAGNESIUM CHLORIDE AND DEXTROSE 2.5; 538; 448; 18.3; 5.08 G/100ML; MG/100ML; MG/100ML; MG/100ML; MG/100ML
6000 INJECTION, SOLUTION INTRAPERITONEAL
Status: DISCONTINUED | OUTPATIENT
Start: 2025-08-03 | End: 2025-08-06 | Stop reason: HOSPADM

## 2025-08-03 RX ORDER — SODIUM CHLORIDE, SODIUM LACTATE, CALCIUM CHLORIDE, MAGNESIUM CHLORIDE AND DEXTROSE 2.5; 538; 448; 18.3; 5.08 G/100ML; MG/100ML; MG/100ML; MG/100ML; MG/100ML
12000 INJECTION, SOLUTION INTRAPERITONEAL EVERY 6 HOURS
Status: DISCONTINUED | OUTPATIENT
Start: 2025-08-03 | End: 2025-08-03 | Stop reason: CLARIF

## 2025-08-03 RX ADMIN — ROPINIROLE HYDROCHLORIDE 1 MG: 1 TABLET, FILM COATED ORAL at 19:36

## 2025-08-03 RX ADMIN — HEPARIN SODIUM 5000 UNITS: 5000 INJECTION INTRAVENOUS; SUBCUTANEOUS at 22:12

## 2025-08-03 RX ADMIN — SODIUM CHLORIDE, SODIUM LACTATE, CALCIUM CHLORIDE, MAGNESIUM CHLORIDE AND DEXTROSE 6000 ML: 2.5; 538; 448; 18.3; 5.08 INJECTION, SOLUTION INTRAPERITONEAL at 18:54

## 2025-08-03 RX ADMIN — SEVELAMER CARBONATE 1600 MG: 800 TABLET, FILM COATED ORAL at 08:44

## 2025-08-03 RX ADMIN — HEPARIN SODIUM 5000 UNITS: 5000 INJECTION INTRAVENOUS; SUBCUTANEOUS at 13:42

## 2025-08-03 RX ADMIN — ASPIRIN 81 MG: 81 TABLET, CHEWABLE ORAL at 08:44

## 2025-08-03 RX ADMIN — METRONIDAZOLE 500 MG: 500 INJECTION, SOLUTION INTRAVENOUS at 20:29

## 2025-08-03 RX ADMIN — ROSUVASTATIN CALCIUM 10 MG: 10 TABLET, FILM COATED ORAL at 19:35

## 2025-08-03 RX ADMIN — SEVELAMER CARBONATE 1600 MG: 800 TABLET, FILM COATED ORAL at 13:14

## 2025-08-03 RX ADMIN — HEPARIN SODIUM 5000 UNITS: 5000 INJECTION INTRAVENOUS; SUBCUTANEOUS at 05:12

## 2025-08-03 RX ADMIN — METOCLOPRAMIDE 5 MG: 10 TABLET ORAL at 08:44

## 2025-08-03 RX ADMIN — CLOPIDOGREL BISULFATE 75 MG: 75 TABLET, FILM COATED ORAL at 08:44

## 2025-08-03 RX ADMIN — PANTOPRAZOLE SODIUM 40 MG: 40 TABLET, DELAYED RELEASE ORAL at 05:12

## 2025-08-03 RX ADMIN — ALPRAZOLAM 2 MG: 0.5 TABLET ORAL at 19:36

## 2025-08-03 RX ADMIN — METRONIDAZOLE 500 MG: 500 INJECTION, SOLUTION INTRAVENOUS at 13:16

## 2025-08-03 RX ADMIN — SEVELAMER CARBONATE 1600 MG: 800 TABLET, FILM COATED ORAL at 17:30

## 2025-08-03 RX ADMIN — LOPERAMIDE HYDROCHLORIDE 2 MG: 2 CAPSULE ORAL at 13:42

## 2025-08-03 RX ADMIN — CEFEPIME 1000 MG: 1 INJECTION, POWDER, FOR SOLUTION INTRAMUSCULAR; INTRAVENOUS at 08:45

## 2025-08-03 RX ADMIN — OXYBUTYNIN CHLORIDE 5 MG: 5 TABLET, EXTENDED RELEASE ORAL at 17:30

## 2025-08-03 RX ADMIN — GENTAMICIN SULFATE: 1 OINTMENT TOPICAL at 05:04

## 2025-08-03 RX ADMIN — TIOTROPIUM BROMIDE AND OLODATEROL 2 PUFF: 3.124; 2.736 SPRAY, METERED RESPIRATORY (INHALATION) at 08:17

## 2025-08-03 RX ADMIN — SODIUM CHLORIDE, PRESERVATIVE FREE 10 ML: 5 INJECTION INTRAVENOUS at 19:37

## 2025-08-03 RX ADMIN — Medication 3 MG: at 20:54

## 2025-08-03 RX ADMIN — METOCLOPRAMIDE 5 MG: 10 TABLET ORAL at 17:30

## 2025-08-03 RX ADMIN — SODIUM CHLORIDE, PRESERVATIVE FREE 10 ML: 5 INJECTION INTRAVENOUS at 08:44

## 2025-08-03 RX ADMIN — METRONIDAZOLE 500 MG: 500 INJECTION, SOLUTION INTRAVENOUS at 04:27

## 2025-08-03 RX ADMIN — Medication 400 MG: at 19:35

## 2025-08-03 ASSESSMENT — PAIN SCALES - GENERAL
PAINLEVEL_OUTOF10: 0

## 2025-08-04 PROBLEM — N18.6 ESRD (END STAGE RENAL DISEASE) ON DIALYSIS (HCC): Status: ACTIVE | Noted: 2025-08-04

## 2025-08-04 PROBLEM — R79.89 ELEVATED PROCALCITONIN: Status: ACTIVE | Noted: 2025-08-04

## 2025-08-04 PROBLEM — B95.7 COAGULASE-NEGATIVE STAPHYLOCOCCAL INFECTION: Status: ACTIVE | Noted: 2025-08-04

## 2025-08-04 PROBLEM — Z99.2 ESRD (END STAGE RENAL DISEASE) ON DIALYSIS (HCC): Status: ACTIVE | Noted: 2025-08-04

## 2025-08-04 PROBLEM — T85.71XA: Status: ACTIVE | Noted: 2025-08-04

## 2025-08-04 LAB
ANION GAP SERPL CALCULATED.3IONS-SCNC: 12 MMOL/L (ref 3–16)
APPEARANCE FLUID: CLEAR
BACTERIA FLD AEROBE CULT: ABNORMAL
BASOPHILS # BLD: 0.1 K/UL (ref 0–0.2)
BASOPHILS NFR BLD: 0.7 %
BDY FLUID QUALITY: NORMAL
BUN SERPL-MCNC: 29 MG/DL (ref 7–20)
CALCIUM SERPL-MCNC: 8.5 MG/DL (ref 8.3–10.6)
CELL COUNT FLUID TYPE: NORMAL
CHLORIDE SERPL-SCNC: 100 MMOL/L (ref 99–110)
CO2 SERPL-SCNC: 25 MMOL/L (ref 21–32)
COLOR FLUID: COLORLESS
CREAT SERPL-MCNC: 7.2 MG/DL (ref 0.6–1.2)
DEPRECATED RDW RBC AUTO: 15.2 % (ref 12.4–15.4)
EOSINOPHIL # BLD: 0.3 K/UL (ref 0–0.6)
EOSINOPHIL NFR BLD: 4 %
EOSINOPHIL NFR FLD MANUAL: 10 %
FERRITIN SERPL IA-MCNC: 281 NG/ML (ref 15–150)
GFR SERPLBLD CREATININE-BSD FMLA CKD-EPI: 6 ML/MIN/{1.73_M2}
GLUCOSE SERPL-MCNC: 132 MG/DL (ref 70–99)
GRAM STN SPEC: ABNORMAL
HCT VFR BLD AUTO: 29.2 % (ref 36–48)
HGB BLD-MCNC: 9.6 G/DL (ref 12–16)
IRON SATN MFR SERPL: 51 % (ref 15–50)
IRON SERPL-MCNC: 92 UG/DL (ref 37–145)
LYMPHOCYTES # BLD: 1.4 K/UL (ref 1–5.1)
LYMPHOCYTES NFR BLD: 18.5 %
LYMPHOCYTES NFR FLD: 20 %
MACROPHAGES # FLD: 1 %
MAGNESIUM SERPL-MCNC: 1.53 MG/DL (ref 1.8–2.4)
MCH RBC QN AUTO: 28.6 PG (ref 26–34)
MCHC RBC AUTO-ENTMCNC: 33 G/DL (ref 31–36)
MCV RBC AUTO: 86.5 FL (ref 80–100)
MESOTHL CELL NFR FLD: 1 %
MONOCYTES # BLD: 0.7 K/UL (ref 0–1.3)
MONOCYTES NFR BLD: 9.2 %
MONOCYTES NFR FLD: 23 %
NEUTROPHIL, FLUID: 45 %
NEUTROPHILS # BLD: 5.1 K/UL (ref 1.7–7.7)
NEUTROPHILS NFR BLD: 67.6 %
NUC CELL # FLD: 53 /CUMM
ORGANISM: ABNORMAL
PLATELET # BLD AUTO: 339 K/UL (ref 135–450)
PMV BLD AUTO: 8.1 FL (ref 5–10.5)
POTASSIUM SERPL-SCNC: 3.4 MMOL/L (ref 3.5–5.1)
PROCALCITONIN SERPL IA-MCNC: 1.18 NG/ML (ref 0–0.15)
RBC # BLD AUTO: 3.37 M/UL (ref 4–5.2)
RBC FLUID: <2000 /CUMM
SODIUM SERPL-SCNC: 137 MMOL/L (ref 136–145)
TIBC SERPL-MCNC: 182 UG/DL (ref 260–445)
TOTAL CELLS COUNTED FLD: 100
VANCOMYCIN SERPL-MCNC: 20.5 UG/ML
WBC # BLD AUTO: 7.5 K/UL (ref 4–11)

## 2025-08-04 PROCEDURE — 6370000000 HC RX 637 (ALT 250 FOR IP): Performed by: HOSPITALIST

## 2025-08-04 PROCEDURE — 83540 ASSAY OF IRON: CPT

## 2025-08-04 PROCEDURE — 87070 CULTURE OTHR SPECIMN AEROBIC: CPT

## 2025-08-04 PROCEDURE — 97530 THERAPEUTIC ACTIVITIES: CPT

## 2025-08-04 PROCEDURE — 99233 SBSQ HOSP IP/OBS HIGH 50: CPT | Performed by: INTERNAL MEDICINE

## 2025-08-04 PROCEDURE — 83550 IRON BINDING TEST: CPT

## 2025-08-04 PROCEDURE — 85025 COMPLETE CBC W/AUTO DIFF WBC: CPT

## 2025-08-04 PROCEDURE — 2580000003 HC RX 258: Performed by: INTERNAL MEDICINE

## 2025-08-04 PROCEDURE — 36415 COLL VENOUS BLD VENIPUNCTURE: CPT

## 2025-08-04 PROCEDURE — 6370000000 HC RX 637 (ALT 250 FOR IP): Performed by: NURSE PRACTITIONER

## 2025-08-04 PROCEDURE — 82728 ASSAY OF FERRITIN: CPT

## 2025-08-04 PROCEDURE — 84145 PROCALCITONIN (PCT): CPT

## 2025-08-04 PROCEDURE — 89051 BODY FLUID CELL COUNT: CPT

## 2025-08-04 PROCEDURE — 97535 SELF CARE MNGMENT TRAINING: CPT

## 2025-08-04 PROCEDURE — 2500000003 HC RX 250 WO HCPCS: Performed by: INTERNAL MEDICINE

## 2025-08-04 PROCEDURE — 80048 BASIC METABOLIC PNL TOTAL CA: CPT

## 2025-08-04 PROCEDURE — 97116 GAIT TRAINING THERAPY: CPT

## 2025-08-04 PROCEDURE — 6370000000 HC RX 637 (ALT 250 FOR IP): Performed by: INTERNAL MEDICINE

## 2025-08-04 PROCEDURE — 6360000002 HC RX W HCPCS: Performed by: INTERNAL MEDICINE

## 2025-08-04 PROCEDURE — 83735 ASSAY OF MAGNESIUM: CPT

## 2025-08-04 PROCEDURE — 97165 OT EVAL LOW COMPLEX 30 MIN: CPT

## 2025-08-04 PROCEDURE — 94760 N-INVAS EAR/PLS OXIMETRY 1: CPT

## 2025-08-04 PROCEDURE — 2060000000 HC ICU INTERMEDIATE R&B

## 2025-08-04 PROCEDURE — 80202 ASSAY OF VANCOMYCIN: CPT

## 2025-08-04 PROCEDURE — G0545 PR INHERENT VISIT TO INPT: HCPCS | Performed by: INTERNAL MEDICINE

## 2025-08-04 PROCEDURE — 87205 SMEAR GRAM STAIN: CPT

## 2025-08-04 RX ORDER — ONDANSETRON 2 MG/ML
4 INJECTION INTRAMUSCULAR; INTRAVENOUS EVERY 6 HOURS PRN
Status: DISCONTINUED | OUTPATIENT
Start: 2025-08-04 | End: 2025-08-06 | Stop reason: HOSPADM

## 2025-08-04 RX ORDER — MAGNESIUM SULFATE 1 G/100ML
1000 INJECTION INTRAVENOUS ONCE
Status: COMPLETED | OUTPATIENT
Start: 2025-08-04 | End: 2025-08-04

## 2025-08-04 RX ORDER — ZOLPIDEM TARTRATE 5 MG/1
5 TABLET ORAL ONCE
Status: COMPLETED | OUTPATIENT
Start: 2025-08-04 | End: 2025-08-04

## 2025-08-04 RX ORDER — POTASSIUM CHLORIDE 1500 MG/1
40 TABLET, EXTENDED RELEASE ORAL PRN
Status: DISCONTINUED | OUTPATIENT
Start: 2025-08-04 | End: 2025-08-04

## 2025-08-04 RX ORDER — POTASSIUM CHLORIDE 1500 MG/1
40 TABLET, EXTENDED RELEASE ORAL ONCE
Status: COMPLETED | OUTPATIENT
Start: 2025-08-04 | End: 2025-08-04

## 2025-08-04 RX ORDER — LOSARTAN POTASSIUM 50 MG/1
50 TABLET ORAL NIGHTLY
Status: DISCONTINUED | OUTPATIENT
Start: 2025-08-04 | End: 2025-08-06 | Stop reason: HOSPADM

## 2025-08-04 RX ORDER — ROPINIROLE 0.5 MG/1
0.5 TABLET, FILM COATED ORAL ONCE
Status: COMPLETED | OUTPATIENT
Start: 2025-08-04 | End: 2025-08-04

## 2025-08-04 RX ORDER — MAGNESIUM SULFATE IN WATER 40 MG/ML
2000 INJECTION, SOLUTION INTRAVENOUS PRN
Status: DISCONTINUED | OUTPATIENT
Start: 2025-08-04 | End: 2025-08-04

## 2025-08-04 RX ORDER — METOPROLOL SUCCINATE 50 MG/1
50 TABLET, EXTENDED RELEASE ORAL DAILY
Status: DISCONTINUED | OUTPATIENT
Start: 2025-08-04 | End: 2025-08-06 | Stop reason: HOSPADM

## 2025-08-04 RX ORDER — POTASSIUM CHLORIDE 7.45 MG/ML
10 INJECTION INTRAVENOUS PRN
Status: DISCONTINUED | OUTPATIENT
Start: 2025-08-04 | End: 2025-08-04

## 2025-08-04 RX ADMIN — ROPINIROLE HYDROCHLORIDE 0.5 MG: 0.5 TABLET, FILM COATED ORAL at 16:59

## 2025-08-04 RX ADMIN — SODIUM CHLORIDE, PRESERVATIVE FREE 10 ML: 5 INJECTION INTRAVENOUS at 20:43

## 2025-08-04 RX ADMIN — LOPERAMIDE HYDROCHLORIDE 2 MG: 2 CAPSULE ORAL at 14:04

## 2025-08-04 RX ADMIN — ROPINIROLE HYDROCHLORIDE 1 MG: 1 TABLET, FILM COATED ORAL at 20:46

## 2025-08-04 RX ADMIN — HEPARIN SODIUM 5000 UNITS: 5000 INJECTION INTRAVENOUS; SUBCUTANEOUS at 21:06

## 2025-08-04 RX ADMIN — LOPERAMIDE HYDROCHLORIDE 2 MG: 2 CAPSULE ORAL at 08:38

## 2025-08-04 RX ADMIN — ASPIRIN 81 MG: 81 TABLET, CHEWABLE ORAL at 08:42

## 2025-08-04 RX ADMIN — HEPARIN SODIUM 5000 UNITS: 5000 INJECTION INTRAVENOUS; SUBCUTANEOUS at 05:17

## 2025-08-04 RX ADMIN — ALPRAZOLAM 2 MG: 0.5 TABLET ORAL at 20:46

## 2025-08-04 RX ADMIN — SODIUM CHLORIDE, PRESERVATIVE FREE 10 ML: 5 INJECTION INTRAVENOUS at 08:39

## 2025-08-04 RX ADMIN — LOPERAMIDE HYDROCHLORIDE 2 MG: 2 CAPSULE ORAL at 20:46

## 2025-08-04 RX ADMIN — TIZANIDINE 4 MG: 4 TABLET ORAL at 23:07

## 2025-08-04 RX ADMIN — SODIUM CHLORIDE, SODIUM LACTATE, CALCIUM CHLORIDE, MAGNESIUM CHLORIDE AND DEXTROSE 6000 ML: 2.5; 538; 448; 18.3; 5.08 INJECTION, SOLUTION INTRAPERITONEAL at 20:43

## 2025-08-04 RX ADMIN — SEVELAMER CARBONATE 1600 MG: 800 TABLET, FILM COATED ORAL at 08:38

## 2025-08-04 RX ADMIN — Medication 3 MG: at 00:06

## 2025-08-04 RX ADMIN — ROSUVASTATIN CALCIUM 10 MG: 10 TABLET, FILM COATED ORAL at 20:46

## 2025-08-04 RX ADMIN — METRONIDAZOLE 500 MG: 500 INJECTION, SOLUTION INTRAVENOUS at 05:19

## 2025-08-04 RX ADMIN — GENTAMICIN SULFATE: 1 OINTMENT TOPICAL at 12:15

## 2025-08-04 RX ADMIN — LOSARTAN POTASSIUM 50 MG: 50 TABLET, FILM COATED ORAL at 20:46

## 2025-08-04 RX ADMIN — PANTOPRAZOLE SODIUM 40 MG: 40 TABLET, DELAYED RELEASE ORAL at 08:38

## 2025-08-04 RX ADMIN — ZOLPIDEM TARTRATE 5 MG: 5 TABLET ORAL at 00:11

## 2025-08-04 RX ADMIN — ONDANSETRON 4 MG: 2 INJECTION, SOLUTION INTRAMUSCULAR; INTRAVENOUS at 08:55

## 2025-08-04 RX ADMIN — Medication 400 MG: at 20:46

## 2025-08-04 RX ADMIN — OXYBUTYNIN CHLORIDE 5 MG: 5 TABLET, EXTENDED RELEASE ORAL at 16:59

## 2025-08-04 RX ADMIN — METOPROLOL SUCCINATE 50 MG: 50 TABLET, EXTENDED RELEASE ORAL at 10:12

## 2025-08-04 RX ADMIN — MAGNESIUM SULFATE HEPTAHYDRATE 1000 MG: 1 INJECTION, SOLUTION INTRAVENOUS at 10:18

## 2025-08-04 RX ADMIN — ROPINIROLE HYDROCHLORIDE 0.5 MG: 0.5 TABLET, FILM COATED ORAL at 01:57

## 2025-08-04 RX ADMIN — SEVELAMER CARBONATE 1600 MG: 800 TABLET, FILM COATED ORAL at 18:01

## 2025-08-04 RX ADMIN — HEPARIN SODIUM 5000 UNITS: 5000 INJECTION INTRAVENOUS; SUBCUTANEOUS at 14:04

## 2025-08-04 RX ADMIN — POTASSIUM CHLORIDE 40 MEQ: 1500 TABLET, EXTENDED RELEASE ORAL at 10:12

## 2025-08-04 RX ADMIN — CEFEPIME 1000 MG: 1 INJECTION, POWDER, FOR SOLUTION INTRAMUSCULAR; INTRAVENOUS at 08:46

## 2025-08-04 RX ADMIN — CLOPIDOGREL BISULFATE 75 MG: 75 TABLET, FILM COATED ORAL at 08:38

## 2025-08-04 RX ADMIN — METOCLOPRAMIDE 5 MG: 10 TABLET ORAL at 18:00

## 2025-08-04 ASSESSMENT — PAIN DESCRIPTION - ONSET
ONSET: ON-GOING
ONSET: ON-GOING

## 2025-08-04 ASSESSMENT — PAIN DESCRIPTION - ORIENTATION
ORIENTATION: MID;POSTERIOR
ORIENTATION: LEFT;RIGHT
ORIENTATION: RIGHT;LEFT

## 2025-08-04 ASSESSMENT — PAIN SCALES - GENERAL
PAINLEVEL_OUTOF10: 2
PAINLEVEL_OUTOF10: 0
PAINLEVEL_OUTOF10: 8
PAINLEVEL_OUTOF10: 5
PAINLEVEL_OUTOF10: 10
PAINLEVEL_OUTOF10: 0

## 2025-08-04 ASSESSMENT — PAIN DESCRIPTION - DESCRIPTORS
DESCRIPTORS: BURNING;DISCOMFORT
DESCRIPTORS: DISCOMFORT
DESCRIPTORS: DISCOMFORT

## 2025-08-04 ASSESSMENT — PAIN - FUNCTIONAL ASSESSMENT
PAIN_FUNCTIONAL_ASSESSMENT: PREVENTS OR INTERFERES SOME ACTIVE ACTIVITIES AND ADLS
PAIN_FUNCTIONAL_ASSESSMENT: ACTIVITIES ARE NOT PREVENTED
PAIN_FUNCTIONAL_ASSESSMENT: ACTIVITIES ARE NOT PREVENTED

## 2025-08-04 ASSESSMENT — PAIN DESCRIPTION - PAIN TYPE
TYPE: ACUTE PAIN
TYPE: CHRONIC PAIN

## 2025-08-04 ASSESSMENT — PAIN DESCRIPTION - LOCATION
LOCATION: PERINEUM;SACRUM
LOCATION: LEG
LOCATION: LEG

## 2025-08-04 ASSESSMENT — PAIN SCALES - WONG BAKER: WONGBAKER_NUMERICALRESPONSE: NO HURT

## 2025-08-04 ASSESSMENT — PAIN DESCRIPTION - FREQUENCY
FREQUENCY: CONTINUOUS
FREQUENCY: CONTINUOUS

## 2025-08-05 LAB
ANION GAP SERPL CALCULATED.3IONS-SCNC: 13 MMOL/L (ref 3–16)
APPEARANCE FLUID: CLEAR
BACTERIA BLD CULT ORG #2: NORMAL
BACTERIA BLD CULT: NORMAL
BASOPHILS # BLD: 0.1 K/UL (ref 0–0.2)
BASOPHILS NFR BLD: 0.6 %
BASOPHILS NFR FLD MANUAL: 2 %
BDY FLUID QUALITY: NORMAL
BUN SERPL-MCNC: 26 MG/DL (ref 7–20)
CALCIUM SERPL-MCNC: 8.6 MG/DL (ref 8.3–10.6)
CELL COUNT FLUID TYPE: NORMAL
CHLORIDE SERPL-SCNC: 101 MMOL/L (ref 99–110)
CO2 SERPL-SCNC: 24 MMOL/L (ref 21–32)
COLOR FLUID: COLORLESS
CREAT SERPL-MCNC: 7.3 MG/DL (ref 0.6–1.2)
DEPRECATED RDW RBC AUTO: 15.3 % (ref 12.4–15.4)
EOSINOPHIL # BLD: 0.3 K/UL (ref 0–0.6)
EOSINOPHIL NFR BLD: 3.1 %
EOSINOPHIL NFR FLD MANUAL: 6 %
GFR SERPLBLD CREATININE-BSD FMLA CKD-EPI: 5 ML/MIN/{1.73_M2}
GLUCOSE SERPL-MCNC: 117 MG/DL (ref 70–99)
HCT VFR BLD AUTO: 27.7 % (ref 36–48)
HGB BLD-MCNC: 9.2 G/DL (ref 12–16)
LYMPHOCYTES # BLD: 1.3 K/UL (ref 1–5.1)
LYMPHOCYTES NFR BLD: 13.1 %
LYMPHOCYTES NFR FLD: 36 %
MACROPHAGES # FLD: 28 %
MCH RBC QN AUTO: 29 PG (ref 26–34)
MCHC RBC AUTO-ENTMCNC: 33.2 G/DL (ref 31–36)
MCV RBC AUTO: 87.4 FL (ref 80–100)
MONOCYTES # BLD: 0.9 K/UL (ref 0–1.3)
MONOCYTES NFR BLD: 8.4 %
MONOCYTES NFR FLD: 2 %
NEUTROPHIL, FLUID: 26 %
NEUTROPHILS # BLD: 7.6 K/UL (ref 1.7–7.7)
NEUTROPHILS NFR BLD: 74.8 %
NUC CELL # FLD: 18 /CUMM
PLATELET # BLD AUTO: 376 K/UL (ref 135–450)
PMV BLD AUTO: 8.5 FL (ref 5–10.5)
POTASSIUM SERPL-SCNC: 4.3 MMOL/L (ref 3.5–5.1)
RBC # BLD AUTO: 3.17 M/UL (ref 4–5.2)
RBC FLUID: <2000 /CUMM
SODIUM SERPL-SCNC: 138 MMOL/L (ref 136–145)
SPECIMEN VOL FLD: 100 ML
TOTAL CELLS COUNTED FLD: 50
VANCOMYCIN SERPL-MCNC: 20 UG/ML
WBC # BLD AUTO: 10.2 K/UL (ref 4–11)

## 2025-08-05 PROCEDURE — 87205 SMEAR GRAM STAIN: CPT

## 2025-08-05 PROCEDURE — 87328 CRYPTOSPORIDIUM AG IA: CPT

## 2025-08-05 PROCEDURE — 6360000002 HC RX W HCPCS: Performed by: INTERNAL MEDICINE

## 2025-08-05 PROCEDURE — 6370000000 HC RX 637 (ALT 250 FOR IP): Performed by: NURSE PRACTITIONER

## 2025-08-05 PROCEDURE — 6370000000 HC RX 637 (ALT 250 FOR IP): Performed by: INTERNAL MEDICINE

## 2025-08-05 PROCEDURE — 87329 GIARDIA AG IA: CPT

## 2025-08-05 PROCEDURE — 2580000003 HC RX 258: Performed by: INTERNAL MEDICINE

## 2025-08-05 PROCEDURE — 87070 CULTURE OTHR SPECIMN AEROBIC: CPT

## 2025-08-05 PROCEDURE — 2500000003 HC RX 250 WO HCPCS: Performed by: INTERNAL MEDICINE

## 2025-08-05 PROCEDURE — 2060000000 HC ICU INTERMEDIATE R&B

## 2025-08-05 PROCEDURE — 94760 N-INVAS EAR/PLS OXIMETRY 1: CPT

## 2025-08-05 PROCEDURE — 85025 COMPLETE CBC W/AUTO DIFF WBC: CPT

## 2025-08-05 PROCEDURE — 36415 COLL VENOUS BLD VENIPUNCTURE: CPT

## 2025-08-05 PROCEDURE — 89051 BODY FLUID CELL COUNT: CPT

## 2025-08-05 PROCEDURE — 3E1M39Z IRRIGATION OF PERITONEAL CAVITY USING DIALYSATE, PERCUTANEOUS APPROACH: ICD-10-PCS | Performed by: INTERNAL MEDICINE

## 2025-08-05 PROCEDURE — 80048 BASIC METABOLIC PNL TOTAL CA: CPT

## 2025-08-05 PROCEDURE — 6370000000 HC RX 637 (ALT 250 FOR IP)

## 2025-08-05 PROCEDURE — 90945 DIALYSIS ONE EVALUATION: CPT

## 2025-08-05 PROCEDURE — 80202 ASSAY OF VANCOMYCIN: CPT

## 2025-08-05 PROCEDURE — 87336 ENTAMOEB HIST DISPR AG IA: CPT

## 2025-08-05 PROCEDURE — 99232 SBSQ HOSP IP/OBS MODERATE 35: CPT | Performed by: INTERNAL MEDICINE

## 2025-08-05 RX ORDER — DIAZEPAM 2 MG/1
2 TABLET ORAL ONCE
Status: COMPLETED | OUTPATIENT
Start: 2025-08-05 | End: 2025-08-05

## 2025-08-05 RX ADMIN — HEPARIN SODIUM 5000 UNITS: 5000 INJECTION INTRAVENOUS; SUBCUTANEOUS at 14:12

## 2025-08-05 RX ADMIN — OXYBUTYNIN CHLORIDE 5 MG: 5 TABLET, EXTENDED RELEASE ORAL at 18:48

## 2025-08-05 RX ADMIN — SODIUM CHLORIDE, SODIUM LACTATE, CALCIUM CHLORIDE, MAGNESIUM CHLORIDE AND DEXTROSE 6000 ML: 2.5; 538; 448; 18.3; 5.08 INJECTION, SOLUTION INTRAPERITONEAL at 21:35

## 2025-08-05 RX ADMIN — HEPARIN SODIUM 5000 UNITS: 5000 INJECTION INTRAVENOUS; SUBCUTANEOUS at 05:32

## 2025-08-05 RX ADMIN — SEVELAMER CARBONATE 1600 MG: 800 TABLET, FILM COATED ORAL at 18:48

## 2025-08-05 RX ADMIN — ROSUVASTATIN CALCIUM 10 MG: 10 TABLET, FILM COATED ORAL at 20:12

## 2025-08-05 RX ADMIN — METOCLOPRAMIDE 5 MG: 10 TABLET ORAL at 18:48

## 2025-08-05 RX ADMIN — DIAZEPAM 2 MG: 2 TABLET ORAL at 04:13

## 2025-08-05 RX ADMIN — SEVELAMER CARBONATE 1600 MG: 800 TABLET, FILM COATED ORAL at 13:17

## 2025-08-05 RX ADMIN — HEPARIN SODIUM 5000 UNITS: 5000 INJECTION INTRAVENOUS; SUBCUTANEOUS at 20:12

## 2025-08-05 RX ADMIN — PANTOPRAZOLE SODIUM 40 MG: 40 TABLET, DELAYED RELEASE ORAL at 05:32

## 2025-08-05 RX ADMIN — CLOPIDOGREL BISULFATE 75 MG: 75 TABLET, FILM COATED ORAL at 07:39

## 2025-08-05 RX ADMIN — SODIUM CHLORIDE, PRESERVATIVE FREE 10 ML: 5 INJECTION INTRAVENOUS at 20:12

## 2025-08-05 RX ADMIN — SEVELAMER CARBONATE 1600 MG: 800 TABLET, FILM COATED ORAL at 07:39

## 2025-08-05 RX ADMIN — ALPRAZOLAM 2 MG: 0.5 TABLET ORAL at 20:40

## 2025-08-05 RX ADMIN — ASPIRIN 81 MG: 81 TABLET, CHEWABLE ORAL at 07:39

## 2025-08-05 RX ADMIN — SODIUM CHLORIDE, PRESERVATIVE FREE 10 ML: 5 INJECTION INTRAVENOUS at 07:39

## 2025-08-05 RX ADMIN — Medication 400 MG: at 20:12

## 2025-08-05 RX ADMIN — EPOETIN ALFA-EPBX 10000 UNITS: 10000 INJECTION, SOLUTION INTRAVENOUS; SUBCUTANEOUS at 16:57

## 2025-08-05 RX ADMIN — LOPERAMIDE HYDROCHLORIDE 2 MG: 2 CAPSULE ORAL at 07:44

## 2025-08-05 RX ADMIN — METOCLOPRAMIDE 5 MG: 10 TABLET ORAL at 07:39

## 2025-08-05 RX ADMIN — ROPINIROLE HYDROCHLORIDE 1 MG: 1 TABLET, FILM COATED ORAL at 20:12

## 2025-08-05 ASSESSMENT — PAIN SCALES - WONG BAKER
WONGBAKER_NUMERICALRESPONSE: NO HURT

## 2025-08-05 ASSESSMENT — PAIN SCALES - GENERAL
PAINLEVEL_OUTOF10: 0

## 2025-08-06 VITALS
DIASTOLIC BLOOD PRESSURE: 84 MMHG | TEMPERATURE: 98.1 F | HEIGHT: 64 IN | WEIGHT: 124.12 LBS | BODY MASS INDEX: 21.19 KG/M2 | SYSTOLIC BLOOD PRESSURE: 147 MMHG | OXYGEN SATURATION: 97 % | RESPIRATION RATE: 18 BRPM | HEART RATE: 69 BPM

## 2025-08-06 LAB
APPEARANCE FLUID: CLEAR
BDY FLUID QUALITY: NORMAL
CELL COUNT FLUID TYPE: NORMAL
COLOR FLUID: YELLOW
CRYPTOSP AG STL QL IA: NORMAL
E HISTOLYT AG STL QL IA: NORMAL
EOSINOPHIL NFR FLD MANUAL: 5 %
G LAMBLIA AG STL QL IA: NORMAL
LYMPHOCYTES NFR FLD: 37 %
MONOCYTES NFR FLD: 38 %
NEUTROPHIL, FLUID: 20 %
NUC CELL # FLD: 40 /CUMM
RBC FLUID: <2000 /CUMM
TOTAL CELLS COUNTED FLD: 100
VANCOMYCIN SERPL-MCNC: 17.2 UG/ML

## 2025-08-06 PROCEDURE — 94640 AIRWAY INHALATION TREATMENT: CPT

## 2025-08-06 PROCEDURE — 6370000000 HC RX 637 (ALT 250 FOR IP): Performed by: INTERNAL MEDICINE

## 2025-08-06 PROCEDURE — 6370000000 HC RX 637 (ALT 250 FOR IP): Performed by: NURSE PRACTITIONER

## 2025-08-06 PROCEDURE — 2500000003 HC RX 250 WO HCPCS: Performed by: INTERNAL MEDICINE

## 2025-08-06 PROCEDURE — 6360000002 HC RX W HCPCS: Performed by: INTERNAL MEDICINE

## 2025-08-06 PROCEDURE — 36415 COLL VENOUS BLD VENIPUNCTURE: CPT

## 2025-08-06 PROCEDURE — 80202 ASSAY OF VANCOMYCIN: CPT

## 2025-08-06 PROCEDURE — 90945 DIALYSIS ONE EVALUATION: CPT

## 2025-08-06 PROCEDURE — 94760 N-INVAS EAR/PLS OXIMETRY 1: CPT

## 2025-08-06 PROCEDURE — 6370000000 HC RX 637 (ALT 250 FOR IP)

## 2025-08-06 RX ORDER — DIAZEPAM 2 MG/1
2 TABLET ORAL ONCE
Status: COMPLETED | OUTPATIENT
Start: 2025-08-06 | End: 2025-08-06

## 2025-08-06 RX ORDER — ROPINIROLE 1 MG/1
1 TABLET, FILM COATED ORAL ONCE
Status: COMPLETED | OUTPATIENT
Start: 2025-08-06 | End: 2025-08-06

## 2025-08-06 RX ADMIN — METOCLOPRAMIDE 5 MG: 10 TABLET ORAL at 09:31

## 2025-08-06 RX ADMIN — SODIUM CHLORIDE, PRESERVATIVE FREE 10 ML: 5 INJECTION INTRAVENOUS at 09:31

## 2025-08-06 RX ADMIN — HEPARIN SODIUM 5000 UNITS: 5000 INJECTION INTRAVENOUS; SUBCUTANEOUS at 06:30

## 2025-08-06 RX ADMIN — METOPROLOL SUCCINATE 50 MG: 50 TABLET, EXTENDED RELEASE ORAL at 09:30

## 2025-08-06 RX ADMIN — TIOTROPIUM BROMIDE AND OLODATEROL 2 PUFF: 3.124; 2.736 SPRAY, METERED RESPIRATORY (INHALATION) at 08:08

## 2025-08-06 RX ADMIN — DIAZEPAM 2 MG: 2 TABLET ORAL at 01:40

## 2025-08-06 RX ADMIN — ASPIRIN 81 MG: 81 TABLET, CHEWABLE ORAL at 09:31

## 2025-08-06 RX ADMIN — SEVELAMER CARBONATE 1600 MG: 800 TABLET, FILM COATED ORAL at 09:30

## 2025-08-06 RX ADMIN — PANTOPRAZOLE SODIUM 40 MG: 40 TABLET, DELAYED RELEASE ORAL at 06:30

## 2025-08-06 RX ADMIN — ACETAMINOPHEN 650 MG: 325 TABLET ORAL at 00:06

## 2025-08-06 RX ADMIN — ROPINIROLE HYDROCHLORIDE 1 MG: 1 TABLET, FILM COATED ORAL at 10:48

## 2025-08-06 RX ADMIN — Medication 3 MG: at 00:05

## 2025-08-06 RX ADMIN — CLOPIDOGREL BISULFATE 75 MG: 75 TABLET, FILM COATED ORAL at 09:30

## 2025-08-06 RX ADMIN — SEVELAMER CARBONATE 1600 MG: 800 TABLET, FILM COATED ORAL at 12:33

## 2025-08-07 LAB
BACTERIA FLD AEROBE CULT: NORMAL
BACTERIA FLD AEROBE CULT: NORMAL
GRAM STN SPEC: NORMAL
GRAM STN SPEC: NORMAL

## 2025-08-09 LAB
BACTERIA FLD AEROBE CULT: NORMAL
GRAM STN SPEC: NORMAL

## 2025-08-13 ENCOUNTER — TELEPHONE (OUTPATIENT)
Dept: INTERNAL MEDICINE CLINIC | Age: 75
End: 2025-08-13

## (undated) DEVICE — ENDOSCOPY KIT: Brand: MEDLINE INDUSTRIES, INC.

## (undated) DEVICE — SINGLE USE SUCTION VALVE MAJ-209: Brand: SINGLE USE SUCTION VALVE (STERILE)

## (undated) DEVICE — SWIVEL CONNECTOR

## (undated) DEVICE — FORMALIN CLEAR VIAL 20 ML 10%

## (undated) DEVICE — FORCEPS BX 240CM 2.4MM L NDL RAD JAW 4 M00513334

## (undated) DEVICE — VISION PROBE ADAPTER AND SUCTION ADAPTER

## (undated) DEVICE — MAJ-1414 SINGLE USE ADPATER BIOPSY VALV: Brand: SINGLE USE ADAPTOR BIOPSY VALVE

## (undated) DEVICE — BIOPSY NEEDLE, 21G: Brand: FLEXISION

## (undated) DEVICE — BITE BLOCK ENDOSCP AD 60 FR W/ ADJ STRP PLAS GRN BLOX

## (undated) DEVICE — SINGLE USE BIOPSY VALVE MAJ-210: Brand: SINGLE USE BIOPSY VALVE (STERILE)